# Patient Record
Sex: FEMALE | Race: WHITE | NOT HISPANIC OR LATINO
[De-identification: names, ages, dates, MRNs, and addresses within clinical notes are randomized per-mention and may not be internally consistent; named-entity substitution may affect disease eponyms.]

---

## 2020-01-01 ENCOUNTER — TRANSCRIPTION ENCOUNTER (OUTPATIENT)
Age: 65
End: 2020-01-01

## 2020-01-01 ENCOUNTER — APPOINTMENT (OUTPATIENT)
Dept: OTOLARYNGOLOGY | Facility: CLINIC | Age: 65
End: 2020-01-01
Payer: MEDICARE

## 2020-01-01 ENCOUNTER — INPATIENT (INPATIENT)
Facility: HOSPITAL | Age: 65
LOS: 5 days | Discharge: HOSPICE | DRG: 853 | End: 2020-12-03
Attending: INTERNAL MEDICINE | Admitting: INTERNAL MEDICINE
Payer: MEDICARE

## 2020-01-01 ENCOUNTER — APPOINTMENT (OUTPATIENT)
Dept: THORACIC SURGERY | Facility: CLINIC | Age: 65
End: 2020-01-01
Payer: MEDICARE

## 2020-01-01 ENCOUNTER — APPOINTMENT (OUTPATIENT)
Dept: OTOLARYNGOLOGY | Facility: CLINIC | Age: 65
End: 2020-01-01

## 2020-01-01 ENCOUNTER — APPOINTMENT (OUTPATIENT)
Dept: RHEUMATOLOGY | Facility: CLINIC | Age: 65
End: 2020-01-01

## 2020-01-01 ENCOUNTER — APPOINTMENT (OUTPATIENT)
Dept: RHEUMATOLOGY | Facility: CLINIC | Age: 65
End: 2020-01-01
Payer: MEDICARE

## 2020-01-01 ENCOUNTER — INPATIENT (INPATIENT)
Facility: HOSPITAL | Age: 65
LOS: 21 days | Discharge: HOSPICE | DRG: 871 | End: 2020-12-29
Attending: FAMILY MEDICINE | Admitting: HOSPITALIST
Payer: MEDICARE

## 2020-01-01 VITALS
DIASTOLIC BLOOD PRESSURE: 69 MMHG | SYSTOLIC BLOOD PRESSURE: 113 MMHG | HEART RATE: 120 BPM | OXYGEN SATURATION: 96 % | RESPIRATION RATE: 24 BRPM

## 2020-01-01 VITALS
RESPIRATION RATE: 16 BRPM | OXYGEN SATURATION: 99 % | WEIGHT: 96 LBS | SYSTOLIC BLOOD PRESSURE: 122 MMHG | BODY MASS INDEX: 18.85 KG/M2 | HEART RATE: 87 BPM | DIASTOLIC BLOOD PRESSURE: 74 MMHG | TEMPERATURE: 97.7 F | HEIGHT: 60 IN

## 2020-01-01 VITALS
SYSTOLIC BLOOD PRESSURE: 106 MMHG | HEART RATE: 95 BPM | DIASTOLIC BLOOD PRESSURE: 71 MMHG | WEIGHT: 96 LBS | BODY MASS INDEX: 18.85 KG/M2 | HEIGHT: 60 IN

## 2020-01-01 VITALS
HEIGHT: 60 IN | SYSTOLIC BLOOD PRESSURE: 111 MMHG | WEIGHT: 99 LBS | DIASTOLIC BLOOD PRESSURE: 72 MMHG | HEART RATE: 94 BPM | BODY MASS INDEX: 19.44 KG/M2

## 2020-01-01 VITALS
HEART RATE: 100 BPM | RESPIRATION RATE: 16 BRPM | DIASTOLIC BLOOD PRESSURE: 74 MMHG | HEIGHT: 62 IN | WEIGHT: 89.95 LBS | OXYGEN SATURATION: 100 % | SYSTOLIC BLOOD PRESSURE: 108 MMHG

## 2020-01-01 VITALS
TEMPERATURE: 102 F | RESPIRATION RATE: 20 BRPM | SYSTOLIC BLOOD PRESSURE: 105 MMHG | OXYGEN SATURATION: 94 % | HEART RATE: 72 BPM | DIASTOLIC BLOOD PRESSURE: 62 MMHG

## 2020-01-01 VITALS
SYSTOLIC BLOOD PRESSURE: 114 MMHG | DIASTOLIC BLOOD PRESSURE: 69 MMHG | TEMPERATURE: 98 F | HEART RATE: 108 BPM | OXYGEN SATURATION: 90 % | RESPIRATION RATE: 20 BRPM

## 2020-01-01 VITALS
RESPIRATION RATE: 16 BRPM | HEART RATE: 84 BPM | OXYGEN SATURATION: 96 % | SYSTOLIC BLOOD PRESSURE: 117 MMHG | BODY MASS INDEX: 19.44 KG/M2 | DIASTOLIC BLOOD PRESSURE: 76 MMHG | WEIGHT: 99 LBS | HEIGHT: 60 IN | TEMPERATURE: 97.7 F

## 2020-01-01 VITALS
HEIGHT: 60 IN | WEIGHT: 103 LBS | TEMPERATURE: 98.4 F | DIASTOLIC BLOOD PRESSURE: 82 MMHG | BODY MASS INDEX: 20.22 KG/M2 | HEART RATE: 98 BPM | SYSTOLIC BLOOD PRESSURE: 136 MMHG

## 2020-01-01 VITALS
WEIGHT: 100 LBS | BODY MASS INDEX: 19.63 KG/M2 | HEIGHT: 60 IN | DIASTOLIC BLOOD PRESSURE: 94 MMHG | SYSTOLIC BLOOD PRESSURE: 142 MMHG | OXYGEN SATURATION: 85 % | TEMPERATURE: 98.8 F | HEART RATE: 105 BPM

## 2020-01-01 DIAGNOSIS — A41.9 SEPSIS, UNSPECIFIED ORGANISM: ICD-10-CM

## 2020-01-01 DIAGNOSIS — R53.1 WEAKNESS: ICD-10-CM

## 2020-01-01 DIAGNOSIS — Z29.9 ENCOUNTER FOR PROPHYLACTIC MEASURES, UNSPECIFIED: ICD-10-CM

## 2020-01-01 DIAGNOSIS — K94.20 GASTROSTOMY COMPLICATION, UNSPECIFIED: ICD-10-CM

## 2020-01-01 DIAGNOSIS — L02.419 CUTANEOUS ABSCESS OF LIMB, UNSPECIFIED: ICD-10-CM

## 2020-01-01 DIAGNOSIS — Z00.00 ENCOUNTER FOR GENERAL ADULT MEDICAL EXAMINATION W/OUT ABNORMAL FINDINGS: ICD-10-CM

## 2020-01-01 DIAGNOSIS — Z86.59 PERSONAL HISTORY OF OTHER MENTAL AND BEHAVIORAL DISORDERS: ICD-10-CM

## 2020-01-01 DIAGNOSIS — J38.01 PARALYSIS OF VOCAL CORDS AND LARYNX, UNILATERAL: ICD-10-CM

## 2020-01-01 DIAGNOSIS — Z87.39 PERSONAL HISTORY OF OTHER DISEASES OF THE MUSCULOSKELETAL SYSTEM AND CONNECTIVE TISSUE: ICD-10-CM

## 2020-01-01 DIAGNOSIS — R41.82 ALTERED MENTAL STATUS, UNSPECIFIED: ICD-10-CM

## 2020-01-01 DIAGNOSIS — R13.10 DYSPHAGIA, UNSPECIFIED: ICD-10-CM

## 2020-01-01 DIAGNOSIS — J30.0 VASOMOTOR RHINITIS: ICD-10-CM

## 2020-01-01 DIAGNOSIS — J44.9 CHRONIC OBSTRUCTIVE PULMONARY DISEASE, UNSPECIFIED: ICD-10-CM

## 2020-01-01 DIAGNOSIS — M25.559 PAIN IN UNSPECIFIED HIP: ICD-10-CM

## 2020-01-01 DIAGNOSIS — R06.00 DYSPNEA, UNSPECIFIED: ICD-10-CM

## 2020-01-01 DIAGNOSIS — J69.0 PNEUMONITIS DUE TO INHALATION OF FOOD AND VOMIT: ICD-10-CM

## 2020-01-01 DIAGNOSIS — J38.00 PARALYSIS OF VOCAL CORDS AND LARYNX, UNSPECIFIED: ICD-10-CM

## 2020-01-01 DIAGNOSIS — C18.9 MALIGNANT NEOPLASM OF COLON, UNSPECIFIED: ICD-10-CM

## 2020-01-01 DIAGNOSIS — Z82.49 FAMILY HISTORY OF ISCHEMIC HEART DISEASE AND OTHER DISEASES OF THE CIRCULATORY SYSTEM: ICD-10-CM

## 2020-01-01 DIAGNOSIS — M54.9 DORSALGIA, UNSPECIFIED: ICD-10-CM

## 2020-01-01 DIAGNOSIS — M06.9 RHEUMATOID ARTHRITIS, UNSPECIFIED: ICD-10-CM

## 2020-01-01 DIAGNOSIS — F32.9 MAJOR DEPRESSIVE DISORDER, SINGLE EPISODE, UNSPECIFIED: ICD-10-CM

## 2020-01-01 DIAGNOSIS — Z79.899 OTHER LONG TERM (CURRENT) DRUG THERAPY: ICD-10-CM

## 2020-01-01 DIAGNOSIS — R10.9 UNSPECIFIED ABDOMINAL PAIN: ICD-10-CM

## 2020-01-01 DIAGNOSIS — B37.0 CANDIDAL STOMATITIS: ICD-10-CM

## 2020-01-01 DIAGNOSIS — Z51.5 ENCOUNTER FOR PALLIATIVE CARE: ICD-10-CM

## 2020-01-01 DIAGNOSIS — E43 UNSPECIFIED SEVERE PROTEIN-CALORIE MALNUTRITION: ICD-10-CM

## 2020-01-01 DIAGNOSIS — K22.5 DIVERTICULUM OF ESOPHAGUS, ACQUIRED: ICD-10-CM

## 2020-01-01 DIAGNOSIS — Z98.890 OTHER SPECIFIED POSTPROCEDURAL STATES: Chronic | ICD-10-CM

## 2020-01-01 DIAGNOSIS — Z87.891 PERSONAL HISTORY OF NICOTINE DEPENDENCE: ICD-10-CM

## 2020-01-01 DIAGNOSIS — Z63.5 DISRUPTION OF FAMILY BY SEPARATION AND DIVORCE: ICD-10-CM

## 2020-01-01 DIAGNOSIS — K11.7 DISTURBANCES OF SALIVARY SECRETION: ICD-10-CM

## 2020-01-01 DIAGNOSIS — K21.9 GASTRO-ESOPHAGEAL REFLUX DISEASE WITHOUT ESOPHAGITIS: ICD-10-CM

## 2020-01-01 DIAGNOSIS — D64.9 ANEMIA, UNSPECIFIED: ICD-10-CM

## 2020-01-01 DIAGNOSIS — I10 ESSENTIAL (PRIMARY) HYPERTENSION: ICD-10-CM

## 2020-01-01 DIAGNOSIS — Z90.49 ACQUIRED ABSENCE OF OTHER SPECIFIED PARTS OF DIGESTIVE TRACT: Chronic | ICD-10-CM

## 2020-01-01 DIAGNOSIS — Z78.9 OTHER SPECIFIED HEALTH STATUS: Chronic | ICD-10-CM

## 2020-01-01 DIAGNOSIS — F41.9 ANXIETY DISORDER, UNSPECIFIED: ICD-10-CM

## 2020-01-01 DIAGNOSIS — Z87.09 PERSONAL HISTORY OF OTHER DISEASES OF THE RESPIRATORY SYSTEM: ICD-10-CM

## 2020-01-01 DIAGNOSIS — R78.81 BACTEREMIA: ICD-10-CM

## 2020-01-01 LAB
-  AMPICILLIN: SIGNIFICANT CHANGE UP
-  AMPICILLIN: SIGNIFICANT CHANGE UP
-  CANDIDA ALBICANS: SIGNIFICANT CHANGE UP
-  CANDIDA GLABRATA: SIGNIFICANT CHANGE UP
-  CANDIDA KRUSEI: SIGNIFICANT CHANGE UP
-  CANDIDA PARAPSILOSIS: SIGNIFICANT CHANGE UP
-  CANDIDA TROPICALIS: SIGNIFICANT CHANGE UP
-  COAGULASE NEGATIVE STAPHYLOCOCCUS: SIGNIFICANT CHANGE UP
-  GENTAMICIN SYNERGY: SIGNIFICANT CHANGE UP
-  K. PNEUMONIAE GROUP: SIGNIFICANT CHANGE UP
-  KPC RESISTANCE GENE: SIGNIFICANT CHANGE UP
-  STREPTOCOCCUS SP. (NOT GRP A, B OR S PNEUMONIAE): SIGNIFICANT CHANGE UP
-  STREPTOMYCIN SYNERGY: SIGNIFICANT CHANGE UP
-  TETRACYCLINE: SIGNIFICANT CHANGE UP
-  VANCOMYCIN: SIGNIFICANT CHANGE UP
-  VANCOMYCIN: SIGNIFICANT CHANGE UP
25(OH)D3 SERPL-MCNC: 29.7 NG/ML
A BAUMANNII DNA SPEC QL NAA+PROBE: SIGNIFICANT CHANGE UP
A1C WITH ESTIMATED AVERAGE GLUCOSE RESULT: 5.4 % — SIGNIFICANT CHANGE UP (ref 4–5.6)
ALBUMIN MFR SERPL ELPH: 47 %
ALBUMIN SERPL ELPH-MCNC: 1.2 G/DL — LOW (ref 3.3–5)
ALBUMIN SERPL ELPH-MCNC: 1.2 G/DL — LOW (ref 3.3–5)
ALBUMIN SERPL ELPH-MCNC: 1.3 G/DL — LOW (ref 3.3–5)
ALBUMIN SERPL ELPH-MCNC: 1.6 G/DL — LOW (ref 3.3–5)
ALBUMIN SERPL ELPH-MCNC: 1.7 G/DL — LOW (ref 3.3–5)
ALBUMIN SERPL ELPH-MCNC: 2.2 G/DL — LOW (ref 3.3–5)
ALBUMIN SERPL ELPH-MCNC: 2.7 G/DL — LOW (ref 3.3–5)
ALBUMIN SERPL ELPH-MCNC: 2.7 G/DL — LOW (ref 3.3–5)
ALBUMIN SERPL ELPH-MCNC: 3 G/DL — LOW (ref 3.3–5)
ALBUMIN SERPL ELPH-MCNC: 3.2 G/DL — LOW (ref 3.3–5)
ALBUMIN SERPL ELPH-MCNC: 3.4 G/DL
ALBUMIN SERPL-MCNC: 3.1 G/DL
ALBUMIN/GLOB SERPL: 0.9 RATIO
ALBUPE: 18.1 %
ALP BLD-CCNC: 107 U/L
ALP SERPL-CCNC: 102 U/L — SIGNIFICANT CHANGE UP (ref 40–120)
ALP SERPL-CCNC: 102 U/L — SIGNIFICANT CHANGE UP (ref 40–120)
ALP SERPL-CCNC: 104 U/L — SIGNIFICANT CHANGE UP (ref 40–120)
ALP SERPL-CCNC: 111 U/L — SIGNIFICANT CHANGE UP (ref 40–120)
ALP SERPL-CCNC: 122 U/L — HIGH (ref 40–120)
ALP SERPL-CCNC: 78 U/L — SIGNIFICANT CHANGE UP (ref 40–120)
ALP SERPL-CCNC: 80 U/L — SIGNIFICANT CHANGE UP (ref 40–120)
ALP SERPL-CCNC: 89 U/L — SIGNIFICANT CHANGE UP (ref 40–120)
ALP SERPL-CCNC: 94 U/L — SIGNIFICANT CHANGE UP (ref 40–120)
ALP SERPL-CCNC: 95 U/L — SIGNIFICANT CHANGE UP (ref 40–120)
ALP SERPL-CCNC: 96 U/L — SIGNIFICANT CHANGE UP (ref 40–120)
ALP SERPL-CCNC: 99 U/L — SIGNIFICANT CHANGE UP (ref 40–120)
ALPHA1 GLOB MFR SERPL ELPH: 7.2 %
ALPHA1 GLOB SERPL ELPH-MCNC: 0.5 G/DL
ALPHA1UPE: 33.1 %
ALPHA2 GLOB MFR SERPL ELPH: 15.2 %
ALPHA2 GLOB SERPL ELPH-MCNC: 1 G/DL
ALPHA2UPE: 23.3 %
ALT FLD-CCNC: 11 U/L — LOW (ref 12–78)
ALT FLD-CCNC: 12 U/L — SIGNIFICANT CHANGE UP (ref 10–45)
ALT FLD-CCNC: 12 U/L — SIGNIFICANT CHANGE UP (ref 12–78)
ALT FLD-CCNC: 13 U/L — SIGNIFICANT CHANGE UP (ref 12–78)
ALT FLD-CCNC: 6 U/L — LOW (ref 10–45)
ALT FLD-CCNC: 6 U/L — LOW (ref 10–45)
ALT FLD-CCNC: 7 U/L — LOW (ref 12–78)
ALT FLD-CCNC: 7 U/L — LOW (ref 12–78)
ALT FLD-CCNC: 8 U/L — LOW (ref 12–78)
ALT FLD-CCNC: 9 U/L — LOW (ref 10–45)
ALT SERPL-CCNC: 18 U/L
ANA SER IF-ACNC: NEGATIVE
ANION GAP SERPL CALC-SCNC: 10 MMOL/L — SIGNIFICANT CHANGE UP (ref 5–17)
ANION GAP SERPL CALC-SCNC: 11 MMOL/L — SIGNIFICANT CHANGE UP (ref 5–17)
ANION GAP SERPL CALC-SCNC: 12 MMOL/L — SIGNIFICANT CHANGE UP (ref 5–17)
ANION GAP SERPL CALC-SCNC: 13 MMOL/L
ANION GAP SERPL CALC-SCNC: 15 MMOL/L — SIGNIFICANT CHANGE UP (ref 5–17)
ANION GAP SERPL CALC-SCNC: 5 MMOL/L — SIGNIFICANT CHANGE UP (ref 5–17)
ANION GAP SERPL CALC-SCNC: 6 MMOL/L — SIGNIFICANT CHANGE UP (ref 5–17)
ANION GAP SERPL CALC-SCNC: 8 MMOL/L — SIGNIFICANT CHANGE UP (ref 5–17)
ANION GAP SERPL CALC-SCNC: 8 MMOL/L — SIGNIFICANT CHANGE UP (ref 5–17)
ANION GAP SERPL CALC-SCNC: 9 MMOL/L — SIGNIFICANT CHANGE UP (ref 5–17)
APPEARANCE UR: CLEAR — SIGNIFICANT CHANGE UP
APPEARANCE UR: CLEAR — SIGNIFICANT CHANGE UP
APTT BLD: 31.5 SEC — SIGNIFICANT CHANGE UP (ref 27.5–35.5)
APTT BLD: 33 SEC — SIGNIFICANT CHANGE UP (ref 27.5–35.5)
APTT BLD: 35.8 SEC — HIGH (ref 27.5–35.5)
AST SERPL-CCNC: 10 U/L — SIGNIFICANT CHANGE UP (ref 10–40)
AST SERPL-CCNC: 11 U/L — SIGNIFICANT CHANGE UP (ref 10–40)
AST SERPL-CCNC: 12 U/L — LOW (ref 15–37)
AST SERPL-CCNC: 12 U/L — LOW (ref 15–37)
AST SERPL-CCNC: 13 U/L — LOW (ref 15–37)
AST SERPL-CCNC: 14 U/L — SIGNIFICANT CHANGE UP (ref 10–40)
AST SERPL-CCNC: 15 U/L — SIGNIFICANT CHANGE UP (ref 15–37)
AST SERPL-CCNC: 15 U/L — SIGNIFICANT CHANGE UP (ref 15–37)
AST SERPL-CCNC: 18 U/L — SIGNIFICANT CHANGE UP (ref 15–37)
AST SERPL-CCNC: 19 U/L — SIGNIFICANT CHANGE UP (ref 10–40)
AST SERPL-CCNC: 19 U/L — SIGNIFICANT CHANGE UP (ref 15–37)
AST SERPL-CCNC: 20 U/L
AST SERPL-CCNC: 9 U/L — LOW (ref 15–37)
B-GLOBULIN MFR SERPL ELPH: 13 %
B-GLOBULIN SERPL ELPH-MCNC: 0.8 G/DL
BACTERIA # UR AUTO: ABNORMAL
BASOPHILS # BLD AUTO: 0 K/UL — SIGNIFICANT CHANGE UP (ref 0–0.2)
BASOPHILS # BLD AUTO: 0.02 K/UL — SIGNIFICANT CHANGE UP (ref 0–0.2)
BASOPHILS # BLD AUTO: 0.03 K/UL — SIGNIFICANT CHANGE UP (ref 0–0.2)
BASOPHILS # BLD AUTO: 0.04 K/UL — SIGNIFICANT CHANGE UP (ref 0–0.2)
BASOPHILS # BLD AUTO: 0.05 K/UL — SIGNIFICANT CHANGE UP (ref 0–0.2)
BASOPHILS # BLD AUTO: 0.06 K/UL — SIGNIFICANT CHANGE UP (ref 0–0.2)
BASOPHILS # BLD AUTO: 0.06 K/UL — SIGNIFICANT CHANGE UP (ref 0–0.2)
BASOPHILS # BLD AUTO: 0.07 K/UL
BASOPHILS # BLD AUTO: 0.08 K/UL — SIGNIFICANT CHANGE UP (ref 0–0.2)
BASOPHILS NFR BLD AUTO: 0 % — SIGNIFICANT CHANGE UP (ref 0–2)
BASOPHILS NFR BLD AUTO: 0.2 % — SIGNIFICANT CHANGE UP (ref 0–2)
BASOPHILS NFR BLD AUTO: 0.3 %
BASOPHILS NFR BLD AUTO: 0.3 % — SIGNIFICANT CHANGE UP (ref 0–2)
BASOPHILS NFR BLD AUTO: 0.4 % — SIGNIFICANT CHANGE UP (ref 0–2)
BASOPHILS NFR BLD AUTO: 0.4 % — SIGNIFICANT CHANGE UP (ref 0–2)
BASOPHILS NFR BLD AUTO: 0.5 % — SIGNIFICANT CHANGE UP (ref 0–2)
BETAUPE: 15.6 %
BILIRUB DIRECT SERPL-MCNC: <.1 MG/DL — SIGNIFICANT CHANGE UP (ref 0.05–0.2)
BILIRUB DIRECT SERPL-MCNC: <.1 MG/DL — SIGNIFICANT CHANGE UP (ref 0.05–0.2)
BILIRUB INDIRECT FLD-MCNC: >0.1 MG/DL — LOW (ref 0.2–1)
BILIRUB INDIRECT FLD-MCNC: >0.2 MG/DL — SIGNIFICANT CHANGE UP (ref 0.2–1)
BILIRUB SERPL-MCNC: 0.2 MG/DL — SIGNIFICANT CHANGE UP (ref 0.2–1.2)
BILIRUB SERPL-MCNC: 0.3 MG/DL
BILIRUB SERPL-MCNC: 0.3 MG/DL — SIGNIFICANT CHANGE UP (ref 0.2–1.2)
BILIRUB SERPL-MCNC: 0.4 MG/DL — SIGNIFICANT CHANGE UP (ref 0.2–1.2)
BILIRUB SERPL-MCNC: 0.5 MG/DL — SIGNIFICANT CHANGE UP (ref 0.2–1.2)
BILIRUB UR-MCNC: NEGATIVE — SIGNIFICANT CHANGE UP
BILIRUB UR-MCNC: NEGATIVE — SIGNIFICANT CHANGE UP
BLD GP AB SCN SERPL QL: NEGATIVE — SIGNIFICANT CHANGE UP
BLD GP AB SCN SERPL QL: NEGATIVE — SIGNIFICANT CHANGE UP
BLD GP AB SCN SERPL QL: SIGNIFICANT CHANGE UP
BUN SERPL-MCNC: 11 MG/DL — SIGNIFICANT CHANGE UP (ref 7–23)
BUN SERPL-MCNC: 12 MG/DL — SIGNIFICANT CHANGE UP (ref 7–23)
BUN SERPL-MCNC: 13 MG/DL — SIGNIFICANT CHANGE UP (ref 7–23)
BUN SERPL-MCNC: 14 MG/DL — SIGNIFICANT CHANGE UP (ref 7–23)
BUN SERPL-MCNC: 14 MG/DL — SIGNIFICANT CHANGE UP (ref 7–23)
BUN SERPL-MCNC: 15 MG/DL — SIGNIFICANT CHANGE UP (ref 7–23)
BUN SERPL-MCNC: 16 MG/DL — SIGNIFICANT CHANGE UP (ref 7–23)
BUN SERPL-MCNC: 18 MG/DL — SIGNIFICANT CHANGE UP (ref 7–23)
BUN SERPL-MCNC: 19 MG/DL — SIGNIFICANT CHANGE UP (ref 7–23)
BUN SERPL-MCNC: 19 MG/DL — SIGNIFICANT CHANGE UP (ref 7–23)
BUN SERPL-MCNC: 20 MG/DL — SIGNIFICANT CHANGE UP (ref 7–23)
BUN SERPL-MCNC: 22 MG/DL — SIGNIFICANT CHANGE UP (ref 7–23)
BUN SERPL-MCNC: 22 MG/DL — SIGNIFICANT CHANGE UP (ref 7–23)
BUN SERPL-MCNC: 24 MG/DL — HIGH (ref 7–23)
BUN SERPL-MCNC: 3 MG/DL — LOW (ref 7–23)
BUN SERPL-MCNC: 40 MG/DL
BUN SERPL-MCNC: 5 MG/DL — LOW (ref 7–23)
BUN SERPL-MCNC: 5 MG/DL — LOW (ref 7–23)
BUN SERPL-MCNC: 8 MG/DL — SIGNIFICANT CHANGE UP (ref 7–23)
BUN SERPL-MCNC: 8 MG/DL — SIGNIFICANT CHANGE UP (ref 7–23)
C3 SERPL-MCNC: 88 MG/DL
C4 SERPL-MCNC: 17 MG/DL
CALCIUM SERPL-MCNC: 5.6 MG/DL — CRITICAL LOW (ref 8.4–10.5)
CALCIUM SERPL-MCNC: 7.1 MG/DL — LOW (ref 8.5–10.1)
CALCIUM SERPL-MCNC: 7.5 MG/DL — LOW (ref 8.5–10.1)
CALCIUM SERPL-MCNC: 7.7 MG/DL — LOW (ref 8.5–10.1)
CALCIUM SERPL-MCNC: 7.8 MG/DL — LOW (ref 8.5–10.1)
CALCIUM SERPL-MCNC: 7.8 MG/DL — LOW (ref 8.5–10.1)
CALCIUM SERPL-MCNC: 8.1 MG/DL — LOW (ref 8.4–10.5)
CALCIUM SERPL-MCNC: 8.1 MG/DL — LOW (ref 8.5–10.1)
CALCIUM SERPL-MCNC: 8.2 MG/DL — LOW (ref 8.4–10.5)
CALCIUM SERPL-MCNC: 8.2 MG/DL — LOW (ref 8.5–10.1)
CALCIUM SERPL-MCNC: 8.2 MG/DL — LOW (ref 8.5–10.1)
CALCIUM SERPL-MCNC: 8.3 MG/DL — LOW (ref 8.4–10.5)
CALCIUM SERPL-MCNC: 8.3 MG/DL — LOW (ref 8.4–10.5)
CALCIUM SERPL-MCNC: 8.4 MG/DL — SIGNIFICANT CHANGE UP (ref 8.4–10.5)
CALCIUM SERPL-MCNC: 8.5 MG/DL — SIGNIFICANT CHANGE UP (ref 8.4–10.5)
CALCIUM SERPL-MCNC: 8.5 MG/DL — SIGNIFICANT CHANGE UP (ref 8.4–10.5)
CALCIUM SERPL-MCNC: 8.6 MG/DL — SIGNIFICANT CHANGE UP (ref 8.4–10.5)
CALCIUM SERPL-MCNC: 8.7 MG/DL — SIGNIFICANT CHANGE UP (ref 8.4–10.5)
CALCIUM SERPL-MCNC: 8.8 MG/DL — SIGNIFICANT CHANGE UP (ref 8.4–10.5)
CALCIUM SERPL-MCNC: 9 MG/DL — SIGNIFICANT CHANGE UP (ref 8.4–10.5)
CALCIUM SERPL-MCNC: 9.1 MG/DL — SIGNIFICANT CHANGE UP (ref 8.4–10.5)
CALCIUM SERPL-MCNC: 9.5 MG/DL
CCP AB SER IA-ACNC: 128 UNITS
CEA SERPL-MCNC: 2.6 NG/ML — SIGNIFICANT CHANGE UP (ref 0–3.8)
CHLORIDE SERPL-SCNC: 100 MMOL/L — SIGNIFICANT CHANGE UP (ref 96–108)
CHLORIDE SERPL-SCNC: 101 MMOL/L — SIGNIFICANT CHANGE UP (ref 96–108)
CHLORIDE SERPL-SCNC: 102 MMOL/L — SIGNIFICANT CHANGE UP (ref 96–108)
CHLORIDE SERPL-SCNC: 103 MMOL/L — SIGNIFICANT CHANGE UP (ref 96–108)
CHLORIDE SERPL-SCNC: 104 MMOL/L — SIGNIFICANT CHANGE UP (ref 96–108)
CHLORIDE SERPL-SCNC: 90 MMOL/L — LOW (ref 96–108)
CHLORIDE SERPL-SCNC: 92 MMOL/L — LOW (ref 96–108)
CHLORIDE SERPL-SCNC: 92 MMOL/L — LOW (ref 96–108)
CHLORIDE SERPL-SCNC: 93 MMOL/L
CHLORIDE SERPL-SCNC: 94 MMOL/L — LOW (ref 96–108)
CHLORIDE SERPL-SCNC: 95 MMOL/L — LOW (ref 96–108)
CHLORIDE SERPL-SCNC: 96 MMOL/L — SIGNIFICANT CHANGE UP (ref 96–108)
CHLORIDE SERPL-SCNC: 96 MMOL/L — SIGNIFICANT CHANGE UP (ref 96–108)
CHLORIDE SERPL-SCNC: 97 MMOL/L — SIGNIFICANT CHANGE UP (ref 96–108)
CHLORIDE SERPL-SCNC: 99 MMOL/L — SIGNIFICANT CHANGE UP (ref 96–108)
CHLORIDE SERPL-SCNC: <70 MMOL/L — LOW (ref 96–108)
CK SERPL-CCNC: 10 U/L — LOW (ref 25–170)
CK SERPL-CCNC: 16 U/L — LOW (ref 26–192)
CK SERPL-CCNC: 23 U/L — LOW (ref 26–192)
CK SERPL-CCNC: 32 U/L — SIGNIFICANT CHANGE UP (ref 25–170)
CK SERPL-CCNC: 61 U/L — SIGNIFICANT CHANGE UP (ref 26–192)
CO2 SERPL-SCNC: 19 MMOL/L — LOW (ref 22–31)
CO2 SERPL-SCNC: 23 MMOL/L — SIGNIFICANT CHANGE UP (ref 22–31)
CO2 SERPL-SCNC: 25 MMOL/L — SIGNIFICANT CHANGE UP (ref 22–31)
CO2 SERPL-SCNC: 25 MMOL/L — SIGNIFICANT CHANGE UP (ref 22–31)
CO2 SERPL-SCNC: 26 MMOL/L — SIGNIFICANT CHANGE UP (ref 22–31)
CO2 SERPL-SCNC: 26 MMOL/L — SIGNIFICANT CHANGE UP (ref 22–31)
CO2 SERPL-SCNC: 27 MMOL/L — SIGNIFICANT CHANGE UP (ref 22–31)
CO2 SERPL-SCNC: 28 MMOL/L — SIGNIFICANT CHANGE UP (ref 22–31)
CO2 SERPL-SCNC: 29 MMOL/L — SIGNIFICANT CHANGE UP (ref 22–31)
CO2 SERPL-SCNC: 29 MMOL/L — SIGNIFICANT CHANGE UP (ref 22–31)
CO2 SERPL-SCNC: 30 MMOL/L — SIGNIFICANT CHANGE UP (ref 22–31)
CO2 SERPL-SCNC: 31 MMOL/L — SIGNIFICANT CHANGE UP (ref 22–31)
CO2 SERPL-SCNC: 34 MMOL/L
CO2 SERPL-SCNC: 34 MMOL/L — HIGH (ref 22–31)
COLOR SPEC: YELLOW — SIGNIFICANT CHANGE UP
COLOR SPEC: YELLOW — SIGNIFICANT CHANGE UP
CREAT 24H UR-MCNC: NORMAL G/24 H
CREAT SERPL-MCNC: 0.21 MG/DL — LOW (ref 0.5–1.3)
CREAT SERPL-MCNC: 0.25 MG/DL — LOW (ref 0.5–1.3)
CREAT SERPL-MCNC: 0.29 MG/DL — LOW (ref 0.5–1.3)
CREAT SERPL-MCNC: 0.31 MG/DL — LOW (ref 0.5–1.3)
CREAT SERPL-MCNC: 0.31 MG/DL — LOW (ref 0.5–1.3)
CREAT SERPL-MCNC: 0.32 MG/DL — LOW (ref 0.5–1.3)
CREAT SERPL-MCNC: 0.32 MG/DL — LOW (ref 0.5–1.3)
CREAT SERPL-MCNC: 0.33 MG/DL — LOW (ref 0.5–1.3)
CREAT SERPL-MCNC: 0.34 MG/DL — LOW (ref 0.5–1.3)
CREAT SERPL-MCNC: 0.35 MG/DL — LOW (ref 0.5–1.3)
CREAT SERPL-MCNC: 0.36 MG/DL — LOW (ref 0.5–1.3)
CREAT SERPL-MCNC: 0.4 MG/DL — LOW (ref 0.5–1.3)
CREAT SERPL-MCNC: 0.43 MG/DL — LOW (ref 0.5–1.3)
CREAT SERPL-MCNC: 0.6 MG/DL
CREAT SERPL-MCNC: <0.2 MG/DL — LOW (ref 0.5–1.3)
CREAT SERPL-MCNC: <0.2 MG/DL — LOW (ref 0.5–1.3)
CREAT SERPL-MCNC: <0.3 MG/DL — LOW (ref 0.5–1.3)
CREATININE UR (MAYO): 101 MG/DL
CRP SERPL-MCNC: 20.94 MG/DL — HIGH (ref 0–0.4)
CRP SERPL-MCNC: 5.83 MG/DL
CULTURE RESULTS: SIGNIFICANT CHANGE UP
DIFF PNL FLD: ABNORMAL
DIFF PNL FLD: ABNORMAL
DSDNA AB SER-ACNC: <12 IU/ML
E CLOAC COMP DNA BLD POS QL NAA+PROBE: SIGNIFICANT CHANGE UP
E COLI DNA BLD POS QL NAA+NON-PROBE: SIGNIFICANT CHANGE UP
ENA RNP AB SER IA-ACNC: <0.2 AL
ENA SM AB SER IA-ACNC: <0.2 AL
ENA SS-A AB SER IA-ACNC: <0.2 AL
ENA SS-B AB SER IA-ACNC: <0.2 AL
ENTEROCOC DNA BLD POS QL NAA+NON-PROBE: SIGNIFICANT CHANGE UP
EOSINOPHIL # BLD AUTO: 0 K/UL — SIGNIFICANT CHANGE UP (ref 0–0.5)
EOSINOPHIL # BLD AUTO: 0 K/UL — SIGNIFICANT CHANGE UP (ref 0–0.5)
EOSINOPHIL # BLD AUTO: 0.02 K/UL
EOSINOPHIL # BLD AUTO: 0.08 K/UL — SIGNIFICANT CHANGE UP (ref 0–0.5)
EOSINOPHIL # BLD AUTO: 0.11 K/UL — SIGNIFICANT CHANGE UP (ref 0–0.5)
EOSINOPHIL # BLD AUTO: 0.12 K/UL — SIGNIFICANT CHANGE UP (ref 0–0.5)
EOSINOPHIL # BLD AUTO: 0.12 K/UL — SIGNIFICANT CHANGE UP (ref 0–0.5)
EOSINOPHIL # BLD AUTO: 0.14 K/UL — SIGNIFICANT CHANGE UP (ref 0–0.5)
EOSINOPHIL # BLD AUTO: 0.16 K/UL — SIGNIFICANT CHANGE UP (ref 0–0.5)
EOSINOPHIL # BLD AUTO: 0.16 K/UL — SIGNIFICANT CHANGE UP (ref 0–0.5)
EOSINOPHIL # BLD AUTO: 0.17 K/UL — SIGNIFICANT CHANGE UP (ref 0–0.5)
EOSINOPHIL # BLD AUTO: 0.18 K/UL — SIGNIFICANT CHANGE UP (ref 0–0.5)
EOSINOPHIL # BLD AUTO: 0.2 K/UL — SIGNIFICANT CHANGE UP (ref 0–0.5)
EOSINOPHIL # BLD AUTO: 0.21 K/UL — SIGNIFICANT CHANGE UP (ref 0–0.5)
EOSINOPHIL # BLD AUTO: 0.22 K/UL — SIGNIFICANT CHANGE UP (ref 0–0.5)
EOSINOPHIL # BLD AUTO: 0.24 K/UL — SIGNIFICANT CHANGE UP (ref 0–0.5)
EOSINOPHIL # BLD AUTO: 0.24 K/UL — SIGNIFICANT CHANGE UP (ref 0–0.5)
EOSINOPHIL NFR BLD AUTO: 0 % — SIGNIFICANT CHANGE UP (ref 0–6)
EOSINOPHIL NFR BLD AUTO: 0 % — SIGNIFICANT CHANGE UP (ref 0–6)
EOSINOPHIL NFR BLD AUTO: 0.1 %
EOSINOPHIL NFR BLD AUTO: 0.5 % — SIGNIFICANT CHANGE UP (ref 0–6)
EOSINOPHIL NFR BLD AUTO: 0.6 % — SIGNIFICANT CHANGE UP (ref 0–6)
EOSINOPHIL NFR BLD AUTO: 0.6 % — SIGNIFICANT CHANGE UP (ref 0–6)
EOSINOPHIL NFR BLD AUTO: 0.7 % — SIGNIFICANT CHANGE UP (ref 0–6)
EOSINOPHIL NFR BLD AUTO: 0.8 % — SIGNIFICANT CHANGE UP (ref 0–6)
EOSINOPHIL NFR BLD AUTO: 0.9 % — SIGNIFICANT CHANGE UP (ref 0–6)
EOSINOPHIL NFR BLD AUTO: 0.9 % — SIGNIFICANT CHANGE UP (ref 0–6)
EOSINOPHIL NFR BLD AUTO: 1.2 % — SIGNIFICANT CHANGE UP (ref 0–6)
EOSINOPHIL NFR BLD AUTO: 1.4 % — SIGNIFICANT CHANGE UP (ref 0–6)
EOSINOPHIL NFR BLD AUTO: 1.5 % — SIGNIFICANT CHANGE UP (ref 0–6)
EOSINOPHIL NFR BLD AUTO: 1.9 % — SIGNIFICANT CHANGE UP (ref 0–6)
EOSINOPHIL NFR BLD AUTO: 2.2 % — SIGNIFICANT CHANGE UP (ref 0–6)
EOSINOPHIL NFR BLD AUTO: 2.7 % — SIGNIFICANT CHANGE UP (ref 0–6)
EPI CELLS # UR: SIGNIFICANT CHANGE UP
ERYTHROCYTE [SEDIMENTATION RATE] IN BLOOD BY WESTERGREN METHOD: 96 MM/HR
ERYTHROCYTE [SEDIMENTATION RATE] IN BLOOD: 98 MM/HR — HIGH (ref 0–20)
ESTIMATED AVERAGE GLUCOSE: 108 MG/DL — SIGNIFICANT CHANGE UP (ref 68–114)
G6PD SER-CCNC: 22.8 U/G HGB
GAMMA GLOB FLD ELPH-MCNC: 1.1 G/DL
GAMMA GLOB MFR SERPL ELPH: 17.6 %
GAMMAUPE: 9.9 %
GAS PNL BLDV: SIGNIFICANT CHANGE UP
GLUCOSE BLDC GLUCOMTR-MCNC: 100 MG/DL — HIGH (ref 70–99)
GLUCOSE BLDC GLUCOMTR-MCNC: 107 MG/DL — HIGH (ref 70–99)
GLUCOSE BLDC GLUCOMTR-MCNC: 107 MG/DL — HIGH (ref 70–99)
GLUCOSE BLDC GLUCOMTR-MCNC: 115 MG/DL — HIGH (ref 70–99)
GLUCOSE BLDC GLUCOMTR-MCNC: 115 MG/DL — HIGH (ref 70–99)
GLUCOSE BLDC GLUCOMTR-MCNC: 121 MG/DL — HIGH (ref 70–99)
GLUCOSE BLDC GLUCOMTR-MCNC: 122 MG/DL — HIGH (ref 70–99)
GLUCOSE BLDC GLUCOMTR-MCNC: 133 MG/DL — HIGH (ref 70–99)
GLUCOSE BLDC GLUCOMTR-MCNC: 139 MG/DL — HIGH (ref 70–99)
GLUCOSE BLDC GLUCOMTR-MCNC: 150 MG/DL — HIGH (ref 70–99)
GLUCOSE BLDC GLUCOMTR-MCNC: 156 MG/DL — HIGH (ref 70–99)
GLUCOSE BLDC GLUCOMTR-MCNC: 165 MG/DL — HIGH (ref 70–99)
GLUCOSE BLDC GLUCOMTR-MCNC: 179 MG/DL — HIGH (ref 70–99)
GLUCOSE BLDC GLUCOMTR-MCNC: 181 MG/DL — HIGH (ref 70–99)
GLUCOSE BLDC GLUCOMTR-MCNC: 194 MG/DL — HIGH (ref 70–99)
GLUCOSE BLDC GLUCOMTR-MCNC: 197 MG/DL — HIGH (ref 70–99)
GLUCOSE BLDC GLUCOMTR-MCNC: 64 MG/DL — LOW (ref 70–99)
GLUCOSE BLDC GLUCOMTR-MCNC: 79 MG/DL — SIGNIFICANT CHANGE UP (ref 70–99)
GLUCOSE BLDC GLUCOMTR-MCNC: 79 MG/DL — SIGNIFICANT CHANGE UP (ref 70–99)
GLUCOSE BLDC GLUCOMTR-MCNC: 80 MG/DL — SIGNIFICANT CHANGE UP (ref 70–99)
GLUCOSE BLDC GLUCOMTR-MCNC: 82 MG/DL — SIGNIFICANT CHANGE UP (ref 70–99)
GLUCOSE BLDC GLUCOMTR-MCNC: 85 MG/DL — SIGNIFICANT CHANGE UP (ref 70–99)
GLUCOSE BLDC GLUCOMTR-MCNC: 85 MG/DL — SIGNIFICANT CHANGE UP (ref 70–99)
GLUCOSE BLDC GLUCOMTR-MCNC: 90 MG/DL — SIGNIFICANT CHANGE UP (ref 70–99)
GLUCOSE BLDC GLUCOMTR-MCNC: 95 MG/DL — SIGNIFICANT CHANGE UP (ref 70–99)
GLUCOSE SERPL-MCNC: 102 MG/DL — HIGH (ref 70–99)
GLUCOSE SERPL-MCNC: 107 MG/DL — HIGH (ref 70–99)
GLUCOSE SERPL-MCNC: 108 MG/DL — HIGH (ref 70–99)
GLUCOSE SERPL-MCNC: 111 MG/DL — HIGH (ref 70–99)
GLUCOSE SERPL-MCNC: 115 MG/DL — HIGH (ref 70–99)
GLUCOSE SERPL-MCNC: 123 MG/DL — HIGH (ref 70–99)
GLUCOSE SERPL-MCNC: 123 MG/DL — HIGH (ref 70–99)
GLUCOSE SERPL-MCNC: 144 MG/DL — HIGH (ref 70–99)
GLUCOSE SERPL-MCNC: 202 MG/DL — HIGH (ref 70–99)
GLUCOSE SERPL-MCNC: 38 MG/DL — CRITICAL LOW (ref 70–99)
GLUCOSE SERPL-MCNC: 58 MG/DL — LOW (ref 70–99)
GLUCOSE SERPL-MCNC: 66 MG/DL — LOW (ref 70–99)
GLUCOSE SERPL-MCNC: 69 MG/DL — LOW (ref 70–99)
GLUCOSE SERPL-MCNC: 72 MG/DL — SIGNIFICANT CHANGE UP (ref 70–99)
GLUCOSE SERPL-MCNC: 77 MG/DL — SIGNIFICANT CHANGE UP (ref 70–99)
GLUCOSE SERPL-MCNC: 79 MG/DL — SIGNIFICANT CHANGE UP (ref 70–99)
GLUCOSE SERPL-MCNC: 80 MG/DL
GLUCOSE SERPL-MCNC: 87 MG/DL — SIGNIFICANT CHANGE UP (ref 70–99)
GLUCOSE SERPL-MCNC: 92 MG/DL — SIGNIFICANT CHANGE UP (ref 70–99)
GLUCOSE SERPL-MCNC: 93 MG/DL — SIGNIFICANT CHANGE UP (ref 70–99)
GLUCOSE SERPL-MCNC: 96 MG/DL — SIGNIFICANT CHANGE UP (ref 70–99)
GLUCOSE SERPL-MCNC: 97 MG/DL — SIGNIFICANT CHANGE UP (ref 70–99)
GLUCOSE SERPL-MCNC: 97 MG/DL — SIGNIFICANT CHANGE UP (ref 70–99)
GLUCOSE SERPL-MCNC: 98 MG/DL — SIGNIFICANT CHANGE UP (ref 70–99)
GLUCOSE UR QL: NEGATIVE — SIGNIFICANT CHANGE UP
GLUCOSE UR QL: NEGATIVE — SIGNIFICANT CHANGE UP
GP B STREP DNA BLD POS QL NAA+NON-PROBE: SIGNIFICANT CHANGE UP
GRAM STN FLD: SIGNIFICANT CHANGE UP
HAEM INFLU DNA BLD POS QL NAA+NON-PROBE: SIGNIFICANT CHANGE UP
HBV CORE IGG+IGM SER QL: NONREACTIVE
HBV SURFACE AB SER QL: NONREACTIVE
HBV SURFACE AG SER QL: NONREACTIVE
HCT VFR BLD CALC: 17.7 % — CRITICAL LOW (ref 34.5–45)
HCT VFR BLD CALC: 20.2 % — CRITICAL LOW (ref 34.5–45)
HCT VFR BLD CALC: 23.8 % — LOW (ref 34.5–45)
HCT VFR BLD CALC: 24.3 % — LOW (ref 34.5–45)
HCT VFR BLD CALC: 24.5 % — LOW (ref 34.5–45)
HCT VFR BLD CALC: 24.6 % — LOW (ref 34.5–45)
HCT VFR BLD CALC: 24.6 % — LOW (ref 34.5–45)
HCT VFR BLD CALC: 24.7 % — LOW (ref 34.5–45)
HCT VFR BLD CALC: 24.9 % — LOW (ref 34.5–45)
HCT VFR BLD CALC: 25.5 % — LOW (ref 34.5–45)
HCT VFR BLD CALC: 25.8 % — LOW (ref 34.5–45)
HCT VFR BLD CALC: 26.1 % — LOW (ref 34.5–45)
HCT VFR BLD CALC: 26.4 % — LOW (ref 34.5–45)
HCT VFR BLD CALC: 26.6 % — LOW (ref 34.5–45)
HCT VFR BLD CALC: 26.8 % — LOW (ref 34.5–45)
HCT VFR BLD CALC: 27.5 % — LOW (ref 34.5–45)
HCT VFR BLD CALC: 27.9 % — LOW (ref 34.5–45)
HCT VFR BLD CALC: 27.9 % — LOW (ref 34.5–45)
HCT VFR BLD CALC: 28.3 % — LOW (ref 34.5–45)
HCT VFR BLD CALC: 28.8 % — LOW (ref 34.5–45)
HCT VFR BLD CALC: 29.2 % — LOW (ref 34.5–45)
HCT VFR BLD CALC: 29.8 % — LOW (ref 34.5–45)
HCT VFR BLD CALC: 29.8 % — LOW (ref 34.5–45)
HCT VFR BLD CALC: 30 % — LOW (ref 34.5–45)
HCT VFR BLD CALC: 31.2 % — LOW (ref 34.5–45)
HCT VFR BLD CALC: 34.7 %
HCV AB S/CO SERPL IA: 0.12 S/CO — SIGNIFICANT CHANGE UP (ref 0–0.99)
HCV AB S/CO SERPL IA: 0.16 S/CO — SIGNIFICANT CHANGE UP (ref 0–0.99)
HCV AB SER QL: NONREACTIVE
HCV AB SERPL-IMP: SIGNIFICANT CHANGE UP
HCV AB SERPL-IMP: SIGNIFICANT CHANGE UP
HCV S/CO RATIO: 0.22 S/CO
HGB BLD-MCNC: 10.6 G/DL
HGB BLD-MCNC: 5.4 G/DL — CRITICAL LOW (ref 11.5–15.5)
HGB BLD-MCNC: 6.1 G/DL — CRITICAL LOW (ref 11.5–15.5)
HGB BLD-MCNC: 6.7 G/DL — CRITICAL LOW (ref 11.5–15.5)
HGB BLD-MCNC: 7 G/DL — CRITICAL LOW (ref 11.5–15.5)
HGB BLD-MCNC: 7.1 G/DL — LOW (ref 11.5–15.5)
HGB BLD-MCNC: 7.2 G/DL — LOW (ref 11.5–15.5)
HGB BLD-MCNC: 7.3 G/DL — LOW (ref 11.5–15.5)
HGB BLD-MCNC: 7.3 G/DL — LOW (ref 11.5–15.5)
HGB BLD-MCNC: 7.4 G/DL — LOW (ref 11.5–15.5)
HGB BLD-MCNC: 7.5 G/DL — LOW (ref 11.5–15.5)
HGB BLD-MCNC: 7.6 G/DL — LOW (ref 11.5–15.5)
HGB BLD-MCNC: 7.6 G/DL — LOW (ref 11.5–15.5)
HGB BLD-MCNC: 7.8 G/DL — LOW (ref 11.5–15.5)
HGB BLD-MCNC: 7.9 G/DL — LOW (ref 11.5–15.5)
HGB BLD-MCNC: 8 G/DL — LOW (ref 11.5–15.5)
HGB BLD-MCNC: 8.1 G/DL — LOW (ref 11.5–15.5)
HGB BLD-MCNC: 8.3 G/DL — LOW (ref 11.5–15.5)
HGB BLD-MCNC: 8.5 G/DL — LOW (ref 11.5–15.5)
HGB BLD-MCNC: 8.5 G/DL — LOW (ref 11.5–15.5)
HGB BLD-MCNC: 8.6 G/DL — LOW (ref 11.5–15.5)
HGB BLD-MCNC: 8.6 G/DL — LOW (ref 11.5–15.5)
HGB BLD-MCNC: 8.9 G/DL — LOW (ref 11.5–15.5)
HGB BLD-MCNC: 9 G/DL — LOW (ref 11.5–15.5)
HGB BLD-MCNC: 9 G/DL — LOW (ref 11.5–15.5)
HGB BLD-MCNC: 9.2 G/DL — LOW (ref 11.5–15.5)
HISTONE AB SER QL: 2.5 UNITS
IGA 24H UR QL IFE: NORMAL
IMM GRANULOCYTES NFR BLD AUTO: 0.8 %
IMM GRANULOCYTES NFR BLD AUTO: 0.8 % — SIGNIFICANT CHANGE UP (ref 0–1.5)
IMM GRANULOCYTES NFR BLD AUTO: 0.9 % — SIGNIFICANT CHANGE UP (ref 0–1.5)
IMM GRANULOCYTES NFR BLD AUTO: 1 % — SIGNIFICANT CHANGE UP (ref 0–1.5)
IMM GRANULOCYTES NFR BLD AUTO: 1.1 % — SIGNIFICANT CHANGE UP (ref 0–1.5)
IMM GRANULOCYTES NFR BLD AUTO: 1.1 % — SIGNIFICANT CHANGE UP (ref 0–1.5)
IMM GRANULOCYTES NFR BLD AUTO: 1.4 % — SIGNIFICANT CHANGE UP (ref 0–1.5)
INR BLD: 1.18 RATIO — HIGH (ref 0.88–1.16)
INR BLD: 1.25 RATIO — HIGH (ref 0.88–1.16)
INR BLD: 1.29 RATIO — HIGH (ref 0.88–1.16)
INR BLD: 1.6 RATIO — HIGH (ref 0.88–1.16)
INTERPRETATION SERPL IEP-IMP: NORMAL
K OXYTOCA DNA BLD POS QL NAA+NON-PROBE: SIGNIFICANT CHANGE UP
KAPPA LC 24H UR QL: NORMAL
KETONES UR-MCNC: ABNORMAL
KETONES UR-MCNC: NEGATIVE — SIGNIFICANT CHANGE UP
L MONOCYTOG DNA BLD POS QL NAA+NON-PROBE: SIGNIFICANT CHANGE UP
LACTATE SERPL-SCNC: 0.7 MMOL/L — SIGNIFICANT CHANGE UP (ref 0.7–2)
LEUKOCYTE ESTERASE UR-ACNC: ABNORMAL
LEUKOCYTE ESTERASE UR-ACNC: NEGATIVE — SIGNIFICANT CHANGE UP
LIDOCAIN IGE QN: 31 U/L — LOW (ref 73–393)
LYMPHOCYTES # BLD AUTO: 0.35 K/UL — LOW (ref 1–3.3)
LYMPHOCYTES # BLD AUTO: 0.52 K/UL — LOW (ref 1–3.3)
LYMPHOCYTES # BLD AUTO: 0.82 K/UL — LOW (ref 1–3.3)
LYMPHOCYTES # BLD AUTO: 0.87 K/UL — LOW (ref 1–3.3)
LYMPHOCYTES # BLD AUTO: 0.9 K/UL — LOW (ref 1–3.3)
LYMPHOCYTES # BLD AUTO: 0.99 K/UL — LOW (ref 1–3.3)
LYMPHOCYTES # BLD AUTO: 1.07 K/UL — SIGNIFICANT CHANGE UP (ref 1–3.3)
LYMPHOCYTES # BLD AUTO: 1.12 K/UL — SIGNIFICANT CHANGE UP (ref 1–3.3)
LYMPHOCYTES # BLD AUTO: 1.18 K/UL — SIGNIFICANT CHANGE UP (ref 1–3.3)
LYMPHOCYTES # BLD AUTO: 1.2 K/UL — SIGNIFICANT CHANGE UP (ref 1–3.3)
LYMPHOCYTES # BLD AUTO: 1.21 K/UL — SIGNIFICANT CHANGE UP (ref 1–3.3)
LYMPHOCYTES # BLD AUTO: 1.28 K/UL — SIGNIFICANT CHANGE UP (ref 1–3.3)
LYMPHOCYTES # BLD AUTO: 1.29 K/UL — SIGNIFICANT CHANGE UP (ref 1–3.3)
LYMPHOCYTES # BLD AUTO: 1.38 K/UL — SIGNIFICANT CHANGE UP (ref 1–3.3)
LYMPHOCYTES # BLD AUTO: 1.44 K/UL — SIGNIFICANT CHANGE UP (ref 1–3.3)
LYMPHOCYTES # BLD AUTO: 1.45 K/UL — SIGNIFICANT CHANGE UP (ref 1–3.3)
LYMPHOCYTES # BLD AUTO: 1.58 K/UL — SIGNIFICANT CHANGE UP (ref 1–3.3)
LYMPHOCYTES # BLD AUTO: 1.6 K/UL
LYMPHOCYTES # BLD AUTO: 1.7 % — LOW (ref 13–44)
LYMPHOCYTES # BLD AUTO: 1.87 K/UL — SIGNIFICANT CHANGE UP (ref 1–3.3)
LYMPHOCYTES # BLD AUTO: 10 % — LOW (ref 13–44)
LYMPHOCYTES # BLD AUTO: 14.5 % — SIGNIFICANT CHANGE UP (ref 13–44)
LYMPHOCYTES # BLD AUTO: 16.4 % — SIGNIFICANT CHANGE UP (ref 13–44)
LYMPHOCYTES # BLD AUTO: 2.6 % — LOW (ref 13–44)
LYMPHOCYTES # BLD AUTO: 4.9 % — LOW (ref 13–44)
LYMPHOCYTES # BLD AUTO: 6.3 % — LOW (ref 13–44)
LYMPHOCYTES # BLD AUTO: 6.8 % — LOW (ref 13–44)
LYMPHOCYTES # BLD AUTO: 6.9 % — LOW (ref 13–44)
LYMPHOCYTES # BLD AUTO: 6.9 % — LOW (ref 13–44)
LYMPHOCYTES # BLD AUTO: 7 % — LOW (ref 13–44)
LYMPHOCYTES # BLD AUTO: 7.7 % — LOW (ref 13–44)
LYMPHOCYTES # BLD AUTO: 8.8 % — LOW (ref 13–44)
LYMPHOCYTES # BLD AUTO: 8.9 % — LOW (ref 13–44)
LYMPHOCYTES # BLD AUTO: 9 % — LOW (ref 13–44)
LYMPHOCYTES # BLD AUTO: 9.1 % — LOW (ref 13–44)
LYMPHOCYTES # BLD AUTO: 9.3 % — LOW (ref 13–44)
LYMPHOCYTES # BLD AUTO: 9.6 % — LOW (ref 13–44)
LYMPHOCYTES NFR BLD AUTO: 7 %
M TB IFN-G BLD-IMP: NEGATIVE
MAGNESIUM SERPL-MCNC: 1.1 MG/DL — LOW (ref 1.6–2.6)
MAGNESIUM SERPL-MCNC: 1.6 MG/DL — SIGNIFICANT CHANGE UP (ref 1.6–2.6)
MAGNESIUM SERPL-MCNC: 1.7 MG/DL — SIGNIFICANT CHANGE UP (ref 1.6–2.6)
MAGNESIUM SERPL-MCNC: 1.8 MG/DL — SIGNIFICANT CHANGE UP (ref 1.6–2.6)
MAGNESIUM SERPL-MCNC: 1.9 MG/DL — SIGNIFICANT CHANGE UP (ref 1.6–2.6)
MAGNESIUM SERPL-MCNC: 2 MG/DL — SIGNIFICANT CHANGE UP (ref 1.6–2.6)
MAGNESIUM SERPL-MCNC: 2.1 MG/DL — SIGNIFICANT CHANGE UP (ref 1.6–2.6)
MAN DIFF?: NORMAL
MCHC RBC-ENTMCNC: 24.9 PG — LOW (ref 27–34)
MCHC RBC-ENTMCNC: 25 PG — LOW (ref 27–34)
MCHC RBC-ENTMCNC: 25.1 PG — LOW (ref 27–34)
MCHC RBC-ENTMCNC: 25.2 PG — LOW (ref 27–34)
MCHC RBC-ENTMCNC: 25.3 PG — LOW (ref 27–34)
MCHC RBC-ENTMCNC: 25.4 PG — LOW (ref 27–34)
MCHC RBC-ENTMCNC: 25.5 PG — LOW (ref 27–34)
MCHC RBC-ENTMCNC: 25.6 PG — LOW (ref 27–34)
MCHC RBC-ENTMCNC: 25.8 PG — LOW (ref 27–34)
MCHC RBC-ENTMCNC: 26 PG — LOW (ref 27–34)
MCHC RBC-ENTMCNC: 26.1 PG — LOW (ref 27–34)
MCHC RBC-ENTMCNC: 26.2 PG — LOW (ref 27–34)
MCHC RBC-ENTMCNC: 26.2 PG — LOW (ref 27–34)
MCHC RBC-ENTMCNC: 26.3 PG — LOW (ref 27–34)
MCHC RBC-ENTMCNC: 26.9 PG — LOW (ref 27–34)
MCHC RBC-ENTMCNC: 27.9 PG
MCHC RBC-ENTMCNC: 28.2 GM/DL — LOW (ref 32–36)
MCHC RBC-ENTMCNC: 28.5 GM/DL — LOW (ref 32–36)
MCHC RBC-ENTMCNC: 28.9 GM/DL — LOW (ref 32–36)
MCHC RBC-ENTMCNC: 29.1 GM/DL — LOW (ref 32–36)
MCHC RBC-ENTMCNC: 29.1 GM/DL — LOW (ref 32–36)
MCHC RBC-ENTMCNC: 29.3 GM/DL — LOW (ref 32–36)
MCHC RBC-ENTMCNC: 29.3 GM/DL — LOW (ref 32–36)
MCHC RBC-ENTMCNC: 29.4 GM/DL — LOW (ref 32–36)
MCHC RBC-ENTMCNC: 29.4 GM/DL — LOW (ref 32–36)
MCHC RBC-ENTMCNC: 29.5 GM/DL — LOW (ref 32–36)
MCHC RBC-ENTMCNC: 29.7 GM/DL — LOW (ref 32–36)
MCHC RBC-ENTMCNC: 29.7 GM/DL — LOW (ref 32–36)
MCHC RBC-ENTMCNC: 29.9 GM/DL — LOW (ref 32–36)
MCHC RBC-ENTMCNC: 30 GM/DL — LOW (ref 32–36)
MCHC RBC-ENTMCNC: 30 GM/DL — LOW (ref 32–36)
MCHC RBC-ENTMCNC: 30.2 GM/DL — LOW (ref 32–36)
MCHC RBC-ENTMCNC: 30.5 GM/DL
MCHC RBC-ENTMCNC: 30.5 GM/DL — LOW (ref 32–36)
MCV RBC AUTO: 83.8 FL — SIGNIFICANT CHANGE UP (ref 80–100)
MCV RBC AUTO: 84.9 FL — SIGNIFICANT CHANGE UP (ref 80–100)
MCV RBC AUTO: 85.2 FL — SIGNIFICANT CHANGE UP (ref 80–100)
MCV RBC AUTO: 85.4 FL — SIGNIFICANT CHANGE UP (ref 80–100)
MCV RBC AUTO: 85.4 FL — SIGNIFICANT CHANGE UP (ref 80–100)
MCV RBC AUTO: 85.6 FL — SIGNIFICANT CHANGE UP (ref 80–100)
MCV RBC AUTO: 85.7 FL — SIGNIFICANT CHANGE UP (ref 80–100)
MCV RBC AUTO: 85.9 FL — SIGNIFICANT CHANGE UP (ref 80–100)
MCV RBC AUTO: 86.2 FL — SIGNIFICANT CHANGE UP (ref 80–100)
MCV RBC AUTO: 86.3 FL — SIGNIFICANT CHANGE UP (ref 80–100)
MCV RBC AUTO: 86.3 FL — SIGNIFICANT CHANGE UP (ref 80–100)
MCV RBC AUTO: 86.4 FL — SIGNIFICANT CHANGE UP (ref 80–100)
MCV RBC AUTO: 86.5 FL — SIGNIFICANT CHANGE UP (ref 80–100)
MCV RBC AUTO: 86.7 FL — SIGNIFICANT CHANGE UP (ref 80–100)
MCV RBC AUTO: 86.8 FL — SIGNIFICANT CHANGE UP (ref 80–100)
MCV RBC AUTO: 86.9 FL — SIGNIFICANT CHANGE UP (ref 80–100)
MCV RBC AUTO: 86.9 FL — SIGNIFICANT CHANGE UP (ref 80–100)
MCV RBC AUTO: 87.3 FL — SIGNIFICANT CHANGE UP (ref 80–100)
MCV RBC AUTO: 87.4 FL — SIGNIFICANT CHANGE UP (ref 80–100)
MCV RBC AUTO: 88.3 FL — SIGNIFICANT CHANGE UP (ref 80–100)
MCV RBC AUTO: 88.4 FL — SIGNIFICANT CHANGE UP (ref 80–100)
MCV RBC AUTO: 88.5 FL — SIGNIFICANT CHANGE UP (ref 80–100)
MCV RBC AUTO: 88.7 FL — SIGNIFICANT CHANGE UP (ref 80–100)
MCV RBC AUTO: 88.8 FL — SIGNIFICANT CHANGE UP (ref 80–100)
MCV RBC AUTO: 88.8 FL — SIGNIFICANT CHANGE UP (ref 80–100)
MCV RBC AUTO: 91.3 FL
METHOD TYPE: SIGNIFICANT CHANGE UP
MONOCYTES # BLD AUTO: 0.16 K/UL — SIGNIFICANT CHANGE UP (ref 0–0.9)
MONOCYTES # BLD AUTO: 0.17 K/UL — SIGNIFICANT CHANGE UP (ref 0–0.9)
MONOCYTES # BLD AUTO: 0.5 K/UL — SIGNIFICANT CHANGE UP (ref 0–0.9)
MONOCYTES # BLD AUTO: 0.83 K/UL — SIGNIFICANT CHANGE UP (ref 0–0.9)
MONOCYTES # BLD AUTO: 0.84 K/UL — SIGNIFICANT CHANGE UP (ref 0–0.9)
MONOCYTES # BLD AUTO: 1.01 K/UL — HIGH (ref 0–0.9)
MONOCYTES # BLD AUTO: 1.04 K/UL — HIGH (ref 0–0.9)
MONOCYTES # BLD AUTO: 1.14 K/UL — HIGH (ref 0–0.9)
MONOCYTES # BLD AUTO: 1.15 K/UL — HIGH (ref 0–0.9)
MONOCYTES # BLD AUTO: 1.19 K/UL — HIGH (ref 0–0.9)
MONOCYTES # BLD AUTO: 1.23 K/UL
MONOCYTES # BLD AUTO: 1.26 K/UL — HIGH (ref 0–0.9)
MONOCYTES # BLD AUTO: 1.27 K/UL — HIGH (ref 0–0.9)
MONOCYTES # BLD AUTO: 1.34 K/UL — HIGH (ref 0–0.9)
MONOCYTES # BLD AUTO: 1.35 K/UL — HIGH (ref 0–0.9)
MONOCYTES # BLD AUTO: 1.36 K/UL — HIGH (ref 0–0.9)
MONOCYTES # BLD AUTO: 1.59 K/UL — HIGH (ref 0–0.9)
MONOCYTES NFR BLD AUTO: 0.8 % — LOW (ref 2–14)
MONOCYTES NFR BLD AUTO: 0.9 % — LOW (ref 2–14)
MONOCYTES NFR BLD AUTO: 10.4 % — SIGNIFICANT CHANGE UP (ref 2–14)
MONOCYTES NFR BLD AUTO: 10.4 % — SIGNIFICANT CHANGE UP (ref 2–14)
MONOCYTES NFR BLD AUTO: 10.8 % — SIGNIFICANT CHANGE UP (ref 2–14)
MONOCYTES NFR BLD AUTO: 5.3 % — SIGNIFICANT CHANGE UP (ref 2–14)
MONOCYTES NFR BLD AUTO: 5.4 %
MONOCYTES NFR BLD AUTO: 5.6 % — SIGNIFICANT CHANGE UP (ref 2–14)
MONOCYTES NFR BLD AUTO: 6.3 % — SIGNIFICANT CHANGE UP (ref 2–14)
MONOCYTES NFR BLD AUTO: 6.4 % — SIGNIFICANT CHANGE UP (ref 2–14)
MONOCYTES NFR BLD AUTO: 6.6 % — SIGNIFICANT CHANGE UP (ref 2–14)
MONOCYTES NFR BLD AUTO: 7 % — SIGNIFICANT CHANGE UP (ref 2–14)
MONOCYTES NFR BLD AUTO: 7.3 % — SIGNIFICANT CHANGE UP (ref 2–14)
MONOCYTES NFR BLD AUTO: 7.6 % — SIGNIFICANT CHANGE UP (ref 2–14)
MONOCYTES NFR BLD AUTO: 7.8 % — SIGNIFICANT CHANGE UP (ref 2–14)
MONOCYTES NFR BLD AUTO: 8.4 % — SIGNIFICANT CHANGE UP (ref 2–14)
MONOCYTES NFR BLD AUTO: 8.8 % — SIGNIFICANT CHANGE UP (ref 2–14)
MONOCYTES NFR BLD AUTO: 8.8 % — SIGNIFICANT CHANGE UP (ref 2–14)
MONOCYTES NFR BLD AUTO: 9.4 % — SIGNIFICANT CHANGE UP (ref 2–14)
MRSA SPEC QL CULT: SIGNIFICANT CHANGE UP
MSSA DNA SPEC QL NAA+PROBE: SIGNIFICANT CHANGE UP
N MEN ISLT CULT: SIGNIFICANT CHANGE UP
NEUTROPHILS # BLD AUTO: 10.3 K/UL — HIGH (ref 1.8–7.4)
NEUTROPHILS # BLD AUTO: 10.87 K/UL — HIGH (ref 1.8–7.4)
NEUTROPHILS # BLD AUTO: 13.51 K/UL — HIGH (ref 1.8–7.4)
NEUTROPHILS # BLD AUTO: 14.41 K/UL — HIGH (ref 1.8–7.4)
NEUTROPHILS # BLD AUTO: 14.42 K/UL — HIGH (ref 1.8–7.4)
NEUTROPHILS # BLD AUTO: 15.01 K/UL — HIGH (ref 1.8–7.4)
NEUTROPHILS # BLD AUTO: 16.94 K/UL — HIGH (ref 1.8–7.4)
NEUTROPHILS # BLD AUTO: 17.04 K/UL — HIGH (ref 1.8–7.4)
NEUTROPHILS # BLD AUTO: 17.5 K/UL — HIGH (ref 1.8–7.4)
NEUTROPHILS # BLD AUTO: 17.65 K/UL — HIGH (ref 1.8–7.4)
NEUTROPHILS # BLD AUTO: 18.42 K/UL — HIGH (ref 1.8–7.4)
NEUTROPHILS # BLD AUTO: 19.44 K/UL — HIGH (ref 1.8–7.4)
NEUTROPHILS # BLD AUTO: 19.82 K/UL
NEUTROPHILS # BLD AUTO: 5.38 K/UL — SIGNIFICANT CHANGE UP (ref 1.8–7.4)
NEUTROPHILS # BLD AUTO: 7.47 K/UL — HIGH (ref 1.8–7.4)
NEUTROPHILS # BLD AUTO: 7.91 K/UL — HIGH (ref 1.8–7.4)
NEUTROPHILS # BLD AUTO: 8.32 K/UL — HIGH (ref 1.8–7.4)
NEUTROPHILS # BLD AUTO: 8.43 K/UL — HIGH (ref 1.8–7.4)
NEUTROPHILS # BLD AUTO: 9.35 K/UL — HIGH (ref 1.8–7.4)
NEUTROPHILS NFR BLD AUTO: 68.9 % — SIGNIFICANT CHANGE UP (ref 43–77)
NEUTROPHILS NFR BLD AUTO: 72.5 % — SIGNIFICANT CHANGE UP (ref 43–77)
NEUTROPHILS NFR BLD AUTO: 76.5 % — SIGNIFICANT CHANGE UP (ref 43–77)
NEUTROPHILS NFR BLD AUTO: 77.6 % — HIGH (ref 43–77)
NEUTROPHILS NFR BLD AUTO: 79.3 % — HIGH (ref 43–77)
NEUTROPHILS NFR BLD AUTO: 79.4 % — HIGH (ref 43–77)
NEUTROPHILS NFR BLD AUTO: 80.1 % — HIGH (ref 43–77)
NEUTROPHILS NFR BLD AUTO: 81.4 % — HIGH (ref 43–77)
NEUTROPHILS NFR BLD AUTO: 83.1 % — HIGH (ref 43–77)
NEUTROPHILS NFR BLD AUTO: 83.8 % — HIGH (ref 43–77)
NEUTROPHILS NFR BLD AUTO: 84.2 % — HIGH (ref 43–77)
NEUTROPHILS NFR BLD AUTO: 84.8 % — HIGH (ref 43–77)
NEUTROPHILS NFR BLD AUTO: 85 % — HIGH (ref 43–77)
NEUTROPHILS NFR BLD AUTO: 86.2 % — HIGH (ref 43–77)
NEUTROPHILS NFR BLD AUTO: 86.4 %
NEUTROPHILS NFR BLD AUTO: 86.8 % — HIGH (ref 43–77)
NEUTROPHILS NFR BLD AUTO: 90.5 % — HIGH (ref 43–77)
NEUTROPHILS NFR BLD AUTO: 90.5 % — HIGH (ref 43–77)
NEUTROPHILS NFR BLD AUTO: 94 % — HIGH (ref 43–77)
NITRITE UR-MCNC: NEGATIVE — SIGNIFICANT CHANGE UP
NITRITE UR-MCNC: NEGATIVE — SIGNIFICANT CHANGE UP
NRBC # BLD: 0 /100 WBCS — SIGNIFICANT CHANGE UP (ref 0–0)
ORGANISM # SPEC MICROSCOPIC CNT: SIGNIFICANT CHANGE UP
P AERUGINOSA DNA BLD POS NAA+NON-PROBE: SIGNIFICANT CHANGE UP
PH UR: 6.5 — SIGNIFICANT CHANGE UP (ref 5–8)
PH UR: 6.5 — SIGNIFICANT CHANGE UP (ref 5–8)
PHOSPHATE SERPL-MCNC: 2.4 MG/DL — LOW (ref 2.5–4.5)
PHOSPHATE SERPL-MCNC: 2.4 MG/DL — LOW (ref 2.5–4.5)
PHOSPHATE SERPL-MCNC: 2.6 MG/DL — SIGNIFICANT CHANGE UP (ref 2.5–4.5)
PHOSPHATE SERPL-MCNC: 2.8 MG/DL — SIGNIFICANT CHANGE UP (ref 2.5–4.5)
PHOSPHATE SERPL-MCNC: 2.8 MG/DL — SIGNIFICANT CHANGE UP (ref 2.5–4.5)
PHOSPHATE SERPL-MCNC: 3.1 MG/DL — SIGNIFICANT CHANGE UP (ref 2.5–4.5)
PHOSPHATE SERPL-MCNC: 3.2 MG/DL — SIGNIFICANT CHANGE UP (ref 2.5–4.5)
PHOSPHATE SERPL-MCNC: 3.3 MG/DL — SIGNIFICANT CHANGE UP (ref 2.5–4.5)
PHOSPHATE SERPL-MCNC: 3.4 MG/DL — SIGNIFICANT CHANGE UP (ref 2.5–4.5)
PHOSPHATE SERPL-MCNC: 3.5 MG/DL — SIGNIFICANT CHANGE UP (ref 2.5–4.5)
PHOSPHATE SERPL-MCNC: 3.6 MG/DL — SIGNIFICANT CHANGE UP (ref 2.5–4.5)
PHOSPHATE SERPL-MCNC: 3.7 MG/DL — SIGNIFICANT CHANGE UP (ref 2.5–4.5)
PLATELET # BLD AUTO: 365 K/UL — SIGNIFICANT CHANGE UP (ref 150–400)
PLATELET # BLD AUTO: 365 K/UL — SIGNIFICANT CHANGE UP (ref 150–400)
PLATELET # BLD AUTO: 376 K/UL — SIGNIFICANT CHANGE UP (ref 150–400)
PLATELET # BLD AUTO: 386 K/UL — SIGNIFICANT CHANGE UP (ref 150–400)
PLATELET # BLD AUTO: 389 K/UL — SIGNIFICANT CHANGE UP (ref 150–400)
PLATELET # BLD AUTO: 390 K/UL — SIGNIFICANT CHANGE UP (ref 150–400)
PLATELET # BLD AUTO: 394 K/UL — SIGNIFICANT CHANGE UP (ref 150–400)
PLATELET # BLD AUTO: 434 K/UL — HIGH (ref 150–400)
PLATELET # BLD AUTO: 481 K/UL — HIGH (ref 150–400)
PLATELET # BLD AUTO: 487 K/UL — HIGH (ref 150–400)
PLATELET # BLD AUTO: 494 K/UL — HIGH (ref 150–400)
PLATELET # BLD AUTO: 502 K/UL
PLATELET # BLD AUTO: 505 K/UL — HIGH (ref 150–400)
PLATELET # BLD AUTO: 506 K/UL — HIGH (ref 150–400)
PLATELET # BLD AUTO: 516 K/UL — HIGH (ref 150–400)
PLATELET # BLD AUTO: 517 K/UL — HIGH (ref 150–400)
PLATELET # BLD AUTO: 538 K/UL — HIGH (ref 150–400)
PLATELET # BLD AUTO: 543 K/UL — HIGH (ref 150–400)
PLATELET # BLD AUTO: 544 K/UL — HIGH (ref 150–400)
PLATELET # BLD AUTO: 619 K/UL — HIGH (ref 150–400)
PLATELET # BLD AUTO: 645 K/UL — HIGH (ref 150–400)
PLATELET # BLD AUTO: 672 K/UL — HIGH (ref 150–400)
PLATELET # BLD AUTO: 687 K/UL — HIGH (ref 150–400)
PLATELET # BLD AUTO: 694 K/UL — HIGH (ref 150–400)
PLATELET # BLD AUTO: 756 K/UL — HIGH (ref 150–400)
PLATELET # BLD AUTO: 829 K/UL — HIGH (ref 150–400)
POTASSIUM SERPL-MCNC: 3.1 MMOL/L — LOW (ref 3.5–5.3)
POTASSIUM SERPL-MCNC: 3.4 MMOL/L — LOW (ref 3.5–5.3)
POTASSIUM SERPL-MCNC: 3.6 MMOL/L — SIGNIFICANT CHANGE UP (ref 3.5–5.3)
POTASSIUM SERPL-MCNC: 3.6 MMOL/L — SIGNIFICANT CHANGE UP (ref 3.5–5.3)
POTASSIUM SERPL-MCNC: 3.8 MMOL/L — SIGNIFICANT CHANGE UP (ref 3.5–5.3)
POTASSIUM SERPL-MCNC: 3.9 MMOL/L — SIGNIFICANT CHANGE UP (ref 3.5–5.3)
POTASSIUM SERPL-MCNC: 4 MMOL/L — SIGNIFICANT CHANGE UP (ref 3.5–5.3)
POTASSIUM SERPL-MCNC: 4.1 MMOL/L — SIGNIFICANT CHANGE UP (ref 3.5–5.3)
POTASSIUM SERPL-MCNC: 4.2 MMOL/L — SIGNIFICANT CHANGE UP (ref 3.5–5.3)
POTASSIUM SERPL-MCNC: 4.3 MMOL/L — SIGNIFICANT CHANGE UP (ref 3.5–5.3)
POTASSIUM SERPL-MCNC: 4.4 MMOL/L — SIGNIFICANT CHANGE UP (ref 3.5–5.3)
POTASSIUM SERPL-MCNC: 4.5 MMOL/L — SIGNIFICANT CHANGE UP (ref 3.5–5.3)
POTASSIUM SERPL-MCNC: 4.7 MMOL/L — SIGNIFICANT CHANGE UP (ref 3.5–5.3)
POTASSIUM SERPL-SCNC: 3.1 MMOL/L — LOW (ref 3.5–5.3)
POTASSIUM SERPL-SCNC: 3.4 MMOL/L — LOW (ref 3.5–5.3)
POTASSIUM SERPL-SCNC: 3.6 MMOL/L — SIGNIFICANT CHANGE UP (ref 3.5–5.3)
POTASSIUM SERPL-SCNC: 3.6 MMOL/L — SIGNIFICANT CHANGE UP (ref 3.5–5.3)
POTASSIUM SERPL-SCNC: 3.8 MMOL/L — SIGNIFICANT CHANGE UP (ref 3.5–5.3)
POTASSIUM SERPL-SCNC: 3.9 MMOL/L — SIGNIFICANT CHANGE UP (ref 3.5–5.3)
POTASSIUM SERPL-SCNC: 4 MMOL/L — SIGNIFICANT CHANGE UP (ref 3.5–5.3)
POTASSIUM SERPL-SCNC: 4.1 MMOL/L — SIGNIFICANT CHANGE UP (ref 3.5–5.3)
POTASSIUM SERPL-SCNC: 4.2 MMOL/L — SIGNIFICANT CHANGE UP (ref 3.5–5.3)
POTASSIUM SERPL-SCNC: 4.3 MMOL/L — SIGNIFICANT CHANGE UP (ref 3.5–5.3)
POTASSIUM SERPL-SCNC: 4.4 MMOL/L — SIGNIFICANT CHANGE UP (ref 3.5–5.3)
POTASSIUM SERPL-SCNC: 4.5 MMOL/L — SIGNIFICANT CHANGE UP (ref 3.5–5.3)
POTASSIUM SERPL-SCNC: 4.6 MMOL/L
POTASSIUM SERPL-SCNC: 4.7 MMOL/L — SIGNIFICANT CHANGE UP (ref 3.5–5.3)
PROCALCITONIN SERPL-MCNC: 0.19 NG/ML — HIGH (ref 0–0.04)
PROT PATTERN 24H UR ELPH-IMP: NORMAL
PROT SERPL-MCNC: 5.1 G/DL — LOW (ref 6–8.3)
PROT SERPL-MCNC: 5.1 G/DL — LOW (ref 6–8.3)
PROT SERPL-MCNC: 5.3 G/DL — LOW (ref 6–8.3)
PROT SERPL-MCNC: 5.6 G/DL — LOW (ref 6–8.3)
PROT SERPL-MCNC: 5.6 G/DL — LOW (ref 6–8.3)
PROT SERPL-MCNC: 5.7 G/DL — LOW (ref 6–8.3)
PROT SERPL-MCNC: 5.7 G/DL — LOW (ref 6–8.3)
PROT SERPL-MCNC: 6.4 G/DL — SIGNIFICANT CHANGE UP (ref 6–8.3)
PROT SERPL-MCNC: 6.5 G/DL
PROT SERPL-MCNC: 6.6 G/DL — SIGNIFICANT CHANGE UP (ref 6–8.3)
PROT SERPL-MCNC: 6.7 G/DL — SIGNIFICANT CHANGE UP (ref 6–8.3)
PROT UR-MCNC: 15
PROT UR-MCNC: 15 MG/DL
PROT UR-MCNC: 15 MG/DL
PROT UR-MCNC: ABNORMAL
PROTHROM AB SERPL-ACNC: 13.7 SEC — HIGH (ref 10.6–13.6)
PROTHROM AB SERPL-ACNC: 14.5 SEC — HIGH (ref 10.6–13.6)
PROTHROM AB SERPL-ACNC: 14.9 SEC — HIGH (ref 10.6–13.6)
PROTHROM AB SERPL-ACNC: 18.8 SEC — HIGH (ref 10.6–13.6)
QUANTIFERON TB PLUS MITOGEN MINUS NIL: 1.03 IU/ML
QUANTIFERON TB PLUS NIL: 0.01 IU/ML
QUANTIFERON TB PLUS TB1 MINUS NIL: 0 IU/ML
QUANTIFERON TB PLUS TB2 MINUS NIL: -0.01 IU/ML
RAPID RVP RESULT: SIGNIFICANT CHANGE UP
RBC # BLD: 2.05 M/UL — LOW (ref 3.8–5.2)
RBC # BLD: 2.38 M/UL — LOW (ref 3.8–5.2)
RBC # BLD: 2.68 M/UL — LOW (ref 3.8–5.2)
RBC # BLD: 2.77 M/UL — LOW (ref 3.8–5.2)
RBC # BLD: 2.78 M/UL — LOW (ref 3.8–5.2)
RBC # BLD: 2.83 M/UL — LOW (ref 3.8–5.2)
RBC # BLD: 2.84 M/UL — LOW (ref 3.8–5.2)
RBC # BLD: 2.85 M/UL — LOW (ref 3.8–5.2)
RBC # BLD: 2.9 M/UL — LOW (ref 3.8–5.2)
RBC # BLD: 2.94 M/UL — LOW (ref 3.8–5.2)
RBC # BLD: 3.01 M/UL — LOW (ref 3.8–5.2)
RBC # BLD: 3.01 M/UL — LOW (ref 3.8–5.2)
RBC # BLD: 3.05 M/UL — LOW (ref 3.8–5.2)
RBC # BLD: 3.09 M/UL — LOW (ref 3.8–5.2)
RBC # BLD: 3.1 M/UL — LOW (ref 3.8–5.2)
RBC # BLD: 3.16 M/UL — LOW (ref 3.8–5.2)
RBC # BLD: 3.17 M/UL — LOW (ref 3.8–5.2)
RBC # BLD: 3.19 M/UL — LOW (ref 3.8–5.2)
RBC # BLD: 3.3 M/UL — LOW (ref 3.8–5.2)
RBC # BLD: 3.33 M/UL — LOW (ref 3.8–5.2)
RBC # BLD: 3.42 M/UL — LOW (ref 3.8–5.2)
RBC # BLD: 3.43 M/UL — LOW (ref 3.8–5.2)
RBC # BLD: 3.45 M/UL — LOW (ref 3.8–5.2)
RBC # BLD: 3.48 M/UL — LOW (ref 3.8–5.2)
RBC # BLD: 3.59 M/UL — LOW (ref 3.8–5.2)
RBC # BLD: 3.8 M/UL
RBC # FLD: 16.3 % — HIGH (ref 10.3–14.5)
RBC # FLD: 16.4 % — HIGH (ref 10.3–14.5)
RBC # FLD: 16.6 % — HIGH (ref 10.3–14.5)
RBC # FLD: 16.7 % — HIGH (ref 10.3–14.5)
RBC # FLD: 16.8 % — HIGH (ref 10.3–14.5)
RBC # FLD: 16.8 % — HIGH (ref 10.3–14.5)
RBC # FLD: 16.9 % — HIGH (ref 10.3–14.5)
RBC # FLD: 17 % — HIGH (ref 10.3–14.5)
RBC # FLD: 17.1 % — HIGH (ref 10.3–14.5)
RBC # FLD: 17.1 % — HIGH (ref 10.3–14.5)
RBC # FLD: 17.2 % — HIGH (ref 10.3–14.5)
RBC # FLD: 17.2 % — HIGH (ref 10.3–14.5)
RBC # FLD: 17.3 % — HIGH (ref 10.3–14.5)
RBC # FLD: 17.4 % — HIGH (ref 10.3–14.5)
RBC # FLD: 17.5 % — HIGH (ref 10.3–14.5)
RBC # FLD: 17.8 % — HIGH (ref 10.3–14.5)
RBC # FLD: 18 % — HIGH (ref 10.3–14.5)
RBC # FLD: 18.2 % — HIGH (ref 10.3–14.5)
RBC # FLD: 18.2 % — HIGH (ref 10.3–14.5)
RBC # FLD: 18.3 %
RBC # FLD: 18.5 % — HIGH (ref 10.3–14.5)
RBC CASTS # UR COMP ASSIST: SIGNIFICANT CHANGE UP /HPF (ref 0–4)
RF+CCP IGG SER-IMP: ABNORMAL
RH IG SCN BLD-IMP: POSITIVE — SIGNIFICANT CHANGE UP
RH IG SCN BLD-IMP: POSITIVE — SIGNIFICANT CHANGE UP
RHEUMATOID FACT SER QL: 86 IU/ML
S MARCESCENS DNA BLD POS NAA+NON-PROBE: SIGNIFICANT CHANGE UP
S PNEUM DNA BLD POS QL NAA+NON-PROBE: SIGNIFICANT CHANGE UP
S PYO DNA BLD POS QL NAA+NON-PROBE: SIGNIFICANT CHANGE UP
SARS-COV-2 IGG SERPL IA-ACNC: 0.03 INDEX
SARS-COV-2 IGG SERPL QL IA: NEGATIVE
SARS-COV-2 IGG SERPL QL IA: NEGATIVE — SIGNIFICANT CHANGE UP
SARS-COV-2 IGG SERPL QL IA: NEGATIVE — SIGNIFICANT CHANGE UP
SARS-COV-2 IGM SERPL IA-ACNC: 0.08 INDEX — SIGNIFICANT CHANGE UP
SARS-COV-2 IGM SERPL IA-ACNC: <0.1 INDEX — SIGNIFICANT CHANGE UP
SARS-COV-2 RNA SPEC QL NAA+PROBE: SIGNIFICANT CHANGE UP
SODIUM SERPL-SCNC: 128 MMOL/L — LOW (ref 135–145)
SODIUM SERPL-SCNC: 129 MMOL/L — LOW (ref 135–145)
SODIUM SERPL-SCNC: 130 MMOL/L — LOW (ref 135–145)
SODIUM SERPL-SCNC: 131 MMOL/L — LOW (ref 135–145)
SODIUM SERPL-SCNC: 133 MMOL/L — LOW (ref 135–145)
SODIUM SERPL-SCNC: 134 MMOL/L — LOW (ref 135–145)
SODIUM SERPL-SCNC: 135 MMOL/L — SIGNIFICANT CHANGE UP (ref 135–145)
SODIUM SERPL-SCNC: 137 MMOL/L — SIGNIFICANT CHANGE UP (ref 135–145)
SODIUM SERPL-SCNC: 137 MMOL/L — SIGNIFICANT CHANGE UP (ref 135–145)
SODIUM SERPL-SCNC: 138 MMOL/L — SIGNIFICANT CHANGE UP (ref 135–145)
SODIUM SERPL-SCNC: 138 MMOL/L — SIGNIFICANT CHANGE UP (ref 135–145)
SODIUM SERPL-SCNC: 139 MMOL/L — SIGNIFICANT CHANGE UP (ref 135–145)
SODIUM SERPL-SCNC: 139 MMOL/L — SIGNIFICANT CHANGE UP (ref 135–145)
SODIUM SERPL-SCNC: 140 MMOL/L
SODIUM SERPL-SCNC: 141 MMOL/L — SIGNIFICANT CHANGE UP (ref 135–145)
SODIUM SERPL-SCNC: 142 MMOL/L — SIGNIFICANT CHANGE UP (ref 135–145)
SODIUM SERPL-SCNC: 93 MMOL/L — CRITICAL LOW (ref 135–145)
SP GR SPEC: 1.01 — SIGNIFICANT CHANGE UP (ref 1.01–1.02)
SP GR SPEC: >1.05 (ref 1.01–1.02)
SPECIMEN SOURCE: SIGNIFICANT CHANGE UP
SPECIMEN VOL 24H UR: NORMAL ML
TROPONIN I SERPL-MCNC: <.015 NG/ML — SIGNIFICANT CHANGE UP (ref 0.01–0.04)
UROBILINOGEN FLD QL: ABNORMAL
UROBILINOGEN FLD QL: NEGATIVE — SIGNIFICANT CHANGE UP
VANCOMYCIN TROUGH SERPL-MCNC: 5.2 UG/ML — LOW (ref 10–20)
WBC # BLD: 10.72 K/UL — HIGH (ref 3.8–10.5)
WBC # BLD: 10.81 K/UL — HIGH (ref 3.8–10.5)
WBC # BLD: 11.01 K/UL — HIGH (ref 3.8–10.5)
WBC # BLD: 12.83 K/UL — HIGH (ref 3.8–10.5)
WBC # BLD: 12.89 K/UL — HIGH (ref 3.8–10.5)
WBC # BLD: 12.98 K/UL — HIGH (ref 3.8–10.5)
WBC # BLD: 13.57 K/UL — HIGH (ref 3.8–10.5)
WBC # BLD: 14.77 K/UL — HIGH (ref 3.8–10.5)
WBC # BLD: 16.19 K/UL — HIGH (ref 3.8–10.5)
WBC # BLD: 16.26 K/UL — HIGH (ref 3.8–10.5)
WBC # BLD: 16.61 K/UL — HIGH (ref 3.8–10.5)
WBC # BLD: 17.71 K/UL — HIGH (ref 3.8–10.5)
WBC # BLD: 17.92 K/UL — HIGH (ref 3.8–10.5)
WBC # BLD: 18.72 K/UL — HIGH (ref 3.8–10.5)
WBC # BLD: 20 K/UL — HIGH (ref 3.8–10.5)
WBC # BLD: 20.12 K/UL — HIGH (ref 3.8–10.5)
WBC # BLD: 20.35 K/UL — HIGH (ref 3.8–10.5)
WBC # BLD: 20.46 K/UL — HIGH (ref 3.8–10.5)
WBC # BLD: 20.66 K/UL — HIGH (ref 3.8–10.5)
WBC # BLD: 24.43 K/UL — HIGH (ref 3.8–10.5)
WBC # BLD: 24.77 K/UL — HIGH (ref 3.8–10.5)
WBC # BLD: 7.8 K/UL — SIGNIFICANT CHANGE UP (ref 3.8–10.5)
WBC # BLD: 9.01 K/UL — SIGNIFICANT CHANGE UP (ref 3.8–10.5)
WBC # BLD: 9.39 K/UL — SIGNIFICANT CHANGE UP (ref 3.8–10.5)
WBC # BLD: 9.4 K/UL — SIGNIFICANT CHANGE UP (ref 3.8–10.5)
WBC # FLD AUTO: 10.72 K/UL — HIGH (ref 3.8–10.5)
WBC # FLD AUTO: 10.81 K/UL — HIGH (ref 3.8–10.5)
WBC # FLD AUTO: 11.01 K/UL — HIGH (ref 3.8–10.5)
WBC # FLD AUTO: 12.83 K/UL — HIGH (ref 3.8–10.5)
WBC # FLD AUTO: 12.89 K/UL — HIGH (ref 3.8–10.5)
WBC # FLD AUTO: 12.98 K/UL — HIGH (ref 3.8–10.5)
WBC # FLD AUTO: 13.57 K/UL — HIGH (ref 3.8–10.5)
WBC # FLD AUTO: 14.77 K/UL — HIGH (ref 3.8–10.5)
WBC # FLD AUTO: 16.19 K/UL — HIGH (ref 3.8–10.5)
WBC # FLD AUTO: 16.26 K/UL — HIGH (ref 3.8–10.5)
WBC # FLD AUTO: 16.61 K/UL — HIGH (ref 3.8–10.5)
WBC # FLD AUTO: 17.71 K/UL — HIGH (ref 3.8–10.5)
WBC # FLD AUTO: 17.92 K/UL — HIGH (ref 3.8–10.5)
WBC # FLD AUTO: 18.72 K/UL — HIGH (ref 3.8–10.5)
WBC # FLD AUTO: 20 K/UL — HIGH (ref 3.8–10.5)
WBC # FLD AUTO: 20.12 K/UL — HIGH (ref 3.8–10.5)
WBC # FLD AUTO: 20.35 K/UL — HIGH (ref 3.8–10.5)
WBC # FLD AUTO: 20.46 K/UL — HIGH (ref 3.8–10.5)
WBC # FLD AUTO: 20.66 K/UL — HIGH (ref 3.8–10.5)
WBC # FLD AUTO: 22.92 K/UL
WBC # FLD AUTO: 24.43 K/UL — HIGH (ref 3.8–10.5)
WBC # FLD AUTO: 24.77 K/UL — HIGH (ref 3.8–10.5)
WBC # FLD AUTO: 7.8 K/UL — SIGNIFICANT CHANGE UP (ref 3.8–10.5)
WBC # FLD AUTO: 9.01 K/UL — SIGNIFICANT CHANGE UP (ref 3.8–10.5)
WBC # FLD AUTO: 9.39 K/UL — SIGNIFICANT CHANGE UP (ref 3.8–10.5)
WBC # FLD AUTO: 9.4 K/UL — SIGNIFICANT CHANGE UP (ref 3.8–10.5)
WBC UR QL: SIGNIFICANT CHANGE UP

## 2020-01-01 PROCEDURE — 99233 SBSQ HOSP IP/OBS HIGH 50: CPT

## 2020-01-01 PROCEDURE — 85025 COMPLETE CBC W/AUTO DIFF WBC: CPT

## 2020-01-01 PROCEDURE — 86140 C-REACTIVE PROTEIN: CPT

## 2020-01-01 PROCEDURE — 93010 ELECTROCARDIOGRAM REPORT: CPT

## 2020-01-01 PROCEDURE — A9579: CPT

## 2020-01-01 PROCEDURE — 99233 SBSQ HOSP IP/OBS HIGH 50: CPT | Mod: GC

## 2020-01-01 PROCEDURE — 72170 X-RAY EXAM OF PELVIS: CPT

## 2020-01-01 PROCEDURE — 73502 X-RAY EXAM HIP UNI 2-3 VIEWS: CPT

## 2020-01-01 PROCEDURE — 84100 ASSAY OF PHOSPHORUS: CPT

## 2020-01-01 PROCEDURE — 80048 BASIC METABOLIC PNL TOTAL CA: CPT

## 2020-01-01 PROCEDURE — 73723 MRI JOINT LWR EXTR W/O&W/DYE: CPT | Mod: 26,LT

## 2020-01-01 PROCEDURE — 86803 HEPATITIS C AB TEST: CPT

## 2020-01-01 PROCEDURE — 86900 BLOOD TYPING SEROLOGIC ABO: CPT

## 2020-01-01 PROCEDURE — 87205 SMEAR GRAM STAIN: CPT

## 2020-01-01 PROCEDURE — 99232 SBSQ HOSP IP/OBS MODERATE 35: CPT

## 2020-01-01 PROCEDURE — 80202 ASSAY OF VANCOMYCIN: CPT

## 2020-01-01 PROCEDURE — 86769 SARS-COV-2 COVID-19 ANTIBODY: CPT

## 2020-01-01 PROCEDURE — 99204 OFFICE O/P NEW MOD 45 MIN: CPT

## 2020-01-01 PROCEDURE — 73552 X-RAY EXAM OF FEMUR 2/>: CPT | Mod: 26,LT

## 2020-01-01 PROCEDURE — 74230 X-RAY XM SWLNG FUNCJ C+: CPT | Mod: 26

## 2020-01-01 PROCEDURE — P9047: CPT

## 2020-01-01 PROCEDURE — 99223 1ST HOSP IP/OBS HIGH 75: CPT | Mod: 25

## 2020-01-01 PROCEDURE — 12345: CPT | Mod: NC

## 2020-01-01 PROCEDURE — 74177 CT ABD & PELVIS W/CONTRAST: CPT | Mod: 26

## 2020-01-01 PROCEDURE — 94640 AIRWAY INHALATION TREATMENT: CPT

## 2020-01-01 PROCEDURE — 71045 X-RAY EXAM CHEST 1 VIEW: CPT | Mod: 26

## 2020-01-01 PROCEDURE — 99291 CRITICAL CARE FIRST HOUR: CPT

## 2020-01-01 PROCEDURE — 82962 GLUCOSE BLOOD TEST: CPT

## 2020-01-01 PROCEDURE — 82248 BILIRUBIN DIRECT: CPT

## 2020-01-01 PROCEDURE — 85652 RBC SED RATE AUTOMATED: CPT

## 2020-01-01 PROCEDURE — 74176 CT ABD & PELVIS W/O CONTRAST: CPT

## 2020-01-01 PROCEDURE — 0225U NFCT DS DNA&RNA 21 SARSCOV2: CPT

## 2020-01-01 PROCEDURE — 73110 X-RAY EXAM OF WRIST: CPT | Mod: LT

## 2020-01-01 PROCEDURE — 83036 HEMOGLOBIN GLYCOSYLATED A1C: CPT

## 2020-01-01 PROCEDURE — 36415 COLL VENOUS BLD VENIPUNCTURE: CPT

## 2020-01-01 PROCEDURE — 87070 CULTURE OTHR SPECIMN AEROBIC: CPT

## 2020-01-01 PROCEDURE — 99285 EMERGENCY DEPT VISIT HI MDM: CPT

## 2020-01-01 PROCEDURE — 84484 ASSAY OF TROPONIN QUANT: CPT

## 2020-01-01 PROCEDURE — 99214 OFFICE O/P EST MOD 30 MIN: CPT

## 2020-01-01 PROCEDURE — 99223 1ST HOSP IP/OBS HIGH 75: CPT | Mod: AI,GC

## 2020-01-01 PROCEDURE — 99214 OFFICE O/P EST MOD 30 MIN: CPT | Mod: 25

## 2020-01-01 PROCEDURE — 99222 1ST HOSP IP/OBS MODERATE 55: CPT

## 2020-01-01 PROCEDURE — 72156 MRI NECK SPINE W/O & W/DYE: CPT

## 2020-01-01 PROCEDURE — C1729: CPT

## 2020-01-01 PROCEDURE — 72156 MRI NECK SPINE W/O & W/DYE: CPT | Mod: 26

## 2020-01-01 PROCEDURE — 85610 PROTHROMBIN TIME: CPT

## 2020-01-01 PROCEDURE — 73723 MRI JOINT LWR EXTR W/O&W/DYE: CPT

## 2020-01-01 PROCEDURE — 72158 MRI LUMBAR SPINE W/O & W/DYE: CPT

## 2020-01-01 PROCEDURE — 72157 MRI CHEST SPINE W/O & W/DYE: CPT | Mod: 26

## 2020-01-01 PROCEDURE — 87040 BLOOD CULTURE FOR BACTERIA: CPT

## 2020-01-01 PROCEDURE — 80076 HEPATIC FUNCTION PANEL: CPT

## 2020-01-01 PROCEDURE — 72157 MRI CHEST SPINE W/O & W/DYE: CPT

## 2020-01-01 PROCEDURE — 99221 1ST HOSP IP/OBS SF/LOW 40: CPT

## 2020-01-01 PROCEDURE — 87186 SC STD MICRODIL/AGAR DIL: CPT

## 2020-01-01 PROCEDURE — 83735 ASSAY OF MAGNESIUM: CPT

## 2020-01-01 PROCEDURE — 12345: CPT | Mod: NC,GC

## 2020-01-01 PROCEDURE — 73502 X-RAY EXAM HIP UNI 2-3 VIEWS: CPT | Mod: 26,LT

## 2020-01-01 PROCEDURE — 80053 COMPREHEN METABOLIC PANEL: CPT

## 2020-01-01 PROCEDURE — 31575 DIAGNOSTIC LARYNGOSCOPY: CPT

## 2020-01-01 PROCEDURE — 86901 BLOOD TYPING SEROLOGIC RH(D): CPT

## 2020-01-01 PROCEDURE — 83690 ASSAY OF LIPASE: CPT

## 2020-01-01 PROCEDURE — 85730 THROMBOPLASTIN TIME PARTIAL: CPT

## 2020-01-01 PROCEDURE — 93005 ELECTROCARDIOGRAM TRACING: CPT

## 2020-01-01 PROCEDURE — 74177 CT ABD & PELVIS W/CONTRAST: CPT

## 2020-01-01 PROCEDURE — 84145 PROCALCITONIN (PCT): CPT

## 2020-01-01 PROCEDURE — 74176 CT ABD & PELVIS W/O CONTRAST: CPT | Mod: 26

## 2020-01-01 PROCEDURE — 73630 X-RAY EXAM OF FOOT: CPT | Mod: LT

## 2020-01-01 PROCEDURE — 31579 LARYNGOSCOPY TELESCOPIC: CPT

## 2020-01-01 PROCEDURE — 99204 OFFICE O/P NEW MOD 45 MIN: CPT | Mod: 25

## 2020-01-01 PROCEDURE — 99223 1ST HOSP IP/OBS HIGH 75: CPT

## 2020-01-01 PROCEDURE — 93306 TTE W/DOPPLER COMPLETE: CPT | Mod: 26

## 2020-01-01 PROCEDURE — 96375 TX/PRO/DX INJ NEW DRUG ADDON: CPT

## 2020-01-01 PROCEDURE — 86850 RBC ANTIBODY SCREEN: CPT

## 2020-01-01 PROCEDURE — 83605 ASSAY OF LACTIC ACID: CPT

## 2020-01-01 PROCEDURE — 71045 X-RAY EXAM CHEST 1 VIEW: CPT

## 2020-01-01 PROCEDURE — 82550 ASSAY OF CK (CPK): CPT

## 2020-01-01 PROCEDURE — 73130 X-RAY EXAM OF HAND: CPT | Mod: RT

## 2020-01-01 PROCEDURE — 99205 OFFICE O/P NEW HI 60 MIN: CPT

## 2020-01-01 PROCEDURE — 87150 DNA/RNA AMPLIFIED PROBE: CPT

## 2020-01-01 PROCEDURE — 87086 URINE CULTURE/COLONY COUNT: CPT

## 2020-01-01 PROCEDURE — 96365 THER/PROPH/DIAG IV INF INIT: CPT | Mod: XU

## 2020-01-01 PROCEDURE — 90792 PSYCH DIAG EVAL W/MED SRVCS: CPT

## 2020-01-01 PROCEDURE — 85027 COMPLETE CBC AUTOMATED: CPT

## 2020-01-01 PROCEDURE — 72158 MRI LUMBAR SPINE W/O & W/DYE: CPT | Mod: 26

## 2020-01-01 PROCEDURE — 81001 URINALYSIS AUTO W/SCOPE: CPT

## 2020-01-01 PROCEDURE — C1769: CPT

## 2020-01-01 PROCEDURE — 73522 X-RAY EXAM HIPS BI 3-4 VIEWS: CPT | Mod: 26

## 2020-01-01 PROCEDURE — 87075 CULTR BACTERIA EXCEPT BLOOD: CPT

## 2020-01-01 PROCEDURE — 10160 PNXR ASPIR ABSC HMTMA BULLA: CPT

## 2020-01-01 PROCEDURE — 73610 X-RAY EXAM OF ANKLE: CPT | Mod: LT

## 2020-01-01 RX ORDER — SODIUM CHLORIDE 9 MG/ML
1000 INJECTION, SOLUTION INTRAVENOUS ONCE
Refills: 0 | Status: COMPLETED | OUTPATIENT
Start: 2020-01-01 | End: 2020-01-01

## 2020-01-01 RX ORDER — GABAPENTIN 400 MG/1
200 CAPSULE ORAL THREE TIMES A DAY
Refills: 0 | Status: DISCONTINUED | OUTPATIENT
Start: 2020-01-01 | End: 2020-01-01

## 2020-01-01 RX ORDER — ALBUMIN HUMAN 25 %
50 VIAL (ML) INTRAVENOUS EVERY 6 HOURS
Refills: 0 | Status: COMPLETED | OUTPATIENT
Start: 2020-01-01 | End: 2020-01-01

## 2020-01-01 RX ORDER — PIPERACILLIN AND TAZOBACTAM 4; .5 G/20ML; G/20ML
3.38 INJECTION, POWDER, LYOPHILIZED, FOR SOLUTION INTRAVENOUS
Qty: 0 | Refills: 0 | DISCHARGE
Start: 2020-01-01

## 2020-01-01 RX ORDER — METHADONE HYDROCHLORIDE 40 MG/1
30 TABLET ORAL EVERY 12 HOURS
Refills: 0 | Status: DISCONTINUED | OUTPATIENT
Start: 2020-01-01 | End: 2020-01-01

## 2020-01-01 RX ORDER — ACETAMINOPHEN 500 MG
650 TABLET ORAL ONCE
Refills: 0 | Status: COMPLETED | OUTPATIENT
Start: 2020-01-01 | End: 2020-01-01

## 2020-01-01 RX ORDER — METHADONE HYDROCHLORIDE 40 MG/1
60 TABLET ORAL ONCE
Refills: 0 | Status: DISCONTINUED | OUTPATIENT
Start: 2020-01-01 | End: 2020-01-01

## 2020-01-01 RX ORDER — HYDROMORPHONE HYDROCHLORIDE 2 MG/ML
2 INJECTION INTRAMUSCULAR; INTRAVENOUS; SUBCUTANEOUS
Qty: 100 | Refills: 0 | Status: DISCONTINUED | OUTPATIENT
Start: 2020-01-01 | End: 2020-01-01

## 2020-01-01 RX ORDER — DULOXETINE HYDROCHLORIDE 30 MG/1
30 CAPSULE, DELAYED RELEASE ORAL DAILY
Refills: 0 | Status: DISCONTINUED | OUTPATIENT
Start: 2020-01-01 | End: 2020-01-01

## 2020-01-01 RX ORDER — HYDROMORPHONE HYDROCHLORIDE 2 MG/ML
2 INJECTION INTRAMUSCULAR; INTRAVENOUS; SUBCUTANEOUS
Qty: 0 | Refills: 0 | DISCHARGE
Start: 2020-01-01

## 2020-01-01 RX ORDER — PIPERACILLIN AND TAZOBACTAM 4; .5 G/20ML; G/20ML
3.38 INJECTION, POWDER, LYOPHILIZED, FOR SOLUTION INTRAVENOUS EVERY 8 HOURS
Refills: 0 | Status: DISCONTINUED | OUTPATIENT
Start: 2020-01-01 | End: 2020-01-01

## 2020-01-01 RX ORDER — SODIUM CHLORIDE 9 MG/ML
1000 INJECTION INTRAMUSCULAR; INTRAVENOUS; SUBCUTANEOUS ONCE
Refills: 0 | Status: COMPLETED | OUTPATIENT
Start: 2020-01-01 | End: 2020-01-01

## 2020-01-01 RX ORDER — ADALIMUMAB 40MG/0.8ML
0 KIT SUBCUTANEOUS
Qty: 0 | Refills: 0 | DISCHARGE

## 2020-01-01 RX ORDER — HYDROMORPHONE HYDROCHLORIDE 2 MG/ML
2 INJECTION INTRAMUSCULAR; INTRAVENOUS; SUBCUTANEOUS EVERY 4 HOURS
Refills: 0 | Status: DISCONTINUED | OUTPATIENT
Start: 2020-01-01 | End: 2020-01-01

## 2020-01-01 RX ORDER — VANCOMYCIN HCL 1 G
750 VIAL (EA) INTRAVENOUS EVERY 12 HOURS
Refills: 0 | Status: DISCONTINUED | OUTPATIENT
Start: 2020-01-01 | End: 2020-01-01

## 2020-01-01 RX ORDER — ASCORBIC ACID 60 MG
1 TABLET,CHEWABLE ORAL
Qty: 0 | Refills: 0 | DISCHARGE

## 2020-01-01 RX ORDER — LACTULOSE 10 G/15ML
15 SOLUTION ORAL
Qty: 0 | Refills: 0 | DISCHARGE

## 2020-01-01 RX ORDER — HYDROMORPHONE HYDROCHLORIDE 2 MG/ML
0.5 INJECTION INTRAMUSCULAR; INTRAVENOUS; SUBCUTANEOUS
Refills: 0 | Status: DISCONTINUED | OUTPATIENT
Start: 2020-01-01 | End: 2020-01-01

## 2020-01-01 RX ORDER — DEXTROSE 50 % IN WATER 50 %
12.5 SYRINGE (ML) INTRAVENOUS ONCE
Refills: 0 | Status: COMPLETED | OUTPATIENT
Start: 2020-01-01 | End: 2020-01-01

## 2020-01-01 RX ORDER — TIZANIDINE 4 MG/1
4 TABLET ORAL
Refills: 0 | Status: ACTIVE | COMMUNITY

## 2020-01-01 RX ORDER — METHYLNALTREXONE BROMIDE 12 MG/.6ML
12 INJECTION, SOLUTION SUBCUTANEOUS ONCE
Refills: 0 | Status: COMPLETED | OUTPATIENT
Start: 2020-01-01 | End: 2020-01-01

## 2020-01-01 RX ORDER — DEXTROSE 50 % IN WATER 50 %
25 SYRINGE (ML) INTRAVENOUS ONCE
Refills: 0 | Status: DISCONTINUED | OUTPATIENT
Start: 2020-01-01 | End: 2020-01-01

## 2020-01-01 RX ORDER — ACETAMINOPHEN 500 MG
650 TABLET ORAL EVERY 6 HOURS
Refills: 0 | Status: DISCONTINUED | OUTPATIENT
Start: 2020-01-01 | End: 2020-01-01

## 2020-01-01 RX ORDER — MONTELUKAST 4 MG/1
1 TABLET, CHEWABLE ORAL
Qty: 0 | Refills: 0 | DISCHARGE

## 2020-01-01 RX ORDER — METHADONE HYDROCHLORIDE 40 MG/1
20 TABLET ORAL ONCE
Refills: 0 | Status: DISCONTINUED | OUTPATIENT
Start: 2020-01-01 | End: 2020-01-01

## 2020-01-01 RX ORDER — HEPARIN SODIUM 5000 [USP'U]/ML
5000 INJECTION INTRAVENOUS; SUBCUTANEOUS EVERY 12 HOURS
Refills: 0 | Status: DISCONTINUED | OUTPATIENT
Start: 2020-01-01 | End: 2020-01-01

## 2020-01-01 RX ORDER — PANTOPRAZOLE SODIUM 20 MG/1
40 TABLET, DELAYED RELEASE ORAL DAILY
Refills: 0 | Status: DISCONTINUED | OUTPATIENT
Start: 2020-01-01 | End: 2020-01-01

## 2020-01-01 RX ORDER — DULOXETINE HYDROCHLORIDE 30 MG/1
1 CAPSULE, DELAYED RELEASE ORAL
Qty: 0 | Refills: 0 | DISCHARGE

## 2020-01-01 RX ORDER — TRAZODONE HCL 50 MG
1 TABLET ORAL
Qty: 0 | Refills: 0 | DISCHARGE

## 2020-01-01 RX ORDER — MAGNESIUM SULFATE 500 MG/ML
2 VIAL (ML) INJECTION ONCE
Refills: 0 | Status: COMPLETED | OUTPATIENT
Start: 2020-01-01 | End: 2020-01-01

## 2020-01-01 RX ORDER — SODIUM CHLORIDE 9 MG/ML
1000 INJECTION, SOLUTION INTRAVENOUS
Refills: 0 | Status: DISCONTINUED | OUTPATIENT
Start: 2020-01-01 | End: 2020-01-01

## 2020-01-01 RX ORDER — GABAPENTIN 400 MG/1
600 CAPSULE ORAL THREE TIMES A DAY
Refills: 0 | Status: DISCONTINUED | OUTPATIENT
Start: 2020-01-01 | End: 2020-01-01

## 2020-01-01 RX ORDER — DAPTOMYCIN 500 MG/10ML
270 INJECTION, POWDER, LYOPHILIZED, FOR SOLUTION INTRAVENOUS EVERY 24 HOURS
Refills: 0 | Status: DISCONTINUED | OUTPATIENT
Start: 2020-01-01 | End: 2020-01-01

## 2020-01-01 RX ORDER — ALPRAZOLAM 0.25 MG
0.25 TABLET ORAL ONCE
Refills: 0 | Status: DISCONTINUED | OUTPATIENT
Start: 2020-01-01 | End: 2020-01-01

## 2020-01-01 RX ORDER — POLYETHYLENE GLYCOL 3350 17 G/17G
17 POWDER, FOR SOLUTION ORAL DAILY
Refills: 0 | Status: DISCONTINUED | OUTPATIENT
Start: 2020-01-01 | End: 2020-01-01

## 2020-01-01 RX ORDER — TRAMADOL HYDROCHLORIDE 50 MG/1
25 TABLET ORAL ONCE
Refills: 0 | Status: DISCONTINUED | OUTPATIENT
Start: 2020-01-01 | End: 2020-01-01

## 2020-01-01 RX ORDER — SIMETHICONE 80 MG/1
80 TABLET, CHEWABLE ORAL ONCE
Refills: 0 | Status: COMPLETED | OUTPATIENT
Start: 2020-01-01 | End: 2020-01-01

## 2020-01-01 RX ORDER — ROBINUL 0.2 MG/ML
0.4 INJECTION INTRAMUSCULAR; INTRAVENOUS
Qty: 0 | Refills: 0 | DISCHARGE
Start: 2020-01-01

## 2020-01-01 RX ORDER — FLUOXETINE HCL 10 MG
1 CAPSULE ORAL
Qty: 0 | Refills: 0 | DISCHARGE

## 2020-01-01 RX ORDER — HYDROMORPHONE HYDROCHLORIDE 2 MG/ML
3 INJECTION INTRAMUSCULAR; INTRAVENOUS; SUBCUTANEOUS
Refills: 0 | Status: DISCONTINUED | OUTPATIENT
Start: 2020-01-01 | End: 2020-01-01

## 2020-01-01 RX ORDER — ASCORBIC ACID 60 MG
500 TABLET,CHEWABLE ORAL DAILY
Refills: 0 | Status: DISCONTINUED | OUTPATIENT
Start: 2020-01-01 | End: 2020-01-01

## 2020-01-01 RX ORDER — ADALIMUMAB 20MG/0.4ML
KIT SUBCUTANEOUS
Refills: 0 | Status: ACTIVE | COMMUNITY

## 2020-01-01 RX ORDER — CHOLECALCIFEROL (VITAMIN D3) 125 MCG
0 CAPSULE ORAL
Qty: 0 | Refills: 0 | DISCHARGE

## 2020-01-01 RX ORDER — ACETAMINOPHEN 500 MG
2 TABLET ORAL
Qty: 0 | Refills: 0 | DISCHARGE

## 2020-01-01 RX ORDER — GABAPENTIN 400 MG/1
1 CAPSULE ORAL
Qty: 0 | Refills: 0 | DISCHARGE

## 2020-01-01 RX ORDER — PANTOPRAZOLE SODIUM 20 MG/1
1 TABLET, DELAYED RELEASE ORAL
Qty: 0 | Refills: 0 | DISCHARGE

## 2020-01-01 RX ORDER — FLUOXETINE HCL 10 MG
TABLET ORAL
Refills: 0 | Status: ACTIVE | COMMUNITY

## 2020-01-01 RX ORDER — SIMETHICONE 125 MG
CAPSULE ORAL
Refills: 0 | Status: ACTIVE | COMMUNITY

## 2020-01-01 RX ORDER — DEXTROSE 50 % IN WATER 50 %
15 SYRINGE (ML) INTRAVENOUS ONCE
Refills: 0 | Status: DISCONTINUED | OUTPATIENT
Start: 2020-01-01 | End: 2020-01-01

## 2020-01-01 RX ORDER — MORPHINE SULFATE 50 MG/1
2 CAPSULE, EXTENDED RELEASE ORAL EVERY 4 HOURS
Refills: 0 | Status: DISCONTINUED | OUTPATIENT
Start: 2020-01-01 | End: 2020-01-01

## 2020-01-01 RX ORDER — FAMOTIDINE 10 MG/ML
1 INJECTION INTRAVENOUS
Qty: 0 | Refills: 0 | DISCHARGE

## 2020-01-01 RX ORDER — HYDROMORPHONE HYDROCHLORIDE 2 MG/ML
4 INJECTION INTRAMUSCULAR; INTRAVENOUS; SUBCUTANEOUS
Refills: 0 | Status: DISCONTINUED | OUTPATIENT
Start: 2020-01-01 | End: 2020-01-01

## 2020-01-01 RX ORDER — PIPERACILLIN AND TAZOBACTAM 4; .5 G/20ML; G/20ML
3.38 INJECTION, POWDER, LYOPHILIZED, FOR SOLUTION INTRAVENOUS ONCE
Refills: 0 | Status: DISCONTINUED | OUTPATIENT
Start: 2020-01-01 | End: 2020-01-01

## 2020-01-01 RX ORDER — DILTIAZEM HCL 120 MG
1 CAPSULE, EXT RELEASE 24 HR ORAL
Qty: 0 | Refills: 0 | DISCHARGE

## 2020-01-01 RX ORDER — MIDODRINE HYDROCHLORIDE 2.5 MG/1
20 TABLET ORAL THREE TIMES A DAY
Refills: 0 | Status: DISCONTINUED | OUTPATIENT
Start: 2020-01-01 | End: 2020-01-01

## 2020-01-01 RX ORDER — GLUCAGON INJECTION, SOLUTION 0.5 MG/.1ML
1 INJECTION, SOLUTION SUBCUTANEOUS ONCE
Refills: 0 | Status: DISCONTINUED | OUTPATIENT
Start: 2020-01-01 | End: 2020-01-01

## 2020-01-01 RX ORDER — ASPIRIN 81 MG
81 TABLET, DELAYED RELEASE (ENTERIC COATED) ORAL
Refills: 0 | Status: ACTIVE | COMMUNITY

## 2020-01-01 RX ORDER — TIOTROPIUM BROMIDE 18 UG/1
1 CAPSULE ORAL; RESPIRATORY (INHALATION)
Qty: 0 | Refills: 0 | DISCHARGE

## 2020-01-01 RX ORDER — ENOXAPARIN SODIUM 100 MG/ML
40 INJECTION SUBCUTANEOUS DAILY
Refills: 0 | Status: DISCONTINUED | OUTPATIENT
Start: 2020-01-01 | End: 2020-01-01

## 2020-01-01 RX ORDER — MIDODRINE HYDROCHLORIDE 2.5 MG/1
15 TABLET ORAL THREE TIMES A DAY
Refills: 0 | Status: DISCONTINUED | OUTPATIENT
Start: 2020-01-01 | End: 2020-01-01

## 2020-01-01 RX ORDER — TRAMADOL HYDROCHLORIDE 50 MG/1
25 TABLET ORAL EVERY 6 HOURS
Refills: 0 | Status: DISCONTINUED | OUTPATIENT
Start: 2020-01-01 | End: 2020-01-01

## 2020-01-01 RX ORDER — VANCOMYCIN HCL 1 G
1000 VIAL (EA) INTRAVENOUS ONCE
Refills: 0 | Status: COMPLETED | OUTPATIENT
Start: 2020-01-01 | End: 2020-01-01

## 2020-01-01 RX ORDER — NYSTATIN 100000 [USP'U]/ML
100000 SUSPENSION ORAL 3 TIMES DAILY
Qty: 1 | Refills: 1 | Status: ACTIVE | COMMUNITY
Start: 2020-01-01 | End: 1900-01-01

## 2020-01-01 RX ORDER — METHADONE HYDROCHLORIDE 40 MG/1
60 TABLET ORAL
Qty: 0 | Refills: 0 | DISCHARGE

## 2020-01-01 RX ORDER — FLUOXETINE HCL 10 MG
20 CAPSULE ORAL DAILY
Refills: 0 | Status: DISCONTINUED | OUTPATIENT
Start: 2020-01-01 | End: 2020-01-01

## 2020-01-01 RX ORDER — IPRATROPIUM/ALBUTEROL SULFATE 18-103MCG
3 AEROSOL WITH ADAPTER (GRAM) INHALATION EVERY 6 HOURS
Refills: 0 | Status: DISCONTINUED | OUTPATIENT
Start: 2020-01-01 | End: 2020-01-01

## 2020-01-01 RX ORDER — METHADONE HYDROCHLORIDE 40 MG/1
10 TABLET ORAL DAILY
Refills: 0 | Status: DISCONTINUED | OUTPATIENT
Start: 2020-01-01 | End: 2020-01-01

## 2020-01-01 RX ORDER — QUETIAPINE FUMARATE 200 MG/1
12.5 TABLET, FILM COATED ORAL AT BEDTIME
Refills: 0 | Status: DISCONTINUED | OUTPATIENT
Start: 2020-01-01 | End: 2020-01-01

## 2020-01-01 RX ORDER — METHADONE HYDROCHLORIDE 5 MG/1
TABLET ORAL
Refills: 0 | Status: ACTIVE | COMMUNITY

## 2020-01-01 RX ORDER — SODIUM CHLORIDE 9 MG/ML
500 INJECTION, SOLUTION INTRAVENOUS
Refills: 0 | Status: DISCONTINUED | OUTPATIENT
Start: 2020-01-01 | End: 2020-01-01

## 2020-01-01 RX ORDER — MAGNESIUM OXIDE 241.3 MG/1000MG
400 TABLET ORAL
Refills: 0 | Status: ACTIVE | COMMUNITY

## 2020-01-01 RX ORDER — MORPHINE SULFATE 50 MG/1
2 CAPSULE, EXTENDED RELEASE ORAL
Qty: 0 | Refills: 0 | DISCHARGE

## 2020-01-01 RX ORDER — IPRATROPIUM BROMIDE 0.2 MG/ML
2.5 SOLUTION, NON-ORAL INHALATION
Qty: 0 | Refills: 0 | DISCHARGE

## 2020-01-01 RX ORDER — THIAMINE MONONITRATE (VIT B1) 100 MG
100 TABLET ORAL DAILY
Refills: 0 | Status: DISCONTINUED | OUTPATIENT
Start: 2020-01-01 | End: 2020-01-01

## 2020-01-01 RX ORDER — PANTOPRAZOLE SODIUM 20 MG/1
TABLET, DELAYED RELEASE ORAL
Refills: 0 | Status: ACTIVE | COMMUNITY

## 2020-01-01 RX ORDER — IPRATROPIUM/ALBUTEROL SULFATE 18-103MCG
3 AEROSOL WITH ADAPTER (GRAM) INHALATION ONCE
Refills: 0 | Status: COMPLETED | OUTPATIENT
Start: 2020-01-01 | End: 2020-01-01

## 2020-01-01 RX ORDER — CHOLECALCIFEROL (VITAMIN D3) 125 MCG
1 CAPSULE ORAL
Qty: 0 | Refills: 0 | DISCHARGE

## 2020-01-01 RX ORDER — ALBUTEROL 90 UG/1
2 AEROSOL, METERED ORAL EVERY 6 HOURS
Refills: 0 | Status: DISCONTINUED | OUTPATIENT
Start: 2020-01-01 | End: 2020-01-01

## 2020-01-01 RX ORDER — TIOTROPIUM BROMIDE 18 UG/1
1 CAPSULE ORAL; RESPIRATORY (INHALATION) DAILY
Refills: 0 | Status: DISCONTINUED | OUTPATIENT
Start: 2020-01-01 | End: 2020-01-01

## 2020-01-01 RX ORDER — LACTULOSE 10 G/15ML
10 SOLUTION ORAL THREE TIMES A DAY
Refills: 0 | Status: DISCONTINUED | OUTPATIENT
Start: 2020-01-01 | End: 2020-01-01

## 2020-01-01 RX ORDER — METHOCARBAMOL 500 MG/1
250 TABLET, FILM COATED ORAL EVERY 8 HOURS
Refills: 0 | Status: DISCONTINUED | OUTPATIENT
Start: 2020-01-01 | End: 2020-01-01

## 2020-01-01 RX ORDER — MONTELUKAST SODIUM 10 MG/1
TABLET, FILM COATED ORAL
Refills: 0 | Status: ACTIVE | COMMUNITY

## 2020-01-01 RX ORDER — HYDROMORPHONE HYDROCHLORIDE 2 MG/ML
2 INJECTION INTRAMUSCULAR; INTRAVENOUS; SUBCUTANEOUS
Refills: 0 | Status: DISCONTINUED | OUTPATIENT
Start: 2020-01-01 | End: 2020-01-01

## 2020-01-01 RX ORDER — DEXTROSE 50 % IN WATER 50 %
12.5 SYRINGE (ML) INTRAVENOUS ONCE
Refills: 0 | Status: DISCONTINUED | OUTPATIENT
Start: 2020-01-01 | End: 2020-01-01

## 2020-01-01 RX ORDER — METHADONE HYDROCHLORIDE 40 MG/1
60 TABLET ORAL DAILY
Refills: 0 | Status: DISCONTINUED | OUTPATIENT
Start: 2020-01-01 | End: 2020-01-01

## 2020-01-01 RX ORDER — GABAPENTIN 300 MG/1
300 CAPSULE ORAL
Refills: 0 | Status: ACTIVE | COMMUNITY

## 2020-01-01 RX ORDER — METHOCARBAMOL 500 MG/1
0.5 TABLET, FILM COATED ORAL
Qty: 0 | Refills: 0 | DISCHARGE
Start: 2020-01-01

## 2020-01-01 RX ORDER — IPRATROPIUM BROMIDE 21 UG/1
0.03 SPRAY NASAL TWICE DAILY
Qty: 2 | Refills: 2 | Status: ACTIVE | COMMUNITY
Start: 2020-01-01 | End: 1900-01-01

## 2020-01-01 RX ORDER — NOREPINEPHRINE BITARTRATE/D5W 8 MG/250ML
0.05 PLASTIC BAG, INJECTION (ML) INTRAVENOUS
Qty: 8 | Refills: 0 | Status: DISCONTINUED | OUTPATIENT
Start: 2020-01-01 | End: 2020-01-01

## 2020-01-01 RX ORDER — MONTELUKAST 4 MG/1
10 TABLET, CHEWABLE ORAL AT BEDTIME
Refills: 0 | Status: DISCONTINUED | OUTPATIENT
Start: 2020-01-01 | End: 2020-01-01

## 2020-01-01 RX ORDER — SODIUM CHLORIDE 9 MG/ML
500 INJECTION, SOLUTION INTRAVENOUS ONCE
Refills: 0 | Status: COMPLETED | OUTPATIENT
Start: 2020-01-01 | End: 2020-01-01

## 2020-01-01 RX ORDER — METHOCARBAMOL 500 MG/1
0.5 TABLET, FILM COATED ORAL
Qty: 0 | Refills: 0 | DISCHARGE

## 2020-01-01 RX ORDER — HYDROMORPHONE HYDROCHLORIDE 2 MG/ML
0.2 INJECTION INTRAMUSCULAR; INTRAVENOUS; SUBCUTANEOUS
Refills: 0 | Status: DISCONTINUED | OUTPATIENT
Start: 2020-01-01 | End: 2020-01-01

## 2020-01-01 RX ORDER — SENNA PLUS 8.6 MG/1
2 TABLET ORAL AT BEDTIME
Refills: 0 | Status: DISCONTINUED | OUTPATIENT
Start: 2020-01-01 | End: 2020-01-01

## 2020-01-01 RX ORDER — MAGNESIUM HYDROXIDE 400 MG/1
15 TABLET, CHEWABLE ORAL AT BEDTIME
Refills: 0 | Status: DISCONTINUED | OUTPATIENT
Start: 2020-01-01 | End: 2020-01-01

## 2020-01-01 RX ORDER — BIOTIN 10 MG
TABLET ORAL
Refills: 0 | Status: ACTIVE | COMMUNITY

## 2020-01-01 RX ORDER — DILTIAZEM HCL 120 MG
30 CAPSULE, EXT RELEASE 24 HR ORAL EVERY 8 HOURS
Refills: 0 | Status: DISCONTINUED | OUTPATIENT
Start: 2020-01-01 | End: 2020-01-01

## 2020-01-01 RX ORDER — SENNOSIDES 8.6 MG/1
8.6 CAPSULE, GELATIN COATED ORAL
Refills: 0 | Status: ACTIVE | COMMUNITY

## 2020-01-01 RX ORDER — DAPTOMYCIN 500 MG/10ML
250 INJECTION, POWDER, LYOPHILIZED, FOR SOLUTION INTRAVENOUS EVERY 24 HOURS
Refills: 0 | Status: DISCONTINUED | OUTPATIENT
Start: 2020-01-01 | End: 2020-01-01

## 2020-01-01 RX ORDER — DAPTOMYCIN 500 MG/10ML
270 INJECTION, POWDER, LYOPHILIZED, FOR SOLUTION INTRAVENOUS ONCE
Refills: 0 | Status: COMPLETED | OUTPATIENT
Start: 2020-01-01 | End: 2020-01-01

## 2020-01-01 RX ORDER — IOHEXOL 300 MG/ML
500 INJECTION, SOLUTION INTRAVENOUS
Refills: 0 | Status: COMPLETED | OUTPATIENT
Start: 2020-01-01 | End: 2020-01-01

## 2020-01-01 RX ORDER — FUROSEMIDE 40 MG
40 TABLET ORAL ONCE
Refills: 0 | Status: DISCONTINUED | OUTPATIENT
Start: 2020-01-01 | End: 2020-01-01

## 2020-01-01 RX ORDER — DOCUSATE SODIUM 100 MG/1
100 CAPSULE ORAL
Refills: 0 | Status: ACTIVE | COMMUNITY

## 2020-01-01 RX ORDER — HEPARIN SODIUM 5000 [USP'U]/ML
1 INJECTION INTRAVENOUS; SUBCUTANEOUS
Qty: 0 | Refills: 0 | DISCHARGE

## 2020-01-01 RX ORDER — HYDROMORPHONE HYDROCHLORIDE 2 MG/ML
1 INJECTION INTRAMUSCULAR; INTRAVENOUS; SUBCUTANEOUS EVERY 6 HOURS
Refills: 0 | Status: DISCONTINUED | OUTPATIENT
Start: 2020-01-01 | End: 2020-01-01

## 2020-01-01 RX ORDER — HYDROMORPHONE HYDROCHLORIDE 2 MG/ML
0.5 INJECTION INTRAMUSCULAR; INTRAVENOUS; SUBCUTANEOUS EVERY 6 HOURS
Refills: 0 | Status: DISCONTINUED | OUTPATIENT
Start: 2020-01-01 | End: 2020-01-01

## 2020-01-01 RX ORDER — METHADONE HYDROCHLORIDE 40 MG/1
3 TABLET ORAL
Qty: 0 | Refills: 0 | DISCHARGE

## 2020-01-01 RX ORDER — ALBUTEROL 90 UG/1
1 AEROSOL, METERED ORAL EVERY 4 HOURS
Refills: 0 | Status: DISCONTINUED | OUTPATIENT
Start: 2020-01-01 | End: 2020-01-01

## 2020-01-01 RX ORDER — MORPHINE SULFATE 50 MG/1
1 CAPSULE, EXTENDED RELEASE ORAL ONCE
Refills: 0 | Status: DISCONTINUED | OUTPATIENT
Start: 2020-01-01 | End: 2020-01-01

## 2020-01-01 RX ORDER — ASPIRIN/CALCIUM CARB/MAGNESIUM 324 MG
81 TABLET ORAL DAILY
Refills: 0 | Status: DISCONTINUED | OUTPATIENT
Start: 2020-01-01 | End: 2020-01-01

## 2020-01-01 RX ORDER — MULTIVIT-MIN/FERROUS GLUCONATE 9 MG/15 ML
15 LIQUID (ML) ORAL DAILY
Refills: 0 | Status: DISCONTINUED | OUTPATIENT
Start: 2020-01-01 | End: 2020-01-01

## 2020-01-01 RX ORDER — SOD SULF/SODIUM/NAHCO3/KCL/PEG
1000 SOLUTION, RECONSTITUTED, ORAL ORAL EVERY 4 HOURS
Refills: 0 | Status: COMPLETED | OUTPATIENT
Start: 2020-01-01 | End: 2020-01-01

## 2020-01-01 RX ORDER — FAMOTIDINE 10 MG/ML
20 INJECTION INTRAVENOUS
Refills: 0 | Status: DISCONTINUED | OUTPATIENT
Start: 2020-01-01 | End: 2020-01-01

## 2020-01-01 RX ORDER — MORPHINE SULFATE 50 MG/1
2 CAPSULE, EXTENDED RELEASE ORAL ONCE
Refills: 0 | Status: DISCONTINUED | OUTPATIENT
Start: 2020-01-01 | End: 2020-01-01

## 2020-01-01 RX ORDER — HYDROMORPHONE HYDROCHLORIDE 2 MG/ML
2 INJECTION INTRAMUSCULAR; INTRAVENOUS; SUBCUTANEOUS EVERY 6 HOURS
Refills: 0 | Status: DISCONTINUED | OUTPATIENT
Start: 2020-01-01 | End: 2020-01-01

## 2020-01-01 RX ORDER — TRAZODONE HYDROCHLORIDE 100 MG/1
100 TABLET ORAL
Refills: 0 | Status: ACTIVE | COMMUNITY

## 2020-01-01 RX ORDER — SODIUM CHLORIDE 9 MG/ML
1000 INJECTION INTRAMUSCULAR; INTRAVENOUS; SUBCUTANEOUS
Refills: 0 | Status: COMPLETED | OUTPATIENT
Start: 2020-01-01 | End: 2020-01-01

## 2020-01-01 RX ORDER — PIPERACILLIN AND TAZOBACTAM 4; .5 G/20ML; G/20ML
3.38 INJECTION, POWDER, LYOPHILIZED, FOR SOLUTION INTRAVENOUS ONCE
Refills: 0 | Status: COMPLETED | OUTPATIENT
Start: 2020-01-01 | End: 2020-01-01

## 2020-01-01 RX ORDER — ACETAMINOPHEN 500 MG
600 TABLET ORAL ONCE
Refills: 0 | Status: COMPLETED | OUTPATIENT
Start: 2020-01-01 | End: 2020-01-01

## 2020-01-01 RX ORDER — ALBUTEROL 90 UG/1
2.5 AEROSOL, METERED ORAL EVERY 6 HOURS
Refills: 0 | Status: DISCONTINUED | OUTPATIENT
Start: 2020-01-01 | End: 2020-01-01

## 2020-01-01 RX ORDER — SOD SULF/SODIUM/NAHCO3/KCL/PEG
1000 SOLUTION, RECONSTITUTED, ORAL ORAL EVERY 4 HOURS
Refills: 0 | Status: DISCONTINUED | OUTPATIENT
Start: 2020-01-01 | End: 2020-01-01

## 2020-01-01 RX ORDER — ACETAMINOPHEN 500 MG
1000 TABLET ORAL ONCE
Refills: 0 | Status: DISCONTINUED | OUTPATIENT
Start: 2020-01-01 | End: 2020-01-01

## 2020-01-01 RX ORDER — PIPERACILLIN AND TAZOBACTAM 4; .5 G/20ML; G/20ML
3.38 INJECTION, POWDER, LYOPHILIZED, FOR SOLUTION INTRAVENOUS
Qty: 0 | Refills: 0 | DISCHARGE

## 2020-01-01 RX ORDER — VIT A/VIT C/VIT E/ZINC/COPPER 4296-226
CAPSULE ORAL
Refills: 0 | Status: ACTIVE | COMMUNITY

## 2020-01-01 RX ORDER — SENNA PLUS 8.6 MG/1
2 TABLET ORAL
Qty: 0 | Refills: 0 | DISCHARGE

## 2020-01-01 RX ORDER — HEPARIN SODIUM 5000 [USP'U]/ML
0 INJECTION INTRAVENOUS; SUBCUTANEOUS
Qty: 0 | Refills: 0 | DISCHARGE

## 2020-01-01 RX ORDER — DEXTROSE 50 % IN WATER 50 %
25 SYRINGE (ML) INTRAVENOUS ONCE
Refills: 0 | Status: COMPLETED | OUTPATIENT
Start: 2020-01-01 | End: 2020-01-01

## 2020-01-01 RX ORDER — TRAZODONE HCL 50 MG
50 TABLET ORAL AT BEDTIME
Refills: 0 | Status: DISCONTINUED | OUTPATIENT
Start: 2020-01-01 | End: 2020-01-01

## 2020-01-01 RX ORDER — GUAIFENESIN 600 MG/1
600 TABLET, EXTENDED RELEASE ORAL
Refills: 0 | Status: ACTIVE | COMMUNITY

## 2020-01-01 RX ORDER — POTASSIUM CHLORIDE 20 MEQ
10 PACKET (EA) ORAL
Refills: 0 | Status: COMPLETED | OUTPATIENT
Start: 2020-01-01 | End: 2020-01-01

## 2020-01-01 RX ORDER — METHADONE HYDROCHLORIDE 40 MG/1
3 TABLET ORAL
Qty: 0 | Refills: 0 | DISCHARGE
Start: 2020-01-01

## 2020-01-01 RX ORDER — HALOPERIDOL DECANOATE 100 MG/ML
5 INJECTION INTRAMUSCULAR ONCE
Refills: 0 | Status: COMPLETED | OUTPATIENT
Start: 2020-01-01 | End: 2020-01-01

## 2020-01-01 RX ORDER — DAPTOMYCIN 500 MG/10ML
INJECTION, POWDER, LYOPHILIZED, FOR SOLUTION INTRAVENOUS
Refills: 0 | Status: DISCONTINUED | OUTPATIENT
Start: 2020-01-01 | End: 2020-01-01

## 2020-01-01 RX ORDER — ASPIRIN/CALCIUM CARB/MAGNESIUM 324 MG
0 TABLET ORAL
Qty: 0 | Refills: 0 | DISCHARGE

## 2020-01-01 RX ORDER — ROBINUL 0.2 MG/ML
0.4 INJECTION INTRAMUSCULAR; INTRAVENOUS EVERY 6 HOURS
Refills: 0 | Status: DISCONTINUED | OUTPATIENT
Start: 2020-01-01 | End: 2020-01-01

## 2020-01-01 RX ORDER — METHADONE HYDROCHLORIDE 40 MG/1
60 TABLET ORAL
Refills: 0 | Status: DISCONTINUED | OUTPATIENT
Start: 2020-01-01 | End: 2020-01-01

## 2020-01-01 RX ORDER — ENOXAPARIN SODIUM 100 MG/ML
30 INJECTION SUBCUTANEOUS DAILY
Refills: 0 | Status: DISCONTINUED | OUTPATIENT
Start: 2020-01-01 | End: 2020-01-01

## 2020-01-01 RX ORDER — IPRATROPIUM/ALBUTEROL SULFATE 18-103MCG
1 AEROSOL WITH ADAPTER (GRAM) INHALATION
Qty: 0 | Refills: 0 | DISCHARGE

## 2020-01-01 RX ORDER — ASPIRIN/CALCIUM CARB/MAGNESIUM 324 MG
1 TABLET ORAL
Qty: 0 | Refills: 0 | DISCHARGE

## 2020-01-01 RX ORDER — MAGNESIUM HYDROXIDE 400 MG/1
30 TABLET, CHEWABLE ORAL
Qty: 0 | Refills: 0 | DISCHARGE

## 2020-01-01 RX ORDER — PIPERACILLIN AND TAZOBACTAM 4; .5 G/20ML; G/20ML
3.38 INJECTION, POWDER, LYOPHILIZED, FOR SOLUTION INTRAVENOUS EVERY 8 HOURS
Refills: 0 | Status: COMPLETED | OUTPATIENT
Start: 2020-01-01 | End: 2020-01-01

## 2020-01-01 RX ORDER — CHOLECALCIFEROL (VITAMIN D3) 125 MCG
1000 CAPSULE ORAL DAILY
Refills: 0 | Status: DISCONTINUED | OUTPATIENT
Start: 2020-01-01 | End: 2020-01-01

## 2020-01-01 RX ORDER — MAGNESIUM HYDROXIDE 400 MG/1
15 TABLET, CHEWABLE ORAL
Qty: 0 | Refills: 0 | DISCHARGE

## 2020-01-01 RX ORDER — TRAZODONE HCL 50 MG
100 TABLET ORAL AT BEDTIME
Refills: 0 | Status: DISCONTINUED | OUTPATIENT
Start: 2020-01-01 | End: 2020-01-01

## 2020-01-01 RX ORDER — DAPTOMYCIN 500 MG/10ML
350 INJECTION, POWDER, LYOPHILIZED, FOR SOLUTION INTRAVENOUS EVERY 24 HOURS
Refills: 0 | Status: COMPLETED | OUTPATIENT
Start: 2020-01-01 | End: 2020-01-01

## 2020-01-01 RX ORDER — ALBUTEROL 90 UG/1
3 AEROSOL, METERED ORAL
Qty: 0 | Refills: 0 | DISCHARGE

## 2020-01-01 RX ORDER — GABAPENTIN 400 MG/1
2 CAPSULE ORAL
Qty: 0 | Refills: 0 | DISCHARGE

## 2020-01-01 RX ORDER — LACTULOSE 10 G/15ML
30 SOLUTION ORAL
Qty: 0 | Refills: 0 | DISCHARGE

## 2020-01-01 RX ORDER — ACETAMINOPHEN 500 MG
1 TABLET ORAL
Qty: 0 | Refills: 0 | DISCHARGE
Start: 2020-01-01

## 2020-01-01 RX ADMIN — Medication 1 TABLET(S): at 12:34

## 2020-01-01 RX ADMIN — PIPERACILLIN AND TAZOBACTAM 25 GRAM(S): 4; .5 INJECTION, POWDER, LYOPHILIZED, FOR SOLUTION INTRAVENOUS at 22:35

## 2020-01-01 RX ADMIN — HYDROMORPHONE HYDROCHLORIDE 2 MILLIGRAM(S): 2 INJECTION INTRAMUSCULAR; INTRAVENOUS; SUBCUTANEOUS at 12:49

## 2020-01-01 RX ADMIN — TIOTROPIUM BROMIDE 1 CAPSULE(S): 18 CAPSULE ORAL; RESPIRATORY (INHALATION) at 05:56

## 2020-01-01 RX ADMIN — HYDROMORPHONE HYDROCHLORIDE 3 MILLIGRAM(S): 2 INJECTION INTRAMUSCULAR; INTRAVENOUS; SUBCUTANEOUS at 08:52

## 2020-01-01 RX ADMIN — GABAPENTIN 200 MILLIGRAM(S): 400 CAPSULE ORAL at 05:52

## 2020-01-01 RX ADMIN — SODIUM CHLORIDE 2000 MILLILITER(S): 9 INJECTION INTRAMUSCULAR; INTRAVENOUS; SUBCUTANEOUS at 17:31

## 2020-01-01 RX ADMIN — MORPHINE SULFATE 2 MILLIGRAM(S): 50 CAPSULE, EXTENDED RELEASE ORAL at 05:49

## 2020-01-01 RX ADMIN — HYDROMORPHONE HYDROCHLORIDE 2 MILLIGRAM(S): 2 INJECTION INTRAMUSCULAR; INTRAVENOUS; SUBCUTANEOUS at 16:23

## 2020-01-01 RX ADMIN — ENOXAPARIN SODIUM 40 MILLIGRAM(S): 100 INJECTION SUBCUTANEOUS at 11:07

## 2020-01-01 RX ADMIN — GABAPENTIN 200 MILLIGRAM(S): 400 CAPSULE ORAL at 14:30

## 2020-01-01 RX ADMIN — ROBINUL 0.4 MILLIGRAM(S): 0.2 INJECTION INTRAMUSCULAR; INTRAVENOUS at 23:47

## 2020-01-01 RX ADMIN — Medication 0.5 MILLIGRAM(S): at 18:33

## 2020-01-01 RX ADMIN — HALOPERIDOL DECANOATE 5 MILLIGRAM(S): 100 INJECTION INTRAMUSCULAR at 21:40

## 2020-01-01 RX ADMIN — Medication 100 MILLIEQUIVALENT(S): at 13:27

## 2020-01-01 RX ADMIN — Medication 81 MILLIGRAM(S): at 11:37

## 2020-01-01 RX ADMIN — HYDROMORPHONE HYDROCHLORIDE 2 MILLIGRAM(S): 2 INJECTION INTRAMUSCULAR; INTRAVENOUS; SUBCUTANEOUS at 17:18

## 2020-01-01 RX ADMIN — GABAPENTIN 200 MILLIGRAM(S): 400 CAPSULE ORAL at 06:06

## 2020-01-01 RX ADMIN — PIPERACILLIN AND TAZOBACTAM 25 GRAM(S): 4; .5 INJECTION, POWDER, LYOPHILIZED, FOR SOLUTION INTRAVENOUS at 14:33

## 2020-01-01 RX ADMIN — Medication 4.16 MICROGRAM(S)/KG/MIN: at 11:10

## 2020-01-01 RX ADMIN — GABAPENTIN 200 MILLIGRAM(S): 400 CAPSULE ORAL at 14:10

## 2020-01-01 RX ADMIN — TIOTROPIUM BROMIDE 1 CAPSULE(S): 18 CAPSULE ORAL; RESPIRATORY (INHALATION) at 08:55

## 2020-01-01 RX ADMIN — Medication 15 MILLILITER(S): at 12:40

## 2020-01-01 RX ADMIN — Medication 650 MILLIGRAM(S): at 05:21

## 2020-01-01 RX ADMIN — GABAPENTIN 200 MILLIGRAM(S): 400 CAPSULE ORAL at 05:14

## 2020-01-01 RX ADMIN — PANTOPRAZOLE SODIUM 40 MILLIGRAM(S): 20 TABLET, DELAYED RELEASE ORAL at 18:39

## 2020-01-01 RX ADMIN — HYDROMORPHONE HYDROCHLORIDE 2 MILLIGRAM(S): 2 INJECTION INTRAMUSCULAR; INTRAVENOUS; SUBCUTANEOUS at 13:10

## 2020-01-01 RX ADMIN — GABAPENTIN 600 MILLIGRAM(S): 400 CAPSULE ORAL at 05:37

## 2020-01-01 RX ADMIN — GABAPENTIN 200 MILLIGRAM(S): 400 CAPSULE ORAL at 05:21

## 2020-01-01 RX ADMIN — MORPHINE SULFATE 2 MILLIGRAM(S): 50 CAPSULE, EXTENDED RELEASE ORAL at 08:04

## 2020-01-01 RX ADMIN — Medication 0.5 MILLIGRAM(S): at 05:42

## 2020-01-01 RX ADMIN — PIPERACILLIN AND TAZOBACTAM 25 GRAM(S): 4; .5 INJECTION, POWDER, LYOPHILIZED, FOR SOLUTION INTRAVENOUS at 14:11

## 2020-01-01 RX ADMIN — METHOCARBAMOL 250 MILLIGRAM(S): 500 TABLET, FILM COATED ORAL at 05:40

## 2020-01-01 RX ADMIN — GABAPENTIN 600 MILLIGRAM(S): 400 CAPSULE ORAL at 14:17

## 2020-01-01 RX ADMIN — GABAPENTIN 200 MILLIGRAM(S): 400 CAPSULE ORAL at 13:08

## 2020-01-01 RX ADMIN — ROBINUL 0.4 MILLIGRAM(S): 0.2 INJECTION INTRAMUSCULAR; INTRAVENOUS at 11:22

## 2020-01-01 RX ADMIN — Medication 50 MILLILITER(S): at 23:31

## 2020-01-01 RX ADMIN — PIPERACILLIN AND TAZOBACTAM 25 GRAM(S): 4; .5 INJECTION, POWDER, LYOPHILIZED, FOR SOLUTION INTRAVENOUS at 05:52

## 2020-01-01 RX ADMIN — FAMOTIDINE 20 MILLIGRAM(S): 10 INJECTION INTRAVENOUS at 17:12

## 2020-01-01 RX ADMIN — Medication 1 TABLET(S): at 12:53

## 2020-01-01 RX ADMIN — METHOCARBAMOL 250 MILLIGRAM(S): 500 TABLET, FILM COATED ORAL at 22:24

## 2020-01-01 RX ADMIN — Medication 1 MILLIGRAM(S): at 11:34

## 2020-01-01 RX ADMIN — HYDROMORPHONE HYDROCHLORIDE 3 MILLIGRAM(S): 2 INJECTION INTRAMUSCULAR; INTRAVENOUS; SUBCUTANEOUS at 22:00

## 2020-01-01 RX ADMIN — Medication 20 MILLIGRAM(S): at 13:59

## 2020-01-01 RX ADMIN — ROBINUL 0.4 MILLIGRAM(S): 0.2 INJECTION INTRAMUSCULAR; INTRAVENOUS at 05:20

## 2020-01-01 RX ADMIN — HYDROMORPHONE HYDROCHLORIDE 2 MILLIGRAM(S): 2 INJECTION INTRAMUSCULAR; INTRAVENOUS; SUBCUTANEOUS at 21:17

## 2020-01-01 RX ADMIN — SENNA PLUS 2 TABLET(S): 8.6 TABLET ORAL at 22:15

## 2020-01-01 RX ADMIN — ROBINUL 0.4 MILLIGRAM(S): 0.2 INJECTION INTRAMUSCULAR; INTRAVENOUS at 11:44

## 2020-01-01 RX ADMIN — Medication 100 MILLIGRAM(S): at 22:06

## 2020-01-01 RX ADMIN — PANTOPRAZOLE SODIUM 40 MILLIGRAM(S): 20 TABLET, DELAYED RELEASE ORAL at 11:38

## 2020-01-01 RX ADMIN — Medication 1 MILLIGRAM(S): at 23:21

## 2020-01-01 RX ADMIN — Medication 1000 UNIT(S): at 11:39

## 2020-01-01 RX ADMIN — Medication 20 MILLIGRAM(S): at 12:14

## 2020-01-01 RX ADMIN — GABAPENTIN 200 MILLIGRAM(S): 400 CAPSULE ORAL at 15:02

## 2020-01-01 RX ADMIN — METHADONE HYDROCHLORIDE 30 MILLIGRAM(S): 40 TABLET ORAL at 05:21

## 2020-01-01 RX ADMIN — ROBINUL 0.4 MILLIGRAM(S): 0.2 INJECTION INTRAMUSCULAR; INTRAVENOUS at 12:01

## 2020-01-01 RX ADMIN — METHADONE HYDROCHLORIDE 30 MILLIGRAM(S): 40 TABLET ORAL at 17:07

## 2020-01-01 RX ADMIN — PIPERACILLIN AND TAZOBACTAM 25 GRAM(S): 4; .5 INJECTION, POWDER, LYOPHILIZED, FOR SOLUTION INTRAVENOUS at 05:39

## 2020-01-01 RX ADMIN — GABAPENTIN 200 MILLIGRAM(S): 400 CAPSULE ORAL at 14:36

## 2020-01-01 RX ADMIN — DAPTOMYCIN 114 MILLIGRAM(S): 500 INJECTION, POWDER, LYOPHILIZED, FOR SOLUTION INTRAVENOUS at 22:07

## 2020-01-01 RX ADMIN — PIPERACILLIN AND TAZOBACTAM 25 GRAM(S): 4; .5 INJECTION, POWDER, LYOPHILIZED, FOR SOLUTION INTRAVENOUS at 14:38

## 2020-01-01 RX ADMIN — Medication 20 MILLIGRAM(S): at 12:34

## 2020-01-01 RX ADMIN — MIDODRINE HYDROCHLORIDE 20 MILLIGRAM(S): 2.5 TABLET ORAL at 05:23

## 2020-01-01 RX ADMIN — GABAPENTIN 600 MILLIGRAM(S): 400 CAPSULE ORAL at 05:38

## 2020-01-01 RX ADMIN — PIPERACILLIN AND TAZOBACTAM 25 GRAM(S): 4; .5 INJECTION, POWDER, LYOPHILIZED, FOR SOLUTION INTRAVENOUS at 16:16

## 2020-01-01 RX ADMIN — SENNA PLUS 2 TABLET(S): 8.6 TABLET ORAL at 22:05

## 2020-01-01 RX ADMIN — METHADONE HYDROCHLORIDE 30 MILLIGRAM(S): 40 TABLET ORAL at 17:10

## 2020-01-01 RX ADMIN — METHADONE HYDROCHLORIDE 60 MILLIGRAM(S): 40 TABLET ORAL at 09:35

## 2020-01-01 RX ADMIN — Medication 1 MILLIGRAM(S): at 12:08

## 2020-01-01 RX ADMIN — Medication 100 MILLIGRAM(S): at 11:35

## 2020-01-01 RX ADMIN — GABAPENTIN 200 MILLIGRAM(S): 400 CAPSULE ORAL at 05:22

## 2020-01-01 RX ADMIN — GABAPENTIN 600 MILLIGRAM(S): 400 CAPSULE ORAL at 22:04

## 2020-01-01 RX ADMIN — HEPARIN SODIUM 5000 UNIT(S): 5000 INJECTION INTRAVENOUS; SUBCUTANEOUS at 05:37

## 2020-01-01 RX ADMIN — METHADONE HYDROCHLORIDE 30 MILLIGRAM(S): 40 TABLET ORAL at 11:26

## 2020-01-01 RX ADMIN — ROBINUL 0.4 MILLIGRAM(S): 0.2 INJECTION INTRAMUSCULAR; INTRAVENOUS at 18:29

## 2020-01-01 RX ADMIN — Medication 81 MILLIGRAM(S): at 11:38

## 2020-01-01 RX ADMIN — HYDROMORPHONE HYDROCHLORIDE 0.5 MILLIGRAM(S): 2 INJECTION INTRAMUSCULAR; INTRAVENOUS; SUBCUTANEOUS at 12:13

## 2020-01-01 RX ADMIN — FAMOTIDINE 20 MILLIGRAM(S): 10 INJECTION INTRAVENOUS at 05:52

## 2020-01-01 RX ADMIN — GABAPENTIN 200 MILLIGRAM(S): 400 CAPSULE ORAL at 05:26

## 2020-01-01 RX ADMIN — Medication 650 MILLIGRAM(S): at 18:27

## 2020-01-01 RX ADMIN — METHOCARBAMOL 250 MILLIGRAM(S): 500 TABLET, FILM COATED ORAL at 05:37

## 2020-01-01 RX ADMIN — PIPERACILLIN AND TAZOBACTAM 25 GRAM(S): 4; .5 INJECTION, POWDER, LYOPHILIZED, FOR SOLUTION INTRAVENOUS at 14:50

## 2020-01-01 RX ADMIN — Medication 100 MILLIGRAM(S): at 15:24

## 2020-01-01 RX ADMIN — ROBINUL 0.4 MILLIGRAM(S): 0.2 INJECTION INTRAMUSCULAR; INTRAVENOUS at 05:16

## 2020-01-01 RX ADMIN — PIPERACILLIN AND TAZOBACTAM 25 GRAM(S): 4; .5 INJECTION, POWDER, LYOPHILIZED, FOR SOLUTION INTRAVENOUS at 06:06

## 2020-01-01 RX ADMIN — HYDROMORPHONE HYDROCHLORIDE 2 MILLIGRAM(S): 2 INJECTION INTRAMUSCULAR; INTRAVENOUS; SUBCUTANEOUS at 05:27

## 2020-01-01 RX ADMIN — PIPERACILLIN AND TAZOBACTAM 25 GRAM(S): 4; .5 INJECTION, POWDER, LYOPHILIZED, FOR SOLUTION INTRAVENOUS at 22:59

## 2020-01-01 RX ADMIN — HYDROMORPHONE HYDROCHLORIDE 2 MILLIGRAM(S): 2 INJECTION INTRAMUSCULAR; INTRAVENOUS; SUBCUTANEOUS at 15:08

## 2020-01-01 RX ADMIN — Medication 4.16 MICROGRAM(S)/KG/MIN: at 03:17

## 2020-01-01 RX ADMIN — METHADONE HYDROCHLORIDE 30 MILLIGRAM(S): 40 TABLET ORAL at 06:06

## 2020-01-01 RX ADMIN — Medication 500 MILLIGRAM(S): at 01:38

## 2020-01-01 RX ADMIN — DAPTOMYCIN 114 MILLIGRAM(S): 500 INJECTION, POWDER, LYOPHILIZED, FOR SOLUTION INTRAVENOUS at 22:14

## 2020-01-01 RX ADMIN — PIPERACILLIN AND TAZOBACTAM 25 GRAM(S): 4; .5 INJECTION, POWDER, LYOPHILIZED, FOR SOLUTION INTRAVENOUS at 05:16

## 2020-01-01 RX ADMIN — GABAPENTIN 200 MILLIGRAM(S): 400 CAPSULE ORAL at 22:24

## 2020-01-01 RX ADMIN — ROBINUL 0.4 MILLIGRAM(S): 0.2 INJECTION INTRAMUSCULAR; INTRAVENOUS at 00:51

## 2020-01-01 RX ADMIN — PIPERACILLIN AND TAZOBACTAM 25 GRAM(S): 4; .5 INJECTION, POWDER, LYOPHILIZED, FOR SOLUTION INTRAVENOUS at 13:26

## 2020-01-01 RX ADMIN — DULOXETINE HYDROCHLORIDE 30 MILLIGRAM(S): 30 CAPSULE, DELAYED RELEASE ORAL at 11:37

## 2020-01-01 RX ADMIN — PIPERACILLIN AND TAZOBACTAM 25 GRAM(S): 4; .5 INJECTION, POWDER, LYOPHILIZED, FOR SOLUTION INTRAVENOUS at 21:16

## 2020-01-01 RX ADMIN — HYDROMORPHONE HYDROCHLORIDE 0.2 MILLIGRAM(S): 2 INJECTION INTRAMUSCULAR; INTRAVENOUS; SUBCUTANEOUS at 19:50

## 2020-01-01 RX ADMIN — HYDROMORPHONE HYDROCHLORIDE 2 MILLIGRAM(S): 2 INJECTION INTRAMUSCULAR; INTRAVENOUS; SUBCUTANEOUS at 20:07

## 2020-01-01 RX ADMIN — MORPHINE SULFATE 2 MILLIGRAM(S): 50 CAPSULE, EXTENDED RELEASE ORAL at 08:34

## 2020-01-01 RX ADMIN — METHADONE HYDROCHLORIDE 30 MILLIGRAM(S): 40 TABLET ORAL at 18:39

## 2020-01-01 RX ADMIN — Medication 20 MILLIGRAM(S): at 12:44

## 2020-01-01 RX ADMIN — Medication 250 MILLIGRAM(S): at 19:02

## 2020-01-01 RX ADMIN — PIPERACILLIN AND TAZOBACTAM 25 GRAM(S): 4; .5 INJECTION, POWDER, LYOPHILIZED, FOR SOLUTION INTRAVENOUS at 05:56

## 2020-01-01 RX ADMIN — FAMOTIDINE 20 MILLIGRAM(S): 10 INJECTION INTRAVENOUS at 17:07

## 2020-01-01 RX ADMIN — METHADONE HYDROCHLORIDE 30 MILLIGRAM(S): 40 TABLET ORAL at 06:33

## 2020-01-01 RX ADMIN — Medication 50 MILLILITER(S): at 11:37

## 2020-01-01 RX ADMIN — Medication 100 MILLIGRAM(S): at 11:32

## 2020-01-01 RX ADMIN — Medication 20 MILLIGRAM(S): at 11:37

## 2020-01-01 RX ADMIN — PIPERACILLIN AND TAZOBACTAM 25 GRAM(S): 4; .5 INJECTION, POWDER, LYOPHILIZED, FOR SOLUTION INTRAVENOUS at 05:36

## 2020-01-01 RX ADMIN — Medication 50 GRAM(S): at 11:28

## 2020-01-01 RX ADMIN — SODIUM CHLORIDE 1000 MILLILITER(S): 9 INJECTION INTRAMUSCULAR; INTRAVENOUS; SUBCUTANEOUS at 13:20

## 2020-01-01 RX ADMIN — Medication 250 MILLIGRAM(S): at 19:29

## 2020-01-01 RX ADMIN — HYDROMORPHONE HYDROCHLORIDE 2 MILLIGRAM(S): 2 INJECTION INTRAMUSCULAR; INTRAVENOUS; SUBCUTANEOUS at 17:39

## 2020-01-01 RX ADMIN — ENOXAPARIN SODIUM 40 MILLIGRAM(S): 100 INJECTION SUBCUTANEOUS at 12:34

## 2020-01-01 RX ADMIN — HYDROMORPHONE HYDROCHLORIDE 2 MILLIGRAM(S): 2 INJECTION INTRAMUSCULAR; INTRAVENOUS; SUBCUTANEOUS at 12:00

## 2020-01-01 RX ADMIN — Medication 1 MILLIGRAM(S): at 23:39

## 2020-01-01 RX ADMIN — PIPERACILLIN AND TAZOBACTAM 25 GRAM(S): 4; .5 INJECTION, POWDER, LYOPHILIZED, FOR SOLUTION INTRAVENOUS at 22:24

## 2020-01-01 RX ADMIN — Medication 0.25 MILLIGRAM(S): at 14:29

## 2020-01-01 RX ADMIN — HYDROMORPHONE HYDROCHLORIDE 2 MILLIGRAM(S): 2 INJECTION INTRAMUSCULAR; INTRAVENOUS; SUBCUTANEOUS at 22:24

## 2020-01-01 RX ADMIN — GABAPENTIN 200 MILLIGRAM(S): 400 CAPSULE ORAL at 22:35

## 2020-01-01 RX ADMIN — HEPARIN SODIUM 5000 UNIT(S): 5000 INJECTION INTRAVENOUS; SUBCUTANEOUS at 17:52

## 2020-01-01 RX ADMIN — ENOXAPARIN SODIUM 40 MILLIGRAM(S): 100 INJECTION SUBCUTANEOUS at 12:52

## 2020-01-01 RX ADMIN — Medication 500 MILLIGRAM(S): at 12:40

## 2020-01-01 RX ADMIN — METHADONE HYDROCHLORIDE 30 MILLIGRAM(S): 40 TABLET ORAL at 07:28

## 2020-01-01 RX ADMIN — TRAMADOL HYDROCHLORIDE 25 MILLIGRAM(S): 50 TABLET ORAL at 16:15

## 2020-01-01 RX ADMIN — DAPTOMYCIN 114 MILLIGRAM(S): 500 INJECTION, POWDER, LYOPHILIZED, FOR SOLUTION INTRAVENOUS at 22:02

## 2020-01-01 RX ADMIN — Medication 100 MILLIGRAM(S): at 12:03

## 2020-01-01 RX ADMIN — MONTELUKAST 10 MILLIGRAM(S): 4 TABLET, CHEWABLE ORAL at 21:20

## 2020-01-01 RX ADMIN — METHADONE HYDROCHLORIDE 30 MILLIGRAM(S): 40 TABLET ORAL at 19:31

## 2020-01-01 RX ADMIN — SODIUM CHLORIDE 80 MILLILITER(S): 9 INJECTION, SOLUTION INTRAVENOUS at 10:12

## 2020-01-01 RX ADMIN — HYDROMORPHONE HYDROCHLORIDE 2 MILLIGRAM(S): 2 INJECTION INTRAMUSCULAR; INTRAVENOUS; SUBCUTANEOUS at 12:37

## 2020-01-01 RX ADMIN — TRAMADOL HYDROCHLORIDE 25 MILLIGRAM(S): 50 TABLET ORAL at 16:14

## 2020-01-01 RX ADMIN — HYDROMORPHONE HYDROCHLORIDE 4 MILLIGRAM(S): 2 INJECTION INTRAMUSCULAR; INTRAVENOUS; SUBCUTANEOUS at 13:24

## 2020-01-01 RX ADMIN — LACTULOSE 10 GRAM(S): 10 SOLUTION ORAL at 21:20

## 2020-01-01 RX ADMIN — PIPERACILLIN AND TAZOBACTAM 25 GRAM(S): 4; .5 INJECTION, POWDER, LYOPHILIZED, FOR SOLUTION INTRAVENOUS at 05:01

## 2020-01-01 RX ADMIN — Medication 1000 MILLILITER(S): at 20:03

## 2020-01-01 RX ADMIN — PIPERACILLIN AND TAZOBACTAM 25 GRAM(S): 4; .5 INJECTION, POWDER, LYOPHILIZED, FOR SOLUTION INTRAVENOUS at 21:43

## 2020-01-01 RX ADMIN — DAPTOMYCIN 114 MILLIGRAM(S): 500 INJECTION, POWDER, LYOPHILIZED, FOR SOLUTION INTRAVENOUS at 22:48

## 2020-01-01 RX ADMIN — PIPERACILLIN AND TAZOBACTAM 25 GRAM(S): 4; .5 INJECTION, POWDER, LYOPHILIZED, FOR SOLUTION INTRAVENOUS at 06:21

## 2020-01-01 RX ADMIN — Medication 100 MILLIGRAM(S): at 14:04

## 2020-01-01 RX ADMIN — FAMOTIDINE 20 MILLIGRAM(S): 10 INJECTION INTRAVENOUS at 05:27

## 2020-01-01 RX ADMIN — Medication 100 MILLIGRAM(S): at 06:09

## 2020-01-01 RX ADMIN — Medication 100 MILLIGRAM(S): at 13:21

## 2020-01-01 RX ADMIN — MORPHINE SULFATE 1 MILLIGRAM(S): 50 CAPSULE, EXTENDED RELEASE ORAL at 23:26

## 2020-01-01 RX ADMIN — PIPERACILLIN AND TAZOBACTAM 25 GRAM(S): 4; .5 INJECTION, POWDER, LYOPHILIZED, FOR SOLUTION INTRAVENOUS at 05:26

## 2020-01-01 RX ADMIN — DULOXETINE HYDROCHLORIDE 30 MILLIGRAM(S): 30 CAPSULE, DELAYED RELEASE ORAL at 11:38

## 2020-01-01 RX ADMIN — FAMOTIDINE 20 MILLIGRAM(S): 10 INJECTION INTRAVENOUS at 17:10

## 2020-01-01 RX ADMIN — PIPERACILLIN AND TAZOBACTAM 25 GRAM(S): 4; .5 INJECTION, POWDER, LYOPHILIZED, FOR SOLUTION INTRAVENOUS at 22:43

## 2020-01-01 RX ADMIN — METHOCARBAMOL 250 MILLIGRAM(S): 500 TABLET, FILM COATED ORAL at 05:55

## 2020-01-01 RX ADMIN — SENNA PLUS 2 TABLET(S): 8.6 TABLET ORAL at 21:19

## 2020-01-01 RX ADMIN — TRAMADOL HYDROCHLORIDE 25 MILLIGRAM(S): 50 TABLET ORAL at 03:13

## 2020-01-01 RX ADMIN — Medication 250 MILLIGRAM(S): at 17:13

## 2020-01-01 RX ADMIN — GABAPENTIN 200 MILLIGRAM(S): 400 CAPSULE ORAL at 06:24

## 2020-01-01 RX ADMIN — HYDROMORPHONE HYDROCHLORIDE 3 MILLIGRAM(S): 2 INJECTION INTRAMUSCULAR; INTRAVENOUS; SUBCUTANEOUS at 10:53

## 2020-01-01 RX ADMIN — PIPERACILLIN AND TAZOBACTAM 25 GRAM(S): 4; .5 INJECTION, POWDER, LYOPHILIZED, FOR SOLUTION INTRAVENOUS at 21:17

## 2020-01-01 RX ADMIN — Medication 100 MILLIGRAM(S): at 12:34

## 2020-01-01 RX ADMIN — Medication 100 MILLIGRAM(S): at 12:29

## 2020-01-01 RX ADMIN — TRAMADOL HYDROCHLORIDE 25 MILLIGRAM(S): 50 TABLET ORAL at 15:44

## 2020-01-01 RX ADMIN — ROBINUL 0.4 MILLIGRAM(S): 0.2 INJECTION INTRAMUSCULAR; INTRAVENOUS at 05:08

## 2020-01-01 RX ADMIN — ROBINUL 0.4 MILLIGRAM(S): 0.2 INJECTION INTRAMUSCULAR; INTRAVENOUS at 01:29

## 2020-01-01 RX ADMIN — ROBINUL 0.4 MILLIGRAM(S): 0.2 INJECTION INTRAMUSCULAR; INTRAVENOUS at 17:32

## 2020-01-01 RX ADMIN — Medication 0.5 MILLIGRAM(S): at 16:34

## 2020-01-01 RX ADMIN — MIDODRINE HYDROCHLORIDE 15 MILLIGRAM(S): 2.5 TABLET ORAL at 05:55

## 2020-01-01 RX ADMIN — SODIUM CHLORIDE 1000 MILLILITER(S): 9 INJECTION INTRAMUSCULAR; INTRAVENOUS; SUBCUTANEOUS at 21:35

## 2020-01-01 RX ADMIN — ENOXAPARIN SODIUM 40 MILLIGRAM(S): 100 INJECTION SUBCUTANEOUS at 11:44

## 2020-01-01 RX ADMIN — HYDROMORPHONE HYDROCHLORIDE 3 MILLIGRAM(S): 2 INJECTION INTRAMUSCULAR; INTRAVENOUS; SUBCUTANEOUS at 01:51

## 2020-01-01 RX ADMIN — PIPERACILLIN AND TAZOBACTAM 25 GRAM(S): 4; .5 INJECTION, POWDER, LYOPHILIZED, FOR SOLUTION INTRAVENOUS at 22:07

## 2020-01-01 RX ADMIN — FAMOTIDINE 20 MILLIGRAM(S): 10 INJECTION INTRAVENOUS at 05:21

## 2020-01-01 RX ADMIN — ROBINUL 0.4 MILLIGRAM(S): 0.2 INJECTION INTRAMUSCULAR; INTRAVENOUS at 12:13

## 2020-01-01 RX ADMIN — ROBINUL 0.4 MILLIGRAM(S): 0.2 INJECTION INTRAMUSCULAR; INTRAVENOUS at 23:41

## 2020-01-01 RX ADMIN — ROBINUL 0.4 MILLIGRAM(S): 0.2 INJECTION INTRAMUSCULAR; INTRAVENOUS at 17:29

## 2020-01-01 RX ADMIN — FAMOTIDINE 20 MILLIGRAM(S): 10 INJECTION INTRAVENOUS at 18:39

## 2020-01-01 RX ADMIN — SENNA PLUS 2 TABLET(S): 8.6 TABLET ORAL at 22:24

## 2020-01-01 RX ADMIN — MIDODRINE HYDROCHLORIDE 15 MILLIGRAM(S): 2.5 TABLET ORAL at 12:16

## 2020-01-01 RX ADMIN — TIOTROPIUM BROMIDE 1 CAPSULE(S): 18 CAPSULE ORAL; RESPIRATORY (INHALATION) at 17:54

## 2020-01-01 RX ADMIN — Medication 0.5 MILLIGRAM(S): at 19:35

## 2020-01-01 RX ADMIN — Medication 20 MILLIGRAM(S): at 15:39

## 2020-01-01 RX ADMIN — Medication 1 TABLET(S): at 11:25

## 2020-01-01 RX ADMIN — HYDROMORPHONE HYDROCHLORIDE 0.2 MILLIGRAM(S): 2 INJECTION INTRAMUSCULAR; INTRAVENOUS; SUBCUTANEOUS at 09:05

## 2020-01-01 RX ADMIN — SODIUM CHLORIDE 500 MILLILITER(S): 9 INJECTION, SOLUTION INTRAVENOUS at 00:30

## 2020-01-01 RX ADMIN — DAPTOMYCIN 114 MILLIGRAM(S): 500 INJECTION, POWDER, LYOPHILIZED, FOR SOLUTION INTRAVENOUS at 22:17

## 2020-01-01 RX ADMIN — PIPERACILLIN AND TAZOBACTAM 25 GRAM(S): 4; .5 INJECTION, POWDER, LYOPHILIZED, FOR SOLUTION INTRAVENOUS at 22:44

## 2020-01-01 RX ADMIN — Medication 1 MILLIGRAM(S): at 05:27

## 2020-01-01 RX ADMIN — HYDROMORPHONE HYDROCHLORIDE 0.5 MILLIGRAM(S): 2 INJECTION INTRAMUSCULAR; INTRAVENOUS; SUBCUTANEOUS at 23:23

## 2020-01-01 RX ADMIN — DULOXETINE HYDROCHLORIDE 30 MILLIGRAM(S): 30 CAPSULE, DELAYED RELEASE ORAL at 14:14

## 2020-01-01 RX ADMIN — Medication 0.5 MILLIGRAM(S): at 08:04

## 2020-01-01 RX ADMIN — PIPERACILLIN AND TAZOBACTAM 25 GRAM(S): 4; .5 INJECTION, POWDER, LYOPHILIZED, FOR SOLUTION INTRAVENOUS at 05:15

## 2020-01-01 RX ADMIN — METHADONE HYDROCHLORIDE 30 MILLIGRAM(S): 40 TABLET ORAL at 05:58

## 2020-01-01 RX ADMIN — Medication 20 MILLIGRAM(S): at 11:59

## 2020-01-01 RX ADMIN — PIPERACILLIN AND TAZOBACTAM 25 GRAM(S): 4; .5 INJECTION, POWDER, LYOPHILIZED, FOR SOLUTION INTRAVENOUS at 13:20

## 2020-01-01 RX ADMIN — HYDROMORPHONE HYDROCHLORIDE 3 MILLIGRAM(S): 2 INJECTION INTRAMUSCULAR; INTRAVENOUS; SUBCUTANEOUS at 10:38

## 2020-01-01 RX ADMIN — HYDROMORPHONE HYDROCHLORIDE 3 MILLIGRAM(S): 2 INJECTION INTRAMUSCULAR; INTRAVENOUS; SUBCUTANEOUS at 07:41

## 2020-01-01 RX ADMIN — HEPARIN SODIUM 5000 UNIT(S): 5000 INJECTION INTRAVENOUS; SUBCUTANEOUS at 18:55

## 2020-01-01 RX ADMIN — TRAMADOL HYDROCHLORIDE 25 MILLIGRAM(S): 50 TABLET ORAL at 00:02

## 2020-01-01 RX ADMIN — ENOXAPARIN SODIUM 40 MILLIGRAM(S): 100 INJECTION SUBCUTANEOUS at 11:27

## 2020-01-01 RX ADMIN — HYDROMORPHONE HYDROCHLORIDE 2 MILLIGRAM(S): 2 INJECTION INTRAMUSCULAR; INTRAVENOUS; SUBCUTANEOUS at 14:53

## 2020-01-01 RX ADMIN — Medication 20 MILLIGRAM(S): at 11:35

## 2020-01-01 RX ADMIN — IOHEXOL 500 MILLILITER(S): 300 INJECTION, SOLUTION INTRAVENOUS at 13:37

## 2020-01-01 RX ADMIN — GABAPENTIN 200 MILLIGRAM(S): 400 CAPSULE ORAL at 14:33

## 2020-01-01 RX ADMIN — Medication 1 TABLET(S): at 11:44

## 2020-01-01 RX ADMIN — FAMOTIDINE 20 MILLIGRAM(S): 10 INJECTION INTRAVENOUS at 05:02

## 2020-01-01 RX ADMIN — Medication 500 MILLIGRAM(S): at 11:38

## 2020-01-01 RX ADMIN — MIDODRINE HYDROCHLORIDE 15 MILLIGRAM(S): 2.5 TABLET ORAL at 18:25

## 2020-01-01 RX ADMIN — HEPARIN SODIUM 5000 UNIT(S): 5000 INJECTION INTRAVENOUS; SUBCUTANEOUS at 18:26

## 2020-01-01 RX ADMIN — METHADONE HYDROCHLORIDE 30 MILLIGRAM(S): 40 TABLET ORAL at 05:27

## 2020-01-01 RX ADMIN — DAPTOMYCIN 270 MILLIGRAM(S): 500 INJECTION, POWDER, LYOPHILIZED, FOR SOLUTION INTRAVENOUS at 21:16

## 2020-01-01 RX ADMIN — ROBINUL 0.4 MILLIGRAM(S): 0.2 INJECTION INTRAMUSCULAR; INTRAVENOUS at 11:18

## 2020-01-01 RX ADMIN — ROBINUL 0.4 MILLIGRAM(S): 0.2 INJECTION INTRAMUSCULAR; INTRAVENOUS at 10:40

## 2020-01-01 RX ADMIN — Medication 20 MILLIGRAM(S): at 12:03

## 2020-01-01 RX ADMIN — FAMOTIDINE 20 MILLIGRAM(S): 10 INJECTION INTRAVENOUS at 18:07

## 2020-01-01 RX ADMIN — METHOCARBAMOL 250 MILLIGRAM(S): 500 TABLET, FILM COATED ORAL at 21:19

## 2020-01-01 RX ADMIN — FAMOTIDINE 20 MILLIGRAM(S): 10 INJECTION INTRAVENOUS at 05:26

## 2020-01-01 RX ADMIN — PANTOPRAZOLE SODIUM 40 MILLIGRAM(S): 20 TABLET, DELAYED RELEASE ORAL at 01:39

## 2020-01-01 RX ADMIN — PIPERACILLIN AND TAZOBACTAM 25 GRAM(S): 4; .5 INJECTION, POWDER, LYOPHILIZED, FOR SOLUTION INTRAVENOUS at 14:00

## 2020-01-01 RX ADMIN — Medication 1000 UNIT(S): at 11:37

## 2020-01-01 RX ADMIN — SENNA PLUS 2 TABLET(S): 8.6 TABLET ORAL at 22:35

## 2020-01-01 RX ADMIN — PIPERACILLIN AND TAZOBACTAM 25 GRAM(S): 4; .5 INJECTION, POWDER, LYOPHILIZED, FOR SOLUTION INTRAVENOUS at 13:35

## 2020-01-01 RX ADMIN — PIPERACILLIN AND TAZOBACTAM 25 GRAM(S): 4; .5 INJECTION, POWDER, LYOPHILIZED, FOR SOLUTION INTRAVENOUS at 22:14

## 2020-01-01 RX ADMIN — METHOCARBAMOL 250 MILLIGRAM(S): 500 TABLET, FILM COATED ORAL at 13:27

## 2020-01-01 RX ADMIN — HYDROMORPHONE HYDROCHLORIDE 0.2 MILLIGRAM(S): 2 INJECTION INTRAMUSCULAR; INTRAVENOUS; SUBCUTANEOUS at 19:35

## 2020-01-01 RX ADMIN — Medication 15 MILLILITER(S): at 15:51

## 2020-01-01 RX ADMIN — Medication 20 MILLIGRAM(S): at 11:32

## 2020-01-01 RX ADMIN — METHADONE HYDROCHLORIDE 30 MILLIGRAM(S): 40 TABLET ORAL at 17:28

## 2020-01-01 RX ADMIN — SODIUM CHLORIDE 1000 MILLILITER(S): 9 INJECTION INTRAMUSCULAR; INTRAVENOUS; SUBCUTANEOUS at 17:28

## 2020-01-01 RX ADMIN — HYDROMORPHONE HYDROCHLORIDE 2 MILLIGRAM(S): 2 INJECTION INTRAMUSCULAR; INTRAVENOUS; SUBCUTANEOUS at 20:18

## 2020-01-01 RX ADMIN — METHADONE HYDROCHLORIDE 30 MILLIGRAM(S): 40 TABLET ORAL at 05:17

## 2020-01-01 RX ADMIN — HYDROMORPHONE HYDROCHLORIDE 2 MILLIGRAM(S): 2 INJECTION INTRAMUSCULAR; INTRAVENOUS; SUBCUTANEOUS at 23:39

## 2020-01-01 RX ADMIN — METHADONE HYDROCHLORIDE 30 MILLIGRAM(S): 40 TABLET ORAL at 18:37

## 2020-01-01 RX ADMIN — PIPERACILLIN AND TAZOBACTAM 25 GRAM(S): 4; .5 INJECTION, POWDER, LYOPHILIZED, FOR SOLUTION INTRAVENOUS at 14:36

## 2020-01-01 RX ADMIN — PIPERACILLIN AND TAZOBACTAM 25 GRAM(S): 4; .5 INJECTION, POWDER, LYOPHILIZED, FOR SOLUTION INTRAVENOUS at 06:04

## 2020-01-01 RX ADMIN — TIOTROPIUM BROMIDE 1 CAPSULE(S): 18 CAPSULE ORAL; RESPIRATORY (INHALATION) at 06:41

## 2020-01-01 RX ADMIN — FAMOTIDINE 20 MILLIGRAM(S): 10 INJECTION INTRAVENOUS at 06:34

## 2020-01-01 RX ADMIN — GABAPENTIN 200 MILLIGRAM(S): 400 CAPSULE ORAL at 05:58

## 2020-01-01 RX ADMIN — Medication 250 MILLIGRAM(S): at 06:33

## 2020-01-01 RX ADMIN — PIPERACILLIN AND TAZOBACTAM 25 GRAM(S): 4; .5 INJECTION, POWDER, LYOPHILIZED, FOR SOLUTION INTRAVENOUS at 14:54

## 2020-01-01 RX ADMIN — Medication 20 MILLIGRAM(S): at 12:28

## 2020-01-01 RX ADMIN — Medication 100 MILLIGRAM(S): at 12:53

## 2020-01-01 RX ADMIN — PIPERACILLIN AND TAZOBACTAM 25 GRAM(S): 4; .5 INJECTION, POWDER, LYOPHILIZED, FOR SOLUTION INTRAVENOUS at 06:14

## 2020-01-01 RX ADMIN — MORPHINE SULFATE 2 MILLIGRAM(S): 50 CAPSULE, EXTENDED RELEASE ORAL at 19:30

## 2020-01-01 RX ADMIN — Medication 1 TABLET(S): at 12:42

## 2020-01-01 RX ADMIN — FAMOTIDINE 20 MILLIGRAM(S): 10 INJECTION INTRAVENOUS at 17:13

## 2020-01-01 RX ADMIN — HYDROMORPHONE HYDROCHLORIDE 2 MILLIGRAM(S): 2 INJECTION INTRAMUSCULAR; INTRAVENOUS; SUBCUTANEOUS at 01:46

## 2020-01-01 RX ADMIN — ROBINUL 0.4 MILLIGRAM(S): 0.2 INJECTION INTRAMUSCULAR; INTRAVENOUS at 06:04

## 2020-01-01 RX ADMIN — PIPERACILLIN AND TAZOBACTAM 25 GRAM(S): 4; .5 INJECTION, POWDER, LYOPHILIZED, FOR SOLUTION INTRAVENOUS at 22:23

## 2020-01-01 RX ADMIN — PIPERACILLIN AND TAZOBACTAM 25 GRAM(S): 4; .5 INJECTION, POWDER, LYOPHILIZED, FOR SOLUTION INTRAVENOUS at 05:21

## 2020-01-01 RX ADMIN — TRAMADOL HYDROCHLORIDE 25 MILLIGRAM(S): 50 TABLET ORAL at 23:23

## 2020-01-01 RX ADMIN — METHADONE HYDROCHLORIDE 30 MILLIGRAM(S): 40 TABLET ORAL at 05:26

## 2020-01-01 RX ADMIN — ROBINUL 0.4 MILLIGRAM(S): 0.2 INJECTION INTRAMUSCULAR; INTRAVENOUS at 23:22

## 2020-01-01 RX ADMIN — HEPARIN SODIUM 5000 UNIT(S): 5000 INJECTION INTRAVENOUS; SUBCUTANEOUS at 05:50

## 2020-01-01 RX ADMIN — PIPERACILLIN AND TAZOBACTAM 25 GRAM(S): 4; .5 INJECTION, POWDER, LYOPHILIZED, FOR SOLUTION INTRAVENOUS at 14:29

## 2020-01-01 RX ADMIN — HEPARIN SODIUM 5000 UNIT(S): 5000 INJECTION INTRAVENOUS; SUBCUTANEOUS at 05:38

## 2020-01-01 RX ADMIN — DAPTOMYCIN 114 MILLIGRAM(S): 500 INJECTION, POWDER, LYOPHILIZED, FOR SOLUTION INTRAVENOUS at 22:10

## 2020-01-01 RX ADMIN — METHOCARBAMOL 250 MILLIGRAM(S): 500 TABLET, FILM COATED ORAL at 14:10

## 2020-01-01 RX ADMIN — HYDROMORPHONE HYDROCHLORIDE 2 MILLIGRAM(S): 2 INJECTION INTRAMUSCULAR; INTRAVENOUS; SUBCUTANEOUS at 12:30

## 2020-01-01 RX ADMIN — ENOXAPARIN SODIUM 40 MILLIGRAM(S): 100 INJECTION SUBCUTANEOUS at 11:20

## 2020-01-01 RX ADMIN — METHADONE HYDROCHLORIDE 30 MILLIGRAM(S): 40 TABLET ORAL at 07:59

## 2020-01-01 RX ADMIN — MIDODRINE HYDROCHLORIDE 15 MILLIGRAM(S): 2.5 TABLET ORAL at 05:38

## 2020-01-01 RX ADMIN — Medication 3 MILLILITER(S): at 09:12

## 2020-01-01 RX ADMIN — DAPTOMYCIN 110 MILLIGRAM(S): 500 INJECTION, POWDER, LYOPHILIZED, FOR SOLUTION INTRAVENOUS at 21:02

## 2020-01-01 RX ADMIN — GABAPENTIN 200 MILLIGRAM(S): 400 CAPSULE ORAL at 05:55

## 2020-01-01 RX ADMIN — GABAPENTIN 200 MILLIGRAM(S): 400 CAPSULE ORAL at 07:28

## 2020-01-01 RX ADMIN — Medication 250 MILLIGRAM(S): at 17:51

## 2020-01-01 RX ADMIN — Medication 1000 UNIT(S): at 01:38

## 2020-01-01 RX ADMIN — METHADONE HYDROCHLORIDE 30 MILLIGRAM(S): 40 TABLET ORAL at 06:04

## 2020-01-01 RX ADMIN — PIPERACILLIN AND TAZOBACTAM 25 GRAM(S): 4; .5 INJECTION, POWDER, LYOPHILIZED, FOR SOLUTION INTRAVENOUS at 07:28

## 2020-01-01 RX ADMIN — Medication 650 MILLIGRAM(S): at 19:32

## 2020-01-01 RX ADMIN — DULOXETINE HYDROCHLORIDE 30 MILLIGRAM(S): 30 CAPSULE, DELAYED RELEASE ORAL at 22:05

## 2020-01-01 RX ADMIN — HYDROMORPHONE HYDROCHLORIDE 3 MILLIGRAM(S): 2 INJECTION INTRAMUSCULAR; INTRAVENOUS; SUBCUTANEOUS at 22:15

## 2020-01-01 RX ADMIN — HYDROMORPHONE HYDROCHLORIDE 2 MILLIGRAM(S): 2 INJECTION INTRAMUSCULAR; INTRAVENOUS; SUBCUTANEOUS at 06:40

## 2020-01-01 RX ADMIN — GABAPENTIN 200 MILLIGRAM(S): 400 CAPSULE ORAL at 22:48

## 2020-01-01 RX ADMIN — Medication 500 MILLIGRAM(S): at 14:01

## 2020-01-01 RX ADMIN — Medication 260 MILLIGRAM(S): at 14:33

## 2020-01-01 RX ADMIN — TRAMADOL HYDROCHLORIDE 25 MILLIGRAM(S): 50 TABLET ORAL at 11:32

## 2020-01-01 RX ADMIN — HYDROMORPHONE HYDROCHLORIDE 2 MILLIGRAM(S): 2 INJECTION INTRAMUSCULAR; INTRAVENOUS; SUBCUTANEOUS at 21:36

## 2020-01-01 RX ADMIN — MIDODRINE HYDROCHLORIDE 15 MILLIGRAM(S): 2.5 TABLET ORAL at 18:38

## 2020-01-01 RX ADMIN — Medication 20 MILLIGRAM(S): at 12:40

## 2020-01-01 RX ADMIN — HYDROMORPHONE HYDROCHLORIDE 1 MILLIGRAM(S): 2 INJECTION INTRAMUSCULAR; INTRAVENOUS; SUBCUTANEOUS at 17:32

## 2020-01-01 RX ADMIN — TRAMADOL HYDROCHLORIDE 25 MILLIGRAM(S): 50 TABLET ORAL at 02:23

## 2020-01-01 RX ADMIN — Medication 15 MILLILITER(S): at 01:39

## 2020-01-01 RX ADMIN — PIPERACILLIN AND TAZOBACTAM 200 GRAM(S): 4; .5 INJECTION, POWDER, LYOPHILIZED, FOR SOLUTION INTRAVENOUS at 17:28

## 2020-01-01 RX ADMIN — Medication 100 MILLIGRAM(S): at 11:44

## 2020-01-01 RX ADMIN — LACTULOSE 10 GRAM(S): 10 SOLUTION ORAL at 05:37

## 2020-01-01 RX ADMIN — FAMOTIDINE 20 MILLIGRAM(S): 10 INJECTION INTRAVENOUS at 18:56

## 2020-01-01 RX ADMIN — PIPERACILLIN AND TAZOBACTAM 25 GRAM(S): 4; .5 INJECTION, POWDER, LYOPHILIZED, FOR SOLUTION INTRAVENOUS at 21:30

## 2020-01-01 RX ADMIN — Medication 100 MILLIGRAM(S): at 22:24

## 2020-01-01 RX ADMIN — SODIUM CHLORIDE 3000 MILLILITER(S): 9 INJECTION, SOLUTION INTRAVENOUS at 02:06

## 2020-01-01 RX ADMIN — ENOXAPARIN SODIUM 40 MILLIGRAM(S): 100 INJECTION SUBCUTANEOUS at 11:32

## 2020-01-01 RX ADMIN — Medication 0.5 MILLIGRAM(S): at 02:20

## 2020-01-01 RX ADMIN — HYDROMORPHONE HYDROCHLORIDE 2 MILLIGRAM(S): 2 INJECTION INTRAMUSCULAR; INTRAVENOUS; SUBCUTANEOUS at 23:10

## 2020-01-01 RX ADMIN — SODIUM CHLORIDE 666.67 MILLILITER(S): 9 INJECTION, SOLUTION INTRAVENOUS at 06:00

## 2020-01-01 RX ADMIN — METHADONE HYDROCHLORIDE 60 MILLIGRAM(S): 40 TABLET ORAL at 06:51

## 2020-01-01 RX ADMIN — HYDROMORPHONE HYDROCHLORIDE 2 MILLIGRAM(S): 2 INJECTION INTRAMUSCULAR; INTRAVENOUS; SUBCUTANEOUS at 12:45

## 2020-01-01 RX ADMIN — Medication 1 MILLIGRAM(S): at 12:28

## 2020-01-01 RX ADMIN — Medication 1 MILLIGRAM(S): at 15:26

## 2020-01-01 RX ADMIN — ROBINUL 0.4 MILLIGRAM(S): 0.2 INJECTION INTRAMUSCULAR; INTRAVENOUS at 18:02

## 2020-01-01 RX ADMIN — GABAPENTIN 600 MILLIGRAM(S): 400 CAPSULE ORAL at 22:23

## 2020-01-01 RX ADMIN — HEPARIN SODIUM 5000 UNIT(S): 5000 INJECTION INTRAVENOUS; SUBCUTANEOUS at 06:09

## 2020-01-01 RX ADMIN — METHOCARBAMOL 250 MILLIGRAM(S): 500 TABLET, FILM COATED ORAL at 15:25

## 2020-01-01 RX ADMIN — FAMOTIDINE 20 MILLIGRAM(S): 10 INJECTION INTRAVENOUS at 05:17

## 2020-01-01 RX ADMIN — Medication 20 MILLIGRAM(S): at 14:54

## 2020-01-01 RX ADMIN — Medication 20 MILLIGRAM(S): at 11:44

## 2020-01-01 RX ADMIN — METHOCARBAMOL 250 MILLIGRAM(S): 500 TABLET, FILM COATED ORAL at 14:35

## 2020-01-01 RX ADMIN — Medication 1 TABLET(S): at 11:35

## 2020-01-01 RX ADMIN — DAPTOMYCIN 114 MILLIGRAM(S): 500 INJECTION, POWDER, LYOPHILIZED, FOR SOLUTION INTRAVENOUS at 21:45

## 2020-01-01 RX ADMIN — HYDROMORPHONE HYDROCHLORIDE 2 MILLIGRAM(S): 2 INJECTION INTRAMUSCULAR; INTRAVENOUS; SUBCUTANEOUS at 22:06

## 2020-01-01 RX ADMIN — PIPERACILLIN AND TAZOBACTAM 25 GRAM(S): 4; .5 INJECTION, POWDER, LYOPHILIZED, FOR SOLUTION INTRAVENOUS at 21:50

## 2020-01-01 RX ADMIN — HEPARIN SODIUM 5000 UNIT(S): 5000 INJECTION INTRAVENOUS; SUBCUTANEOUS at 01:38

## 2020-01-01 RX ADMIN — MORPHINE SULFATE 1 MILLIGRAM(S): 50 CAPSULE, EXTENDED RELEASE ORAL at 22:56

## 2020-01-01 RX ADMIN — Medication 1000 UNIT(S): at 12:39

## 2020-01-01 RX ADMIN — GABAPENTIN 200 MILLIGRAM(S): 400 CAPSULE ORAL at 15:35

## 2020-01-01 RX ADMIN — PIPERACILLIN AND TAZOBACTAM 25 GRAM(S): 4; .5 INJECTION, POWDER, LYOPHILIZED, FOR SOLUTION INTRAVENOUS at 05:50

## 2020-01-01 RX ADMIN — QUETIAPINE FUMARATE 12.5 MILLIGRAM(S): 200 TABLET, FILM COATED ORAL at 22:02

## 2020-01-01 RX ADMIN — Medication 1000 MILLILITER(S): at 16:19

## 2020-01-01 RX ADMIN — LACTULOSE 10 GRAM(S): 10 SOLUTION ORAL at 22:04

## 2020-01-01 RX ADMIN — Medication 1000 UNIT(S): at 11:38

## 2020-01-01 RX ADMIN — GABAPENTIN 200 MILLIGRAM(S): 400 CAPSULE ORAL at 14:37

## 2020-01-01 RX ADMIN — HYDROMORPHONE HYDROCHLORIDE 2 MILLIGRAM(S): 2 INJECTION INTRAMUSCULAR; INTRAVENOUS; SUBCUTANEOUS at 00:51

## 2020-01-01 RX ADMIN — HYDROMORPHONE HYDROCHLORIDE 3 MILLIGRAM(S): 2 INJECTION INTRAMUSCULAR; INTRAVENOUS; SUBCUTANEOUS at 04:08

## 2020-01-01 RX ADMIN — PIPERACILLIN AND TAZOBACTAM 25 GRAM(S): 4; .5 INJECTION, POWDER, LYOPHILIZED, FOR SOLUTION INTRAVENOUS at 13:07

## 2020-01-01 RX ADMIN — TRAMADOL HYDROCHLORIDE 25 MILLIGRAM(S): 50 TABLET ORAL at 17:01

## 2020-01-01 RX ADMIN — TRAMADOL HYDROCHLORIDE 25 MILLIGRAM(S): 50 TABLET ORAL at 15:52

## 2020-01-01 RX ADMIN — PIPERACILLIN AND TAZOBACTAM 25 GRAM(S): 4; .5 INJECTION, POWDER, LYOPHILIZED, FOR SOLUTION INTRAVENOUS at 13:39

## 2020-01-01 RX ADMIN — Medication 250 MILLIGRAM(S): at 06:11

## 2020-01-01 RX ADMIN — Medication 0.5 MILLIGRAM(S): at 00:30

## 2020-01-01 RX ADMIN — HYDROMORPHONE HYDROCHLORIDE 2 MG/HR: 2 INJECTION INTRAMUSCULAR; INTRAVENOUS; SUBCUTANEOUS at 19:31

## 2020-01-01 RX ADMIN — GABAPENTIN 200 MILLIGRAM(S): 400 CAPSULE ORAL at 21:24

## 2020-01-01 RX ADMIN — GABAPENTIN 200 MILLIGRAM(S): 400 CAPSULE ORAL at 22:03

## 2020-01-01 RX ADMIN — SODIUM CHLORIDE 80 MILLILITER(S): 9 INJECTION, SOLUTION INTRAVENOUS at 11:38

## 2020-01-01 RX ADMIN — PIPERACILLIN AND TAZOBACTAM 25 GRAM(S): 4; .5 INJECTION, POWDER, LYOPHILIZED, FOR SOLUTION INTRAVENOUS at 06:33

## 2020-01-01 RX ADMIN — Medication 20 MILLIGRAM(S): at 11:25

## 2020-01-01 RX ADMIN — PIPERACILLIN AND TAZOBACTAM 25 GRAM(S): 4; .5 INJECTION, POWDER, LYOPHILIZED, FOR SOLUTION INTRAVENOUS at 22:41

## 2020-01-01 RX ADMIN — HYDROMORPHONE HYDROCHLORIDE 0.2 MILLIGRAM(S): 2 INJECTION INTRAMUSCULAR; INTRAVENOUS; SUBCUTANEOUS at 18:03

## 2020-01-01 RX ADMIN — HYDROMORPHONE HYDROCHLORIDE 2 MILLIGRAM(S): 2 INJECTION INTRAMUSCULAR; INTRAVENOUS; SUBCUTANEOUS at 17:00

## 2020-01-01 RX ADMIN — ROBINUL 0.4 MILLIGRAM(S): 0.2 INJECTION INTRAMUSCULAR; INTRAVENOUS at 04:45

## 2020-01-01 RX ADMIN — GABAPENTIN 200 MILLIGRAM(S): 400 CAPSULE ORAL at 17:08

## 2020-01-01 RX ADMIN — Medication 1 TABLET(S): at 11:32

## 2020-01-01 RX ADMIN — MORPHINE SULFATE 2 MILLIGRAM(S): 50 CAPSULE, EXTENDED RELEASE ORAL at 22:03

## 2020-01-01 RX ADMIN — HYDROMORPHONE HYDROCHLORIDE 1 MG/HR: 2 INJECTION INTRAMUSCULAR; INTRAVENOUS; SUBCUTANEOUS at 10:30

## 2020-01-01 RX ADMIN — METHOCARBAMOL 250 MILLIGRAM(S): 500 TABLET, FILM COATED ORAL at 14:02

## 2020-01-01 RX ADMIN — ENOXAPARIN SODIUM 40 MILLIGRAM(S): 100 INJECTION SUBCUTANEOUS at 13:59

## 2020-01-01 RX ADMIN — LACTULOSE 10 GRAM(S): 10 SOLUTION ORAL at 14:29

## 2020-01-01 RX ADMIN — TRAMADOL HYDROCHLORIDE 25 MILLIGRAM(S): 50 TABLET ORAL at 05:19

## 2020-01-01 RX ADMIN — LACTULOSE 10 GRAM(S): 10 SOLUTION ORAL at 22:22

## 2020-01-01 RX ADMIN — GABAPENTIN 200 MILLIGRAM(S): 400 CAPSULE ORAL at 22:53

## 2020-01-01 RX ADMIN — GABAPENTIN 200 MILLIGRAM(S): 400 CAPSULE ORAL at 05:19

## 2020-01-01 RX ADMIN — GABAPENTIN 200 MILLIGRAM(S): 400 CAPSULE ORAL at 13:39

## 2020-01-01 RX ADMIN — MIDODRINE HYDROCHLORIDE 15 MILLIGRAM(S): 2.5 TABLET ORAL at 11:39

## 2020-01-01 RX ADMIN — Medication 81 MILLIGRAM(S): at 01:38

## 2020-01-01 RX ADMIN — METHADONE HYDROCHLORIDE 60 MILLIGRAM(S): 40 TABLET ORAL at 11:39

## 2020-01-01 RX ADMIN — Medication 100 MILLIEQUIVALENT(S): at 15:48

## 2020-01-01 RX ADMIN — PIPERACILLIN AND TAZOBACTAM 25 GRAM(S): 4; .5 INJECTION, POWDER, LYOPHILIZED, FOR SOLUTION INTRAVENOUS at 05:22

## 2020-01-01 RX ADMIN — ENOXAPARIN SODIUM 40 MILLIGRAM(S): 100 INJECTION SUBCUTANEOUS at 11:35

## 2020-01-01 RX ADMIN — GABAPENTIN 200 MILLIGRAM(S): 400 CAPSULE ORAL at 06:33

## 2020-01-01 RX ADMIN — ENOXAPARIN SODIUM 40 MILLIGRAM(S): 100 INJECTION SUBCUTANEOUS at 11:25

## 2020-01-01 RX ADMIN — PIPERACILLIN AND TAZOBACTAM 25 GRAM(S): 4; .5 INJECTION, POWDER, LYOPHILIZED, FOR SOLUTION INTRAVENOUS at 22:51

## 2020-01-01 RX ADMIN — GABAPENTIN 200 MILLIGRAM(S): 400 CAPSULE ORAL at 13:27

## 2020-01-01 RX ADMIN — MORPHINE SULFATE 2 MILLIGRAM(S): 50 CAPSULE, EXTENDED RELEASE ORAL at 00:03

## 2020-01-01 RX ADMIN — FAMOTIDINE 20 MILLIGRAM(S): 10 INJECTION INTRAVENOUS at 05:58

## 2020-01-01 RX ADMIN — Medication 12.5 GRAM(S): at 12:53

## 2020-01-01 RX ADMIN — Medication 50 MILLILITER(S): at 23:48

## 2020-01-01 RX ADMIN — FAMOTIDINE 20 MILLIGRAM(S): 10 INJECTION INTRAVENOUS at 18:11

## 2020-01-01 RX ADMIN — Medication 81 MILLIGRAM(S): at 12:39

## 2020-01-01 RX ADMIN — GABAPENTIN 200 MILLIGRAM(S): 400 CAPSULE ORAL at 23:20

## 2020-01-01 RX ADMIN — HYDROMORPHONE HYDROCHLORIDE 1 MG/HR: 2 INJECTION INTRAMUSCULAR; INTRAVENOUS; SUBCUTANEOUS at 19:49

## 2020-01-01 RX ADMIN — DAPTOMYCIN 114 MILLIGRAM(S): 500 INJECTION, POWDER, LYOPHILIZED, FOR SOLUTION INTRAVENOUS at 21:40

## 2020-01-01 RX ADMIN — PIPERACILLIN AND TAZOBACTAM 25 GRAM(S): 4; .5 INJECTION, POWDER, LYOPHILIZED, FOR SOLUTION INTRAVENOUS at 04:16

## 2020-01-01 RX ADMIN — Medication 20 MILLIGRAM(S): at 01:48

## 2020-01-01 RX ADMIN — Medication 50 MILLILITER(S): at 05:21

## 2020-01-01 RX ADMIN — PANTOPRAZOLE SODIUM 40 MILLIGRAM(S): 20 TABLET, DELAYED RELEASE ORAL at 12:40

## 2020-01-01 RX ADMIN — GABAPENTIN 200 MILLIGRAM(S): 400 CAPSULE ORAL at 22:55

## 2020-01-01 RX ADMIN — TRAMADOL HYDROCHLORIDE 25 MILLIGRAM(S): 50 TABLET ORAL at 14:53

## 2020-01-01 RX ADMIN — ROBINUL 0.4 MILLIGRAM(S): 0.2 INJECTION INTRAMUSCULAR; INTRAVENOUS at 05:27

## 2020-01-01 RX ADMIN — GABAPENTIN 200 MILLIGRAM(S): 400 CAPSULE ORAL at 16:53

## 2020-01-01 RX ADMIN — DULOXETINE HYDROCHLORIDE 30 MILLIGRAM(S): 30 CAPSULE, DELAYED RELEASE ORAL at 11:39

## 2020-01-01 RX ADMIN — Medication 650 MILLIGRAM(S): at 22:15

## 2020-01-01 RX ADMIN — Medication 1 TABLET(S): at 13:21

## 2020-01-01 RX ADMIN — GABAPENTIN 200 MILLIGRAM(S): 400 CAPSULE ORAL at 22:01

## 2020-01-01 RX ADMIN — FAMOTIDINE 20 MILLIGRAM(S): 10 INJECTION INTRAVENOUS at 17:28

## 2020-01-01 RX ADMIN — Medication 100 MILLIGRAM(S): at 05:35

## 2020-01-01 RX ADMIN — GABAPENTIN 600 MILLIGRAM(S): 400 CAPSULE ORAL at 15:25

## 2020-01-01 RX ADMIN — FAMOTIDINE 20 MILLIGRAM(S): 10 INJECTION INTRAVENOUS at 17:40

## 2020-01-01 RX ADMIN — ENOXAPARIN SODIUM 40 MILLIGRAM(S): 100 INJECTION SUBCUTANEOUS at 12:42

## 2020-01-01 RX ADMIN — HYDROMORPHONE HYDROCHLORIDE 2 MILLIGRAM(S): 2 INJECTION INTRAMUSCULAR; INTRAVENOUS; SUBCUTANEOUS at 06:03

## 2020-01-01 RX ADMIN — Medication 1 TABLET(S): at 12:28

## 2020-01-01 RX ADMIN — HYDROMORPHONE HYDROCHLORIDE 3 MILLIGRAM(S): 2 INJECTION INTRAMUSCULAR; INTRAVENOUS; SUBCUTANEOUS at 07:26

## 2020-01-01 RX ADMIN — DULOXETINE HYDROCHLORIDE 30 MILLIGRAM(S): 30 CAPSULE, DELAYED RELEASE ORAL at 12:14

## 2020-01-01 RX ADMIN — TRAMADOL HYDROCHLORIDE 25 MILLIGRAM(S): 50 TABLET ORAL at 15:32

## 2020-01-01 RX ADMIN — PIPERACILLIN AND TAZOBACTAM 25 GRAM(S): 4; .5 INJECTION, POWDER, LYOPHILIZED, FOR SOLUTION INTRAVENOUS at 22:30

## 2020-01-01 RX ADMIN — ENOXAPARIN SODIUM 40 MILLIGRAM(S): 100 INJECTION SUBCUTANEOUS at 11:58

## 2020-01-01 RX ADMIN — Medication 50 MILLILITER(S): at 20:25

## 2020-01-01 RX ADMIN — TIOTROPIUM BROMIDE 1 CAPSULE(S): 18 CAPSULE ORAL; RESPIRATORY (INHALATION) at 05:39

## 2020-01-01 RX ADMIN — Medication 240 MILLIGRAM(S): at 06:32

## 2020-01-01 RX ADMIN — GABAPENTIN 200 MILLIGRAM(S): 400 CAPSULE ORAL at 14:15

## 2020-01-01 RX ADMIN — HYDROMORPHONE HYDROCHLORIDE 3 MILLIGRAM(S): 2 INJECTION INTRAMUSCULAR; INTRAVENOUS; SUBCUTANEOUS at 23:36

## 2020-01-01 RX ADMIN — Medication 81 MILLIGRAM(S): at 12:14

## 2020-01-01 RX ADMIN — HYDROMORPHONE HYDROCHLORIDE 2 MILLIGRAM(S): 2 INJECTION INTRAMUSCULAR; INTRAVENOUS; SUBCUTANEOUS at 13:19

## 2020-01-01 RX ADMIN — IOHEXOL 500 MILLILITER(S): 300 INJECTION, SOLUTION INTRAVENOUS at 14:16

## 2020-01-01 RX ADMIN — LACTULOSE 10 GRAM(S): 10 SOLUTION ORAL at 06:10

## 2020-01-01 RX ADMIN — HEPARIN SODIUM 5000 UNIT(S): 5000 INJECTION INTRAVENOUS; SUBCUTANEOUS at 17:41

## 2020-01-01 RX ADMIN — DAPTOMYCIN 114 MILLIGRAM(S): 500 INJECTION, POWDER, LYOPHILIZED, FOR SOLUTION INTRAVENOUS at 23:20

## 2020-01-01 RX ADMIN — Medication 20 MILLIGRAM(S): at 12:53

## 2020-01-01 RX ADMIN — SODIUM CHLORIDE 1000 MILLILITER(S): 9 INJECTION, SOLUTION INTRAVENOUS at 23:49

## 2020-01-01 RX ADMIN — SODIUM CHLORIDE 1000 MILLILITER(S): 9 INJECTION, SOLUTION INTRAVENOUS at 16:47

## 2020-01-01 RX ADMIN — FAMOTIDINE 20 MILLIGRAM(S): 10 INJECTION INTRAVENOUS at 06:04

## 2020-01-01 RX ADMIN — MORPHINE SULFATE 2 MILLIGRAM(S): 50 CAPSULE, EXTENDED RELEASE ORAL at 06:19

## 2020-01-01 RX ADMIN — METHOCARBAMOL 250 MILLIGRAM(S): 500 TABLET, FILM COATED ORAL at 05:22

## 2020-01-01 RX ADMIN — FAMOTIDINE 20 MILLIGRAM(S): 10 INJECTION INTRAVENOUS at 17:00

## 2020-01-01 RX ADMIN — SODIUM CHLORIDE 1000 MILLILITER(S): 9 INJECTION INTRAMUSCULAR; INTRAVENOUS; SUBCUTANEOUS at 16:50

## 2020-01-01 RX ADMIN — DAPTOMYCIN 110.8 MILLIGRAM(S): 500 INJECTION, POWDER, LYOPHILIZED, FOR SOLUTION INTRAVENOUS at 18:16

## 2020-01-01 RX ADMIN — HYDROMORPHONE HYDROCHLORIDE 2 MILLIGRAM(S): 2 INJECTION INTRAMUSCULAR; INTRAVENOUS; SUBCUTANEOUS at 02:16

## 2020-01-01 RX ADMIN — PIPERACILLIN AND TAZOBACTAM 25 GRAM(S): 4; .5 INJECTION, POWDER, LYOPHILIZED, FOR SOLUTION INTRAVENOUS at 21:11

## 2020-01-01 RX ADMIN — GABAPENTIN 600 MILLIGRAM(S): 400 CAPSULE ORAL at 21:20

## 2020-01-01 RX ADMIN — Medication 1 MILLIGRAM(S): at 17:29

## 2020-01-01 RX ADMIN — PIPERACILLIN AND TAZOBACTAM 200 GRAM(S): 4; .5 INJECTION, POWDER, LYOPHILIZED, FOR SOLUTION INTRAVENOUS at 13:25

## 2020-01-01 RX ADMIN — PIPERACILLIN AND TAZOBACTAM 25 GRAM(S): 4; .5 INJECTION, POWDER, LYOPHILIZED, FOR SOLUTION INTRAVENOUS at 21:02

## 2020-01-01 RX ADMIN — Medication 1 TABLET(S): at 14:15

## 2020-01-01 RX ADMIN — METHADONE HYDROCHLORIDE 30 MILLIGRAM(S): 40 TABLET ORAL at 17:00

## 2020-01-01 RX ADMIN — Medication 15 MILLILITER(S): at 18:39

## 2020-01-01 RX ADMIN — METHADONE HYDROCHLORIDE 30 MILLIGRAM(S): 40 TABLET ORAL at 20:59

## 2020-01-01 RX ADMIN — SODIUM CHLORIDE 1000 MILLILITER(S): 9 INJECTION INTRAMUSCULAR; INTRAVENOUS; SUBCUTANEOUS at 09:30

## 2020-01-01 RX ADMIN — METHADONE HYDROCHLORIDE 30 MILLIGRAM(S): 40 TABLET ORAL at 18:11

## 2020-01-01 RX ADMIN — Medication 0.25 MILLIGRAM(S): at 10:20

## 2020-01-01 RX ADMIN — ROBINUL 0.4 MILLIGRAM(S): 0.2 INJECTION INTRAMUSCULAR; INTRAVENOUS at 06:19

## 2020-01-01 RX ADMIN — Medication 1000 UNIT(S): at 14:01

## 2020-01-01 RX ADMIN — ENOXAPARIN SODIUM 40 MILLIGRAM(S): 100 INJECTION SUBCUTANEOUS at 13:21

## 2020-01-01 RX ADMIN — GABAPENTIN 200 MILLIGRAM(S): 400 CAPSULE ORAL at 22:06

## 2020-01-01 RX ADMIN — METHADONE HYDROCHLORIDE 30 MILLIGRAM(S): 40 TABLET ORAL at 06:24

## 2020-01-01 RX ADMIN — PIPERACILLIN AND TAZOBACTAM 25 GRAM(S): 4; .5 INJECTION, POWDER, LYOPHILIZED, FOR SOLUTION INTRAVENOUS at 15:35

## 2020-01-01 RX ADMIN — Medication 100 MILLIGRAM(S): at 14:15

## 2020-01-01 RX ADMIN — Medication 100 MILLIGRAM(S): at 12:44

## 2020-01-01 RX ADMIN — MIDODRINE HYDROCHLORIDE 15 MILLIGRAM(S): 2.5 TABLET ORAL at 14:02

## 2020-01-01 RX ADMIN — METHADONE HYDROCHLORIDE 30 MILLIGRAM(S): 40 TABLET ORAL at 18:07

## 2020-01-01 RX ADMIN — GABAPENTIN 600 MILLIGRAM(S): 400 CAPSULE ORAL at 06:09

## 2020-01-01 RX ADMIN — Medication 0.5 MILLIGRAM(S): at 01:01

## 2020-01-01 RX ADMIN — HYDROMORPHONE HYDROCHLORIDE 0.2 MILLIGRAM(S): 2 INJECTION INTRAMUSCULAR; INTRAVENOUS; SUBCUTANEOUS at 08:50

## 2020-01-01 RX ADMIN — HYDROMORPHONE HYDROCHLORIDE 2 MILLIGRAM(S): 2 INJECTION INTRAMUSCULAR; INTRAVENOUS; SUBCUTANEOUS at 10:45

## 2020-01-01 RX ADMIN — PIPERACILLIN AND TAZOBACTAM 25 GRAM(S): 4; .5 INJECTION, POWDER, LYOPHILIZED, FOR SOLUTION INTRAVENOUS at 16:48

## 2020-01-01 RX ADMIN — ENOXAPARIN SODIUM 40 MILLIGRAM(S): 100 INJECTION SUBCUTANEOUS at 12:03

## 2020-01-01 RX ADMIN — GABAPENTIN 200 MILLIGRAM(S): 400 CAPSULE ORAL at 14:34

## 2020-01-01 RX ADMIN — GABAPENTIN 200 MILLIGRAM(S): 400 CAPSULE ORAL at 22:30

## 2020-01-01 RX ADMIN — LACTULOSE 10 GRAM(S): 10 SOLUTION ORAL at 05:36

## 2020-01-01 RX ADMIN — DAPTOMYCIN 114 MILLIGRAM(S): 500 INJECTION, POWDER, LYOPHILIZED, FOR SOLUTION INTRAVENOUS at 22:13

## 2020-01-01 RX ADMIN — ROBINUL 0.4 MILLIGRAM(S): 0.2 INJECTION INTRAMUSCULAR; INTRAVENOUS at 17:07

## 2020-01-01 RX ADMIN — Medication 100 MILLIGRAM(S): at 14:50

## 2020-01-01 RX ADMIN — HYDROMORPHONE HYDROCHLORIDE 3 MILLIGRAM(S): 2 INJECTION INTRAMUSCULAR; INTRAVENOUS; SUBCUTANEOUS at 23:54

## 2020-01-01 RX ADMIN — LACTULOSE 10 GRAM(S): 10 SOLUTION ORAL at 14:17

## 2020-01-01 RX ADMIN — Medication 81 MILLIGRAM(S): at 11:39

## 2020-01-01 RX ADMIN — PIPERACILLIN AND TAZOBACTAM 25 GRAM(S): 4; .5 INJECTION, POWDER, LYOPHILIZED, FOR SOLUTION INTRAVENOUS at 22:49

## 2020-01-01 RX ADMIN — HYDROMORPHONE HYDROCHLORIDE 3 MILLIGRAM(S): 2 INJECTION INTRAMUSCULAR; INTRAVENOUS; SUBCUTANEOUS at 02:06

## 2020-01-01 RX ADMIN — SODIUM CHLORIDE 50 MILLILITER(S): 9 INJECTION, SOLUTION INTRAVENOUS at 13:04

## 2020-01-01 RX ADMIN — GABAPENTIN 200 MILLIGRAM(S): 400 CAPSULE ORAL at 06:05

## 2020-01-01 RX ADMIN — Medication 50 MILLILITER(S): at 05:55

## 2020-01-01 RX ADMIN — GABAPENTIN 200 MILLIGRAM(S): 400 CAPSULE ORAL at 13:32

## 2020-01-01 RX ADMIN — DULOXETINE HYDROCHLORIDE 30 MILLIGRAM(S): 30 CAPSULE, DELAYED RELEASE ORAL at 12:40

## 2020-01-01 RX ADMIN — PIPERACILLIN AND TAZOBACTAM 25 GRAM(S): 4; .5 INJECTION, POWDER, LYOPHILIZED, FOR SOLUTION INTRAVENOUS at 13:54

## 2020-01-01 RX ADMIN — FAMOTIDINE 20 MILLIGRAM(S): 10 INJECTION INTRAVENOUS at 17:24

## 2020-01-01 RX ADMIN — PIPERACILLIN AND TAZOBACTAM 25 GRAM(S): 4; .5 INJECTION, POWDER, LYOPHILIZED, FOR SOLUTION INTRAVENOUS at 14:34

## 2020-01-01 RX ADMIN — FAMOTIDINE 20 MILLIGRAM(S): 10 INJECTION INTRAVENOUS at 05:14

## 2020-01-01 RX ADMIN — METHADONE HYDROCHLORIDE 30 MILLIGRAM(S): 40 TABLET ORAL at 17:37

## 2020-01-01 RX ADMIN — ROBINUL 0.4 MILLIGRAM(S): 0.2 INJECTION INTRAMUSCULAR; INTRAVENOUS at 17:39

## 2020-01-01 RX ADMIN — Medication 0.5 MILLIGRAM(S): at 12:31

## 2020-01-01 RX ADMIN — HYDROMORPHONE HYDROCHLORIDE 2 MILLIGRAM(S): 2 INJECTION INTRAMUSCULAR; INTRAVENOUS; SUBCUTANEOUS at 12:01

## 2020-01-01 RX ADMIN — GABAPENTIN 200 MILLIGRAM(S): 400 CAPSULE ORAL at 15:12

## 2020-01-01 RX ADMIN — ENOXAPARIN SODIUM 40 MILLIGRAM(S): 100 INJECTION SUBCUTANEOUS at 12:28

## 2020-01-01 RX ADMIN — SODIUM CHLORIDE 80 MILLILITER(S): 9 INJECTION, SOLUTION INTRAVENOUS at 05:49

## 2020-01-01 RX ADMIN — GABAPENTIN 200 MILLIGRAM(S): 400 CAPSULE ORAL at 13:54

## 2020-01-01 RX ADMIN — PIPERACILLIN AND TAZOBACTAM 25 GRAM(S): 4; .5 INJECTION, POWDER, LYOPHILIZED, FOR SOLUTION INTRAVENOUS at 13:14

## 2020-01-01 RX ADMIN — QUETIAPINE FUMARATE 12.5 MILLIGRAM(S): 200 TABLET, FILM COATED ORAL at 23:20

## 2020-01-01 RX ADMIN — ENOXAPARIN SODIUM 40 MILLIGRAM(S): 100 INJECTION SUBCUTANEOUS at 12:44

## 2020-01-01 RX ADMIN — SODIUM CHLORIDE 80 MILLILITER(S): 9 INJECTION, SOLUTION INTRAVENOUS at 06:11

## 2020-01-01 RX ADMIN — SODIUM CHLORIDE 80 MILLILITER(S): 9 INJECTION, SOLUTION INTRAVENOUS at 02:23

## 2020-01-01 RX ADMIN — FAMOTIDINE 20 MILLIGRAM(S): 10 INJECTION INTRAVENOUS at 06:14

## 2020-01-01 RX ADMIN — METHADONE HYDROCHLORIDE 30 MILLIGRAM(S): 40 TABLET ORAL at 17:24

## 2020-01-01 RX ADMIN — SODIUM CHLORIDE 50 MILLILITER(S): 9 INJECTION, SOLUTION INTRAVENOUS at 20:36

## 2020-01-01 RX ADMIN — METHADONE HYDROCHLORIDE 20 MILLIGRAM(S): 40 TABLET ORAL at 11:17

## 2020-01-01 RX ADMIN — HYDROMORPHONE HYDROCHLORIDE 1 MG/HR: 2 INJECTION INTRAMUSCULAR; INTRAVENOUS; SUBCUTANEOUS at 08:52

## 2020-01-01 RX ADMIN — Medication 1 TABLET(S): at 12:52

## 2020-01-01 RX ADMIN — PIPERACILLIN AND TAZOBACTAM 25 GRAM(S): 4; .5 INJECTION, POWDER, LYOPHILIZED, FOR SOLUTION INTRAVENOUS at 13:27

## 2020-01-01 RX ADMIN — ENOXAPARIN SODIUM 40 MILLIGRAM(S): 100 INJECTION SUBCUTANEOUS at 12:53

## 2020-01-01 RX ADMIN — Medication 100 MILLIGRAM(S): at 12:42

## 2020-01-01 RX ADMIN — GABAPENTIN 200 MILLIGRAM(S): 400 CAPSULE ORAL at 06:04

## 2020-01-01 RX ADMIN — TRAMADOL HYDROCHLORIDE 25 MILLIGRAM(S): 50 TABLET ORAL at 00:30

## 2020-01-01 RX ADMIN — Medication 1 MILLIGRAM(S): at 13:24

## 2020-01-01 RX ADMIN — PIPERACILLIN AND TAZOBACTAM 25 GRAM(S): 4; .5 INJECTION, POWDER, LYOPHILIZED, FOR SOLUTION INTRAVENOUS at 23:20

## 2020-01-01 RX ADMIN — Medication 500 MILLIGRAM(S): at 12:14

## 2020-01-01 RX ADMIN — QUETIAPINE FUMARATE 12.5 MILLIGRAM(S): 200 TABLET, FILM COATED ORAL at 22:34

## 2020-01-01 RX ADMIN — ENOXAPARIN SODIUM 40 MILLIGRAM(S): 100 INJECTION SUBCUTANEOUS at 11:09

## 2020-01-01 RX ADMIN — Medication 1000 UNIT(S): at 12:14

## 2020-01-01 RX ADMIN — Medication 20 MILLIGRAM(S): at 13:21

## 2020-01-01 RX ADMIN — GABAPENTIN 200 MILLIGRAM(S): 400 CAPSULE ORAL at 06:14

## 2020-01-01 RX ADMIN — Medication 1 TABLET(S): at 12:44

## 2020-01-01 RX ADMIN — HYDROMORPHONE HYDROCHLORIDE 2 MILLIGRAM(S): 2 INJECTION INTRAMUSCULAR; INTRAVENOUS; SUBCUTANEOUS at 12:50

## 2020-01-01 RX ADMIN — PIPERACILLIN AND TAZOBACTAM 25 GRAM(S): 4; .5 INJECTION, POWDER, LYOPHILIZED, FOR SOLUTION INTRAVENOUS at 06:24

## 2020-01-01 RX ADMIN — Medication 1 TABLET(S): at 12:03

## 2020-01-01 RX ADMIN — HYDROMORPHONE HYDROCHLORIDE 2 MILLIGRAM(S): 2 INJECTION INTRAMUSCULAR; INTRAVENOUS; SUBCUTANEOUS at 11:26

## 2020-01-01 RX ADMIN — PIPERACILLIN AND TAZOBACTAM 25 GRAM(S): 4; .5 INJECTION, POWDER, LYOPHILIZED, FOR SOLUTION INTRAVENOUS at 14:14

## 2020-01-01 RX ADMIN — PIPERACILLIN AND TAZOBACTAM 25 GRAM(S): 4; .5 INJECTION, POWDER, LYOPHILIZED, FOR SOLUTION INTRAVENOUS at 22:04

## 2020-01-01 RX ADMIN — ROBINUL 0.4 MILLIGRAM(S): 0.2 INJECTION INTRAMUSCULAR; INTRAVENOUS at 23:21

## 2020-01-01 RX ADMIN — MONTELUKAST 10 MILLIGRAM(S): 4 TABLET, CHEWABLE ORAL at 22:23

## 2020-01-01 RX ADMIN — TRAMADOL HYDROCHLORIDE 25 MILLIGRAM(S): 50 TABLET ORAL at 12:00

## 2020-01-01 RX ADMIN — SODIUM CHLORIDE 80 MILLILITER(S): 9 INJECTION, SOLUTION INTRAVENOUS at 20:21

## 2020-01-01 RX ADMIN — HYDROMORPHONE HYDROCHLORIDE 2 MILLIGRAM(S): 2 INJECTION INTRAMUSCULAR; INTRAVENOUS; SUBCUTANEOUS at 20:10

## 2020-01-01 RX ADMIN — MIDODRINE HYDROCHLORIDE 15 MILLIGRAM(S): 2.5 TABLET ORAL at 12:40

## 2020-01-01 RX ADMIN — METHADONE HYDROCHLORIDE 30 MILLIGRAM(S): 40 TABLET ORAL at 17:08

## 2020-01-01 RX ADMIN — HYDROMORPHONE HYDROCHLORIDE 2 MILLIGRAM(S): 2 INJECTION INTRAMUSCULAR; INTRAVENOUS; SUBCUTANEOUS at 11:00

## 2020-01-01 RX ADMIN — Medication 100 MILLIGRAM(S): at 21:19

## 2020-01-01 RX ADMIN — Medication 1 MILLIGRAM(S): at 05:20

## 2020-01-01 RX ADMIN — Medication 81 MILLIGRAM(S): at 14:02

## 2020-01-01 RX ADMIN — HYDROMORPHONE HYDROCHLORIDE 2 MILLIGRAM(S): 2 INJECTION INTRAMUSCULAR; INTRAVENOUS; SUBCUTANEOUS at 01:04

## 2020-01-01 RX ADMIN — ENOXAPARIN SODIUM 40 MILLIGRAM(S): 100 INJECTION SUBCUTANEOUS at 12:11

## 2020-01-01 RX ADMIN — FAMOTIDINE 20 MILLIGRAM(S): 10 INJECTION INTRAVENOUS at 07:28

## 2020-01-01 RX ADMIN — METHADONE HYDROCHLORIDE 30 MILLIGRAM(S): 40 TABLET ORAL at 05:15

## 2020-01-01 RX ADMIN — METHADONE HYDROCHLORIDE 10 MILLIGRAM(S): 40 TABLET ORAL at 08:08

## 2020-01-01 RX ADMIN — TRAMADOL HYDROCHLORIDE 25 MILLIGRAM(S): 50 TABLET ORAL at 23:05

## 2020-01-01 RX ADMIN — HYDROMORPHONE HYDROCHLORIDE 2 MILLIGRAM(S): 2 INJECTION INTRAMUSCULAR; INTRAVENOUS; SUBCUTANEOUS at 11:44

## 2020-01-01 RX ADMIN — SIMETHICONE 80 MILLIGRAM(S): 80 TABLET, CHEWABLE ORAL at 05:27

## 2020-01-01 RX ADMIN — DAPTOMYCIN 114 MILLIGRAM(S): 500 INJECTION, POWDER, LYOPHILIZED, FOR SOLUTION INTRAVENOUS at 22:36

## 2020-01-01 RX ADMIN — Medication 100 MILLIGRAM(S): at 11:25

## 2020-01-01 RX ADMIN — FAMOTIDINE 20 MILLIGRAM(S): 10 INJECTION INTRAVENOUS at 06:27

## 2020-01-01 RX ADMIN — Medication 1 MILLIGRAM(S): at 08:51

## 2020-01-01 RX ADMIN — Medication 650 MILLIGRAM(S): at 13:20

## 2020-01-01 RX ADMIN — FAMOTIDINE 20 MILLIGRAM(S): 10 INJECTION INTRAVENOUS at 17:09

## 2020-01-01 RX ADMIN — METHADONE HYDROCHLORIDE 60 MILLIGRAM(S): 40 TABLET ORAL at 05:38

## 2020-01-01 RX ADMIN — PANTOPRAZOLE SODIUM 40 MILLIGRAM(S): 20 TABLET, DELAYED RELEASE ORAL at 14:01

## 2020-01-01 RX ADMIN — MIDODRINE HYDROCHLORIDE 15 MILLIGRAM(S): 2.5 TABLET ORAL at 17:51

## 2020-01-01 RX ADMIN — METHADONE HYDROCHLORIDE 30 MILLIGRAM(S): 40 TABLET ORAL at 17:12

## 2020-01-01 RX ADMIN — PIPERACILLIN AND TAZOBACTAM 25 GRAM(S): 4; .5 INJECTION, POWDER, LYOPHILIZED, FOR SOLUTION INTRAVENOUS at 06:11

## 2020-01-01 RX ADMIN — DAPTOMYCIN 114 MILLIGRAM(S): 500 INJECTION, POWDER, LYOPHILIZED, FOR SOLUTION INTRAVENOUS at 21:18

## 2020-01-01 RX ADMIN — MIDODRINE HYDROCHLORIDE 15 MILLIGRAM(S): 2.5 TABLET ORAL at 05:37

## 2020-01-01 RX ADMIN — HYDROMORPHONE HYDROCHLORIDE 1 MILLIGRAM(S): 2 INJECTION INTRAMUSCULAR; INTRAVENOUS; SUBCUTANEOUS at 13:06

## 2020-01-01 RX ADMIN — METHADONE HYDROCHLORIDE 30 MILLIGRAM(S): 40 TABLET ORAL at 06:23

## 2020-01-01 RX ADMIN — Medication 1 MILLIGRAM(S): at 01:51

## 2020-01-01 RX ADMIN — GABAPENTIN 200 MILLIGRAM(S): 400 CAPSULE ORAL at 13:14

## 2020-01-01 RX ADMIN — METHADONE HYDROCHLORIDE 30 MILLIGRAM(S): 40 TABLET ORAL at 05:52

## 2020-01-01 RX ADMIN — FAMOTIDINE 20 MILLIGRAM(S): 10 INJECTION INTRAVENOUS at 06:06

## 2020-01-01 RX ADMIN — METHOCARBAMOL 250 MILLIGRAM(S): 500 TABLET, FILM COATED ORAL at 06:10

## 2020-01-01 RX ADMIN — HYDROMORPHONE HYDROCHLORIDE 2 MILLIGRAM(S): 2 INJECTION INTRAMUSCULAR; INTRAVENOUS; SUBCUTANEOUS at 11:47

## 2020-01-01 RX ADMIN — HYDROMORPHONE HYDROCHLORIDE 2 MG/HR: 2 INJECTION INTRAMUSCULAR; INTRAVENOUS; SUBCUTANEOUS at 10:46

## 2020-01-01 RX ADMIN — Medication 100 MILLIEQUIVALENT(S): at 14:15

## 2020-01-01 RX ADMIN — Medication 500 MILLIGRAM(S): at 11:37

## 2020-01-01 RX ADMIN — GABAPENTIN 200 MILLIGRAM(S): 400 CAPSULE ORAL at 21:02

## 2020-01-01 RX ADMIN — Medication 25 MILLILITER(S): at 10:05

## 2020-01-01 RX ADMIN — HYDROMORPHONE HYDROCHLORIDE 0.2 MILLIGRAM(S): 2 INJECTION INTRAMUSCULAR; INTRAVENOUS; SUBCUTANEOUS at 02:16

## 2020-01-01 RX ADMIN — METHADONE HYDROCHLORIDE 30 MILLIGRAM(S): 40 TABLET ORAL at 05:20

## 2020-01-01 RX ADMIN — Medication 50 MILLILITER(S): at 17:51

## 2020-01-01 RX ADMIN — METHADONE HYDROCHLORIDE 30 MILLIGRAM(S): 40 TABLET ORAL at 17:15

## 2020-01-01 RX ADMIN — ROBINUL 0.4 MILLIGRAM(S): 0.2 INJECTION INTRAMUSCULAR; INTRAVENOUS at 11:45

## 2020-01-01 RX ADMIN — PIPERACILLIN AND TAZOBACTAM 25 GRAM(S): 4; .5 INJECTION, POWDER, LYOPHILIZED, FOR SOLUTION INTRAVENOUS at 21:20

## 2020-01-01 RX ADMIN — MONTELUKAST 10 MILLIGRAM(S): 4 TABLET, CHEWABLE ORAL at 22:05

## 2020-01-01 RX ADMIN — HYDROMORPHONE HYDROCHLORIDE 3 MILLIGRAM(S): 2 INJECTION INTRAMUSCULAR; INTRAVENOUS; SUBCUTANEOUS at 09:07

## 2020-01-01 RX ADMIN — HYDROMORPHONE HYDROCHLORIDE 2 MG/HR: 2 INJECTION INTRAMUSCULAR; INTRAVENOUS; SUBCUTANEOUS at 12:27

## 2020-01-01 RX ADMIN — DAPTOMYCIN 114 MILLIGRAM(S): 500 INJECTION, POWDER, LYOPHILIZED, FOR SOLUTION INTRAVENOUS at 22:09

## 2020-01-01 RX ADMIN — GABAPENTIN 200 MILLIGRAM(S): 400 CAPSULE ORAL at 21:17

## 2020-01-01 RX ADMIN — PANTOPRAZOLE SODIUM 40 MILLIGRAM(S): 20 TABLET, DELAYED RELEASE ORAL at 12:14

## 2020-01-01 RX ADMIN — HYDROMORPHONE HYDROCHLORIDE 0.2 MILLIGRAM(S): 2 INJECTION INTRAMUSCULAR; INTRAVENOUS; SUBCUTANEOUS at 20:31

## 2020-01-01 RX ADMIN — TRAMADOL HYDROCHLORIDE 25 MILLIGRAM(S): 50 TABLET ORAL at 23:35

## 2020-01-01 RX ADMIN — HYDROMORPHONE HYDROCHLORIDE 0.2 MILLIGRAM(S): 2 INJECTION INTRAMUSCULAR; INTRAVENOUS; SUBCUTANEOUS at 01:41

## 2020-01-01 RX ADMIN — Medication 1000 MILLIGRAM(S): at 20:12

## 2020-01-01 RX ADMIN — Medication 20 MILLIGRAM(S): at 14:14

## 2020-01-01 RX ADMIN — PIPERACILLIN AND TAZOBACTAM 25 GRAM(S): 4; .5 INJECTION, POWDER, LYOPHILIZED, FOR SOLUTION INTRAVENOUS at 14:58

## 2020-01-01 RX ADMIN — Medication 15 MILLILITER(S): at 12:14

## 2020-01-01 RX ADMIN — FAMOTIDINE 20 MILLIGRAM(S): 10 INJECTION INTRAVENOUS at 17:18

## 2020-01-01 RX ADMIN — METHOCARBAMOL 250 MILLIGRAM(S): 500 TABLET, FILM COATED ORAL at 22:07

## 2020-01-01 RX ADMIN — GABAPENTIN 200 MILLIGRAM(S): 400 CAPSULE ORAL at 21:16

## 2020-01-01 RX ADMIN — PIPERACILLIN AND TAZOBACTAM 25 GRAM(S): 4; .5 INJECTION, POWDER, LYOPHILIZED, FOR SOLUTION INTRAVENOUS at 22:11

## 2020-01-01 RX ADMIN — Medication 0.25 MILLIGRAM(S): at 11:38

## 2020-01-01 RX ADMIN — Medication 600 MILLIGRAM(S): at 07:42

## 2020-01-01 RX ADMIN — Medication 20 MILLIGRAM(S): at 11:38

## 2020-01-01 RX ADMIN — PIPERACILLIN AND TAZOBACTAM 25 GRAM(S): 4; .5 INJECTION, POWDER, LYOPHILIZED, FOR SOLUTION INTRAVENOUS at 21:24

## 2020-01-01 RX ADMIN — PIPERACILLIN AND TAZOBACTAM 25 GRAM(S): 4; .5 INJECTION, POWDER, LYOPHILIZED, FOR SOLUTION INTRAVENOUS at 05:27

## 2020-01-01 RX ADMIN — PIPERACILLIN AND TAZOBACTAM 25 GRAM(S): 4; .5 INJECTION, POWDER, LYOPHILIZED, FOR SOLUTION INTRAVENOUS at 13:32

## 2020-01-01 RX ADMIN — TRAMADOL HYDROCHLORIDE 25 MILLIGRAM(S): 50 TABLET ORAL at 00:03

## 2020-01-01 RX ADMIN — MIDODRINE HYDROCHLORIDE 20 MILLIGRAM(S): 2.5 TABLET ORAL at 17:52

## 2020-01-01 RX ADMIN — ENOXAPARIN SODIUM 40 MILLIGRAM(S): 100 INJECTION SUBCUTANEOUS at 12:33

## 2020-01-01 RX ADMIN — Medication 20 MILLIGRAM(S): at 11:34

## 2020-01-01 RX ADMIN — Medication 100 MILLIGRAM(S): at 22:04

## 2020-01-01 RX ADMIN — MIDODRINE HYDROCHLORIDE 20 MILLIGRAM(S): 2.5 TABLET ORAL at 11:37

## 2020-01-01 RX ADMIN — METHYLNALTREXONE BROMIDE 12 MILLIGRAM(S): 12 INJECTION, SOLUTION SUBCUTANEOUS at 16:19

## 2020-01-01 RX ADMIN — METHOCARBAMOL 250 MILLIGRAM(S): 500 TABLET, FILM COATED ORAL at 22:29

## 2020-01-01 RX ADMIN — HEPARIN SODIUM 5000 UNIT(S): 5000 INJECTION INTRAVENOUS; SUBCUTANEOUS at 05:55

## 2020-01-01 RX ADMIN — PIPERACILLIN AND TAZOBACTAM 25 GRAM(S): 4; .5 INJECTION, POWDER, LYOPHILIZED, FOR SOLUTION INTRAVENOUS at 14:17

## 2020-01-01 RX ADMIN — HYDROMORPHONE HYDROCHLORIDE 2 MILLIGRAM(S): 2 INJECTION INTRAMUSCULAR; INTRAVENOUS; SUBCUTANEOUS at 17:45

## 2020-01-01 RX ADMIN — GABAPENTIN 200 MILLIGRAM(S): 400 CAPSULE ORAL at 22:11

## 2020-01-01 RX ADMIN — HYDROMORPHONE HYDROCHLORIDE 0.5 MILLIGRAM(S): 2 INJECTION INTRAMUSCULAR; INTRAVENOUS; SUBCUTANEOUS at 18:29

## 2020-01-01 RX ADMIN — SODIUM CHLORIDE 1000 MILLILITER(S): 9 INJECTION, SOLUTION INTRAVENOUS at 18:15

## 2020-01-01 RX ADMIN — PIPERACILLIN AND TAZOBACTAM 25 GRAM(S): 4; .5 INJECTION, POWDER, LYOPHILIZED, FOR SOLUTION INTRAVENOUS at 05:35

## 2020-01-01 RX ADMIN — HYDROMORPHONE HYDROCHLORIDE 3 MILLIGRAM(S): 2 INJECTION INTRAMUSCULAR; INTRAVENOUS; SUBCUTANEOUS at 03:53

## 2020-01-01 RX ADMIN — Medication 100 MILLIGRAM(S): at 05:50

## 2020-01-01 RX ADMIN — HYDROMORPHONE HYDROCHLORIDE 0.5 MILLIGRAM(S): 2 INJECTION INTRAMUSCULAR; INTRAVENOUS; SUBCUTANEOUS at 05:16

## 2020-01-01 RX ADMIN — HYDROMORPHONE HYDROCHLORIDE 2 MILLIGRAM(S): 2 INJECTION INTRAMUSCULAR; INTRAVENOUS; SUBCUTANEOUS at 12:15

## 2020-01-01 RX ADMIN — MIDODRINE HYDROCHLORIDE 15 MILLIGRAM(S): 2.5 TABLET ORAL at 11:38

## 2020-01-01 RX ADMIN — HYDROMORPHONE HYDROCHLORIDE 2 MILLIGRAM(S): 2 INJECTION INTRAMUSCULAR; INTRAVENOUS; SUBCUTANEOUS at 21:19

## 2020-01-01 SDOH — SOCIAL STABILITY - SOCIAL INSECURITY: DISRUPTION OF FAMILY BY SEPARATION AND DIVORCE: Z63.5

## 2020-05-13 PROBLEM — J38.01 VOCAL FOLD PARESIS, LEFT: Status: ACTIVE | Noted: 2020-01-01

## 2020-05-13 PROBLEM — J30.0 VASOMOTOR RHINITIS: Status: ACTIVE | Noted: 2020-01-01

## 2020-05-13 NOTE — ASSESSMENT
[FreeTextEntry1] : Ms. 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 Chase), then s/p Nissen fundoplication in Jan by thoracic, now with return of all symptoms.  History highly suggestive of return of zenkers given the dysphagia to liquids which pour out if she bends over several minutes after swallowing.  Small zenker's seen on Barium- unclear significance as this is typicall seen after zenkers repair.  Left TVC still paretic but with good voice.   Needs SLP f/up - but planning to do through [facility]. \par - f/up GI EGD with Dr. Dailey on thursday\par - will refer to Dr. Livingston for possible Zenker's repair\par - f/up SLP once able to schedule due to global pandemic \par - atrovent nasal for vasomotor rhinitis

## 2020-05-13 NOTE — PROCEDURE
[de-identified] : reason for laryngoscopy: unable to cooperate with mirror \par \par Fiberoptic laryngoscopy was performed on the patient.  R/b/a was explained to the patient and they agreed to proceed with procedure.  Nasal cavities were treated with lidocaine and afrin prior to laryngoscopy for comfort.  Nasal cavities: clear b/l, Nasopharynx: mild erythema and swelling, Oropharynx/Hypopharynx: + lingual tonsillar hypertrophy no masses or lesions, posterior pharyngeal wall with cobblestoning.  No BOT hypertrophy, vallecula is clear of any masses or cysts, Supraglottis: epiglottis sharp with normal bilateral arytenoids, aryepiglottic folds, ventricles but with + interarytenoid edema consistent with reflux, Glottis: LEFT TVC PARETIC BUT WITH SOME MINIMAL MOVEMENT.  Subglottis clear  No pooling of secretions in the pyriform sinus.  Airway patent\par \par Scope #:28\par \par

## 2020-05-13 NOTE — HISTORY OF PRESENT ILLNESS
[de-identified] : Ms. BOONE is a 64 year female referred from Dr. Dailey of GI.  Outside medical records scanned in all scripts. \par Had zenkers repair by thoracic surgery in [place], technique not mentioned in records.  Had RLN injury postop on the LEFT.  \par seen by ENT in [place] (Dr. Toni Chase).  [place] to be palsy, had silent aspiration. Given injection laryngoplasty with significant improvement in voice and swallowing.  Was working with SLP down there and was swallowing well. Then developed severe acid reflux symptoms.  Same thoracic surgeon (Dr. Banda) performed nissen fundoplication in Jan 2020.  Pt initially with symptoms but since moving back to [place] region with return of nearly all symptoms.  \par + severe acid reflux, + globus sensation, + dysphagia to liquids.  Feels they do not go down and when she leans forward, liquids will come back out.  Cannot sleep laying flat due to severe irritation/throat clearing.  Thickens her liquids.  \par Seen by GI here, started on PPI, planned for new EGD thursday.  Had barium swallow which showed small zenkers.  Planned to see SLP at [facility], but they are waiting to schedule due to global pandemic. \par Doing well with regards to voice, no dyspnea.  \par also with near constant runny nose, no nasal obstruction, worsened when going outside. Present for years but worsened lately with reflux.

## 2020-05-13 NOTE — CONSULT LETTER
[Dear  ___] : Dear  [unfilled], [Consult Letter:] : I had the pleasure of evaluating your patient, [unfilled]. [Please see my note below.] : Please see my note below. [Consult Closing:] : Thank you very much for allowing me to participate in the care of this patient.  If you have any questions, please do not hesitate to contact me. [FreeTextEntry3] : Sincerely, \par \par Jocelin Mohr MD\par Otolaryngology- Facial Plastics \par [address]\[place] [address]\par (P) - [phone]\par (F) - [phone]

## 2020-05-28 PROBLEM — Z82.49 FAMILY HISTORY OF HEART FAILURE: Status: ACTIVE | Noted: 2020-01-01

## 2020-05-28 PROBLEM — Z87.39 HISTORY OF RHEUMATOID ARTHRITIS: Status: RESOLVED | Noted: 2020-01-01 | Resolved: 2020-01-01

## 2020-05-28 PROBLEM — Z82.49 FAMILY HISTORY OF HYPERTENSION: Status: ACTIVE | Noted: 2020-01-01

## 2020-05-28 PROBLEM — Z87.39 HISTORY OF CHRONIC BACK PAIN: Status: RESOLVED | Noted: 2020-01-01 | Resolved: 2020-01-01

## 2020-05-28 PROBLEM — Z00.00 ENCOUNTER FOR PREVENTIVE HEALTH EXAMINATION: Status: ACTIVE | Noted: 2020-01-01

## 2020-05-28 PROBLEM — Z87.09 HISTORY OF CHRONIC OBSTRUCTIVE LUNG DISEASE: Status: RESOLVED | Noted: 2020-01-01 | Resolved: 2020-01-01

## 2020-05-28 PROBLEM — Z86.59 HISTORY OF ANXIETY: Status: RESOLVED | Noted: 2020-01-01 | Resolved: 2020-01-01

## 2020-05-28 PROBLEM — Z86.59 HISTORY OF DEPRESSION: Status: RESOLVED | Noted: 2020-01-01 | Resolved: 2020-01-01

## 2020-06-12 NOTE — ASSESSMENT
[FreeTextEntry1] : =Longstanding, RA (likely seropositive, erosive as per patient-no records available)\par On Humira since 2018, appears to be in clinical remission since\par =Chronic iron deficiency anemia, on IV iron with hematologist\par =COPD, follows with pulmonary\par =Complicated Zenker's diverticulum repair with recurrent aspiration pneumonias and recent hospitalization\par =Colon Ca s/p partial colectomy 2016. T2N0 no adjuvant therapy. last colonoscopy 2018, repeat planned for 2021\par \par .No evidence of active synovitis on exam today\par .Blood work today for monitoring since on biologic as well as screening tests\par .Xrays of the hands/feet to assess for erosive disease\par .Patient to fax over records from previous Rheumatologist (labs, Xrays, DEXA)\par as well as the records from hematology and Pulmonary\par .Hypoxia on exam today, patient asymptomatic\par urged to call her pulmonologist for evaluation and to reassess at home \par to seek medical care immediately if SOB \par \par RTO in one month\par \par Patient aware of my assessment and plan, all questions answered.\par

## 2020-06-12 NOTE — HISTORY OF PRESENT ILLNESS
[FreeTextEntry1] : 65 yo W, presenting to establish care for previously diagnosed RA. \par \par =-Diagnosed with RA for over 10 years\par was initially seeing Dr Chen for a short period of time, no therapies received\par She moved to [place] 2018 and was there for 4 years was seeing a Rheumatologist Dr Wolf for the past 4 years.\par Was transiently on Cimzia then lost efficacy then Xeljanz without benefit (2017).\par Has been on Humira since 2018, last taken yesterday. Reports being in remission since. 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 Chase), then s/p Nissen fundoplication in Jan by thoracic, now with return of all symptoms.\par Seen ENT Dr Mohr on 5/13 referred to GI for EGD and Dr Livingston for possible Zenker's repair\par #HCM \par colonoscopy last 2 years ago\par PAP due next year\par \par PMD Dr Espinoza

## 2020-06-12 NOTE — PHYSICAL EXAM
[General Appearance - Alert] : alert [General Appearance - In No Acute Distress] : in no acute distress [Sclera] : the sclera and conjunctiva were normal [Heart Sounds] : normal S1 and S2 [Abdomen Tenderness] : non-tender [Edema] : there was no peripheral edema [] : no rash [Oriented To Time, Place, And Person] : oriented to person, place, and time [FreeTextEntry1] : no synovitis on exam, deformities of toes bilaterally

## 2020-06-12 NOTE — CONSULT LETTER
[Dear  ___] : Dear  [unfilled], [Consult Letter:] : I had the pleasure of evaluating your patient, [unfilled]. [Please see my note below.] : Please see my note below. [Sincerely,] : Sincerely, [FreeTextEntry3] : Elif Doe MD\par

## 2020-06-23 NOTE — HISTORY OF PRESENT ILLNESS
[de-identified] : 65 yo F with hx of Zenker's diverticulum. In July 2019, pt had an operation for the diverticulum in [place]. However, patient states Dr. Dailey (GI) diagnosed her with another Zenker's in April with an esophagram. Pt states that her food comes up.  She is currently on a PEG and pureed foods. Pt has been "spitting up" especially since Friday, and has hx of aspiration pneumonia.\par \par Pt has had a paraesophageal hernia repair in the past as well.

## 2020-06-23 NOTE — REASON FOR VISIT
[Initial Evaluation] : an initial evaluation for [FreeTextEntry2] : Referred by Dr. Mohr for Zenker's diverticulum

## 2020-06-23 NOTE — CONSULT LETTER
[Dear  ___] : Dear  [unfilled], [Please see my note below.] : Please see my note below. [Consult Closing:] : Thank you very much for allowing me to participate in the care of this patient.  If you have any questions, please do not hesitate to contact me. [Consult Letter:] : I had the pleasure of evaluating your patient, [unfilled]. [Sincerely,] : Sincerely, [DrLuz Maria  ___] : Dr. BAÑUELOS [FreeTextEntry2] : Jocelin Mohr MD ([facility] physician)\par  [FreeTextEntry3] : Karri Livingston MD, FACS\par Chief of Otolaryngology [facility]\par  - Dept. of Otolaryngology\par [facility] [employer]\par \par

## 2020-06-23 NOTE — PHYSICAL EXAM
[Edentulous] : edentulous [Midline] : trachea located in midline position [Normal] : orientation to person, place, and time: normal [de-identified] : Kyphotic

## 2020-06-23 NOTE — DATA REVIEWED
[de-identified] : Esophagram (Handy Hendrickson) - Images unavailable for review, but show a residual diverticulum.\par \par

## 2020-07-01 PROBLEM — Z79.899 HIGH RISK MEDICATION USE: Status: ACTIVE | Noted: 2020-01-01

## 2020-07-01 PROBLEM — M06.9 RHEUMATOID ARTHRITIS: Status: ACTIVE | Noted: 2020-01-01

## 2020-08-25 PROBLEM — K22.5 ZENKER DIVERTICULA: Status: ACTIVE | Noted: 2020-01-01

## 2020-08-25 PROBLEM — J69.0 ASPIRATION PNEUMONIA: Status: ACTIVE | Noted: 2020-01-01

## 2020-08-25 PROBLEM — D64.9 ANEMIA: Status: ACTIVE | Noted: 2020-01-01

## 2020-08-25 PROBLEM — Z63.5 DIVORCED: Status: ACTIVE | Noted: 2020-01-01

## 2020-08-25 PROBLEM — R13.10 ESOPHAGEAL DYSPHAGIA: Status: ACTIVE | Noted: 2020-01-01

## 2020-08-25 PROBLEM — Z87.891 FORMER SMOKER: Status: ACTIVE | Noted: 2020-01-01

## 2020-08-25 NOTE — PHYSICAL EXAM
[General Appearance - Alert] : alert [General Appearance - In No Acute Distress] : in no acute distress [General Appearance - Well Developed] : well developed [Sclera] : the sclera and conjunctiva were normal [Outer Ear] : the ears and nose were normal in appearance [Neck Appearance] : the appearance of the neck was normal [] : the neck was supple [Neck Cervical Mass (___cm)] : no neck mass was observed [Respiration, Rhythm And Depth] : normal respiratory rhythm and effort [Auscultation Breath Sounds / Voice Sounds] : lungs were clear to auscultation bilaterally [Heart Rate And Rhythm] : heart rate was normal and rhythm regular [Heart Sounds] : normal S1 and S2 [Examination Of The Chest] : the chest was normal in appearance [Abdomen Soft] : soft [Abdomen Tenderness] : non-tender [FreeTextEntry1] : .def [Cervical Lymph Nodes Enlarged Posterior Bilaterally] : posterior cervical [Cervical Lymph Nodes Enlarged Anterior Bilaterally] : anterior cervical [No CVA Tenderness] : no ~M costovertebral angle tenderness [Supraclavicular Lymph Nodes Enlarged Bilaterally] : supraclavicular [Abnormal Walk] : normal gait [Skin Color & Pigmentation] : normal skin color and pigmentation [No Focal Deficits] : no focal deficits [Oriented To Time, Place, And Person] : oriented to person, place, and time [Impaired Insight] : insight and judgment were intact [Affect] : the affect was normal

## 2020-08-25 NOTE — CONSULT LETTER
[Dear  ___] : Dear  [unfilled], [Consult Letter:] : I had the pleasure of evaluating your patient, [unfilled]. [( Thank you for referring [unfilled] for consultation for _____ )] : Thank you for referring [unfilled] for consultation for [unfilled] [Please see my note below.] : Please see my note below. [Consult Closing:] : Thank you very much for allowing me to participate in the care of this patient.  If you have any questions, please do not hesitate to contact me. [Sincerely,] : Sincerely, [FreeTextEntry2] : Dr. William Vicente (Pulm/Ref)\par Dr. Cinthya Espinoza (PCP)\par Dr. Mix (Card)\par  [FreeTextEntry3] : Prashanth Madrigal MD, FACS \par Chief, Division of Thoracic Surgery \par Director, Minimally Invasive Thoracic Surgery \par Department of Cardiovascular and Thoracic Surgery \par [facility] \par , Cardiovascular and Thoracic Surgery\par \par

## 2020-08-25 NOTE — HISTORY OF PRESENT ILLNESS
[FreeTextEntry1] : Ms. GINA BOONE, 64 year old female presents today for initial consultation.  She has history of Zenker's diverticulum s/p repair 08/2019 (in [place]) which was complicated by left vocal cord paralysis and dysphagia requiring PEG insertion.  Since that time she has developed recurrent aspiration pneumonia.  \par \par She is a former smoker (quit 2013, hx 2 PPD x 40 years), w/ hx of HTN, colon CA s/p colectomy, COPD with bronchospastic component on home oxygen, anxiety/depression, RA, chronic pain syndrome, left vocal fold paresis, anemia (requiring Iron infusions) and hiatal hernia repair s/p Nissen fundoplication 1/3/20 (in [place]).  PEG tube was changed/replaced on 6/3/20.  \par \par Of note, patient has had multiple admissions to [facility] for aspiration pneumonia and dislodged PEG tube. PEG tube was replaced.  She continues to use the PEG tube for supplemental feedings (Jevity HN 1/2 can 3-4 times daily).  She tolerated thickened fluids and chopped diet. \par \par Barium esophagram on 05/01/2020:\par - a small ovoid collection of barium is demonstrated posterior to the lower cervical spine to the right of the midline. This barium collection communicates with the esophagus. This likely represents a Zenker's diverticulum. A postoperative collection cannot be completely excluded\par - there is retention of barium in the distal esophagus with delayed esophageal emptying. There is free flow of barium into the stomach w/o evidence of obstruction\par \par Patient is here today for CT surgery consultation, referred by Dr. William Vicente (Pulm). \par \par She continues to complain of hoarseness, fatigue, weight loss (>15 lbs), dysphagia with solid foods and thin liquids, shortness of breath with exertion.  She has history of acid reflux but reports that it is improved after hiatal hernia repair and Pantoprazole.  She has a frequent cough and reports that she frequently expectorates thick sputum.  She denies any fever, chills, cough, chest pain, hemoptysis, or recent illness.

## 2020-08-25 NOTE — REVIEW OF SYSTEMS
[Feeling Poorly] : feeling poorly [Feeling Tired] : feeling tired [Recent Weight Loss (___ Lbs)] : recent [unfilled] ~Ulb weight loss [Hoarseness] : hoarseness [Shortness Of Breath] : shortness of breath [Cough] : cough [Abdominal Pain] : abdominal pain [Heartburn] : heartburn [Constipation] : constipation [Joint Pain] : joint pain [Limb Weakness] : limb weakness [Difficulty Walking] : difficulty walking [Sleep Disturbances] : sleep disturbances [Negative] : Endocrine [Fever] : no fever [Chills] : no chills [Earache] : no earache [Loss Of Hearing] : no hearing loss [Nosebleeds] : no nosebleeds [Nasal Discharge] : no nasal discharge [Wheezing] : no wheezing [Confused] : no confusion [Convulsions] : no convulsions [Dizziness] : no dizziness [Fainting] : no fainting [Easy Bleeding] : no tendency for easy bleeding [Easy Bruising] : no tendency for easy bruising [Swollen Glands] : no swollen glands [Swollen Glands In The Neck] : no swollen glands in the neck [FreeTextEntry7] : reports heartburn improved with Pantoprazole [FreeTextEntry4] : vocal cord paralysis  [FreeTextEntry9] : hx of RA, chronic neck/back pain (followed by [facility] Mg[facility]) [de-identified] : hx of anemia

## 2020-08-25 NOTE — ASSESSMENT
[FreeTextEntry1] : 64 year old female, former smoker presents today for initial consultation, referred by her pulmonologist Dr. Vicente.  She has history of Zenker's diverticulum s/p repair 08/2019 (in [place]) which was complicated by left vocal cord paralysis and dysphagia requiring PEG insertion.  Since that time she has developed recurrent aspiration pneumonia.    \par \par Of note, patient has had multiple admissions to [facility] for aspiration pneumonia and dislodged PEG tube. PEG tube was replaced.  She continues to use the PEG tube for supplemental feedings (Jevity HN 1/2 can 3-4 times daily).  She tolerated thickened fluids and chopped diet. \par \par Barium esophagram on 05/01/2020:\par - a small ovoid collection of barium is demonstrated posterior to the lower cervical spine to the right of the midline. This barium collection communicates with the esophagus. This likely represents a Zenker's diverticulum. A postoperative collection cannot be completely excluded\par - there is retention of barium in the distal esophagus with delayed esophageal emptying. There is free flow of barium into the stomach w/o evidence of obstruction\par \par I have reviewed the patient's medical records and diagnostic images during the time of this office visit, and I have made the following recommendation:\par 1.  I have recommended that she see Dr. Kelley for a Motility study and Bravo Study.\par 2.  I have recommended that she have a noncontrast Chest CT Scan and follow up in my office to discuss the results.\par 3.  She has an appointment with Dr. Kim tomorrow re: vocal cord paralysis, which I have encouraged her to keep. \par \par I personally performed the services described in the documentation, reviewed the documentation recorded by the scribe in my presence and it accurately and completely records my words and actions.\par \par I, Neelima Brown NP, am scribing for and the presence of Dr. Madrigal, the following sections HISTORY OF PRESENT ILLNESS, PAST MEDICAL/FAMILY/SOCIAL HISTORY; REVIEW OF SYSTEMS; VITAL SIGNS; PHYSICAL EXAM; DISPOSITION.\par

## 2020-08-26 PROBLEM — B37.0 ORAL THRUSH: Status: ACTIVE | Noted: 2020-01-01

## 2020-08-26 NOTE — HISTORY OF PRESENT ILLNESS
[de-identified] : 64 year old female referred by Dr. Livingston vocal cord paresis.  History of Zenker diverticula, esophageal dysphagia and aspiration pneumonia, s/p Zenker's surgery July 2020, subsequent vocal cord damage. G-tube in place for feedings.  Currently on thickened liquid diet and chopped food.  Reports constant expectorant of clear sputum with nausea and vomiting.  Reports continues to have dysphagia, but contents continue to come back up, taking Protonix for acid reflux.  Denies throat pain, but soreness due to constant coughing. Denies hemoptysis and recent fevers/infections. Temperature WNL upon screening when entering facility. Oxygen requirements for COPD. Previous injection helped but still having trouble with thick coughing and phlegm and aspiration pneumonia. Few weeks of otalgia AU. H/o fungal pharyngitis.\par

## 2020-08-26 NOTE — REASON FOR VISIT
[Subsequent Evaluation] : a subsequent evaluation for [Family Member] : family member [FreeTextEntry2] : referred by Dr. Livingston vocal cord paresis.

## 2020-08-26 NOTE — CONSULT LETTER
[Dear  ___] : Dear  [unfilled], [Courtesy Letter:] : I had the pleasure of seeing your patient, [unfilled], in my office today. [Consult Closing:] : Thank you very much for allowing me to participate in the care of this patient.  If you have any questions, please do not hesitate to contact me. [Please see my note below.] : Please see my note below. [Sincerely,] : Sincerely, [DrLuz Maria  ___] : Dr. BAÑUELOS [FreeTextEntry2] : Cinthya Espinoza, DO [FreeTextEntry3] : Artur Kim MD, PhD\par Chief, Division of Laryngology\par Department of Otolaryngology\par [facility]\par Pediatric Otolaryngology, [facility]\par  of Otolaryngology\par [facility] [employer] [facility]

## 2020-11-27 NOTE — CONSULT NOTE ADULT - ASSESSMENT
s/p peg - malfunction  ct abdomen -appendicitis cannot be excluded  iv antibiotics and further management  for appendicitis as per surgery  cardiac status stable for surgery if needed  hypertension  copd

## 2020-11-27 NOTE — H&P ADULT - NSHPSOCIALHISTORY_GEN_ALL_CORE
Social History:    Marital Status:   Occupation:   Lives with:     Substance Use :  Tobacco Usage:  (   ) never smoked   (   ) former smoker   (   ) current smoker  (     ) pack year  (        ) last tobacco use date  Alcohol Usage:      Health Management     For female:   Last Mammo:   Last Pap:     For male:  Last prostate exam:          [  ] date:            (  ) findings      Immunization Hx:   (  ) flu shot                               (     ) date   (  ) pneumonia shot               (     ) date  (  ) tetanus                               (     ) date     (     ) Advanced Directives: (     ) declined   [  ] health care proxy Social History:    Marital Status:   Occupation:   Lives with:     Substance Use :  none  Tobacco Usage:  (   ) never smoked   (   ) former smoker   (  no  ) current smoker  (     ) pack year  (        ) last tobacco use date  Alcohol Usage:  none      Health Management     For female:   Last Mammo:   Last Pap:     For male:  Last prostate exam:          [  ] date:            (  ) findings      Immunization Hx:   (  ) flu shot                               (     ) date   (  ) pneumonia shot               (     ) date  (  ) tetanus                               (     ) date     (     ) Advanced Directives: (     ) declined   [  ] health care proxy

## 2020-11-27 NOTE — H&P ADULT - NSHPLABSRESULTS_GEN_ALL_CORE
8.5    20.00 )-----------( 543      ( 27 Nov 2020 13:21 )             27.9       CBC Full  -  ( 27 Nov 2020 13:21 )  WBC Count : 20.00 K/uL  RBC Count : 3.33 M/uL  Hemoglobin : 8.5 g/dL  Hematocrit : 27.9 %  Platelet Count - Automated : 543 K/uL  Mean Cell Volume : 83.8 fl  Mean Cell Hemoglobin : 25.5 pg  Mean Cell Hemoglobin Concentration : 30.5 gm/dL  Auto Neutrophil # : 17.04 K/uL  Auto Lymphocyte # : 1.38 K/uL  Auto Monocyte # : 1.27 K/uL  Auto Eosinophil # : 0.11 K/uL  Auto Basophil # : 0.03 K/uL  Auto Neutrophil % : 85.0 %  Auto Lymphocyte % : 6.9 %  Auto Monocyte % : 6.4 %  Auto Eosinophil % : 0.6 %  Auto Basophil % : 0.2 %      11-27    133<L>  |  96  |  22  ----------------------------<  93  4.3   |  31  |  0.36<L>    Ca    8.1<L>      27 Nov 2020 13:21    TPro  6.4  /  Alb  1.6<L>  /  TBili  0.2  /  DBili  x   /  AST  13<L>  /  ALT  11<L>  /  AlkPhos  102  11-27      CAPILLARY BLOOD GLUCOSE          Vital Signs Last 24 Hrs  T(C): 36.8 (27 Nov 2020 17:31), Max: 38.7 (27 Nov 2020 12:46)  T(F): 98.2 (27 Nov 2020 17:31), Max: 101.6 (27 Nov 2020 12:46)  HR: 80 (27 Nov 2020 20:43) (67 - 81)  BP: 100/59 (27 Nov 2020 20:43) (88/51 - 105/62)  BP(mean): 72 (27 Nov 2020 20:43) (72 - 72)  RR: 17 (27 Nov 2020 20:43) (16 - 20)  SpO2: 96% (27 Nov 2020 20:43) (94% - 100%)        PT/INR - ( 27 Nov 2020 13:21 )   PT: 14.9 sec;   INR: 1.29 ratio         PTT - ( 27 Nov 2020 13:21 )  PTT:31.5 sec

## 2020-11-27 NOTE — ED ADULT TRIAGE NOTE - TEMPERATURE IN CELSIUS (DEGREES C)
[Drinks from cup] : drinks from cup [Imitates speech/sounds] : imitates speech/sounds [Stranger anxiety] : stranger anxiety [Indicates wants] : indicates wants [Play pat-a-cake] : play pat-a-cake [Plays peek-a-garces] : plays peek-a-garces [[place] 2 objects held in hands] : passes objects [Points at object] : points at object [Takes objects] : takes objects [Lee/Mama specific] : lee/mama specific [Combine syllables] : combine syllables [Tony] : tony [Get to sitting] : get to sitting [Pull to stand] : pull to stand [Sits well] : sits well  [Stands holding on] : stands holding on 38.7 [Waves bye-bye] : does not wave bye-bye

## 2020-11-27 NOTE — CONSULT NOTE ADULT - ASSESSMENT
64 y/o F w/ pmh of recovered alcoholic, former smoker, copd, RA, chronic low back pain, hx of colon ca s/p sigmoid resection (2015), hx of open R. inguinal hernia repair w/ mesh (2010), hx of hiatal hernia repair (2019), zenker diverticulum surgery c/b vocal cord paralysis's requiring peg for feeding, s/p peg (replaced peg 3 weeks ago [facility]) transfer from [facility] to [facility] earlier today for leaking PEJ tube and on/off lower abd pain over the last 2-3 days. In the ED pt underwent an CT of abd/pelvis was found to have a Abscess around the L. hip ill-defined multi septated fluid collection containing foci of air measuring appx 3.8 x 3.2 x3.9cm concerning for abscess, WBC of 20, normal LA. MICU consulted for hypotension    66 y/o F w/ pmh of recovered alcoholic, former smoker, copd, RA, chronic low back pain, hx of colon ca s/p sigmoid resection (2015), hx of open R. inguinal hernia repair w/ mesh (2010), hx of hiatal hernia repair (2019), zenker diverticulum surgery c/b vocal cord paralysis's requiring peg for feeding, s/p peg (replaced peg 3 weeks ago [facility]) transfer from [facility] to [facility] earlier today for leaking PEJ tube and on/off lower abd pain over the last 2-3 days. In the ED pt underwent an CT of abd/pelvis was found to have a Abscess around the L. hip ill-defined multi septated fluid collection containing foci of air measuring appx 3.8 x 3.2 x3.9cm concerning for abscess, WBC of 20, normal LA. MICU consulted for hypotension after 3L of IVF in the setting of sepsis from L. hip abscess.    -Neuro: No acute issues MS stable, currently awake and protecting her airway  -Cardiac: Hypotension possible from sepsis given 3L and now on LR at 80cc/hr w/ improvement in SBP to 100 cont IVF if becomes hypotensive again will consider pressors, maintain MAP >65  -Resp: No acute issues, maintain o2 >92%  -GI: cont diet as tolerated, GI ppx w/ protonix, constipation bowel regimen w/ lactulose, mag and senna    66 y/o F w/ pmh of recovered alcoholic, former smoker, copd, RA, chronic low back pain, hx of colon ca s/p sigmoid resection (2015), hx of open R. inguinal hernia repair w/ mesh (2010), hx of hiatal hernia repair (2019), zenker diverticulum surgery c/b vocal cord paralysis's requiring peg for feeding, s/p peg (replaced peg 3 weeks ago [facility]) transfer from [facility] to [facility] earlier today for leaking PEJ tube and on/off lower abd pain over the last 2-3 days. In the ED pt underwent an CT of abd/pelvis was found to have a Abscess around the L. hip ill-defined multi septated fluid collection containing foci of air measuring appx 3.8 x 3.2 x3.9cm concerning for abscess, WBC of 20, normal LA. MICU consulted for hypotension after 3L of IVF in the setting of sepsis from L. hip abscess.    -Neuro: No acute issues MS stable, currently awake and protecting her airway  -Cardiac: Hypotension possible from sepsis given 3L and now on LR at 80cc/hr w/ improvement in SBP to 100 cont IVF if becomes hypotensive again will consider pressors, maintain MAP >65  -Resp: No acute issues, maintain o2 >92%  -GI: NPO for now, GI ppx w/ protonix, constipation bowel regimen w/ lactulose, mag and senna   -Renal: Renal function stable, lytes stable, cont to monitor and trend and replete prn  -ID: L. hip abscess seen by ID recommend IV vanco/zosyn, will add IV clinda, covid19 neg, f/u on UA, UCX, blood CX, check procal, possible MRI in the AM to r/o joint involvement per ortho?, will check repeat labs  -Endo: no acute issues, monitor FS q6h given NPO status  -Heme: DVT ppx w/ heparin  -Dispo: Transfer to MICU, C discussed pt is full code, patient requested her daughter Angelina Cardoso be her HCP if she unable to make any decision her phone number is [phone], case d/w eICU attending    64 y/o F w/ pmh of recovered alcoholic, former smoker, copd, RA, chronic low back pain, hx of colon ca s/p sigmoid resection (2015), hx of open R. inguinal hernia repair w/ mesh (2010), hx of hiatal hernia repair (2019), zenker diverticulum surgery c/b vocal cord paralysis's requiring peg for feeding, s/p peg (replaced peg 3 weeks ago [facility] transfer from [facility] to [facility] earlier today for leaking PEJ tube and on/off lower abd pain over the last 2-3 days. In the ED pt underwent an CT of abd/pelvis was found to have a Abscess around the L. hip ill-defined multi septated fluid collection containing foci of air measuring appx 3.8 x 3.2 x3.9cm concerning for abscess, WBC of 20, normal LA. MICU consulted for hypotension after 3L of IVF in the setting of sepsis from L. hip abscess.    -Neuro: No acute issues MS stable, currently awake and protecting her airway  -Cardiac: Hypotension possible from sepsis given 3L of IVF and now on LR at 80cc/hr w/ improvement in SBP to 100 cont IVF if becomes hypotensive again will consider pressors +/- midodrine to maintain MAP >65  -Resp: No acute issues, maintain o2 >92%  -GI: NPO for now, GI ppx w/ protonix, constipation bowel regimen w/ lactulose, mag and senna, Intra abdominal inflammatory changes? per surgery to monitor will consider adding US to eval if pain continues  -Renal: Renal function stable, lytes stable, cont to monitor and trend and replete prn  -ID: L. hip abscess seen by ID recommend IV vanco/zosyn, will add IV clinda, covid19 neg, f/u on UA, UCX, blood CX, check procal, possible MRI in the AM to r/o joint involvement per ortho as MRI is not available at this time?, will check repeat labs  -Endo: no acute issues, monitor FS q6h given NPO status  -Heme: DVT ppx w/ heparin  -Dispo: Transfer to MICU, GOC discussed pt is full code, patient requested her daughter Angelina Cardoso be her HCP if she unable to make any decision her phone number is [phone], case d/w eICU attending and both General surgery and orthopedic team   66 y/o F w/ pmh of recovered alcoholic, former smoker, copd, RA, chronic low back pain, hx of colon ca s/p sigmoid resection (2015), hx of open R. inguinal hernia repair w/ mesh (2010), hx of hiatal hernia repair (2019), zenker diverticulum surgery c/b vocal cord paralysis's requiring peg for feeding, s/p peg (replaced peg 3 weeks ago [facility]) transfer from [facility] to [facility] earlier today for leaking PEJ tube and on/off lower abd pain over the last 2-3 days. In the ED pt underwent an CT of abd/pelvis was found to have a Abscess around the L. hip ill-defined multi septated fluid collection containing foci of air measuring appx 3.8 x 3.2 x3.9cm concerning for abscess, WBC of 20, normal LA. MICU consulted for hypotension after 3L of IVF in the setting of sepsis from L. hip abscess.    -Neuro: No acute issues MS stable, currently awake and protecting her airway  -Cardiac: Hypotension possible from sepsis given 3L of IVF and now on LR at 80cc/hr w/ improvement in SBP to 100 cont IVF if becomes hypotensive again will consider pressors +/- midodrine to maintain MAP >65  -Resp: No acute issues, maintain o2 >92%  -GI: NPO for now, GI ppx w/ protonix, constipation bowel regimen w/ lactulose, mag and senna, Intra abdominal inflammatory changes? per surgery to monitor will consider adding US to eval if pain continues  -Renal: Renal function stable, lytes stable, cont to monitor and trend and replete prn  -ID: L. hip abscess seen by ID recommend IV vanco/zosyn, will add IV clinda, covid19 neg, f/u on UA, UCX, blood CX, check procal, possible MRI in the AM to r/o joint involvement per ortho as MRI is not available at this time?, will check repeat labs  -Endo: no acute issues, monitor FS q6h given NPO status  -Heme: DVT ppx w/ heparin  -Msk: Chronic low back pain on methadone 60mg daily checked HCS data-base and confirmed pt reports being on methadone for appx 15 years) along with gabapentin and Robaxin, will order methadone 60mg daily for 6am as patient has been taking  -[facility]: [facility] discussed pt is full code, patient requested her daughter Angelina Cardoso be her HCP if she unable to make any decision her phone number is [phone], case d/w eICU attending and both General surgery and orthopedic team

## 2020-11-27 NOTE — ED ADULT NURSE NOTE - PMH
Candidal stomatitis    COPD (chronic obstructive pulmonary disease)    HTN (hypertension)    Major depressive disorder    Malignant neoplasm of colon

## 2020-11-27 NOTE — ED ADULT NURSE REASSESSMENT NOTE - NS ED NURSE REASSESS COMMENT FT1
Patient hypotensive at this time, MD Joseph and MD Ahumada made aware. IVF in progress. Will cont. to monitor.
Pt is Aox3 in NAD. c/o of left hip pain. pt medicated as ordered. .  IV clean dry and intact, running without difficulty.. Safety maintained, Bed locked in lowest position. Call bell in reach. Pt awaiting bed placement. ICU PA at bedside  pt noted to have Peg tube.

## 2020-11-27 NOTE — ED ADULT TRIAGE NOTE - CHIEF COMPLAINT QUOTE
Pt BIBA from [facility] for fever and leaking Gtube- as per EMD staff at [facility] state the leaking from Gtube smells like fecal matter

## 2020-11-27 NOTE — H&P ADULT - ADDITIONAL PE
+Pain in L gluteal region  soft, mild distention, mild LLQ and around the PEJ site tenderness   L. hip with mild swelling

## 2020-11-27 NOTE — PROVIDER CONTACT NOTE (EICU) - SITUATION
new admission- brief note- called by ICU ACP for new admission. Please see ICU ACP note for further details.

## 2020-11-27 NOTE — ED ADULT NURSE NOTE - NSIMPLEMENTINTERV_GEN_ALL_ED
Implemented All Fall Risk Interventions:  [place] to call system. Call bell, personal items and telephone within reach. Instruct patient to call for assistance. Room bathroom lighting operational. Non-slip footwear when patient is off stretcher. Physically safe environment: no spills, clutter or unnecessary equipment. Stretcher in lowest position, wheels locked, appropriate side rails in place. Provide visual cue, wrist band, yellow gown, etc. Monitor gait and stability. Monitor for mental status changes and reorient to person, place, and time. Review medications for side effects contributing to fall risk. Reinforce activity limits and safety measures with patient and family.

## 2020-11-27 NOTE — H&P ADULT - ATTENDING COMMENTS
HPI: 66 y/o F w/ pmh of recovered alcoholic, former smoker, copd, RA, chronic low back pain (on methadone 60mg daily), hx of colon ca s/p sigmoid resection (2015), hx of open R. inguinal hernia repair w/ mesh (2010), hx of hiatal hernia repair (2019), zenker diverticulum surgery c/b vocal cord paralysis's requiring peg for feeding, s/p peg (replaced peg 3 weeks ago [facility]) transfer from [facility] to [facility] earlier today for leaking PEJ tube and on/off lower abd pain over the last 2-3 days. In the ED pt underwent an CT of abd/pelvis was found to have a Abscess around the L. hip ill-defined multi septated fluid collection containing foci of air measuring appx 3.8 x 3.2 x3.9cm concerning for abscess, WBC of 20, normal LA. Pt was seen by both surgical and ortho team who recommenced no surgical intervention at this time. MICU consulted for hypotension,

## 2020-11-27 NOTE — ED PROVIDER NOTE - CARE PLAN
Principal Discharge DX:	Abdominal pain, unspecified abdominal location  Goal:	ro Free air   Principal Discharge DX:	Abdominal pain, unspecified abdominal location  Goal:	ro sepsis  Secondary Diagnosis:	Abscess of hip  Secondary Diagnosis:	Leukocytosis, unspecified type

## 2020-11-27 NOTE — H&P ADULT - NSICDXPASTMEDICALHX_GEN_ALL_CORE_FT
PAST MEDICAL HISTORY:  Candidal stomatitis     COPD (chronic obstructive pulmonary disease)     HTN (hypertension)     Major depressive disorder     Malignant neoplasm of colon

## 2020-11-27 NOTE — CONSULT NOTE ADULT - ASSESSMENT
MARIAM FUENTES  DOA 11/27/2020 DR ZHANNA GREEN            Initial evaluation/Pulmonary Critical Care consultation requested on  11/27/2020  by Dr Green   from Dr Nair   Patient examined chart reviewed    HOSPITAL ADMISSION   PATIENT CAME  FROM (if information available)      MARIAM FUENTES  DOA 11/27/2020 DR ZHANNA GREEN              REVIEW OF SYMPTOMS      Able to give ROS  Yes     RELIABLE No   CONSTITUTIONAL Weakness Yes  Chills No Vision changes No  ENDOCRINE No unexplained hair loss No heat or cold intolerance    ALLERGY No hives  Sore throat No   RESP Coughing blood no  Shortness of breath YES   NEURO No Headache  Confusion Pain neck No   CARDIAC No Chest pain No Palpitations   GI No Pain abdomen NO   Vomiting NO     PHYSICAL EXAM    HEENT Unremarkable PERRLA atraumatic   RESP Fair air entry EXP prolonged    Harsh breath sound Resp distres mild   CARDIAC S1 S2 No S3     NO JVD    ABDOMEN SOFT BS PRESENT NOT DISTENDED No hepatosplenomegaly PEDAL EDEMA present No calf tenderness  NO rash     OXYGENATION.   11/27/2020 2l 97%      VITALS.   11/27/2020 afeb 67 91/51   11/27/2020 On arrival 101 100/60       MICROBIO.   RVP 11/27/2020 rvp negv  SCV2 11/27/2020 scv2 negv            LABS.   W 11/27/2020 w 20  Tr 11/27/2020 Tr negv   Hb 11/27/2020 Hb 8.5   Plt 11/27/2020 plt 543   inr 11/27/2020 inr 1.29   Na 11/27/2020 Na 133   CO2 11/27/2020 CO2 31   Cr 11/27/2020 Cr .3                  IMAGING.   CXR 11/27/2020 cxr napd   ctap ic 11/27/2020   emphysem changes base cyst l lower base   sp janet  appy cannot be excluded      heterogenous thickening l hip with ill defined multiseptate fluid collection containing foci of air 3.8 x 3.2 x3.9 cm concerning for abscess and surrounding cellulitis       PATIENT SUMMARY.   65 f recovered alcoholic former smoker PMH candida stomatitis COPD hytn major depression cancer colon sigmoid resectn in [place] 2015 open r hernia repair with mesh  from [facility] sent for leaking pej tube Recently her dislodged peg had been replaced with pej at [facility] Currently pt  had fever and abdd pain x 1 d   Pt also co pain l hip x 2 d no local trauma hx Free air was seen in l hip on imaging     home meds mom hpsc vit c asa 81 cardizem 30.3 duloxetine 30 fluoxetine 20 montelukast 10 protonix 40 senna  trazodone 100 albuterol gabapentin 600.3 methocarbamol 250.3 methadone 60       ASSESSMENT PLAN   LEAKING PEJ Sugery on case GI called   HIP ABSCESS Surgery and Ortho on case MRI being considered   INFECTION Hip abscess Vanco zosyn started in er Continue ID eval   HEMODYNAMCIS Stable monitor   COPD Continue bd ltra   DEPRESSION Cont psych meds     OVERALL PLAN  Get pej fixed  Rx abscess hip       TIME SPENT   Over 55 minutes aggregate care time spent on encounter; activities included   direct patient care, counseling and/or coordinating care reviewing notes, lab data/ imaging , discussion with multidisciplinary team/ patient  /family and explaining in detail risks, benefits, alternatives  of the recommendations     MARIAM FUENTES  DOA 11/27/2020 DR ZHANNA GREEN

## 2020-11-27 NOTE — ED ADULT NURSE NOTE - CHIEF COMPLAINT QUOTE
Pt BIBA from [facility] for fever and leaking Gtube- as per EMD staff at [facility] state the leaking from Gtube smells like fecal matter

## 2020-11-27 NOTE — CONSULT NOTE ADULT - SUBJECTIVE AND OBJECTIVE BOX
HPI: 66 y/o F w/ pmh of recovered alcoholic, former smoker, copd, RA, chronic low back pain, hx of colon ca s/p sigmoid resection (2015), hx of open R. inguinal hernia repair w/ mesh (2010), hx of hiatal hernia repair (2019), zenker diverticulum surgery c/b vocal cord paralysis's requiring peg for feeding, s/p peg (replaced peg 3 weeks ago [facility]) transfer from [facility] to [facility] earlier today for leaking PEJ tube and on/off lower abd pain over the last 2-3 days. In the ED pt underwent an CT of abd/pelvis was found to have a Abscess around the L. hip ill-defined multi septated fluid collection containing foci of air measuring appx 3.8 x 3.2 x3.9cm concerning for abscess, WBC of 20, normal LA. Pt was seen by both surgical and ortho team who recommenced no surgical intervention at this time. MICU consulted for hypotension, pt seen and examined at bedside noted to have a BP of 90/52 w/ MAP of 66 with otherwise stable VS. Pt reports having pain to the L. hip that is worsens w/ touch and movement radiating into the lower abdomen worst to the LLQ. Pt otherwise denies any other medical complains at this time, denies any fall or trauma.         Review of Systems:  Constitutional: No fever, chills, fatigue  Neuro: No headache, numbness, weakness  Resp: No cough, wheezing, shortness of breath  CVS: No chest pain, palpitations, leg swelling  GI: +abdominal pain, no nausea, no vomiting, no diarrhea   : No dysuria, frequency, incontinence  Skin: No itching, burning, rashes, or lesions   Msk: +L. hip pain and swelling  Psych: No depression, anxiety, mood swings    T(F): 98.2 (11-27-20 @ 17:31), Max: 101.6 (11-27-20 @ 12:46)  HR: 77 (11-27-20 @ 19:26) (67 - 81)  BP: 93/58 (11-27-20 @ 19:26) (88/51 - 105/62)  RR: 18 (11-27-20 @ 19:26) (16 - 20)  SpO2: 98% (11-27-20 @ 19:26) (94% - 100%)  Wt(kg): --        CAPILLARY BLOOD GLUCOSE          I&O's Summary      Physical Exam:   Gen: Comfortable in bed in NAD  Neuro: AAOx3  HEENT: PERRL  Resp: CTA b/l  CVS: +RRR  Abd: BSx4, soft, mild distention, mild LLQ and around the PEJ site tenderness   Ext: L. hip with swelling and ttp along the hip, no erythema/warmness noted, no crepitus, NVI, +dp pulses  Skin: warm/dry    Meds:  piperacillin/tazobactam IVPB.. IV Intermittent        montelukast Oral    acetaminophen    Suspension .. Enteral Tube PRN  DULoxetine Oral  FLUoxetine Oral  gabapentin Oral  methocarbamol Oral  traZODone Oral      aspirin  chewable Enteral Tube  heparin   Injectable SubCutaneous    lactulose Syrup Oral  magnesium hydroxide Suspension Oral PRN  pantoprazole  Injectable IV Push  senna Oral      ascorbic acid Oral  cholecalciferol Oral  lactated ringers. IV Continuous  multivitamin/minerals/iron Oral Solution (CENTRUM) Oral                                  8.5    20.00 )-----------( 543      ( 27 Nov 2020 13:21 )             27.9       11-27    133<L>  |  96  |  22  ----------------------------<  93  4.3   |  31  |  0.36<L>    Ca    8.1<L>      27 Nov 2020 13:21    TPro  6.4  /  Alb  1.6<L>  /  TBili  0.2  /  DBili  x   /  AST  13<L>  /  ALT  11<L>  /  AlkPhos  102  11-27    Lactate 0.7           11-27 @ 13:21      CARDIAC MARKERS ( 27 Nov 2020 13:21 )  <.015 ng/mL / x     / 16 U/L / x     / x          PT/INR - ( 27 Nov 2020 13:21 )   PT: 14.9 sec;   INR: 1.29 ratio         PTT - ( 27 Nov 2020 13:21 )  PTT:31.5 sec        Rapid RVP Result: NotDetec (11-27 @ 13:21)          Radiology:     < from: CT Abdomen and Pelvis w/ IV Cont (11.27.20 @ 15:52) >    EXAM:  CT ABDOMEN AND PELVIS IC                            *** ADDENDUM 11/27/2020  ***    Addendum: Heterogeneous thickening of the left hip, with an ill-defined multi septated fluid collection containing foci of air measuring approximately 3.8 x 3.2 x 3.9 cm, concerning for abscess with surrounding cellulitis.    *** END OF ADDENDUM 11/27/2020  ***      PROCEDURE DATE:  11/27/2020          INTERPRETATION:  CLINICAL INFORMATION: 65 years old. Female. Abdominal pain, acute, nonlocalized.    COMPARISON: None.    PROCEDURE:  CT of the Abdomen and Pelvis was performed with intravenous contrast.  Intravenous contrast: 90 ml Omnipaque 350. 10 ml discarded.  Oral contrast: None.  Sagittal and coronal reformats were performed.    FINDINGS:    LOWER CHEST: Emphysematous changes in the visualized lung bases with an air cyst in the left lower lobe base.    LIVER: Within normal limits.  BILE DUCTS: Normal caliber.  GALLBLADDER: Cholecystectomy.  SPLEEN: Within normal limits.  PANCREAS: Within normal limits.  ADRENALS: Within normal limits.  KIDNEYS/URETERS: Enhance symmetrically. No hydronephrosis.    BLADDER: Within normal limits.  REPRODUCTIVE ORGANS: Uterus appears unremarkable.    BOWEL: No bowel obstruction. Percutaneous gastrojejunostomyis noted. Rectosigmoid anastomosis is also noted. Appendix is not definitively visualized; however, there appear to be mild inflammatory changes in the right lower quadrant. Moderate stool throughout the colon and rectum.  PERITONEUM: No ascites.  VESSELS: Normal caliber abdominal aorta. Atherosclerosis. Appendix is not definitively visualized.  RETROPERITONEUM/LYMPH NODES: No lymphadenopathy.  ABDOMINAL WALL: Within normal limits.  BONES: Degenerative changes in the spine. Scoliosis.    IMPRESSION:    Appendix is not definitively visualized; however, there appear to be mild inflammatory changes in the right lower quadrant. Appendicitis cannot be excluded. Consider repeat examination with oral contrast. Moderate stool throughout the colon andrectum.    Findings discussed with Dr. LEES on 11/27/2020 4:20 PM with readback.        ***Please see the addendum at the top of this report. It may contain additional important information or changes.****        FERNANDO GREEN MD; Attending Radiologist  This document has been electronically signed. Nov 27 2020  4:26PM  Addend:FERNANDO GREEN MD; Attending Radiologist  This addendum was electronically signed on: Nov 27 2020  5:56PM.    < end of copied text >    < from: Xray Chest 1 View-PORTABLE IMMEDIATE (11.27.20 @ 13:59) >  EXAM:  XR CHEST PORTABLE IMMED 1V                            PROCEDURE DATE:  11/27/2020          INTERPRETATION:  DATE OF STUDY: 11/27/2020    PRIOR:None    CLINICAL INDICATION: Fever; abdominal pain.    TECHNIQUE: portable chest.    FINDINGS:  The study is limited by patient positioning.  The cardiomediastinal silhouette is within normal limits.  No bilateral focal infiltrates. No pleural effusion or pneumothorax.  There are degenerative changes of the thoracic spine.    IMPRESSION:  Negativefor acute pulmonary process.      KEYSHA GALEANO MD; Attending Radiologist  This document has been electronically signed. Nov 27 2020  2:28PM    < end of copied text >      CENTRAL LINE: N  GREWAL: N  A-LINE: N    GLOBAL ISSUE/BEST PRACTICE:  Analgesia: Y  Sedation: N  CAM-ICU: n/a  HOB elevation: Y  Stress ulcer prophylaxis: Y  VTE prophylaxis: Y  Glycemic control: Y  Nutrition: Y    CODE STATUS: FULL CODE    CRITICAL CARE TIME SPENT:   (Assessing presenting problems of acute illness, which pose high probability of life threatening deterioration or end organ damage/dysfunction, as well as medical decision making including initiating plan of care, reviewing data, reviewing radiologic exams, discussing with multidisciplinary team,  discussing goals of care with patient/family, and writing this note.  Non-inclusive of procedures performed)     HPI: 64 y/o F w/ pmh of recovered alcoholic, former smoker, copd, RA, chronic low back pain, hx of colon ca s/p sigmoid resection (2015), hx of open R. inguinal hernia repair w/ mesh (2010), hx of hiatal hernia repair (2019), zenker diverticulum surgery c/b vocal cord paralysis's requiring peg for feeding, s/p peg (replaced peg 3 weeks ago [facility]) transfer from [facility] [place] to [facility] earlier today for leaking PEJ tube and on/off lower abd pain over the last 2-3 days. In the ED pt underwent an CT of abd/pelvis was found to have a Abscess around the L. hip ill-defined multi septated fluid collection containing foci of air measuring appx 3.8 x 3.2 x3.9cm concerning for abscess, WBC of 20, normal LA. Pt was seen by both surgical and ortho team who recommenced no surgical intervention at this time. MICU consulted for hypotension, pt seen and examined at bedside noted to have a BP of 90/52 w/ MAP of 66 with otherwise stable VS. Pt reports having pain to the L. hip that is worsens w/ touch and movement radiating into the lower abdomen worst to the LLQ. Pt otherwise denies any other medical complains at this time, denies any fall or trauma.         Review of Systems:  Constitutional: No fever, chills, fatigue  Neuro: No headache, numbness, weakness  Resp: No cough, wheezing, shortness of breath  CVS: No chest pain, palpitations, leg swelling  GI: +abdominal pain, no nausea, no vomiting, no diarrhea   : No dysuria, frequency, incontinence  Skin: No itching, burning, rashes, or lesions   Msk: +L. hip pain and swelling  Psych: No depression, anxiety, mood swings    T(F): 98.2 (11-27-20 @ 17:31), Max: 101.6 (11-27-20 @ 12:46)  HR: 77 (11-27-20 @ 19:26) (67 - 81)  BP: 93/58 (11-27-20 @ 19:26) (88/51 - 105/62)  RR: 18 (11-27-20 @ 19:26) (16 - 20)  SpO2: 98% (11-27-20 @ 19:26) (94% - 100%)  Wt(kg): --        CAPILLARY BLOOD GLUCOSE          I&O's Summary      Physical Exam:   Gen: Comfortable in bed in NAD  Neuro: AAOx3  HEENT: PERRL  Resp: CTA b/l  CVS: +RRR  Abd: BSx4, soft, mild distention, mild LLQ and around the PEJ site tenderness   Ext: L. hip with swelling and ttp along the hip, no erythema/warmness noted, no crepitus, NVI, +dp pulses  Skin: warm/dry    Meds:  piperacillin/tazobactam IVPB.. IV Intermittent        montelukast Oral    acetaminophen    Suspension .. Enteral Tube PRN  DULoxetine Oral  FLUoxetine Oral  gabapentin Oral  methocarbamol Oral  traZODone Oral      aspirin  chewable Enteral Tube  heparin   Injectable SubCutaneous    lactulose Syrup Oral  magnesium hydroxide Suspension Oral PRN  pantoprazole  Injectable IV Push  senna Oral      ascorbic acid Oral  cholecalciferol Oral  lactated ringers. IV Continuous  multivitamin/minerals/iron Oral Solution (CENTRUM) Oral                                  8.5    20.00 )-----------( 543      ( 27 Nov 2020 13:21 )             27.9       11-27    133<L>  |  96  |  22  ----------------------------<  93  4.3   |  31  |  0.36<L>    Ca    8.1<L>      27 Nov 2020 13:21    TPro  6.4  /  Alb  1.6<L>  /  TBili  0.2  /  DBili  x   /  AST  13<L>  /  ALT  11<L>  /  AlkPhos  102  11-27    Lactate 0.7           11-27 @ 13:21      CARDIAC MARKERS ( 27 Nov 2020 13:21 )  <.015 ng/mL / x     / 16 U/L / x     / x          PT/INR - ( 27 Nov 2020 13:21 )   PT: 14.9 sec;   INR: 1.29 ratio         PTT - ( 27 Nov 2020 13:21 )  PTT:31.5 sec        Rapid RVP Result: NotDetec (11-27 @ 13:21)          Radiology:     < from: CT Abdomen and Pelvis w/ IV Cont (11.27.20 @ 15:52) >    EXAM:  CT ABDOMEN AND PELVIS IC                            *** ADDENDUM 11/27/2020  ***    Addendum: Heterogeneous thickening of the left hip, with an ill-defined multi septated fluid collection containing foci of air measuring approximately 3.8 x 3.2 x 3.9 cm, concerning for abscess with surrounding cellulitis.    *** END OF ADDENDUM 11/27/2020  ***      PROCEDURE DATE:  11/27/2020          INTERPRETATION:  CLINICAL INFORMATION: 65 years old. Female. Abdominal pain, acute, nonlocalized.    COMPARISON: None.    PROCEDURE:  CT of the Abdomen and Pelvis was performed with intravenous contrast.  Intravenous contrast: 90 ml Omnipaque 350. 10 ml discarded.  Oral contrast: None.  Sagittal and coronal reformats were performed.    FINDINGS:    LOWER CHEST: Emphysematous changes in the visualized lung bases with an air cyst in the left lower lobe base.    LIVER: Within normal limits.  BILE DUCTS: Normal caliber.  GALLBLADDER: Cholecystectomy.  SPLEEN: Within normal limits.  PANCREAS: Within normal limits.  ADRENALS: Within normal limits.  KIDNEYS/URETERS: Enhance symmetrically. No hydronephrosis.    BLADDER: Within normal limits.  REPRODUCTIVE ORGANS: Uterus appears unremarkable.    BOWEL: No bowel obstruction. Percutaneous gastrojejunostomyis noted. Rectosigmoid anastomosis is also noted. Appendix is not definitively visualized; however, there appear to be mild inflammatory changes in the right lower quadrant. Moderate stool throughout the colon and rectum.  PERITONEUM: No ascites.  VESSELS: Normal caliber abdominal aorta. Atherosclerosis. Appendix is not definitively visualized.  RETROPERITONEUM/LYMPH NODES: No lymphadenopathy.  ABDOMINAL WALL: Within normal limits.  BONES: Degenerative changes in the spine. Scoliosis.    IMPRESSION:    Appendix is not definitively visualized; however, there appear to be mild inflammatory changes in the right lower quadrant. Appendicitis cannot be excluded. Consider repeat examination with oral contrast. Moderate stool throughout the colon andrectum.    Findings discussed with Dr. LEES on 11/27/2020 4:20 PM with readback.        ***Please see the addendum at the top of this report. It may contain additional important information or changes.****        FERNANDO GREEN MD; Attending Radiologist  This document has been electronically signed. Nov 27 2020  4:26PM  Addend:FERNANDO GREEN MD; Attending Radiologist  This addendum was electronically signed on: Nov 27 2020  5:56PM.    < end of copied text >    < from: Xray Chest 1 View-PORTABLE IMMEDIATE (11.27.20 @ 13:59) >  EXAM:  XR CHEST PORTABLE IMMED 1V                            PROCEDURE DATE:  11/27/2020          INTERPRETATION:  DATE OF STUDY: 11/27/2020    PRIOR:None    CLINICAL INDICATION: Fever; abdominal pain.    TECHNIQUE: portable chest.    FINDINGS:  The study is limited by patient positioning.  The cardiomediastinal silhouette is within normal limits.  No bilateral focal infiltrates. No pleural effusion or pneumothorax.  There are degenerative changes of the thoracic spine.    IMPRESSION:  Negativefor acute pulmonary process.      KEYSHA GALEANO MD; Attending Radiologist  This document has been electronically signed. Nov 27 2020  2:28PM    < end of copied text >      CENTRAL LINE: N  GREWAL: N  A-LINE: N    GLOBAL ISSUE/BEST PRACTICE:  Analgesia: Y  Sedation: N  CAM-ICU: n/a  HOB elevation: Y  Stress ulcer prophylaxis: Y  VTE prophylaxis: Y  Glycemic control: Y  Nutrition: Y    CODE STATUS: FULL CODE       HPI: 64 y/o F w/ pmh of recovered alcoholic, former smoker, copd, RA, chronic low back pain (on methadone 60mg daily), hx of colon ca s/p sigmoid resection (2015), hx of open R. 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) transfer from [facility] to [facility] earlier today for leaking PEJ tube and on/off lower abd pain over the last 2-3 days. In the ED pt underwent an CT of abd/pelvis was found to have a Abscess around the L. hip ill-defined multi septated fluid collection containing foci of air measuring appx 3.8 x 3.2 x3.9cm concerning for abscess, WBC of 20, normal LA. Pt was seen by both surgical and ortho team who recommenced no surgical intervention at this time. MICU consulted for hypotension, pt seen and examined at bedside noted to have a BP of 90/52 w/ MAP of 66 with otherwise stable VS. Pt reports having pain to the L. hip that is worsens w/ touch and movement radiating into the lower abdomen worst to the LLQ. Pt otherwise denies any other medical complains at this time, denies any fall or trauma.         Review of Systems:  Constitutional: No fever, chills, fatigue  Neuro: No headache, numbness, weakness  Resp: No cough, wheezing, shortness of breath  CVS: No chest pain, palpitations, leg swelling  GI: +abdominal pain, no nausea, no vomiting, no diarrhea   : No dysuria, frequency, incontinence  Skin: No itching, burning, rashes, or lesions   Msk: +L. hip pain and swelling  Psych: No depression, anxiety, mood swings    T(F): 98.2 (11-27-20 @ 17:31), Max: 101.6 (11-27-20 @ 12:46)  HR: 77 (11-27-20 @ 19:26) (67 - 81)  BP: 93/58 (11-27-20 @ 19:26) (88/51 - 105/62)  RR: 18 (11-27-20 @ 19:26) (16 - 20)  SpO2: 98% (11-27-20 @ 19:26) (94% - 100%)  Wt(kg): --        CAPILLARY BLOOD GLUCOSE          I&O's Summary      Physical Exam:   Gen: Comfortable in bed in NAD  Neuro: AAOx3  HEENT: PERRL  Resp: CTA b/l  CVS: +RRR  Abd: BSx4, soft, mild distention, mild LLQ and around the PEJ site tenderness   Ext: L. hip with swelling and ttp along the hip, no erythema/warmness noted, no crepitus, NVI, +dp pulses  Skin: warm/dry    Meds:  piperacillin/tazobactam IVPB.. IV Intermittent        montelukast Oral    acetaminophen    Suspension .. Enteral Tube PRN  DULoxetine Oral  FLUoxetine Oral  gabapentin Oral  methocarbamol Oral  traZODone Oral      aspirin  chewable Enteral Tube  heparin   Injectable SubCutaneous    lactulose Syrup Oral  magnesium hydroxide Suspension Oral PRN  pantoprazole  Injectable IV Push  senna Oral      ascorbic acid Oral  cholecalciferol Oral  lactated ringers. IV Continuous  multivitamin/minerals/iron Oral Solution (CENTRUM) Oral                                  8.5    20.00 )-----------( 543      ( 27 Nov 2020 13:21 )             27.9       11-27    133<L>  |  96  |  22  ----------------------------<  93  4.3   |  31  |  0.36<L>    Ca    8.1<L>      27 Nov 2020 13:21    TPro  6.4  /  Alb  1.6<L>  /  TBili  0.2  /  DBili  x   /  AST  13<L>  /  ALT  11<L>  /  AlkPhos  102  11-27    Lactate 0.7           11-27 @ 13:21      CARDIAC MARKERS ( 27 Nov 2020 13:21 )  <.015 ng/mL / x     / 16 U/L / x     / x          PT/INR - ( 27 Nov 2020 13:21 )   PT: 14.9 sec;   INR: 1.29 ratio         PTT - ( 27 Nov 2020 13:21 )  PTT:31.5 sec        Rapid RVP Result: NotDetec (11-27 @ 13:21)          Radiology:     < from: CT Abdomen and Pelvis w/ IV Cont (11.27.20 @ 15:52) >    EXAM:  CT ABDOMEN AND PELVIS IC                            *** ADDENDUM 11/27/2020  ***    Addendum: Heterogeneous thickening of the left hip, with an ill-defined multi septated fluid collection containing foci of air measuring approximately 3.8 x 3.2 x 3.9 cm, concerning for abscess with surrounding cellulitis.    *** END OF ADDENDUM 11/27/2020  ***      PROCEDURE DATE:  11/27/2020          INTERPRETATION:  CLINICAL INFORMATION: 65 years old. Female. Abdominal pain, acute, nonlocalized.    COMPARISON: None.    PROCEDURE:  CT of the Abdomen and Pelvis was performed with intravenous contrast.  Intravenous contrast: 90 ml Omnipaque 350. 10 ml discarded.  Oral contrast: None.  Sagittal and coronal reformats were performed.    FINDINGS:    LOWER CHEST: Emphysematous changes in the visualized lung bases with an air cyst in the left lower lobe base.    LIVER: Within normal limits.  BILE DUCTS: Normal caliber.  GALLBLADDER: Cholecystectomy.  SPLEEN: Within normal limits.  PANCREAS: Within normal limits.  ADRENALS: Within normal limits.  KIDNEYS/URETERS: Enhance symmetrically. No hydronephrosis.    BLADDER: Within normal limits.  REPRODUCTIVE ORGANS: Uterus appears unremarkable.    BOWEL: No bowel obstruction. Percutaneous gastrojejunostomyis noted. Rectosigmoid anastomosis is also noted. Appendix is not definitively visualized; however, there appear to be mild inflammatory changes in the right lower quadrant. Moderate stool throughout the colon and rectum.  PERITONEUM: No ascites.  VESSELS: Normal caliber abdominal aorta. Atherosclerosis. Appendix is not definitively visualized.  RETROPERITONEUM/LYMPH NODES: No lymphadenopathy.  ABDOMINAL WALL: Within normal limits.  BONES: Degenerative changes in the spine. Scoliosis.    IMPRESSION:    Appendix is not definitively visualized; however, there appear to be mild inflammatory changes in the right lower quadrant. Appendicitis cannot be excluded. Consider repeat examination with oral contrast. Moderate stool throughout the colon andrectum.    Findings discussed with Dr. LEES on 11/27/2020 4:20 PM with readback.        ***Please see the addendum at the top of this report. It may contain additional important information or changes.****        FERNANDO GREEN MD; Attending Radiologist  This document has been electronically signed. Nov 27 2020  4:26PM  Addend:FERNANDO GREEN MD; Attending Radiologist  This addendum was electronically signed on: Nov 27 2020  5:56PM.    < end of copied text >    < from: Xray Chest 1 View-PORTABLE IMMEDIATE (11.27.20 @ 13:59) >  EXAM:  XR CHEST PORTABLE IMMED 1V                            PROCEDURE DATE:  11/27/2020          INTERPRETATION:  DATE OF STUDY: 11/27/2020    PRIOR:None    CLINICAL INDICATION: Fever; abdominal pain.    TECHNIQUE: portable chest.    FINDINGS:  The study is limited by patient positioning.  The cardiomediastinal silhouette is within normal limits.  No bilateral focal infiltrates. No pleural effusion or pneumothorax.  There are degenerative changes of the thoracic spine.    IMPRESSION:  Negativefor acute pulmonary process.      KEYSHA GALEANO MD; Attending Radiologist  This document has been electronically signed. Nov 27 2020  2:28PM    < end of copied text >      CENTRAL LINE: N  GREWAL: N  A-LINE: N    GLOBAL ISSUE/BEST PRACTICE:  Analgesia: Y  Sedation: N  CAM-ICU: n/a  HOB elevation: Y  Stress ulcer prophylaxis: Y  VTE prophylaxis: Y  Glycemic control: Y  Nutrition: Y    CODE STATUS: FULL CODE

## 2020-11-27 NOTE — H&P ADULT - PROBLEM SELECTOR PLAN 3
BP monitoring,continue current antihypertensive meds, low salt diet,followup with PMD in 1-2 weeks BP monitoring,continue current antihypertensive meds, low salt diet,followup with PMD in 1-2 weeks  Holding chem DVT ppx for possible operative intervention.

## 2020-11-27 NOTE — CONSULT NOTE ADULT - SUBJECTIVE AND OBJECTIVE BOX
GINA BECERRA    [facility] ED    Patient is a 65y old  Female who presents with a chief complaint of      Allergies    Levaquin (Unknown)    Intolerances        HPI:      PAST MEDICAL & SURGICAL HISTORY:  Candidal stomatitis    Malignant neoplasm of colon    Major depressive disorder    HTN (hypertension)    COPD (chronic obstructive pulmonary disease)        FAMILY HISTORY:        MEDICATIONS       Vital Signs Last 24 Hrs  T(C): 36.8 (27 Nov 2020 17:31), Max: 38.7 (27 Nov 2020 12:46)  T(F): 98.2 (27 Nov 2020 17:31), Max: 101.6 (27 Nov 2020 12:46)  HR: 71 (27 Nov 2020 18:44) (67 - 81)  BP: 93/58 (27 Nov 2020 18:44) (88/51 - 105/62)  BP(mean): --  RR: 18 (27 Nov 2020 18:44) (16 - 20)  SpO2: 97% (27 Nov 2020 18:44) (94% - 100%)            LABS:                        8.5    20.00 )-----------( 543      ( 27 Nov 2020 13:21 )             27.9     11-27    133<L>  |  96  |  22  ----------------------------<  93  4.3   |  31  |  0.36<L>    Ca    8.1<L>      27 Nov 2020 13:21    TPro  6.4  /  Alb  1.6<L>  /  TBili  0.2  /  DBili  x   /  AST  13<L>  /  ALT  11<L>  /  AlkPhos  102  11-27    PT/INR - ( 27 Nov 2020 13:21 )   PT: 14.9 sec;   INR: 1.29 ratio         PTT - ( 27 Nov 2020 13:21 )  PTT:31.5 sec          WBC:  WBC Count: 20.00 K/uL (11-27 @ 13:21)      MICROBIOLOGY:  RECENT CULTURES:        CARDIAC MARKERS ( 27 Nov 2020 13:21 )  <.015 ng/mL / x     / 16 U/L / x     / x            PT/INR - ( 27 Nov 2020 13:21 )   PT: 14.9 sec;   INR: 1.29 ratio         PTT - ( 27 Nov 2020 13:21 )  PTT:31.5 sec    Sodium:  Sodium, Serum: 133 mmol/L (11-27 @ 13:21)      0.36 mg/dL 11-27 @ 13:21      Hemoglobin:  Hemoglobin: 8.5 g/dL (11-27 @ 13:21)      Platelets: Platelet Count - Automated: 543 K/uL (11-27 @ 13:21)      LIVER FUNCTIONS - ( 27 Nov 2020 13:21 )  Alb: 1.6 g/dL / Pro: 6.4 g/dL / ALK PHOS: 102 U/L / ALT: 11 U/L / AST: 13 U/L / GGT: x                 RADIOLOGY & ADDITIONAL STUDIES:

## 2020-11-27 NOTE — H&P ADULT - HISTORY OF PRESENT ILLNESS
66 yo F p/w fever from SNF, abd pain x past ~ 1 day. Staff states ? backing up into g-tube. Pt co some abd pain and some gen malaise. Pos nausea. No diarrhea. no neck / back pain. no dysuria. no cp/sob/palp. no cough/. No known covid exposure. no agg/allev factors. No recent travel. no other acute co.  INTERPRETATION:  CLINICAL INFORMATION: 65 years old. Female. Abdominal pain, acute, nonlocalized.  	  	COMPARISON: None.  	  	PROCEDURE:  	CT of the Abdomen and Pelvis was performed with intravenous contrast.  	Intravenous contrast: 90 ml Omnipaque 350. 10 ml discarded.  	Oral contrast: None.  	Sagittal and coronal reformats were performed.  	  	FINDINGS:  	  	LOWER CHEST: Emphysematous changes in the visualizedlung bases with an air cyst in the left lower lobe base.  	  	LIVER: Within normal limits.  	BILE DUCTS: Normal caliber.  	GALLBLADDER: Cholecystectomy.  	SPLEEN: Within normal limits.  	PANCREAS: Within normal limits.  	ADRENALS: Within normal limits.  	KIDNEYS/URETERS: Enhance symmetrically. No hydronephrosis.  	  	BLADDER: Within normal limits.  	REPRODUCTIVE ORGANS: Uterus appears unremarkable.  	  	BOWEL: No bowel obstruction. Percutaneous gastrojejunostomy is noted. Rectosigmoid anastomosis is also noted. Appendix is not definitively visualized; however, there appear to be mild inflammatory changes in the right lower quadrant. Moderate stool throughout the colon and rectum.  	PERITONEUM: No ascites.  	VESSELS: Nor HPI: 66 y/o F w/ pmh of recovered alcoholic, former smoker, copd, RA, chronic low back pain (on methadone 60mg daily), hx of colon ca s/p sigmoid resection (2015), hx of open R. inguinal hernia repair w/ mesh (2010), hx of hiatal hernia repair (2019), zenker diverticulum surgery c/b vocal cord paralysis's requiring peg for feeding, s/p peg (replaced peg 3 weeks ago [facility]) transfer from [facility] to [facility] earlier today for leaking PEJ tube and on/off lower abd pain over the last 2-3 days. In the ED pt underwent an CT of abd/pelvis was found to have a Abscess around the L. hip ill-defined multi septated fluid collection containing foci of air measuring appx 3.8 x 3.2 x3.9cm concerning for abscess, WBC of 20, normal LA. Pt was seen by both surgical and ortho team who recommenced no surgical intervention at this time. MICU consulted for hypotension,

## 2020-11-27 NOTE — H&P ADULT - ASSESSMENT
< from: CT Abdomen and Pelvis w/ IV Cont (11.27.20 @ 15:52) >    EXAM:  CT ABDOMEN AND PELVIS IC                            *** ADDENDUM 11/27/2020  ***    Addendum: Heterogeneous thickening of the left hip, with an ill-defined multi septated fluid collection containing foci of air measuring approximately 3.8 x 3.2 x 3.9 cm, concerning for abscess with surrounding cellulitis.    *** END OF ADDENDUM 11/27/2020  ***      PROCEDURE DATE:  11/27/2020          INTERPRETATION:  CLINICAL INFORMATION: 65 years old. Female. Abdominal pain, acute, nonlocalized.    COMPARISON: None.    PROCEDURE:  CT of the Abdomen and Pelvis was performed with intravenous contrast.  Intravenous contrast: 90 ml Omnipaque 350. 10 ml discarded.  Oral contrast: None.  Sagittal and coronal reformats were performed.    FINDINGS:    LOWER CHEST: Emphysematous changes in the visualized lung bases with an air cyst in the left lower lobe base.    LIVER: Within normal limits.    < from: CT Abdomen and Pelvis w/ IV Cont (11.27.20 @ 15:52) >  LIVER: Within normal limits.  BILE DUCTS: Normal caliber.  GALLBLADDER: Cholecystectomy.  SPLEEN: Within normal limits.  PANCREAS: Within normal limits.  ADRENALS: Within normal limits.  KIDNEYS/URETERS: Enhance symmetrically. No hydronephrosis.    BLADDER: Within normal limits.  REPRODUCTIVE ORGANS: Uterus appears unremarkable.    BOWEL: No bowel obstruction. Percutaneous gastrojejunostomyis noted. Rectosigmoid anastomosis is also noted. Appendix is not definitively visualized; however, there appear to be mild inflammatory changes in the right lower quadrant. Moderate stool throughout the colon and rectum.  PERITONEUM: No ascites.  VESSELS: Normal caliber abdominal aorta. Atherosclerosis. Appendix is not definitively visualized.  RETROPERITONEUM/LYMPH NODES: No lymphadenopathy.  ABDOMINAL WALL: Within normal limits.  BONES: Degenerative changes in the spine. Scoliosis.    IMPRESSION:    Appendix is not definitively visualized; however, there appear to be mild inflammatory changes in the right lower quadrant. Appendicitis cannot be excluded. Consider repeat examination with oral contrast. Moderate stool throughout the colon andrectum.    Findings discussed with Dr. LEES on 11/27/2020 4:20 PM with readback.          < end of copied text >  < end of copied text >               HPI: 66 y/o F w/ pmh of recovered alcoholic, former smoker, copd, RA, chronic low back pain (on methadone 60mg daily), hx of colon ca s/p sigmoid resection (2015), hx of open R. inguinal hernia repair w/ mesh (2010), hx of hiatal hernia repair (2019), zenker diverticulum surgery c/b vocal cord paralysis's requiring peg for feeding, s/p peg (replaced peg 3 weeks ago [facility]) transfer from [facility] to [facility] earlier today for leaking PEJ tube and on/off lower abd pain over the last 2-3 days. In the ED pt underwent an CT of abd/pelvis was found to have a Abscess around the L. hip ill-defined multi septated fluid collection containing foci of air measuring appx 3.8 x 3.2 x3.9cm concerning for abscess, WBC of 20, normal LA. Pt was seen by both surgical and ortho team who recommenced no surgical intervention at this time. MICU consulted for hypotension,      < from: CT Abdomen and Pelvis w/ IV Cont (11.27.20 @ 15:52) >    EXAM:  CT ABDOMEN AND PELVIS IC                            *** ADDENDUM 11/27/2020  ***    Addendum: Heterogeneous thickening of the left hip, with an ill-defined multi septated fluid collection containing foci of air measuring approximately 3.8 x 3.2 x 3.9 cm, concerning for abscess with surrounding cellulitis.    *** END OF ADDENDUM 11/27/2020  ***      PROCEDURE DATE:  11/27/2020          INTERPRETATION:  CLINICAL INFORMATION: 65 years old. Female. Abdominal pain, acute, nonlocalized.    COMPARISON: None.    PROCEDURE:  CT of the Abdomen and Pelvis was performed with intravenous contrast.  Intravenous contrast: 90 ml Omnipaque 350. 10 ml discarded.  Oral contrast: None.  Sagittal and coronal reformats were performed.    FINDINGS:    LOWER CHEST: Emphysematous changes in the visualized lung bases with an air cyst in the left lower lobe base.    LIVER: Within normal limits.    < from: CT Abdomen and Pelvis w/ IV Cont (11.27.20 @ 15:52) >  LIVER: Within normal limits.  BILE DUCTS: Normal caliber.  GALLBLADDER: Cholecystectomy.  SPLEEN: Within normal limits.  PANCREAS: Within normal limits.  ADRENALS: Within normal limits.  KIDNEYS/URETERS: Enhance symmetrically. No hydronephrosis.    BLADDER: Within normal limits.  REPRODUCTIVE ORGANS: Uterus appears unremarkable.    BOWEL: No bowel obstruction. Percutaneous gastrojejunostomyis noted. Rectosigmoid anastomosis is also noted. Appendix is not definitively visualized; however, there appear to be mild inflammatory changes in the right lower quadrant. Moderate stool throughout the colon and rectum.  PERITONEUM: No ascites.  VESSELS: Normal caliber abdominal aorta. Atherosclerosis. Appendix is not definitively visualized.  RETROPERITONEUM/LYMPH NODES: No lymphadenopathy.  ABDOMINAL WALL: Within normal limits.  BONES: Degenerative changes in the spine. Scoliosis.    IMPRESSION:    Appendix is not definitively visualized; however, there appear to be mild inflammatory changes in the right lower quadrant. Appendicitis cannot be excluded. Consider repeat examination with oral contrast. Moderate stool throughout the colon andrectum.    Findings discussed with Dr. LEES on 11/27/2020 4:20 PM with readback.          < end of copied text >  < end of copied text >

## 2020-11-27 NOTE — H&P ADULT - PROBLEM SELECTOR PLAN 1
f/u with ct scan f/u with ct scan  Renal function stable, lytes stable, cont to monitor and trend and replete prn  L. hip abscess  IV vanco/zosyn, , covid19 neg, f/u on UA, UCX, blood CX, check procal, possible MRI in the AM to r/o joint involvement per ortho as MRI is not available at this time?, will check repeat labs  Discussed with [facility] Radiologist: b/l pyriformis enhancement on MR. L enhancement track laterally to and causes enhancement of the L hip capsule and synovium with no invasion of the joint. No effusion or signs of joint involvement. There is sacral edema w/ edema of sacral nerve roots. Rec MR L Spine w/wo contrast to rule out epidural abscess.

## 2020-11-27 NOTE — ED PROVIDER NOTE - OBJECTIVE STATEMENT
66 yo F p/w fever from SNF, abd pain x past ~ 1 day. Staff states ? backing up into g-tube. Pt co some abd pain and some gen malaise. Pos nausea. No diarrhea. no neck / back pain. no dysuria. no cp/sob/palp. no cough/. No known covid exposure. no agg/allev factors. No recent travel. no other acute co.

## 2020-11-27 NOTE — PATIENT PROFILE ADULT - PUBLIC BENEFITS
Principal Discharge DX:	ESRD (end stage renal disease) on dialysis  Goal:	Continuation of dialysis  Assessment and plan of treatment:	You were found to have electrolyte abnormalities causing you to have mental status and EKG changes on presentation. You were emergently dialyzed. Please continue with your dialysis regimen and follow up with Dr. Garcia as an outpatient.  Secondary Diagnosis:	Diabetes  Goal:	HbA1c < 7  Assessment and plan of treatment:	You have a history of diabetes for which you are on insulin at home. Please continue to take your insulin as prescribed. Please follow up with your primary care doctor for management of your diabetes. no

## 2020-11-27 NOTE — ED PROVIDER NOTE - GASTROINTESTINAL, MLM
Abdomen soft, pos diffuse mid abd tenderness, no guarding. g-tube in place, no obv stool in tube. No acute infxn around tube insertion site.

## 2020-11-27 NOTE — CONSULT NOTE ADULT - SUBJECTIVE AND OBJECTIVE BOX
66 y/o Female presents to  ED c/o left hip pain for the past few days. Patient also reports 2-3 days of LUQ pain around the PEJ site. Patient denies any other acute complaints. Pain in the hip is localized to the gluteal region and is 10/10. Reports it was was made worse with palpation. Reports its made better with rest. Patient has a fever of 101.6 F in the Ed. No other complaints.     PAST MEDICAL & SURGICAL HISTORY:  Candidal stomatitis    Malignant neoplasm of colon    Major depressive disorder    HTN (hypertension)    COPD (chronic obstructive pulmonary disease)      MEDICATIONS  (STANDING):  vancomycin  IVPB 1000 milliGRAM(s) IV Intermittent once    MEDICATIONS  (PRN):    Allergies    Levaquin (Unknown)    Intolerances        T(C): 36.8 (11-27-20 @ 17:31), Max: 38.7 (11-27-20 @ 12:46)  HR: 71 (11-27-20 @ 18:44) (67 - 81)  BP: 93/58 (11-27-20 @ 18:44) (88/51 - 105/62)  RR: 18 (11-27-20 @ 18:44) (16 - 20)  SpO2: 97% (11-27-20 @ 18:44) (94% - 100%)  Wt(kg): --    PE   L LE:  Skin intact, fullness over the left gluteal region, No ecchymosis/soft tissue swelling  Compartments soft; + TTP about gluteal regoin of the hip. No TTP to knee/leg/ankle/foot   ROM limited 2/2 pain   Unable to SLR; + Log Roll/Heel Strike  Motor intact GS/TA/FHL/EHL  SILT L2-S1  DP/PT pulses 2+    SECONDARY EXAM: Benign, Skin intact, SILT throughout, motor grossly intact throughout, no other orthopedic injuries at this time, compartments soft and compressible    General: PEg tube in place    SPINE: Skin intact, no bony tenderness or step-offs appreciated throughout cervical/thoracic/lumbar/sacral spine    B/L UE: Skin intact, no erythema, ecchymosis, edema, gross deformity, NTTP over the bony prominences of the shoulder/elbow/wrist/hand, painless passive/active ROM of the shoulder/elbow/wrist/hand, C5-T1 SILT, motor grossly intact throughout axillary/musculocutaenous/radial/median/ulnar nerves, + radial pulse    R LE: Skin intact, no erythema, ecchymosis, edema, gross deformity, NTTP over the bony prominences of the hip/knee/ankle/foot, painless passive/active ROM of the hip/knee/ankle/foot, L2-S1 SILT, motor grossly intact throughout hip flexors/quads/hams/TA/EHL/FHL/GSC, + DP/PT pulses, no pain with log roll, no pain on axial loading, compartments soft and compressible, calves nontender      Imaging:  CT A/P: left gluteal heterogeneous collection suspicious for abscess. No effusion noted, no tracking to the hip noted. D/w radiologist Dr. Meadows who confirmed that the collection is over the gluteal musculature wihtout clear involvement of the hip. Unable to fully exclude without further imaging.     65y Female with left gluteal absess  - At present likely infectious collection of fluid does not appear to involvement the joint. D/w Dr. Meadows from radiology that the colleciton is centered over the gluteal musculature but doesn't appear to involve the joint. If there is evidence of hip joint involvement, would I&D the joint, but at present there is no evidence as such  - Recommend MRI pelvis/L hip w/&w/o to r/o joint involvement  - Can consider joint aspiration to r/o hip involvement  - Medicine/ICU management as needed  - At present no orthopaedic surgical intervention warranted  - FU ESR/CRP  - FU Hip Xrays  -Ortho to follow    Case d/w attending on call Dr. Andujar, Dr. Joseph (ED), ICU PA and Dr. Ahumada (Gen Sx). 64 y/o Female presents to  ED c/o left hip pain for the past few days. Patient also reports 2-3 days of LUQ pain around the PEJ site. Patient denies any other acute complaints. Pain in the hip is localized to the gluteal region and is 10/10. Reports it was was made worse with palpation. Reports its made better with rest. Patient has a fever of 101.6 F in the Ed. No other complaints.     PAST MEDICAL & SURGICAL HISTORY:  Candidal stomatitis    Malignant neoplasm of colon    Major depressive disorder    HTN (hypertension)    COPD (chronic obstructive pulmonary disease)      MEDICATIONS  (STANDING):  vancomycin  IVPB 1000 milliGRAM(s) IV Intermittent once    MEDICATIONS  (PRN):    Allergies    Levaquin (Unknown)    Intolerances        T(C): 36.8 (11-27-20 @ 17:31), Max: 38.7 (11-27-20 @ 12:46)  HR: 71 (11-27-20 @ 18:44) (67 - 81)  BP: 93/58 (11-27-20 @ 18:44) (88/51 - 105/62)  RR: 18 (11-27-20 @ 18:44) (16 - 20)  SpO2: 97% (11-27-20 @ 18:44) (94% - 100%)  Wt(kg): --    PE   L LE:  Skin intact, fullness over the left gluteal region, No ecchymosis/soft tissue swelling  Compartments soft; + TTP about gluteal regoin of the hip. No TTP to knee/leg/ankle/foot   ROM limited 2/2 pain   Unable to SLR; + Log Roll/Heel Strike  Motor intact GS/TA/FHL/EHL  SILT L2-S1  DP/PT pulses 2+    SECONDARY EXAM: Benign, Skin intact, SILT throughout, motor grossly intact throughout, no other orthopedic injuries at this time, compartments soft and compressible    General: PEg tube in place    SPINE: Skin intact, no bony tenderness or step-offs appreciated throughout cervical/thoracic/lumbar/sacral spine    B/L UE: Skin intact, no erythema, ecchymosis, edema, gross deformity, NTTP over the bony prominences of the shoulder/elbow/wrist/hand, painless passive/active ROM of the shoulder/elbow/wrist/hand, C5-T1 SILT, motor grossly intact throughout axillary/musculocutaenous/radial/median/ulnar nerves, + radial pulse    R LE: Skin intact, no erythema, ecchymosis, edema, gross deformity, NTTP over the bony prominences of the hip/knee/ankle/foot, painless passive/active ROM of the hip/knee/ankle/foot, L2-S1 SILT, motor grossly intact throughout hip flexors/quads/hams/TA/EHL/FHL/GSC, + DP/PT pulses, no pain with log roll, no pain on axial loading, compartments soft and compressible, calves nontender      Imaging:  CT A/P: left gluteal heterogeneous collection suspicious for abscess. No effusion noted, no tracking to the hip noted. D/w radiologist Dr. Meadows who confirmed that the collection is over the gluteal musculature wihtout clear involvement of the hip. Unable to fully exclude without further imaging.     65y Female with left gluteal abscess  - At present likely infectious collection of fluid does not appear to involvement the joint. D/w Dr. Meadows from radiology that the colleciton is centered over the gluteal musculature but doesn't appear to involve the joint. If there is evidence of hip joint involvement, would I&D the joint, but at present there is no evidence as such  - Recommend MRI pelvis/L hip w/&w/o to r/o joint involvement  - Can consider joint aspiration to r/o hip involvement  - Medicine/ICU management as needed  - At present no orthopaedic surgical intervention warranted  - FU ESR/CRP  - FU Hip Xrays  - LRINEC score 5, pending CRP. Recommend close observation at this time  -Ortho to follow    Case d/w attending on call Dr. Andujar, Dr. Joseph (ED), ICU PA and Dr. Ahumada (Gen Sx).

## 2020-11-27 NOTE — ED ADULT NURSE NOTE - OBJECTIVE STATEMENT
a/ox4 patient BIBEMS from nursing home for fecal odor coming from feeding tube, nausea/vomiting, and abd discomfort. Patient has rectal temp of 101.6 at this time - meds admin per md orders. No cp/sob. Patient at nursing home for malnutrition and weight loss. Pending further lab and radiology reports at this time.

## 2020-11-27 NOTE — CONSULT NOTE ADULT - SUBJECTIVE AND OBJECTIVE BOX
Patient is a 65y old  Female who presents with a chief complaint of peg leaking   r/o appendicitis (27 Nov 2020 20:18)          Asked to see patient for ID Consult    PAST MEDICAL & SURGICAL HISTORY:  Candidal stomatitis    Malignant neoplasm of colon    Major depressive disorder    HTN (hypertension)    COPD (chronic obstructive pulmonary disease)        Allergies    Levaquin (Unknown)    Intolerances        REVIEW OF SYSTEMS:  All systems below were reviewed and are negative [  ]  HEENT:  ID:  Pulmonary:  Cardiac:  GI:  Renal:  Musculoskeletal:  All other systems above were reviewed and are negative   [  ]    HOME MEDICATIONS:    MEDICATIONS  (STANDING):  ascorbic acid 500 milliGRAM(s) Oral daily  aspirin  chewable 81 milliGRAM(s) Enteral Tube daily  cholecalciferol 1000 Unit(s) Oral daily  DULoxetine 30 milliGRAM(s) Oral daily  FLUoxetine 20 milliGRAM(s) Oral daily  gabapentin 600 milliGRAM(s) Oral three times a day  heparin   Injectable 5000 Unit(s) SubCutaneous every 12 hours  lactated ringers. 1000 milliLiter(s) (80 mL/Hr) IV Continuous <Continuous>  lactulose Syrup 10 Gram(s) Oral three times a day  methocarbamol 250 milliGRAM(s) Oral every 8 hours  montelukast 10 milliGRAM(s) Oral at bedtime  multivitamin/minerals/iron Oral Solution (CENTRUM) 15 milliLiter(s) Oral daily  pantoprazole  Injectable 40 milliGRAM(s) IV Push daily  piperacillin/tazobactam IVPB.. 3.375 Gram(s) IV Intermittent every 8 hours  senna 2 Tablet(s) Oral at bedtime  traZODone 100 milliGRAM(s) Oral at bedtime    MEDICATIONS  (PRN):  acetaminophen    Suspension .. 650 milliGRAM(s) Enteral Tube every 6 hours PRN Temp greater or equal to 38C (100.4F), Mild Pain (1 - 3)  magnesium hydroxide Suspension 15 milliLiter(s) Oral at bedtime PRN Constipation      Vital Signs Last 24 Hrs  T(C): 36.8 (27 Nov 2020 17:31), Max: 38.7 (27 Nov 2020 12:46)  T(F): 98.2 (27 Nov 2020 17:31), Max: 101.6 (27 Nov 2020 12:46)  HR: 77 (27 Nov 2020 19:26) (67 - 81)  BP: 93/58 (27 Nov 2020 19:26) (88/51 - 105/62)  BP(mean): --  RR: 18 (27 Nov 2020 19:26) (16 - 20)  SpO2: 98% (27 Nov 2020 19:26) (94% - 100%)    PHYSICAL EXAM:  HEENT:  Neck:  Lungs:  Heart:  Abdomen:  Genital/ Rectal:  Extremities:  Neurologic:  Vascular:    I&O's Summary      LABORATORY:                          8.5    20.00 )-----------( 543      ( 27 Nov 2020 13:21 )             27.9           11-27    133<L>  |  96  |  22  ----------------------------<  93  4.3   |  31  |  0.36<L>    Ca    8.1<L>      27 Nov 2020 13:21    TPro  6.4  /  Alb  1.6<L>  /  TBili  0.2  /  DBili  x   /  AST  13<L>  /  ALT  11<L>  /  AlkPhos  102  11-27      Rapid Respiratory Viral Panel Result        11-27 @ 13:21  Rapid RVP [facility]  Coronovirus --  Adenovirus --  Bordetella Pertussis --  Chlamydia Pneumonia --  Entero/Rhinovirus--  HKU1 Coronovirus --  HMPV Coronovirus --  Influenza A --  Influenza AH1 --  Influenza AH1 2009 --  Influenza AH3 --  Influenza B --  Mycoplasma Pneumoniae --  NL63 Coronovirus --  OC43 Coronovirus --  Parainfluenza 1 --  Parainfluenza 2 --  Parainfluenza 3 --  Parainfluenza 4 --  Resp Syncytial Virus --      LABORATORY:    CBC Full  -  ( 27 Nov 2020 13:21 )  WBC Count : 20.00 K/uL  RBC Count : 3.33 M/uL  Hemoglobin : 8.5 g/dL  Hematocrit : 27.9 %  Platelet Count - Automated : 543 K/uL  Mean Cell Volume : 83.8 fl  Mean Cell Hemoglobin : 25.5 pg  Mean Cell Hemoglobin Concentration : 30.5 gm/dL  Auto Neutrophil # : 17.04 K/uL  Auto Lymphocyte # : 1.38 K/uL  Auto Monocyte # : 1.27 K/uL  Auto Eosinophil # : 0.11 K/uL  Auto Basophil # : 0.03 K/uL  Auto Neutrophil % : 85.0 %  Auto Lymphocyte % : 6.9 %  Auto Monocyte % : 6.4 %  Auto Eosinophil % : 0.6 %  Auto Basophil % : 0.2 %          11-27    133<L>  |  96  |  22  ----------------------------<  93  4.3   |  31  |  0.36<L>    Ca    8.1<L>      27 Nov 2020 13:21    TPro  6.4  /  Alb  1.6<L>  /  TBili  0.2  /  DBili  x   /  AST  13<L>  /  ALT  11<L>  /  AlkPhos  102  11-27                                            Rapid Respiratory Viral Panel Result        11-27 @ 13:21  Rapid RVP [facility]  Coronovirus --  Adenovirus --  Bordetella Pertussis --  Chlamydia Pneumonia --  Entero/Rhinovirus--  HKU1 Coronovirus --  HMPV Coronovirus --  Influenza A --  Influenza AH1 --  Influenza AH1 2009 --  Influenza AH3 --  Influenza B --  Mycoplasma Pneumoniae --  NL63 Coronovirus --  OC43 Coronovirus --  Parainfluenza 1 --  Parainfluenza 2 --  Parainfluenza 3 --  Parainfluenza 4 --  Resp Syncytial Virus --           Patient is a 65y old  Female who presents with a chief complaint of peg leaking   r/o appendicitis (27 Nov 2020 20:18)     The patient is a 65 year old female with a history of former smoker, COPD, RA, chronic back pain, colon cancer with sigmoid resection (2015), open Right inguinal hernia repair with mesh (2010), hiatal hernia repair (2019) and PEG placement who was brought to the ED today from the nursing home for foul leakage from the PEG tube and site. The PEG reportedly was just replaced at [facility] 3 weeks ago. In the ED she had a fever of 101.6F with SBP in the 90s and high WBC of 20K. CT of abdomen and pelvis showed inflammatory change in the RLQ and a fluid collection with gas in the left hip about 3 x 3.8 cm. She was given IV Zosyn and Vancomycin in the ED. She has been seen by surgery.       PAST MEDICAL & SURGICAL HISTORY:  Candidal stomatitis    Malignant neoplasm of colon    Major depressive disorder    HTN (hypertension)    COPD (chronic obstructive pulmonary disease)        Allergies    Levaquin (Unknown)    Intolerances        REVIEW OF SYSTEMS:  No vomiting  No GI bleed or hematuria.     HOME MEDICATIONS:    MEDICATIONS  (STANDING):  ascorbic acid 500 milliGRAM(s) Oral daily  aspirin  chewable 81 milliGRAM(s) Enteral Tube daily  cholecalciferol 1000 Unit(s) Oral daily  DULoxetine 30 milliGRAM(s) Oral daily  FLUoxetine 20 milliGRAM(s) Oral daily  gabapentin 600 milliGRAM(s) Oral three times a day  heparin   Injectable 5000 Unit(s) SubCutaneous every 12 hours  lactated ringers. 1000 milliLiter(s) (80 mL/Hr) IV Continuous <Continuous>  lactulose Syrup 10 Gram(s) Oral three times a day  methocarbamol 250 milliGRAM(s) Oral every 8 hours  montelukast 10 milliGRAM(s) Oral at bedtime  multivitamin/minerals/iron Oral Solution (CENTRUM) 15 milliLiter(s) Oral daily  pantoprazole  Injectable 40 milliGRAM(s) IV Push daily  piperacillin/tazobactam IVPB.. 3.375 Gram(s) IV Intermittent every 8 hours  senna 2 Tablet(s) Oral at bedtime  traZODone 100 milliGRAM(s) Oral at bedtime    MEDICATIONS  (PRN):  acetaminophen    Suspension .. 650 milliGRAM(s) Enteral Tube every 6 hours PRN Temp greater or equal to 38C (100.4F), Mild Pain (1 - 3)  magnesium hydroxide Suspension 15 milliLiter(s) Oral at bedtime PRN Constipation      Vital Signs Last 24 Hrs  T(C): 36.8 (27 Nov 2020 17:31), Max: 38.7 (27 Nov 2020 12:46)  T(F): 98.2 (27 Nov 2020 17:31), Max: 101.6 (27 Nov 2020 12:46)  HR: 77 (27 Nov 2020 19:26) (67 - 81)  BP: 93/58 (27 Nov 2020 19:26) (88/51 - 105/62)  BP(mean): --  RR: 18 (27 Nov 2020 19:26) (16 - 20)  SpO2: 98% (27 Nov 2020 19:26) (94% - 100%)    PHYSICAL EXAM:  HEENT: NC/AT  Neck: Soft. no tenderness.   Lungs: Coarse BS bilaterally; no wheezing.   Heart: RRR; no murmurs.   Abdomen: Soft; PEG site with moderate erythema and swelling. Tender in all quadrants more pronounced i  RLQ and LLQ.   Genital/ Rectal: No goldman .   Extremities: Moderate swelling of left hip. Very tender to palpations.   Neurologic: Awake.     I&O's Summary      LABORATORY:                          8.5    20.00 )-----------( 543      ( 27 Nov 2020 13:21 )             27.9           11-27    133<L>  |  96  |  22  ----------------------------<  93  4.3   |  31  |  0.36<L>    Ca    8.1<L>      27 Nov 2020 13:21    TPro  6.4  /  Alb  1.6<L>  /  TBili  0.2  /  DBili  x   /  AST  13<L>  /  ALT  11<L>  /  AlkPhos  102  11-27      Rapid Respiratory Viral Panel Result        11-27 @ 13:21  Rapid RVP [facility]  Coronovirus --  Adenovirus --  Bordetella Pertussis --  Chlamydia Pneumonia --  Entero/Rhinovirus--  HKU1 Coronovirus --  HMPV Coronovirus --  Influenza A --  Influenza AH1 --  Influenza AH1 2009 --  Influenza AH3 --  Influenza B --  Mycoplasma Pneumoniae --  NL63 Coronovirus --  OC43 Coronovirus --  Parainfluenza 1 --  Parainfluenza 2 --  Parainfluenza 3 --  Parainfluenza 4 --  Resp Syncytial Virus --      LABORATORY:    CBC Full  -  ( 27 Nov 2020 13:21 )  WBC Count : 20.00 K/uL  RBC Count : 3.33 M/uL  Hemoglobin : 8.5 g/dL  Hematocrit : 27.9 %  Platelet Count - Automated : 543 K/uL  Mean Cell Volume : 83.8 fl  Mean Cell Hemoglobin : 25.5 pg  Mean Cell Hemoglobin Concentration : 30.5 gm/dL  Auto Neutrophil # : 17.04 K/uL  Auto Lymphocyte # : 1.38 K/uL  Auto Monocyte # : 1.27 K/uL  Auto Eosinophil # : 0.11 K/uL  Auto Basophil # : 0.03 K/uL  Auto Neutrophil % : 85.0 %  Auto Lymphocyte % : 6.9 %  Auto Monocyte % : 6.4 %  Auto Eosinophil % : 0.6 %  Auto Basophil % : 0.2 %          11-27    133<L>  |  96  |  22  ----------------------------<  93  4.3   |  31  |  0.36<L>    Ca    8.1<L>      27 Nov 2020 13:21    TPro  6.4  /  Alb  1.6<L>  /  TBili  0.2  /  DBili  x   /  AST  13<L>  /  ALT  11<L>  /  AlkPhos  102  11-27    Rapid Respiratory Viral Panel Result        11-27 @ 13:21  Rapid RVP [facility]  Coronovirus --  Adenovirus --  Bordetella Pertussis --  Chlamydia Pneumonia --  Entero/Rhinovirus--  HKU1 Coronovirus --  HMPV Coronovirus --  Influenza A --  Influenza AH1 --  Influenza AH1 2009 --  Influenza AH3 --  Influenza B --  Mycoplasma Pneumoniae --  NL63 Coronovirus --  OC43 Coronovirus --  Parainfluenza 1 --  Parainfluenza 2 --  Parainfluenza 3 --  Parainfluenza 4 --  Resp Syncytial Virus --    Assessment and Plan:    1. Cellulitis of PEG site.  2. Probable RLQ abscess.  3. Peritonitis.  4. Suspected left hip/left gluteal abscess.  5. Sepsis with hypotension and leukocytosis.  6. PEG malfunction.  7, Colon cancer with sigmoid resection.    . Agree with IV Zosyn.   . Add IV Vancomycin. Vanco troigh before 4th dose.  . Add IV Difkucan to provide empirical antifungal coverage.   . Blood cultures, UA and urine culture.  . GI evaluation and orthopedics evaluations.  . Surgery follow up.    Thank you      Patient is a 65y old  Female who presents with a chief complaint of peg leaking   r/o appendicitis (27 Nov 2020 20:18)     The patient is a 65 year old female with a history of former smoker, COPD, RA, chronic back pain, colon cancer with sigmoid resection (2015), open Right inguinal hernia repair with mesh (2010), hiatal hernia repair (2019) and PEG placement who was brought to the ED today from the nursing home for foul leakage from the PEG tube and site. The PEG reportedly was just replaced at [facility] 3 weeks ago. In the ED she had a fever of 101.6F with SBP in the 90s and high WBC of 20K. CT of abdomen and pelvis showed inflammatory change in the RLQ and a fluid collection with gas in the left hip about 3 x 3.8 cm. She was given IV Zosyn and Vancomycin in the ED. She has been seen by surgery.       PAST MEDICAL & SURGICAL HISTORY:  Candidal stomatitis    Malignant neoplasm of colon    Major depressive disorder    HTN (hypertension)    COPD (chronic obstructive pulmonary disease)        Allergies    Levaquin (Unknown)    Intolerances        REVIEW OF SYSTEMS:  No vomiting  No GI bleed or hematuria.     HOME MEDICATIONS:    MEDICATIONS  (STANDING):  ascorbic acid 500 milliGRAM(s) Oral daily  aspirin  chewable 81 milliGRAM(s) Enteral Tube daily  cholecalciferol 1000 Unit(s) Oral daily  DULoxetine 30 milliGRAM(s) Oral daily  FLUoxetine 20 milliGRAM(s) Oral daily  gabapentin 600 milliGRAM(s) Oral three times a day  heparin   Injectable 5000 Unit(s) SubCutaneous every 12 hours  lactated ringers. 1000 milliLiter(s) (80 mL/Hr) IV Continuous <Continuous>  lactulose Syrup 10 Gram(s) Oral three times a day  methocarbamol 250 milliGRAM(s) Oral every 8 hours  montelukast 10 milliGRAM(s) Oral at bedtime  multivitamin/minerals/iron Oral Solution (CENTRUM) 15 milliLiter(s) Oral daily  pantoprazole  Injectable 40 milliGRAM(s) IV Push daily  piperacillin/tazobactam IVPB.. 3.375 Gram(s) IV Intermittent every 8 hours  senna 2 Tablet(s) Oral at bedtime  traZODone 100 milliGRAM(s) Oral at bedtime    MEDICATIONS  (PRN):  acetaminophen    Suspension .. 650 milliGRAM(s) Enteral Tube every 6 hours PRN Temp greater or equal to 38C (100.4F), Mild Pain (1 - 3)  magnesium hydroxide Suspension 15 milliLiter(s) Oral at bedtime PRN Constipation      Vital Signs Last 24 Hrs  T(C): 36.8 (27 Nov 2020 17:31), Max: 38.7 (27 Nov 2020 12:46)  T(F): 98.2 (27 Nov 2020 17:31), Max: 101.6 (27 Nov 2020 12:46)  HR: 77 (27 Nov 2020 19:26) (67 - 81)  BP: 93/58 (27 Nov 2020 19:26) (88/51 - 105/62)  BP(mean): --  RR: 18 (27 Nov 2020 19:26) (16 - 20)  SpO2: 98% (27 Nov 2020 19:26) (94% - 100%)    PHYSICAL EXAM:  HEENT: NC/AT  Neck: Soft. no tenderness.   Lungs: Coarse BS bilaterally; no wheezing.   Heart: RRR; no murmurs.   Abdomen: Soft; PEG site with moderate erythema and swelling. Tender in all quadrants more pronounced i  RLQ and LLQ.   Genital/ Rectal: No goldman .   Extremities: Moderate swelling of left hip. Very tender to palpations.   Neurologic: Awake.     I&O's Summary      LABORATORY:                          8.5    20.00 )-----------( 543      ( 27 Nov 2020 13:21 )             27.9           11-27    133<L>  |  96  |  22  ----------------------------<  93  4.3   |  31  |  0.36<L>    Ca    8.1<L>      27 Nov 2020 13:21    TPro  6.4  /  Alb  1.6<L>  /  TBili  0.2  /  DBili  x   /  AST  13<L>  /  ALT  11<L>  /  AlkPhos  102  11-27      Rapid Respiratory Viral Panel Result        11-27 @ 13:21  Rapid RVP [facility]  Coronovirus --  Adenovirus --  Bordetella Pertussis --  Chlamydia Pneumonia --  Entero/Rhinovirus--  HKU1 Coronovirus --  HMPV Coronovirus --  Influenza A --  Influenza AH1 --  Influenza AH1 2009 --  Influenza AH3 --  Influenza B --  Mycoplasma Pneumoniae --  NL63 Coronovirus --  OC43 Coronovirus --  Parainfluenza 1 --  Parainfluenza 2 --  Parainfluenza 3 --  Parainfluenza 4 --  Resp Syncytial Virus --      LABORATORY:    CBC Full  -  ( 27 Nov 2020 13:21 )  WBC Count : 20.00 K/uL  RBC Count : 3.33 M/uL  Hemoglobin : 8.5 g/dL  Hematocrit : 27.9 %  Platelet Count - Automated : 543 K/uL  Mean Cell Volume : 83.8 fl  Mean Cell Hemoglobin : 25.5 pg  Mean Cell Hemoglobin Concentration : 30.5 gm/dL  Auto Neutrophil # : 17.04 K/uL  Auto Lymphocyte # : 1.38 K/uL  Auto Monocyte # : 1.27 K/uL  Auto Eosinophil # : 0.11 K/uL  Auto Basophil # : 0.03 K/uL  Auto Neutrophil % : 85.0 %  Auto Lymphocyte % : 6.9 %  Auto Monocyte % : 6.4 %  Auto Eosinophil % : 0.6 %  Auto Basophil % : 0.2 %          11-27    133<L>  |  96  |  22  ----------------------------<  93  4.3   |  31  |  0.36<L>    Ca    8.1<L>      27 Nov 2020 13:21    TPro  6.4  /  Alb  1.6<L>  /  TBili  0.2  /  DBili  x   /  AST  13<L>  /  ALT  11<L>  /  AlkPhos  102  11-27    Rapid Respiratory Viral Panel Result        11-27 @ 13:21  Rapid RVP [facility]  Coronovirus --  Adenovirus --  Bordetella Pertussis --  Chlamydia Pneumonia --  Entero/Rhinovirus--  HKU1 Coronovirus --  HMPV Coronovirus --  Influenza A --  Influenza AH1 --  Influenza AH1 2009 --  Influenza AH3 --  Influenza B --  Mycoplasma Pneumoniae --  NL63 Coronovirus --  OC43 Coronovirus --  Parainfluenza 1 --  Parainfluenza 2 --  Parainfluenza 3 --  Parainfluenza 4 --  Resp Syncytial Virus --    Assessment and Plan:    1. Cellulitis of PEG site.  2. Probable RLQ abscess.  3. Peritonitis.  4. Suspected left hip/left gluteal abscess.  5. Sepsis with hypotension and leukocytosis.  6. PEG malfunction.  7, Colon cancer with sigmoid resection.    . Agree with IV Zosyn 3.375 gm q8h  . Add IV Vancomycin 750 mg q12h. Vanco trough before 4th dose.  . Blood cultures, UA and urine culture.  . GI evaluation for PEG malfunction., Would get orthopedics evaluation for suspected left hip/gluteal abscess.   . If still has fevers in 24 hrs will add antifungal coverage..     Thank you for the courtesy of this consultation.

## 2020-11-27 NOTE — CONSULT NOTE ADULT - ASSESSMENT
65 year old female, recovered alcoholic, former smoker, COPD, RA, chronic back pain, hx colon cancer s/p sigmoid resection (2015, done in [place], unsure of surgeon), hx open Right inguinal hernia repair with mesh (2010), hx hiatal hernia repair (2019, in [place]), s/p PEJ (replaced PEG, 3 weeks ago, [facility]), brought in from [facility] for leaking PEJ tube. 65 year old female, recovered alcoholic, former smoker, COPD, RA, chronic back pain, hx colon cancer s/p sigmoid resection (2015, done in [place], unsure of surgeon), hx open Right inguinal hernia repair with mesh (2010), hx hiatal hernia repair (2019, in [place]), s/p PEJ (replaced PEG, 3 weeks ago, [facility]), brought in from [facility] for leaking PEJ tube, found to have RLQ inflammatory changes, as well as abnormal finding of free air in Left hip on CT scan.    - No immediate surgical intervention warranted at this time  - Will monitor intra-abdominal inflammatory findings, however patient without complaint in that regard at this time.  - Recommend admit to medicine  - GI consult for possible replacement of PEJ tube  - ID consult for antibiotic recommendations  - Ortho consult for evaluation of free air in Left hip on CT  - Should undergo fever workup including UA/urine culture  - Discussed with Dr. Ahumada

## 2020-11-27 NOTE — CONSULT NOTE ADULT - SUBJECTIVE AND OBJECTIVE BOX
HPI:  65 year old female, recovered alcoholic, former smoker, COPD, RA, chronic back pain, hx colon cancer s/p sigmoid resection (2015, done in [place], unsure of surgeon), hx open Right inguinal hernia repair with mesh (2010), hx hiatal hernia repair (2019, in [place]), s/p PEJ (replaced PEG, 3 weeks ago, [facility]), brought in from [facility] for leaking PEJ tube.  Patient reports 2-3 days of LUQ pain around PEJ site.  Patient reports similar episode 3 weeks ago, was brought to [facility], was transferred to [facility] to replace PEG with PEJ tube, sent back and discharged from [facility].  Last tube feed was yesterday.  Last BM was this AM, small, looser than usual, did not see color, wears diaper.  In ED, found to have fever of 101.6F, WBC 20, CT showing RLQ inflammatory changes, appendicitis not excluded, moderate stool.  Patient denies any fever, chest pain, nausea, vomiting, or RLQ pain.    PAST MEDICAL & SURGICAL HISTORY:  Candidal stomatitis  Malignant neoplasm of colon s/p sigmoid colon resection (2015)  Major depressive disorder  HTN (hypertension)  COPD (chronic obstructive pulmonary disease)  RA  s/p open Right inguinal hernia repair with mesh (2010)  s/p Hiatal hernia repair (2019)    REVIEW OF SYSTEMS:  CONSTITUTIONAL: +Fever.  EYES/ENT: No visual changes;  No vertigo or throat pain   NECK: No pain or stiffness  RESPIRATORY: +SOB (hx COPD). No cough, wheezing, hemoptysis.  CARDIOVASCULAR: No chest pain or palpitations  GASTROINTESTINAL: +LUQ pain.  No nausea, vomiting, or hematemesis; No diarrhea or constipation. No melena or hematochezia.  GENITOURINARY: No dysuria, frequency or hematuria  NEUROLOGICAL: No numbness or weakness  SKIN: No itching, burning, rashes, or lesions   All other review of systems is negative unless indicated above.    MEDICATIONS  (STANDING):  sodium chloride 0.9% Bolus 1000 milliLiter(s) IV Bolus once    Allergies  Levaquin (Rash)    SOCIAL HISTORY: Recovered alcoholic (history of 1 pint/vodka x 20 years, quit 20 years ago).  Former smoker (2 ppd x 40 years, quit 7 years ago)    Vital Signs Last 24 Hrs  T(C): 38.7 (27 Nov 2020 13:25), Max: 38.7 (27 Nov 2020 12:46)  T(F): 101.6 (27 Nov 2020 13:25), Max: 101.6 (27 Nov 2020 12:46)  HR: 70 (27 Nov 2020 16:47) (70 - 81)  BP: 95/54 (27 Nov 2020 16:47) (95/54 - 105/62)  RR: 18 (27 Nov 2020 16:47) (18 - 20)  SpO2: 100% (27 Nov 2020 16:47) (94% - 100%)    PHYSICAL EXAM  GENERAL:  Ill-appearing female lying in bed in NAD, on O2 via NC  CARDIO:  Regular rate and rhythm.  No murmur, gallop or rub appreciated.  RESPIRATORY:  Clear to auscultation bilaterally.  No wheezing, rales or rhonchi appreciated.  ABDOMEN:  Soft, nondistended, +mild TTP and erythema around PEJ site, +leaking gastric contents.  Faint BS appreciated on auscultation.  No rebound tenderness or guarding.  NEURO:  A&O x 3    LABS:                        8.5    20.00 )-----------( 543      ( 27 Nov 2020 13:21 )             27.9     11-27    133<L>  |  96  |  22  ----------------------------<  93  4.3   |  31  |  0.36<L>    Ca    8.1<L>      27 Nov 2020 13:21    TPro  6.4  /  Alb  1.6<L>  /  TBili  0.2  /  DBili  x   /  AST  13<L>  /  ALT  11<L>  /  AlkPhos  102  11-27    PT/INR - ( 27 Nov 2020 13:21 )   PT: 14.9 sec;   INR: 1.29 ratio  PTT - ( 27 Nov 2020 13:21 )  PTT:31.5 sec    RADIOLOGY & ADDITIONAL STUDIES:  < from: CT Abdomen and Pelvis w/ IV Cont (11.27.20 @ 15:52) >  IMPRESSION:  Appendix is not definitively visualized; however, there appear to be mild inflammatory changes in the right lower quadrant. Appendicitis cannot be excluded. Consider repeat examination with oral contrast. Moderate stool throughout the colon and rectum. HPI:  65 year old female, recovered alcoholic, former smoker, COPD, RA, chronic back pain, hx colon cancer s/p sigmoid resection (2015, done in [place], unsure of surgeon), hx open Right inguinal hernia repair with mesh (2010), hx hiatal hernia repair (2019, in [place]), s/p PEJ (replaced PEG, 3 weeks ago, [facility]), brought in from [facility] for leaking PEJ tube.  Patient reports 2-3 days of LUQ pain around PEJ site.  Patient reports similar episode 3 weeks ago, was brought to [facility], was transferred to [facility] to replace PEG with PEJ tube, sent back and discharged from [facility].  Last tube feed was yesterday.  Last BM was this AM, small, looser than usual, did not see color, wears diaper.  Patient also complaining of intermittent Left hip pain x 2-3 days.  Denies any trauma or injections to area.  In ED, found to have fever of 101.6F, WBC 20, CT showing RLQ inflammatory changes, and small area of free air within Left hip as per phone conversation between Dr. Ahumada and radiologist.  Patient denies any fever, chest pain, nausea, vomiting, or RLQ pain.    PAST MEDICAL & SURGICAL HISTORY:  Candidal stomatitis  Malignant neoplasm of colon s/p sigmoid colon resection (2015)  Major depressive disorder  HTN (hypertension)  COPD (chronic obstructive pulmonary disease)  RA  s/p open Right inguinal hernia repair with mesh (2010)  s/p Hiatal hernia repair (2019)    REVIEW OF SYSTEMS:  CONSTITUTIONAL: +Fever.  EYES/ENT: No visual changes;  No vertigo or throat pain   NECK: No pain or stiffness  RESPIRATORY: +SOB (hx COPD). No cough, wheezing, hemoptysis.  CARDIOVASCULAR: No chest pain or palpitations  GASTROINTESTINAL: +LUQ pain.  No nausea, vomiting, or hematemesis; No diarrhea or constipation. No melena or hematochezia.  GENITOURINARY: No dysuria, frequency or hematuria  NEUROLOGICAL: No numbness or weakness  SKIN: No itching, burning, rashes, or lesions   All other review of systems is negative unless indicated above.    MEDICATIONS  (STANDING):  sodium chloride 0.9% Bolus 1000 milliLiter(s) IV Bolus once    Allergies  Levaquin (Rash)    SOCIAL HISTORY: Recovered alcoholic (history of 1 pint/vodka x 20 years, quit 20 years ago).  Former smoker (2 ppd x 40 years, quit 7 years ago)    Vital Signs Last 24 Hrs  T(C): 38.7 (27 Nov 2020 13:25), Max: 38.7 (27 Nov 2020 12:46)  T(F): 101.6 (27 Nov 2020 13:25), Max: 101.6 (27 Nov 2020 12:46)  HR: 70 (27 Nov 2020 16:47) (70 - 81)  BP: 95/54 (27 Nov 2020 16:47) (95/54 - 105/62)  RR: 18 (27 Nov 2020 16:47) (18 - 20)  SpO2: 100% (27 Nov 2020 16:47) (94% - 100%)    PHYSICAL EXAM  GENERAL:  Ill-appearing female lying in bed in NAD, on O2 via NC  CARDIO:  Regular rate and rhythm.  No murmur, gallop or rub appreciated.  RESPIRATORY:  Clear to auscultation bilaterally.  No wheezing, rales or rhonchi appreciated.  ABDOMEN:  Soft, nondistended, +mild TTP and erythema around PEJ site, +leaking gastric contents.  Faint BS appreciated on auscultation.  No rebound tenderness or guarding.  NEURO:  A&O x 3    LABS:                        8.5    20.00 )-----------( 543      ( 27 Nov 2020 13:21 )             27.9     11-27    133<L>  |  96  |  22  ----------------------------<  93  4.3   |  31  |  0.36<L>    Ca    8.1<L>      27 Nov 2020 13:21    TPro  6.4  /  Alb  1.6<L>  /  TBili  0.2  /  DBili  x   /  AST  13<L>  /  ALT  11<L>  /  AlkPhos  102  11-27    PT/INR - ( 27 Nov 2020 13:21 )   PT: 14.9 sec;   INR: 1.29 ratio  PTT - ( 27 Nov 2020 13:21 )  PTT:31.5 sec    RADIOLOGY & ADDITIONAL STUDIES:  < from: CT Abdomen and Pelvis w/ IV Cont (11.27.20 @ 15:52) >  IMPRESSION:  Appendix is not definitively visualized; however, there appear to be mild inflammatory changes in the right lower quadrant. Appendicitis cannot be excluded. Consider repeat examination with oral contrast. Moderate stool throughout the colon and rectum. HPI:  65 year old female, recovered alcoholic, former smoker, COPD, RA, chronic back pain, hx colon cancer s/p sigmoid resection (2015, done in [place], unsure of surgeon), hx open Right inguinal hernia repair with mesh (2010), hx hiatal hernia repair (2019, in [place]), s/p PEJ (replaced PEG, 3 weeks ago, [facility]), brought in from [facility] for leaking PEJ tube.  Patient reports 2-3 days of LUQ pain around PEJ site.  Patient reports similar episode 3 weeks ago, was brought to [facility], was transferred to [facility] to replace PEG with PEJ tube, sent back and discharged from [facility].  Last tube feed was yesterday.  Last BM was this AM, small, looser than usual, did not see color, wears diaper.  Patient also complaining of intermittent Left hip pain x 2-3 days.  Denies any trauma or injections to area.  In ED, found to have fever of 101.6F, WBC 20, CT showing RLQ inflammatory changes, and small area of free air within Left hip as per phone conversation between Dr. Ahumada and radiologist.  Patient denies any fever, chest pain, nausea, vomiting, or RLQ pain.    PAST MEDICAL & SURGICAL HISTORY:  Candidal stomatitis  Malignant neoplasm of colon s/p sigmoid colon resection (2015)  Major depressive disorder  HTN (hypertension)  COPD (chronic obstructive pulmonary disease)  RA  s/p open Right inguinal hernia repair with mesh (2010)  s/p Hiatal hernia repair (2019)    REVIEW OF SYSTEMS:  CONSTITUTIONAL: +Fever.  EYES/ENT: No visual changes;  No vertigo or throat pain   NECK: No pain or stiffness  RESPIRATORY: +SOB (hx COPD). No cough, wheezing, hemoptysis.  CARDIOVASCULAR: No chest pain or palpitations  GASTROINTESTINAL: +LUQ pain.  No nausea, vomiting, or hematemesis; No diarrhea or constipation. No melena or hematochezia.  GENITOURINARY: No dysuria, frequency or hematuria  NEUROLOGICAL: No numbness or weakness  SKIN: No itching, burning, rashes, or lesions   All other review of systems is negative unless indicated above.    MEDICATIONS  (STANDING):  sodium chloride 0.9% Bolus 1000 milliLiter(s) IV Bolus once    Allergies  Levaquin (Rash)    SOCIAL HISTORY: Recovered alcoholic (history of 1 pint/vodka x 20 years, quit 20 years ago).  Former smoker (2 ppd x 40 years, quit 7 years ago)    Vital Signs Last 24 Hrs  T(C): 38.7 (27 Nov 2020 13:25), Max: 38.7 (27 Nov 2020 12:46)  T(F): 101.6 (27 Nov 2020 13:25), Max: 101.6 (27 Nov 2020 12:46)  HR: 70 (27 Nov 2020 16:47) (70 - 81)  BP: 95/54 (27 Nov 2020 16:47) (95/54 - 105/62)  RR: 18 (27 Nov 2020 16:47) (18 - 20)  SpO2: 100% (27 Nov 2020 16:47) (94% - 100%)    PHYSICAL EXAM  GENERAL:  Ill-appearing female lying in bed in NAD, on O2 via NC  CARDIO:  Regular rate and rhythm.  No murmur, gallop or rub appreciated.  RESPIRATORY:  Clear to auscultation bilaterally.  No wheezing, rales or rhonchi appreciated.  ABDOMEN:  Soft, nondistended, +mild TTP and erythema around PEJ site, +leaking gastric contents.  Faint BS appreciated on auscultation.  No rebound tenderness or guarding.  EXTREMITIES: Moderately TTP along Left hip  NEURO:  A&O x 3    LABS:                        8.5    20.00 )-----------( 543      ( 27 Nov 2020 13:21 )             27.9     11-27    133<L>  |  96  |  22  ----------------------------<  93  4.3   |  31  |  0.36<L>    Ca    8.1<L>      27 Nov 2020 13:21    TPro  6.4  /  Alb  1.6<L>  /  TBili  0.2  /  DBili  x   /  AST  13<L>  /  ALT  11<L>  /  AlkPhos  102  11-27    PT/INR - ( 27 Nov 2020 13:21 )   PT: 14.9 sec;   INR: 1.29 ratio  PTT - ( 27 Nov 2020 13:21 )  PTT:31.5 sec    RADIOLOGY & ADDITIONAL STUDIES:  < from: CT Abdomen and Pelvis w/ IV Cont (11.27.20 @ 15:52) >  IMPRESSION:  Appendix is not definitively visualized; however, there appear to be mild inflammatory changes in the right lower quadrant. Appendicitis cannot be excluded. Consider repeat examination with oral contrast. Moderate stool throughout the colon and rectum.

## 2020-11-27 NOTE — H&P ADULT - REASON FOR ADMISSION
the L. hip ill-defined multi septated fluid collection containing foci of air measuring appx 3.8 x 3.2 x3.9cm concerning for abscess, WBC of 20, normal LA. Pt was seen by both surgical and ortho team who recommenced no surgical intervention at this time. MICU consulted for hypotension,

## 2020-11-27 NOTE — CONSULT NOTE ADULT - SUBJECTIVE AND OBJECTIVE BOX
CARDIOLOGY CONSULT NOTE admitted with peg leaking  s/p peg   s/p colon sigmoid   resection for ca colon         HPI:      REVIEW OF SYSTEMS:    CONSTITUTIONAL: No fever, weight loss, or fatigue  EYES: No eye pain, visual disturbances, or discharge  ENMT:  No difficulty hearing, tinnitus, vertigo; No sinus or throat pain  NECK: No pain or stiffness  BREASTS: No pain, masses, or nipple discharge  RESPIRATORY: No cough, wheezing, chills or hemoptysis; No shortness of breath  CARDIOVASCULAR: No chest pain, palpitations, dizziness, or leg swelling  GASTROINTESTINAL: No abdominal or epigastric pain. No nausea, vomiting, or hematemesis; No diarrhea or constipation. No melena or hematochezia.  GENITOURINARY: No dysuria, frequency, hematuria, or incontinence  NEUROLOGICAL: No headaches, memory loss, loss of strength, numbness, or tremors  SKIN: No itching, burning, rashes, or lesions   LYMPH NODES: No enlarged glands  ENDOCRINE: No heat or cold intolerance; No hair loss  MUSCULOSKELETAL: No joint pain or swelling; No muscle, back, or extremity pain  PSYCHIATRIC: No depression, anxiety, mood swings, or difficulty sleeping  HEME/LYMPH: No easy bruising, or bleeding gums  ALLERGY AND IMMUNOLOGIC: No hives or eczema    MEDICATIONS  (STANDING):  ascorbic acid 500 milliGRAM(s) Oral daily  aspirin  chewable 81 milliGRAM(s) Enteral Tube daily  cholecalciferol 1000 Unit(s) Oral daily  diltiazem    Tablet 30 milliGRAM(s) Oral every 8 hours  DULoxetine 30 milliGRAM(s) Oral daily  FLUoxetine 20 milliGRAM(s) Oral daily  gabapentin 600 milliGRAM(s) Oral three times a day  heparin   Injectable 5000 Unit(s) SubCutaneous every 12 hours  lactated ringers. 1000 milliLiter(s) (80 mL/Hr) IV Continuous <Continuous>  lactulose Syrup 10 Gram(s) Oral three times a day  methocarbamol 250 milliGRAM(s) Oral every 8 hours  montelukast 10 milliGRAM(s) Oral at bedtime  multivitamin/minerals/iron Oral Solution (CENTRUM) 15 milliLiter(s) Oral daily  pantoprazole  Injectable 40 milliGRAM(s) IV Push daily  piperacillin/tazobactam IVPB.. 3.375 Gram(s) IV Intermittent every 8 hours  senna 2 Tablet(s) Oral at bedtime  traZODone 100 milliGRAM(s) Oral at bedtime    MEDICATIONS  (PRN):  acetaminophen    Suspension .. 650 milliGRAM(s) Enteral Tube every 6 hours PRN Temp greater or equal to 38C (100.4F), Mild Pain (1 - 3)  magnesium hydroxide Suspension 15 milliLiter(s) Oral at bedtime PRN Constipation      ALLERGIES: Levaquin (Unknown)      Social History:     FAMILY HISTORY:      PAST MEDICAL & SURGICAL HISTORY:  Candidal stomatitis    Malignant neoplasm of colon    Major depressive disorder    HTN (hypertension)    COPD (chronic obstructive pulmonary disease)          PHYSICAL EXAMINATION:  -----------------------------  T(C): 36.8 (11-27-20 @ 17:31), Max: 38.7 (11-27-20 @ 12:46)  HR: 77 (11-27-20 @ 19:26) (67 - 81)  BP: 93/58 (11-27-20 @ 19:26) (88/51 - 105/62)  RR: 18 (11-27-20 @ 19:26) (16 - 20)  SpO2: 98% (11-27-20 @ 19:26) (94% - 100%)  Wt(kg): --        Constitutional: well developed, normal appearance, well groomed, well nourished, no deformities and no acute distress.   Eyes: the conjunctiva exhibited no abnormalities and the eyelids demonstrated no xanthelasmas.   HEENT: normal oral mucosa, no oral pallor and no oral cyanosis.   Neck: normal jugular venous A waves present, normal jugular venous V waves present and no jugular venous morales A waves.   Pulmonary: no respiratory distress, normal respiratory rhythm and effort, no accessory muscle use and lungs were clear to auscultation bilaterally.   Cardiovascular: heart rate and rhythm were normal, normal S1 and S2 and no murmur, gallop, rub, heave or thrill are present.   Abdomen: marked tenderness rt lower abdomen, no hepato-splenomegaly and no abdominal mass palpated.   Musculoskeletal: the gait could not be assessed..   Extremities: no clubbing of the fingernails, no localized cyanosis, no petechial hemorrhages and no ischemic changes.   Skin: normal skin color and pigmentation, no rash, no venous stasis, no skin lesions, no skin ulcer and no xanthoma was observed.   Psychiatric: oriented to person, place, and time, the affect was normal, the mood was normal and not feeling anxious.     LABS:   --------  11-27    133<L>  |  96  |  22  ----------------------------<  93  4.3   |  31  |  0.36<L>    Ca    8.1<L>      27 Nov 2020 13:21    TPro  6.4  /  Alb  1.6<L>  /  TBili  0.2  /  DBili  x   /  AST  13<L>  /  ALT  11<L>  /  AlkPhos  102  11-27                         8.5    20.00 )-----------( 543      ( 27 Nov 2020 13:21 )             27.9     PT/INR - ( 27 Nov 2020 13:21 )   PT: 14.9 sec;   INR: 1.29 ratio         PTT - ( 27 Nov 2020 13:21 )  PTT:31.5 sec    11-27 @ 13:21 CPK total:--, CKMB --, Troponin I - <.015 ng/mL          RADIOLOGY:  -----------------        ECG:     ECHO

## 2020-11-27 NOTE — PROVIDER CONTACT NOTE (EICU) - ASSESSMENT
- agree with vancomycin, pipercillin/tazobactam, clindamycin (toxin reduction) for now  - fluids--> pressors if shock continues  - hyponatremia is concerning  - ortho and surgery aware of case; d/w ICU ACP if any clinical decline needs emergent source control... like in OR.  - unstable for MRI at this point in my opinion.

## 2020-11-27 NOTE — ED PROVIDER NOTE - PROGRESS NOTE DETAILS
Pt seen by Dr Ahumada - who [facility] (new radiologist), who states free air lat to L hip - reeval pt - no surgical scar, no known surg. pos tend at L lat him. [facility] - will see pt. CHRISTOPHER Mustafa states no gen surgical issues, will follow> BP borderline - ICU called - will call back Pt seen by Ortho - no involvement of joint - should dw Gen Surg. Dw Dr Ahumada - states medical rx at this time, NO OR indicated for now - IV abx, medical admission. Matthew Nair ([facility]), accepts admit to [facility]. MATTHEW ICU PA

## 2020-11-28 NOTE — PROGRESS NOTE ADULT - ASSESSMENT
s/p peg   s/p colon resection for ca   abdominal pain- abnormal ct abdomen r/o appendicitis  r/o hip absces  hypertension  further management as per surgery

## 2020-11-28 NOTE — CONSULT NOTE ADULT - SUBJECTIVE AND OBJECTIVE BOX
[facility]  Santosh Stapleton PA-C  [address] [place] [address]  [phone]      Chief Complaint:  Patient is a 65y old  Female who presents with a chief complaint of leaking peg (2020 10:19)      HPI:64 y/o F w/ pmh of recovered alcoholic, former smoker, copd, RA, chronic low back pain (on methadone 60mg daily), hx of colon ca s/p sigmoid resection (), hx of open R. 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 In the ED pt underwent an CT of abd/pelvis was found to have a Abscess around the L. hip ill-defined multi septated fluid collection containing foci of air measuring appx 3.8 x 3.2 x3.9cm concerning for abscess, WBC of 20, normal LA. Pt was seen by both surgical and ortho team who recommenced no surgical intervention at this time. MICU consulted for hypotension, pt seen and examined at bedside noted to have a BP of 90/52 w/ MAP of 66 with otherwise stable VS. Pt reports having pain to the L. hip that is worsens w/ touch and movement radiating into the lower abdomen worst to the LLQ. Pt otherwise denies any other medical complains at this time, denies any fall or trauma.       Allergies:  Levaquin (Unknown)      Medications:  acetaminophen    Suspension .. 650 milliGRAM(s) Enteral Tube every 6 hours PRN  ALBUTerol    90 MICROgram(s) HFA Inhaler 1 Puff(s) Inhalation every 4 hours  ALBUTerol    90 MICROgram(s) HFA Inhaler 2 Puff(s) Inhalation every 6 hours PRN  ascorbic acid 500 milliGRAM(s) Oral daily  aspirin  chewable 81 milliGRAM(s) Enteral Tube daily  cholecalciferol 1000 Unit(s) Oral daily  clindamycin IVPB      clindamycin IVPB 600 milliGRAM(s) IV Intermittent every 8 hours  DULoxetine 30 milliGRAM(s) Oral daily  FLUoxetine 20 milliGRAM(s) Oral daily  gabapentin 600 milliGRAM(s) Oral three times a day  lactated ringers. 1000 milliLiter(s) IV Continuous <Continuous>  lactulose Syrup 10 Gram(s) Oral three times a day  magnesium hydroxide Suspension 15 milliLiter(s) Oral at bedtime PRN  methocarbamol 250 milliGRAM(s) Oral every 8 hours  midodrine. 15 milliGRAM(s) Oral three times a day  montelukast 10 milliGRAM(s) Oral at bedtime  morphine  - Injectable 2 milliGRAM(s) IV Push every 4 hours PRN  multivitamin/minerals/iron Oral Solution (CENTRUM) 15 milliLiter(s) Oral daily  pantoprazole   Suspension 40 milliGRAM(s) Oral daily  piperacillin/tazobactam IVPB.. 3.375 Gram(s) IV Intermittent every 8 hours  senna 2 Tablet(s) Oral at bedtime  tiotropium 18 MICROgram(s) Capsule 1 Capsule(s) Inhalation daily  traZODone 100 milliGRAM(s) Oral at bedtime  vancomycin  IVPB 750 milliGRAM(s) IV Intermittent every 12 hours      PMHX/PSHX:  Candidal stomatitis    Malignant neoplasm of colon    Major depressive disorder    HTN (hypertension)    COPD (chronic obstructive pulmonary disease)        Family history:      Social History:     ROS:     unable to obtain      PHYSICAL EXAM:   Vital Signs:  Vital Signs Last 24 Hrs  T(C): 36.9 (2020 07:54), Max: 38.7 (2020 12:46)  T(F): 98.4 (2020 07:54), Max: 101.6 (2020 12:46)  HR: 81 (2020 10:00) (67 - 85)  BP: 99/58 (2020 10:00) (86/52 - 105/62)  BP(mean): 73 (2020 10:00) (64 - 75)  RR: 16 (2020 10:00) (14 - 26)  SpO2: 97% (2020 10:00) (94% - 100%)  Daily Height in cm: 152.4 (2020 20:43)    Daily     nad  confused  frail  non toxic  soft, nt  no edema      LABS:                        7.8    12.83 )-----------( 481      ( 2020 06:21 )             26.4         138  |  103  |  12  ----------------------------<  66<L>  4.2   |  29  |  0.21<L>    Ca    7.8<L>      2020 06:21  Phos  3.3       Mg     1.9         TPro  5.6<L>  /  Alb  1.3<L>  /  TBili  0.3  /  DBili  <.10  /  AST  12<L>  /  ALT  8<L>  /  AlkPhos  80      LIVER FUNCTIONS - ( 2020 06:21 )  Alb: 1.3 g/dL / Pro: 5.6 g/dL / ALK PHOS: 80 U/L / ALT: 8 U/L / AST: 12 U/L / GGT: x           PT/INR - ( 2020 06:21 )   PT: 13.7 sec;   INR: 1.18 ratio         PTT - ( 2020 13:21 )  PTT:31.5 sec  Urinalysis Basic - ( 2020 22:39 )    Color: Yellow / Appearance: Clear / S.010 / pH: x  Gluc: x / Ketone: Negative  / Bili: Negative / Urobili: Negative   Blood: x / Protein: 15 / Nitrite: Negative   Leuk Esterase: Trace / RBC: 0-2 /HPF / WBC 3-5   Sq Epi: x / Non Sq Epi: Few / Bacteria: Occasional      Amylase Serum--      Lipase serum31       Ammonia--      Imaging:

## 2020-11-28 NOTE — PROGRESS NOTE ADULT - SUBJECTIVE AND OBJECTIVE BOX
GINA MARIAM    V [id number] [id number]    Patient is a 65y old  Female who presents with a chief complaint of leaking peg (2020 10:39)       Allergies    Levaquin (Unknown)    Intolerances        HPI:  66 yo F p/w fever from SNF, abd pain x past ~ 1 day. Staff states ? backing up into g-tube. Pt co some abd pain and some gen malaise. Pos nausea. No diarrhea. no neck / back pain. no dysuria. no cp/sob/palp. no cough/. No known covid exposure. no agg/allev factors. No recent travel. no other acute co.  INTERPRETATION:  CLINICAL INFORMATION: 65 years old. Female. Abdominal pain, acute, nonlocalized.  	  	COMPARISON: None.  	  	PROCEDURE:  	CT of the Abdomen and Pelvis was performed with intravenous contrast.  	Intravenous contrast: 90 ml Omnipaque 350. 10 ml discarded.  	Oral contrast: None.  	Sagittal and coronal reformats were performed.  	  	FINDINGS:  	  	LOWER CHEST: Emphysematous changes in the visualizedlung bases with an air cyst in the left lower lobe base.  	  	LIVER: Within normal limits.  	BILE DUCTS: Normal caliber.  	GALLBLADDER: Cholecystectomy.  	SPLEEN: Within normal limits.  	PANCREAS: Within normal limits.  	ADRENALS: Within normal limits.  	KIDNEYS/URETERS: Enhance symmetrically. No hydronephrosis.  	  	BLADDER: Within normal limits.  	REPRODUCTIVE ORGANS: Uterus appears unremarkable.  	  	BOWEL: No bowel obstruction. Percutaneous gastrojejunostomy is noted. Rectosigmoid anastomosis is also noted. Appendix is not definitively visualized; however, there appear to be mild inflammatory changes in the right lower quadrant. Moderate stool throughout the colon and rectum.  	PERITONEUM: No ascites.  	VESSELS: Nor (2020 19:45)      PAST MEDICAL & SURGICAL HISTORY:  Candidal stomatitis    Malignant neoplasm of colon    Major depressive disorder    HTN (hypertension)    COPD (chronic obstructive pulmonary disease)        FAMILY HISTORY:        MEDICATIONS   acetaminophen    Suspension .. 650 milliGRAM(s) Enteral Tube every 6 hours PRN  ALBUTerol    90 MICROgram(s) HFA Inhaler 1 Puff(s) Inhalation every 4 hours  ALBUTerol    90 MICROgram(s) HFA Inhaler 2 Puff(s) Inhalation every 6 hours PRN  ascorbic acid 500 milliGRAM(s) Oral daily  aspirin  chewable 81 milliGRAM(s) Enteral Tube daily  cholecalciferol 1000 Unit(s) Oral daily  clindamycin IVPB 600 milliGRAM(s) IV Intermittent every 8 hours  clindamycin IVPB      DULoxetine 30 milliGRAM(s) Oral daily  FLUoxetine 20 milliGRAM(s) Oral daily  gabapentin 600 milliGRAM(s) Oral three times a day  lactated ringers. 1000 milliLiter(s) IV Continuous <Continuous>  lactulose Syrup 10 Gram(s) Oral three times a day  magnesium hydroxide Suspension 15 milliLiter(s) Oral at bedtime PRN  methocarbamol 250 milliGRAM(s) Oral every 8 hours  midodrine. 15 milliGRAM(s) Oral three times a day  montelukast 10 milliGRAM(s) Oral at bedtime  morphine  - Injectable 2 milliGRAM(s) IV Push every 4 hours PRN  multivitamin/minerals/iron Oral Solution (CENTRUM) 15 milliLiter(s) Oral daily  norepinephrine Infusion 0.05 MICROgram(s)/kG/Min IV Continuous <Continuous>  pantoprazole   Suspension 40 milliGRAM(s) Oral daily  piperacillin/tazobactam IVPB.. 3.375 Gram(s) IV Intermittent every 8 hours  senna 2 Tablet(s) Oral at bedtime  tiotropium 18 MICROgram(s) Capsule 1 Capsule(s) Inhalation daily  traZODone 100 milliGRAM(s) Oral at bedtime  vancomycin  IVPB 750 milliGRAM(s) IV Intermittent every 12 hours      Vital Signs Last 24 Hrs  T(C): 36.9 (2020 07:54), Max: 38.7 (2020 12:46)  T(F): 98.4 (2020 07:54), Max: 101.6 (2020 12:46)  HR: 81 (2020 10:00) (67 - 85)  BP: 99/58 (2020 10:00) (86/52 - 105/62)  BP(mean): 73 (2020 10:00) (64 - 75)  RR: 16 (2020 10:00) (14 - 26)  SpO2: 97% (2020 10:00) (94% - 100%)      20 @ 07:01  -  20 @ 07:00  --------------------------------------------------------  IN: 160 mL / OUT: 500 mL / NET: -340 mL            LABS:                        7.8    12.83 )-----------( 481      ( 2020 06:21 )             26.4         138  |  103  |  12  ----------------------------<  66<L>  4.2   |  29  |  0.21<L>    Ca    7.8<L>      2020 06:21  Phos  3.3       Mg     1.9         TPro  5.6<L>  /  Alb  1.3<L>  /  TBili  0.3  /  DBili  <.10  /  AST  12<L>  /  ALT  8<L>  /  AlkPhos  80      PT/INR - ( 2020 06:21 )   PT: 13.7 sec;   INR: 1.18 ratio         PTT - ( 2020 13:21 )  PTT:31.5 sec  Urinalysis Basic - ( 2020 22:39 )    Color: Yellow / Appearance: Clear / S.010 / pH: x  Gluc: x / Ketone: Negative  / Bili: Negative / Urobili: Negative   Blood: x / Protein: 15 / Nitrite: Negative   Leuk Esterase: Trace / RBC: 0-2 /HPF / WBC 3-5   Sq Epi: x / Non Sq Epi: Few / Bacteria: Occasional            WBC:  WBC Count: 12.83 K/uL ( @ 06:21)  WBC Count: 14.77 K/uL ( @ 02:27)  WBC Count: 16.26 K/uL ( @ 23:42)  WBC Count: 20.00 K/uL ( @ 13:21)      MICROBIOLOGY:  RECENT CULTURES:   .Blood Blood-Peripheral XXXX   Growth in anaerobic bottle: Gram Positive Cocci in Pairs and Chains   Growth in anaerobic bottle: Gram Positive Cocci in Pairs and Chains  "Due to technical problems, Proteus sp. will Not be reported as part of  the BCID panel until further notice" ***Blood Panel PCR results on this  specimen are available  approximately 3 hours after the Gram stain result.***  Gram stain, PCR, and/or culture results may not always  correspond due to difference in methodologies.  ************************************************************  This PCR assay was performed using [employer].  The following targets are tested for: Enterococcus,  vancomycin resistant enterococci, Listeria monocytogenes,  coagulase negative staphylococci, S. aureus,  methicillin resistant S. aureus, Streptococcus agalactiae  (Group B), S. pneumoniae, S. pyogenes (Group A),  Acinetobacter baumannii, Enterobacter cloacae, E. coli,  Klebsiella oxytoca, K. pneumoniae, Proteus sp.,  Serratia marcescens, Haemophilus influenzae,  Neisseria meningitidis, Pseudomonas aeruginosa, Candida  albicans, C. glabrata, C krusei, C parapsilosis,  C. tropicalis and the KPC resistance gene.          CARDIAC MARKERS ( 2020 23:42 )  x     / x     / 23 U/L / x     / x      CARDIAC MARKERS ( 2020 13: )  <.015 ng/mL / x     / 16 U/L / x     / x            PT/INR - ( 2020 06:21 )   PT: 13.7 sec;   INR: 1.18 ratio         PTT - ( 2020 13: )  PTT:31.5 sec    Sodium:  Sodium, Serum: 138 mmol/L ( @ 06:21)  Sodium, Serum: 138 mmol/L ( @ 23:42)  Sodium, Serum: 133 mmol/L (:21)      0.21 mg/dL :21  <0.20 mg/dL  @ 23:42  0.36 mg/dL :21      Hemoglobin:  Hemoglobin: 7.8 g/dL ( 06:21)  Hemoglobin: 7.4 g/dL ( @ :27)  Hemoglobin: 6.1 g/dL ( @ 23:42)  Hemoglobin: 8.5 g/dL ( 13:21)      Platelets: Platelet Count - Automated: 481 K/uL ( @ 06:21)  Platelet Count - Automated: 434 K/uL ( @ :27)  Platelet Count - Automated: 517 K/uL ( @ 23:42)  Platelet Count - Automated: 543 K/uL ( @ 13:21)      LIVER FUNCTIONS - ( 2020 06:21 )  Alb: 1.3 g/dL / Pro: 5.6 g/dL / ALK PHOS: 80 U/L / ALT: 8 U/L / AST: 12 U/L / GGT: x             Urinalysis Basic - ( 2020 22:39 )    Color: Yellow / Appearance: Clear / S.010 / pH: x  Gluc: x / Ketone: Negative  / Bili: Negative / Urobili: Negative   Blood: x / Protein: 15 / Nitrite: Negative   Leuk Esterase: Trace / RBC: 0-2 /HPF / WBC 3-5   Sq Epi: x / Non Sq Epi: Few / Bacteria: Occasional        RADIOLOGY & ADDITIONAL STUDIES:

## 2020-11-28 NOTE — CHART NOTE - NSCHARTNOTEFT_GEN_A_CORE
MRI results reviewed with Dr. Andujar. Patient has a large gluteal muscle abscess. No current signs of septic arthritis. No further orthopaedic intervention at this time for the hip.    Plan  Pain control  PT/OT  Ok to resume DVT ppx  Can feed patient  Spine consult for possible OM  Gluteal abscess management per ID, Gen Sx, ICU  No acute orthopaedic surgical intervention at this time.  Outpatient follow up with attending on call, Dr. Andujar in 1-2 weeks following discharge. Please call office for an appointment.  Ortho stable for discharge. Please reconsult as necessary MRI results reviewed with Dr. Andujar. Patient has a large gluteal muscle abscess. No current signs of septic arthritis. No further orthopaedic intervention at this time for the hip.    Plan  Pain control  PT/OT  Ok to resume DVT ppx  Can feed patient  MRI L/T/C spine if concerned regarding sacral OM.   Gluteal abscess management per ID, Gen Sx, ICU  No acute orthopaedic surgical intervention at this time.  Outpatient follow up with attending on call, Dr. Andujar in 1-2 weeks following discharge. Please call office for an appointment.  Ortho stable for discharge. Please reconsult as necessary

## 2020-11-28 NOTE — DIETITIAN INITIAL EVALUATION ADULT. - PHYSCIAL ASSESSMENT
underweight Limited visual physical exam reveals moderate-severe muscle wasting to temples, shoulders, clavicles, +fat loss to buccal pads.

## 2020-11-28 NOTE — CONSULT NOTE ADULT - ASSESSMENT
leaking gj tube    CT reviewed, no collection noted  G tube bumper tightened  ok to use g port for meds/flush  feeds ONLY in j port  will follow

## 2020-11-28 NOTE — PROGRESS NOTE ADULT - ATTENDING COMMENTS
64 yo woman with Hx past EtOH abuse, COPD, RA, chronic pain on methadone, Hx colon cancer s/p sigmoid resection 2015 presented with a leaking PEJ tube and found to have severe sepsis from a left buttock abscess and myositis, causing hypotension.  Now found to have enterococcus bacteremia.      --alert and mentating well  continue home regimen of cymbalta, fluoxetine, trazadone  chronic pain, continue methadone, robaxin  prn morphine for acute pain from abscess  --hypotension, no need for vasopressors at this point  add midodrine   --stable respiratory status  COPD, continue bronchodilators  --normal renal function  --NPO for now  currently PEJ working for medications without difficulty  if leaking when TF restarted then will have GI evaluate  --left piriformis abscess and myositis resulting in enterococcus bacteremia  MRI hip joint shows no evidence of septic arthritis, per ortho no intervention at this time  there is edema of S1-S3 neural foramina so will check lumbar MRI to eval for evidence of epidural abscess  per surgery, no intervention at this point  continue broad spectrum coverage with vanc, zosyn, clinda  recheck BCx  --DVT ppx

## 2020-11-28 NOTE — DIETITIAN INITIAL EVALUATION ADULT. - ENTERAL
Vital 1.5 @ 20cc/hr x 20 hrs and advance by 10cc q8h until goal rate of 50cc/hr x 20 hrs achieved and tolerated.  (1500 kenan, 67 gm protein)

## 2020-11-28 NOTE — PROGRESS NOTE ADULT - ASSESSMENT
66 yo with L buttock abscess as per CT.  No superficial components to abscess     cont abx     will d/w IR on 11/30 for possible drainage    poss repeat imaging      needs local wound care to abdomen for leaking pej. Possible GI consult

## 2020-11-28 NOTE — PROGRESS NOTE ADULT - SUBJECTIVE AND OBJECTIVE BOX
pt seen  c/o soreness of abdomen and L hip  ICU Vital Signs Last 24 Hrs  T(C): 36.9 (28 Nov 2020 07:54), Max: 38.7 (27 Nov 2020 12:46)  T(F): 98.4 (28 Nov 2020 07:54), Max: 101.6 (27 Nov 2020 12:46)  HR: 85 (28 Nov 2020 07:00) (67 - 85)  BP: 91/56 (28 Nov 2020 07:00) (86/52 - 105/62)  BP(mean): 68 (28 Nov 2020 07:00) (64 - 75)  ABP: --  ABP(mean): --  RR: 20 (28 Nov 2020 07:00) (14 - 26)  SpO2: 95% (28 Nov 2020 07:00) (94% - 100%)  gen-NAD  resp-clear  abd-soft ND, erythema per-PEJ                        7.8    12.83 )-----------( 481      ( 28 Nov 2020 06:21 )             26.4   11-28    138  |  103  |  12  ----------------------------<  66<L>  4.2   |  29  |  0.21<L>    Ca    7.8<L>      28 Nov 2020 06:21  Phos  3.3     11-28  Mg     1.9     11-28    TPro  5.6<L>  /  Alb  1.3<L>  /  TBili  0.3  /  DBili  <.10  /  AST  12<L>  /  ALT  8<L>  /  AlkPhos  80  11-28

## 2020-11-28 NOTE — PROGRESS NOTE ADULT - SUBJECTIVE AND OBJECTIVE BOX
Patient is a 65y Female with a known history of : admitted with leaking peg  vague abdominal pain   left hip pain  HTN (hypertension) [I10]    Abscess of hip [L02.419]    Abdominal pain, unspecified abdominal location [R10.9]      HPI:  66 yo F p/w fever from SNF, abd pain x past ~ 1 day. Staff states ? backing up into g-tube. Pt co some abd pain and some gen malaise. Pos nausea. No diarrhea. no neck / back pain. no dysuria. no cp/sob/palp. no cough/. No known covid exposure. no agg/allev factors. No recent travel. no other acute co.  INTERPRETATION:  CLINICAL INFORMATION: 65 years old. Female. Abdominal pain, acute, nonlocalized.  	  	COMPARISON: None.  	  	PROCEDURE:  	CT of the Abdomen and Pelvis was performed with intravenous contrast.  	Intravenous contrast: 90 ml Omnipaque 350. 10 ml discarded.  	Oral contrast: None.  	Sagittal and coronal reformats were performed.  	  	FINDINGS:  	  	LOWER CHEST: Emphysematous changes in the visualizedlung bases with an air cyst in the left lower lobe base.  	  	LIVER: Within normal limits.  	BILE DUCTS: Normal caliber.  	GALLBLADDER: Cholecystectomy.  	SPLEEN: Within normal limits.  	PANCREAS: Within normal limits.  	ADRENALS: Within normal limits.  	KIDNEYS/URETERS: Enhance symmetrically. No hydronephrosis.  	  	BLADDER: Within normal limits.  	REPRODUCTIVE ORGANS: Uterus appears unremarkable.  	  	BOWEL: No bowel obstruction. Percutaneous gastrojejunostomy is noted. Rectosigmoid anastomosis is also noted. Appendix is not definitively visualized; however, there appear to be mild inflammatory changes in the right lower quadrant. Moderate stool throughout the colon and rectum.  	PERITONEUM: No ascites.  	VESSELS: Nor (27 Nov 2020 19:45)      REVIEW OF SYSTEMS:    CONSTITUTIONAL: No fever, weight loss, or fatigue  EYES: No eye pain, visual disturbances, or discharge  ENMT:  No difficulty hearing, tinnitus, vertigo; No sinus or throat pain  NECK: No pain or stiffness  BREASTS: No pain, masses, or nipple discharge  RESPIRATORY: No cough, wheezing, chills or hemoptysis; No shortness of breath  CARDIOVASCULAR: No chest pain, palpitations, dizziness, or leg swelling  GASTROINTESTINAL: No abdominal or epigastric pain. No nausea, vomiting, or hematemesis; No diarrhea or constipation. No melena or hematochezia.  GENITOURINARY: No dysuria, frequency, hematuria, or incontinence  NEUROLOGICAL: No headaches, memory loss, loss of strength, numbness, or tremors  SKIN: No itching, burning, rashes, or lesions   LYMPH NODES: No enlarged glands  ENDOCRINE: No heat or cold intolerance; No hair loss  MUSCULOSKELETAL: No joint pain or swelling; No muscle, back, or extremity pain  PSYCHIATRIC: No depression, anxiety, mood swings, or difficulty sleeping  HEME/LYMPH: No easy bruising, or bleeding gums  ALLERGY AND IMMUNOLOGIC: No hives or eczema    MEDICATIONS  (STANDING):  ALBUTerol    90 MICROgram(s) HFA Inhaler 1 Puff(s) Inhalation every 4 hours  ALPRAZolam 0.25 milliGRAM(s) Oral once  ascorbic acid 500 milliGRAM(s) Oral daily  aspirin  chewable 81 milliGRAM(s) Enteral Tube daily  cholecalciferol 1000 Unit(s) Oral daily  clindamycin IVPB      clindamycin IVPB 600 milliGRAM(s) IV Intermittent every 8 hours  DULoxetine 30 milliGRAM(s) Oral daily  FLUoxetine 20 milliGRAM(s) Oral daily  gabapentin 600 milliGRAM(s) Oral three times a day  lactated ringers. 1000 milliLiter(s) (80 mL/Hr) IV Continuous <Continuous>  lactulose Syrup 10 Gram(s) Oral three times a day  methocarbamol 250 milliGRAM(s) Oral every 8 hours  midodrine. 15 milliGRAM(s) Oral three times a day  montelukast 10 milliGRAM(s) Oral at bedtime  multivitamin/minerals/iron Oral Solution (CENTRUM) 15 milliLiter(s) Oral daily  pantoprazole   Suspension 40 milliGRAM(s) Oral daily  piperacillin/tazobactam IVPB.. 3.375 Gram(s) IV Intermittent every 8 hours  senna 2 Tablet(s) Oral at bedtime  tiotropium 18 MICROgram(s) Capsule 1 Capsule(s) Inhalation daily  traZODone 100 milliGRAM(s) Oral at bedtime  vancomycin  IVPB 750 milliGRAM(s) IV Intermittent every 12 hours    MEDICATIONS  (PRN):  acetaminophen    Suspension .. 650 milliGRAM(s) Enteral Tube every 6 hours PRN Temp greater or equal to 38C (100.4F), Mild Pain (1 - 3)  ALBUTerol    90 MICROgram(s) HFA Inhaler 2 Puff(s) Inhalation every 6 hours PRN Shortness of Breath and/or Wheezing  magnesium hydroxide Suspension 15 milliLiter(s) Oral at bedtime PRN Constipation  morphine  - Injectable 2 milliGRAM(s) IV Push every 4 hours PRN Moderate Pain (4 - 6)      ALLERGIES: Levaquin (Unknown)      FAMILY HISTORY:      PHYSICAL EXAMINATION:  -----------------------------  T(C): 36.9 (11-28-20 @ 07:54), Max: 38.7 (11-27-20 @ 12:46)  HR: 81 (11-28-20 @ 10:00) (67 - 85)  BP: 99/58 (11-28-20 @ 10:00) (86/52 - 105/62)  RR: 16 (11-28-20 @ 10:00) (14 - 26)  SpO2: 97% (11-28-20 @ 10:00) (94% - 100%)  Wt(kg): --    11-27 @ 07:01  -  11-28 @ 07:00  --------------------------------------------------------  IN:    Lactated Ringers: 160 mL  Total IN: 160 mL    OUT:    Voided (mL): 500 mL  Total OUT: 500 mL    Total NET: -340 mL        Height (cm): 152.4 (11-27 @ 20:43)  Weight (kg): 44.4 (11-27 @ 20:43)  BMI (kg/m2): 19.1 (11-27 @ 20:43)  BSA (m2): 1.38 (11-27 @ 20:43)    VITALS  T(C): 36.9 (11-28-20 @ 07:54), Max: 38.7 (11-27-20 @ 12:46)  HR: 81 (11-28-20 @ 10:00) (67 - 85)  BP: 99/58 (11-28-20 @ 10:00) (86/52 - 105/62)  RR: 16 (11-28-20 @ 10:00) (14 - 26)  SpO2: 97% (11-28-20 @ 10:00) (94% - 100%)    Constitutional: well developed, normal appearance, well groomed, well nourished, no deformities and no acute distress.   Eyes: the conjunctiva exhibited no abnormalities and the eyelids demonstrated no xanthelasmas.   HEENT: normal oral mucosa, no oral pallor and no oral cyanosis.   Neck: normal jugular venous A waves present, normal jugular venous V waves present and no jugular venous morales A waves.   Pulmonary: no respiratory distress, normal respiratory rhythm and effort, no accessory muscle use and lungs were clear to auscultation bilaterally.   Cardiovascular: heart rate and rhythm were normal, normal S1 and S2 and no murmur, gallop, rub, heave or thrill are present.   Abdomen: soft, non-tender, no hepato-splenomegaly and no abdominal mass palpated.   Musculoskeletal: the gait could not be assessed..   Extremities: no clubbing of the fingernails, no localized cyanosis, no petechial hemorrhages and no ischemic changes.   Skin: normal skin color and pigmentation, no rash, no venous stasis, no skin lesions, no skin ulcer and no xanthoma was observed.   Psychiatric: oriented to person, place, and time, the affect was normal, the mood was normal and not feeling anxious.     LABS:   --------  11-28    138  |  103  |  12  ----------------------------<  66<L>  4.2   |  29  |  0.21<L>    Ca    7.8<L>      28 Nov 2020 06:21  Phos  3.3     11-28  Mg     1.9     11-28    TPro  5.6<L>  /  Alb  1.3<L>  /  TBili  0.3  /  DBili  <.10  /  AST  12<L>  /  ALT  8<L>  /  AlkPhos  80  11-28                         7.8    12.83 )-----------( 481      ( 28 Nov 2020 06:21 )             26.4     PT/INR - ( 28 Nov 2020 06:21 )   PT: 13.7 sec;   INR: 1.18 ratio         PTT - ( 27 Nov 2020 13:21 )  PTT:31.5 sec    11-27 @ 13:21 CPK total:--, CKMB --, Troponin I - <.015 ng/mL          RADIOLOGY:  -----------------    ECG:     ECHO:

## 2020-11-28 NOTE — PROGRESS NOTE ADULT - ASSESSMENT
MARIAM FUENTES  DOA 11/27/2020 DR ZHANNA BECKWITH              REVIEW OF SYMPTOMS      Able to give ROS  Yes     RELIABLE No   CONSTITUTIONAL Weakness Yes  Chills No Vision changes No  ENDOCRINE No unexplained hair loss No heat or cold intolerance    ALLERGY No hives  Sore throat No   RESP Coughing blood no  Shortness of breath YES   NEURO No Headache  Confusion Pain neck No   CARDIAC No Chest pain No Palpitations   GI No Pain abdomen NO   Vomiting NO     PHYSICAL EXAM    HEENT Unremarkable PERRLA atraumatic   RESP Fair air entry EXP prolonged    Harsh breath sound Resp distres mild   CARDIAC S1 S2 No S3     NO JVD    ABDOMEN SOFT BS PRESENT NOT DISTENDED No hepatosplenomegaly PEDAL EDEMA present No calf tenderness  NO rash     PT DATA/BEST PRACTICE  ALLERGY          levaquin    WT                    11/28/2020 44 k           BMI                     11/28/2020 19                   ADVANCED DIRECTIVE     HEAD OF BED ELEVATION Yes      DVT PROPHYLAXIS.  PHARMAC PPLX HELD BECAUSE OF DROP IN HB NOPTED 11/27       DIET. NPO (11/27)                                                                          VYAS PROPHYLAXIS. PROTONIX 40 (11/27)   INFECTION PPLX                                                    OXYGENATION.   11/27/2020 2l 97%      VITALS.    11/28/2020 afeb 82 96/56   11/28/2020 u 500       IMAGING.   CXR 11/27/2020 cxr napd   ctap ic 11/27/2020   emphysem changes base cyst l lower base   sp janet  appy cannot be excluded      heterogenous thickening l hip with ill defined multiseptate fluid collection containing foci of air 3.8 x 3.2 x3.9 cm concerning for abscess and surrounding cellulitis           PATIENT SUMMARY.   65 f recovered alcoholic former smoker PMH candida stomatitis COPD hytn major depression cancer colon sigmoid resectn in [place] 2015 open r hernia repair with mesh  from [facility] sent for leaking pej tube  Currently pt  had fever and abdd pain x 1 d   Pt also co pain l hip x 2 d no local trauma hx Free air was seen in l hip on imaging     Recently her dislodged peg had been replaced with pej at [facility] meds mom hpsc vit c asa 81 cardizem 30.3 duloxetine 30 fluoxetine 20 montelukast 10 protonix 40 senna  trazodone 100 albuterol gabapentin 600.3 methocarbamol 250.3 methadone 60       ASSESSMENT PLAN   LEAKING PEJ Sug[place] on case GI called   HIP ABSCESS Surgery and Ortho on case MRI being considered   ESR/CRP 98/20   IR drainage being considered for 11/30   Ortho noted that radiologist feels the abscess spares the hip joint MRI pelvis w&w/o contr   INFECTION Hip abscess Vanco zosyn started in er Continue ID eval 11/28/2020 Suggest dc clinda overlapping coverage (zosyn)   HEMODYNAMCIS Was admitted ICU bec of low bp 11/27 BP improved on midodrine Target MAP 65 (+)   COPD Continue bd ltra   ANEMIA POA Monitor target Hb 7 (+)   DEPRESSION Cont psych meds     OVERALL PLAN  Get pej fixed  Rx abscess hip     TIME SPENT   Over 25 minutes aggregate care time spent on encounter; activities included   direct patient care, counseling and/or coordinating care reviewing notes, lab data/ imaging , discussion with multidisciplinary team/ patient  /family and explaining in detail risks, benefits, alternatives  of the recommendations     MARIAM FUENTES  DOA 11/27/2020 DR ZHANNA BECKWITH

## 2020-11-28 NOTE — PROGRESS NOTE ADULT - ASSESSMENT
66 y/o F w/ pmh of recovered alcoholic, former smoker, copd, RA, chronic low back pain, hx of colon ca s/p sigmoid resection (2015), hx of open R. inguinal hernia repair w/ mesh (2010), hx of hiatal hernia repair (2019), zenker diverticulum surgery c/b vocal cord paralysis's requiring peg for feeding, s/p peg (replaced peg 3 weeks ago [facility]) transfer from [facility] to [facility] earlier today for leaking PEJ tube and on/off lower abd pain over the last 2-3 days. In the ED pt underwent an CT of abd/pelvis was found to have a Abscess around the L. hip ill-defined multi septated fluid collection containing foci of air measuring appx 3.8 x 3.2 x3.9cm concerning for abscess, WBC of 20, normal LA. MICU consulted for hypotension after 3L of IVF in the setting of sepsis from L. hip abscess.    Neuro:   No acute issues MS stable, currently awake and protecting her airway    Cardiac:   Hypotension possible from sepsis given 3L of IVF and now on LR at 80cc/hr w/ improvement in SBP to 100 cont IVF if becomes hypotensive again will consider pressors +/- midodrine to maintain MAP >65    Resp:   No acute issues, maintain o2 >92%    GI:  NPO for now, GI ppx w/ protonix, constipation bowel regimen w/ lactulose, mag and senna, Intra abdominal inflammatory changes? per surgery to monitor will consider adding US to eval if pain continues    Renal:   Renal function stable, lytes stable, cont to monitor and trend and replete prn    ID:   L. hip abscess seen by ID recommend IV vanco/zosyn, will add IV clinda, covid19 neg, f/u on UA, UCX, blood CX, check procal, possible MRI in the AM to r/o joint involvement per ortho as MRI is not available at this time?, will check repeat labs    Endo:   no acute issues, monitor FS q6h given NPO status    Heme:   DVT ppx w/ heparin    Msk:   Chronic low back pain on methadone 60mg daily checked HCS data-base and confirmed pt reports being on methadone for appx 15 years) along with gabapentin and Robaxin, will order methadone 60mg daily for 6am as patient has been taking    Dispo: Transfer to MICU, GOC discussed pt is full code, patient requested her daughter Angelina Cardoso be her HCP if she unable to make any decision her phone number is [phone], case d/w eICU attending and both General surgery and orthopedic team 64 y/o F w/ pmh of recovered alcoholic, former smoker, copd, RA, chronic low back pain, hx of colon ca s/p sigmoid resection (2015), hx of open R. 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) transfer from [facility] to [facility] earlier today for leaking PEJ tube and on/off lower abd pain over the last 2-3 days. In the ED pt underwent an CT of abd/pelvis was found to have a Abscess around the L. hip ill-defined multi septated fluid collection containing foci of air measuring appx 3.8 x 3.2 x3.9cm concerning for abscess, WBC of 20, normal LA. MICU consulted for hypotension after 3L of IVF in the setting of sepsis from L. hip abscess.    Neuro:   No acute issues MS stable, currently awake and protecting her airway.   Home meds: Duloxetine Po, Fluoxetine PO, Methadone/methocarbamol (back pain), Trazadone. Gabapentin.     Cardiac:   Hypotension possible from sepsis Midodrine and Levophed for BP support.     Resp:   No acute issues, maintain o2 >92%. nasal cannula. No home O2. Montelukast, albuterol/tiotropium inhaler.     GI:  NPO for now, GI ppx w/ protonix, constipation bowel regimen w/ lactulose, mag and senna, Intra abdominal inflammatory changes. per surgery to monitor will consider adding US to eval if pain continues    Renal:   Renal function stable, lytes stable, cont to monitor and trend and replete prn    ID:   L. hip abscess seen by ID recommend IV vanco/zosyn, will add IV clinda, covid19 neg, f/u on UA, UCX, blood CX, check procal, possible MRI in the AM to r/o joint involvement per ortho as MRI is not available at this time?, will check repeat labs  Discussed with [facility] Radiologist: b/l pyriformis enhancement on MR. L enhancement track laterally to and causes enhancement of the L hip capsule and synovium with no invasion of the joint. No effusion or signs of joint involvement. There is sacral edema w/ edema of sacral nerve roots. Rec MR L Spine w/wo contrast to rule out epidural abscess.     Endo:   no acute issues, monitor FS q6h given NPO status    Heme:   Holding chem DVT ppx for possible operative intervention.     Msk:   Chronic low back pain on methadone 60mg daily checked HCS data-base and confirmed pt reports being on methadone for appx 15 years) along with gabapentin and Robaxin, will order methadone 60mg daily for 6am as patient has been taking    Dispo: ICU management for sepsis. 64 y/o F w/ pmh of recovered alcoholic, former smoker, copd, RA, chronic low back pain, hx of colon ca s/p sigmoid resection (2015), hx of open R. inguinal hernia repair w/ mesh (2010), hx of hiatal hernia repair (2019), zenker diverticulum surgery c/b vocal cord paralysis's requiring peg for feeding, s/p peg (replaced peg 3 weeks ago [facility]) transfer from [facility] to [facility] earlier today for leaking PEJ tube and on/off lower abd pain over the last 2-3 days. In the ED pt underwent an CT of abd/pelvis was found to have a Abscess around the L. hip ill-defined multi septated fluid collection containing foci of air measuring appx 3.8 x 3.2 x3.9cm concerning for abscess, WBC of 20, normal LA. MICU consulted for hypotension after 3L of IVF in the setting of sepsis from L. hip abscess.    Neuro:   No acute issues MS stable, currently awake and protecting her airway.   Home meds: Duloxetine Po, Fluoxetine PO, Methadone/methocarbamol (back pain), Trazadone. Gabapentin.     Cardiac:   Hypotension possible from sepsis Midodrine and Levophed for BP support.     Resp:   No acute issues, maintain o2 >92%. nasal cannula. No home O2. Montelukast, albuterol/tiotropium inhaler.     GI:  NPO for now, GI ppx w/ protonix, constipation bowel regimen w/ lactulose, mag and senna, Intra abdominal inflammatory changes. per surgery to monitor will consider adding US to eval if pain continues    Renal:   Renal function stable, lytes stable, cont to monitor and trend and replete prn    ID:   L. hip abscess seen by ID recommend IV vanco/zosyn, will add IV clinda, covid19 neg, f/u on UA, UCX, blood CX, check procal, possible MRI in the AM to r/o joint involvement per ortho as MRI is not available at this time?, will check repeat labs  Discussed with [facility] Radiologist: b/l pyriformis enhancement on MR. L enhancement track laterally to and causes enhancement of the L hip capsule and synovium with no invasion of the joint. No effusion or signs of joint involvement. There is sacral edema w/ edema of sacral nerve roots. Rec MR L Spine w/wo contrast to rule out epidural abscess.     Endo:   no acute issues, monitor FS q6h given NPO status    Heme:   Holding chem DVT ppx for possible operative intervention.     Msk:   Chronic low back pain on methadone 60mg daily checked HCS data-base and confirmed pt reports being on methadone for appx 15 years) along with gabapentin and Robaxin, will order methadone 60mg daily for 6am as patient has been taking    Dispo: ICU management for sepsis.     Discussed plan of care and status with Daughter Missy @ [id number] 11/28

## 2020-11-28 NOTE — PROGRESS NOTE ADULT - ASSESSMENT
65y Female with left gluteal abscess    - At present likely infectious collection of fluid does not appear to involvement the joint. D/w Dr. Meadows from radiology that the collection is centered over the gluteal musculature but doesn't appear to involve the joint. If there is evidence of hip joint involvement, would I&D the joint, but at present there is no evidence as such  - Recommend MRI pelvis/L hip w/&w/o to r/o joint involvement  - Medicine/ICU management as needed  - At present no orthopaedic surgical intervention warranted  - ESR/CRP: 98/20. WBC count currently improving on antibiotics.   -Recommend close observation at this time  -Ortho to follow, contact with any acute changes,   -Discussed with Dr Andujar   65y Female with left gluteal abscess    - At present likely infectious collection of fluid does not appear to involvement the joint. D/w Dr. Meadows from radiology that the collection is centered over the gluteal musculature but doesn't appear to involve the joint. If there is evidence of hip joint involvement, would I&D the joint, but at present there is no evidence as such  - Recommend MRI pelvis/L hip w/&w/o to r/o joint involvement  - Medicine/ICU management as needed  - At present no acute orthopaedic surgical intervention warranted  - ESR/CRP: 98/20. WBC count currently improving on antibiotics.   - Recommend close observation at this time  - Ortho to follow, contact with any acute changes  - Discussed with Dr Andujar   65y Female with left gluteal abscess    - At present likely infectious collection of fluid does not appear to involvement the joint. D/w Dr. Meadows from radiology that the collection is centered over the gluteal musculature but doesn't appear to involve the joint. If there is evidence of hip joint involvement, would I&D the joint, but at present there is no evidence as such  - Recommend MRI pelvis/L hip w/&w/o to r/o joint involvement  - Medicine/ICU management as needed  - At present no acute orthopaedic surgical intervention warranted  - ESR/CRP: 98/20. WBC count currently improving on antibiotics.   - Wound c/w NPO and would hold all AC in case MR imaging findings suspicious for joint involvement  - Recommend close observation at this time  - Ortho to follow, contact with any acute changes  - Discussed with Dr Andujar

## 2020-11-28 NOTE — CHART NOTE - NSCHARTNOTEFT_GEN_A_CORE
Upon Nutritional Assessment by the Registered Dietitian your patient was determined to meet criteria / has evidence of the following diagnosis/diagnoses:          [ ]  Mild Protein Calorie Malnutrition        [ ]  Moderate Protein Calorie Malnutrition        [x] Severe Protein Calorie Malnutrition        [ ] Unspecified Protein Calorie Malnutrition        [ ] Underweight / BMI <19        [ ] Morbid Obesity / BMI > 40      Findings as based on:  •  Comprehensive nutrition assessment and consultation  •  Pt presents with moderate-severe muscle wasting to temples, clavicles, shoulders, +moderate buccal fat pad loss.       Treatment:    The following diet has been recommended:  Pt currently NPO    PROVIDER Section:     By signing this assessment you are acknowledging and agree with the diagnosis/diagnoses assigned by the Registered Dietitian    Comments:

## 2020-11-28 NOTE — PROGRESS NOTE ADULT - SUBJECTIVE AND OBJECTIVE BOX
Interval History:    CENTRAL LINE:   [  ] YES       [  ] NO  GREWAL:                 [  ] YES       [  ] NO         REVIEW OF SYSTEMS:  All Systems below were reviewed and are negative [  ]  HEENT:  ID:  Pulmonary:  Cardiac:  GI:  Renal:  Musculoskeletal:  All other systems above were reviewed and are negative   [  ]      MEDICATIONS  (STANDING):  ALBUTerol    90 MICROgram(s) HFA Inhaler 1 Puff(s) Inhalation every 4 hours  ascorbic acid 500 milliGRAM(s) Oral daily  aspirin  chewable 81 milliGRAM(s) Enteral Tube daily  cholecalciferol 1000 Unit(s) Oral daily  clindamycin IVPB      clindamycin IVPB 600 milliGRAM(s) IV Intermittent every 8 hours  DULoxetine 30 milliGRAM(s) Oral daily  FLUoxetine 20 milliGRAM(s) Oral daily  gabapentin 600 milliGRAM(s) Oral three times a day  heparin   Injectable 5000 Unit(s) SubCutaneous every 12 hours  lactated ringers. 1000 milliLiter(s) (80 mL/Hr) IV Continuous <Continuous>  lactulose Syrup 10 Gram(s) Oral three times a day  methocarbamol 250 milliGRAM(s) Oral every 8 hours  midodrine. 15 milliGRAM(s) Oral three times a day  montelukast 10 milliGRAM(s) Oral at bedtime  multivitamin/minerals/iron Oral Solution (CENTRUM) 15 milliLiter(s) Oral daily  norepinephrine Infusion 0.05 MICROgram(s)/kG/Min (4.16 mL/Hr) IV Continuous <Continuous>  pantoprazole   Suspension 40 milliGRAM(s) Oral daily  piperacillin/tazobactam IVPB.. 3.375 Gram(s) IV Intermittent every 8 hours  senna 2 Tablet(s) Oral at bedtime  tiotropium 18 MICROgram(s) Capsule 1 Capsule(s) Inhalation daily  traZODone 100 milliGRAM(s) Oral at bedtime  vancomycin  IVPB 750 milliGRAM(s) IV Intermittent every 12 hours    MEDICATIONS  (PRN):  acetaminophen    Suspension .. 650 milliGRAM(s) Enteral Tube every 6 hours PRN Temp greater or equal to 38C (100.4F), Mild Pain (1 - 3)  ALBUTerol    90 MICROgram(s) HFA Inhaler 2 Puff(s) Inhalation every 6 hours PRN Shortness of Breath and/or Wheezing  magnesium hydroxide Suspension 15 milliLiter(s) Oral at bedtime PRN Constipation  morphine  - Injectable 2 milliGRAM(s) IV Push every 4 hours PRN Moderate Pain (4 - 6)      Vital Signs Last 24 Hrs  T(C): 37.1 (28 Nov 2020 16:01), Max: 37.1 (28 Nov 2020 16:01)  T(F): 98.8 (28 Nov 2020 16:01), Max: 98.8 (28 Nov 2020 16:01)  HR: 78 (28 Nov 2020 17:00) (70 - 85)  BP: 96/54 (28 Nov 2020 17:00) (84/52 - 132/67)  BP(mean): 70 (28 Nov 2020 17:00) (64 - 91)  RR: 26 (28 Nov 2020 17:00) (12 - 29)  SpO2: 93% (28 Nov 2020 17:00) (91% - 99%)    I&O's Summary    27 Nov 2020 07:01  -  28 Nov 2020 07:00  --------------------------------------------------------  IN: 160 mL / OUT: 500 mL / NET: -340 mL    28 Nov 2020 07:01  -  28 Nov 2020 18:28  --------------------------------------------------------  IN: 1420 mL / OUT: 0 mL / NET: 1420 mL        PHYSICAL EXAM:  HEENT: NC/AT; PERRLA  Neck: Soft; no tenderness  Lungs: CTA bilaterally; no wheezing.   Heart:  Abdomen:  Genital/ Rectal:  Extremities:  Neurologic:  Vascular:      LABORATORY:    CBC Full  -  ( 28 Nov 2020 06:21 )  WBC Count : 12.83 K/uL  RBC Count : 3.09 M/uL  Hemoglobin : 7.8 g/dL  Hematocrit : 26.4 %  Platelet Count - Automated : 481 K/uL  Mean Cell Volume : 85.4 fl  Mean Cell Hemoglobin : 25.2 pg  Mean Cell Hemoglobin Concentration : 29.5 gm/dL  Auto Neutrophil # : x  Auto Lymphocyte # : x  Auto Monocyte # : x  Auto Eosinophil # : x  Auto Basophil # : x  Auto Neutrophil % : x  Auto Lymphocyte % : x  Auto Monocyte % : x  Auto Eosinophil % : x  Auto Basophil % : x      ESR:                   11-28 @ 01:24  --    C-Reactive Protein:     11-28 @ 01:24  20.94    Procalcitonin:           11-28 @ 01:24   --  ESR:                   11-27 @ 23:42  --    C-Reactive Protein:     11-27 @ 23:42  --    Procalcitonin:           11-27 @ 23:42   0.19  ESR:                   11-27 @ 21:20  98    C-Reactive Protein:     11-27 @ 21:20  --    Procalcitonin:           11-27 @ 21:20   --      11-28    138  |  103  |  12  ----------------------------<  66<L>  4.2   |  29  |  0.21<L>    Ca    7.8<L>      28 Nov 2020 06:21  Phos  3.3     11-28  Mg     1.9     11-28    TPro  5.6<L>  /  Alb  1.3<L>  /  TBili  0.3  /  DBili  <.10  /  AST  12<L>  /  ALT  8<L>  /  AlkPhos  80  11-28      Rapid Respiratory Viral Panel Result        11-27 @ 13:21  Rapid [facility]  Coronovirus --  Adenovirus --  Bordetella Pertussis --  Chlamydia Pneumonia --  Entero/Rhinovirus--  HKU1 Coronovirus --  HMPV Coronovirus --  Influenza A --  Influenza AH1 --  Influenza AH1 2009 --  Influenza AH3 --  Influenza B --  Mycoplasma Pneumoniae --  NL63 Coronovirus --  OC43 Coronovirus --  Parainfluenza 1 --  Parainfluenza 2 --  Parainfluenza 3 --  Parainfluenza 4 --  Resp Syncytial Virus --      Assessment and Plan:          Lorenzo Zelaya MD   [phone].   She is awake. Basim comfortable.  No fevers today.      MEDICATIONS  (STANDING):  ALBUTerol    90 MICROgram(s) HFA Inhaler 1 Puff(s) Inhalation every 4 hours  ascorbic acid 500 milliGRAM(s) Oral daily  aspirin  chewable 81 milliGRAM(s) Enteral Tube daily  cholecalciferol 1000 Unit(s) Oral daily  clindamycin IVPB      clindamycin IVPB 600 milliGRAM(s) IV Intermittent every 8 hours  DULoxetine 30 milliGRAM(s) Oral daily  FLUoxetine 20 milliGRAM(s) Oral daily  gabapentin 600 milliGRAM(s) Oral three times a day  heparin   Injectable 5000 Unit(s) SubCutaneous every 12 hours  lactated ringers. 1000 milliLiter(s) (80 mL/Hr) IV Continuous <Continuous>  lactulose Syrup 10 Gram(s) Oral three times a day  methocarbamol 250 milliGRAM(s) Oral every 8 hours  midodrine. 15 milliGRAM(s) Oral three times a day  montelukast 10 milliGRAM(s) Oral at bedtime  multivitamin/minerals/iron Oral Solution (CENTRUM) 15 milliLiter(s) Oral daily  norepinephrine Infusion 0.05 MICROgram(s)/kG/Min (4.16 mL/Hr) IV Continuous <Continuous>  pantoprazole   Suspension 40 milliGRAM(s) Oral daily  piperacillin/tazobactam IVPB.. 3.375 Gram(s) IV Intermittent every 8 hours  senna 2 Tablet(s) Oral at bedtime  tiotropium 18 MICROgram(s) Capsule 1 Capsule(s) Inhalation daily  traZODone 100 milliGRAM(s) Oral at bedtime  vancomycin  IVPB 750 milliGRAM(s) IV Intermittent every 12 hours    MEDICATIONS  (PRN):  acetaminophen    Suspension .. 650 milliGRAM(s) Enteral Tube every 6 hours PRN Temp greater or equal to 38C (100.4F), Mild Pain (1 - 3)  ALBUTerol    90 MICROgram(s) HFA Inhaler 2 Puff(s) Inhalation every 6 hours PRN Shortness of Breath and/or Wheezing  magnesium hydroxide Suspension 15 milliLiter(s) Oral at bedtime PRN Constipation  morphine  - Injectable 2 milliGRAM(s) IV Push every 4 hours PRN Moderate Pain (4 - 6)      Vital Signs Last 24 Hrs  T(C): 37.1 (28 Nov 2020 16:01), Max: 37.1 (28 Nov 2020 16:01)  T(F): 98.8 (28 Nov 2020 16:01), Max: 98.8 (28 Nov 2020 16:01)  HR: 78 (28 Nov 2020 17:00) (70 - 85)  BP: 96/54 (28 Nov 2020 17:00) (84/52 - 132/67)  BP(mean): 70 (28 Nov 2020 17:00) (64 - 91)  RR: 26 (28 Nov 2020 17:00) (12 - 29)  SpO2: 93% (28 Nov 2020 17:00) (91% - 99%)    I&O's Summary    27 Nov 2020 07:01  -  28 Nov 2020 07:00  --------------------------------------------------------  IN: 160 mL / OUT: 500 mL / NET: -340 mL    28 Nov 2020 07:01  -  28 Nov 2020 18:28  --------------------------------------------------------  IN: 1420 mL / OUT: 0 mL / NET: 1420 mL        PHYSICAL EXAM:  HEENT: NC/AT; PERRLA  Neck: Soft; no tenderness  Lungs: Coarse BS bilaterally; no wheezing.   Heart: RRR; no murmurs.   Abdomen: Soft, PEG site with moderate erythema. Decreased tenderness of RLQ. Very tender of left hip and left gluteus.   Genital/ Rectal: No goldman  Extremities: No ulcers.  Neurologic: Confused.       LABORATORY:    CBC Full  -  ( 28 Nov 2020 06:21 )  WBC Count : 12.83 K/uL  RBC Count : 3.09 M/uL  Hemoglobin : 7.8 g/dL  Hematocrit : 26.4 %  Platelet Count - Automated : 481 K/uL  Mean Cell Volume : 85.4 fl  Mean Cell Hemoglobin : 25.2 pg  Mean Cell Hemoglobin Concentration : 29.5 gm/dL  Auto Neutrophil # : x  Auto Lymphocyte # : x  Auto Monocyte # : x  Auto Eosinophil # : x  Auto Basophil # : x  Auto Neutrophil % : x  Auto Lymphocyte % : x  Auto Monocyte % : x  Auto Eosinophil % : x  Auto Basophil % : x      ESR:                   11-28 @ 01:24  --    C-Reactive Protein:     11-28 @ 01:24  20.94    Procalcitonin:           11-28 @ 01:24   --  ESR:                   11-27 @ 23:42  --    C-Reactive Protein:     11-27 @ 23:42  --    Procalcitonin:           11-27 @ 23:42   0.19  ESR:                   11-27 @ 21:20  98    C-Reactive Protein:     11-27 @ 21:20  --    Procalcitonin:           11-27 @ 21:20   --      11-28    138  |  103  |  12  ----------------------------<  66<L>  4.2   |  29  |  0.21<L>    Ca    7.8<L>      28 Nov 2020 06:21  Phos  3.3     11-28  Mg     1.9     11-28    TPro  5.6<L>  /  Alb  1.3<L>  /  TBili  0.3  /  DBili  <.10  /  AST  12<L>  /  ALT  8<L>  /  AlkPhos  80  11-28    Assessment and Plan:    1. Cellulitis of PEG site.  2. Left gluteal abscess with extensive myositis.   3. Left greater trochanter abscess.  4. Suspected right gluteal abscess.  5. Suspected osteomyelitis of sacrum and L-spine.  6. Bacteremia with Enterococcus.   6. Sepsis.   7. History of colon cancer with sigmoid resection and anastomosis.     . Blood cultures in the ED are growing Enterococcus. Still waiting for susceptibilities but would cover for VRE. Would change IV Vanco to Daptomycin 4 mg/kg iv daily. Monitor CPK. Repeat blood cultures in AM.  . Continue IV Zosyn 3.375 gm q8h and IV Clindamycin for now.   . With the bilateral gluteal abscesses and extensive myositis of left gluteus, would suggest early surgical drainage and debridements. Would also evaluate for leaks of the rectosigmoid anastomosis as the source.  . Agree to repeat MRI to evaluate for osteomyelitis of sacrum and Lspine.  . Would get ortho to re-evaluate of left greater trochanter abscess,  . Discussed with Intensivist Dr. Abbott.     Lorenzo Zelaya MD   [phone].   She is awake. Basim comfortable.  No fevers today.      MEDICATIONS  (STANDING):  ALBUTerol    90 MICROgram(s) HFA Inhaler 1 Puff(s) Inhalation every 4 hours  ascorbic acid 500 milliGRAM(s) Oral daily  aspirin  chewable 81 milliGRAM(s) Enteral Tube daily  cholecalciferol 1000 Unit(s) Oral daily  clindamycin IVPB      clindamycin IVPB 600 milliGRAM(s) IV Intermittent every 8 hours  DULoxetine 30 milliGRAM(s) Oral daily  FLUoxetine 20 milliGRAM(s) Oral daily  gabapentin 600 milliGRAM(s) Oral three times a day  heparin   Injectable 5000 Unit(s) SubCutaneous every 12 hours  lactated ringers. 1000 milliLiter(s) (80 mL/Hr) IV Continuous <Continuous>  lactulose Syrup 10 Gram(s) Oral three times a day  methocarbamol 250 milliGRAM(s) Oral every 8 hours  midodrine. 15 milliGRAM(s) Oral three times a day  montelukast 10 milliGRAM(s) Oral at bedtime  multivitamin/minerals/iron Oral Solution (CENTRUM) 15 milliLiter(s) Oral daily  norepinephrine Infusion 0.05 MICROgram(s)/kG/Min (4.16 mL/Hr) IV Continuous <Continuous>  pantoprazole   Suspension 40 milliGRAM(s) Oral daily  piperacillin/tazobactam IVPB.. 3.375 Gram(s) IV Intermittent every 8 hours  senna 2 Tablet(s) Oral at bedtime  tiotropium 18 MICROgram(s) Capsule 1 Capsule(s) Inhalation daily  traZODone 100 milliGRAM(s) Oral at bedtime  vancomycin  IVPB 750 milliGRAM(s) IV Intermittent every 12 hours    MEDICATIONS  (PRN):  acetaminophen    Suspension .. 650 milliGRAM(s) Enteral Tube every 6 hours PRN Temp greater or equal to 38C (100.4F), Mild Pain (1 - 3)  ALBUTerol    90 MICROgram(s) HFA Inhaler 2 Puff(s) Inhalation every 6 hours PRN Shortness of Breath and/or Wheezing  magnesium hydroxide Suspension 15 milliLiter(s) Oral at bedtime PRN Constipation  morphine  - Injectable 2 milliGRAM(s) IV Push every 4 hours PRN Moderate Pain (4 - 6)      Vital Signs Last 24 Hrs  T(C): 37.1 (28 Nov 2020 16:01), Max: 37.1 (28 Nov 2020 16:01)  T(F): 98.8 (28 Nov 2020 16:01), Max: 98.8 (28 Nov 2020 16:01)  HR: 78 (28 Nov 2020 17:00) (70 - 85)  BP: 96/54 (28 Nov 2020 17:00) (84/52 - 132/67)  BP(mean): 70 (28 Nov 2020 17:00) (64 - 91)  RR: 26 (28 Nov 2020 17:00) (12 - 29)  SpO2: 93% (28 Nov 2020 17:00) (91% - 99%)    I&O's Summary    27 Nov 2020 07:01  -  28 Nov 2020 07:00  --------------------------------------------------------  IN: 160 mL / OUT: 500 mL / NET: -340 mL    28 Nov 2020 07:01  -  28 Nov 2020 18:28  --------------------------------------------------------  IN: 1420 mL / OUT: 0 mL / NET: 1420 mL        PHYSICAL EXAM:  HEENT: NC/AT; PERRLA  Neck: Soft; no tenderness  Lungs: Coarse BS bilaterally; no wheezing.   Heart: RRR; no murmurs.   Abdomen: Soft, PEG site with moderate erythema. Decreased tenderness of RLQ. Very tender of left hip and left gluteus.   Genital/ Rectal: No goldman  Extremities: No ulcers.  Neurologic: Confused.       LABORATORY:    CBC Full  -  ( 28 Nov 2020 06:21 )  WBC Count : 12.83 K/uL  RBC Count : 3.09 M/uL  Hemoglobin : 7.8 g/dL  Hematocrit : 26.4 %  Platelet Count - Automated : 481 K/uL  Mean Cell Volume : 85.4 fl  Mean Cell Hemoglobin : 25.2 pg  Mean Cell Hemoglobin Concentration : 29.5 gm/dL  Auto Neutrophil # : x  Auto Lymphocyte # : x  Auto Monocyte # : x  Auto Eosinophil # : x  Auto Basophil # : x  Auto Neutrophil % : x  Auto Lymphocyte % : x  Auto Monocyte % : x  Auto Eosinophil % : x  Auto Basophil % : x      ESR:                   11-28 @ 01:24  --    C-Reactive Protein:     11-28 @ 01:24  20.94    Procalcitonin:           11-28 @ 01:24   --  ESR:                   11-27 @ 23:42  --    C-Reactive Protein:     11-27 @ 23:42  --    Procalcitonin:           11-27 @ 23:42   0.19  ESR:                   11-27 @ 21:20  98    C-Reactive Protein:     11-27 @ 21:20  --    Procalcitonin:           11-27 @ 21:20   --      11-28    138  |  103  |  12  ----------------------------<  66<L>  4.2   |  29  |  0.21<L>    Ca    7.8<L>      28 Nov 2020 06:21  Phos  3.3     11-28  Mg     1.9     11-28    TPro  5.6<L>  /  Alb  1.3<L>  /  TBili  0.3  /  DBili  <.10  /  AST  12<L>  /  ALT  8<L>  /  AlkPhos  80  11-28    Assessment and Plan:    1. Cellulitis of PEG site.  2. Left gluteal abscess with extensive myositis.   3. Left greater trochanter abscess.  4. Suspected right gluteal abscess.  5. Suspected osteomyelitis of sacrum and L-spine.  6. Bacteremia with Enterococcus.   6. Sepsis.   7. History of colon cancer with sigmoid resection and anastomosis.     . Blood cultures in the ED are growing Enterococcus. Still waiting for susceptibilities but would cover for VRE. Would change IV Vanco to Daptomycin 6 mg/kg iv daily for suspected VRE bacteremia. Monitor CPK. Repeat blood cultures in AM.  . Continue IV Zosyn 3.375 gm q8h and IV Clindamycin for now.   . With the bilateral gluteal abscesses and extensive myositis of left gluteus, would suggest early surgical drainage and debridements. Would also evaluate for leaks of the rectosigmoid anastomosis as the source.  . Agree to repeat MRI to evaluate for osteomyelitis of sacrum and Lspine.  . Would get ortho to re-evaluate of left greater trochanter abscess,  . Discussed with Intensivist Dr. Abbott.     Lorenzo Zelaya MD   [phone].

## 2020-11-28 NOTE — PROGRESS NOTE ADULT - SUBJECTIVE AND OBJECTIVE BOX
Patient is a 65y old  Female who presents with a chief complaint of L. hip abscess, sepsis, hypotension (2020 20:23)    24 hour events: ***    REVIEW OF SYSTEMS  Constitutional: No fever, chills, fatigue  Neuro: No headache, numbness, weakness  Resp: No cough, wheezing, shortness of breath  CVS: No chest pain, palpitations, leg swelling  GI: No abdominal pain, nausea, vomiting, diarrhea   : No dysuria, frequency, incontinence  Skin: No itching, burning, rashes, or lesions   Msk: No joint pain or swelling  Psych: No depression, anxiety, mood swings  Heme: No bleeding    T(F): 98.4 (20 @ 07:54), Max: 101.6 (20 @ 12:46)  HR: 85 (20 @ 07:00) (67 - 85)  BP: 91/56 (20 @ 07:00) (86/52 - 105/62)  RR: 20 (20 @ 07:00) (14 - 26)  SpO2: 95% (20 @ 07:00) (94% - 100%)  Wt(kg): --            I&O's Summary     @ 07:01  -   @ 07:00  --------------------------------------------------------  IN: 160 mL / OUT: 500 mL / NET: -340 mL      PHYSICAL EXAM  Gen: Comfortable in bed in NAD  Neuro: AAOx3, answering questions appropriately.   HEENT: PERRL, normocephalic, atraumatic  Resp: CTA b/l  CVS: +RRR  Abd: BSx4, soft, mild distention, mild LLQ and around the PEJ site tenderness   Ext: L. hip with swelling and ttp along the hip, no erythema/warmness noted, no crepitus, NVI, +dp pulses, Pain with ROM of L hip.   Skin: warm/dry    MEDICATIONS  clindamycin IVPB   clindamycin IVPB IV Intermittent  piperacillin/tazobactam IVPB.. IV Intermittent  vancomycin  IVPB IV Intermittent        montelukast Oral    acetaminophen    Suspension .. Enteral Tube PRN  DULoxetine Oral  FLUoxetine Oral  gabapentin Oral  methadone    Tablet Oral  methocarbamol Oral  morphine  - Injectable IV Push PRN  traZODone Oral      aspirin  chewable Enteral Tube  heparin   Injectable SubCutaneous    lactulose Syrup Oral  magnesium hydroxide Suspension Oral PRN  pantoprazole   Suspension Oral  senna Oral      ascorbic acid Oral  cholecalciferol Oral  lactated ringers. IV Continuous  multivitamin/minerals/iron Oral Solution (CENTRUM) Oral                                7.8    12.83 )-----------( 481      ( 2020 06:21 )             26.4           138  |  103  |  12  ----------------------------<  66<L>  4.2   |  29  |  0.21<L>    Ca    7.8<L>      2020 06:21  Phos  3.3       Mg     1.9         TPro  5.6<L>  /  Alb  1.3<L>  /  TBili  0.3  /  DBili  <.10  /  AST  12<L>  /  ALT  8<L>  /  AlkPhos  80      Lactate 0.7            @ 13:21      CARDIAC MARKERS ( 2020 23:42 )  x     / x     / 23 U/L / x     / x      CARDIAC MARKERS ( 2020 13:21 )  <.015 ng/mL / x     / 16 U/L / x     / x          PT/INR - ( 2020 06:21 )   PT: 13.7 sec;   INR: 1.18 ratio         PTT - ( 2020 13:21 )  PTT:31.5 sec  Urinalysis Basic - ( 2020 22:39 )    Color: Yellow / Appearance: Clear / S.010 / pH: x  Gluc: x / Ketone: Negative  / Bili: Negative / Urobili: Negative   Blood: x / Protein: 15 / Nitrite: Negative   Leuk Esterase: Trace / RBC: 0-2 /HPF / WBC 3-5   Sq Epi: x / Non Sq Epi: Few / Bacteria: Occasional          Rapid RVP Result: NotDetec ( @ 13:21)    Radiology: ***< from: CT Abdomen and Pelvis w/ IV Cont (20 @ 15:52) >  Appendix is not definitively visualized; however, there appear to be mild inflammatory changes in the right lower quadrant. Appendicitis cannot be excluded. Consider repeat examination with oral contrast. Moderate stool throughout the colon andrectum.  Heterogeneous thickening of the left hip, with an ill-defined multi septated fluid collection containing foci of air measuring approximately 3.8 x 3.2 x 3.9 cm, concerning for abscess with surrounding cellulitis.    < from: Xray Chest 1 View-PORTABLE IMMEDIATE (20 @ 13:59) >  IMPRESSION:  Negativefor acute pulmonary process.      Bedside lung ultrasound: ***  Bedside ECHO: ***    PEG: Y  GREWAL: N                              GLOBAL ISSUE/BEST PRACTICE  Analgesia:   Sedation:   CAM-ICU:   HOB elevation: yes  Stress ulcer prophylaxis:   VTE prophylaxis:   Glycemic control:   Nutrition:     CODE STATUS: ***  [facility] discussion: Y       Patient is a 65y old  Female who presents with a chief complaint of L. hip abscess, sepsis, hypotension (2020 20:23)    24 hour events: Pt was admitted to the ICU with septic shock 2/2 to L piriformis abscess that tracks laterally and associated with enhancement of the L hip capsule and synovium. Levophed was started for BP support. Pt was seen this morning, no overnight events. She complains of abdominal pain and pain in the L gluteal region.     REVIEW OF SYSTEMS  Constitutional: No fever, chills. + fatigue  Neuro: No headache, numbness, weakness  Resp: No cough, wheezing, shortness of breath  CVS: No chest pain, palpitations, leg swelling  GI: No abdominal pain, nausea, vomiting, diarrhea   : No dysuria, frequency, incontinence  Skin: No itching, burning, rashes, or lesions   Msk: +Pain in L gluteal region  Psych: No depression, anxiety, mood swings  Heme: No bleeding    T(F): 98.4 (20 @ 07:54), Max: 101.6 (20 @ 12:46)  HR: 85 (20 @ 07:00) (67 - 85)  BP: 91/56 (20 @ 07:00) (86/52 - 105/62)  RR: 20 (20 @ 07:00) (14 - 26)  SpO2: 95% (20 @ 07:00) (94% - 100%)  Wt(kg): --            I&O's Summary     @ 07:01  -   @ 07:00  --------------------------------------------------------  IN: 160 mL / OUT: 500 mL / NET: -340 mL      PHYSICAL EXAM  Gen: Comfortable in bed in NAD  Neuro: AAOx3, answering questions appropriately.   HEENT: PERRL, normocephalic, atraumatic  Resp: CTA b/l  CVS: +RRR  Abd: BSx4, soft, mild distention, mild LLQ and around the PEJ site tenderness   Ext: L. hip with mild swelling and ttp along the gluteal region, no erythema/warmness noted, no crepitus, NVI, +dp pulses, Pain with ROM of L hip.   Skin: warm/dry    MEDICATIONS  clindamycin IVPB   clindamycin IVPB IV Intermittent  piperacillin/tazobactam IVPB.. IV Intermittent  vancomycin  IVPB IV Intermittent        montelukast Oral    acetaminophen    Suspension .. Enteral Tube PRN  DULoxetine Oral  FLUoxetine Oral  gabapentin Oral  methadone    Tablet Oral  methocarbamol Oral  morphine  - Injectable IV Push PRN  traZODone Oral      aspirin  chewable Enteral Tube  heparin   Injectable SubCutaneous    lactulose Syrup Oral  magnesium hydroxide Suspension Oral PRN  pantoprazole   Suspension Oral  senna Oral      ascorbic acid Oral  cholecalciferol Oral  lactated ringers. IV Continuous  multivitamin/minerals/iron Oral Solution (CENTRUM) Oral                                7.8    12.83 )-----------( 481      ( 2020 06:21 )             26.4       -    138  |  103  |  12  ----------------------------<  66<L>  4.2   |  29  |  0.21<L>    Ca    7.8<L>      2020 06:21  Phos  3.3     -  Mg     1.9     -    TPro  5.6<L>  /  Alb  1.3<L>  /  TBili  0.3  /  DBili  <.10  /  AST  12<L>  /  ALT  8<L>  /  AlkPhos  80      Lactate 0.7           - @ 13:21      CARDIAC MARKERS ( 2020 23:42 )  x     / x     / 23 U/L / x     / x      CARDIAC MARKERS ( 2020 13:21 )  <.015 ng/mL / x     / 16 U/L / x     / x          PT/INR - ( 2020 06:21 )   PT: 13.7 sec;   INR: 1.18 ratio         PTT - ( 2020 13:21 )  PTT:31.5 sec  Urinalysis Basic - ( 2020 22:39 )    Color: Yellow / Appearance: Clear / S.010 / pH: x  Gluc: x / Ketone: Negative  / Bili: Negative / Urobili: Negative   Blood: x / Protein: 15 / Nitrite: Negative   Leuk Esterase: Trace / RBC: 0-2 /HPF / WBC 3-5   Sq Epi: x / Non Sq Epi: Few / Bacteria: Occasional          Rapid RVP Result: NotDetec ( @ 13:21)    Radiology: ***< from: CT Abdomen and Pelvis w/ IV Cont (20 @ 15:52) >  Appendix is not definitively visualized; however, there appear to be mild inflammatory changes in the right lower quadrant. Appendicitis cannot be excluded. Consider repeat examination with oral contrast. Moderate stool throughout the colon andrectum.  Heterogeneous thickening of the left hip, with an ill-defined multi septated fluid collection containing foci of air measuring approximately 3.8 x 3.2 x 3.9 cm, concerning for abscess with surrounding cellulitis.    < from: Xray Chest 1 View-PORTABLE IMMEDIATE (20 @ 13:59) >  IMPRESSION:  Negativefor acute pulmonary process.      Bedside lung ultrasound: ***  Bedside ECHO: ***    PEG: Y  GREWAL: N                              GLOBAL ISSUE/BEST PRACTICE  Analgesia:   Sedation:   CAM-ICU:   HOB elevation: yes  Stress ulcer prophylaxis:   VTE prophylaxis:   Glycemic control:   Nutrition:     CODE STATUS: ***  [facility] discussion: Y       Patient is a 65y old  Female who presents with a chief complaint of L. hip abscess, sepsis, hypotension (2020 20:23)    24 hour events: Pt was admitted to the ICU with severe sepsis and hypotension 2/2 to L piriformis abscess that tracks laterally and associated with enhancement of the L hip capsule and synovium. Has not required vasopressors.  Pt was seen this morning, no overnight events. She complains of abdominal pain and pain in the L gluteal region.     REVIEW OF SYSTEMS  Constitutional: No fever, chills. + fatigue  Msk: +Pain in L gluteal region    T(F): 98.4 (20 @ 07:54), Max: 101.6 (20 @ 12:46)  HR: 85 (20 @ 07:00) (67 - 85)  BP: 91/56 (20 @ 07:00) (86/52 - 105/62)  RR: 20 (20 @ 07:00) (14 - 26)  SpO2: 95% (20 @ 07:00) (94% - 100%)  Wt(kg): --            I&O's Summary     @ 07:01  -   @ 07:00  --------------------------------------------------------  IN: 160 mL / OUT: 500 mL / NET: -340 mL      PHYSICAL EXAM  Gen: Comfortable in bed in NAD  Neuro: AAOx3, answering questions appropriately.   HEENT: PERRL, normocephalic, atraumatic  Resp: CTA b/l  CVS: +RRR  Abd: BSx4, soft, mild distention, mild LLQ and around the PEJ site tenderness   Ext: L. hip with mild swelling and ttp along the gluteal region, no erythema/warmness noted, no crepitus, NVI, +dp pulses, Pain with ROM of L hip.   Skin: warm/dry    MEDICATIONS  clindamycin IVPB   clindamycin IVPB IV Intermittent  piperacillin/tazobactam IVPB.. IV Intermittent  vancomycin  IVPB IV Intermittent        montelukast Oral    acetaminophen    Suspension .. Enteral Tube PRN  DULoxetine Oral  FLUoxetine Oral  gabapentin Oral  methadone    Tablet Oral  methocarbamol Oral  morphine  - Injectable IV Push PRN  traZODone Oral      aspirin  chewable Enteral Tube  heparin   Injectable SubCutaneous    lactulose Syrup Oral  magnesium hydroxide Suspension Oral PRN  pantoprazole   Suspension Oral  senna Oral      ascorbic acid Oral  cholecalciferol Oral  lactated ringers. IV Continuous  multivitamin/minerals/iron Oral Solution (CENTRUM) Oral                                7.8    12.83 )-----------( 481      ( 2020 06:21 )             26.4           138  |  103  |  12  ----------------------------<  66<L>  4.2   |  29  |  0.21<L>    Ca    7.8<L>      2020 06:21  Phos  3.3       Mg     1.9         TPro  5.6<L>  /  Alb  1.3<L>  /  TBili  0.3  /  DBili  <.10  /  AST  12<L>  /  ALT  8<L>  /  AlkPhos  80      Lactate 0.7            @ 13:21      CARDIAC MARKERS ( 2020 23:42 )  x     / x     / 23 U/L / x     / x      CARDIAC MARKERS ( 2020 13:21 )  <.015 ng/mL / x     / 16 U/L / x     / x          PT/INR - ( 2020 06:21 )   PT: 13.7 sec;   INR: 1.18 ratio         PTT - ( 2020 13:21 )  PTT:31.5 sec  Urinalysis Basic - ( 2020 22:39 )    Color: Yellow / Appearance: Clear / S.010 / pH: x  Gluc: x / Ketone: Negative  / Bili: Negative / Urobili: Negative   Blood: x / Protein: 15 / Nitrite: Negative   Leuk Esterase: Trace / RBC: 0-2 /HPF / WBC 3-5   Sq Epi: x / Non Sq Epi: Few / Bacteria: Occasional          Rapid RVP Result: NotDetec ( @ 13:21)    Radiology: ***< from: CT Abdomen and Pelvis w/ IV Cont (20 @ 15:52) >  Appendix is not definitively visualized; however, there appear to be mild inflammatory changes in the right lower quadrant. Appendicitis cannot be excluded. Consider repeat examination with oral contrast. Moderate stool throughout the colon andrectum.  Heterogeneous thickening of the left hip, with an ill-defined multi septated fluid collection containing foci of air measuring approximately 3.8 x 3.2 x 3.9 cm, concerning for abscess with surrounding cellulitis.    < from: Xray Chest 1 View-PORTABLE IMMEDIATE (. @ 13:59) >  IMPRESSION:  Negativefor acute pulmonary process.      < from: MR Hip w/wo IV Cont, Left (. @ 12:34) >  Impression:    Extensive myositis about the left hip musculature as outlined above with a collection decompressing from the left piriformis muscle laterally along the leftgluteal myofascial planes to the level of the left greater trochanter. This collection demonstrates intrinsic increased T1 signal suggestive of some degree of underlying hemorrhagic/proteinaceous material. Prominent myositis is also seen within the right piriformis muscle with evidence of a developing fluid collection. On the prior CT there are small foci of extraluminal air seen posterior to the rectosigmoid anastomosis. This may be related to a small anastomotic leak and may be the etiology of this process. There is patchy edema within the sacrum, most notably about the right S1/S2 neural foramen which may be related to developing osteomyelitis or reactive osteitis. Edema extends along the bilateral S1/S2 and S2/S3 neural foramina. Further evaluation with a dedicated MRI of the lumbar spine with and without intravenous contrast is recommended to exclude any epidural abscess.    Additional fluid collection is seen within the posterior aspect of the left greater trochanteric bursa.    Pericapsular edema and enhancement about the left hip joint. Minimal synovial enhancement about the left hip without evidence of a left hip joint effusion. Findings may be related to reactive changes secondary to pericapsular edema, however the possibility of an early septic arthrosis is not entirely excluded.    Findings discussed with Dr. Burroughs.    < end of copied text >        PEG: Y  GREWAL: N                              GLOBAL ISSUE/BEST PRACTICE  Analgesia:  Y  Sedation:  N  HOB elevation: yes  Stress ulcer prophylaxis:  Y  VTE prophylaxis:  Y  Glycemic control: Y  Nutrition:  NPO    CODE STATUS: FULL

## 2020-11-28 NOTE — DIETITIAN INITIAL EVALUATION ADULT. - OTHER INFO
66 y/o F w/ pmh of recovered alcoholic, former smoker, copd, RA, chronic low back pain, hx of colon ca s/p sigmoid resection (2015), hx of open R. 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) transfer from [facility] to [facility] earlier today for leaking PEJ tube and on/off lower abd pain over the last 2-3 days. CT of abd/pelvis revealed an Abscess around the L. hip.   MICU consulted for hypotension after 3L of IVF in the setting of sepsis from L. hip abscess.  Pt asleep, unable to interview.  As per visual inspection pt appears malnourished. +muscle wasting and fat loss apparent.  Transfer papers reviewed, pt was receiving Vital 1.2 75cc/hr x 20 hrs (1500mL) via PEJ.  Ht/Wt noted as 59", 85.7#.  Requested reweight from RN as admit wt 95# here.  RN stated wt was taken this morning with nothing on bed.

## 2020-11-28 NOTE — PROGRESS NOTE ADULT - SUBJECTIVE AND OBJECTIVE BOX
Pt seen and examined at the bedside this morning. She reports pain in the abdomen and some discomfort for the in the L hip. She states that she is tired.       Vital Signs Last 24 Hrs  T(C): 36.9 (28 Nov 2020 07:54), Max: 38.7 (27 Nov 2020 12:46)  T(F): 98.4 (28 Nov 2020 07:54), Max: 101.6 (27 Nov 2020 12:46)  HR: 85 (28 Nov 2020 07:00) (67 - 85)  BP: 91/56 (28 Nov 2020 07:00) (86/52 - 105/62)  BP(mean): 68 (28 Nov 2020 07:00) (64 - 75)  RR: 20 (28 Nov 2020 07:00) (14 - 26)  SpO2: 95% (28 Nov 2020 07:00) (94% - 100%)      PE   PEG tube in place.   L LE:  Skin intact, fullness over the left gluteal region, No ecchymosis/soft tissue swelling  Compartments soft; + TTP about gluteal regoin of the hip. No TTP to knee/leg/ankle/foot   ROM limited 2/2 pain   Unable to SLR; + Log Roll/Heel Strike  Motor intact GS/TA/FHL/EHL  SILT L2-S1  DP/PT pulses 2+      Imaging:  CT A/P: left gluteal heterogeneous collection suspicious for abscess. No effusion noted, no tracking to the hip noted. D/w radiologist Dr. Meadows who confirmed that the collection is over the gluteal musculature wihtout clear involvement of the hip. Unable to fully exclude without further imaging.      Pt seen and examined at the bedside this morning. She reports pain in the abdomen and some discomfort for the in the L hip. She states that she is tired.       Vital Signs Last 24 Hrs  T(C): 36.9 (28 Nov 2020 07:54), Max: 38.7 (27 Nov 2020 12:46)  T(F): 98.4 (28 Nov 2020 07:54), Max: 101.6 (27 Nov 2020 12:46)  HR: 85 (28 Nov 2020 07:00) (67 - 85)  BP: 91/56 (28 Nov 2020 07:00) (86/52 - 105/62)  BP(mean): 68 (28 Nov 2020 07:00) (64 - 75)  RR: 20 (28 Nov 2020 07:00) (14 - 26)  SpO2: 95% (28 Nov 2020 07:00) (94% - 100%)      PE   PEG tube in place.   L LE:  Skin intact, fullness over the left gluteal region, No ecchymosis/soft tissue swelling  Compartments soft; + TTP about gluteal regoin of the hip. No TTP to knee/leg/ankle/foot   ROM limited 2/2 pain   Unable to SLR; + Log Roll/Heel Strike  Motor intact GS/TA/FHL/EHL  SILT L2-S1  DP/PT pulses 2+      Imaging:  CT A/P: left gluteal heterogeneous collection suspicious for abscess. No effusion noted, no tracking to the hip noted. D/w radiologist Dr. Meadows who confirmed that the collection is over the gluteal musculature wihtout clear involvement of the hip. Unable to fully exclude without further imaging.     XR L Hip/Pel: Neg for Fx

## 2020-11-29 NOTE — CHART NOTE - NSCHARTNOTEFT_GEN_A_CORE
"[facility]  consult dictated    Impression:    Enterococcal Bacteremia  Hx of recurrent aspiration pneumonia  COPD  S/P G tube  Chronic hypoxic respiratory failure    Plan:    Continue antibiotics   IV fluids  Bronchodilators  Supplemental oxygen   

## 2020-11-29 NOTE — PROGRESS NOTE ADULT - SUBJECTIVE AND OBJECTIVE BOX
Patient is a 65y old  Female who presents with a chief complaint of the L. hip ill-defined multi septated fluid collection containing foci of air measuring appx 3.8 x 3.2 x3.9cm concerning for abscess, WBC of 20, normal LA. Pt was seen by both surgical and ortho team who recommenced no surgical intervention at this time. MICU consulted for hypotension, (2020 18:27)    24 hour events: Required small dose of levophed overnight. No other acute events.    REVIEW OF SYSTEMS  Constitutional: No fever, chills. admits to fatigue  Neuro: No headache, numbness, weakness  Resp: No cough, wheezing, shortness of breath  CVS: No chest pain, palpitations, leg swelling  GI: No abdominal pain, nausea, vomiting, diarrhea   : No dysuria, frequency, incontinence  Skin: No itching, burning, rashes, or lesions   Msk: admits to L gluteal pain  Psych: No depression, anxiety, mood swings  Heme: No bleeding    T(F): 98.4 (20 @ 05:00), Max: 98.8 (20 @ 16:01)  HR: 76 (20 @ 06:00) (62 - 82)  BP: 105/65 (20 @ 06:00) (76/52 - 132/67)  RR: 16 (20 @ 06:00) (12 - 38)  SpO2: 95% (20 @ 06:00) (84% - 99%)    I&O's Summary     @ 07:01  -   @ 07:00  --------------------------------------------------------  IN: 2851.3 mL / OUT: 0 mL / NET: 2851.3 mL      PHYSICAL EXAM  Gen: Comfortable in bed in NAD  Neuro: AAOx3, answering questions appropriately.   HEENT: PERRL, normocephalic, atraumatic  Resp: CTA b/l  CVS: +RRR  Abd: BSx4, soft, mild distention, mild LLQ and around the PEJ site tenderness   Ext: L. hip with mild swelling and ttp along the gluteal region, no erythema/warmness noted, no crepitus, NVI, +dp pulses, Pain with ROM of L hip.   Skin: warm/dry    MEDICATIONS  clindamycin IVPB   clindamycin IVPB IV Intermittent  DAPTOmycin IVPB   DAPTOmycin IVPB IV Intermittent  piperacillin/tazobactam IVPB.. IV Intermittent    midodrine. Oral  norepinephrine Infusion IV Continuous      ALBUTerol    90 MICROgram(s) HFA Inhaler Inhalation  ALBUTerol    90 MICROgram(s) HFA Inhaler Inhalation PRN  montelukast Oral  tiotropium 18 MICROgram(s) Capsule Inhalation    acetaminophen    Suspension .. Enteral Tube PRN  DULoxetine Oral  FLUoxetine Oral  gabapentin Oral  methadone    Tablet Oral  methocarbamol Oral  morphine  - Injectable IV Push PRN  traZODone Oral      aspirin  chewable Enteral Tube  heparin   Injectable SubCutaneous    lactulose Syrup Oral  magnesium hydroxide Suspension Oral PRN  pantoprazole   Suspension Oral  senna Oral      ascorbic acid Oral  cholecalciferol Oral  lactated ringers. IV Continuous  multivitamin/minerals/iron Oral Solution (CENTRUM) Oral                                8.6    11.01 )-----------( 538      ( 2020 06:11 )             29.2       11-    137  |  99  |  12  ----------------------------<  92  4.0   |  28  |  0.35<L>    Ca    8.2<L>      2020 06:11  Phos  3.7     -  Mg     1.9         TPro  5.7<L>  /  Alb  1.3<L>  /  TBili  0.2  /  DBili  x   /  AST  18  /  ALT  13  /  AlkPhos  95  11-29      CARDIAC MARKERS ( 2020 06:11 )  x     / x     / 61 U/L / x     / x      CARDIAC MARKERS ( 2020 23:42 )  x     / x     / 23 U/L / x     / x      CARDIAC MARKERS ( 2020 13:21 )  <.015 ng/mL / x     / 16 U/L / x     / x          PT/INR - ( 2020 06:21 )   PT: 13.7 sec;   INR: 1.18 ratio         PTT - ( 2020 13:21 )  PTT:31.5 sec  Urinalysis Basic - ( 2020 22:39 )    Color: Yellow / Appearance: Clear / S.010 / pH: x  Gluc: x / Ketone: Negative  / Bili: Negative / Urobili: Negative   Blood: x / Protein: 15 / Nitrite: Negative   Leuk Esterase: Trace / RBC: 0-2 /HPF / WBC 3-5   Sq Epi: x / Non Sq Epi: Few / Bacteria: Occasional      .Urine Clean Catch (Midstream)   <10,000 CFU/mL Normal Urogenital Ami --  @ 04:55  .Blood Blood-Peripheral   No growth to date.   Growth in anaerobic bottle: Gram Positive Cocci in Pairs and Chains  @ 18:29      Rapid RVP Result: NotDetec ( @ 13:21)    PEG: Y  GREWAL: N                              GLOBAL ISSUE/BEST PRACTICE  Analgesia:  Y  Sedation:  N  HOB elevation: yes  Stress ulcer prophylaxis:  Y  VTE prophylaxis:  Y  Glycemic control: Y  Nutrition:  NPO    CODE STATUS: FULL       Patient is a 65y old  Female who presents with a chief complaint of the L. hip ill-defined multi septated fluid collection containing foci of air measuring appx 3.8 x 3.2 x3.9cm concerning for abscess, WBC of 20, normal LA. Pt was seen by both surgical and ortho team who recommenced no surgical intervention at this time. MICU consulted for hypotension, (2020 18:27)    24 hour events: Required small dose of levophed overnight for 2 hours. No other acute events.    REVIEW OF SYSTEMS:  Limited as patient very lethargic  Constitutional: admits to fatigue  Resp: No shortness of breath  CVS: No chest pain  GI: No abdominal pain     T(F): 98.4 (20 @ 05:00), Max: 98.8 (20 @ 16:01)  HR: 76 (20 @ 06:00) (62 - 82)  BP: 105/65 (20 @ 06:00) (76/52 - 132/67)  RR: 16 (20 @ 06:00) (12 - 38)  SpO2: 95% (20 @ 06:00) (84% - 99%)    I&O's Summary     @ 07:01  -   @ 07:00  --------------------------------------------------------  IN: 2851.3 mL / OUT: 0 mL / NET: 2851.3 mL      PHYSICAL EXAM  Gen: Comfortable in bed in NAD, lethargic  HEENT: PERRL, normocephalic, atraumatic  Resp: CTA b/l  CVS: +RRR  Abd: BSx4, soft, mild distention, NT, PEJ tube in place  Ext: L. hip with mild swelling and TTP along the gluteal region, no erythema  Skin: warm/dry    MEDICATIONS  clindamycin IVPB   clindamycin IVPB IV Intermittent  DAPTOmycin IVPB   DAPTOmycin IVPB IV Intermittent  piperacillin/tazobactam IVPB.. IV Intermittent    midodrine. Oral  norepinephrine Infusion IV Continuous      ALBUTerol    90 MICROgram(s) HFA Inhaler Inhalation  ALBUTerol    90 MICROgram(s) HFA Inhaler Inhalation PRN  montelukast Oral  tiotropium 18 MICROgram(s) Capsule Inhalation    acetaminophen    Suspension .. Enteral Tube PRN  DULoxetine Oral  FLUoxetine Oral  gabapentin Oral  methadone    Tablet Oral  methocarbamol Oral  morphine  - Injectable IV Push PRN  traZODone Oral      aspirin  chewable Enteral Tube  heparin   Injectable SubCutaneous    lactulose Syrup Oral  magnesium hydroxide Suspension Oral PRN  pantoprazole   Suspension Oral  senna Oral      ascorbic acid Oral  cholecalciferol Oral  lactated ringers. IV Continuous  multivitamin/minerals/iron Oral Solution (CENTRUM) Oral                                8.6    11.01 )-----------( 538      ( 2020 06:11 )             29.2           137  |  99  |  12  ----------------------------<  92  4.0   |  28  |  0.35<L>    Ca    8.2<L>      2020 06:11  Phos  3.7       Mg     1.9         TPro  5.7<L>  /  Alb  1.3<L>  /  TBili  0.2  /  DBili  x   /  AST  18  /  ALT  13  /  AlkPhos  95        CARDIAC MARKERS ( 2020 06:11 )  x     / x     / 61 U/L / x     / x      CARDIAC MARKERS ( 2020 23:42 )  x     / x     / 23 U/L / x     / x      CARDIAC MARKERS ( 2020 13:21 )  <.015 ng/mL / x     / 16 U/L / x     / x          PT/INR - ( 2020 06:21 )   PT: 13.7 sec;   INR: 1.18 ratio         PTT - ( 2020 13:21 )  PTT:31.5 sec  Urinalysis Basic - ( 2020 22:39 )    Color: Yellow / Appearance: Clear / S.010 / pH: x  Gluc: x / Ketone: Negative  / Bili: Negative / Urobili: Negative   Blood: x / Protein: 15 / Nitrite: Negative   Leuk Esterase: Trace / RBC: 0-2 /HPF / WBC 3-5   Sq Epi: x / Non Sq Epi: Few / Bacteria: Occasional      .Urine Clean Catch (Midstream)   <10,000 CFU/mL Normal Urogenital Ami --  @ 04:55  .Blood Blood-Peripheral   No growth to date.   Growth in anaerobic bottle: Gram Positive Cocci in Pairs and Chains  @ 18:29      Rapid RVP Result: NotDetec ( @ 13:21)    PEG: Y  GREWAL: N                              GLOBAL ISSUE/BEST PRACTICE  Analgesia:  Y  Sedation:  N  HOB elevation: yes  Stress ulcer prophylaxis:  Y  VTE prophylaxis:  Y  Glycemic control: Y  Nutrition:  NPO    CODE STATUS: FULL       Patient is a 65y old  Female who presents with a chief complaint of the L. hip ill-defined multi septated fluid collection containing foci of air measuring appx 3.8 x 3.2 x3.9cm concerning for abscess, WBC of 20, normal LA. Pt was seen by both surgical and ortho team who recommenced no surgical intervention at this time. MICU consulted for hypotension, (2020 18:27)    24 hour events: Required small dose of levophed overnight for 2 hours. No other acute events. Patient seen, just received methadone dose and therefore patient very lethargic but responding appropriately. Denies pain at the moment.    REVIEW OF SYSTEMS:  Limited as patient very lethargic  Constitutional: admits to fatigue  Resp: No shortness of breath  CVS: No chest pain  GI: No abdominal pain     T(F): 98.4 (20 @ 05:00), Max: 98.8 (20 @ 16:01)  HR: 76 (20 @ 06:00) (62 - 82)  BP: 105/65 (20 @ 06:00) (76/52 - 132/67)  RR: 16 (20 @ 06:00) (12 - 38)  SpO2: 95% (20 @ 06:00) (84% - 99%)    I&O's Summary     @ 07:01  -   @ 07:00  --------------------------------------------------------  IN: 2851.3 mL / OUT: 0 mL / NET: 2851.3 mL      PHYSICAL EXAM  Gen: Comfortable in bed in NAD, lethargic  HEENT: PERRL, normocephalic, atraumatic  Resp: CTA b/l  CVS: +RRR  Abd: BSx4, soft, mild distention, NT, PEJ tube in place  Ext: L. hip with mild swelling and TTP along the gluteal region, no erythema  Skin: warm/dry    MEDICATIONS  clindamycin IVPB   clindamycin IVPB IV Intermittent  DAPTOmycin IVPB   DAPTOmycin IVPB IV Intermittent  piperacillin/tazobactam IVPB.. IV Intermittent    midodrine. Oral  norepinephrine Infusion IV Continuous      ALBUTerol    90 MICROgram(s) HFA Inhaler Inhalation  ALBUTerol    90 MICROgram(s) HFA Inhaler Inhalation PRN  montelukast Oral  tiotropium 18 MICROgram(s) Capsule Inhalation    acetaminophen    Suspension .. Enteral Tube PRN  DULoxetine Oral  FLUoxetine Oral  gabapentin Oral  methadone    Tablet Oral  methocarbamol Oral  morphine  - Injectable IV Push PRN  traZODone Oral      aspirin  chewable Enteral Tube  heparin   Injectable SubCutaneous    lactulose Syrup Oral  magnesium hydroxide Suspension Oral PRN  pantoprazole   Suspension Oral  senna Oral      ascorbic acid Oral  cholecalciferol Oral  lactated ringers. IV Continuous  multivitamin/minerals/iron Oral Solution (CENTRUM) Oral                                8.6    11.01 )-----------( 538      ( 2020 06:11 )             29.2           137  |  99  |  12  ----------------------------<  92  4.0   |  28  |  0.35<L>    Ca    8.2<L>      2020 06:11  Phos  3.7       Mg     1.9         TPro  5.7<L>  /  Alb  1.3<L>  /  TBili  0.2  /  DBili  x   /  AST  18  /  ALT  13  /  AlkPhos  95        CARDIAC MARKERS ( 2020 06:11 )  x     / x     / 61 U/L / x     / x      CARDIAC MARKERS ( 2020 23:42 )  x     / x     / 23 U/L / x     / x      CARDIAC MARKERS ( 2020 13:21 )  <.015 ng/mL / x     / 16 U/L / x     / x          PT/INR - ( 2020 06:21 )   PT: 13.7 sec;   INR: 1.18 ratio         PTT - ( 2020 13:21 )  PTT:31.5 sec  Urinalysis Basic - ( 2020 22:39 )    Color: Yellow / Appearance: Clear / S.010 / pH: x  Gluc: x / Ketone: Negative  / Bili: Negative / Urobili: Negative   Blood: x / Protein: 15 / Nitrite: Negative   Leuk Esterase: Trace / RBC: 0-2 /HPF / WBC 3-5   Sq Epi: x / Non Sq Epi: Few / Bacteria: Occasional      .Urine Clean Catch (Midstream)   <10,000 CFU/mL Normal Urogenital Ami --  @ 04:55  .Blood Blood-Peripheral   No growth to date.   Growth in anaerobic bottle: Gram Positive Cocci in Pairs and Chains  @ 18:29      Rapid RVP Result: NotDetec ( @ 13:21)    PEG: Y  GREWAL: N                              GLOBAL ISSUE/BEST PRACTICE  Analgesia:  Y  Sedation:  N  HOB elevation: yes  Stress ulcer prophylaxis:  Y  VTE prophylaxis:  Y  Glycemic control: Y  Nutrition:  NPO    CODE STATUS: FULL

## 2020-11-29 NOTE — PHARMACOTHERAPY INTERVENTION NOTE - COMMENTS
Patient ordered methadone 60mg once daily at 6am for chronic pain including back pain, colon malignancy, generalized osteoarthritis, and intervertebral disc displacement. Verified dose and indication via [facility].
Patient currently ordered for IV vancomycin, IV zosyn, and IV clindamycin empirically. Spoke with Dr. Abbott to discuss increased risk of JARED in patients who receive vancomycin and zosyn concomitantly. MD is aware and would like to continue with current regimen. Will monitor renal function and discuss with ID Dr. Zelaya.

## 2020-11-29 NOTE — PROGRESS NOTE ADULT - SUBJECTIVE AND OBJECTIVE BOX
GINA BECERRA    [facility] ICU1 [id number]    Patient is a 65y old  Female who presents with a chief complaint of the L. hip ill-defined multi septated fluid collection containing foci of air measuring appx 3.8 x 3.2 x3.9cm concerning for abscess, WBC of 20, normal LA. Pt was seen by both surgical and ortho team who recommenced no surgical intervention at this time. MICU consulted for hypotension, (2020 07:31)       Allergies    Levaquin (Unknown)    Intolerances        HPI:  HPI: 64 y/o F w/ pmh of recovered alcoholic, former smoker, copd, RA, chronic low back pain (on methadone 60mg daily), hx of colon ca s/p sigmoid resection (), hx of open R. 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 In the ED pt underwent an CT of abd/pelvis was found to have a Abscess around the L. hip ill-defined multi septated fluid collection containing foci of air measuring appx 3.8 x 3.2 x3.9cm concerning for abscess, WBC of 20, normal LA. Pt was seen by both surgical and ortho team who recommenced no surgical intervention at this time. MICU consulted for hypotension, (2020 19:45)      PAST MEDICAL & SURGICAL HISTORY:  Candidal stomatitis    Malignant neoplasm of colon    Major depressive disorder    HTN (hypertension)    COPD (chronic obstructive pulmonary disease)        FAMILY HISTORY:        MEDICATIONS   acetaminophen    Suspension .. 650 milliGRAM(s) Enteral Tube every 6 hours PRN  ALBUTerol    90 MICROgram(s) HFA Inhaler 2 Puff(s) Inhalation every 6 hours PRN  ascorbic acid 500 milliGRAM(s) Oral daily  aspirin  chewable 81 milliGRAM(s) Enteral Tube daily  cholecalciferol 1000 Unit(s) Oral daily  clindamycin IVPB      clindamycin IVPB 600 milliGRAM(s) IV Intermittent every 8 hours  DAPTOmycin IVPB      DAPTOmycin IVPB 270 milliGRAM(s) IV Intermittent every 24 hours  dextrose 40% Gel 15 Gram(s) Oral once  dextrose 5%. 1000 milliLiter(s) IV Continuous <Continuous>  dextrose 5%. 1000 milliLiter(s) IV Continuous <Continuous>  dextrose 50% Injectable 25 Gram(s) IV Push once  dextrose 50% Injectable 12.5 Gram(s) IV Push once  dextrose 50% Injectable 25 Gram(s) IV Push once  DULoxetine 30 milliGRAM(s) Oral daily  FLUoxetine 20 milliGRAM(s) Oral daily  gabapentin 600 milliGRAM(s) Oral three times a day  glucagon  Injectable 1 milliGRAM(s) IntraMuscular once  heparin   Injectable 5000 Unit(s) SubCutaneous every 12 hours  lactated ringers. 1000 milliLiter(s) IV Continuous <Continuous>  lactulose Syrup 10 Gram(s) Oral three times a day  magnesium hydroxide Suspension 15 milliLiter(s) Oral at bedtime PRN  methadone    Tablet 60 milliGRAM(s) Oral <User Schedule>  methocarbamol 250 milliGRAM(s) Oral every 8 hours  midodrine. 15 milliGRAM(s) Oral three times a day  montelukast 10 milliGRAM(s) Oral at bedtime  morphine  - Injectable 2 milliGRAM(s) IV Push every 4 hours PRN  multivitamin/minerals/iron Oral Solution (CENTRUM) 15 milliLiter(s) Oral daily  pantoprazole   Suspension 40 milliGRAM(s) Oral daily  piperacillin/tazobactam IVPB.. 3.375 Gram(s) IV Intermittent every 8 hours  senna 2 Tablet(s) Oral at bedtime  tiotropium 18 MICROgram(s) Capsule 1 Capsule(s) Inhalation daily  traZODone 100 milliGRAM(s) Oral at bedtime      Vital Signs Last 24 Hrs  T(C): 36.6 (2020 07:44), Max: 37.1 (2020 16:01)  T(F): 97.8 (2020 07:44), Max: 98.8 (2020 16:01)  HR: 69 (2020 09:00) (62 - 81)  BP: 82/53 (2020 09:00) (76/52 - 132/67)  BP(mean): 63 (2020 09:00) (60 - 94)  RR: 12 (2020 09:00) (11 - 38)  SpO2: 94% (2020 09:00) (84% - 99%)      20 @ 07:01  -  20 @ 07:00  --------------------------------------------------------  IN: 2969.6 mL / OUT: 0 mL / NET: 2969.6 mL            LABS:                        8.6    11. )-----------( 538      ( 2020 06:11 )             29.2         137  |  99  |  12  ----------------------------<  92  4.0   |  28  |  0.35<L>    Ca    8.2<L>      2020 06:11  Phos  3.7       Mg     1.9         TPro  5.7<L>  /  Alb  1.3<L>  /  TBili  0.2  /  DBili  x   /  AST  18  /  ALT  13  /  AlkPhos  95      PT/INR - ( 2020 06:21 )   PT: 13.7 sec;   INR: 1.18 ratio         PTT - ( 2020 13:21 )  PTT:31.5 sec  Urinalysis Basic - ( 2020 22:39 )    Color: Yellow / Appearance: Clear / S.010 / pH: x  Gluc: x / Ketone: Negative  / Bili: Negative / Urobili: Negative   Blood: x / Protein: 15 / Nitrite: Negative   Leuk Esterase: Trace / RBC: 0-2 /HPF / WBC 3-5   Sq Epi: x / Non Sq Epi: Few / Bacteria: Occasional            WBC:  WBC Count: 11.01 K/uL ( @ 06:11)  WBC Count: 12.83 K/uL ( @ 06:21)  WBC Count: 14.77 K/uL ( @ 02:27)  WBC Count: 16.26 K/uL ( @ 23:42)  WBC Count: 20.00 K/uL ( @ 13:21)      MICROBIOLOGY:  RECENT CULTURES:   .Urine Clean Catch (Midstream) XXXX XXXX   <10,000 CFU/mL Normal Urogenital Ami     .Blood Blood-Peripheral Blood Culture PCR   Growth in anaerobic bottle: Gram Positive Cocci in Pairs and Chains   No growth to date.          CARDIAC MARKERS ( 2020 06:11 )  x     / x     / 61 U/L / x     / x      CARDIAC MARKERS ( 2020 23:42 )  x     / x     / 23 U/L / x     / x      CARDIAC MARKERS ( 2020 13:21 )  <.015 ng/mL / x     / 16 U/L / x     / x            PT/INR - ( 2020 06:21 )   PT: 13.7 sec;   INR: 1.18 ratio         PTT - ( 2020 13:21 )  PTT:31.5 sec    Sodium:  Sodium, Serum: 137 mmol/L ( 06:11)  Sodium, Serum: 138 mmol/L ( 06:21)  Sodium, Serum: 138 mmol/L ( 23:42)  Sodium, Serum: 133 mmol/L ( 13:21)      0.35 mg/dL  06:11  0.21 mg/dL :21  <0.20 mg/dL  23:42  0.36 mg/dL  13:21      Hemoglobin:  Hemoglobin: 8.6 g/dL ( @ 06:11)  Hemoglobin: 7.8 g/dL ( @ 06:21)  Hemoglobin: 7.4 g/dL ( @ 02:27)  Hemoglobin: 6.1 g/dL ( @ 23:42)  Hemoglobin: 8.5 g/dL ( @ 13:21)      Platelets: Platelet Count - Automated: 538 K/uL ( @ 06:11)  Platelet Count - Automated: 481 K/uL ( @ 06:21)  Platelet Count - Automated: 434 K/uL ( @ 02:27)  Platelet Count - Automated: 517 K/uL ( @ 23:42)  Platelet Count - Automated: 543 K/uL ( @ 13:21)      LIVER FUNCTIONS - ( 2020 06:11 )  Alb: 1.3 g/dL / Pro: 5.7 g/dL / ALK PHOS: 95 U/L / ALT: 13 U/L / AST: 18 U/L / GGT: x             Urinalysis Basic - ( 2020 22:39 )    Color: Yellow / Appearance: Clear / S.010 / pH: x  Gluc: x / Ketone: Negative  / Bili: Negative / Urobili: Negative   Blood: x / Protein: 15 / Nitrite: Negative   Leuk Esterase: Trace / RBC: 0-2 /HPF / WBC 3-5   Sq Epi: x / Non Sq Epi: Few / Bacteria: Occasional        RADIOLOGY & ADDITIONAL STUDIES:

## 2020-11-29 NOTE — PROGRESS NOTE ADULT - ASSESSMENT
s/p peg   s/p colon resection for ca   abdominal pain- abnormal ct abdomen r/o appendicitis  r/o hip absces  hypertension  further management as per surgery  to have repeat ct abdomen aqnd pelvis 11/30

## 2020-11-29 NOTE — PROGRESS NOTE ADULT - SUBJECTIVE AND OBJECTIVE BOX
Patient is a 65y Female with a known history of :  HTN (hypertension) [I10]    Abscess of hip [L02.419]    Abdominal pain, unspecified abdominal location [R10.9]      HPI:  HPI: 66 y/o F w/ pmh of recovered alcoholic, former smoker, copd, RA, chronic low back pain (on methadone 60mg daily), hx of colon ca s/p sigmoid resection (2015), hx of open R. inguinal hernia repair w/ mesh (2010), hx of hiatal hernia repair (2019), zenker diverticulum surgery c/b vocal cord paralysis's requiring peg for feeding, s/p peg (replaced peg 3 weeks ago [facility]) transfer from [facility] to [facility] earlier today for leaking PEJ tube and on/off lower abd pain over the last 2-3 days. In the ED pt underwent an CT of abd/pelvis was found to have a Abscess around the L. hip ill-defined multi septated fluid collection containing foci of air measuring appx 3.8 x 3.2 x3.9cm concerning for abscess, WBC of 20, normal LA. Pt was seen by both surgical and ortho team who recommenced no surgical intervention at this time. MICU consulted for hypotension, (27 Nov 2020 19:45)      REVIEW OF SYSTEMS:    CONSTITUTIONAL: No fever, weight loss, or fatigue  EYES: No eye pain, visual disturbances, or discharge  ENMT:  No difficulty hearing, tinnitus, vertigo; No sinus or throat pain  NECK: No pain or stiffness  BREASTS: No pain, masses, or nipple discharge  RESPIRATORY: No cough, wheezing, chills or hemoptysis; No shortness of breath  CARDIOVASCULAR: No chest pain, palpitations, dizziness, or leg swelling  GASTROINTESTINAL: No abdominal or epigastric pain. No nausea, vomiting, or hematemesis; No diarrhea or constipation. No melena or hematochezia.  GENITOURINARY: No dysuria, frequency, hematuria, or incontinence  NEUROLOGICAL: No headaches, memory loss, loss of strength, numbness, or tremors  SKIN: No itching, burning, rashes, or lesions   LYMPH NODES: No enlarged glands  ENDOCRINE: No heat or cold intolerance; No hair loss  MUSCULOSKELETAL: No joint pain or swelling; No muscle, back, or extremity pain  PSYCHIATRIC: No depression, anxiety, mood swings, or difficulty sleeping  HEME/LYMPH: No easy bruising, or bleeding gums  ALLERGY AND IMMUNOLOGIC: No hives or eczema    MEDICATIONS  (STANDING):  ascorbic acid 500 milliGRAM(s) Oral daily  aspirin  chewable 81 milliGRAM(s) Enteral Tube daily  cholecalciferol 1000 Unit(s) Oral daily  clindamycin IVPB      clindamycin IVPB 600 milliGRAM(s) IV Intermittent every 8 hours  DAPTOmycin IVPB      DAPTOmycin IVPB 270 milliGRAM(s) IV Intermittent every 24 hours  dextrose 40% Gel 15 Gram(s) Oral once  dextrose 5%. 1000 milliLiter(s) (50 mL/Hr) IV Continuous <Continuous>  dextrose 5%. 1000 milliLiter(s) (100 mL/Hr) IV Continuous <Continuous>  dextrose 50% Injectable 25 Gram(s) IV Push once  dextrose 50% Injectable 12.5 Gram(s) IV Push once  dextrose 50% Injectable 25 Gram(s) IV Push once  DULoxetine 30 milliGRAM(s) Oral daily  FLUoxetine 20 milliGRAM(s) Oral daily  gabapentin 600 milliGRAM(s) Oral three times a day  glucagon  Injectable 1 milliGRAM(s) IntraMuscular once  heparin   Injectable 5000 Unit(s) SubCutaneous every 12 hours  lactated ringers. 1000 milliLiter(s) (80 mL/Hr) IV Continuous <Continuous>  lactulose Syrup 10 Gram(s) Oral three times a day  methadone    Tablet 60 milliGRAM(s) Oral <User Schedule>  methocarbamol 250 milliGRAM(s) Oral every 8 hours  midodrine. 15 milliGRAM(s) Oral three times a day  montelukast 10 milliGRAM(s) Oral at bedtime  multivitamin/minerals/iron Oral Solution (CENTRUM) 15 milliLiter(s) Oral daily  pantoprazole   Suspension 40 milliGRAM(s) Oral daily  piperacillin/tazobactam IVPB.. 3.375 Gram(s) IV Intermittent every 8 hours  senna 2 Tablet(s) Oral at bedtime  tiotropium 18 MICROgram(s) Capsule 1 Capsule(s) Inhalation daily  traZODone 100 milliGRAM(s) Oral at bedtime    MEDICATIONS  (PRN):  acetaminophen    Suspension .. 650 milliGRAM(s) Enteral Tube every 6 hours PRN Temp greater or equal to 38C (100.4F), Mild Pain (1 - 3)  ALBUTerol    90 MICROgram(s) HFA Inhaler 2 Puff(s) Inhalation every 6 hours PRN Shortness of Breath and/or Wheezing  magnesium hydroxide Suspension 15 milliLiter(s) Oral at bedtime PRN Constipation  morphine  - Injectable 2 milliGRAM(s) IV Push every 4 hours PRN Moderate Pain (4 - 6)      ALLERGIES: Levaquin (Unknown)      FAMILY HISTORY:      PHYSICAL EXAMINATION:  -----------------------------  T(C): 36.6 (11-29-20 @ 07:44), Max: 37.1 (11-28-20 @ 16:01)  HR: 69 (11-29-20 @ 09:00) (62 - 80)  BP: 82/53 (11-29-20 @ 09:00) (76/52 - 132/67)  RR: 12 (11-29-20 @ 09:00) (11 - 38)  SpO2: 94% (11-29-20 @ 09:00) (84% - 99%)  Wt(kg): --    11-28 @ 07:01  -  11-29 @ 07:00  --------------------------------------------------------  IN:    Enteral Tube Flush: 280 mL    IV PiggyBack: 50 mL    IV PiggyBack: 250 mL    IV PiggyBack: 150 mL    IV PiggyBack: 300 mL    Lactated Ringers: 1920 mL    Norepinephrine: 19.6 mL  Total IN: 2969.6 mL    OUT:  Total OUT: 0 mL    Total NET: 2969.6 mL            VITALS  T(C): 36.6 (11-29-20 @ 07:44), Max: 37.1 (11-28-20 @ 16:01)  HR: 69 (11-29-20 @ 09:00) (62 - 80)  BP: 82/53 (11-29-20 @ 09:00) (76/52 - 132/67)  RR: 12 (11-29-20 @ 09:00) (11 - 38)  SpO2: 94% (11-29-20 @ 09:00) (84% - 99%)    Constitutional: well developed, normal appearance, well groomed, well nourished, no deformities and no acute distress.   Eyes: the conjunctiva exhibited no abnormalities and the eyelids demonstrated no xanthelasmas.   HEENT: normal oral mucosa, no oral pallor and no oral cyanosis.   Neck: normal jugular venous A waves present, normal jugular venous V waves present and no jugular venous morales A waves.   Pulmonary: no respiratory distress, normal respiratory rhythm and effort, no accessory muscle use and lungs were clear to auscultation bilaterally.   Cardiovascular: heart rate and rhythm were normal, normal S1 and S2 and no murmur, gallop, rub, heave or thrill are present.   Abdomen: soft, non-tender, no hepato-splenomegaly and no abdominal mass palpated.   Musculoskeletal: the gait could not be assessed..   Extremities: no clubbing of the fingernails, no localized cyanosis, no petechial hemorrhages and no ischemic changes.   Skin: normal skin color and pigmentation, no rash, no venous stasis, no skin lesions, no skin ulcer and no xanthoma was observed.   Psychiatric: oriented to person, place, and time, the affect was normal, the mood was normal and not feeling anxious.     LABS:   --------  11-29    137  |  99  |  12  ----------------------------<  92  4.0   |  28  |  0.35<L>    Ca    8.2<L>      29 Nov 2020 06:11  Phos  3.7     11-29  Mg     1.9     11-29    TPro  5.7<L>  /  Alb  1.3<L>  /  TBili  0.2  /  DBili  x   /  AST  18  /  ALT  13  /  AlkPhos  95  11-29                         8.6    11.01 )-----------( 538      ( 29 Nov 2020 06:11 )             29.2     PT/INR - ( 28 Nov 2020 06:21 )   PT: 13.7 sec;   INR: 1.18 ratio         PTT - ( 27 Nov 2020 13:21 )  PTT:31.5 sec    11-27 @ 13:21 CPK total:--, CKMB --, Troponin I - <.015 ng/mL      Culture Results:   <10,000 CFU/mL Normal Urogenital Ami (11-28 @ 04:55)  Culture Results:   No growth to date. (11-27 @ 18:29)  Culture Results:   Growth in anaerobic bottle: Gram Positive Cocci in Pairs and Chains  "Due to technical problems, Proteus sp. will Not be reported as part of  the BCID panel until further notice" ***Blood Panel PCR results on this  specimen are available  approximately 3 hours after the Gram stain result.***  Gram stain, PCR, and/or culture results may not always  correspond due to difference in methodologies.  ************************************************************  This PCR assay was performed using [employer].  The following targets are tested for: Enterococcus,  vancomycin resistant enterococci, Listeria monocytogenes,  coagulase negative staphylococci, S. aureus,  methicillin resistant S. aureus, Streptococcus agalactiae  (Group B), S. pneumoniae, S. pyogenes (Group A),  Acinetobacter baumannii, Enterobacter cloacae, E. coli,  Klebsiella oxytoca, K. pneumoniae, Proteus sp.,  Serratia marcescens, Haemophilus influenzae,  Neisseria meningitidis, Pseudomonas aeruginosa, Candida  albicans, C. glabrata, C krusei, C parapsilosis,  C. tropicalis and the KPC resistance gene. (11-27 @ 18:29)      RADIOLOGY:  -----------------    ECG:     ECHO:

## 2020-11-29 NOTE — PROGRESS NOTE ADULT - SUBJECTIVE AND OBJECTIVE BOX
Interval History:    CENTRAL LINE:   [  ] YES       [  ] NO  GREWAL:                 [  ] YES       [  ] NO         REVIEW OF SYSTEMS:  All Systems below were reviewed and are negative [  ]  HEENT:  ID:  Pulmonary:  Cardiac:  GI:  Renal:  Musculoskeletal:  All other systems above were reviewed and are negative   [  ]      MEDICATIONS  (STANDING):  ascorbic acid 500 milliGRAM(s) Oral daily  aspirin  chewable 81 milliGRAM(s) Enteral Tube daily  cholecalciferol 1000 Unit(s) Oral daily  clindamycin IVPB      clindamycin IVPB 600 milliGRAM(s) IV Intermittent every 8 hours  DAPTOmycin IVPB      DAPTOmycin IVPB 270 milliGRAM(s) IV Intermittent every 24 hours  dextrose 40% Gel 15 Gram(s) Oral once  dextrose 5% + lactated ringers. 1000 milliLiter(s) (80 mL/Hr) IV Continuous <Continuous>  dextrose 5%. 1000 milliLiter(s) (50 mL/Hr) IV Continuous <Continuous>  dextrose 5%. 1000 milliLiter(s) (100 mL/Hr) IV Continuous <Continuous>  dextrose 50% Injectable 25 Gram(s) IV Push once  dextrose 50% Injectable 12.5 Gram(s) IV Push once  dextrose 50% Injectable 25 Gram(s) IV Push once  DULoxetine 30 milliGRAM(s) Oral daily  FLUoxetine 20 milliGRAM(s) Oral daily  gabapentin 600 milliGRAM(s) Oral three times a day  glucagon  Injectable 1 milliGRAM(s) IntraMuscular once  heparin   Injectable 5000 Unit(s) SubCutaneous every 12 hours  lactulose Syrup 10 Gram(s) Oral three times a day  methadone    Tablet 60 milliGRAM(s) Oral <User Schedule>  methocarbamol 250 milliGRAM(s) Oral every 8 hours  midodrine. 15 milliGRAM(s) Oral three times a day  montelukast 10 milliGRAM(s) Oral at bedtime  multivitamin/minerals/iron Oral Solution (CENTRUM) 15 milliLiter(s) Oral daily  pantoprazole   Suspension 40 milliGRAM(s) Oral daily  piperacillin/tazobactam IVPB.. 3.375 Gram(s) IV Intermittent every 8 hours  senna 2 Tablet(s) Oral at bedtime  tiotropium 18 MICROgram(s) Capsule 1 Capsule(s) Inhalation daily  traZODone 100 milliGRAM(s) Oral at bedtime    MEDICATIONS  (PRN):  acetaminophen    Suspension .. 650 milliGRAM(s) Enteral Tube every 6 hours PRN Temp greater or equal to 38C (100.4F), Mild Pain (1 - 3)  ALBUTerol    90 MICROgram(s) HFA Inhaler 2 Puff(s) Inhalation every 6 hours PRN Shortness of Breath and/or Wheezing  magnesium hydroxide Suspension 15 milliLiter(s) Oral at bedtime PRN Constipation  morphine  - Injectable 2 milliGRAM(s) IV Push every 4 hours PRN Moderate Pain (4 - 6)      Vital Signs Last 24 Hrs  T(C): 36.6 (29 Nov 2020 15:51), Max: 36.9 (29 Nov 2020 05:00)  T(F): 97.9 (29 Nov 2020 15:51), Max: 98.4 (29 Nov 2020 05:00)  HR: 78 (29 Nov 2020 15:00) (62 - 80)  BP: 99/58 (29 Nov 2020 15:00) (76/52 - 128/71)  BP(mean): 76 (29 Nov 2020 15:00) (60 - 94)  RR: 18 (29 Nov 2020 15:00) (10 - 38)  SpO2: 89% (29 Nov 2020 15:00) (84% - 98%)    I&O's Summary    28 Nov 2020 07:01  -  29 Nov 2020 07:00  --------------------------------------------------------  IN: 2969.6 mL / OUT: 0 mL / NET: 2969.6 mL    29 Nov 2020 07:01  -  29 Nov 2020 16:14  --------------------------------------------------------  IN: 699 mL / OUT: 0 mL / NET: 699 mL        PHYSICAL EXAM:  HEENT: NC/AT; PERRLA  Neck: Soft; no tenderness  Lungs: CTA bilaterally; no wheezing.   Heart:  Abdomen:  Genital/ Rectal:  Extremities:  Neurologic:  Vascular:      LABORATORY:    CBC Full  -  ( 29 Nov 2020 06:11 )  WBC Count : 11.01 K/uL  RBC Count : 3.42 M/uL  Hemoglobin : 8.6 g/dL  Hematocrit : 29.2 %  Platelet Count - Automated : 538 K/uL  Mean Cell Volume : 85.4 fl  Mean Cell Hemoglobin : 25.1 pg  Mean Cell Hemoglobin Concentration : 29.5 gm/dL  Auto Neutrophil # : 8.43 K/uL  Auto Lymphocyte # : 0.99 K/uL  Auto Monocyte # : 1.15 K/uL  Auto Eosinophil # : 0.24 K/uL  Auto Basophil # : 0.05 K/uL  Auto Neutrophil % : 76.5 %  Auto Lymphocyte % : 9.0 %  Auto Monocyte % : 10.4 %  Auto Eosinophil % : 2.2 %  Auto Basophil % : 0.5 %      ESR:                   11-28 @ 01:24  --    C-Reactive Protein:     11-28 @ 01:24  20.94    Procalcitonin:           11-28 @ 01:24   --  ESR:                   11-27 @ 23:42  --    C-Reactive Protein:     11-27 @ 23:42  --    Procalcitonin:           11-27 @ 23:42   0.19  ESR:                   11-27 @ 21:20  98    C-Reactive Protein:     11-27 @ 21:20  --    Procalcitonin:           11-27 @ 21:20   --      11-29    137  |  99  |  12  ----------------------------<  92  4.0   |  28  |  0.35<L>    Ca    8.2<L>      29 Nov 2020 06:11  Phos  3.7     11-29  Mg     1.9     11-29    TPro  5.7<L>  /  Alb  1.3<L>  /  TBili  0.2  /  DBili  x   /  AST  18  /  ALT  13  /  AlkPhos  95  11-29      Rapid Respiratory Viral Panel Result        11-27 @ 13:21  Rapid RVP [facility]  Coronovirus --  Adenovirus --  Bordetella Pertussis --  Chlamydia Pneumonia --  Entero/Rhinovirus--  HKU1 Coronovirus --  HMPV Coronovirus --  Influenza A --  Influenza AH1 --  Influenza AH1 2009 --  Influenza AH3 --  Influenza B --  Mycoplasma Pneumoniae --  NL63 Coronovirus --  OC43 Coronovirus --  Parainfluenza 1 --  Parainfluenza 2 --  Parainfluenza 3 --  Parainfluenza 4 --  Resp Syncytial Virus --      Assessment and Plan:          Lorenzo Zelaya MD   [phone].   She is afebrile  Sleeping in bed.      MEDICATIONS  (STANDING):  ascorbic acid 500 milliGRAM(s) Oral daily  aspirin  chewable 81 milliGRAM(s) Enteral Tube daily  cholecalciferol 1000 Unit(s) Oral daily  clindamycin IVPB      clindamycin IVPB 600 milliGRAM(s) IV Intermittent every 8 hours  DAPTOmycin IVPB      DAPTOmycin IVPB 270 milliGRAM(s) IV Intermittent every 24 hours  dextrose 40% Gel 15 Gram(s) Oral once  dextrose 5% + lactated ringers. 1000 milliLiter(s) (80 mL/Hr) IV Continuous <Continuous>  dextrose 5%. 1000 milliLiter(s) (50 mL/Hr) IV Continuous <Continuous>  dextrose 5%. 1000 milliLiter(s) (100 mL/Hr) IV Continuous <Continuous>  dextrose 50% Injectable 25 Gram(s) IV Push once  dextrose 50% Injectable 12.5 Gram(s) IV Push once  dextrose 50% Injectable 25 Gram(s) IV Push once  DULoxetine 30 milliGRAM(s) Oral daily  FLUoxetine 20 milliGRAM(s) Oral daily  gabapentin 600 milliGRAM(s) Oral three times a day  glucagon  Injectable 1 milliGRAM(s) IntraMuscular once  heparin   Injectable 5000 Unit(s) SubCutaneous every 12 hours  lactulose Syrup 10 Gram(s) Oral three times a day  methadone    Tablet 60 milliGRAM(s) Oral <User Schedule>  methocarbamol 250 milliGRAM(s) Oral every 8 hours  midodrine. 15 milliGRAM(s) Oral three times a day  montelukast 10 milliGRAM(s) Oral at bedtime  multivitamin/minerals/iron Oral Solution (CENTRUM) 15 milliLiter(s) Oral daily  pantoprazole   Suspension 40 milliGRAM(s) Oral daily  piperacillin/tazobactam IVPB.. 3.375 Gram(s) IV Intermittent every 8 hours  senna 2 Tablet(s) Oral at bedtime  tiotropium 18 MICROgram(s) Capsule 1 Capsule(s) Inhalation daily  traZODone 100 milliGRAM(s) Oral at bedtime    MEDICATIONS  (PRN):  acetaminophen    Suspension .. 650 milliGRAM(s) Enteral Tube every 6 hours PRN Temp greater or equal to 38C (100.4F), Mild Pain (1 - 3)  ALBUTerol    90 MICROgram(s) HFA Inhaler 2 Puff(s) Inhalation every 6 hours PRN Shortness of Breath and/or Wheezing  magnesium hydroxide Suspension 15 milliLiter(s) Oral at bedtime PRN Constipation  morphine  - Injectable 2 milliGRAM(s) IV Push every 4 hours PRN Moderate Pain (4 - 6)      Vital Signs Last 24 Hrs  T(C): 36.6 (29 Nov 2020 15:51), Max: 36.9 (29 Nov 2020 05:00)  T(F): 97.9 (29 Nov 2020 15:51), Max: 98.4 (29 Nov 2020 05:00)  HR: 78 (29 Nov 2020 15:00) (62 - 80)  BP: 99/58 (29 Nov 2020 15:00) (76/52 - 128/71)  BP(mean): 76 (29 Nov 2020 15:00) (60 - 94)  RR: 18 (29 Nov 2020 15:00) (10 - 38)  SpO2: 89% (29 Nov 2020 15:00) (84% - 98%)    I&O's Summary    28 Nov 2020 07:01  -  29 Nov 2020 07:00  --------------------------------------------------------  IN: 2969.6 mL / OUT: 0 mL / NET: 2969.6 mL    29 Nov 2020 07:01  -  29 Nov 2020 16:14  --------------------------------------------------------  IN: 699 mL / OUT: 0 mL / NET: 699 mL        PHYSICAL EXAM:  HEENT: NC/AT; PERRLA  Neck: Soft; no tenderness  Lungs: CTA bilaterally; no wheezing.   Heart: RRR; no murmurs.   Abdomen: Soft; no tenderness.   Genital/ Rectal: No goldman   Extremities: No ulcers.   Neurologic: Lethargic.      LABORATORY:    CBC Full  -  ( 29 Nov 2020 06:11 )  WBC Count : 11.01 K/uL  RBC Count : 3.42 M/uL  Hemoglobin : 8.6 g/dL  Hematocrit : 29.2 %  Platelet Count - Automated : 538 K/uL  Mean Cell Volume : 85.4 fl  Mean Cell Hemoglobin : 25.1 pg  Mean Cell Hemoglobin Concentration : 29.5 gm/dL  Auto Neutrophil # : 8.43 K/uL  Auto Lymphocyte # : 0.99 K/uL  Auto Monocyte # : 1.15 K/uL  Auto Eosinophil # : 0.24 K/uL  Auto Basophil # : 0.05 K/uL  Auto Neutrophil % : 76.5 %  Auto Lymphocyte % : 9.0 %  Auto Monocyte % : 10.4 %  Auto Eosinophil % : 2.2 %  Auto Basophil % : 0.5 %      ESR:                   11-28 @ 01:24  --    C-Reactive Protein:     11-28 @ 01:24  20.94    Procalcitonin:           11-28 @ 01:24   --  ESR:                   11-27 @ 23:42  --    C-Reactive Protein:     11-27 @ 23:42  --    Procalcitonin:           11-27 @ 23:42   0.19  ESR:                   11-27 @ 21:20  98    C-Reactive Protein:     11-27 @ 21:20  --    Procalcitonin:           11-27 @ 21:20   --      11-29    137  |  99  |  12  ----------------------------<  92  4.0   |  28  |  0.35<L>    Ca    8.2<L>      29 Nov 2020 06:11  Phos  3.7     11-29  Mg     1.9     11-29    TPro  5.7<L>  /  Alb  1.3<L>  /  TBili  0.2  /  DBili  x   /  AST  18  /  ALT  13  /  AlkPhos  95  11-29      Rapid Respiratory Viral Panel Result        11-27 @ 13:21  Rapid RVP [facility]  Coronovirus --  Adenovirus --  Bordetella Pertussis --  Chlamydia Pneumonia --  Entero/Rhinovirus--  HKU1 Coronovirus --  HMPV Coronovirus --  Influenza A --  Influenza AH1 --  Influenza AH1 2009 --  Influenza AH3 --  Influenza B --  Mycoplasma Pneumoniae --  NL63 Coronovirus --  OC43 Coronovirus --  Parainfluenza 1 --  Parainfluenza 2 --  Parainfluenza 3 --  Parainfluenza 4 --  Resp Syncytial Virus --      Assessment and Plan:    1. Cellulitis of PEG site.  2. Left gluteal abscess with extensive myositis.   3. Left greater trochanter abscess.  4. Suspected right gluteal abscess.  5. Suspected osteomyelitis of sacrum and L-spine.  6. Bacteremia with Enterococcus.   6. Sepsis.   7. History of colon cancer with sigmoid resection and anastomosis.     . Blood cultures in the ED are growing Enterococcus. Still waiting for susceptibilities but would cover for VRE. Continue Daptomycin 6 mg/kg iv daily for suspected VRE bacteremia. Monitor CPK. Repeat blood cultures today.   . Continue IV Zosyn 3.375 gm q8h and IV Clindamycin for now.   . With the bilateral gluteal abscesses and extensive myositis of left gluteus, would suggest early surgical drainage and debridements rather than just IR drainage. Would also request surgery to evaluate for leaks of the rectosigmoid anastomosis as the source of abscesses.   . Agree to repeat MRI to evaluate for osteomyelitis of sacrum and Lspine.  . Would get ortho to re-evaluate of left greater trochanter abscess.       Lorenzo Zelaya MD   [phone].

## 2020-11-29 NOTE — PROGRESS NOTE ADULT - SUBJECTIVE AND OBJECTIVE BOX
[facility]  Santosh Stapleton PA-C  [address] [place] [address]  [phone]      INTERVAL HPI/OVERNIGHT EVENTS:    patient s/e  no leakage at g/j tube  MRI noted     MEDICATIONS  (STANDING):  ascorbic acid 500 milliGRAM(s) Oral daily  aspirin  chewable 81 milliGRAM(s) Enteral Tube daily  cholecalciferol 1000 Unit(s) Oral daily  clindamycin IVPB      clindamycin IVPB 600 milliGRAM(s) IV Intermittent every 8 hours  DAPTOmycin IVPB      DAPTOmycin IVPB 270 milliGRAM(s) IV Intermittent every 24 hours  dextrose 40% Gel 15 Gram(s) Oral once  dextrose 5%. 1000 milliLiter(s) (50 mL/Hr) IV Continuous <Continuous>  dextrose 5%. 1000 milliLiter(s) (100 mL/Hr) IV Continuous <Continuous>  dextrose 50% Injectable 25 Gram(s) IV Push once  dextrose 50% Injectable 12.5 Gram(s) IV Push once  dextrose 50% Injectable 25 Gram(s) IV Push once  DULoxetine 30 milliGRAM(s) Oral daily  FLUoxetine 20 milliGRAM(s) Oral daily  gabapentin 600 milliGRAM(s) Oral three times a day  glucagon  Injectable 1 milliGRAM(s) IntraMuscular once  heparin   Injectable 5000 Unit(s) SubCutaneous every 12 hours  lactated ringers. 1000 milliLiter(s) (80 mL/Hr) IV Continuous <Continuous>  lactulose Syrup 10 Gram(s) Oral three times a day  methadone    Tablet 60 milliGRAM(s) Oral <User Schedule>  methocarbamol 250 milliGRAM(s) Oral every 8 hours  midodrine. 15 milliGRAM(s) Oral three times a day  montelukast 10 milliGRAM(s) Oral at bedtime  multivitamin/minerals/iron Oral Solution (CENTRUM) 15 milliLiter(s) Oral daily  pantoprazole   Suspension 40 milliGRAM(s) Oral daily  piperacillin/tazobactam IVPB.. 3.375 Gram(s) IV Intermittent every 8 hours  senna 2 Tablet(s) Oral at bedtime  tiotropium 18 MICROgram(s) Capsule 1 Capsule(s) Inhalation daily  traZODone 100 milliGRAM(s) Oral at bedtime    MEDICATIONS  (PRN):  acetaminophen    Suspension .. 650 milliGRAM(s) Enteral Tube every 6 hours PRN Temp greater or equal to 38C (100.4F), Mild Pain (1 - 3)  ALBUTerol    90 MICROgram(s) HFA Inhaler 2 Puff(s) Inhalation every 6 hours PRN Shortness of Breath and/or Wheezing  magnesium hydroxide Suspension 15 milliLiter(s) Oral at bedtime PRN Constipation  morphine  - Injectable 2 milliGRAM(s) IV Push every 4 hours PRN Moderate Pain (4 - 6)      Allergies    Levaquin (Unknown)    Intolerances        ROS:   unable to obtain      PHYSICAL EXAM:   Vital Signs:  Vital Signs Last 24 Hrs  T(C): 36.6 (2020 07:44), Max: 37.1 (2020 16:01)  T(F): 97.8 (2020 07:44), Max: 98.8 (2020 16:01)  HR: 69 (2020 09:00) (62 - 80)  BP: 82/53 (2020 09:00) (76/52 - 132/67)  BP(mean): 63 (2020 09:00) (60 - 94)  RR: 12 (2020 09:00) (11 - 38)  SpO2: 94% (2020 09:00) (84% - 99%)  Daily     Daily Weight in k (2020 07:44)    nad  confused  frail  non toxic  soft, nt +g tube   no edema        LABS:                        8.6    11.01 )-----------( 538      ( 2020 06:11 )             29.2         137  |  99  |  12  ----------------------------<  92  4.0   |  28  |  0.35<L>    Ca    8.2<L>      2020 06:11  Phos  3.7       Mg     1.9         TPro  5.7<L>  /  Alb  1.3<L>  /  TBili  0.2  /  DBili  x   /  AST  18  /  ALT  13  /  AlkPhos  95  11-29    PT/INR - ( 2020 06:21 )   PT: 13.7 sec;   INR: 1.18 ratio         PTT - ( 2020 13:21 )  PTT:31.5 sec  Urinalysis Basic - ( 2020 22:39 )    Color: Yellow / Appearance: Clear / S.010 / pH: x  Gluc: x / Ketone: Negative  / Bili: Negative / Urobili: Negative   Blood: x / Protein: 15 / Nitrite: Negative   Leuk Esterase: Trace / RBC: 0-2 /HPF / WBC 3-5   Sq Epi: x / Non Sq Epi: Few / Bacteria: Occasional        RADIOLOGY & ADDITIONAL TESTS:

## 2020-11-29 NOTE — PROGRESS NOTE ADULT - SUBJECTIVE AND OBJECTIVE BOX
BRIEF HOSPITAL COURSE:   65F with PMHx of depression, colon CA sp sigmoid resection, IHR with mesh, HH repair, PEG for vocal cord paralysis, HTN, COPD who presented with leaking PEJ tube and abd discomfort. Workup revealed a L hip collection with concern of abscess. Subsequent development of shock requiring pressors. Course complicated by septic shock, enterococcal bacteremia.     Events last 24 hours: afebrile, overnight, called by nursing for hypotension, pressors restarted.     PAST MEDICAL & SURGICAL HISTORY:  Candidal stomatitis    Malignant neoplasm of colon    Major depressive disorder    HTN (hypertension)    COPD (chronic obstructive pulmonary disease)        Review of Systems:  12 pt ROS negative unless otherwise stated above      Medications:  clindamycin IVPB      clindamycin IVPB 600 milliGRAM(s) IV Intermittent every 8 hours  DAPTOmycin IVPB      DAPTOmycin IVPB 270 milliGRAM(s) IV Intermittent every 24 hours  piperacillin/tazobactam IVPB.. 3.375 Gram(s) IV Intermittent every 8 hours    midodrine. 15 milliGRAM(s) Oral three times a day  norepinephrine Infusion 0.05 MICROgram(s)/kG/Min IV Continuous <Continuous>    ALBUTerol    90 MICROgram(s) HFA Inhaler 1 Puff(s) Inhalation every 4 hours  ALBUTerol    90 MICROgram(s) HFA Inhaler 2 Puff(s) Inhalation every 6 hours PRN  montelukast 10 milliGRAM(s) Oral at bedtime  tiotropium 18 MICROgram(s) Capsule 1 Capsule(s) Inhalation daily    acetaminophen    Suspension .. 650 milliGRAM(s) Enteral Tube every 6 hours PRN  DULoxetine 30 milliGRAM(s) Oral daily  FLUoxetine 20 milliGRAM(s) Oral daily  gabapentin 600 milliGRAM(s) Oral three times a day  methadone    Tablet 60 milliGRAM(s) Oral daily  methocarbamol 250 milliGRAM(s) Oral every 8 hours  morphine  - Injectable 2 milliGRAM(s) IV Push every 4 hours PRN  traZODone 100 milliGRAM(s) Oral at bedtime      aspirin  chewable 81 milliGRAM(s) Enteral Tube daily  heparin   Injectable 5000 Unit(s) SubCutaneous every 12 hours    lactulose Syrup 10 Gram(s) Oral three times a day  magnesium hydroxide Suspension 15 milliLiter(s) Oral at bedtime PRN  pantoprazole   Suspension 40 milliGRAM(s) Oral daily  senna 2 Tablet(s) Oral at bedtime        ascorbic acid 500 milliGRAM(s) Oral daily  cholecalciferol 1000 Unit(s) Oral daily  lactated ringers. 1000 milliLiter(s) IV Continuous <Continuous>  multivitamin/minerals/iron Oral Solution (CENTRUM) 15 milliLiter(s) Oral daily                ICU Vital Signs Last 24 Hrs  T(C): 36.7 (2020 23:00), Max: 37.1 (2020 16:01)  T(F): 98 (2020 23:00), Max: 98.8 (2020 16:01)  HR: 62 (2020 04:) (62 - 85)  BP: 121/65 (:) (76/52 - 132/67)  BP(mean): 88 (:) (60 - 91)  ABP: --  ABP(mean): --  RR: 14 (:) (12 - 38)  SpO2: 98% (:) (84% - 99%)      I&O's Summary    2020 07:01  -  2020 07:00  --------------------------------------------------------  IN: 160 mL / OUT: 500 mL / NET: -340 mL    2020 07:01  -  2020 04:39  --------------------------------------------------------  IN: 1591.3 mL / OUT: 0 mL / NET: 1591.3 mL          LABS:                        7.8    12.83 )-----------( 481      ( 2020 06:21 )             26.4     11    138  |  103  |  12  ----------------------------<  66<L>  4.2   |  29  |  0.21<L>    Ca    7.8<L>      2020 06:21  Phos  3.3       Mg     1.9         TPro  5.6<L>  /  Alb  1.3<L>  /  TBili  0.3  /  DBili  <.10  /  AST  12<L>  /  ALT  8<L>  /  AlkPhos  80        CARDIAC MARKERS ( 2020 23:42 )  x     / x     / 23 U/L / x     / x      CARDIAC MARKERS ( 2020 13:21 )  <.015 ng/mL / x     / 16 U/L / x     / x          CAPILLARY BLOOD GLUCOSE        PT/INR - ( 2020 06:21 )   PT: 13.7 sec;   INR: 1.18 ratio         PTT - ( 2020 13:21 )  PTT:31.5 sec  Urinalysis Basic - ( 2020 22:39 )    Color: Yellow / Appearance: Clear / S.010 / pH: x  Gluc: x / Ketone: Negative  / Bili: Negative / Urobili: Negative   Blood: x / Protein: 15 / Nitrite: Negative   Leuk Esterase: Trace / RBC: 0-2 /HPF / WBC 3-5   Sq Epi: x / Non Sq Epi: Few / Bacteria: Occasional      CULTURES:  Culture Results:   <10,000 CFU/mL Normal Urogenital Ami ( @ 04:55)  Culture Results:   No growth to date. ( @ 18:29)  Culture Results:   Growth in anaerobic bottle: Gram Positive Cocci in Pairs and Chains  "Due to technical problems, Proteus sp. will Not be reported as part of  the BCID panel until further notice" ***Blood Panel PCR results on this  specimen are available  approximately 3 hours after the Gram stain result.***  Gram stain, PCR, and/or culture results may not always  correspond due to difference in methodologies.  ************************************************************  This PCR assay was performed using [employer].  The following targets are tested for: Enterococcus,  vancomycin resistant enterococci, Listeria monocytogenes,  coagulase negative staphylococci, S. aureus,  methicillin resistant S. aureus, Streptococcus agalactiae  (Group B), S. pneumoniae, S. pyogenes (Group A),  Acinetobacter baumannii, Enterobacter cloacae, E. coli,  Klebsiella oxytoca, K. pneumoniae, Proteus sp.,  Serratia marcescens, Haemophilus influenzae,  Neisseria meningitidis, Pseudomonas aeruginosa, Candida  albicans, C. glabrata, C krusei, C parapsilosis,  C. tropicalis and the KPC resistance gene. ( @ 18:29)  Rapid RVP Result: NotDetec ( @ 13:21)      Physical Examination:    General: No acute distress. cachetic, Alert interactive, nonfocal    HEENT: Pupils equal, reactive to light.  Symmetric, sclera anicteric    PULM: Clear to auscultation bilaterally, no significant sputum production    CVS: Regular rate and rhythm    ABD: Soft, nondistended, nontender, normoactive bowel sounds, no rebound or guardingPEJ site with reactive erythema surronding    EXT: L hip swelling/buttock, tender to palpation, no LE edema, neurovascular intact    SKIN: Warm and well perfused, no rashes noted.    RADIOLOGY:   EXAM:  MR HIP WAW IC LT                            PROCEDURE DATE:  2020          INTERPRETATION:  Clinical indications: Left hip abscess.    Multiplanar multisequence MRI of the left hip was performed with and without intravenous contrast.    4.5 cc of Gadavist was administered intravenously. 3 cc was discarded.    Correlation is made with prior CT of the abdomen and pelvis from 2020.    Findings:    There is extensive edema and enhancement involving the piriformis muscles bilaterally, greater on the left. On the left there is a fluid collection tracking from the left piriformis muscle laterally to the level of the gluteal muscles and the superior margin of the greater trochanter. This collection measures approximately 3.6 x 8.9 x 2.5 cm. This collection is closely associated with the distal myotendinous insertional fibers of the gluteus minimus and medius and piriformis muscles on the left greater trochanter. There is increased intrinsic T1 signal within the collection which may be related to some component of proteinaceous/hemorrhagic debris. Additional areas of loculated fluid are seen within the posterior aspect of the left greater trochanteric bursa measuring approximately 1.4 1.5 x 3.4 cm. There is extensive surrounding intramuscular edema and enhancement throughout the left gluteal, left short internal adductor muscles extending to the sacrum, within the left adductor musculature, and along the left anterior compartment thigh musculature. A developing fluid collection is also seen within the right piriformis muscle measuring approximately 3.7 x 1.2 x 1.7 cm. Edema and enhancement within the piriformis muscle also extends proximally to the bilateral S1/S2 and S2/S3 neural foramina with edema and enhancement seen in the sacral canal. Further evaluation with a dedicated MRI of the lumbar spine with and without contrast is recommended to evaluate for any epidural abscess. There is patchy edema and enhancement within the sacrum, more prominent about the right S1/S2 neural foramen with corresponding decreased T1 marrow signal. Findings raise concern for the possibility of developing osteomyelitis versus reactive osteitis. When correlating with the prior CT of the abdomen and pelvis there is evidence of small foci of extraluminal air within the presacral soft tissues adjacent to the rectosigmoid anastomosis. Findings raise concern for possibility of an anastomotic leak as the possible etiology of these findings.    There is pericapsular edema throughout the left hip. There is mild enhancement of the synovium of the left hip joint without a demonstrable joint effusion. Synovial enhancement is consistent with a nonspecific synovitis. This may be reactive in nature secondary to the pericapsular edema. Possibility of a developing septic arthrosis is not entirely excluded, however there is no joint effusion. Aside from patchy marrow signal abnormality within the sacrum the marrow signal within the remainder of the pelvis appears otherwise preserved.    The right hip joint space is preserved. There is severe lower lumbar spondylosis. There is mild bilateral sacroiliac joint arthrosis.    There is edema surrounding the fat planes of the left sciatic nerve. The left sciatic nerve is otherwise normal in signal characteristics.    There is no acute tendinous injury.    Impression:    Extensive myositis about the left hip musculature as outlined above with a collection decompressing from the left piriformis muscle laterally along the left gluteal myofascial planes to the level of the left greater trochanter. This collection demonstrates intrinsic increased T1 signal suggestive of some degree of underlying hemorrhagic/proteinaceous material. Prominent myositis is also seen within the right piriformis muscle with evidence of a developing fluid collection. On the prior CT there are small foci of extraluminal air seen posterior to the rectosigmoid anastomosis. This may be related to a small anastomotic leak and may be the etiology of this process. There is patchy edema within the sacrum, most notably about the right S1/S2 neural foramen which may be related to developing osteomyelitis or reactive osteitis. Edema extends along the bilateral S1/S2 and S2/S3 neural foramina. Further evaluation with a dedicated MRI of the lumbar spine with and without intravenous contrast is recommended to exclude any epidural abscess.    Additional fluid collection is seen within the posterior aspect of the left greater trochanteric bursa.    Pericapsular edema and enhancement about the left hip joint. Minimal synovial enhancement about the left hip without evidence of a left hip joint effusion. Findings may be related to reactive changes secondary to pericapsular edema, however the possibility of an early septic arthrosis is not entirely excluded.    Findings discussed with Dr. Burroughs.            SHITAL CORTES MD; Attending Radiologist  This document has been electronically signed. 2020  1:25PM    EXAM:  CT ABDOMEN AND PELVIS IC                            *** ADDENDUM 2020  ***    Addendum: Heterogeneous thickening of the left hip, with an ill-defined multi septated fluid collection containing foci of air measuring approximately 3.8 x 3.2 x 3.9 cm, concerning for abscess with surrounding cellulitis.    *** END OF ADDENDUM 2020  ***      PROCEDURE DATE:  2020          INTERPRETATION:  CLINICAL INFORMATION: 65 years old. Female. Abdominal pain, acute, nonlocalized.    COMPARISON: None.    PROCEDURE:  CT of the Abdomen and Pelvis was performed with intravenous contrast.  Intravenous contrast: 90 ml Omnipaque 350. 10 ml discarded.  Oral contrast: None.  Sagittal and coronal reformats were performed.    FINDINGS:    LOWER CHEST: Emphysematous changes in the visualized lung bases with an air cyst in the left lower lobe base.    LIVER: Within normal limits.  BILE DUCTS: Normal caliber.  GALLBLADDER: Cholecystectomy.  SPLEEN: Within normal limits.  PANCREAS: Within normal limits.  ADRENALS: Within normal limits.  KIDNEYS/URETERS: Enhance symmetrically. No hydronephrosis.    BLADDER: Within normal limits.  REPRODUCTIVE ORGANS: Uterus appears unremarkable.    BOWEL: No bowel obstruction. Percutaneous gastrojejunostomy is noted. Rectosigmoid anastomosis is also noted. Appendix is not definitively visualized; however, there appear to be mild inflammatory changes in the right lower quadrant. Moderate stool throughout the colon and rectum.  PERITONEUM: No ascites.  VESSELS: Normal caliber abdominal aorta. Atherosclerosis. Appendix is not definitively visualized.  RETROPERITONEUM/LYMPH NODES: No lymphadenopathy.  ABDOMINAL WALL: Within normal limits.  BONES: Degenerative changes in the spine. Scoliosis.    IMPRESSION:    Appendix is not definitively visualized; however, there appear to be mild inflammatory changes in the right lower quadrant. Appendicitis cannot be excluded. Consider repeat examination with oral contrast. Moderate stool throughout the colon and rectum.    Findings discussed with Dr. LEES on 2020 4:20 PM with readback.        ***Please see the addendum at the top of this report. It may contain additional important information or changes.****        FERNANDO GREEN MD; Attending Radiologist  This document has been electronically signed. 2020  4:26PM  Addend:FERNANDO GREEN MD; Attending Radiologist  This addendum was electronically signed on: 2020  5:56PM.      CRITICAL CARE TIME SPENT: 35 mins assessing presenting problems of acute illness that poses high probability of life threatening deterioration or end organ damage/dysfunction.  Medical decision making including Initiating plan of care, reviewing data, reviewing radiology, direct patient bedside evaluation and interpretation of vital signs, discusion with multidisciplinary team, all non inclusive of procedures

## 2020-11-29 NOTE — PROGRESS NOTE ADULT - ASSESSMENT
Impression:  1. enterococcal bacteremia  2. septic shock  3. anemia  4. severe protein malnutrition  5. L buttock abscess    Plan:  Neuro - avoiding neurosuppressants    CV -  actively titrating pressor support as needed to maintain MAP> 60          cont midodrine to facilitate pressor weaning          if escalating pressors would consider addition of stress steroids for CIRCI    Pulm -  stable currently on NC    GI -  PPI, start enteric feeds this am pending needs for intervention    Renal - Cr stable, strict I/Os, avoid nephrotoxins.     Heme -  Pharmacologic DVT PPx  in addition to SCD's, transfuse for Hbg<7 or signs of active bleeding    ID - cont IV abx at direction of ID, BCx + enterobacter, Abx adjustments based on discussion with ID in conjunction with clinical features, culture data, and judicious procalcitonin monitoring.  Ortho seen without evidence of hip involvement.         Will re-engage surgery for source control abscess needs this morning, IR versus surgical debridement.    Endo -  Aggressive glycemic control to limit FS glucose to < 180mg/dl, BS stable.

## 2020-11-29 NOTE — PROGRESS NOTE ADULT - ASSESSMENT
64 y/o F w/ pmh of recovered alcoholic, former smoker, copd, RA, chronic low back pain, hx of colon ca s/p sigmoid resection (2015), hx of open R. inguinal hernia repair w/ mesh (2010), hx of hiatal hernia repair (2019), zenker diverticulum surgery c/b vocal cord paralysis's requiring peg for feeding, s/p peg (replaced peg 3 weeks ago [facility]) transfer from [facility] to [facility] earlier today for leaking PEJ tube and on/off lower abd pain over the last 2-3 days. In the ED pt underwent an CT of abd/pelvis was found to have a Abscess around the L. hip ill-defined multi septated fluid collection containing foci of air measuring appx 3.8 x 3.2 x3.9cm concerning for abscess, WBC of 20, normal LA. MICU consulted for hypotension after 3L of IVF in the setting of sepsis from L. hip abscess.    Neuro:   No acute issues MS stable, currently awake and protecting her airway. PRN morphine for acute pain from abscess.  Home meds: Duloxetine Po, Fluoxetine PO, Trazadone. Chronic pain will continue with methadone/methocarbamol (back pain). Gabapentin.     Cardiac:   Hypotension possible from sepsis Midodrine and Levophed for BP support, required small dose levophed overnight will continue with midodrine.   CAD- continue with asa    Resp:   No acute issues, maintain o2 >92%. nasal cannula. No home O2. Continue with montelukast, albuterol and tiotropium inhaler.     GI:  NPO for now, GI ppx w/ protonix, constipation bowel regimen w/ lactulose, mag and senna, Intra abdominal inflammatory changes. per surgery to monitor will consider adding US to eval if pain continues. Continue with IVF    Renal:   Renal function stable, lytes stable, cont to monitor and trend and replete prn    ID:   L. piriformis abscess- continue with clindamycin and zosyn, vanco changed to daptomycin. 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 Follow up repeat BCx. MRI hip shows no evidence of septic arthritis, no intervention at this time as per ortho. Edema present at S1-S3 neural foramina, f/u MRI lumbar spine to r/o epidural abscess.     Endo:   no acute issues, monitor FS q6h given NPO status    Heme:   Started on heparin 5000U BID as per ortho    Msk:   Chronic low back pain on methadone 60mg daily checked HCS data-base and confirmed pt reports being on methadone for appx 15 years) along with gabapentin and Robaxin    Dispo: ICU management for sepsis. 66 y/o F w/ pmh of recovered alcoholic, former smoker, copd, RA, chronic low back pain, hx of colon ca s/p sigmoid resection (2015), hx of open R. inguinal hernia repair w/ mesh (2010), hx of hiatal hernia repair (2019), zenker diverticulum surgery c/b vocal cord paralysis's requiring peg for feeding, s/p peg (replaced peg 3 weeks ago [facility]) transfer [facility] to [facility] earlier today for leaking PEJ tube and on/off lower abd pain over the last 2-3 days. In the ED pt underwent an CT of abd/pelvis was found to have a Abscess around the L. hip ill-defined multi septated fluid collection containing foci of air measuring appx 3.8 x 3.2 x3.9cm concerning for abscess, WBC of 20, normal LA. MICU consulted for hypotension after 3L of IVF in the setting of sepsis from L. hip abscess.    Neuro:   No acute issues MS stable, currently awake and protecting her airway. PRN morphine for acute pain from abscess.  Home meds: Duloxetine Po, Fluoxetine PO, Trazadone. Chronic pain will continue with methadone/methocarbamol (back pain). Gabapentin.     Cardiac:   Hypotension possible from sepsis Midodrine and Levophed for BP support, required small dose levophed overnight will continue with midodrine.   CAD- continue with asa    Resp:   No acute issues, maintain o2 >92%. nasal cannula. No home O2. Continue with montelukast, albuterol and tiotropium inhaler.     GI:  NPO for now, GI ppx w/ protonix, constipation bowel regimen w/ lactulose, mag and senna, Intra abdominal inflammatory changes. Noted to have possible rectosigmoid anastomotic leak on MRI, will get follow up imaging to r/o extravasation.     Renal:   Renal function stable, lytes stable, cont to monitor and trend and replete prn    ID:   L. piriformis abscess- continue with clindamycin and zosyn, vanco changed to daptomycin. 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 Follow up repeat BCx. MRI hip shows no evidence of septic arthritis, no intervention at this time as per ortho. Edema present at S1-S3 neural foramina, f/u MRI lumbar spine to r/o epidural abscess. Surg spoke to IR, will discuss possible drainage for 11/30    Endo:   no acute issues, monitor FS q6h given NPO status    Heme:   Started on heparin 5000U BID as per ortho    Msk:   Chronic low back pain on methadone 60mg daily checked HCS data-base and confirmed pt reports being on methadone for appx 15 years) along with gabapentin and Robaxin    Dispo: ICU management for sepsis. 66 y/o F w/ pmh of recovered alcoholic, former smoker, copd, RA, chronic low back pain, hx of colon ca s/p sigmoid resection (2015), hx of open R. inguinal hernia repair w/ mesh (2010), hx of hiatal hernia repair (2019), zenker diverticulum surgery c/b vocal cord paralysis's requiring peg for feeding, s/p peg (replaced peg 3 weeks ago [facility]) transfer from [facility] to [facility] earlier today for leaking PEJ tube and on/off lower abd pain over the last 2-3 days. In the ED pt underwent an CT of abd/pelvis was found to have a Abscess around the L. hip ill-defined multi septated fluid collection containing foci of air measuring appx 3.8 x 3.2 x3.9cm concerning for abscess, WBC of 20, normal LA. MICU consulted for hypotension after 3L of IVF in the setting of sepsis from L. hip abscess.    Neuro:   No acute issues MS stable, currently awake and protecting her airway. PRN morphine for acute pain from abscess.  Home meds: Duloxetine Po, Fluoxetine PO, Trazadone. Chronic pain will continue with methadone/methocarbamol (back pain). Gabapentin.     Cardiac:   Hypotension possible from sepsis Midodrine and Levophed for BP support, required small dose levophed overnight will continue with midodrine.   CAD- continue with asa    Resp:   No acute issues, maintain o2 >92%. nasal cannula. No home O2. Continue with montelukast, albuterol and tiotropium inhaler.     GI:  NPO for now, GI ppx w/ protonix, constipation bowel regimen w/ lactulose, mag and senna, Intra abdominal inflammatory changes. Noted to have possible rectosigmoid anastomotic leak on MRI, will get follow up imaging to r/o extravasation.     Renal:   Renal function stable, lytes stable, cont to monitor and trend and replete prn    ID:   L. piriformis abscess- continue with clindamycin and zosyn, vanco changed to daptomycin. 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 Follow up repeat BCx. MRI hip shows no evidence of septic arthritis, no intervention at this time as per ortho. Edema present at S1-S3 neural foramina, f/u MRI lumbar spine to r/o epidural abscess. Surg spoke to IR, will discuss possible drainage for 11/30    Endo:   no acute issues, monitor FS q6h given NPO status    Heme:   Started on heparin 5000U BID as per ortho    Msk:   Chronic low back pain on methadone 60mg daily checked HCS data-base and confirmed pt reports being on methadone for appx 15 years) along with gabapentin and Robaxin    Dispo: ICU management for sepsis.     Spoke to daughter, Angelina Thomas, over the phone and updated on patient's clinical status.

## 2020-11-29 NOTE — PROGRESS NOTE ADULT - ATTENDING COMMENTS
64 yo woman with Hx past EtOH abuse, COPD, RA, chronic pain on methadone, Hx colon cancer s/p sigmoid resection 2015 presented with a leaking PEJ tube and found to have severe sepsis from a left buttock abscess and myositis, causing hypotension/septic shock.  Found to have enterococcus bacteremia.      --continue home regimen of cymbalta, fluoxetine, trazadone  chronic pain, continue methadone, robaxin  prn morphine for acute pain from abscess  --briefly required vasopressors overnight, now off  continue midodrine for hypotension  --stable respiratory status  COPD, continue bronchodilators  --normal renal function  --PEJ now leaking, GI eval  based on MRI imaging, will check CT a/p with PO or rectal contrast to eval for anastomotic leak as source of abscess,   --left piriformis abscess and myositis resulting in enterococcus bacteremia  continue dapto, zosyn, clinda pending further culture data  based on MRI imaging, check lumbar MRI to eval for evidence of epidural abscess  per ortho, no indication for orthopedic intervention at this point  discussed with surgery today, possible IR drainage

## 2020-11-30 NOTE — PROGRESS NOTE ADULT - SUBJECTIVE AND OBJECTIVE BOX
Patient is a 65y Female with a known history of :  Gastrostomy complication [K94.20]    HTN (hypertension) [I10]    Abscess of hip [L02.419]    Abdominal pain, unspecified abdominal location [R10.9]      HPI:  HPI: 64 y/o F w/ pmh of recovered alcoholic, former smoker, copd, RA, chronic low back pain (on methadone 60mg daily), hx of colon ca s/p sigmoid resection (2015), hx of open R. inguinal hernia repair w/ mesh (2010), hx of hiatal hernia repair (2019), zenker diverticulum surgery c/b vocal cord paralysis's requiring peg for feeding, s/p peg (replaced peg 3 weeks ago [facility]) transfer from [facility] to [facility] earlier today for leaking PEJ tube and on/off lower abd pain over the last 2-3 days. In the ED pt underwent an CT of abd/pelvis was found to have a Abscess around the L. hip ill-defined multi septated fluid collection containing foci of air measuring appx 3.8 x 3.2 x3.9cm concerning for abscess, WBC of 20, normal LA. Pt was seen by both surgical and ortho team who recommenced no surgical intervention at this time. MICU consulted for hypotension, (27 Nov 2020 19:45)      REVIEW OF SYSTEMS:    CONSTITUTIONAL: No fever, weight loss, or fatigue  EYES: No eye pain, visual disturbances, or discharge  ENMT:  No difficulty hearing, tinnitus, vertigo; No sinus or throat pain  NECK: No pain or stiffness  BREASTS: No pain, masses, or nipple discharge  RESPIRATORY: No cough, wheezing, chills or hemoptysis; No shortness of breath  CARDIOVASCULAR: No chest pain, palpitations, dizziness, or leg swelling  GASTROINTESTINAL: No abdominal or epigastric pain. No nausea, vomiting, or hematemesis; No diarrhea or constipation. No melena or hematochezia.  GENITOURINARY: No dysuria, frequency, hematuria, or incontinence  NEUROLOGICAL: No headaches, memory loss, loss of strength, numbness, or tremors  SKIN: No itching, burning, rashes, or lesions   LYMPH NODES: No enlarged glands  ENDOCRINE: No heat or cold intolerance; No hair loss  MUSCULOSKELETAL: No joint pain or swelling; No muscle, back, or extremity pain  PSYCHIATRIC: No depression, anxiety, mood swings, or difficulty sleeping  HEME/LYMPH: No easy bruising, or bleeding gums  ALLERGY AND IMMUNOLOGIC: No hives or eczema    MEDICATIONS  (STANDING):  ascorbic acid 500 milliGRAM(s) Oral daily  aspirin  chewable 81 milliGRAM(s) Enteral Tube daily  cholecalciferol 1000 Unit(s) Oral daily  clindamycin IVPB      clindamycin IVPB 600 milliGRAM(s) IV Intermittent every 8 hours  DAPTOmycin IVPB      DAPTOmycin IVPB 270 milliGRAM(s) IV Intermittent every 24 hours  dextrose 40% Gel 15 Gram(s) Oral once  dextrose 5% + lactated ringers. 1000 milliLiter(s) (80 mL/Hr) IV Continuous <Continuous>  dextrose 5%. 1000 milliLiter(s) (100 mL/Hr) IV Continuous <Continuous>  dextrose 5%. 1000 milliLiter(s) (50 mL/Hr) IV Continuous <Continuous>  dextrose 50% Injectable 25 Gram(s) IV Push once  dextrose 50% Injectable 12.5 Gram(s) IV Push once  dextrose 50% Injectable 25 Gram(s) IV Push once  DULoxetine 30 milliGRAM(s) Oral daily  FLUoxetine 20 milliGRAM(s) Oral daily  gabapentin 600 milliGRAM(s) Oral three times a day  glucagon  Injectable 1 milliGRAM(s) IntraMuscular once  heparin   Injectable 5000 Unit(s) SubCutaneous every 12 hours  lactulose Syrup 10 Gram(s) Oral three times a day  methadone    Tablet 60 milliGRAM(s) Oral <User Schedule>  methocarbamol 250 milliGRAM(s) Oral every 8 hours  midodrine. 15 milliGRAM(s) Oral three times a day  montelukast 10 milliGRAM(s) Oral at bedtime  multivitamin/minerals/iron Oral Solution (CENTRUM) 15 milliLiter(s) Oral daily  pantoprazole   Suspension 40 milliGRAM(s) Oral daily  piperacillin/tazobactam IVPB.. 3.375 Gram(s) IV Intermittent every 8 hours  senna 2 Tablet(s) Oral at bedtime  tiotropium 18 MICROgram(s) Capsule 1 Capsule(s) Inhalation daily  traZODone 100 milliGRAM(s) Oral at bedtime    MEDICATIONS  (PRN):  acetaminophen    Suspension .. 650 milliGRAM(s) Enteral Tube every 6 hours PRN Temp greater or equal to 38C (100.4F), Mild Pain (1 - 3)  ALBUTerol    90 MICROgram(s) HFA Inhaler 2 Puff(s) Inhalation every 6 hours PRN Shortness of Breath and/or Wheezing  magnesium hydroxide Suspension 15 milliLiter(s) Oral at bedtime PRN Constipation  morphine  - Injectable 2 milliGRAM(s) IV Push every 4 hours PRN Moderate Pain (4 - 6)      ALLERGIES: Levaquin (Unknown)      FAMILY HISTORY:      PHYSICAL EXAMINATION:  -----------------------------  T(C): 36.6 (11-30-20 @ 04:00), Max: 37.2 (11-29-20 @ 20:30)  HR: 78 (11-30-20 @ 09:00) (62 - 81)  BP: 80/50 (11-30-20 @ 09:00) (80/50 - 127/65)  RR: 18 (11-30-20 @ 09:00) (10 - 31)  SpO2: 96% (11-30-20 @ 09:00) (88% - 97%)  Wt(kg): --    11-29 @ 07:01  -  11-30 @ 07:00  --------------------------------------------------------  IN:    dextrose 5% + lactated ringers: 1600 mL    IV PiggyBack: 50 mL    IV PiggyBack: 500 mL    IV PiggyBack: 50 mL    IV PiggyBack: 200 mL    IV PiggyBack: 100 mL    Lactated Ringers: 329 mL  Total IN: 2829 mL    OUT:    Voided (mL): 750 mL  Total OUT: 750 mL    Total NET: 2079 mL            VITALS  T(C): 36.6 (11-30-20 @ 04:00), Max: 37.2 (11-29-20 @ 20:30)  HR: 78 (11-30-20 @ 09:00) (62 - 81)  BP: 80/50 (11-30-20 @ 09:00) (80/50 - 127/65)  RR: 18 (11-30-20 @ 09:00) (10 - 31)  SpO2: 96% (11-30-20 @ 09:00) (88% - 97%)    Constitutional: well developed, normal appearance, well groomed, well nourished, no deformities and no acute distress.   Eyes: the conjunctiva exhibited no abnormalities and the eyelids demonstrated no xanthelasmas.   HEENT: normal oral mucosa, no oral pallor and no oral cyanosis.   Neck: normal jugular venous A waves present, normal jugular venous V waves present and no jugular venous morales A waves.   Pulmonary: no respiratory distress, normal respiratory rhythm and effort, no accessory muscle use and lungs were clear to auscultation bilaterally.   Cardiovascular: heart rate and rhythm were normal, normal S1 and S2 and no murmur, gallop, rub, heave or thrill are present.   Abdomen: soft, non-tender, no hepato-splenomegaly and no abdominal mass palpated.   Musculoskeletal: the gait could not be assessed..   Extremities: no clubbing of the fingernails, no localized cyanosis, no petechial hemorrhages and no ischemic changes.   Skin: normal skin color and pigmentation, no rash, no venous stasis, no skin lesions, no skin ulcer and no xanthoma was observed.   Psychiatric: oriented to person, place, and time, the affect was normal, the mood was normal and not feeling anxious.     LABS:   --------  11-30    134<L>  |  97  |  11  ----------------------------<  144<H>  3.8   |  31  |  0.31<L>    Ca    7.7<L>      30 Nov 2020 07:39  Phos  2.8     11-30  Mg     1.8     11-30    TPro  5.1<L>  /  Alb  1.2<L>  /  TBili  0.2  /  DBili  x   /  AST  15  /  ALT  12  /  AlkPhos  89  11-30                         7.6    9.40  )-----------( 506      ( 30 Nov 2020 07:39 )             25.8                 RADIOLOGY:  -----------------    ECG:     ECHO:

## 2020-11-30 NOTE — PROGRESS NOTE ADULT - SUBJECTIVE AND OBJECTIVE BOX
PROGRESS NOTE  Patient is a 65y old  Female who presents with a chief complaint of the L. hip ill-defined multi septated fluid collection containing foci of air measuring appx 3.8 x 3.2 x3.9cm concerning for abscess, WBC of 20, normal LA. Pt was seen by both surgical and ortho team who recommenced no surgical intervention at this time. MICU consulted for hypotension, (30 Nov 2020 13:50)  Chart and available morning labs /imaging are reviewed electronically , urgent issues addressed . More information  is being added upon completion of rounds , when more information is collected and management discussed with consultants , medical staff and social service/case management on the floor   OVERNIGHT -REMAINS IN ICU   Was hypotensive over night and resumed on levaphed Followed by surgery and ortho team ID input is appreciated   HPI:  HPI: 66 y/o F w/ pmh of recovered alcoholic, former smoker, copd, RA, chronic low back pain (on methadone 60mg daily), hx of colon ca s/p sigmoid resection (2015), hx of open R. inguinal hernia repair w/ mesh (2010), hx of hiatal hernia repair (2019), zenker diverticulum surgery c/b vocal cord paralysis's requiring peg for feeding, s/p peg (replaced peg 3 weeks ago [facility]) transfer from [facility] to [facility] earlier today for leaking PEJ tube and on/off lower abd pain over the last 2-3 days. In the ED pt underwent an CT of abd/pelvis was found to have a Abscess around the L. hip ill-defined multi septated fluid collection containing foci of air measuring appx 3.8 x 3.2 x3.9cm concerning for abscess, WBC of 20, normal LA. Pt was seen by both surgical and ortho team who recommenced no surgical intervention at this time. MICU consulted for hypotension, (27 Nov 2020 19:45)  PAST MEDICAL & SURGICAL HISTORY:  Candidal stomatitis  Malignant neoplasm of colon  Major depressive disorder  HTN (hypertension)  COPD (chronic obstructive pulmonary disease)  MEDICATIONS  (STANDING):  ascorbic acid 500 milliGRAM(s) Oral daily  aspirin  chewable 81 milliGRAM(s) Enteral Tube daily  cholecalciferol 1000 Unit(s) Oral daily  dextrose 40% Gel 15 Gram(s) Oral once  dextrose 5% + lactated ringers. 1000 milliLiter(s) (80 mL/Hr) IV Continuous <Continuous>  dextrose 5%. 1000 milliLiter(s) (100 mL/Hr) IV Continuous <Continuous>  dextrose 5%. 1000 milliLiter(s) (50 mL/Hr) IV Continuous <Continuous>  dextrose 50% Injectable 25 Gram(s) IV Push once  dextrose 50% Injectable 12.5 Gram(s) IV Push once  dextrose 50% Injectable 25 Gram(s) IV Push once  DULoxetine 30 milliGRAM(s) Oral daily  FLUoxetine 20 milliGRAM(s) Oral daily  gabapentin 600 milliGRAM(s) Oral three times a day  glucagon  Injectable 1 milliGRAM(s) IntraMuscular once  heparin   Injectable 5000 Unit(s) SubCutaneous every 12 hours  lactulose Syrup 10 Gram(s) Oral three times a day  methocarbamol 250 milliGRAM(s) Oral every 8 hours  midodrine. 15 milliGRAM(s) Oral three times a day  montelukast 10 milliGRAM(s) Oral at bedtime  multivitamin/minerals/iron Oral Solution (CENTRUM) 15 milliLiter(s) Oral daily  pantoprazole   Suspension 40 milliGRAM(s) Oral daily  piperacillin/tazobactam IVPB.. 3.375 Gram(s) IV Intermittent every 8 hours  senna 2 Tablet(s) Oral at bedtime  tiotropium 18 MICROgram(s) Capsule 1 Capsule(s) Inhalation daily  traZODone 100 milliGRAM(s) Oral at bedtime  MEDICATIONS  (PRN):  acetaminophen    Suspension .. 650 milliGRAM(s) Enteral Tube every 6 hours PRN Temp greater or equal to 38C (100.4F), Mild Pain (1 - 3)  ALBUTerol    90 MICROgram(s) HFA Inhaler 2 Puff(s) Inhalation every 6 hours PRN Shortness of Breath and/or Wheezing  magnesium hydroxide Suspension 15 milliLiter(s) Oral at bedtime PRN Constipation  OBJECTIVE  T(C): 36.7 (11-30-20 @ 15:23), Max: 37.2 (11-29-20 @ 20:30)  HR: 60 (11-30-20 @ 16:00) (59 - 81)  BP: 107/60 (11-30-20 @ 16:00) (80/50 - 127/65)  RR: 10 (11-30-20 @ 16:00) (10 - 31)  SpO2: 96% (11-30-20 @ 16:00) (88% - 97%)  Wt(kg): --  I&O's Summary    29 Nov 2020 07:01  -  30 Nov 2020 07:00  --------------------------------------------------------  IN: 2829 mL / OUT: 750 mL / NET: 2079 mL  REVIEW OF SYSTEMS:  LIMITED   PHYSICAL EXAM:  Appearance: NAD. VS past 24 hrs -as above   HEENT:   Moist oral mucosa. Conjunctiva clear b/l.   Neck : supple  Respiratory: Lungs CTAB.  Gastrointestinal:  Soft, nontender. No rebound. No rigidity. BS present	PEG  Cardiovascular: RRR ,S1S2 present  Neurologic: Non-focal. Moving all extremities.  Extremities: L. hip with mild swelling and TTP along the gluteal region, no erythema  Skin: No rashes, No ecchymoses, No cyanosis.	  wounds ,skin lesions-See skin assesment flow sheet   LABS:                        7.6    9.40  )-----------( 506      ( 30 Nov 2020 07:39 )             25.8     11-30    134<L>  |  97  |  11  ----------------------------<  144<H>  3.8   |  31  |  0.31<L>    Ca    7.7<L>      30 Nov 2020 07:39  Phos  2.8     11-30  Mg     1.8     11-30    TPro  5.1<L>  /  Alb  1.2<L>  /  TBili  0.2  /  DBili  x   /  AST  15  /  ALT  12  /  AlkPhos  89  11-30    CAPILLARY BLOOD GLUCOSE      POCT Blood Glucose.: 132 mg/dL (30 Nov 2020 12:16)  POCT Blood Glucose.: 142 mg/dL (30 Nov 2020 06:12)  POCT Blood Glucose.: 141 mg/dL (29 Nov 2020 23:01)          Culture - Blood (collected 29 Nov 2020 11:41)  Source: .Blood Blood-Peripheral  Preliminary Report (30 Nov 2020 12:01):    No growth to date.    Culture - Blood (collected 29 Nov 2020 11:41)  Source: .Blood Blood-Peripheral  Preliminary Report (30 Nov 2020 12:01):    No growth to date.    Culture - Urine (collected 28 Nov 2020 04:55)  Source: .Urine Clean Catch (Midstream)  Final Report (29 Nov 2020 00:44):    <10,000 CFU/mL Normal Urogenital Ami    Culture - Blood (collected 27 Nov 2020 18:29)  Source: .Blood Blood-Peripheral  Gram Stain (28 Nov 2020 11:34):    Growth in anaerobic bottle: Gram Positive Cocci in Pairs and Chains  Final Report (30 Nov 2020 11:00):    Growth in anaerobic bottle: Enterococcus faecalis    "Due to technical problems, Proteus sp. will Not be reported as part of    the BCID panel until further notice" ***Blood Panel PCR results on this    specimen are available    approximately 3 hours afterthe Gram stain result.***    Gram stain, PCR, and/or culture results may not always    correspond due to difference in methodologies.    ************************************************************    This PCR assay was performed using [employer].    The following targets are tested for: Enterococcus,    vancomycin resistant enterococci, Listeria monocytogenes,    coagulase negative staphylococci, S. aureus,    methicillin resistant S. aureus, Streptococcus agalactiae    (Group B), S. pneumoniae, S. pyogenes (Group A),    Acinetobacter baumannii, Enterobacter cloacae, E. coli,    Klebsiella oxytoca, K. pneumoniae, Proteus sp.,    Serratia marcescens, Haemophilus influenzae,    Neisseria meningitidis, Pseudomonas aeruginosa, Candida    albicans, C. glabrata, C krusei, C parapsilosis,    C. tropicalis and the KPC resistance gene.  Organism: Blood Culture PCR  Enterococcus faecalis (30 Nov 2020 11:00)  Organism: Enterococcus faecalis (30 Nov 2020 11:00)  Organism: Blood Culture PCR (30 Nov 2020 11:00)    Culture - Blood (collected 27 Nov 2020 18:29)  Source: .Blood Blood-Peripheral  Preliminary Report (28 Nov 2020 19:01):    No growth to date.      RADIOLOGY & ADDITIONAL TESTS:< from: IR Procedure (11.30.20 @ 13:57) >  INTERPRETATION:  Clinical data: Fluid collection in left gluteal region for aspiration and possible drainage    Procedure: The patient was placed prone on the fluoroscopic table. Continuous hemodynamic monitoring was performed. The known left gluteal fluid collection was localized using limited ultrasound of the left gluteal region. The left gluteal region was prepped and draped in sterile fashion. Skinanesthesia was achieved using 2% lidocaine solution. The fluid collection was punctured under ultrasound guidance using a 19-gauge sheathed needle. Serosanguineous material was aspirated with a specimen sent for culture. Via the sheath approximately 5 cc of fluid were manually aspirated. The sheath was removed and a sterile dressing was applied. The patient tolerated the procedure without complication.    Impression: Aspiration of left gluteal fluid collection using ultrasound guidance as above.    < end of copied text >  < from: MR Hip w/wo IV Cont, Left (11.28.20 @ 12:34) >  EXAM:  MR HIP WAW IC LT                            PROCEDURE DATE:  11/28/2020          INTERPRETATION:  Clinical indications: Left hip abscess.    Multiplanar multisequence MRI of the left hip was performed with and without intravenous contrast.    4.5 cc of Gadavist was administered intravenously. 3 cc was discarded.    Correlation is made with prior CT of the abdomen and pelvis from November 27, 2020.    Findings:    There is extensive edema and enhancement involving the piriformis muscles bilaterally, greater on the left. On the left there is a fluid collection tracking from the left piriformis muscle laterally to the level of the gluteal muscles and the superior margin of the greater trochanter. This collection measures approximately 3.6 x 8.9 x 2.5 cm. This collection is closely associated with the distal myotendinous insertional fibers of the gluteus minimus and medius and piriformis muscles on the left greater trochanter. There is increased intrinsic T1 signal within the collection which may be related to some component of proteinaceous/hemorrhagic debris. Additional areas of loculated fluid are seen within the posterior aspect of the left greater trochanteric bursa measuring approximately 1.4 1.5 x 3.4 cm. There is extensive surrounding intramuscular edema and enhancement throughout the left gluteal, left short internal adductor muscles extending to the sacrum, within the left adductor musculature, and along the left anterior compartment thigh musculature. A developing fluid collection is also seen within the right piriformis muscle measuring approximately 3.7 x 1.2 x 1.7 cm. Edema and enhancement within the piriformis muscle also extends proximally to the bilateral S1/S2 and S2/S3 neural foramina with edema and enhancement seen in the sacral canal. Further evaluation with a dedicated MRI of the lumbar spine with and without contrast is recommended to evaluate for any epidural abscess. There is patchy edema and enhancement within the sacrum, more prominent aboutthe right S1/S2 neural foramen with corresponding decreased T1 marrow signal. Findings raise concern for the possibility of developing osteomyelitis versus reactive osteitis. When correlating with the prior CT of the abdomen and pelvis there is evidence of small foci of extraluminal air within the presacral soft tissues adjacent to the rectosigmoid anastomosis. Findings raise concern for possibility of an anastomotic leak as the possible etiology of these findings.    There is pericapsular edema throughout the left hip. There is mild enhancement of the synovium of the left hip joint without a demonstrable joint effusion. Synovial enhancement is consistent with a nonspecific synovitis. This may be reactive in nature secondary to the pericapsular edema. Possibility of a developing septic arthrosis is not entirely excluded, however there is no joint effusion. Aside from patchy marrow signal abnormality within the sacrum the marrow signal within the remainder of the pelvis appears otherwise preserved.    The right hip joint space is preserved. There is severe lower lumbar spondylosis. There is mild bilateral sacroiliac joint arthrosis.    There is edema surrounding the fat planes of the left sciatic nerve. The left sciatic nerve is otherwise normal in signal characteristics.    There is no acute tendinous injury.    Impression:    Extensive myositis about the left hip musculature as outlined above with a collection decompressing from the left piriformis muscle laterally along the leftgluteal myofascial planes to the level of the left greater trochanter. This collection demonstrates intrinsic increased T1 signal suggestive of some degree of underlying hemorrhagic/proteinaceous material. Prominent myositis is also seen within the right piriformis muscle with evidence of a developing fluid collection. On the prior CT there are small foci of extraluminal air seen posterior to the rectosigmoid anastomosis. This may be related to a small anastomotic leak and may be the etiology of this process. There is patchy edema within the sacrum, most notably about the right S1/S2 neural foramen which may be related to developing osteomyelitis or reactive osteitis. Edema extends along the bilateral S1/S2 and S2/S3 neural foramina. Further evaluation with a dedicated MRI of the lumbar spine with and without intravenous contrast is recommended to exclude any epidural abscess.    Additional fluid collection is seen within the posterior aspect of the left greater trochanteric bursa.    Pericapsular edema and enhancement about the left hip joint. Minimal synovial enhancement about the left hip without evidence of a left hip joint effusion. Findings may be related to reactive changes secondary to pericapsular edema, however the possibility of an early septic arthrosis is not entirely excluded.    < end of copied text >     reviewed elctronically  ASSESSMENT/PLAN:

## 2020-11-30 NOTE — PROGRESS NOTE ADULT - ASSESSMENT
s/p peg   s/p colon resection for ca   abdominal pain- abnormal ct abdomen r/o appendicitis  r/o hip absces  hypertension  further management as per surgery  mri of left hip  - r/o abscess -on antibiotics

## 2020-11-30 NOTE — PROGRESS NOTE ADULT - SUBJECTIVE AND OBJECTIVE BOX
Interventional Radiology Procedure Note    Procedure:  US FNA of left gluteal collection    Indication:  LEft gluteal collection, sepsis    Operators: [place]    Anesthesia (type): lidocaine 1 %    Contrast:  none    EBL: minimal    Findings/Follow up Plan of Care:  FNA of left gluteal collection yielded approx 5 cc of maroon-colored material which appears to represent old blood.  Specimen sent for culture.  Full report to follow.    Specimens Removed: culture    Implants: none    Complications: none    Condition/Disposition:  Recovery in ICU.  Discussed with Dr. Monroe.     Please call Interventional Radiology x [phone] with any questions, concerns, or issues.

## 2020-11-30 NOTE — PROGRESS NOTE ADULT - SUBJECTIVE AND OBJECTIVE BOX
Patient is a 65y old  Female who presents with a chief complaint of the L. hip ill-defined multi septated fluid collection containing foci of air measuring appx 3.8 x 3.2 x3.9cm concerning for abscess, WBC of 20, normal LA. Pt was seen by both surgical and ortho team who recommenced no surgical intervention at this time. MICU consulted for hypotension, (30 Nov 2020 18:48)      INTERVAL HPI/OVERNIGHT EVENTS:    admits to cough. Hypotension has resolved    MEDICATIONS  (STANDING):  ascorbic acid 500 milliGRAM(s) Oral daily  aspirin  chewable 81 milliGRAM(s) Enteral Tube daily  cholecalciferol 1000 Unit(s) Oral daily  dextrose 40% Gel 15 Gram(s) Oral once  dextrose 5% + lactated ringers. 1000 milliLiter(s) (80 mL/Hr) IV Continuous <Continuous>  dextrose 5%. 1000 milliLiter(s) (100 mL/Hr) IV Continuous <Continuous>  dextrose 5%. 1000 milliLiter(s) (50 mL/Hr) IV Continuous <Continuous>  dextrose 50% Injectable 25 Gram(s) IV Push once  dextrose 50% Injectable 12.5 Gram(s) IV Push once  dextrose 50% Injectable 25 Gram(s) IV Push once  DULoxetine 30 milliGRAM(s) Oral daily  FLUoxetine 20 milliGRAM(s) Oral daily  gabapentin 600 milliGRAM(s) Oral three times a day  glucagon  Injectable 1 milliGRAM(s) IntraMuscular once  heparin   Injectable 5000 Unit(s) SubCutaneous every 12 hours  lactulose Syrup 10 Gram(s) Oral three times a day  methocarbamol 250 milliGRAM(s) Oral every 8 hours  midodrine. 15 milliGRAM(s) Oral three times a day  montelukast 10 milliGRAM(s) Oral at bedtime  multivitamin/minerals/iron Oral Solution (CENTRUM) 15 milliLiter(s) Oral daily  pantoprazole   Suspension 40 milliGRAM(s) Oral daily  piperacillin/tazobactam IVPB.. 3.375 Gram(s) IV Intermittent every 8 hours  senna 2 Tablet(s) Oral at bedtime  tiotropium 18 MICROgram(s) Capsule 1 Capsule(s) Inhalation daily  traZODone 100 milliGRAM(s) Oral at bedtime      MEDICATIONS  (PRN):  acetaminophen    Suspension .. 650 milliGRAM(s) Enteral Tube every 6 hours PRN Temp greater or equal to 38C (100.4F), Mild Pain (1 - 3)  ALBUTerol    90 MICROgram(s) HFA Inhaler 2 Puff(s) Inhalation every 6 hours PRN Shortness of Breath and/or Wheezing  magnesium hydroxide Suspension 15 milliLiter(s) Oral at bedtime PRN Constipation      Allergies    Levaquin (Unknown)    Intolerances        PAST MEDICAL & SURGICAL HISTORY:  Candidal stomatitis    Malignant neoplasm of colon    Major depressive disorder    HTN (hypertension)    COPD (chronic obstructive pulmonary disease)        Vital Signs Last 24 Hrs  T(C): 36.7 (30 Nov 2020 15:23), Max: 37.2 (29 Nov 2020 20:30)  T(F): 98.1 (30 Nov 2020 15:23), Max: 99 (29 Nov 2020 20:30)  HR: 64 (30 Nov 2020 18:00) (58 - 81)  BP: 110/56 (30 Nov 2020 18:00) (80/50 - 127/65)  BP(mean): 75 (30 Nov 2020 18:00) (60 - 88)  RR: 16 (30 Nov 2020 18:00) (10 - 31)  SpO2: 97% (30 Nov 2020 18:00) (88% - 97%)    PHYSICAL EXAMINATION:    GENERAL: The patient is cachectic, awake and alert in no apparent distress.     HEENT: Head is normocephalic and atraumatic. Extraocular muscles are intact. Mucous membranes are moist.    NECK: Supple.    LUNGS: diminished air entry bilateral    HEART: Regular rate and rhythm without murmur.    ABDOMEN: Soft, nontender, and nondistended.      EXTREMITIES: Without any cyanosis, clubbing, rash, lesions or edema.    NEUROLOGIC: Grossly intact.    SKIN: No ulceration or induration present.      LABS:                        7.6    9.40  )-----------( 506      ( 30 Nov 2020 07:39 )             25.8     11-30    134<L>  |  97  |  11  ----------------------------<  144<H>  3.8   |  31  |  0.31<L>    Ca    7.7<L>      30 Nov 2020 07:39  Phos  2.8     11-30  Mg     1.8     11-30    TPro  5.1<L>  /  Alb  1.2<L>  /  TBili  0.2  /  DBili  x   /  AST  15  /  ALT  12  /  AlkPhos  89  11-30          CARDIAC MARKERS ( 29 Nov 2020 06:11 )  x     / x     / 61 U/L / x     / x                Procalcitonin, Serum: 0.19 ng/mL (11-27-20 @ 23:42)      MICROBIOLOGY:  Culture Results:   No growth to date. (11-29 @ 11:41)  Culture Results:   No growth to date. (11-29 @ 11:41)  Culture Results:   <10,000 CFU/mL Normal Urogenital Ami (11-28 @ 04:55)  Culture Results:   No growth to date. (11-27 @ 18:29)  Culture Results:   Growth in anaerobic bottle: Enterococcus faecalis  "Due to technical problems, Proteus sp. will Not be reported as part of  the BCID panel until further notice" ***Blood Panel PCR results on this  specimen are available  approximately 3 hours afterthe Gram stain result.***  Gram stain, PCR, and/or culture results may not always  correspond due to difference in methodologies.  ************************************************************  This PCR assay was performed using [employer].  The following targets are tested for: Enterococcus,  vancomycin resistant enterococci, Listeria monocytogenes,  coagulase negative staphylococci, S. aureus,  methicillin resistant S. aureus, Streptococcus agalactiae  (Group B), S. pneumoniae, S. pyogenes (Group A),  Acinetobacter baumannii, Enterobacter cloacae, E. coli,  Klebsiella oxytoca, K. pneumoniae, Proteus sp.,  Serratia marcescens, Haemophilus influenzae,  Neisseria meningitidis, Pseudomonas aeruginosa, Candida  albicans, C. glabrata, C krusei, C parapsilosis,  C. tropicalis and the KPC resistance gene. (11-27 @ 18:29)      Assessment:    Gram Negative Bacteremia  Left hip abscess  Hx COPD  Hx Recurrent Aspiration Pneumonia  S/P G Tube  Hypotension due to sepsis - resolved  Hx Hypertension    Plan:    Supple oxygen  IV antibiotics  Pain control  Cardiac monitoring  Spiriva daily  Albuterol PRN

## 2020-11-30 NOTE — PROGRESS NOTE ADULT - SUBJECTIVE AND OBJECTIVE BOX
pt seen  c/o back pain/ L hip pain  ICU Vital Signs Last 24 Hrs  T(C): 36.6 (30 Nov 2020 04:00), Max: 37.2 (29 Nov 2020 20:30)  T(F): 97.8 (30 Nov 2020 04:00), Max: 99 (29 Nov 2020 20:30)  HR: 78 (30 Nov 2020 09:00) (62 - 81)  BP: 80/50 (30 Nov 2020 09:00) (80/50 - 127/65)  BP(mean): 60 (30 Nov 2020 09:00) (60 - 88)  ABP: --  ABP(mean): --  RR: 18 (30 Nov 2020 09:00) (10 - 31)  SpO2: 96% (30 Nov 2020 09:00) (88% - 97%)  gen-NAD  resp-clear  abd-soft NT/ND  L hip tenderness                          7.6    9.40  )-----------( 506      ( 30 Nov 2020 07:39 )             25.8   11-30    134<L>  |  97  |  11  ----------------------------<  144<H>  3.8   |  31  |  0.31<L>    Ca    7.7<L>      30 Nov 2020 07:39  Phos  2.8     11-30  Mg     1.8     11-30    TPro  5.1<L>  /  Alb  1.2<L>  /  TBili  0.2  /  DBili  x   /  AST  15  /  ALT  12  /  AlkPhos  89  11-30

## 2020-11-30 NOTE — PROGRESS NOTE ADULT - ATTENDING COMMENTS
Patient is a 65y old  Female PMHx of depression, colon CA sp sigmoid resection, IHR with mesh, HH repair, PEG for vocal cord paralysis, HTN, COPD who presented with leaking PEJ tube and abd discomfort.who presents with a chief complaint of the L. hip ill-defined multi septated fluid collection containing foci of air measuring appx 3.8 x 3.2 x3.9cm concerning for abscess, WBC of 20, normal LA. Pt was seen by both surgical and ortho team who recommenced no surgical intervention at this time. MICU consulted for hypotension, (30 Nov 2020 00:44) Admitted for septic workup and evaluation ,send blood and urine cx ,serial lactate levels, monitor vitals closely hydration, monitor urine output and renal profile ,iv abx initiated L. piriformis abscess- continue with clindamycin and Daptomycin. Zosynathalia vanco changed to daptomycin. Noted to have enterococcus bacteremia ,Left gluteal abscess with extensive myositis. ,Left greater trochanter abscess ,Suspected right gluteal abscess ,Suspected osteomyelitis of sacrum and L-spine ,  Bacteremia with Enterococcus ,ruled in for Sepsis History of colon cancer with sigmoid resection and anastomosis.   . Blood cultures in the ED are growing Enterococcus. Still waiting for susceptibilities but would cover for VRE. Continue Daptomycin 6 mg/kg iv daily for suspected VRE bacteremia. With the bilateral gluteal abscesses and extensive myositis of left gluteus ID consult suggested early surgical drainage and debridements rather than just IR drainage. Also requested surgery to evaluate for leaks of the rectosigmoid anastomosis as the source of abscesses and to  to repeat MRI to evaluate for osteomyelitis of sacrum and L-spine.

## 2020-11-30 NOTE — PROGRESS NOTE ADULT - SUBJECTIVE AND OBJECTIVE BOX
Patient is a 65y old  Female who presents with a chief complaint of the L. hip ill-defined multi septated fluid collection containing foci of air measuring appx 3.8 x 3.2 x3.9cm concerning for abscess, WBC of 20, normal LA. Pt was seen by both surgical and ortho team who recommenced no surgical intervention at this time. MICU consulted for hypotension, (30 Nov 2020 00:44)    24 hour events: Overnight PA was told by nurse patient was hypotensive. Ordered for Morphine IVP for left hip pain. Daily Methadone, Restarted Levophed gtt.  Actively titrating to maintain MAP >65.    REVIEW OF SYSTEMS  Constitutional: admits to fatigue  Resp: No shortness of breath  CVS: No chest pain  GI: No abdominal pain   MSK: complains of back pain and pain in the L hip, asking for methadone to help with pain.     T(F): 97.8 (11-30-20 @ 04:00), Max: 99 (11-29-20 @ 20:30)  HR: 80 (11-30-20 @ 07:00) (62 - 80)  BP: 87/55 (11-30-20 @ 07:00) (80/50 - 127/65)  RR: 13 (11-30-20 @ 07:00) (10 - 22)  SpO2: 94% (11-30-20 @ 07:00) (89% - 97%)  Wt(kg): --            I&O's Summary    11-29 @ 07:01  -  11-30 @ 07:00  --------------------------------------------------------  IN: 2829 mL / OUT: 750 mL / NET: 2079 mL      PHYSICAL EXAM  Gen: Comfortable in bed in NAD, lethargic  HEENT: PERRL, normocephalic, atraumatic  Resp: CTA b/l  CVS: +RRR  Abd: BSx4, soft, mild distention, NT, PEJ tube in place  Ext: L. hip with mild swelling and TTP along the gluteal region, no erythema  Skin: warm/dry    MEDICATIONS  clindamycin IVPB   clindamycin IVPB IV Intermittent  DAPTOmycin IVPB   DAPTOmycin IVPB IV Intermittent  piperacillin/tazobactam IVPB.. IV Intermittent    midodrine. Oral    dextrose 40% Gel Oral  dextrose 50% Injectable IV Push  dextrose 50% Injectable IV Push  dextrose 50% Injectable IV Push  glucagon  Injectable IntraMuscular    ALBUTerol    90 MICROgram(s) HFA Inhaler Inhalation PRN  montelukast Oral  tiotropium 18 MICROgram(s) Capsule Inhalation    acetaminophen    Suspension .. Enteral Tube PRN  DULoxetine Oral  FLUoxetine Oral  gabapentin Oral  methadone    Tablet Oral  methocarbamol Oral  morphine  - Injectable IV Push PRN  traZODone Oral      aspirin  chewable Enteral Tube  heparin   Injectable SubCutaneous    lactulose Syrup Oral  magnesium hydroxide Suspension Oral PRN  pantoprazole   Suspension Oral  senna Oral      ascorbic acid Oral  cholecalciferol Oral  dextrose 5% + lactated ringers. IV Continuous  dextrose 5%. IV Continuous  dextrose 5%. IV Continuous  multivitamin/minerals/iron Oral Solution (CENTRUM) Oral                                7.6    9.40  )-----------( 506      ( 30 Nov 2020 07:39 )             25.8       11-30    134<L>  |  97  |  11  ----------------------------<  144<H>  3.8   |  31  |  0.31<L>    Ca    7.7<L>      30 Nov 2020 07:39  Phos  2.8     11-30  Mg     1.8     11-30    TPro  5.1<L>  /  Alb  1.2<L>  /  TBili  0.2  /  DBili  x   /  AST  15  /  ALT  12  /  AlkPhos  89  11-30      CARDIAC MARKERS ( 29 Nov 2020 06:11 )  x     / x     / 61 U/L / x     / x              .Urine Clean Catch (Midstream)   <10,000 CFU/mL Normal Urogenital Ami -- 11-28 @ 04:55  .Blood Blood-Peripheral   No growth to date.   Growth in anaerobic bottle: Gram Positive Cocci in Pairs and Chains 11-27 @ 18:29      Rapid RVP Result: NotDetec (11-27 @ 13:21)    Radiology: ***  Bedside lung ultrasound: ***  Bedside ECHO: ***    PEG: Y  GREWAL: N                              GLOBAL ISSUE/BEST PRACTICE  Analgesia:  Y  Sedation:  N  HOB elevation: yes  Stress ulcer prophylaxis:  Y  VTE prophylaxis:  Y  Glycemic control: Y  Nutrition:  NPO    CODE STATUS: FULL

## 2020-11-30 NOTE — PROGRESS NOTE ADULT - SUBJECTIVE AND OBJECTIVE BOX
[facility]  Santosh Stapleton PA-C  [address] [place] [address]  [phone]      INTERVAL HPI/OVERNIGHT EVENTS:    patient s/e  leakage noted at j tube overnight  today is npo for IR procedure  bumper was tightened by me today     MEDICATIONS  (STANDING):  ascorbic acid 500 milliGRAM(s) Oral daily  aspirin  chewable 81 milliGRAM(s) Enteral Tube daily  cholecalciferol 1000 Unit(s) Oral daily  clindamycin IVPB      clindamycin IVPB 600 milliGRAM(s) IV Intermittent every 8 hours  DAPTOmycin IVPB      DAPTOmycin IVPB 270 milliGRAM(s) IV Intermittent every 24 hours  dextrose 40% Gel 15 Gram(s) Oral once  dextrose 5%. 1000 milliLiter(s) (50 mL/Hr) IV Continuous <Continuous>  dextrose 5%. 1000 milliLiter(s) (100 mL/Hr) IV Continuous <Continuous>  dextrose 50% Injectable 25 Gram(s) IV Push once  dextrose 50% Injectable 12.5 Gram(s) IV Push once  dextrose 50% Injectable 25 Gram(s) IV Push once  DULoxetine 30 milliGRAM(s) Oral daily  FLUoxetine 20 milliGRAM(s) Oral daily  gabapentin 600 milliGRAM(s) Oral three times a day  glucagon  Injectable 1 milliGRAM(s) IntraMuscular once  heparin   Injectable 5000 Unit(s) SubCutaneous every 12 hours  lactated ringers. 1000 milliLiter(s) (80 mL/Hr) IV Continuous <Continuous>  lactulose Syrup 10 Gram(s) Oral three times a day  methadone    Tablet 60 milliGRAM(s) Oral <User Schedule>  methocarbamol 250 milliGRAM(s) Oral every 8 hours  midodrine. 15 milliGRAM(s) Oral three times a day  montelukast 10 milliGRAM(s) Oral at bedtime  multivitamin/minerals/iron Oral Solution (CENTRUM) 15 milliLiter(s) Oral daily  pantoprazole   Suspension 40 milliGRAM(s) Oral daily  piperacillin/tazobactam IVPB.. 3.375 Gram(s) IV Intermittent every 8 hours  senna 2 Tablet(s) Oral at bedtime  tiotropium 18 MICROgram(s) Capsule 1 Capsule(s) Inhalation daily  traZODone 100 milliGRAM(s) Oral at bedtime    MEDICATIONS  (PRN):  acetaminophen    Suspension .. 650 milliGRAM(s) Enteral Tube every 6 hours PRN Temp greater or equal to 38C (100.4F), Mild Pain (1 - 3)  ALBUTerol    90 MICROgram(s) HFA Inhaler 2 Puff(s) Inhalation every 6 hours PRN Shortness of Breath and/or Wheezing  magnesium hydroxide Suspension 15 milliLiter(s) Oral at bedtime PRN Constipation  morphine  - Injectable 2 milliGRAM(s) IV Push every 4 hours PRN Moderate Pain (4 - 6)      Allergies    Levaquin (Unknown)    Intolerances        ROS:   unable to obtain      PHYSICAL EXAM:   Vital Signs:  Vital Signs Last 24 Hrs  T(C): 36.6 (2020 07:44), Max: 37.1 (2020 16:01)  T(F): 97.8 (2020 07:44), Max: 98.8 (2020 16:01)  HR: 69 (2020 09:00) (62 - 80)  BP: 82/53 (2020 09:00) (76/52 - 132/67)  BP(mean): 63 (2020 09:00) (60 - 94)  RR: 12 (2020 09:00) (11 - 38)  SpO2: 94% (2020 09:00) (84% - 99%)  Daily     Daily Weight in k (2020 07:44)    nad  confused  frail  non toxic  soft, nt +g tube   no edema        LABS:                        8.6    11.01 )-----------( 538      ( 2020 06:11 )             29.2     11-    137  |  99  |  12  ----------------------------<  92  4.0   |  28  |  0.35<L>    Ca    8.2<L>      2020 06:11  Phos  3.7     11-  Mg     1.9         TPro  5.7<L>  /  Alb  1.3<L>  /  TBili  0.2  /  DBili  x   /  AST  18  /  ALT  13  /  AlkPhos  95  11-29    PT/INR - ( 2020 06:21 )   PT: 13.7 sec;   INR: 1.18 ratio         PTT - ( 2020 13:21 )  PTT:31.5 sec  Urinalysis Basic - ( 2020 22:39 )    Color: Yellow / Appearance: Clear / S.010 / pH: x  Gluc: x / Ketone: Negative  / Bili: Negative / Urobili: Negative   Blood: x / Protein: 15 / Nitrite: Negative   Leuk Esterase: Trace / RBC: 0-2 /HPF / WBC 3-5   Sq Epi: x / Non Sq Epi: Few / Bacteria: Occasional        RADIOLOGY & ADDITIONAL TESTS:

## 2020-11-30 NOTE — PROGRESS NOTE ADULT - SUBJECTIVE AND OBJECTIVE BOX
Interval History:    CENTRAL LINE:   [  ] YES       [  ] NO  GREWAL:                 [  ] YES       [  ] NO         REVIEW OF SYSTEMS:  All Systems below were reviewed and are negative [  ]  HEENT:  ID:  Pulmonary:  Cardiac:  GI:  Renal:  Musculoskeletal:  All other systems above were reviewed and are negative   [  ]      MEDICATIONS  (STANDING):  ascorbic acid 500 milliGRAM(s) Oral daily  aspirin  chewable 81 milliGRAM(s) Enteral Tube daily  cholecalciferol 1000 Unit(s) Oral daily  dextrose 40% Gel 15 Gram(s) Oral once  dextrose 5% + lactated ringers. 1000 milliLiter(s) (80 mL/Hr) IV Continuous <Continuous>  dextrose 5%. 1000 milliLiter(s) (100 mL/Hr) IV Continuous <Continuous>  dextrose 5%. 1000 milliLiter(s) (50 mL/Hr) IV Continuous <Continuous>  dextrose 50% Injectable 25 Gram(s) IV Push once  dextrose 50% Injectable 12.5 Gram(s) IV Push once  dextrose 50% Injectable 25 Gram(s) IV Push once  DULoxetine 30 milliGRAM(s) Oral daily  FLUoxetine 20 milliGRAM(s) Oral daily  gabapentin 600 milliGRAM(s) Oral three times a day  glucagon  Injectable 1 milliGRAM(s) IntraMuscular once  heparin   Injectable 5000 Unit(s) SubCutaneous every 12 hours  lactulose Syrup 10 Gram(s) Oral three times a day  methocarbamol 250 milliGRAM(s) Oral every 8 hours  midodrine. 15 milliGRAM(s) Oral three times a day  montelukast 10 milliGRAM(s) Oral at bedtime  multivitamin/minerals/iron Oral Solution (CENTRUM) 15 milliLiter(s) Oral daily  pantoprazole   Suspension 40 milliGRAM(s) Oral daily  piperacillin/tazobactam IVPB.. 3.375 Gram(s) IV Intermittent every 8 hours  senna 2 Tablet(s) Oral at bedtime  tiotropium 18 MICROgram(s) Capsule 1 Capsule(s) Inhalation daily  traZODone 100 milliGRAM(s) Oral at bedtime    MEDICATIONS  (PRN):  acetaminophen    Suspension .. 650 milliGRAM(s) Enteral Tube every 6 hours PRN Temp greater or equal to 38C (100.4F), Mild Pain (1 - 3)  ALBUTerol    90 MICROgram(s) HFA Inhaler 2 Puff(s) Inhalation every 6 hours PRN Shortness of Breath and/or Wheezing  magnesium hydroxide Suspension 15 milliLiter(s) Oral at bedtime PRN Constipation      Vital Signs Last 24 Hrs  T(C): 36.7 (30 Nov 2020 15:23), Max: 37.2 (29 Nov 2020 20:30)  T(F): 98.1 (30 Nov 2020 15:23), Max: 99 (29 Nov 2020 20:30)  HR: 64 (30 Nov 2020 18:00) (58 - 81)  BP: 110/56 (30 Nov 2020 18:00) (80/50 - 127/65)  BP(mean): 75 (30 Nov 2020 18:00) (60 - 88)  RR: 16 (30 Nov 2020 18:00) (10 - 31)  SpO2: 97% (30 Nov 2020 18:00) (88% - 97%)    I&O's Summary    29 Nov 2020 07:01  -  30 Nov 2020 07:00  --------------------------------------------------------  IN: 2829 mL / OUT: 750 mL / NET: 2079 mL    30 Nov 2020 07:01  -  30 Nov 2020 19:58  --------------------------------------------------------  IN: 960 mL / OUT: 0 mL / NET: 960 mL        PHYSICAL EXAM:  HEENT: NC/AT; PERRLA  Neck: Soft; no tenderness  Lungs: CTA bilaterally; no wheezing.   Heart:  Abdomen:  Genital/ Rectal:  Extremities:  Neurologic:  Vascular:      LABORATORY:    CBC Full  -  ( 30 Nov 2020 07:39 )  WBC Count : 9.40 K/uL  RBC Count : 3.01 M/uL  Hemoglobin : 7.6 g/dL  Hematocrit : 25.8 %  Platelet Count - Automated : 506 K/uL  Mean Cell Volume : 85.7 fl  Mean Cell Hemoglobin : 25.2 pg  Mean Cell Hemoglobin Concentration : 29.5 gm/dL  Auto Neutrophil # : 7.47 K/uL  Auto Lymphocyte # : 0.87 K/uL  Auto Monocyte # : 0.83 K/uL  Auto Eosinophil # : 0.11 K/uL  Auto Basophil # : 0.03 K/uL  Auto Neutrophil % : 79.4 %  Auto Lymphocyte % : 9.3 %  Auto Monocyte % : 8.8 %  Auto Eosinophil % : 1.2 %  Auto Basophil % : 0.3 %      ESR:                   11-28 @ 01:24  --    C-Reactive Protein:     11-28 @ 01:24  20.94    Procalcitonin:           11-28 @ 01:24   --  ESR:                   11-27 @ 23:42  --    C-Reactive Protein:     11-27 @ 23:42  --    Procalcitonin:           11-27 @ 23:42   0.19  ESR:                   11-27 @ 21:20  98    C-Reactive Protein:     11-27 @ 21:20  --    Procalcitonin:           11-27 @ 21:20   --      11-30    134<L>  |  97  |  11  ----------------------------<  144<H>  3.8   |  31  |  0.31<L>    Ca    7.7<L>      30 Nov 2020 07:39  Phos  2.8     11-30  Mg     1.8     11-30    TPro  5.1<L>  /  Alb  1.2<L>  /  TBili  0.2  /  DBili  x   /  AST  15  /  ALT  12  /  AlkPhos  89  11-30      Rapid Respiratory Viral Panel Result        11-27 @ 13:21  Rapid RVP NotDetec  Coronovirus --  Adenovirus --  Bordetella Pertussis --  Chlamydia Pneumonia --  Entero/Rhinovirus--  HKU1 Coronovirus --  HMPV Coronovirus --  Influenza A --  Influenza AH1 --  Influenza AH1 2009 --  Influenza AH3 --  Influenza B --  Mycoplasma Pneumoniae --  NL63 Coronovirus --  OC43 Coronovirus --  Parainfluenza 1 --  Parainfluenza 2 --  Parainfluenza 3 --  Parainfluenza 4 --  Resp Syncytial Virus --      Assessment and Plan:          Lorenzo Zelaya MD   [phone]. She is more alert  No fevers. Not on vasopressors.     MEDICATIONS  (STANDING):  ascorbic acid 500 milliGRAM(s) Oral daily  aspirin  chewable 81 milliGRAM(s) Enteral Tube daily  cholecalciferol 1000 Unit(s) Oral daily  dextrose 40% Gel 15 Gram(s) Oral once  dextrose 5% + lactated ringers. 1000 milliLiter(s) (80 mL/Hr) IV Continuous <Continuous>  dextrose 5%. 1000 milliLiter(s) (100 mL/Hr) IV Continuous <Continuous>  dextrose 5%. 1000 milliLiter(s) (50 mL/Hr) IV Continuous <Continuous>  dextrose 50% Injectable 25 Gram(s) IV Push once  dextrose 50% Injectable 12.5 Gram(s) IV Push once  dextrose 50% Injectable 25 Gram(s) IV Push once  DULoxetine 30 milliGRAM(s) Oral daily  FLUoxetine 20 milliGRAM(s) Oral daily  gabapentin 600 milliGRAM(s) Oral three times a day  glucagon  Injectable 1 milliGRAM(s) IntraMuscular once  heparin   Injectable 5000 Unit(s) SubCutaneous every 12 hours  lactulose Syrup 10 Gram(s) Oral three times a day  methocarbamol 250 milliGRAM(s) Oral every 8 hours  midodrine. 15 milliGRAM(s) Oral three times a day  montelukast 10 milliGRAM(s) Oral at bedtime  multivitamin/minerals/iron Oral Solution (CENTRUM) 15 milliLiter(s) Oral daily  pantoprazole   Suspension 40 milliGRAM(s) Oral daily  piperacillin/tazobactam IVPB.. 3.375 Gram(s) IV Intermittent every 8 hours  senna 2 Tablet(s) Oral at bedtime  tiotropium 18 MICROgram(s) Capsule 1 Capsule(s) Inhalation daily  traZODone 100 milliGRAM(s) Oral at bedtime    MEDICATIONS  (PRN):  acetaminophen    Suspension .. 650 milliGRAM(s) Enteral Tube every 6 hours PRN Temp greater or equal to 38C (100.4F), Mild Pain (1 - 3)  ALBUTerol    90 MICROgram(s) HFA Inhaler 2 Puff(s) Inhalation every 6 hours PRN Shortness of Breath and/or Wheezing  magnesium hydroxide Suspension 15 milliLiter(s) Oral at bedtime PRN Constipation      Vital Signs Last 24 Hrs  T(C): 36.7 (30 Nov 2020 15:23), Max: 37.2 (29 Nov 2020 20:30)  T(F): 98.1 (30 Nov 2020 15:23), Max: 99 (29 Nov 2020 20:30)  HR: 64 (30 Nov 2020 18:00) (58 - 81)  BP: 110/56 (30 Nov 2020 18:00) (80/50 - 127/65)  BP(mean): 75 (30 Nov 2020 18:00) (60 - 88)  RR: 16 (30 Nov 2020 18:00) (10 - 31)  SpO2: 97% (30 Nov 2020 18:00) (88% - 97%)    I&O's Summary    29 Nov 2020 07:01  -  30 Nov 2020 07:00  --------------------------------------------------------  IN: 2829 mL / OUT: 750 mL / NET: 2079 mL    30 Nov 2020 07:01  -  30 Nov 2020 19:58  --------------------------------------------------------  IN: 960 mL / OUT: 0 mL / NET: 960 mL        PHYSICAL EXAM:  HEENT: NC/AT; PERRLA  Neck: Soft; no tenderness  Lungs: Coarse BS bilaterally; no wheezing.   Heart: RRR; no murmurs.   Abdomen: Soft. decreased tenderness of lower quadrants. Decreased erythema of PEG site. No drainage.  Genital/ Rectal: No goldman   Extremities: No ulcers or edema.   Neurologic: Awake.       LABORATORY:    CBC Full  -  ( 30 Nov 2020 07:39 )  WBC Count : 9.40 K/uL  RBC Count : 3.01 M/uL  Hemoglobin : 7.6 g/dL  Hematocrit : 25.8 %  Platelet Count - Automated : 506 K/uL  Mean Cell Volume : 85.7 fl  Mean Cell Hemoglobin : 25.2 pg  Mean Cell Hemoglobin Concentration : 29.5 gm/dL  Auto Neutrophil # : 7.47 K/uL  Auto Lymphocyte # : 0.87 K/uL  Auto Monocyte # : 0.83 K/uL  Auto Eosinophil # : 0.11 K/uL  Auto Basophil # : 0.03 K/uL  Auto Neutrophil % : 79.4 %  Auto Lymphocyte % : 9.3 %  Auto Monocyte % : 8.8 %  Auto Eosinophil % : 1.2 %  Auto Basophil % : 0.3 %      ESR:                   11-28 @ 01:24  --    C-Reactive Protein:     11-28 @ 01:24  20.94    Procalcitonin:           11-28 @ 01:24   --  ESR:                   11-27 @ 23:42  --    C-Reactive Protein:     11-27 @ 23:42  --    Procalcitonin:           11-27 @ 23:42   0.19  ESR:                   11-27 @ 21:20  98    C-Reactive Protein:     11-27 @ 21:20  --    Procalcitonin:           11-27 @ 21:20   --      11-30    134<L>  |  97  |  11  ----------------------------<  144<H>  3.8   |  31  |  0.31<L>    Ca    7.7<L>      30 Nov 2020 07:39  Phos  2.8     11-30  Mg     1.8     11-30    TPro  5.1<L>  /  Alb  1.2<L>  /  TBili  0.2  /  DBili  x   /  AST  15  /  ALT  12  /  AlkPhos  89  11-30      Assessment and Plan:    1. Cellulitis of PEG site.  2. Left gluteal abscess with extensive myositis.   3. Left greater trochanter abscess.  4. Right gluteal abscess.  5. Suspected osteomyelitis of sacrum and L-spine.  6. Bacteremia with Enterococcus.   6. Sepsis.   7. History of colon cancer with sigmoid resection and anastomosis.     . Blood cultures in the ED grew Enterococcus faecalis (not VRE).   . Continue IV Zosyn 3.375 gm q8h for now. Discontinue IV Daptomycin and Clindamycin.   . IR attempted to drain left gluteal abscess but only about 5 cc of fluid was obtained. With the bilateral gluteal abscesses and extensive myositis of left gluteus, would suggest surgical drainage and debridements. Would also request surgery to evaluate for leaks of the rectosigmoid anastomosis as the source of abscesses.   . Waiting to repeat MRI to evaluate for osteomyelitis of sacrum and Lspine.  . Follow culture of left gluteal abscess.       Lorenzo Zelaya MD   [phone].

## 2020-11-30 NOTE — PROGRESS NOTE ADULT - PROBLEM SELECTOR PLAN 1
WITH ENTEROCOCCAL BACTEREMIA ,POA -CONTINUE ABX AS PER ID , ON MIDODRINE AND LEVAPHED FOR HYPOTENSION

## 2020-11-30 NOTE — PROGRESS NOTE ADULT - SUBJECTIVE AND OBJECTIVE BOX
HPI:  64 yo F pmhx depression, Colon CA s/p sigmoid resection w/ PEJ, HTN, COPD presented for abdominal pain. Patient admitted with leaking PEJ and left hip collection concern for abscess. Hospital course further complicated by septic shock, enterococcal bacteremia     Contacted by RN that patient hypotensive. Recieved IVF bolus without improvement. Levophed gtt restarted for hypotension. At bedside patient complaining of left hip pain    Allergies: Levaquin    PAST MEDICAL & SURGICAL HISTORY:  Candidal stomatitis    Malignant neoplasm of colon    Major depressive disorder    HTN (hypertension)    COPD (chronic obstructive pulmonary disease)      FAMILY HISTORY:    SOCIAL HISTORY:    Home Medications:    Review of Systems:  Constitutional: no fever, chills, fatigue  Neuro: no headache, numbness, weakness  Resp: no cough, wheezing, shortness of breath  CVS: no chest pain, palpitations, leg swelling  GI: no abdominal pain, nausea, vomiting, diarrhea   : no dysuria, frequency, incontinence  Msk: +left hip pain, no joint pain or swelling      T(F): 97.8 (11-29-20 @ 23:13), Max: 99 (11-29-20 @ 20:30)  HR: 71 (11-29-20 @ 23:30) (62 - 78)  BP: 97/52 (11-29-20 @ 23:00) (76/52 - 128/71)  RR: 22 (11-29-20 @ 23:30) (10 - 35)  SpO2: 94% (11-29-20 @ 23:30)  Wt(kg): --    CAPILLARY BLOOD GLUCOSE      POCT Blood Glucose.: 141 mg/dL (29 Nov 2020 23:01)    I&O's Summary    28 Nov 2020 07:01  -  29 Nov 2020 07:00  --------------------------------------------------------  IN: 2969.6 mL / OUT: 0 mL / NET: 2969.6 mL    29 Nov 2020 07:01  -  30 Nov 2020 00:44  --------------------------------------------------------  IN: 1569 mL / OUT: 650 mL / NET: 919 mL        Physical Exam:     Gen: ill appearing, cachectic  Neuro: awake and alert  CVS: +S1S2  Resp: CTA   Abd: soft, nt, nd, +J tube  Ext: warm, dry, no edema    Meds:  clindamycin IVPB      clindamycin IVPB 600 milliGRAM(s) IV Intermittent every 8 hours  DAPTOmycin IVPB      DAPTOmycin IVPB 270 milliGRAM(s) IV Intermittent every 24 hours  piperacillin/tazobactam IVPB.. 3.375 Gram(s) IV Intermittent every 8 hours    midodrine. 15 milliGRAM(s) Oral three times a day     dextrose 40% Gel 15 Gram(s) Oral once  dextrose 50% Injectable 25 Gram(s) IV Push once  dextrose 50% Injectable 12.5 Gram(s) IV Push once  dextrose 50% Injectable 25 Gram(s) IV Push once  glucagon  Injectable 1 milliGRAM(s) IntraMuscular once     ALBUTerol    90 MICROgram(s) HFA Inhaler 2 Puff(s) Inhalation every 6 hours PRN  montelukast 10 milliGRAM(s) Oral at bedtime  tiotropium 18 MICROgram(s) Capsule 1 Capsule(s) Inhalation daily     acetaminophen    Suspension .. 650 milliGRAM(s) Enteral Tube every 6 hours PRN  DULoxetine 30 milliGRAM(s) Oral daily  FLUoxetine 20 milliGRAM(s) Oral daily  gabapentin 600 milliGRAM(s) Oral three times a day  methadone    Tablet 60 milliGRAM(s) Oral <User Schedule>  methocarbamol 250 milliGRAM(s) Oral every 8 hours  morphine  - Injectable 2 milliGRAM(s) IV Push every 4 hours PRN  traZODone 100 milliGRAM(s) Oral at bedtime        aspirin  chewable 81 milliGRAM(s) Enteral Tube daily  heparin   Injectable 5000 Unit(s) SubCutaneous every 12 hours     lactulose Syrup 10 Gram(s) Oral three times a day  magnesium hydroxide Suspension 15 milliLiter(s) Oral at bedtime PRN  pantoprazole   Suspension 40 milliGRAM(s) Oral daily  senna 2 Tablet(s) Oral at bedtime        ascorbic acid 500 milliGRAM(s) Oral daily  cholecalciferol 1000 Unit(s) Oral daily  dextrose 5% + lactated ringers. 1000 milliLiter(s) IV Continuous <Continuous>  dextrose 5%. 1000 milliLiter(s) IV Continuous <Continuous>  dextrose 5%. 1000 milliLiter(s) IV Continuous <Continuous>  multivitamin/minerals/iron Oral Solution (CENTRUM) 15 milliLiter(s) Oral daily                                    8.6    11.01 )-----------( 538      ( 29 Nov 2020 06:11 )             29.2       11-29    137  |  99  |  12  ----------------------------<  92  4.0   |  28  |  0.35<L>    Ca    8.2<L>      29 Nov 2020 06:11  Phos  3.7     11-29  Mg     1.9     11-29    TPro  5.7<L>  /  Alb  1.3<L>  /  TBili  0.2  /  DBili  x   /  AST  18  /  ALT  13  /  AlkPhos  95  11-29      CARDIAC MARKERS ( 29 Nov 2020 06:11 )  x     / x     / 61 U/L / x     / x          PT/INR - ( 28 Nov 2020 06:21 )   PT: 13.7 sec;   INR: 1.18 ratio             .Urine Clean Catch (Midstream)   <10,000 CFU/mL Normal Urogenital Ami -- 11-28 @ 04:55  .Blood Blood-Peripheral   No growth to date.   Growth in anaerobic bottle: Gram Positive Cocci in Pairs and Chains 11-27 @ 18:29      Rapid RVP Result: NotDetec (11-27 @ 13:21)      Radiology:   EXAM:  MR HIP WAW IC LT                            PROCEDURE DATE:  11/28/2020          INTERPRETATION:  Clinical indications: Left hip abscess.    Multiplanar multisequence MRI of the left hip was performed with and without intravenous contrast.    4.5 cc of Gadavist was administered intravenously. 3 cc was discarded.    Correlation is made with prior CT of the abdomen and pelvis from November 27, 2020.    Findings:    There is extensive edema and enhancement involving the piriformis muscles bilaterally, greater on the left. On the left there is a fluid collection tracking from the left piriformis muscle laterally to the level of the gluteal muscles and the superior margin of the greater trochanter. This collection measures approximately 3.6 x 8.9 x 2.5 cm. This collection is closely associated with the distal myotendinous insertional fibers of the gluteus minimus and medius and piriformis muscles on the left greater trochanter. There is increased intrinsic T1 signal within the collection which may be related to some component of proteinaceous/hemorrhagic debris. Additional areas of loculated fluid are seen within the posterior aspect of the left greater trochanteric bursa measuring approximately 1.4 1.5 x 3.4 cm. There is extensive surrounding intramuscular edema and enhancement throughout the left gluteal, left short internal adductor muscles extending to the sacrum, within the left adductor musculature, and along the left anterior compartment thigh musculature. A developing fluid collection is also seen within the right piriformis muscle measuring approximately 3.7 x 1.2 x 1.7 cm. Edema and enhancement within the piriformis muscle also extends proximally to the bilateral S1/S2 and S2/S3 neural foramina with edema and enhancement seen in the sacral canal. Further evaluation with a dedicated MRI of the lumbar spine with and without contrast is recommended to evaluate for any epidural abscess. There is patchy edema and enhancement within the sacrum, more prominent aboutthe right S1/S2 neural foramen with corresponding decreased T1 marrow signal. Findings raise concern for the possibility of developing osteomyelitis versus reactive osteitis. When correlating with the prior CT of the abdomen and pelvis there is evidence of small foci of extraluminal air within the presacral soft tissues adjacent to the rectosigmoid anastomosis. Findings raise concern for possibility of an anastomotic leak as the possible etiology of these findings.    There is pericapsular edema throughout the left hip. There is mild enhancement of the synovium of the left hip joint without a demonstrable joint effusion. Synovial enhancement is consistent with a nonspecific synovitis. This may be reactive in nature secondary to the pericapsular edema. Possibility of a developing septic arthrosis is not entirely excluded, however there is no joint effusion. Aside from patchy marrow signal abnormality within the sacrum the marrow signal within the remainder of the pelvis appears otherwise preserved.    The right hip joint space is preserved. There is severe lower lumbar spondylosis. There is mild bilateral sacroiliac joint arthrosis.    There is edema surrounding the fat planes of the left sciatic nerve. The left sciatic nerve is otherwise normal in signal characteristics.    There is no acute tendinous injury.    Impression:    Extensive myositis about the left hip musculature as outlined above with a collection decompressing from the left piriformis muscle laterally along the leftgluteal myofascial planes to the level of the left greater trochanter. This collection demonstrates intrinsic increased T1 signal suggestive of some degree of underlying hemorrhagic/proteinaceous material. Prominent myositis is also seen within the right piriformis muscle with evidence of a developing fluid collection. On the prior CT there are small foci of extraluminal air seen posterior to the rectosigmoid anastomosis. This may be related to a small anastomotic leak and may be the etiology of this process. There is patchy edema within the sacrum, most notably about the right S1/S2 neural foramen which may be related to developing osteomyelitis or reactive osteitis. Edema extends along the bilateral S1/S2 and S2/S3 neural foramina. Further evaluation with a dedicated MRI of the lumbar spine with and without intravenous contrast is recommended to exclude any epidural abscess.    Additional fluid collection is seen within the posterior aspect of the left greater trochanteric bursa.    Pericapsular edema and enhancement about the left hip joint. Minimal synovial enhancement about the left hip without evidence of a left hip joint effusion. Findings may be related to reactive changes secondary to pericapsular edema, however the possibility of an early septic arthrosis is not entirely excluded.    Findings discussed with Dr. Burroughs.            SHITAL CORTES MD; Attending Radiologist  This document has been electronically signed. Nov 28 2020  1:25PM      Assessment/Plan:  64 yo F pmhx depression, Colon CA s/p sigmoid resection w/ PEJ, HTN, COPD presented for abdominal pain. Patient admitted with leaking PEJ and left hip collection concern for abscess. Hospital course further complicated by septic shock, enterococcal bacteremia     -Ordered for Morphine IVP for left hip pain. Daily Methadone  -Septic shock; Levophed gtt restarted. Actively titrating to maintain MAP >65. Midodrine unable to be given secondary to PEJ leak.  -COPD; standing bronchodilators w/ Spiriva. PRN Proventil  -PEJ tube leak. GI involved, will check for extravasation in AM. Keep NPO  -Left hip abscess. Evaluated by Ortho and general surgery. No intervention at this time. Plan for MR spine in AM to rule out epidural abscess. Possible IR drainage  -Enterococcus bacteremia likely from left hip collection; Abx coverage w/ Dapto, Zosyn, and Clindamycin. Add bacid  -DVT PPX: Heparin SC    Critical Care time: 38 minutes including time spent reviewing chart, ordering tests/labs, discussing with interdisciplinary team. Not including time spent performing procedures.  Critical care time spent (mins): ***

## 2020-11-30 NOTE — PROGRESS NOTE ADULT - ASSESSMENT
64 y/o F w/ pmh of recovered alcoholic, former smoker, copd, RA, chronic low back pain, hx of colon ca s/p sigmoid resection (2015), hx of open R. inguinal hernia repair w/ mesh (2010), hx of hiatal hernia repair (2019), zenker diverticulum surgery c/b vocal cord paralysis's requiring peg for feeding, s/p peg (replaced peg 3 weeks ago [facility]) transfer from [facility] to [facility] earlier today for leaking PEJ tube and on/off lower abd pain over the last 2-3 days. In the ED pt underwent an CT of abd/pelvis was found to have a Abscess around the L. hip ill-defined multi septated fluid collection containing foci of air measuring appx 3.8 x 3.2 x3.9cm concerning for abscess, WBC of 20, normal LA. MICU consulted for hypotension after 3L of IVF in the setting of sepsis from L. hip abscess.    Neuro:   No acute issues MS stable, currently awake and protecting her airway. PRN morphine for acute pain from abscess.  Home meds: Duloxetine Po, Fluoxetine PO, Trazadone. Chronic pain will continue with methadone/methocarbamol (back pain). Gabapentin.     Cardiac:   Hypotension possible from sepsis, continue Midodrine for BP Support. Levophed currently held, required small amount overnight.   CAD- continue with asa    Resp:   No acute issues, maintain o2 >92%. nasal cannula. No home O2. Continue with montelukast, albuterol and tiotropium inhaler.     GI:  NPO for now, GI ppx w/ protonix, constipation bowel regimen w/ lactulose, mag and senna, Intra abdominal inflammatory changes. Noted to have possible rectosigmoid anastomotic leak on MRI, will get follow up imaging to r/o extravasation.  GI/ Gen sx following.      Renal:   Renal function stable, lytes stable, cont to monitor and trend and replete prn    ID:   L. piriformis abscess- continue with clindamycin and Daptomycin. Zosyn, vanco changed to daptomycin. 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 Follow up repeat BCx. MRI hip shows no evidence of septic arthritis, no intervention at this time as per ortho. Edema present at S1-S3 neural foramina, f/u MRI C/T/L spine to r/o epidural abscess. Surg spoke to IR, will discuss possible drainage for 11/30    Endo:   no acute issues, monitor FS q6h given NPO status    Heme:   Started on heparin 5000U BID as per ortho, A81    Msk:   Chronic low back pain on methadone 60mg daily checked HCS data-base and confirmed pt reports being on methadone for appx 15 years) along with gabapentin and Robaxin    Dispo: ICU management for sepsis. 66 y/o F w/ pmh of recovered alcoholic, former smoker, copd, RA, chronic low back pain, hx of colon ca s/p sigmoid resection (2015), hx of open R. inguinal hernia repair w/ mesh (2010), hx of hiatal hernia repair (2019), zenker diverticulum surgery c/b vocal cord paralysis's requiring peg for feeding, s/p peg (replaced peg 3 weeks ago [facility]) transfer from [facility] to [facility] earlier today for leaking PEJ tube and on/off lower abd pain over the last 2-3 days. In the ED pt underwent an CT of abd/pelvis was found to have a Abscess around the L. hip ill-defined multi septated fluid collection containing foci of air measuring appx 3.8 x 3.2 x3.9cm concerning for abscess, WBC of 20, normal LA. MICU consulted for hypotension after 3L of IVF in the setting of sepsis from L. hip abscess.    Neuro:   No acute issues MS stable, currently awake and protecting her airway. PRN morphine for acute pain from abscess.  Home meds: Duloxetine Po, Fluoxetine PO, Trazadone. Chronic pain will continue with methadone/methocarbamol (back pain). Gabapentin.     Cardiac:   Hypotension possible from sepsis, continue Midodrine for BP Support. Levophed currently held, required small amount overnight.   CAD- continue with asa    Resp:   No acute issues, maintain o2 >92%. nasal cannula. No home O2. Continue with montelukast, albuterol and tiotropium inhaler.     GI:  NPO for now, GI ppx w/ protonix, constipation bowel regimen w/ lactulose, mag and senna, Intra abdominal inflammatory changes. Noted to have possible rectosigmoid anastomotic leak on MRI, will get follow up imaging to r/o extravasation. CT Abd w/ PO/rectal contrast ord.   Plan for bedside speech and swallow when all tests are complete.     Renal:   Renal function stable, lytes stable, cont to monitor and trend and replete prn    ID:   L. piriformis abscess- continue with clindamycin and Daptomycin. Zosyn, vanco changed to daptomycin. 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 Follow up repeat BCx. MRI hip shows no evidence of septic arthritis, no intervention at this time as per ortho. Edema present at S1-S3 neural foramina, f/u MRI C/T/L spine to r/o epidural abscess.   Undergoing IT drainage of L gluteal abscess today.     Endo:   no acute issues, monitor FS q6h given NPO status    Heme:   Started on heparin 5000U BID as per ortho, A81    Msk:   Chronic low back pain on methadone 60mg daily checked HCS data-base and confirmed pt reports being on methadone for appx 15 years) along with gabapentin and Robaxin    Dispo: ICU management for sepsis. 64 y/o F w/ pmh of recovered alcoholic, former smoker, copd, RA, chronic low back pain, hx of colon ca s/p sigmoid resection (2015), hx of open R. inguinal hernia repair w/ mesh (2010), hx of hiatal hernia repair (2019), zenker diverticulum surgery c/b vocal cord paralysis's requiring peg for feeding, s/p peg (replaced peg 3 weeks ago [facility]) transfer from [facility] to [facility] earlier today for leaking PEJ tube and on/off lower abd pain over the last 2-3 days. In the ED pt underwent an CT of abd/pelvis was found to have a Abscess around the L. hip ill-defined multi septated fluid collection containing foci of air measuring appx 3.8 x 3.2 x3.9cm concerning for abscess, WBC of 20, normal LA. MICU consulted for hypotension after 3L of IVF in the setting of sepsis from L. hip abscess.    Neuro:   No acute issues MS stable, currently awake and protecting her airway. PRN morphine for acute pain from abscess.  Home meds: Duloxetine Po, Fluoxetine PO, Trazadone. Chronic pain will continue with methadone/methocarbamol (back pain). Gabapentin.     Cardiac:   Hypotension possible from sepsis, continue Midodrine for BP Support. Levophed currently held, required small amount overnight.   CAD- continue with asa    Resp:   No acute issues, maintain o2 >92%. nasal cannula. No home O2. Continue with montelukast, albuterol and tiotropium inhaler.     GI:  NPO for now, GI ppx w/ protonix, constipation bowel regimen w/ lactulose, mag and senna, Intra abdominal inflammatory changes. Noted to have possible rectosigmoid anastomotic leak on MRI, will get follow up imaging to r/o extravasation. CT Abd w/ PO/rectal contrast ord.   Plan for bedside speech and swallow when all tests are complete.     Renal:   Renal function stable, lytes stable, cont to monitor and trend and replete prn    ID:   L. piriformis abscess- continue with clindamycin and Daptomycin. Zosyn, vanco changed to daptomycin. 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 Follow up repeat BCx. MRI hip shows no evidence of septic arthritis, no intervention at this time as per ortho. Edema present at S1-S3 neural foramina, f/u MRI C/T/L spine to r/o epidural abscess.   Undergoing IT drainage of L gluteal abscess today.     Endo:   no acute issues, monitor FS q6h given NPO status    Heme:   Started on heparin 5000U BID as per ortho, A81    Msk:   Chronic low back pain on methadone 60mg daily checked HCS data-base and confirmed pt reports being on methadone for appx 15 years) along with gabapentin and Robaxin    Dispo: ICU management for sepsis.     Discussed with Daughter Missy to update on plan of care @ 8105 11/30

## 2020-11-30 NOTE — PROGRESS NOTE ADULT - ATTENDING COMMENTS
Patient seen and examined    64 y/o female with hx past EtOH abuse, COPD, RA, chronic pain on methadone, colon cancer s/p sigmoid resection 2015 presented with a leaking PEJ tube and found to have severe sepsis from a left buttock abscess and myositis, causing hypotension/septic shock. Found to have enterococcus bacteremia.      Awake, interactive   C/o severe back pain, requesting her methadone     Bedside ECHO with normal LV sys fn, MR, TR, LVOT VTI 19 cm, IVC 2.5 cm     Recs:   -Resume methadone 60 mg (dose confirmed)   -HD status is ok on midodrine  -Resp status normal   -J tube fixed by GI, will hold off on starting TF until sigmoid anastomotic leak ruled out with contrast CT   -She is requesting PO diet too - will get swallow eval when ready for diet   -Anemia mostly stable, no evidence of active bleeding   -L gluteal abscess - underwent diagnostic aspiration by IR today, f/u c/s  -CT abd/pelvis with rectal contrast to r/o anastomotic leak pending   -Patient will likely need surgical exploration and drainage as IR aspiration was very minimal and no drains were placed   -MRI spine to r/o epidural abscess pending, pre-treat with Xanax as claustrophobic   -Enterococcus is ampicillin sensitive - will d/w ID re: de-escalation, WBC is improving   -Renal fn stable   -Prognosis poor Patient seen and examined    66 y/o female with hx past EtOH abuse, COPD, RA, chronic pain on methadone, colon cancer s/p sigmoid resection 2015 presented with a leaking PEJ tube and found to have severe sepsis from a left buttock abscess and myositis, causing hypotension/septic shock. Found to have enterococcus bacteremia.      Awake, interactive   C/o severe back pain, requesting her methadone     Bedside ECHO with normal LV sys fn, MR, TR, LVOT VTI 19 cm, IVC 2.5 cm     Recs:   -Resume methadone 60 mg (dose confirmed)   -HD status is ok on midodrine  -Resp status normal   -J tube fixed by GI, will hold off on starting TF until sigmoid anastomotic leak ruled out with contrast CT   -She is requesting PO diet too - will get swallow eval when ready for diet   -Anemia mostly stable, no evidence of active bleeding   -L gluteal abscess - underwent diagnostic aspiration by IR today, f/u c/s  -CT abd/pelvis with rectal contrast to r/o anastomotic leak pending   -Patient will likely need surgical exploration and drainage as IR aspiration was very minimal and no drains were placed   -MRI spine to r/o epidural abscess pending, pre-treat with Xanax as claustrophobic   -Enterococcus is ampicillin sensitive - will d/w ID re: de-escalation, WBC is improving, repeat BCx neg so far  -Renal fn stable   -Prognosis poor

## 2020-11-30 NOTE — PROGRESS NOTE ADULT - SUBJECTIVE AND OBJECTIVE BOX
Interventional Radiology Pre-Procedure Note    This is a 65y Female with left gluteal collection for aspiration    Procedure: Left gluteal aspiration and possible drainage    Diagnosis/Indication: Patient is a 65y old  Female who presents with a chief complaint of the L. hip ill-defined multi septated fluid collection containing foci of air measuring appx 3.8 x 3.2 x3.9cm concerning for abscess, WBC of 20, normal LA. Pt was seen by both surgical and ortho team who recommenced no surgical intervention at this time. MICU consulted for hypotension, (30 Nov 2020 10:58)      PAST MEDICAL & SURGICAL HISTORY:  Candidal stomatitis    Malignant neoplasm of colon    Major depressive disorder    HTN (hypertension)    COPD (chronic obstructive pulmonary disease)         CBC Full  -  ( 30 Nov 2020 07:39 )  WBC Count : 9.40 K/uL  RBC Count : 3.01 M/uL  Hemoglobin : 7.6 g/dL  Hematocrit : 25.8 %  Platelet Count - Automated : 506 K/uL  Mean Cell Volume : 85.7 fl  Mean Cell Hemoglobin : 25.2 pg  Mean Cell Hemoglobin Concentration : 29.5 gm/dL  Auto Neutrophil # : 7.47 K/uL  Auto Lymphocyte # : 0.87 K/uL  Auto Monocyte # : 0.83 K/uL  Auto Eosinophil # : 0.11 K/uL  Auto Basophil # : 0.03 K/uL  Auto Neutrophil % : 79.4 %  Auto Lymphocyte % : 9.3 %  Auto Monocyte % : 8.8 %  Auto Eosinophil % : 1.2 %  Auto Basophil % : 0.3 %    11-30    134<L>  |  97  |  11  ----------------------------<  144<H>  3.8   |  31  |  0.31<L>    Ca    7.7<L>      30 Nov 2020 07:39  Phos  2.8     11-30  Mg     1.8     11-30    TPro  5.1<L>  /  Alb  1.2<L>  /  TBili  0.2  /  DBili  x   /  AST  15  /  ALT  12  /  AlkPhos  89  11-30        Procedure/ risks/ benefits were explained, informed consent obtained from patient daughter (pt confused following methadone), verbalizes understanding. I discussed case with Dr. Monroe of ICU and surgical attending who both feel this is an urgent procedure. Will proceed with drainage.

## 2020-12-01 NOTE — CHART NOTE - NSCHARTNOTEFT_GEN_A_CORE
CT abd/pel reviewed by Dr. Ahumada.  Extravasation noted to be minimal at this time.  To be discussed with IR tomorrow for possible drainage.  If surgery determined to be required, will need Alexandra's procedure.  Patient high risk, however stable at this time.  Will follow up tomorrow and make final determination.

## 2020-12-01 NOTE — CHART NOTE - NSCHARTNOTEFT_GEN_A_CORE
Assessment: Pt seen in ICU for nutrition follow-up. As per chart pt is a EtOH abuse, COPD, RA, chronic pain on methadone, colon cancer s/p sigmoid resection 2015, hx of open R. inguinal hernia repair w/ mesh (2010), hx of hiatal hernia repair (2019), zenker diverticulum surgery c/b vocal cord paralysis's requiring peJ for feeding, presented to [facility] on 11/27/2020 for leaking PEJ tube pt was found to have sepsis from L. buttocks abscess, myositis eneterococcus bacteremia causing hypotension and septic shock require pressors.     S/P (11/30) IR I&D of left hip abscess. Pt currently hypotensive continues on pressors. Pt is currently NPO was previously receiving TF of Vital 1.5 via PEJ. When medically feasible restart TF to meet pt's nutritional needs. Pt noted with constipation, on lactulose syrup and milk of magnesia, resolved +BM 11/30.       Factors impacting intake: [ ] none [ ] nausea  [ ] vomiting [ ] diarrhea [ ] constipation  [ ]chewing problems [x ] swallowing issues  [ x] other: was receiving TF via PEJ     Diet Presciption: Diet, NPO:   Except Medications (11-30-20 @ 10:59)    Intake: n/a     Current Weight: (11/29) 99.2lbs  Admission Weight: 97.14lbs  % Weight Change- continue to trend and obtain pt's daily body weight to obtain more accurate current weight     Pertinent Medications: MEDICATIONS  (STANDING):  ascorbic acid 500 milliGRAM(s) Oral daily  aspirin  chewable 81 milliGRAM(s) Enteral Tube daily  cholecalciferol 1000 Unit(s) Oral daily  dextrose 40% Gel 15 Gram(s) Oral once  dextrose 5% + lactated ringers. 1000 milliLiter(s) (80 mL/Hr) IV Continuous <Continuous>  dextrose 5%. 1000 milliLiter(s) (100 mL/Hr) IV Continuous <Continuous>  dextrose 5%. 1000 milliLiter(s) (50 mL/Hr) IV Continuous <Continuous>  dextrose 50% Injectable 25 Gram(s) IV Push once  dextrose 50% Injectable 12.5 Gram(s) IV Push once  dextrose 50% Injectable 25 Gram(s) IV Push once  DULoxetine 30 milliGRAM(s) Oral daily  FLUoxetine 20 milliGRAM(s) Oral daily  gabapentin 600 milliGRAM(s) Oral three times a day  glucagon  Injectable 1 milliGRAM(s) IntraMuscular once  heparin   Injectable 5000 Unit(s) SubCutaneous every 12 hours  lactulose Syrup 10 Gram(s) Oral three times a day  methadone    Tablet 60 milliGRAM(s) Oral <User Schedule>  methocarbamol 250 milliGRAM(s) Oral every 8 hours  midodrine. 15 milliGRAM(s) Oral three times a day  montelukast 10 milliGRAM(s) Oral at bedtime  multivitamin/minerals/iron Oral Solution (CENTRUM) 15 milliLiter(s) Oral daily  norepinephrine Infusion 0.05 MICROgram(s)/kG/Min (4.16 mL/Hr) IV Continuous <Continuous>  pantoprazole   Suspension 40 milliGRAM(s) Oral daily  piperacillin/tazobactam IVPB.. 3.375 Gram(s) IV Intermittent every 8 hours  senna 2 Tablet(s) Oral at bedtime  tiotropium 18 MICROgram(s) Capsule 1 Capsule(s) Inhalation daily  traZODone 100 milliGRAM(s) Oral at bedtime    MEDICATIONS  (PRN):  acetaminophen    Suspension .. 650 milliGRAM(s) Enteral Tube every 6 hours PRN Temp greater or equal to 38C (100.4F), Mild Pain (1 - 3)  ALBUTerol    90 MICROgram(s) HFA Inhaler 2 Puff(s) Inhalation every 6 hours PRN Shortness of Breath and/or Wheezing  magnesium hydroxide Suspension 15 milliLiter(s) Oral at bedtime PRN Constipation    Pertinent Labs: 12-01 Na139 mmol/L Glu 111 mg/dL<H> K+ 3.6 mmol/L Cr  <0.20 mg/dL<L> BUN 5 mg/dL<L> 12-01 Phos 3.2 mg/dL 12-01 Alb 1.2 g/dL<L>, Hgb 7.6, Hct 26.1, Calcium 7.8     CAPILLARY BLOOD GLUCOSE    POCT Blood Glucose.: 119 mg/dL (01 Dec 2020 05:36)  POCT Blood Glucose.: 109 mg/dL (01 Dec 2020 00:42)  POCT Blood Glucose.: 110 mg/dL (30 Nov 2020 17:53)  POCT Blood Glucose.: 132 mg/dL (30 Nov 2020 12:16)    Skin: Stage 1 sacrum pressure injury   No edema noted at this time     Estimated Needs:   [ x] no change since previous assessment  [ ] recalculated:     Previous Nutrition Diagnosis:    [ x] Malnutrition (Chronic, severe)     Nutrition Diagnosis is [ x] ongoing    New Nutrition Diagnosis: [x] not applicable       Interventions:   Recommend  [ ] Change Diet To:  [ ] Nutrition Supplement  [ x] Nutrition Support: When medically feasible recommend to restart TF of Vital 1.5 at 50ml x 20hrs for a total volume of 1,000ml/day, provides 1,500 kcal/day, 67.5 gram protein/day.   [x ] Other:   1) Monitor pt's tolerance to TF  2) Monitor pt's weight, skin, edema, GI distress     Monitoring and Evaluation:   [ ] PO intake [ x ] Tolerance to diet prescription [ x ] weights [ x ] labs[ x ] follow up per protocol  [x ] other: RD to remain available Assessment: Pt seen in ICU for nutrition follow-up. As per chart pt is a EtOH abuse, COPD, RA, chronic pain on methadone, colon cancer s/p sigmoid resection 2015, hx of open R. inguinal hernia repair w/ mesh (2010), hx of hiatal hernia repair (2019), zenker diverticulum surgery c/b vocal cord paralysis's requiring peJ for feeding, presented to [facility] on 11/27/2020 for leaking PEJ tube pt was found to have sepsis from L. buttocks abscess, myositis eneterococcus bacteremia causing hypotension and septic shock require pressors.     S/P (11/30) IR I&D of left hip abscess. Pt currently hypotensive continues on pressors. Pt is currently NPO was previously receiving TF of Vital 1.5 via PEJ. When medically feasible restart TF to meet pt's nutritional needs. Pt noted with constipation, on lactulose syrup and milk of magnesia, resolved +BM 11/30.       Factors impacting intake: [ ] none [ ] nausea  [ ] vomiting [ ] diarrhea [ ] constipation  [ ]chewing problems [x ] swallowing issues  [ x] other: was receiving TF via PEJ     Diet Presciption: Diet, NPO:   Except Medications (11-30-20 @ 10:59)    Intake: n/a     Current Weight: (11/29) 99.2lbs  Admission Weight: 97.14lbs  % Weight Change- continue to trend and obtain pt's daily body weight to obtain more accurate current weight     Pertinent Medications: MEDICATIONS  (STANDING):  ascorbic acid 500 milliGRAM(s) Oral daily  aspirin  chewable 81 milliGRAM(s) Enteral Tube daily  cholecalciferol 1000 Unit(s) Oral daily  dextrose 40% Gel 15 Gram(s) Oral once  dextrose 5% + lactated ringers. 1000 milliLiter(s) (80 mL/Hr) IV Continuous <Continuous>  dextrose 5%. 1000 milliLiter(s) (100 mL/Hr) IV Continuous <Continuous>  dextrose 5%. 1000 milliLiter(s) (50 mL/Hr) IV Continuous <Continuous>  dextrose 50% Injectable 25 Gram(s) IV Push once  dextrose 50% Injectable 12.5 Gram(s) IV Push once  dextrose 50% Injectable 25 Gram(s) IV Push once  DULoxetine 30 milliGRAM(s) Oral daily  FLUoxetine 20 milliGRAM(s) Oral daily  gabapentin 600 milliGRAM(s) Oral three times a day  glucagon  Injectable 1 milliGRAM(s) IntraMuscular once  heparin   Injectable 5000 Unit(s) SubCutaneous every 12 hours  lactulose Syrup 10 Gram(s) Oral three times a day  methadone    Tablet 60 milliGRAM(s) Oral <User Schedule>  methocarbamol 250 milliGRAM(s) Oral every 8 hours  midodrine. 15 milliGRAM(s) Oral three times a day  montelukast 10 milliGRAM(s) Oral at bedtime  multivitamin/minerals/iron Oral Solution (CENTRUM) 15 milliLiter(s) Oral daily  norepinephrine Infusion 0.05 MICROgram(s)/kG/Min (4.16 mL/Hr) IV Continuous <Continuous>  pantoprazole   Suspension 40 milliGRAM(s) Oral daily  piperacillin/tazobactam IVPB.. 3.375 Gram(s) IV Intermittent every 8 hours  senna 2 Tablet(s) Oral at bedtime  tiotropium 18 MICROgram(s) Capsule 1 Capsule(s) Inhalation daily  traZODone 100 milliGRAM(s) Oral at bedtime    MEDICATIONS  (PRN):  acetaminophen    Suspension .. 650 milliGRAM(s) Enteral Tube every 6 hours PRN Temp greater or equal to 38C (100.4F), Mild Pain (1 - 3)  ALBUTerol    90 MICROgram(s) HFA Inhaler 2 Puff(s) Inhalation every 6 hours PRN Shortness of Breath and/or Wheezing  magnesium hydroxide Suspension 15 milliLiter(s) Oral at bedtime PRN Constipation    Pertinent Labs: 12-01 Na139 mmol/L Glu 111 mg/dL<H> K+ 3.6 mmol/L Cr  <0.20 mg/dL<L> BUN 5 mg/dL<L> 12-01 Phos 3.2 mg/dL 12-01 Alb 1.2 g/dL<L>, Hgb 7.6, Hct 26.1, Calcium 7.8     CAPILLARY BLOOD GLUCOSE    POCT Blood Glucose.: 119 mg/dL (01 Dec 2020 05:36)  POCT Blood Glucose.: 109 mg/dL (01 Dec 2020 00:42)  POCT Blood Glucose.: 110 mg/dL (30 Nov 2020 17:53)  POCT Blood Glucose.: 132 mg/dL (30 Nov 2020 12:16)    Skin: Stage 1 sacrum pressure injury   No edema noted at this time     Estimated Needs:   [ x] no change since previous assessment  [ ] recalculated:     Previous Nutrition Diagnosis:    [ x] Malnutrition (Chronic, severe)     Nutrition Diagnosis is [ x] ongoing    New Nutrition Diagnosis: [x] not applicable       Interventions:   Recommend  [ ] Change Diet To:  [ ] Nutrition Supplement  [ x] Nutrition Support: When medically feasible recommend to restart TF of Vital 1.5 as pt becomes more stable recommend to start trickle feeds and increase as tolerated to goal rate of 50ml x 20hrs for a total volume of 1,000ml/day, provides 1,500 kcal/day, 67.5 gram protein/day.   [x ] Other:   1) Monitor pt's tolerance to TF  2) Monitor pt's weight, skin, edema, GI distress     Monitoring and Evaluation:   [ ] PO intake [ x ] Tolerance to diet prescription [ x ] weights [ x ] labs[ x ] follow up per protocol  [x ] other: RD to remain available

## 2020-12-01 NOTE — PROGRESS NOTE ADULT - SUBJECTIVE AND OBJECTIVE BOX
INTERVAL HPI/OVERNIGHT EVENTS:  pt seen and examined earlier this am  lethargic in bed but arousable, sp meds per rn  per overnight rn- on pressors, ft still leaking bilious drainage w meds/water flushes  bm yesterday and today    MEDICATIONS  (STANDING):  ascorbic acid 500 milliGRAM(s) Oral daily  aspirin  chewable 81 milliGRAM(s) Enteral Tube daily  cholecalciferol 1000 Unit(s) Oral daily  dextrose 40% Gel 15 Gram(s) Oral once  dextrose 5% + lactated ringers. 1000 milliLiter(s) (80 mL/Hr) IV Continuous <Continuous>  dextrose 5%. 1000 milliLiter(s) (100 mL/Hr) IV Continuous <Continuous>  dextrose 5%. 1000 milliLiter(s) (50 mL/Hr) IV Continuous <Continuous>  dextrose 50% Injectable 25 Gram(s) IV Push once  dextrose 50% Injectable 12.5 Gram(s) IV Push once  dextrose 50% Injectable 25 Gram(s) IV Push once  DULoxetine 30 milliGRAM(s) Oral daily  FLUoxetine 20 milliGRAM(s) Oral daily  gabapentin 200 milliGRAM(s) Oral three times a day  glucagon  Injectable 1 milliGRAM(s) IntraMuscular once  heparin   Injectable 5000 Unit(s) SubCutaneous every 12 hours  lactulose Syrup 10 Gram(s) Oral three times a day  methocarbamol 250 milliGRAM(s) Oral every 8 hours  midodrine. 15 milliGRAM(s) Oral three times a day  montelukast 10 milliGRAM(s) Oral at bedtime  multivitamin/minerals/iron Oral Solution (CENTRUM) 15 milliLiter(s) Oral daily  norepinephrine Infusion 0.05 MICROgram(s)/kG/Min (4.16 mL/Hr) IV Continuous <Continuous>  pantoprazole   Suspension 40 milliGRAM(s) Oral daily  piperacillin/tazobactam IVPB.. 3.375 Gram(s) IV Intermittent every 8 hours  senna 2 Tablet(s) Oral at bedtime  tiotropium 18 MICROgram(s) Capsule 1 Capsule(s) Inhalation daily  traZODone 50 milliGRAM(s) Oral at bedtime    MEDICATIONS  (PRN):  acetaminophen    Suspension .. 650 milliGRAM(s) Enteral Tube every 6 hours PRN Temp greater or equal to 38C (100.4F), Mild Pain (1 - 3)  ALBUTerol    90 MICROgram(s) HFA Inhaler 2 Puff(s) Inhalation every 6 hours PRN Shortness of Breath and/or Wheezing  magnesium hydroxide Suspension 15 milliLiter(s) Oral at bedtime PRN Constipation      Allergies    Levaquin (Unknown)    Intolerances        Review of Systems:    unable to obtain       Vital Signs Last 24 Hrs  T(C): 36.4 (01 Dec 2020 12:31), Max: 36.7 (30 Nov 2020 15:23)  T(F): 97.5 (01 Dec 2020 12:31), Max: 98.1 (30 Nov 2020 15:23)  HR: 61 (01 Dec 2020 13:00) (57 - 83)  BP: 108/68 (01 Dec 2020 13:00) (70/45 - 150/66)  BP(mean): 84 (01 Dec 2020 13:00) (53 - 96)  RR: 8 (01 Dec 2020 13:00) (8 - 29)  SpO2: 97% (01 Dec 2020 13:00) (91% - 100%)    PHYSICAL EXAM:    Constitutional: lying in bed  HEENT: ncat  Gastrointestinal: soft appears comfortable upon palpation +dt +gj tube w surrounding erythema no active leaking appreciated dressing dry and intact   Extremities: No edema  Vascular: + peripheral pulses  Neurological: lethargic but arousable      LABS:                        7.6    10.72 )-----------( 516      ( 01 Dec 2020 06:10 )             26.1     12-01    139  |  102  |  5<L>  ----------------------------<  111<H>  3.6   |  29  |  <0.20<L>    Ca    7.8<L>      01 Dec 2020 06:10  Phos  3.2     12-01  Mg     1.7     12-01    TPro  5.1<L>  /  Alb  1.2<L>  /  TBili  0.2  /  DBili  <.10  /  AST  15  /  ALT  11<L>  /  AlkPhos  102  12-01    PT/INR - ( 01 Dec 2020 08:47 )   PT: 14.5 sec;   INR: 1.25 ratio         PTT - ( 01 Dec 2020 08:47 )  PTT:33.0 sec      RADIOLOGY & ADDITIONAL TESTS:

## 2020-12-01 NOTE — PROGRESS NOTE ADULT - SUBJECTIVE AND OBJECTIVE BOX
pt seen  lethargic  ICU Vital Signs Last 24 Hrs  T(C): 36.7 (01 Dec 2020 07:55), Max: 36.7 (30 Nov 2020 13:29)  T(F): 98 (01 Dec 2020 07:55), Max: 98.1 (30 Nov 2020 15:23)  HR: 60 (01 Dec 2020 10:00) (57 - 83)  BP: 105/63 (01 Dec 2020 10:00) (70/45 - 150/66)  BP(mean): 79 (01 Dec 2020 10:00) (53 - 96)  ABP: --  ABP(mean): --  RR: 10 (01 Dec 2020 10:00) (9 - 29)  SpO2: 99% (01 Dec 2020 10:00) (91% - 100%)  gen-NAD  resp-clear  abd-soft ND/NT                          7.6    10.72 )-----------( 516      ( 01 Dec 2020 06:10 )             26.1   12-01    139  |  102  |  5<L>  ----------------------------<  111<H>  3.6   |  29  |  <0.20<L>    Ca    7.8<L>      01 Dec 2020 06:10  Phos  3.2     12-01  Mg     1.7     12-01    TPro  5.1<L>  /  Alb  1.2<L>  /  TBili  0.2  /  DBili  <.10  /  AST  15  /  ALT  11<L>  /  AlkPhos  102  12-01

## 2020-12-01 NOTE — PROGRESS NOTE ADULT - ATTENDING COMMENTS
Patient seen and examined    66 y/o female with hx past EtOH abuse, COPD, RA, chronic pain on methadone, colon cancer s/p sigmoid resection 2015 presented with a leaking PEJ tube and found to have severe sepsis from a left buttock abscess and myositis, causing hypotension/septic shock. Found to have enterococcus bacteremia.      Lethargic and bradypneic in am today   Required Levo for hypotension     Recs:   -Decrease methadone to 10 mg daily (on 40 mg in am and 20 mg in pm at home per daughter)   -Decrease Neurontin to 200 mg TID and trazodone to 50 mg qhs, continue other CNS meds   -Septic shock on Levo and midodrine, wean Levo for MAP>65   -Monitor resp status with bradypnea, seems arousable however   -CT A/P with small anastomotic leak, d/w surgery - he will speak with IR for drainage tomorrow   -She has been NPO for a few days, will talk to surgery and GI re: TPN   -MRI spine pending   -L gluteal gram stain neg (s/p IR on 11/30)   -Continue Zosyn  -Renal fn stable   -Prognosis poor    Patient critically ill, 36 mins spent

## 2020-12-01 NOTE — PROGRESS NOTE ADULT - SUBJECTIVE AND OBJECTIVE BOX
Patient is a 65y old  Female who presents with a chief complaint of the L. hip ill-defined multi septated fluid collection containing foci of air measuring appx 3.8 x 3.2 x3.9cm concerning for abscess, WBC of 20, normal LA. Pt was seen by both surgical and ortho team who recommenced no surgical intervention at this time. MICU consulted for hypotension, (01 Dec 2020 18:22)      INTERVAL HPI/OVERNIGHT EVENTS:    lethargic but responsive    MEDICATIONS  (STANDING):  albumin human 25% IVPB 50 milliLiter(s) IV Intermittent every 6 hours  ascorbic acid 500 milliGRAM(s) Oral daily  aspirin  chewable 81 milliGRAM(s) Enteral Tube daily  cholecalciferol 1000 Unit(s) Oral daily  dextrose 40% Gel 15 Gram(s) Oral once  dextrose 5% + lactated ringers. 1000 milliLiter(s) (80 mL/Hr) IV Continuous <Continuous>  dextrose 5%. 1000 milliLiter(s) (100 mL/Hr) IV Continuous <Continuous>  dextrose 5%. 1000 milliLiter(s) (50 mL/Hr) IV Continuous <Continuous>  dextrose 50% Injectable 25 Gram(s) IV Push once  dextrose 50% Injectable 12.5 Gram(s) IV Push once  dextrose 50% Injectable 25 Gram(s) IV Push once  DULoxetine 30 milliGRAM(s) Oral daily  FLUoxetine 20 milliGRAM(s) Oral daily  gabapentin 200 milliGRAM(s) Oral three times a day  glucagon  Injectable 1 milliGRAM(s) IntraMuscular once  heparin   Injectable 5000 Unit(s) SubCutaneous every 12 hours  methocarbamol 250 milliGRAM(s) Oral every 8 hours  midodrine. 15 milliGRAM(s) Oral three times a day  norepinephrine Infusion 0.05 MICROgram(s)/kG/Min (4.16 mL/Hr) IV Continuous <Continuous>  piperacillin/tazobactam IVPB.. 3.375 Gram(s) IV Intermittent every 8 hours  tiotropium 18 MICROgram(s) Capsule 1 Capsule(s) Inhalation daily      MEDICATIONS  (PRN):  ALBUTerol    90 MICROgram(s) HFA Inhaler 2 Puff(s) Inhalation every 6 hours PRN Shortness of Breath and/or Wheezing      Allergies    Levaquin (Unknown)    Intolerances        PAST MEDICAL & SURGICAL HISTORY:  Candidal stomatitis    Malignant neoplasm of colon    Major depressive disorder    HTN (hypertension)    COPD (chronic obstructive pulmonary disease)        Vital Signs Last 24 Hrs  T(C): 36.4 (01 Dec 2020 12:31), Max: 36.7 (01 Dec 2020 07:55)  T(F): 97.5 (01 Dec 2020 12:31), Max: 98 (01 Dec 2020 07:55)  HR: 63 (01 Dec 2020 20:00) (59 - 83)  BP: 83/50 (01 Dec 2020 20:00) (70/45 - 121/69)  BP(mean): 62 (01 Dec 2020 20:00) (53 - 90)  RR: 9 (01 Dec 2020 20:00) (8 - 29)  SpO2: 100% (01 Dec 2020 20:00) (91% - 100%)    PHYSICAL EXAMINATION:    GENERAL: The patient is cachectic and  lethargic but responsive and in no apparent distress.     HEENT: Head is normocephalic and atraumatic. Extraocular muscles are intact. Mucous membranes are moist.    NECK: Supple.    LUNGS: diminished air entry bilateral    HEART: Regular rate and rhythm without murmur.    ABDOMEN: Soft, nontender, and nondistended.      EXTREMITIES: Without any cyanosis, clubbing, rash, lesions or edema.    NEUROLOGIC: Grossly intact.    SKIN: No ulceration or induration present.      LABS:                        7.6    10.72 )-----------( 516      ( 01 Dec 2020 06:10 )             26.1     12-01    139  |  102  |  5<L>  ----------------------------<  111<H>  3.6   |  29  |  <0.20<L>    Ca    7.8<L>      01 Dec 2020 06:10  Phos  3.2     12-01  Mg     1.7     12-01    TPro  5.1<L>  /  Alb  1.2<L>  /  TBili  0.2  /  DBili  <.10  /  AST  15  /  ALT  11<L>  /  AlkPhos  102  12-01    PT/INR - ( 01 Dec 2020 08:47 )   PT: 14.5 sec;   INR: 1.25 ratio         PTT - ( 01 Dec 2020 08:47 )  PTT:33.0 sec                    MICROBIOLOGY:  Culture Results:   No growth (11-30 @ 19:13)  Culture Results:   No growth to date. (11-29 @ 11:41)  Culture Results:   No growth to date. (11-29 @ 11:41)  Culture Results:   <10,000 CFU/mL Normal Urogenital Ami (11-28 @ 04:55)        Assessment:    Enterococcus Bacteremia - probably due to recto-sigmoid leak  Hx COPD  Acute on Chronic Hypoxic respiratory failure  Recurrent aspiration pneumonia  S/P G tube    Plan:    Continue IV antibiotics  Supplemental oxygen  Continue Spiriva + Albuterol PRN  Surgical re-evaluation  Cardiac monitoring

## 2020-12-01 NOTE — PROGRESS NOTE ADULT - ASSESSMENT
66 y/o F w/ pmh of recovered alcoholic, former smoker, copd, RA, chronic low back pain, hx of colon ca s/p sigmoid resection (2015), hx of open R. inguinal hernia repair w/ mesh (2010), hx of hiatal hernia repair (2019), zenker diverticulum surgery c/b vocal cord paralysis's requiring peg for feeding, s/p peg (replaced peg 3 weeks ago [facility]) transfer from [facility] to [facility] earlier today for leaking PEJ tube and on/off lower abd pain over the last 2-3 days. In the ED pt underwent an CT of abd/pelvis was found to have a Abscess around the L. hip ill-defined multi septated fluid collection containing foci of air measuring appx 3.8 x 3.2 x3.9cm concerning for abscess, WBC of 20, normal LA. MICU consulted for hypotension after 3L of IVF in the setting of sepsis from L. hip abscess.    Neuro:   No acute issues MS stable, currently awake and protecting her airway. PRN morphine for acute pain from abscess.  Home meds: Duloxetine Po, Fluoxetine PO, Trazadone. Chronic pain will continue with methadone/methocarbamol (back pain). Gabapentin.     Cardiac:   Hypotension possible from sepsis, continue Midodrine for BP Support. Levophed currently held, required small amount overnight.   CAD- continue with asa    Resp:   No acute issues, maintain o2 >92%. nasal cannula. No home O2. Continue with montelukast, albuterol and tiotropium inhaler.     GI:  NPO for now, GI ppx w/ protonix, constipation bowel regimen w/ lactulose, mag and senna, Intra abdominal inflammatory changes. Noted to have possible rectosigmoid anastomotic leak on MRI, will get follow up imaging to r/o extravasation. CT Abd w/ PO/rectal contrast ord.   Plan for bedside speech and swallow when all tests are complete.     Renal:   Renal function stable, lytes stable, cont to monitor and trend and replete prn    ID:   L. piriformis abscess- continue with clindamycin and Daptomycin. Zosyn, vanco changed to daptomycin. 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 Follow up repeat BCx. MRI hip shows no evidence of septic arthritis, no intervention at this time as per ortho. Edema present at S1-S3 neural foramina, f/u MRI C/T/L spine to r/o epidural abscess.   Undergoing IT drainage of L gluteal abscess today.     Endo:   no acute issues, monitor FS q6h given NPO status    Heme:   Started on heparin 5000U BID as per ortho, A81    Msk:   Chronic low back pain on methadone 60mg daily checked HCS data-base and confirmed pt reports being on methadone for appx 15 years) along with gabapentin and Robaxin    Dispo: ICU management for sepsis. 64 y/o F w/ pmh of recovered alcoholic, former smoker, copd, RA, chronic low back pain, hx of colon ca s/p sigmoid resection (2015), hx of open R. inguinal hernia repair w/ mesh (2010), hx of hiatal hernia repair (2019), zenker diverticulum surgery c/b vocal cord paralysis's requiring peg for feeding, s/p peg (replaced peg 3 weeks ago [facility]) transfer from [facility] to [facility] earlier today for leaking PEJ tube and on/off lower abd pain over the last 2-3 days. In the ED pt underwent an CT of abd/pelvis was found to have a Abscess around the L. hip ill-defined multi septated fluid collection containing foci of air measuring appx 3.8 x 3.2 x3.9cm concerning for abscess, WBC of 20, normal LA. MICU consulted for hypotension after 3L of IVF in the setting of sepsis from L. hip abscess.    Neuro:   Pt somnolent on exam this morning. Hold Methadone at this time.   Home meds: Duloxetine Po, Fluoxetine PO, Trazadone. Chronic pain will continue with methadone/methocarbamol (back pain). Gabapentin.     Cardiac:   Hypotension possible from sepsis, continue Midodrine for BP Support. Levophed restarted for hypotension.   CAD- continue with asa    Resp:   No acute issues, maintain o2 >92%. nasal cannula. No home O2. Continue with montelukast, albuterol and tiotropium inhaler.     GI:  NPO for now, GI ppx w/ protonix, constipation bowel regimen w/ lactulose, mag and senna, Intra abdominal inflammatory changes. Noted to have possible rectosigmoid anastomotic leak on MRI, will get follow up imaging to r/o extravasation.   CT Abd w/ PO/rectal contrast ord.   Plan for bedside speech and swallow when all tests are complete.     Renal:   Renal function stable, lytes stable, cont to monitor and trend and replete prn    ID:   L. piriformis abscess- continue with clindamycin and Daptomycin. Zosyn, vanco changed to daptomycin. 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 Follow up repeat BCx. MRI hip shows no evidence of septic arthritis, no intervention at this time as per ortho. Edema present at S1-S3 neural foramina, f/u MRI C/T/L spine to r/o epidural abscess.   Undergoing IT drainage of L gluteal abscess today.     Endo:   no acute issues, monitor FS q6h given NPO status    Heme:   Started on heparin 5000U BID as per ortho, A81    Msk:   Chronic low back pain on methadone 60mg daily checked HCS data-base and confirmed pt reports being on methadone for appx 15 years) along with gabapentin and Robaxin    Dispo: ICU management for sepsis. 64 y/o F w/ pmh of recovered alcoholic, former smoker, copd, RA, chronic low back pain, hx of colon ca s/p sigmoid resection (2015), hx of open R. inguinal hernia repair w/ mesh (2010), hx of hiatal hernia repair (2019), zenker diverticulum surgery c/b vocal cord paralysis's requiring peg for feeding, s/p peg (replaced peg 3 weeks ago [facility]) transfer from [facility] to [facility] earlier today for leaking PEJ tube and on/off lower abd pain over the last 2-3 days. In the ED pt underwent an CT of abd/pelvis was found to have a Abscess around the L. hip ill-defined multi septated fluid collection containing foci of air measuring appx 3.8 x 3.2 x3.9cm concerning for abscess, WBC of 20, normal LA. MICU consulted for hypotension after 3L of IVF in the setting of sepsis from L. hip abscess.    Neuro:   Pt somnolent on exam this morning. Methadone dose reduced. Trazadone reduced. Gabapentin reduced.   Home meds: Duloxetine Po, Fluoxetine PO, Trazadone. Chronic pain will continue with methadone/methocarbamol (back pain). Gabapentin.     Cardiac:   Hypotension possible from sepsis, continue Midodrine for BP Support. Levophed restarted for hypotension.   CAD- continue with asa    Resp:   No acute issues, maintain o2 >92%. nasal cannula. No home O2. Continue with montelukast, albuterol and tiotropium inhaler.     GI:  NPO for now, GI ppx w/ protonix, constipation bowel regimen w/ lactulose, mag and senna, Intra abdominal inflammatory changes. Noted to have possible rectosigmoid anastomotic leak on MRI, will get follow up imaging to r/o extravasation.   CT Abd w/ PO/rectal contrast ord.   Plan for bedside speech and swallow when all tests are complete.     Renal:   Renal function stable, lytes stable, cont to monitor and trend and replete prn    ID:   L. piriformis abscess- continue with clindamycin and Daptomycin. Zosyn, vanco changed to daptomycin. 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 Follow up repeat BCx. MRI hip shows no evidence of septic arthritis, no intervention at this time as per ortho. Edema present at S1-S3 neural foramina, f/u MRI C/T/L spine to r/o epidural abscess.   Undergoing IT drainage of L gluteal abscess today.     Endo:   no acute issues, monitor FS q6h given NPO status    Heme:   Started on heparin 5000U BID as per ortho, A81    Msk:   Chronic low back pain on methadone 60mg daily checked HCS data-base and confirmed pt reports being on methadone for appx 15 years) along with gabapentin and Robaxin    Dispo: ICU management for sepsis. Awaiting MRI for r/o epidural abscess and CT abd for r/o anastomosis intra-abdominal. 64 y/o F w/ pmh of recovered alcoholic, former smoker, copd, RA, chronic low back pain, hx of colon ca s/p sigmoid resection (2015), hx of open R. inguinal hernia repair w/ mesh (2010), hx of hiatal hernia repair (2019), zenker diverticulum surgery c/b vocal cord paralysis's requiring peg for feeding, s/p peg (replaced peg 3 weeks ago [facility]) transfer from [facility] to [facility] earlier today for leaking PEJ tube and on/off lower abd pain over the last 2-3 days. In the ED pt underwent an CT of abd/pelvis was found to have a Abscess around the L. hip ill-defined multi septated fluid collection containing foci of air measuring appx 3.8 x 3.2 x3.9cm concerning for abscess, WBC of 20, normal LA. MICU consulted for hypotension after 3L of IVF in the setting of sepsis from L. hip abscess.    Neuro:   Pt somnolent on exam this morning. Methadone dose reduced. Trazadone reduced. Gabapentin reduced.   Home meds: Duloxetine Po, Fluoxetine PO, Trazadone. Chronic pain will continue with methadone/methocarbamol (back pain). Gabapentin.     Cardiac:   Hypotension possible from sepsis, continue Midodrine for BP Support. Levophed restarted for hypotension.   CAD- continue with asa    Resp:   No acute issues, maintain o2 >92%. nasal cannula. No home O2. Continue with montelukast, albuterol and tiotropium inhaler.     GI:  NPO for now, GI ppx w/ protonix, constipation bowel regimen w/ lactulose, mag and senna, Intra abdominal inflammatory changes. Noted to have possible rectosigmoid anastomotic leak on MRI, will get follow up imaging to r/o extravasation.   CT Abd w/ PO/rectal contrast ord.   Plan for bedside speech and swallow when all tests are complete.     Renal:   Renal function stable, lytes stable, cont to monitor and trend and replete prn    ID:   L. piriformis abscess- continue with clindamycin and Daptomycin. Zosyn, vanco changed to daptomycin. 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 Follow up repeat BCx. MRI hip shows no evidence of septic arthritis, no intervention at this time as per ortho. Edema present at S1-S3 neural foramina, f/u MRI C/T/L spine to r/o epidural abscess.   Undergoing IT drainage of L gluteal abscess today.     Endo:   no acute issues, monitor FS q6h given NPO status    Heme:   Started on heparin 5000U BID as per ortho, A81    Msk:   Chronic low back pain on methadone 60mg daily checked HCS data-base and confirmed pt reports being on methadone for appx 15 years) along with gabapentin and Robaxin    Dispo: ICU management for sepsis. Awaiting MRI for r/o epidural abscess and CT abd for r/o anastomosis intra-abdominal.     Discussed plan of care with daughter this afternoon 12/1

## 2020-12-01 NOTE — PROGRESS NOTE ADULT - PROBLEM SELECTOR PLAN 5
MRI showed - There is minimal localized extraluminal extravasation of contrast at the posterior aspect of the rectosigmoid anastomosis. There are adjacent small bubbles of extraluminal air .Surgery to evaluate for leaks of the rectosigmoid anastomosis and potential source of infection

## 2020-12-01 NOTE — PROGRESS NOTE ADULT - SUBJECTIVE AND OBJECTIVE BOX
Patient is a 65y Female with a known history of :  Prophylactic measure [Z29.9]    Major depressive disorder [F32.9]    Chronic back pain [M54.9]    GERD (gastroesophageal reflux disease) [K21.9]    Severe malnutrition [E43]    Malignant neoplasm of colon [C18.9]    COPD (chronic obstructive pulmonary disease) [J44.9]    Sepsis [A41.9]    Gastrostomy complication [K94.20]    HTN (hypertension) [I10]    Abscess of hip [L02.419]    Abdominal pain, unspecified abdominal location [R10.9]      HPI:  HPI: 66 y/o F w/ pmh of recovered alcoholic, former smoker, copd, RA, chronic low back pain (on methadone 60mg daily), hx of colon ca s/p sigmoid resection (2015), hx of open R. inguinal hernia repair w/ mesh (2010), hx of hiatal hernia repair (2019), zenker diverticulum surgery c/b vocal cord paralysis's requiring peg for feeding, s/p peg (replaced peg 3 weeks ago [facility]) transfer from [facility] to [facility] earlier today for leaking PEJ tube and on/off lower abd pain over the last 2-3 days. In the ED pt underwent an CT of abd/pelvis was found to have a Abscess around the L. hip ill-defined multi septated fluid collection containing foci of air measuring appx 3.8 x 3.2 x3.9cm concerning for abscess, WBC of 20, normal LA. Pt was seen by both surgical and ortho team who recommenced no surgical intervention at this time. MICU consulted for hypotension, (27 Nov 2020 19:45)      REVIEW OF SYSTEMS:    CONSTITUTIONAL: No fever, weight loss, or fatigue  EYES: No eye pain, visual disturbances, or discharge  ENMT:  No difficulty hearing, tinnitus, vertigo; No sinus or throat pain  NECK: No pain or stiffness  BREASTS: No pain, masses, or nipple discharge  RESPIRATORY: No cough, wheezing, chills or hemoptysis; No shortness of breath  CARDIOVASCULAR: No chest pain, palpitations, dizziness, or leg swelling  GASTROINTESTINAL: No abdominal or epigastric pain. No nausea, vomiting, or hematemesis; No diarrhea or constipation. No melena or hematochezia.  GENITOURINARY: No dysuria, frequency, hematuria, or incontinence  NEUROLOGICAL: No headaches, memory loss, loss of strength, numbness, or tremors  SKIN: No itching, burning, rashes, or lesions   LYMPH NODES: No enlarged glands  ENDOCRINE: No heat or cold intolerance; No hair loss  MUSCULOSKELETAL: No joint pain or swelling; No muscle, back, or extremity pain  PSYCHIATRIC: No depression, anxiety, mood swings, or difficulty sleeping  HEME/LYMPH: No easy bruising, or bleeding gums  ALLERGY AND IMMUNOLOGIC: No hives or eczema    MEDICATIONS  (STANDING):  ascorbic acid 500 milliGRAM(s) Oral daily  aspirin  chewable 81 milliGRAM(s) Enteral Tube daily  cholecalciferol 1000 Unit(s) Oral daily  dextrose 40% Gel 15 Gram(s) Oral once  dextrose 5% + lactated ringers. 1000 milliLiter(s) (80 mL/Hr) IV Continuous <Continuous>  dextrose 5%. 1000 milliLiter(s) (100 mL/Hr) IV Continuous <Continuous>  dextrose 5%. 1000 milliLiter(s) (50 mL/Hr) IV Continuous <Continuous>  dextrose 50% Injectable 25 Gram(s) IV Push once  dextrose 50% Injectable 12.5 Gram(s) IV Push once  dextrose 50% Injectable 25 Gram(s) IV Push once  DULoxetine 30 milliGRAM(s) Oral daily  FLUoxetine 20 milliGRAM(s) Oral daily  gabapentin 600 milliGRAM(s) Oral three times a day  glucagon  Injectable 1 milliGRAM(s) IntraMuscular once  heparin   Injectable 5000 Unit(s) SubCutaneous every 12 hours  lactulose Syrup 10 Gram(s) Oral three times a day  methadone    Tablet 60 milliGRAM(s) Oral <User Schedule>  methocarbamol 250 milliGRAM(s) Oral every 8 hours  midodrine. 15 milliGRAM(s) Oral three times a day  montelukast 10 milliGRAM(s) Oral at bedtime  multivitamin/minerals/iron Oral Solution (CENTRUM) 15 milliLiter(s) Oral daily  norepinephrine Infusion 0.05 MICROgram(s)/kG/Min (4.16 mL/Hr) IV Continuous <Continuous>  pantoprazole   Suspension 40 milliGRAM(s) Oral daily  piperacillin/tazobactam IVPB.. 3.375 Gram(s) IV Intermittent every 8 hours  senna 2 Tablet(s) Oral at bedtime  tiotropium 18 MICROgram(s) Capsule 1 Capsule(s) Inhalation daily  traZODone 100 milliGRAM(s) Oral at bedtime    MEDICATIONS  (PRN):  acetaminophen    Suspension .. 650 milliGRAM(s) Enteral Tube every 6 hours PRN Temp greater or equal to 38C (100.4F), Mild Pain (1 - 3)  ALBUTerol    90 MICROgram(s) HFA Inhaler 2 Puff(s) Inhalation every 6 hours PRN Shortness of Breath and/or Wheezing  magnesium hydroxide Suspension 15 milliLiter(s) Oral at bedtime PRN Constipation      ALLERGIES: Levaquin (Unknown)      FAMILY HISTORY:      PHYSICAL EXAMINATION:  -----------------------------  T(C): 36.7 (12-01-20 @ 07:55), Max: 36.7 (11-30-20 @ 13:29)  HR: 59 (12-01-20 @ 08:00) (57 - 83)  BP: 95/58 (12-01-20 @ 08:00) (70/45 - 150/66)  RR: 9 (12-01-20 @ 08:00) (9 - 29)  SpO2: 97% (12-01-20 @ 08:00) (91% - 100%)  Wt(kg): --    11-30 @ 07:01  -  12-01 @ 07:00  --------------------------------------------------------  IN:    dextrose 5% + lactated ringers: 1920 mL    Enteral Tube Flush: 150 mL    IV PiggyBack: 200 mL    IV PiggyBack: 1000 mL    Norepinephrine: 11.8 mL  Total IN: 3281.8 mL    OUT:    Voided (mL): 1000 mL  Total OUT: 1000 mL    Total NET: 2281.8 mL      12-01 @ 07:01  -  12-01 @ 08:43  --------------------------------------------------------  IN:    dextrose 5% + lactated ringers: 80 mL    Norepinephrine: 1.7 mL  Total IN: 81.7 mL    OUT:  Total OUT: 0 mL    Total NET: 81.7 mL            VITALS  T(C): 36.7 (12-01-20 @ 07:55), Max: 36.7 (11-30-20 @ 13:29)  HR: 59 (12-01-20 @ 08:00) (57 - 83)  BP: 95/58 (12-01-20 @ 08:00) (70/45 - 150/66)  RR: 9 (12-01-20 @ 08:00) (9 - 29)  SpO2: 97% (12-01-20 @ 08:00) (91% - 100%)    Constitutional: well developed, normal appearance, well groomed, well nourished, no deformities and no acute distress.   Eyes: the conjunctiva exhibited no abnormalities and the eyelids demonstrated no xanthelasmas.   HEENT: normal oral mucosa, no oral pallor and no oral cyanosis.   Neck: normal jugular venous A waves present, normal jugular venous V waves present and no jugular venous morales A waves.   Pulmonary: no respiratory distress, normal respiratory rhythm and effort, no accessory muscle use and lungs were clear to auscultation bilaterally.   Cardiovascular: heart rate and rhythm were normal, normal S1 and S2 and no murmur, gallop, rub, heave or thrill are present.   Abdomen: soft, non-tender, no hepato-splenomegaly and no abdominal mass palpated.   Musculoskeletal: the gait could not be assessed..   Extremities: no clubbing of the fingernails, no localized cyanosis, no petechial hemorrhages and no ischemic changes.   Skin: normal skin color and pigmentation, no rash, no venous stasis, no skin lesions, no skin ulcer and no xanthoma was observed.   Psychiatric: oriented to person, place, and time, the affect was normal, the mood was normal and not feeling anxious.     LABS:   --------  12-01    139  |  102  |  5<L>  ----------------------------<  111<H>  3.6   |  29  |  <0.20<L>    Ca    7.8<L>      01 Dec 2020 06:10  Phos  3.2     12-01  Mg     1.7     12-01    TPro  5.1<L>  /  Alb  1.2<L>  /  TBili  0.2  /  DBili  <.10  /  AST  15  /  ALT  11<L>  /  AlkPhos  102  12-01                         7.6    10.72 )-----------( 516      ( 01 Dec 2020 06:10 )             26.1             Culture Results:   No growth to date. (11-29 @ 11:41)  Culture Results:   No growth to date. (11-29 @ 11:41)      RADIOLOGY:  -----------------    ECG:     ECHO:

## 2020-12-01 NOTE — PROGRESS NOTE ADULT - ASSESSMENT
64 y/o F w/ pmh of EtOH abuse, COPD, RA, chronic pain on methadone, colon cancer s/p sigmoid resection 2015, hx of open R. inguinal hernia repair w/ mesh (2010), hx of hiatal hernia repair (2019), zenker diverticulum surgery c/b vocal cord paralysis's requiring peJ for feeding, presented to [facility] on 11/27/2020 for leaking PEJ tube pt was found to have sepsis from L. buttocks abscess, myositis eneterococcus bacteremia causing hypotension and septic shock require pressors now off on midodrine.      -Neuro: No acute issues MS stable  -Cardiac: Hypotension       Improved w/ LR bolus continue midodrine 15mg q8h to maintain MAP >65 otherwise no acute issues  -Resp: No acute issues, maintain o2 >92%  -GI: NPO for now, GI ppx w/ protonix, constipation bowel regimen w/ lactulose, mag and senna   -Renal: Renal function stable, lytes stable, cont to monitor and trend and replete prn  -ID: L. hip abscess + Enterococcus bacteremia cont IV zosyn, possible surgical intervention given very minimal drainage from IR drainage  -Endo: no acute issues  -Heme: DVT ppx w/ heparin      -Msk: Chronic low back pain on methadone 60mg daily checked HCS data-base and confirmed pt reports being on methadone for appx 15 years) along with gabapentin and Robaxin, will order methadone 60mg daily for 6am as patient has been taking  -Dispo: Transfer to MICU, [facility] discussed pt is full code, patient requested her daughter Angelina Cardoso be her HCP if she unable to make any decision her phone number is [phone], case d/w eICU attending and both General surgery and orthopedic team 66 y/o F w/ pmh of EtOH abuse, COPD, RA, chronic pain on methadone, colon cancer s/p sigmoid resection 2015, hx of open R. inguinal hernia repair w/ mesh (2010), hx of hiatal hernia repair (2019), zenker diverticulum surgery c/b vocal cord paralysis's requiring peJ for feeding, presented to [facility] on 11/27/2020 for leaking PEJ tube pt was found to have sepsis from L. buttocks abscess, myositis eneterococcus bacteremia causing hypotension and septic shock require pressors now off on midodrine.      -Neuro: No acute issues MS stable  -Cardiac: Hypotension w/ no improvement w/ LR bolus continue midodrine 15mg q8h, levophed restarted to maintain MAP >65 otherwise no acute issues  -Resp: No acute issues, maintain o2 >92%  -GI: NPO for now, GI ppx w/ protonix, constipation bowel regimen w/ lactulose, mag and senna   -Renal: Renal function stable, lytes stable, cont to monitor and trend and replete prn  -ID: L. hip abscess + Enterococcus bacteremia cont IV zosyn, possible surgical intervention given very minimal drainage from IR drainage  -Endo: no acute issues  -Heme: DVT ppx w/ heparin  -Msk: Chronic low back pain on methadone 60mg daily, cont gabapentin and Robaxin  -Dispo: Pt remains in ICU given on/off hypotension requiring levophed to be restarted again tonight, GOC ongoing, dispo pending hospital course

## 2020-12-01 NOTE — PROGRESS NOTE ADULT - SUBJECTIVE AND OBJECTIVE BOX
HPI: 64 y/o F w/ pmh of EtOH abuse, COPD, RA, chronic pain on methadone, colon cancer s/p sigmoid resection 2015, hx of open R. inguinal hernia repair w/ mesh (2010), hx of hiatal hernia repair (2019), zenker diverticulum surgery c/b vocal cord paralysis's requiring peJ for feeding, presented to [facility] on 11/27/2020 for leaking PEJ tube pt was found to have sepsis from L. buttocks abscess, myositis eneterococcus bacteremia causing hypotension and septic shock require pressors now off on midodrine.      24 hour events: tonight pt developed hypotension with SBP into the 70s was given 500cc of LR bolus w/ improvement. pt otherwise remains stable and w/out any complains at this time.    Review of Systems:  Constitutional: No fever, chills, fatigue  Neuro: No headache, numbness, weakness  Resp: No cough, wheezing, shortness of breath  CVS: No chest pain, palpitations, leg swelling  GI: No abdominal pain, nausea, vomiting, diarrhea   : No dysuria, frequency, incontinence  Skin: No itching, burning, rashes, or lesions   Msk: No joint pain or swelling  Psych: No depression, anxiety, mood swings    T(F): 97.6 (12-01-20 @ 00:00), Max: 98.1 (11-30-20 @ 15:23)  HR: 60 (12-01-20 @ 01:00) (57 - 81)  BP: 80/52 (12-01-20 @ 01:00) (70/45 - 150/66)  RR: 13 (12-01-20 @ 01:00) (10 - 31)  SpO2: 97% (12-01-20 @ 01:00) (88% - 100%)  Wt(kg): --      11-29-20 @ 07:01  -  11-30-20 @ 07:00  --------------------------------------------------------  IN: 2829 mL / OUT: 750 mL / NET: 2079 mL    11-30-20 @ 07:01  -  12-01-20 @ 01:47  --------------------------------------------------------  IN: 2170 mL / OUT: 300 mL / NET: 1870 mL        CAPILLARY BLOOD GLUCOSE      POCT Blood Glucose.: 109 mg/dL (01 Dec 2020 00:42)      I&O's Summary    29 Nov 2020 07:01  -  30 Nov 2020 07:00  --------------------------------------------------------  IN: 2829 mL / OUT: 750 mL / NET: 2079 mL    30 Nov 2020 07:01  -  01 Dec 2020 01:47  --------------------------------------------------------  IN: 2170 mL / OUT: 300 mL / NET: 1870 mL        Physical Exam:   Gen: Comfortable in bed in NAD  Neuro: AAOx3  HEENT: PERRL  Resp: CTA b/l  CVS: +RRR  Abd: BSx4, soft, nt/nd  Ext: no edema  Skin: warm/dry    Meds:  piperacillin/tazobactam IVPB.. IV Intermittent    midodrine. Oral    dextrose 40% Gel Oral  dextrose 50% Injectable IV Push  dextrose 50% Injectable IV Push  dextrose 50% Injectable IV Push  glucagon  Injectable IntraMuscular    ALBUTerol    90 MICROgram(s) HFA Inhaler Inhalation PRN  montelukast Oral  tiotropium 18 MICROgram(s) Capsule Inhalation    acetaminophen    Suspension .. Enteral Tube PRN  DULoxetine Oral  FLUoxetine Oral  gabapentin Oral  methadone    Tablet Oral  methocarbamol Oral  traZODone Oral      aspirin  chewable Enteral Tube  heparin   Injectable SubCutaneous    lactulose Syrup Oral  magnesium hydroxide Suspension Oral PRN  pantoprazole   Suspension Oral  senna Oral      ascorbic acid Oral  cholecalciferol Oral  dextrose 5% + lactated ringers. IV Continuous  dextrose 5%. IV Continuous  dextrose 5%. IV Continuous  multivitamin/minerals/iron Oral Solution (CENTRUM) Oral                                  7.6    9.40  )-----------( 506      ( 30 Nov 2020 07:39 )             25.8       11-30    134<L>  |  97  |  11  ----------------------------<  144<H>  3.8   |  31  |  0.31<L>    Ca    7.7<L>      30 Nov 2020 07:39  Phos  2.8     11-30  Mg     1.8     11-30    TPro  5.1<L>  /  Alb  1.2<L>  /  TBili  0.2  /  DBili  x   /  AST  15  /  ALT  12  /  AlkPhos  89  11-30      CARDIAC MARKERS ( 29 Nov 2020 06:11 )  x     / x     / 61 U/L / x     / x              .Blood Blood-Peripheral   No growth to date. -- 11-29 @ 11:41  .Urine Clean Catch (Midstream)   <10,000 CFU/mL Normal Urogenital Ami -- 11-28 @ 04:55  .Blood Blood-Peripheral   No growth to date.   Growth in anaerobic bottle: Gram Positive Cocci in Pairs and Chains 11-27 @ 18:29      Rapid RVP Result: NotDetec (11-27 @ 13:21)          Radiology:     < from: MR Hip w/wo IV Cont, Left (11.28.20 @ 12:34) >  EXAM:  MR HIP WAW IC LT                            PROCEDURE DATE:  11/28/2020          INTERPRETATION:  Clinical indications: Left hip abscess.    Multiplanar multisequence MRI of the left hip was performed with and without intravenous contrast.    4.5 cc of Gadavist was administered intravenously. 3 cc was discarded.    Correlation is made with prior CT of the abdomen and pelvis from November 27, 2020.    Findings:    There is extensive edema and enhancement involving the piriformis muscles bilaterally, greater on the left. On the left there is a fluid collection tracking from the left piriformis muscle laterally to the level of the gluteal muscles and the superior margin of the greater trochanter. This collection measures approximately 3.6 x 8.9 x 2.5 cm. This collection is closely associated with the distal myotendinous insertional fibers of the gluteus minimus and medius and piriformis muscles on the left greater trochanter. There is increased intrinsic T1 signal within the collection which may be related to some component of proteinaceous/hemorrhagic debris. Additional areas of loculated fluid are seen within the posterior aspect of the left greater trochanteric bursa measuring approximately 1.4 1.5 x 3.4 cm. There is extensive surrounding intramuscular edema and enhancement throughout the left gluteal, left short internal adductor muscles extending to the sacrum, within the left adductor musculature, and along the left anterior compartment thigh musculature. A developing fluid collection is also seen within the right piriformis muscle measuring approximately 3.7 x 1.2 x 1.7 cm. Edema and enhancement within the piriformis muscle also extends proximally to the bilateral S1/S2 and S2/S3 neural foramina with edema and enhancement seen in the sacral canal. Further evaluation with a dedicated MRI of the lumbar spine with and without contrast is recommended to evaluate for any epidural abscess. There is patchy edema and enhancement within the sacrum, more prominent aboutthe right S1/S2 neural foramen with corresponding decreased T1 marrow signal. Findings raise concern for the possibility of developing osteomyelitis versus reactive osteitis. When correlating with the prior CT of the abdomen and pelvis there is evidence of small foci of extraluminal air within the presacral soft tissues adjacent to the rectosigmoid anastomosis. Findings raise concern for possibility of an anastomotic leak as the possible etiology of these findings.    There is pericapsular edema throughout the left hip. There is mild enhancement of the synovium of the left hip joint without a demonstrable joint effusion. Synovial enhancement is consistent with a nonspecific synovitis. This may be reactive in nature secondary to the pericapsular edema. Possibility of a developing septic arthrosis is not entirely excluded, however there is no joint effusion. Aside from patchy marrow signal abnormality within the sacrum the marrow signal within the remainder of the pelvis appears otherwise preserved.    The right hip joint space is preserved. There is severe lower lumbar spondylosis. There is mild bilateral sacroiliac joint arthrosis.    There is edema surrounding the fat planes of the left sciatic nerve. The left sciatic nerve is otherwise normal in signal characteristics.    There is no acute tendinous injury.    Impression:    Extensive myositis about the left hip musculature as outlined above with a collection decompressing from the left piriformis muscle laterally along the leftgluteal myofascial planes to the level of the left greater trochanter. This collection demonstrates intrinsic increased T1 signal suggestive of some degree of underlying hemorrhagic/proteinaceous material. Prominent myositis is also seen within the right piriformis muscle with evidence of a developing fluid collection. On the prior CT there are small foci of extraluminal air seen posterior to the rectosigmoid anastomosis. This may be related to a small anastomotic leak and may be the etiology of this process. There is patchy edema within the sacrum, most notably about the right S1/S2 neural foramen which may be related to developing osteomyelitis or reactive osteitis. Edema extends along the bilateral S1/S2 and S2/S3 neural foramina. Further evaluation with a dedicated MRI of the lumbar spine with and without intravenous contrast is recommended to exclude any epidural abscess.    Additional fluid collection is seen within the posterior aspect of the left greater trochanteric bursa.    Pericapsular edema and enhancement about the left hip joint. Minimal synovial enhancement about the left hip without evidence of a left hip joint effusion. Findings may be related to reactive changes secondary to pericapsular edema, however the possibility of an early septic arthrosis is not entirely excluded.    Findings discussed with Dr. Burroughs.            SHITAL CORTES MD; Attending Radiologist  This document has been electronically signed. Nov 28 2020  1:25PM    < end of copied text >      CENTRAL LINE: N  GREWAL: N  A-LINE: N    GLOBAL ISSUE/BEST PRACTICE:  Analgesia: Y  Sedation: N  CAM-ICU: n/a  HOB elevation: Y  Stress ulcer prophylaxis: Y  VTE prophylaxis: Y  Glycemic control: Y  Nutrition: Y    CODE STATUS: FULL CODE         HPI: 66 y/o F w/ pmh of EtOH abuse, COPD, RA, chronic pain on methadone, colon cancer s/p sigmoid resection 2015, hx of open R. inguinal hernia repair w/ mesh (2010), hx of hiatal hernia repair (2019), zenker diverticulum surgery c/b vocal cord paralysis's requiring peJ for feeding, presented to [facility] on 11/27/2020 for leaking PEJ tube pt was found to have sepsis from L. buttocks abscess, myositis eneterococcus bacteremia causing hypotension and septic shock require pressors now off on midodrine.      24 hour events: tonight pt developed hypotension with SBP into the 70s was given 1L of LR bolus w/ no improvement. pt otherwise remains stable and w/out any complains at this time and was restarted on levophed    Review of Systems:  Constitutional: No fever, chills, fatigue  Neuro: No headache, numbness, weakness  Resp: No cough, wheezing, shortness of breath  CVS: No chest pain, palpitations, leg swelling  GI: No abdominal pain, nausea, vomiting, diarrhea   : No dysuria, frequency, incontinence  Skin: No itching, burning, rashes, or lesions   Msk: No joint pain or swelling  Psych: No depression, anxiety, mood swings    T(F): 97.6 (12-01-20 @ 00:00), Max: 98.1 (11-30-20 @ 15:23)  HR: 60 (12-01-20 @ 01:00) (57 - 81)  BP: 80/52 (12-01-20 @ 01:00) (70/45 - 150/66)  RR: 13 (12-01-20 @ 01:00) (10 - 31)  SpO2: 97% (12-01-20 @ 01:00) (88% - 100%)  Wt(kg): --      11-29-20 @ 07:01  -  11-30-20 @ 07:00  --------------------------------------------------------  IN: 2829 mL / OUT: 750 mL / NET: 2079 mL    11-30-20 @ 07:01  -  12-01-20 @ 01:47  --------------------------------------------------------  IN: 2170 mL / OUT: 300 mL / NET: 1870 mL        CAPILLARY BLOOD GLUCOSE      POCT Blood Glucose.: 109 mg/dL (01 Dec 2020 00:42)      I&O's Summary    29 Nov 2020 07:01  -  30 Nov 2020 07:00  --------------------------------------------------------  IN: 2829 mL / OUT: 750 mL / NET: 2079 mL    30 Nov 2020 07:01  -  01 Dec 2020 01:47  --------------------------------------------------------  IN: 2170 mL / OUT: 300 mL / NET: 1870 mL        Physical Exam:   Gen: Comfortable in bed in NAD  Neuro: AAOx3  HEENT: PERRL  Resp: CTA b/l  CVS: +RRR  Abd: BSx4, soft, nt/nd  Ext: no edema  Skin: warm/dry    Meds:  piperacillin/tazobactam IVPB.. IV Intermittent    midodrine. Oral    dextrose 40% Gel Oral  dextrose 50% Injectable IV Push  dextrose 50% Injectable IV Push  dextrose 50% Injectable IV Push  glucagon  Injectable IntraMuscular    ALBUTerol    90 MICROgram(s) HFA Inhaler Inhalation PRN  montelukast Oral  tiotropium 18 MICROgram(s) Capsule Inhalation    acetaminophen    Suspension .. Enteral Tube PRN  DULoxetine Oral  FLUoxetine Oral  gabapentin Oral  methadone    Tablet Oral  methocarbamol Oral  traZODone Oral      aspirin  chewable Enteral Tube  heparin   Injectable SubCutaneous    lactulose Syrup Oral  magnesium hydroxide Suspension Oral PRN  pantoprazole   Suspension Oral  senna Oral      ascorbic acid Oral  cholecalciferol Oral  dextrose 5% + lactated ringers. IV Continuous  dextrose 5%. IV Continuous  dextrose 5%. IV Continuous  multivitamin/minerals/iron Oral Solution (CENTRUM) Oral                                  7.6    9.40  )-----------( 506      ( 30 Nov 2020 07:39 )             25.8       11-30    134<L>  |  97  |  11  ----------------------------<  144<H>  3.8   |  31  |  0.31<L>    Ca    7.7<L>      30 Nov 2020 07:39  Phos  2.8     11-30  Mg     1.8     11-30    TPro  5.1<L>  /  Alb  1.2<L>  /  TBili  0.2  /  DBili  x   /  AST  15  /  ALT  12  /  AlkPhos  89  11-30      CARDIAC MARKERS ( 29 Nov 2020 06:11 )  x     / x     / 61 U/L / x     / x              .Blood Blood-Peripheral   No growth to date. -- 11-29 @ 11:41  .Urine Clean Catch (Midstream)   <10,000 CFU/mL Normal Urogenital Ami -- 11-28 @ 04:55  .Blood Blood-Peripheral   No growth to date.   Growth in anaerobic bottle: Gram Positive Cocci in Pairs and Chains 11-27 @ 18:29      Rapid RVP Result: NotDetec (11-27 @ 13:21)          Radiology:     < from: MR Hip w/wo IV Cont, Left (11.28.20 @ 12:34) >  EXAM:  MR HIP WAW IC LT                            PROCEDURE DATE:  11/28/2020          INTERPRETATION:  Clinical indications: Left hip abscess.    Multiplanar multisequence MRI of the left hip was performed with and without intravenous contrast.    4.5 cc of Gadavist was administered intravenously. 3 cc was discarded.    Correlation is made with prior CT of the abdomen and pelvis from November 27, 2020.    Findings:    There is extensive edema and enhancement involving the piriformis muscles bilaterally, greater on the left. On the left there is a fluid collection tracking from the left piriformis muscle laterally to the level of the gluteal muscles and the superior margin of the greater trochanter. This collection measures approximately 3.6 x 8.9 x 2.5 cm. This collection is closely associated with the distal myotendinous insertional fibers of the gluteus minimus and medius and piriformis muscles on the left greater trochanter. There is increased intrinsic T1 signal within the collection which may be related to some component of proteinaceous/hemorrhagic debris. Additional areas of loculated fluid are seen within the posterior aspect of the left greater trochanteric bursa measuring approximately 1.4 1.5 x 3.4 cm. There is extensive surrounding intramuscular edema and enhancement throughout the left gluteal, left short internal adductor muscles extending to the sacrum, within the left adductor musculature, and along the left anterior compartment thigh musculature. A developing fluid collection is also seen within the right piriformis muscle measuring approximately 3.7 x 1.2 x 1.7 cm. Edema and enhancement within the piriformis muscle also extends proximally to the bilateral S1/S2 and S2/S3 neural foramina with edema and enhancement seen in the sacral canal. Further evaluation with a dedicated MRI of the lumbar spine with and without contrast is recommended to evaluate for any epidural abscess. There is patchy edema and enhancement within the sacrum, more prominent aboutthe right S1/S2 neural foramen with corresponding decreased T1 marrow signal. Findings raise concern for the possibility of developing osteomyelitis versus reactive osteitis. When correlating with the prior CT of the abdomen and pelvis there is evidence of small foci of extraluminal air within the presacral soft tissues adjacent to the rectosigmoid anastomosis. Findings raise concern for possibility of an anastomotic leak as the possible etiology of these findings.    There is pericapsular edema throughout the left hip. There is mild enhancement of the synovium of the left hip joint without a demonstrable joint effusion. Synovial enhancement is consistent with a nonspecific synovitis. This may be reactive in nature secondary to the pericapsular edema. Possibility of a developing septic arthrosis is not entirely excluded, however there is no joint effusion. Aside from patchy marrow signal abnormality within the sacrum the marrow signal within the remainder of the pelvis appears otherwise preserved.    The right hip joint space is preserved. There is severe lower lumbar spondylosis. There is mild bilateral sacroiliac joint arthrosis.    There is edema surrounding the fat planes of the left sciatic nerve. The left sciatic nerve is otherwise normal in signal characteristics.    There is no acute tendinous injury.    Impression:    Extensive myositis about the left hip musculature as outlined above with a collection decompressing from the left piriformis muscle laterally along the leftgluteal myofascial planes to the level of the left greater trochanter. This collection demonstrates intrinsic increased T1 signal suggestive of some degree of underlying hemorrhagic/proteinaceous material. Prominent myositis is also seen within the right piriformis muscle with evidence of a developing fluid collection. On the prior CT there are small foci of extraluminal air seen posterior to the rectosigmoid anastomosis. This may be related to a small anastomotic leak and may be the etiology of this process. There is patchy edema within the sacrum, most notably about the right S1/S2 neural foramen which may be related to developing osteomyelitis or reactive osteitis. Edema extends along the bilateral S1/S2 and S2/S3 neural foramina. Further evaluation with a dedicated MRI of the lumbar spine with and without intravenous contrast is recommended to exclude any epidural abscess.    Additional fluid collection is seen within the posterior aspect of the left greater trochanteric bursa.    Pericapsular edema and enhancement about the left hip joint. Minimal synovial enhancement about the left hip without evidence of a left hip joint effusion. Findings may be related to reactive changes secondary to pericapsular edema, however the possibility of an early septic arthrosis is not entirely excluded.    Findings discussed with Dr. Burroughs.            SHITAL CORTES MD; Attending Radiologist  This document has been electronically signed. Nov 28 2020  1:25PM    < end of copied text >      CENTRAL LINE: N  GREWAL: N  A-LINE: N    GLOBAL ISSUE/BEST PRACTICE:  Analgesia: Y  Sedation: N  CAM-ICU: n/a  HOB elevation: Y  Stress ulcer prophylaxis: Y  VTE prophylaxis: Y  Glycemic control: Y  Nutrition: Y    CODE STATUS: FULL CODE      Critical Care time: 35 minutes including time spent reviewing chart, ordering tests/labs, discussing with interdisciplinary team. Not including time spent performing procedures.

## 2020-12-01 NOTE — PROGRESS NOTE ADULT - SUBJECTIVE AND OBJECTIVE BOX
Patient is a 65y old  Female who presents with a chief complaint of hypotension (01 Dec 2020 01:47)    24 hour events: ***    REVIEW OF SYSTEMS  Constitutional: admits to fatigue  Resp: No shortness of breath  CVS: No chest pain  GI: No abdominal pain   MSK: complains of back pain and pain in the L hip, asking for methadone to help with pain    T(F): 97.6 (12-01-20 @ 00:00), Max: 98.1 (11-30-20 @ 15:23)  HR: 63 (12-01-20 @ 07:00) (57 - 83)  BP: 100/56 (12-01-20 @ 07:00) (70/45 - 150/66)  RR: 10 (12-01-20 @ 07:00) (10 - 31)  SpO2: 96% (12-01-20 @ 07:00) (88% - 100%)  Wt(kg): --            I&O's Summary    11-30 @ 07:01  -  12-01 @ 07:00  --------------------------------------------------------  IN: 3281.8 mL / OUT: 1000 mL / NET: 2281.8 mL      PHYSICAL EXAM  Gen: Comfortable in bed in NAD, lethargic  HEENT: PERRL, normocephalic, atraumatic  Resp: CTA b/l  CVS: +RRR  Abd: BSx4, soft, mild distention, NT, PEJ tube in place  Ext: L. hip with mild swelling and TTP along the gluteal region, no erythema  Skin: warm/dry    MEDICATIONS  piperacillin/tazobactam IVPB.. IV Intermittent    midodrine. Oral  norepinephrine Infusion IV Continuous    dextrose 40% Gel Oral  dextrose 50% Injectable IV Push  dextrose 50% Injectable IV Push  dextrose 50% Injectable IV Push  glucagon  Injectable IntraMuscular    ALBUTerol    90 MICROgram(s) HFA Inhaler Inhalation PRN  montelukast Oral  tiotropium 18 MICROgram(s) Capsule Inhalation    acetaminophen    Suspension .. Enteral Tube PRN  DULoxetine Oral  FLUoxetine Oral  gabapentin Oral  methadone    Tablet Oral  methocarbamol Oral  traZODone Oral      aspirin  chewable Enteral Tube  heparin   Injectable SubCutaneous    lactulose Syrup Oral  magnesium hydroxide Suspension Oral PRN  pantoprazole   Suspension Oral  senna Oral      ascorbic acid Oral  cholecalciferol Oral  dextrose 5% + lactated ringers. IV Continuous  dextrose 5%. IV Continuous  dextrose 5%. IV Continuous  multivitamin/minerals/iron Oral Solution (CENTRUM) Oral                                7.6    10.72 )-----------( 516      ( 01 Dec 2020 06:10 )             26.1       12-01    139  |  102  |  5<L>  ----------------------------<  111<H>  3.6   |  29  |  <0.20<L>    Ca    7.8<L>      01 Dec 2020 06:10  Phos  3.2     12-01  Mg     1.7     12-01    TPro  5.1<L>  /  Alb  1.2<L>  /  TBili  0.2  /  DBili  <.10  /  AST  15  /  ALT  11<L>  /  AlkPhos  102  12-01              .Blood Blood-Peripheral   No growth to date. -- 11-29 @ 11:41  .Urine Clean Catch (Midstream)   <10,000 CFU/mL Normal Urogenital Ami -- 11-28 @ 04:55  .Blood Blood-Peripheral   No growth to date.   Growth in anaerobic bottle: Gram Positive Cocci in Pairs and Chains 11-27 @ 18:29      Rapid RVP Result: NotDetec (11-27 @ 13:21)    Radiology: ***  Bedside lung ultrasound: ***  Bedside ECHO: ***    PEG: Y  GREWAL: N                              GLOBAL ISSUE/BEST PRACTICE  Analgesia:  Y  Sedation:  N  HOB elevation: yes  Stress ulcer prophylaxis:  Y  VTE prophylaxis:  Y  Glycemic control: Y  Nutrition:  NPO    CODE STATUS: FULL         Patient is a 65y old  Female who presents with a chief complaint of hypotension (01 Dec 2020 01:47)    24 hour events: Underwent IR I+D of L hip abscess yesterday. Less than 10cc evacuated unable to place a drain per IR team. She became hypotensive overnight and started on levophed. Will FU MRI and CT for plan of taking patient today.     REVIEW OF SYSTEMS  Pt somnelent this morning and unable to participate in exam.     T(F): 97.6 (12-01-20 @ 00:00), Max: 98.1 (11-30-20 @ 15:23)  HR: 63 (12-01-20 @ 07:00) (57 - 83)  BP: 100/56 (12-01-20 @ 07:00) (70/45 - 150/66)  RR: 10 (12-01-20 @ 07:00) (10 - 31)  SpO2: 96% (12-01-20 @ 07:00) (88% - 100%)  Wt(kg): --            I&O's Summary    11-30 @ 07:01  -  12-01 @ 07:00  --------------------------------------------------------  IN: 3281.8 mL / OUT: 1000 mL / NET: 2281.8 mL      PHYSICAL EXAM  Gen: NAD, Somnelent, mildly reactive to verbal stimuli.   HEENT: PERRL, normocephalic, atraumatic  Resp: CTA b/l  CVS: +RRR  Abd: BSx4, soft, mild distention, NT, PEJ tube in place  Ext: L. hip with mild swelling and TTP along the gluteal region, no erythema  Skin: warm/dry    MEDICATIONS  piperacillin/tazobactam IVPB.. IV Intermittent    midodrine. Oral  norepinephrine Infusion IV Continuous    dextrose 40% Gel Oral  dextrose 50% Injectable IV Push  dextrose 50% Injectable IV Push  dextrose 50% Injectable IV Push  glucagon  Injectable IntraMuscular    ALBUTerol    90 MICROgram(s) HFA Inhaler Inhalation PRN  montelukast Oral  tiotropium 18 MICROgram(s) Capsule Inhalation    acetaminophen    Suspension .. Enteral Tube PRN  DULoxetine Oral  FLUoxetine Oral  gabapentin Oral  methadone    Tablet Oral  methocarbamol Oral  traZODone Oral      aspirin  chewable Enteral Tube  heparin   Injectable SubCutaneous    lactulose Syrup Oral  magnesium hydroxide Suspension Oral PRN  pantoprazole   Suspension Oral  senna Oral      ascorbic acid Oral  cholecalciferol Oral  dextrose 5% + lactated ringers. IV Continuous  dextrose 5%. IV Continuous  dextrose 5%. IV Continuous  multivitamin/minerals/iron Oral Solution (CENTRUM) Oral                                7.6    10.72 )-----------( 516      ( 01 Dec 2020 06:10 )             26.1       12-01    139  |  102  |  5<L>  ----------------------------<  111<H>  3.6   |  29  |  <0.20<L>    Ca    7.8<L>      01 Dec 2020 06:10  Phos  3.2     12-01  Mg     1.7     12-01    TPro  5.1<L>  /  Alb  1.2<L>  /  TBili  0.2  /  DBili  <.10  /  AST  15  /  ALT  11<L>  /  AlkPhos  102  12-01              .Blood Blood-Peripheral   No growth to date. -- 11-29 @ 11:41  .Urine Clean Catch (Midstream)   <10,000 CFU/mL Normal Urogenital Ami -- 11-28 @ 04:55  .Blood Blood-Peripheral   No growth to date.   Growth in anaerobic bottle: Gram Positive Cocci in Pairs and Chains 11-27 @ 18:29      Rapid RVP Result: NotDetec (11-27 @ 13:21)    Radiology: ***  Bedside lung ultrasound: ***  Bedside ECHO: ***    PEG: Y  GREWAL: N                              GLOBAL ISSUE/BEST PRACTICE  Analgesia:  Y  Sedation:  N  HOB elevation: yes  Stress ulcer prophylaxis:  Y  VTE prophylaxis:  Y  Glycemic control: Y  Nutrition:  NPO    CODE STATUS: FULL

## 2020-12-01 NOTE — PROGRESS NOTE ADULT - SUBJECTIVE AND OBJECTIVE BOX
PROGRESS NOTE  Patient is a 65y old  Female who presents with a chief complaint of the L. hip ill-defined multi septated fluid collection containing foci of air measuring appx 3.8 x 3.2 x3.9cm concerning for abscess, WBC of 20, normal LA. Pt was seen by both surgical and ortho team who recommenced no surgical intervention at this time. MICU consulted for hypotension, (01 Dec 2020 14:42)  Chart and available morning labs /imaging are reviewed electronically , urgent issues addressed . More information  is being added upon completion of rounds , when more information is collected and management discussed with consultants , medical staff and social service/case management on the floor   OVERNIGHT  Underwent IR I+D of L hip abscess yesterday. Less than 10cc evacuated unable to place a drain per IR team. She became hypotensive overnight and started on levophed.   HPI:  HPI: 66 y/o F w/ pmh of recovered alcoholic, former smoker, copd, RA, chronic low back pain (on methadone 60mg daily), hx of colon ca s/p sigmoid resection (2015), hx of open R. 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 In the ED pt underwent an CT of abd/pelvis was found to have a Abscess around the L. hip ill-defined multi septated fluid collection containing foci of air measuring appx 3.8 x 3.2 x3.9cm concerning for abscess, WBC of 20, normal LA. Pt was seen by both surgical and ortho team who recommenced no surgical intervention at this time. MICU consulted for hypotension, (27 Nov 2020 19:45)  PAST MEDICAL & SURGICAL HISTORY:  Candidal stomatitis  Malignant neoplasm of colon  Major depressive disorder  HTN (hypertension)  COPD (chronic obstructive pulmonary disease)  MEDICATIONS  (STANDING):  ascorbic acid 500 milliGRAM(s) Oral daily  aspirin  chewable 81 milliGRAM(s) Enteral Tube daily  cholecalciferol 1000 Unit(s) Oral daily  dextrose 40% Gel 15 Gram(s) Oral once  dextrose 5% + lactated ringers. 1000 milliLiter(s) (80 mL/Hr) IV Continuous <Continuous>  dextrose 5%. 1000 milliLiter(s) (100 mL/Hr) IV Continuous <Continuous>  dextrose 5%. 1000 milliLiter(s) (50 mL/Hr) IV Continuous <Continuous>  dextrose 50% Injectable 25 Gram(s) IV Push once  dextrose 50% Injectable 12.5 Gram(s) IV Push once  dextrose 50% Injectable 25 Gram(s) IV Push once  DULoxetine 30 milliGRAM(s) Oral daily  FLUoxetine 20 milliGRAM(s) Oral daily  gabapentin 200 milliGRAM(s) Oral three times a day  glucagon  Injectable 1 milliGRAM(s) IntraMuscular once  heparin   Injectable 5000 Unit(s) SubCutaneous every 12 hours  lactulose Syrup 10 Gram(s) Oral three times a day  methocarbamol 250 milliGRAM(s) Oral every 8 hours  midodrine. 15 milliGRAM(s) Oral three times a day  montelukast 10 milliGRAM(s) Oral at bedtime  multivitamin/minerals/iron Oral Solution (CENTRUM) 15 milliLiter(s) Oral daily  norepinephrine Infusion 0.05 MICROgram(s)/kG/Min (4.16 mL/Hr) IV Continuous <Continuous>  pantoprazole   Suspension 40 milliGRAM(s) Oral daily  piperacillin/tazobactam IVPB.. 3.375 Gram(s) IV Intermittent every 8 hours  senna 2 Tablet(s) Oral at bedtime  tiotropium 18 MICROgram(s) Capsule 1 Capsule(s) Inhalation daily  traZODone 50 milliGRAM(s) Oral at bedtime  MEDICATIONS  (PRN):  acetaminophen    Suspension .. 650 milliGRAM(s) Enteral Tube every 6 hours PRN Temp greater or equal to 38C (100.4F), Mild Pain (1 - 3)  ALBUTerol    90 MICROgram(s) HFA Inhaler 2 Puff(s) Inhalation every 6 hours PRN Shortness of Breath and/or Wheezing  magnesium hydroxide Suspension 15 milliLiter(s) Oral at bedtime PRN Constipation  NOTE In accordance with  [facility] policy ICU final medical management decisions/MD orders are  determined/done only by ICU Intensivists   OBJECTIVE  T(C): 36.4 (12-01-20 @ 12:31), Max: 36.7 (12-01-20 @ 07:55)  HR: 61 (12-01-20 @ 13:00) (57 - 83)  BP: 108/68 (12-01-20 @ 13:00) (70/45 - 150/66)  RR: 8 (12-01-20 @ 13:00) (8 - 29)  SpO2: 97% (12-01-20 @ 13:00) (91% - 100%)  Wt(kg): --  I&O's Summary    30 Nov 2020 07:01  -  01 Dec 2020 07:00  --------------------------------------------------------  IN: 3281.8 mL / OUT: 1000 mL / NET: 2281.8 mL    01 Dec 2020 07:01  -  01 Dec 2020 16:12  --------------------------------------------------------  IN: 640.2 mL / OUT: 0 mL / NET: 640.2 mL  REVIEW OF SYSTEMS:  limited due to ms /somnolence   PHYSICAL EXAM:  Appearance: NAD. VS past 24 hrs -as above   HEENT:   Moist oral mucosa. Conjunctiva clear b/l.   Neck : supple  Respiratory: Lungs CTAB.  Gastrointestinal:  Soft, nontender. No rebound. No rigidity. BS present	  Cardiovascular: RRR ,S1S2 present  Neurologic: Non-focal. Moving all extremities.  Extremities: L hip  edema. No erythema. No calf tenderness.  Skin: No rashes, No ecchymoses, No cyanosis.	  wounds ,skin lesions-See skin assesment flow sheet   LABS:                        7.6    10.72 )-----------( 516      ( 01 Dec 2020 06:10 )             26.1     12-01    139  |  102  |  5<L>  ----------------------------<  111<H>  3.6   |  29  |  <0.20<L>    Ca    7.8<L>      01 Dec 2020 06:10  Phos  3.2     12-01  Mg     1.7     12-01    TPro  5.1<L>  /  Alb  1.2<L>  /  TBili  0.2  /  DBili  <.10  /  AST  15  /  ALT  11<L>  /  AlkPhos  102  12-01    CAPILLARY BLOOD GLUCOSE      POCT Blood Glucose.: 119 mg/dL (01 Dec 2020 05:36)  POCT Blood Glucose.: 109 mg/dL (01 Dec 2020 00:42)  POCT Blood Glucose.: 110 mg/dL (30 Nov 2020 17:53)    PT/INR - ( 01 Dec 2020 08:47 )   PT: 14.5 sec;   INR: 1.25 ratio         PTT - ( 01 Dec 2020 08:47 )  PTT:33.0 sec      Culture - Blood (collected 29 Nov 2020 11:41)  Source: .Blood Blood-Peripheral  Preliminary Report (30 Nov 2020 12:01):    No growth to date.    Culture - Blood (collected 29 Nov 2020 11:41)  Source: .Blood Blood-Peripheral  Preliminary Report (30 Nov 2020 12:01):    No growth to date.    Culture - Urine (collected 28 Nov 2020 04:55)  Source: .Urine Clean Catch (Midstream)  Final Report (29 Nov 2020 00:44):    <10,000 CFU/mL Normal Urogenital Ami    Culture - Blood (collected 27 Nov 2020 18:29)  Source: .Blood Blood-Peripheral  Gram Stain (28 Nov 2020 11:34):    Growth in anaerobic bottle: Gram Positive Cocci in Pairs and Chains  Final Report (30 Nov 2020 11:00):    Growth in anaerobic bottle: Enterococcus faecalis    "Due to technical problems, Proteus sp. will Not be reported as part of    the BCID panel until further notice" ***Blood Panel PCR results on this    specimen are available    approximately 3 hours afterthe Gram stain result.***    Gram stain, PCR, and/or culture results may not always    correspond due to difference in methodologies.    ************************************************************    This PCR assay was performed using [employer].    The following targets are tested for: Enterococcus,    vancomycin resistant enterococci, Listeria monocytogenes,    coagulase negative staphylococci, S. aureus,    methicillin resistant S. aureus, Streptococcus agalactiae    (Group B), S. pneumoniae, S. pyogenes (Group A),    Acinetobacter baumannii, Enterobacter cloacae, E. coli,    Klebsiella oxytoca, K. pneumoniae, Proteus sp.,    Serratia marcescens, Haemophilus influenzae,    Neisseria meningitidis, Pseudomonas aeruginosa, Candida    albicans, C. glabrata, C krusei, C parapsilosis,    C. tropicalis and the KPC resistance gene.  Organism: Blood Culture PCR  Enterococcus faecalis (30 Nov 2020 11:00)  Organism: Enterococcus faecalis (30 Nov 2020 11:00)  Organism: Blood Culture PCR (30 Nov 2020 11:00)    Culture - Blood (collected 27 Nov 2020 18:29)  Source: .Blood Blood-Peripheral  Preliminary Report (28 Nov 2020 19:01):    No growth to date.      RADIOLOGY & ADDITIONAL TESTS:< from: CT Abdomen and Pelvis w/ Oral Cont (12.01.20 @ 14:01) >  EXAM:  CT ABDOMEN AND PELVIS OC                            *** ADDENDUM 12/01/2020  ***    Finding communicated to ICU physician assistant Donte at the time of interpretation on 12/1/2020.    *** END OF ADDENDUM 12/01/2020  ***      PROCEDURE DATE:  12/01/2020          INTERPRETATION:  CLINICAL INFORMATION: Abdominal pain. Rectosigmoid anastomosis, evaluate for leak.    COMPARISON: CT scan abdomen pelvis 11/27/2020.    PROCEDURE:  CT of the Abdomen and Pelvis was performed without intravenous contrast.  Intravenous contrast: None.  Oral contrast: None.  Rectal contrast was administered.  Sagittal and coronal reformats were performed.    FINDINGS:    LOWER CHEST: Not entirely included on this exam. Small left pleural effusion atelectasis.    Streak artifact related to patient's arms degrades image quality.    The evaluation of the solid organ parenchyma is limited without intravenous contrast.    LIVER: Not entirely included on this exam.  BILE DUCTS: Mild prominence of the common bile duct measuring up to 9 mm.  GALLBLADDER: Prior cholecystectomy.  SPLEEN: Within normal limits.  PANCREAS: Within normal limits.  ADRENALS: Within normal limits.  KIDNEYS/URETERS: Within normal limits.    BLADDER: Within normal limits.  REPRODUCTIVE ORGANS: No pelvic mass noted.    BOWEL:  PERITONEUM: Rectosigmoid surgical anastomosis.  There is minimal localized extraluminal extravasation of contrast at the posterior aspect of the rectosigmoid anastomosis. There are adjacent small bubbles of extraluminal air.  There is soft tissue density tracking along the bilateral presacral soft tissues.  No localized intra-abdominal fluid collection or pneumoperitoneum within the greater peritoneal cavity is noted.    There is fecal retention with generalizeddistention of the colon.    There is a percutaneous gastrojejunostomy tube.    VESSELS: Atherosclerotic calcification.  RETROPERITONEUM/LYMPH NODES: No lymphadenopathy.  ABDOMINAL WALL: Soft tissue fullness is present in the periarticular soft tissues of the left hip, with small bubbles of air at the level of the left greater sciatic notch.    BONES: Degenerative changes spine with scoliosis    IMPRESSION:    Localized anastomotic leak at the rectosigmoid surgical anastomosis.  There is diffuse soft tissues prominence in the presacral soft tissues, without discrete fluid collection noted.    See separate report from MRI of the hip and pelvis from 11/28/2020.      ***Please see the addendum at the top of this report. It may contain additional important information or changes.****    < end of copied text >     reviewed elctronically  < from: IR Procedure (11.30.20 @ 13:57) >    INTERPRETATION:  Clinical data: Fluid collection in left gluteal region for aspiration and possible drainage    Procedure: The patient was placed prone on the fluoroscopic table. Continuous hemodynamic monitoring was performed. The known left gluteal fluid collection was localized using limited ultrasound of the left gluteal region. The left gluteal region was prepped and draped in sterile fashion. Skinanesthesia was achieved using 2% lidocaine solution. The fluid collection was punctured under ultrasound guidance using a 19-gauge sheathed needle. Serosanguineous material was aspirated with a specimen sent for culture. Via the sheath approximately 5 cc of fluid were manually aspirated. The sheath was removed and a sterile dressing was applied. The patient tolerated the procedure without complication.    Impression: Aspiration of left gluteal fluid collection using ultrasound guidance as above.    < end of copied text >

## 2020-12-01 NOTE — PROGRESS NOTE ADULT - SUBJECTIVE AND OBJECTIVE BOX
Interval History:    CENTRAL LINE:   [  ] YES       [  ] NO  GREWAL:                 [  ] YES       [  ] NO         REVIEW OF SYSTEMS:  All Systems below were reviewed and are negative [  ]  HEENT:  ID:  Pulmonary:  Cardiac:  GI:  Renal:  Musculoskeletal:  All other systems above were reviewed and are negative   [  ]      MEDICATIONS  (STANDING):  ascorbic acid 500 milliGRAM(s) Oral daily  aspirin  chewable 81 milliGRAM(s) Enteral Tube daily  cholecalciferol 1000 Unit(s) Oral daily  dextrose 40% Gel 15 Gram(s) Oral once  dextrose 5% + lactated ringers. 1000 milliLiter(s) (80 mL/Hr) IV Continuous <Continuous>  dextrose 5%. 1000 milliLiter(s) (100 mL/Hr) IV Continuous <Continuous>  dextrose 5%. 1000 milliLiter(s) (50 mL/Hr) IV Continuous <Continuous>  dextrose 50% Injectable 25 Gram(s) IV Push once  dextrose 50% Injectable 12.5 Gram(s) IV Push once  dextrose 50% Injectable 25 Gram(s) IV Push once  DULoxetine 30 milliGRAM(s) Oral daily  FLUoxetine 20 milliGRAM(s) Oral daily  gabapentin 200 milliGRAM(s) Oral three times a day  glucagon  Injectable 1 milliGRAM(s) IntraMuscular once  heparin   Injectable 5000 Unit(s) SubCutaneous every 12 hours  lactulose Syrup 10 Gram(s) Oral three times a day  methocarbamol 250 milliGRAM(s) Oral every 8 hours  midodrine. 15 milliGRAM(s) Oral three times a day  montelukast 10 milliGRAM(s) Oral at bedtime  multivitamin/minerals/iron Oral Solution (CENTRUM) 15 milliLiter(s) Oral daily  norepinephrine Infusion 0.05 MICROgram(s)/kG/Min (4.16 mL/Hr) IV Continuous <Continuous>  pantoprazole   Suspension 40 milliGRAM(s) Oral daily  piperacillin/tazobactam IVPB.. 3.375 Gram(s) IV Intermittent every 8 hours  senna 2 Tablet(s) Oral at bedtime  tiotropium 18 MICROgram(s) Capsule 1 Capsule(s) Inhalation daily  traZODone 50 milliGRAM(s) Oral at bedtime    MEDICATIONS  (PRN):  acetaminophen    Suspension .. 650 milliGRAM(s) Enteral Tube every 6 hours PRN Temp greater or equal to 38C (100.4F), Mild Pain (1 - 3)  ALBUTerol    90 MICROgram(s) HFA Inhaler 2 Puff(s) Inhalation every 6 hours PRN Shortness of Breath and/or Wheezing  magnesium hydroxide Suspension 15 milliLiter(s) Oral at bedtime PRN Constipation      Vital Signs Last 24 Hrs  T(C): 36.4 (01 Dec 2020 12:31), Max: 36.7 (01 Dec 2020 07:55)  T(F): 97.5 (01 Dec 2020 12:31), Max: 98 (01 Dec 2020 07:55)  HR: 65 (01 Dec 2020 18:11) (57 - 83)  BP: 100/59 (01 Dec 2020 18:11) (70/45 - 150/66)  BP(mean): 77 (01 Dec 2020 18:11) (53 - 96)  RR: 10 (01 Dec 2020 18:11) (8 - 29)  SpO2: 98% (01 Dec 2020 18:11) (91% - 100%)    I&O's Summary    30 Nov 2020 07:01  -  01 Dec 2020 07:00  --------------------------------------------------------  IN: 3281.8 mL / OUT: 1000 mL / NET: 2281.8 mL    01 Dec 2020 07:01  -  01 Dec 2020 18:22  --------------------------------------------------------  IN: 728.7 mL / OUT: 0 mL / NET: 728.7 mL        PHYSICAL EXAM:  HEENT: NC/AT; PERRLA  Neck: Soft; no tenderness  Lungs: CTA bilaterally; no wheezing.   Heart:  Abdomen:  Genital/ Rectal:  Extremities:  Neurologic:  Vascular:      LABORATORY:    CBC Full  -  ( 01 Dec 2020 06:10 )  WBC Count : 10.72 K/uL  RBC Count : 3.05 M/uL  Hemoglobin : 7.6 g/dL  Hematocrit : 26.1 %  Platelet Count - Automated : 516 K/uL  Mean Cell Volume : 85.6 fl  Mean Cell Hemoglobin : 24.9 pg  Mean Cell Hemoglobin Concentration : 29.1 gm/dL  Auto Neutrophil # : 8.32 K/uL  Auto Lymphocyte # : 1.07 K/uL  Auto Monocyte # : 1.01 K/uL  Auto Eosinophil # : 0.20 K/uL  Auto Basophil # : 0.03 K/uL  Auto Neutrophil % : 77.6 %  Auto Lymphocyte % : 10.0 %  Auto Monocyte % : 9.4 %  Auto Eosinophil % : 1.9 %  Auto Basophil % : 0.3 %      ESR:                   11-28 @ 01:24  --    C-Reactive Protein:     11-28 @ 01:24  20.94    Procalcitonin:           11-28 @ 01:24   --  ESR:                   11-27 @ 23:42  --    C-Reactive Protein:     11-27 @ 23:42  --    Procalcitonin:           11-27 @ 23:42   0.19  ESR:                   11-27 @ 21:20  98    C-Reactive Protein:     11-27 @ 21:20  --    Procalcitonin:           11-27 @ 21:20   --      12-01    139  |  102  |  5<L>  ----------------------------<  111<H>  3.6   |  29  |  <0.20<L>    Ca    7.8<L>      01 Dec 2020 06:10  Phos  3.2     12-01  Mg     1.7     12-01    TPro  5.1<L>  /  Alb  1.2<L>  /  TBili  0.2  /  DBili  <.10  /  AST  15  /  ALT  11<L>  /  AlkPhos  102  12-01      Rapid Respiratory Viral Panel Result        11-27 @ 13:21  [facility]  Coronovirus --  Adenovirus --  Bordetella Pertussis --  Chlamydia Pneumonia --  Entero/Rhinovirus--  HKU1 Coronovirus --  HMPV Coronovirus --  Influenza A --  Influenza AH1 --  Influenza AH1 2009 --  Influenza AH3 --  Influenza B --  Mycoplasma Pneumoniae --  NL63 Coronovirus --  OC43 Coronovirus --  Parainfluenza 1 --  Parainfluenza 2 --  Parainfluenza 3 --  Parainfluenza 4 --  Resp Syncytial Virus --      Assessment and Plan:          Lorenzo Zelaya MD   [phone].   She is sleeping in bed.  No fevers today.      MEDICATIONS  (STANDING):  ascorbic acid 500 milliGRAM(s) Oral daily  aspirin  chewable 81 milliGRAM(s) Enteral Tube daily  cholecalciferol 1000 Unit(s) Oral daily  dextrose 40% Gel 15 Gram(s) Oral once  dextrose 5% + lactated ringers. 1000 milliLiter(s) (80 mL/Hr) IV Continuous <Continuous>  dextrose 5%. 1000 milliLiter(s) (100 mL/Hr) IV Continuous <Continuous>  dextrose 5%. 1000 milliLiter(s) (50 mL/Hr) IV Continuous <Continuous>  dextrose 50% Injectable 25 Gram(s) IV Push once  dextrose 50% Injectable 12.5 Gram(s) IV Push once  dextrose 50% Injectable 25 Gram(s) IV Push once  DULoxetine 30 milliGRAM(s) Oral daily  FLUoxetine 20 milliGRAM(s) Oral daily  gabapentin 200 milliGRAM(s) Oral three times a day  glucagon  Injectable 1 milliGRAM(s) IntraMuscular once  heparin   Injectable 5000 Unit(s) SubCutaneous every 12 hours  lactulose Syrup 10 Gram(s) Oral three times a day  methocarbamol 250 milliGRAM(s) Oral every 8 hours  midodrine. 15 milliGRAM(s) Oral three times a day  montelukast 10 milliGRAM(s) Oral at bedtime  multivitamin/minerals/iron Oral Solution (CENTRUM) 15 milliLiter(s) Oral daily  norepinephrine Infusion 0.05 MICROgram(s)/kG/Min (4.16 mL/Hr) IV Continuous <Continuous>  pantoprazole   Suspension 40 milliGRAM(s) Oral daily  piperacillin/tazobactam IVPB.. 3.375 Gram(s) IV Intermittent every 8 hours  senna 2 Tablet(s) Oral at bedtime  tiotropium 18 MICROgram(s) Capsule 1 Capsule(s) Inhalation daily  traZODone 50 milliGRAM(s) Oral at bedtime    MEDICATIONS  (PRN):  acetaminophen    Suspension .. 650 milliGRAM(s) Enteral Tube every 6 hours PRN Temp greater or equal to 38C (100.4F), Mild Pain (1 - 3)  ALBUTerol    90 MICROgram(s) HFA Inhaler 2 Puff(s) Inhalation every 6 hours PRN Shortness of Breath and/or Wheezing  magnesium hydroxide Suspension 15 milliLiter(s) Oral at bedtime PRN Constipation      Vital Signs Last 24 Hrs  T(C): 36.4 (01 Dec 2020 12:31), Max: 36.7 (01 Dec 2020 07:55)  T(F): 97.5 (01 Dec 2020 12:31), Max: 98 (01 Dec 2020 07:55)  HR: 65 (01 Dec 2020 18:11) (57 - 83)  BP: 100/59 (01 Dec 2020 18:11) (70/45 - 150/66)  BP(mean): 77 (01 Dec 2020 18:11) (53 - 96)  RR: 10 (01 Dec 2020 18:11) (8 - 29)  SpO2: 98% (01 Dec 2020 18:11) (91% - 100%)    I&O's Summary    30 Nov 2020 07:01  -  01 Dec 2020 07:00  --------------------------------------------------------  IN: 3281.8 mL / OUT: 1000 mL / NET: 2281.8 mL    01 Dec 2020 07:01  -  01 Dec 2020 18:22  --------------------------------------------------------  IN: 728.7 mL / OUT: 0 mL / NET: 728.7 mL      PHYSICAL EXAM:  HEENT: NC/AT; PERRLA  Neck: Soft; no tenderness  Lungs: Coarse BS bilaterally; no wheezing.   Heart: RRR; no murmurs.   Abdomen: Soft; tenderness of lower quadrants. Decreased erythema pf PEG site.   Genital/ Rectal: No goldman   Extremities: No ulcers.   Neurologic: Awake,         LABORATORY:    CBC Full  -  ( 01 Dec 2020 06:10 )  WBC Count : 10.72 K/uL  RBC Count : 3.05 M/uL  Hemoglobin : 7.6 g/dL  Hematocrit : 26.1 %  Platelet Count - Automated : 516 K/uL  Mean Cell Volume : 85.6 fl  Mean Cell Hemoglobin : 24.9 pg  Mean Cell Hemoglobin Concentration : 29.1 gm/dL  Auto Neutrophil # : 8.32 K/uL  Auto Lymphocyte # : 1.07 K/uL  Auto Monocyte # : 1.01 K/uL  Auto Eosinophil # : 0.20 K/uL  Auto Basophil # : 0.03 K/uL  Auto Neutrophil % : 77.6 %  Auto Lymphocyte % : 10.0 %  Auto Monocyte % : 9.4 %  Auto Eosinophil % : 1.9 %  Auto Basophil % : 0.3 %      ESR:                   11-28 @ 01:24  --    C-Reactive Protein:     11-28 @ 01:24  20.94    Procalcitonin:           11-28 @ 01:24   --  ESR:                   11-27 @ 23:42  --    C-Reactive Protein:     11-27 @ 23:42  --    Procalcitonin:           11-27 @ 23:42   0.19  ESR:                   11-27 @ 21:20  98    C-Reactive Protein:     11-27 @ 21:20  --    Procalcitonin:           11-27 @ 21:20   --      12-01    139  |  102  |  5<L>  ----------------------------<  111<H>  3.6   |  29  |  <0.20<L>    Ca    7.8<L>      01 Dec 2020 06:10  Phos  3.2     12-01  Mg     1.7     12-01    TPro  5.1<L>  /  Alb  1.2<L>  /  TBili  0.2  /  DBili  <.10  /  AST  15  /  ALT  11<L>  /  AlkPhos  102  12-01    Assessment and Plan:    1. Cellulitis of PEG site.  2. Left gluteal abscess with extensive myositis.   3. Left greater trochanter abscess.  4. Right gluteal abscess.  5. Suspected osteomyelitis of sacrum and L-spine.  6. Bacteremia with Enterococcus.   6. :Leak of rectosigmoid anastomosis.   7. History of colon cancer with sigmoid resection and anastomosis.     . Blood cultures in the ED grew Enterococcus faecalis (not VRE).   . Continue IV Zosyn 3.375 gm q8h for now. Discontinue IV Daptomycin and Clindamycin.   . IR attempted to drain left gluteal abscess but only about 5 cc of fluid was obtained. Culture is negative.   . CT of abdomen and pelvis today showed rectosigmoid leak. Surgery to re-evaluate.,   . Waiting to repeat MRI to evaluate for osteomyelitis of sacrum and L-pine.  . Follow culture of left gluteal abscess.         Lorenzo Zelaya MD   [phone].

## 2020-12-02 NOTE — CONSULT NOTE ADULT - REASON FOR ADMISSION
the L. hip ill-defined multi septated fluid collection containing foci of air measuring appx 3.8 x 3.2 x3.9cm concerning for abscess, WBC of 20, normal LA. Pt was seen by both surgical and ortho team who recommenced no surgical intervention at this time. MICU consulted for hypotension,
the L. hip ill-defined multi septated fluid collection containing foci of air measuring appx 3.8 x 3.2 x3.9cm concerning for abscess, WBC of 20, normal LA. Pt was seen by both surgical and ortho team who recommenced no surgical intervention at this time. MICU consulted for hypotension,
L. hip abscess, sepsis, hypotension
peg leaking   r/o appendicitis

## 2020-12-02 NOTE — PROGRESS NOTE ADULT - SUBJECTIVE AND OBJECTIVE BOX
INTERVAL HPI/OVERNIGHT EVENTS:  pt seen and examined earlier this am  more awake alert  c/o hip pain this am  gj not leaking overnight per rn, remains on pressors, ct noted    MEDICATIONS  (STANDING):  albumin human 25% IVPB 50 milliLiter(s) IV Intermittent every 6 hours  ascorbic acid 500 milliGRAM(s) Oral daily  aspirin  chewable 81 milliGRAM(s) Enteral Tube daily  cholecalciferol 1000 Unit(s) Oral daily  dextrose 40% Gel 15 Gram(s) Oral once  dextrose 5% + lactated ringers. 1000 milliLiter(s) (80 mL/Hr) IV Continuous <Continuous>  dextrose 5%. 1000 milliLiter(s) (100 mL/Hr) IV Continuous <Continuous>  dextrose 5%. 1000 milliLiter(s) (50 mL/Hr) IV Continuous <Continuous>  dextrose 50% Injectable 25 Gram(s) IV Push once  dextrose 50% Injectable 12.5 Gram(s) IV Push once  dextrose 50% Injectable 25 Gram(s) IV Push once  DULoxetine 30 milliGRAM(s) Oral daily  FLUoxetine 20 milliGRAM(s) Oral daily  gabapentin 200 milliGRAM(s) Oral three times a day  glucagon  Injectable 1 milliGRAM(s) IntraMuscular once  heparin   Injectable 5000 Unit(s) SubCutaneous every 12 hours  methadone    Tablet 10 milliGRAM(s) Oral daily  methocarbamol 250 milliGRAM(s) Oral every 8 hours  midodrine. 15 milliGRAM(s) Oral three times a day  norepinephrine Infusion 0.05 MICROgram(s)/kG/Min (4.16 mL/Hr) IV Continuous <Continuous>  pantoprazole  Injectable 40 milliGRAM(s) IV Push daily  piperacillin/tazobactam IVPB.. 3.375 Gram(s) IV Intermittent every 8 hours  tiotropium 18 MICROgram(s) Capsule 1 Capsule(s) Inhalation daily    MEDICATIONS  (PRN):  ALBUTerol    90 MICROgram(s) HFA Inhaler 2 Puff(s) Inhalation every 6 hours PRN Shortness of Breath and/or Wheezing      Allergies    Levaquin (Unknown)    Intolerances        Review of Systems:  see above      Vital Signs Last 24 Hrs  T(C): 36.4 (02 Dec 2020 08:08), Max: 36.4 (01 Dec 2020 12:31)  T(F): 97.5 (02 Dec 2020 08:08), Max: 97.6 (02 Dec 2020 00:33)  HR: 63 (02 Dec 2020 11:15) (52 - 67)  BP: 127/71 (02 Dec 2020 11:15) (80/50 - 145/71)  BP(mean): 94 (02 Dec 2020 11:15) (61 - 102)  RR: 22 (02 Dec 2020 11:15) (8 - 23)  SpO2: 84% (02 Dec 2020 11:15) (84% - 100%)    PHYSICAL EXAM:    Constitutional: lying in bed  HEENT: ncat  Gastrointestinal: soft mild generalized ttp mild dt +gj tube w surrounding erythema no active leaking appreciated dressing dry and intact   Extremities: No edema  Vascular: + peripheral pulses  Neurological: awake alert    LABS:                        7.5    9.01  )-----------( 386      ( 02 Dec 2020 06:32 )             25.5     12-02    137  |  101  |  5<L>  ----------------------------<  98  4.0   |  30  |  0.25<L>    Ca    7.5<L>      02 Dec 2020 06:32  Phos  3.5     12-02  Mg     1.8     12-02    TPro  5.3<L>  /  Alb  1.7<L>  /  TBili  0.2  /  DBili  x   /  AST  19  /  ALT  7<L>  /  AlkPhos  94  12-02    PT/INR - ( 01 Dec 2020 08:47 )   PT: 14.5 sec;   INR: 1.25 ratio         PTT - ( 01 Dec 2020 08:47 )  PTT:33.0 sec      RADIOLOGY & ADDITIONAL TESTS:  < from: CT Abdomen and Pelvis w/ Oral Cont (12.01.20 @ 14:01) >    EXAM:  CT ABDOMEN AND PELVIS OC                            *** ADDENDUM 12/01/2020  ***    Finding communicated to ICU physician assistant Donte at the time of interpretation on 12/1/2020.    *** END OF ADDENDUM 12/01/2020  ***      PROCEDURE DATE:  12/01/2020          INTERPRETATION:  CLINICAL INFORMATION: Abdominal pain. Rectosigmoid anastomosis, evaluate for leak.    COMPARISON: CT scan abdomen pelvis 11/27/2020.    PROCEDURE:  CT of the Abdomen and Pelvis was performed without intravenous contrast.  Intravenous contrast: None.  Oral contrast: None.  Rectal contrast was administered.  Sagittal and coronal reformats were performed.    FINDINGS:    LOWER CHEST: Not entirely included on this exam. Small left pleural effusion atelectasis.    Streak artifact related to patient's arms degrades image quality.    The evaluation of the solid organ parenchyma is limited without intravenous contrast.    LIVER: Not entirely included on this exam.  BILE DUCTS: Mild prominence of the common bile duct measuring up to 9 mm.  GALLBLADDER: Prior cholecystectomy.  SPLEEN: Within normal limits.  PANCREAS: Within normal limits.  ADRENALS: Within normal limits.  KIDNEYS/URETERS: Within normal limits.    BLADDER: Within normal limits.  REPRODUCTIVE ORGANS: No pelvic mass noted.    BOWEL:  PERITONEUM: Rectosigmoid surgical anastomosis.  There is minimal localized extraluminal extravasation of contrast at the posterior aspect of the rectosigmoid anastomosis. There are adjacent small bubbles of extraluminal air.  There is soft tissue density tracking along the bilateral presacral soft tissues.  No localized intra-abdominal fluid collection or pneumoperitoneum within the greater peritoneal cavity is noted.    There is fecal retention with generalizeddistention of the colon.    There is a percutaneous gastrojejunostomy tube.    VESSELS: Atherosclerotic calcification.  RETROPERITONEUM/LYMPH NODES: No lymphadenopathy.  ABDOMINAL WALL: Soft tissue fullness is present in the periarticular soft tissues of the left hip, with small bubbles of air at the level of the left greater sciatic notch.    BONES: Degenerative changes spine with scoliosis    IMPRESSION:    Localized anastomotic leak at the rectosigmoid surgical anastomosis.  There is diffuse soft tissues prominence in the presacral soft tissues, without discrete fluid collection noted.    See separate report from MRI of the hip and pelvis from 11/28/2020.      ***Please see the addendum at the top of this report. It may contain additional important information or changes.****        VINI WILLOUGHBY M.D., ATTENDING RADIOLOGIST  This document has been electronically signed. Dec  1 2020  3:41PM  Addend:VINI WILLOUGHBY M.D., ATTENDING RADIOLOGIST  This addendum was electronically signed on: Dec  1 2020  3:55PM.    < end of copied text >

## 2020-12-02 NOTE — PROGRESS NOTE ADULT - PROBLEM SELECTOR PLAN 5
MRI showed - There is minimal localized extraluminal extravasation of contrast at the posterior aspect of the rectosigmoid anastomosis. There are adjacent small bubbles of extraluminal air .No sx interventions as per surgery consult Dr Ahumada  Surgery is high risk with current issues. D/w pt above who doesn't want surgery at this time .Continue conservative management

## 2020-12-02 NOTE — CONSULT NOTE ADULT - SUBJECTIVE AND OBJECTIVE BOX
Patient is a 65y Female admitted to the ICU on 11/27 for sepsis secondary to a left gluteal abscess, shown to be exclude the hip joint on MRI, now s/p IR drainage/biopsy on 11/30.  Hip MRI demonstrated sacral edema for which lumbar/thoracic/cervical MRI imaging was obtained for rule out epidural collection. Patient has been stable in the ICU on pressors and Zosyn without evidence or neurological compromise. Patient denies numbness/tingling/paresthesias/weakness. Denies bowel/bladder incontinence. Denies fevers/chills. No other complaints at this time.    HEALTH ISSUES - PROBLEM Dx:  Prophylactic measure  Prophylactic measure    Major depressive disorder  Major depressive disorder    Chronic back pain  Chronic back pain    GERD (gastroesophageal reflux disease)  GERD (gastroesophageal reflux disease)    Severe malnutrition  Severe malnutrition    Malignant neoplasm of colon  Malignant neoplasm of colon    COPD (chronic obstructive pulmonary disease)  COPD (chronic obstructive pulmonary disease)    Sepsis  Sepsis    Gastrostomy complication  Gastrostomy complication    HTN (hypertension)  HTN (hypertension)    Abscess of hip  Abscess of hip    Abdominal pain, unspecified abdominal location  Abdominal pain, unspecified abdominal location      MEDICATIONS  (STANDING):  albumin human 25% IVPB 50 milliLiter(s) IV Intermittent every 6 hours  ascorbic acid 500 milliGRAM(s) Oral daily  aspirin  chewable 81 milliGRAM(s) Enteral Tube daily  cholecalciferol 1000 Unit(s) Oral daily  dextrose 40% Gel 15 Gram(s) Oral once  dextrose 5% + lactated ringers. 1000 milliLiter(s) IV Continuous <Continuous>  dextrose 5%. 1000 milliLiter(s) IV Continuous <Continuous>  dextrose 5%. 1000 milliLiter(s) IV Continuous <Continuous>  dextrose 50% Injectable 25 Gram(s) IV Push once  dextrose 50% Injectable 12.5 Gram(s) IV Push once  dextrose 50% Injectable 25 Gram(s) IV Push once  DULoxetine 30 milliGRAM(s) Oral daily  FLUoxetine 20 milliGRAM(s) Oral daily  gabapentin 200 milliGRAM(s) Oral three times a day  glucagon  Injectable 1 milliGRAM(s) IntraMuscular once  heparin   Injectable 5000 Unit(s) SubCutaneous every 12 hours  methadone    Tablet 10 milliGRAM(s) Oral daily  methocarbamol 250 milliGRAM(s) Oral every 8 hours  methylnaltrexone Injectable 12 milliGRAM(s) SubCutaneous once  midodrine. 20 milliGRAM(s) Oral three times a day  norepinephrine Infusion 0.05 MICROgram(s)/kG/Min IV Continuous <Continuous>  pantoprazole  Injectable 40 milliGRAM(s) IV Push daily  piperacillin/tazobactam IVPB.. 3.375 Gram(s) IV Intermittent every 8 hours  polyethylene glycol/electrolyte Solution 1000 milliLiter(s) Oral every 4 hours  tiotropium 18 MICROgram(s) Capsule 1 Capsule(s) Inhalation daily      Allergies    Levaquin (Unknown)    Intolerances        PAST MEDICAL & SURGICAL HISTORY:  Candidal stomatitis    Malignant neoplasm of colon    Major depressive disorder    HTN (hypertension)    COPD (chronic obstructive pulmonary disease)          Vital Signs Last 24 Hrs  T(C): 36.1 (12-02-20 @ 13:35), Max: 36.4 (12-02-20 @ 00:33)  T(F): 97 (12-02-20 @ 13:35), Max: 97.6 (12-02-20 @ 00:33)  HR: 51 (12-02-20 @ 12:30) (51 - 67)  BP: 149/72 (12-02-20 @ 12:30) (80/50 - 149/72)  BP(mean): 103 (12-02-20 @ 12:30) (61 - 103)  RR: 16 (12-02-20 @ 12:30) (8 - 23)  SpO2: 100% (12-02-20 @ 12:30) (84% - 100%)    Gen: NAD  Spine PE:  Skin intact  No gross deformity  No midline TTP C/T/L/S spine  No bony step offs  No paraspinal muscle ttp/hypertonicity   Negative clonus  Negative babinski  Negative anne  No saddle anesthesia  Rectal tone Intact    Motor:                   C5                C6              C7               C8           T1   R            5/5                5/5            5/5             5/5          5/5  L             5/5               5/5             5/5             5/5          5/5                L2             L3             L4               L5            S1  R         4/5           5/5          5/5             5/5           5/5  L          4/5          5/5           5/5             4/5           5/5    Sensory:            C5         C6         C7      C8       T1        (0=absent, 1=impaired, 2=normal, NT=not testable)  R         2            2           2        2         2  L          2            2           2        2         2               L2          L3         L4      L5       S1         (0=absent, 1=impaired, 2=normal, NT=not testable)  R         2            2            2        2        2  L          2            2           2        2         2                          7.5    9.01  )-----------( 386      ( 02 Dec 2020 06:32 )             25.5     02 Dec 2020 06:32    137    |  101    |  5      ----------------------------<  98     4.0     |  30     |  0.25     Ca    7.5        02 Dec 2020 06:32  Phos  3.5       02 Dec 2020 06:32  Mg     1.8       02 Dec 2020 06:32    TPro  5.3    /  Alb  1.7    /  TBili  0.2    /  DBili  x      /  AST  19     /  ALT  7      /  AlkPhos  94     02 Dec 2020 06:32    PT/INR - ( 01 Dec 2020 08:47 )   PT: 14.5 sec;   INR: 1.25 ratio         PTT - ( 01 Dec 2020 08:47 )  PTT:33.0 sec      Imaging:     MRI L/T/C Spine: demonstrating sacral edema at the S1/2 level consistent with osteomyelitis as well phlegmon extending into the sacral canal. Thoracic and Cervical imaging are relatively benign without evidence of skip lesions, degenerative changes.

## 2020-12-02 NOTE — PROGRESS NOTE ADULT - SUBJECTIVE AND OBJECTIVE BOX
PROGRESS NOTE .late entry   Patient is a 65y old  Female who presents with a chief complaint of the L. hip ill-defined multi septated fluid collection containing foci of air measuring appx 3.8 x 3.2 x3.9cm concerning for abscess, WBC of 20, normal LA. Pt was seen by both surgical and ortho team who recommenced no surgical intervention at this time. MICU consulted for hypotension, (02 Dec 2020 14:13)  Chart and available morning labs /imaging are reviewed electronically , urgent issues addressed . More information  is being added upon completion of rounds , when more information is collected and management discussed with consultants , medical staff and social service/case management on the floor   OVERNIGHT -   Underwent CT abdomen and found to have a leak of here rectosigmoid anastomosis. MRI CTL spine showed degenerative changed in the C spine, possible early discitis/ OM and a sacral phlegmon possibly extending into the sacral canal. Seen by palliative care team and [facility] discussion took place   No surgical interventions for leaking anastomosis as per sx team .Palliative care would be appropriate level of care .Pain addressed by pall team and dose of  methadone adjusted   HPI:  HPI: 66 y/o F w/ pmh of recovered alcoholic, former smoker, copd, RA, chronic low back pain (on methadone 60mg daily), hx of colon ca s/p sigmoid resection (2015), hx of open R. inguinal hernia repair w/ mesh (2010), hx of hiatal hernia repair (2019), zenker diverticulum surgery c/b vocal cord paralysis's requiring peg for feeding, s/p peg (replaced peg 3 weeks ago [facility]) transfer from [facility] to [facility] earlier today for leaking PEJ tube and on/off lower abd pain over the last 2-3 days. In the ED pt underwent an CT of abd/pelvis was found to have a Abscess around the L. hip ill-defined multi septated fluid collection containing foci of air measuring appx 3.8 x 3.2 x3.9cm concerning for abscess, WBC of 20, normal LA. Pt was seen by both surgical and ortho team who recommenced no surgical intervention at this time. MICU consulted for hypotension, (27 Nov 2020 19:45)  PAST MEDICAL & SURGICAL HISTORY:  Candidal stomatitis  Malignant neoplasm of colon  Major depressive disorder  HTN (hypertension)  COPD (chronic obstructive pulmonary disease)  MEDICATIONS  (STANDING): NOTE In accordance with  [facility] policy ICU final medical management decisions/MD orders are  determined/done only by ICU Intensivists   albumin human 25% IVPB 50 milliLiter(s) IV Intermittent every 6 hours  ascorbic acid 500 milliGRAM(s) Oral daily  aspirin  chewable 81 milliGRAM(s) Enteral Tube daily  cholecalciferol 1000 Unit(s) Oral daily  dextrose 40% Gel 15 Gram(s) Oral once  dextrose 5% + lactated ringers. 1000 milliLiter(s) (80 mL/Hr) IV Continuous <Continuous>  dextrose 5%. 1000 milliLiter(s) (100 mL/Hr) IV Continuous <Continuous>  dextrose 5%. 1000 milliLiter(s) (50 mL/Hr) IV Continuous <Continuous>  dextrose 50% Injectable 25 Gram(s) IV Push once  dextrose 50% Injectable 12.5 Gram(s) IV Push once  dextrose 50% Injectable 25 Gram(s) IV Push once  DULoxetine 30 milliGRAM(s) Oral daily  FLUoxetine 20 milliGRAM(s) Oral daily  gabapentin 200 milliGRAM(s) Oral three times a day  glucagon  Injectable 1 milliGRAM(s) IntraMuscular once  heparin   Injectable 5000 Unit(s) SubCutaneous every 12 hours  methadone    Tablet 30 milliGRAM(s) Oral every 12 hours  methocarbamol 250 milliGRAM(s) Oral every 8 hours  midodrine. 20 milliGRAM(s) Oral three times a day  pantoprazole  Injectable 40 milliGRAM(s) IV Push daily  piperacillin/tazobactam IVPB.. 3.375 Gram(s) IV Intermittent every 8 hours  polyethylene glycol/electrolyte Solution 1000 milliLiter(s) Oral every 4 hours  tiotropium 18 MICROgram(s) Capsule 1 Capsule(s) Inhalation daily  MEDICATIONS  (PRN):  ALBUTerol    90 MICROgram(s) HFA Inhaler 2 Puff(s) Inhalation every 6 hours PRN Shortness of Breath and/or Wheezing  OBJECTIVE  T(C): 36.1 (12-02-20 @ 13:35), Max: 36.4 (12-02-20 @ 00:33)  HR: 62 (12-02-20 @ 18:00) (51 - 67)  BP: 123/59 (12-02-20 @ 18:00) (80/50 - 149/72)  RR: 17 (12-02-20 @ 18:00) (8 - 23)  SpO2: 100% (12-02-20 @ 18:00) (84% - 100%)  Wt(kg): --  I&O's Summary  01 Dec 2020 07:01  -  02 Dec 2020 07:00  --------------------------------------------------------  IN: 2229.8 mL / OUT: 1560 mL / NET: 669.8 mL  02 Dec 2020 07:01  -  02 Dec 2020 18:24  --------------------------------------------------------  IN: 2939.7 mL / OUT: 1550 mL / NET: 1389.7 mL  REVIEW OF SYSTEMS:  limited due to mental status   PHYSICAL EXAM:  Appearance: NAD. VS past 24 hrs -as above   HEENT:   Moist oral mucosa. Conjunctiva clear b/l.   Neck : supple  Respiratory: Lungs CTAB.  Gastrointestinal:  Soft, nontender. No rebound. No rigidity. BS present	  Cardiovascular: RRR ,S1S2 present  Neurologic: Non-focal. Moving all extremities.  Extremities: No edema. No erythema. No calf tenderness.  Skin: No rashes, No ecchymoses, No cyanosis.	  wounds ,skin lesions-See skin assesment flow sheet   LABS:                        7.5    9.01  )-----------( 386      ( 02 Dec 2020 06:32 )             25.5     12-02  137  |  101  |  5<L>  ----------------------------<  98  4.0   |  30  |  0.25<L>  Ca    7.5<L>      02 Dec 2020 06:32  Phos  3.5     12-02  Mg     1.8     12-02  TPro  5.3<L>  /  Alb  1.7<L>  /  TBili  0.2  /  DBili  x   /  AST  19  /  ALT  7<L>  /  AlkPhos  94  12-02  CAPILLARY BLOOD GLUCOSE  POCT Blood Glucose.: 84 mg/dL (02 Dec 2020 17:27)  POCT Blood Glucose.: 86 mg/dL (02 Dec 2020 12:03)  POCT Blood Glucose.: 89 mg/dL (02 Dec 2020 06:42)  POCT Blood Glucose.: 100 mg/dL (01 Dec 2020 23:51)  PT/INR - ( 01 Dec 2020 08:47 )   PT: 14.5 sec;   INR: 1.25 ratio    PTT - ( 01 Dec 2020 08:47 )  PTT:33.0 sec  Culture - Abscess with Gram Stain (collected 30 Nov 2020 19:13)  Source: .Abscess Hip - Left  Preliminary Report (01 Dec 2020 16:43):    No growth  Culture - Blood (collected 29 Nov 2020 11:41)  Source: .Blood Blood-Peripheral  Preliminary Report (30 Nov 2020 12:01):    No growth to date.  Culture - Blood (collected 29 Nov 2020 11:41)  Source: .Blood Blood-Peripheral  Preliminary Report (30 Nov 2020 12:01):    No growth to date.  Culture - Urine (collected 28 Nov 2020 04:55)  Source: .Urine Clean Catch (Midstream)  Final Report (29 Nov 2020 00:44):    <10,000 CFU/mL Normal Urogenital Ami  Culture - Blood (collected 27 Nov 2020 18:29)  Source: .Blood Blood-Peripheral  Gram Stain (28 Nov 2020 11:34):    Growth in anaerobic bottle: Gram Positive Cocci in Pairs and Chains  Final Report (30 Nov 2020 11:00):    Growth in anaerobic bottle: Enterococcus faecalis    "Due to technical problems, Proteus sp. will Not be reported as part of    the BCID panel until further notice" ***Blood Panel PCR results on this    specimen are available    approximately 3 hours afterthe Gram stain result.***    Gram stain, PCR, and/or culture results may not always    correspond due to difference in methodologies.    ************************************************************    This PCR assay was performed using [email or web address].    The following targets are tested for: Enterococcus,    vancomycin resistant enterococci, Listeria monocytogenes,    coagulase negative staphylococci, S. aureus,    methicillin resistant S. aureus, Streptococcus agalactiae    (Group B), S. pneumoniae, S. pyogenes (Group A),    Acinetobacter baumannii, Enterobacter cloacae, E. coli,    Klebsiella oxytoca, K. pneumoniae, Proteus sp.,    Serratia marcescens, Haemophilus influenzae,    Neisseria meningitidis, Pseudomonas aeruginosa, Candida    albicans, C. glabrata, C krusei, C parapsilosis,    C. tropicalis and the KPC resistance gene.  Organism: Blood Culture PCR  Enterococcus faecalis (30 Nov 2020 11:00)  Organism: Enterococcus faecalis (30 Nov 2020 11:00)  Organism: Blood Culture PCR (30 Nov 2020 11:00)  Culture - Blood (collected 27 Nov 2020 18:29)  Source: .Blood Blood-Peripheral  Preliminary Report (28 Nov 2020 19:01):    No growth to date.  RADIOLOGY & ADDITIONAL TESTS:< from: MR Cervical Spine w/wo IV Cont (12.01.20 @ 17:59) >  EXAM:  MR SPINE CERVICAL WAW IC                            PROCEDURE DATE:  12/01/2020          INTERPRETATION:  CLINICAL STATEMENT: S1-S2 3 neural foraminal edema  TECHNIQUE: MRI of the cervical spine was performed with and without gadolinium. 4.5 cc administered  COMPARISON: None.  FINDINGS:  Limited exam due to motion  There is no gross abnormal leptomeningeal or intramedullary enhancement.  Nonspecific mild prevertebral edema noted in the cervical spine. No gross loculated fluid collection in the prevertebral space.  The vertebral body heights are intact. Grade 1 anterolisthesis C3 on C4.  There is no cord compression or abnormal cord edema.  Multilevel osteophytes. Multilevel degenerative disc disease with loss of signal. Mild disc space narrowing C5-C6, C6-7, C7-T1.  C3-4: Disc bulge and facet/uncovertebral hypertrophy noted resulting in mild spinal canal stenosis. Moderate left neural foraminal narrowing.  C4-5: Disc bulge and facet/uncovertebral per atrophy noted resulting in mild spinal canal stenosis. Mild left neural foraminal narrowing.  C5-C6: Disc osteophyte complex and facet/uncovertebral hypertrophy noted resulting in mild spinal canal stenosis and flattening of the ventral cord. Moderate right,mild left neural foraminal narrowing.  C6-7: Disc osteophyte complex and facet/uncovertebral hypertrophy noted resulting in mild spinal canal stenosis. Mild bilateral neural foraminal narrowing.  C7-T1: Disc bulge and facet/uncovertebral hypertrophy noted resulting in mild effacement of ventral thecal sac. Mild bilateral neural foraminal narrowing.  T1-T2: Incidental small nerve root cyst left neural foramen.  IMPRESSION:  Markedly limited exam due to motion.  Mild nonspecific prevertebral edema in the cervical region. No gross loculated fluid collection to suggest abscess collection.  Preliminary report provided by [employer] [facility] on 12/1/2020  < end of copied text >   reviewed elctronically  ASSESSMENT/PLAN:

## 2020-12-02 NOTE — CONSULT NOTE ADULT - SUBJECTIVE AND OBJECTIVE BOX
HPI:  HPI: 64 y/o F w/ pmh of recovered alcoholic, former smoker, copd, RA, chronic low back pain (on methadone 60mg daily), hx of colon ca s/p sigmoid resection (2015), hx of open R. inguinal hernia repair w/ mesh (2010), hx of hiatal hernia repair (2019), zenker diverticulum surgery c/b vocal cord paralysis's requiring peg for feeding, s/p peg (replaced peg 3 weeks ago [facility]) transfer from [facility] to [facility] earlier today for leaking PEJ tube and on/off lower abd pain over the last 2-3 days. In the ED pt underwent an CT of abd/pelvis was found to have a Abscess around the L. hip ill-defined multi septated fluid collection containing foci of air measuring appx 3.8 x 3.2 x3.9cm concerning for abscess, WBC of 20, normal LA. Pt was seen by both surgical and ortho team who recommenced no surgical intervention at this time. MICU consulted for hypotension, (27 Nov 2020 19:45)    PERTINENT PM/SXH:   Candidal stomatitis    Malignant neoplasm of colon    Major depressive disorder    HTN (hypertension)    COPD (chronic obstructive pulmonary disease)        FAMILY HISTORY:    ITEMS NOT CHECKED ARE NOT PRESENT    SOCIAL HISTORY:   Significant other/partner[ ]  Children[ ]  Adventist/Spirituality:  Substance hx:  [ ]   Tobacco hx:  [ ]   Alcohol hx: [ ]   Home Opioid hx:  [ ] I-Stop Reference No:  Living Situation: [ ]Home  [ ]Long term care  [ ]Rehab [ ]Other    ADVANCE DIRECTIVES:    DNR  MOLST  [ ]  Living Will  [ ]   DECISION MAKER(s):  [ ] Health Care Proxy(s)  [ ] Surrogate(s)  [ ] Guardian           Name(s): Phone Number(s):    BASELINE (I)ADL(s) (prior to admission):  [place]: [ ]Total  [ ] Moderate [ ]Dependent    Allergies    Levaquin (Unknown)    Intolerances    MEDICATIONS  (STANDING):  albumin human 25% IVPB 50 milliLiter(s) IV Intermittent every 6 hours  ascorbic acid 500 milliGRAM(s) Oral daily  aspirin  chewable 81 milliGRAM(s) Enteral Tube daily  cholecalciferol 1000 Unit(s) Oral daily  dextrose 40% Gel 15 Gram(s) Oral once  dextrose 5% + lactated ringers. 1000 milliLiter(s) (80 mL/Hr) IV Continuous <Continuous>  dextrose 5%. 1000 milliLiter(s) (100 mL/Hr) IV Continuous <Continuous>  dextrose 5%. 1000 milliLiter(s) (50 mL/Hr) IV Continuous <Continuous>  dextrose 50% Injectable 25 Gram(s) IV Push once  dextrose 50% Injectable 12.5 Gram(s) IV Push once  dextrose 50% Injectable 25 Gram(s) IV Push once  DULoxetine 30 milliGRAM(s) Oral daily  FLUoxetine 20 milliGRAM(s) Oral daily  gabapentin 200 milliGRAM(s) Oral three times a day  glucagon  Injectable 1 milliGRAM(s) IntraMuscular once  heparin   Injectable 5000 Unit(s) SubCutaneous every 12 hours  methadone    Tablet 10 milliGRAM(s) Oral daily  methocarbamol 250 milliGRAM(s) Oral every 8 hours  midodrine. 15 milliGRAM(s) Oral three times a day  norepinephrine Infusion 0.05 MICROgram(s)/kG/Min (4.16 mL/Hr) IV Continuous <Continuous>  pantoprazole  Injectable 40 milliGRAM(s) IV Push daily  piperacillin/tazobactam IVPB.. 3.375 Gram(s) IV Intermittent every 8 hours  tiotropium 18 MICROgram(s) Capsule 1 Capsule(s) Inhalation daily    MEDICATIONS  (PRN):  ALBUTerol    90 MICROgram(s) HFA Inhaler 2 Puff(s) Inhalation every 6 hours PRN Shortness of Breath and/or Wheezing    PRESENT SYMPTOMS: [ ]Unable to obtain due to poor mentation   Source if other than patient:  [ ]Family   [ ]Team     Pain: [ ]yes [ ]no  QOL impact -   Location -                    Aggravating factors -  Quality -  Radiation -  Timing-  Severity (0-10 scale):  Minimal acceptable level (0-10 scale):     CPOT:    https://www.sccm.org/getattachment/[email or web address]/Critical-Care-Pain-Observation-Tool-(CPOT)      PAIN AD Score:     [email or web address]/[email or web address] (press ctrl +  left click to view)    Dyspnea:                           [ ]Mild [ ]Moderate [ ]Severe  Anxiety:                             [ ]Mild [ ]Moderate [ ]Severe  Fatigue:                             [ ]Mild [ ]Moderate [ ]Severe  Nausea:                             [ ]Mild [ ]Moderate [ ]Severe  Loss of appetite:              [ ]Mild [ ]Moderate [ ]Severe  Constipation:                    [ ]Mild [ ]Moderate [ ]Severe    Other Symptoms:  [ ]All other review of systems negative     Palliative Performance Status Version 2:         %    [email or web address]/[email or web address]_performance_[email or web address]_[email or web address].pdf  PHYSICAL EXAM:  Vital Signs Last 24 Hrs  T(C): 36.4 (02 Dec 2020 08:08), Max: 36.4 (01 Dec 2020 12:31)  T(F): 97.5 (02 Dec 2020 08:08), Max: 97.6 (02 Dec 2020 00:33)  HR: 63 (02 Dec 2020 11:15) (52 - 67)  BP: 127/71 (02 Dec 2020 11:15) (80/50 - 145/71)  BP(mean): 94 (02 Dec 2020 11:15) (61 - 102)  RR: 22 (02 Dec 2020 11:15) (8 - 23)  SpO2: 84% (02 Dec 2020 11:15) (84% - 100%) I&O's Summary    01 Dec 2020 07:01  -  02 Dec 2020 07:00  --------------------------------------------------------  IN: 2229.8 mL / OUT: 1560 mL / NET: 669.8 mL    02 Dec 2020 07:01  -  02 Dec 2020 11:22  --------------------------------------------------------  IN: 296.4 mL / OUT: 300 mL / NET: -3.6 mL      GENERAL:  [ ]Alert  [ ]Oriented x   [ ]Lethargic  [ ]Cachexia  [ ]Unarousable  [ ]Verbal  [ ]Non-Verbal  Behavioral:   [ ] Anxiety  [ ] Delirium [ ] Agitation [ ] Other  HEENT:  [ ]Normal   [ ]Dry mouth   [ ]ET Tube/Trach  [ ]Oral lesions  PULMONARY:   [ ]Clear [ ]Tachypnea  [ ]Audible excessive secretions   [ ]Rhonchi        [ ]Right [ ]Left [ ]Bilateral  [ ]Crackles        [ ]Right [ ]Left [ ]Bilateral  [ ]Wheezing     [ ]Right [ ]Left [ ]Bilateral  [ ]Diminished breath sounds [ ]right [ ]left [ ]bilateral  CARDIOVASCULAR:    [ ]Regular [ ]Irregular [ ]Tachy  [ ]Roge [ ]Murmur [ ]Other  GASTROINTESTINAL:  [ ]Soft  [ ]Distended   [ ]+BS  [ ]Non tender [ ]Tender  [ ]PEG [ ]OGT/ NGT  Last BM:     GENITOURINARY:  [ ]Normal [ ] Incontinent   [ ]Oliguria/Anuria   [ ]Dalal  MUSCULOSKELETAL:   [ ]Normal   [ ]Weakness  [ ]Bed/Wheelchair bound [ ]Edema  NEUROLOGIC:   [ ]No focal deficits  [ ]Cognitive impairment  [ ]Dysphagia [ ]Dysarthria [ ]Paresis [ ]Other   SKIN:   [ ]Normal    [ ]Rash  [ ]Pressure ulcer(s)       Present on admission [ ]y [ ]n    CRITICAL CARE:  [ ] Shock Present  [ ]Septic [ ]Cardiogenic [ ]Neurologic [ ]Hypovolemic  [ ]  Vasopressors [ ]  Inotropes   [ ]Respiratory failure present [ ]Mechanical ventilation [ ]Non-invasive ventilatory support [ ]High flow    [ ]Acute  [ ]Chronic [ ]Hypoxic  [ ]Hypercarbic [ ]Other  [ ]Other organ failure     LABS:                        7.5    9.01  )-----------( 386      ( 02 Dec 2020 06:32 )             25.5   12-02    137  |  101  |  5<L>  ----------------------------<  98  4.0   |  30  |  0.25<L>    Ca    7.5<L>      02 Dec 2020 06:32  Phos  3.5     12-02  Mg     1.8     12-02    TPro  5.3<L>  /  Alb  1.7<L>  /  TBili  0.2  /  DBili  x   /  AST  19  /  ALT  7<L>  /  AlkPhos  94  12-02  PT/INR - ( 01 Dec 2020 08:47 )   PT: 14.5 sec;   INR: 1.25 ratio         PTT - ( 01 Dec 2020 08:47 )  PTT:33.0 sec      RADIOLOGY & ADDITIONAL STUDIES:    PROTEIN CALORIE MALNUTRITION PRESENT: [ ]mild [ ]moderate [ ]severe [ ]underweight [ ]morbid obesity  https://www.andeal.org/vault/2440/web/files/ONC/Table_Clinical%20Characteristics%20to%20Document%20Malnutrition-White%20JV%20et%20al%202012.pdf    Height (cm): 152.4 (11-27-20 @ 20:43)  Weight (kg): 44.4 (11-27-20 @ 20:43)  BMI (kg/m2): 19.1 (11-27-20 @ 20:43)    [ ]PPSV2 < or = to 30% [ ]significant weight loss  [ ]poor nutritional intake  [ ]anasarca     Albumin, Serum: 1.7 g/dL (12-02-20 @ 06:32)   [ ]Artificial Nutrition      REFERRALS:   [ ]Chaplaincy  [ ]Hospice  [ ]Child Life  [ ]Social Work  [ ]Case management [ ]Holistic Therapy     Goals of Care Document: HPI:  HPI: 64 y/o F w/ pmh of recovered alcoholic, former smoker, copd, RA, chronic low back pain (on methadone 60mg daily), hx of colon ca s/p sigmoid resection (2015), hx of open R. inguinal hernia repair w/ mesh (2010), hx of hiatal hernia repair (2019), zenker diverticulum surgery c/b vocal cord paralysis's requiring peg for feeding, s/p peg (replaced peg 3 weeks ago [facility]) transfer from [facility] to [facility] earlier today for leaking PEJ tube and on/off lower abd pain over the last 2-3 days. In the ED pt underwent an CT of abd/pelvis was found to have a Abscess around the L. hip ill-defined multi septated fluid collection containing foci of air measuring appx 3.8 x 3.2 x3.9cm concerning for abscess, WBC of 20, normal LA. Pt was seen by both surgical and ortho team who recommenced no surgical intervention at this time. MICU consulted for hypotension, (27 Nov 2020 19:45)    PERTINENT PM/SXH:   Candidal stomatitis    Malignant neoplasm of colon    Major depressive disorder    HTN (hypertension)    COPD (chronic obstructive pulmonary disease)        FAMILY HISTORY:    ITEMS NOT CHECKED ARE NOT PRESENT    SOCIAL HISTORY:   Significant other/partner[ ]  Children[x ]  Synagogue/Spirituality:  Substance hx:  [ ]   Tobacco hx:  [ ]   Alcohol hx: [ ]   Home Opioid hx:  [ ] I-Stop Reference No:  Living Situation: [ ]Home  [ ]Long term care  [x ]Rehab [ ]Other    ADVANCE DIRECTIVES:    DNR  MOLST  [ ]  Living Will  [ ]   DECISION MAKER(s):  [x ] Health Care Proxy(s)  [ ] Surrogate(s)  [ ] Guardian           Name(s): Rodolfo Louis  Phone Number(s):    BASELINE (I)ADL(s) (prior to admission):  [place]: [ ]Total  [ ] Moderate [x ]Dependent    Allergies    Levaquin (Unknown)    Intolerances    MEDICATIONS  (STANDING):  albumin human 25% IVPB 50 milliLiter(s) IV Intermittent every 6 hours  ascorbic acid 500 milliGRAM(s) Oral daily  aspirin  chewable 81 milliGRAM(s) Enteral Tube daily  cholecalciferol 1000 Unit(s) Oral daily  dextrose 40% Gel 15 Gram(s) Oral once  dextrose 5% + lactated ringers. 1000 milliLiter(s) (80 mL/Hr) IV Continuous <Continuous>  dextrose 5%. 1000 milliLiter(s) (100 mL/Hr) IV Continuous <Continuous>  dextrose 5%. 1000 milliLiter(s) (50 mL/Hr) IV Continuous <Continuous>  dextrose 50% Injectable 25 Gram(s) IV Push once  dextrose 50% Injectable 12.5 Gram(s) IV Push once  dextrose 50% Injectable 25 Gram(s) IV Push once  DULoxetine 30 milliGRAM(s) Oral daily  FLUoxetine 20 milliGRAM(s) Oral daily  gabapentin 200 milliGRAM(s) Oral three times a day  glucagon  Injectable 1 milliGRAM(s) IntraMuscular once  heparin   Injectable 5000 Unit(s) SubCutaneous every 12 hours  methadone    Tablet 10 milliGRAM(s) Oral daily  methocarbamol 250 milliGRAM(s) Oral every 8 hours  midodrine. 15 milliGRAM(s) Oral three times a day  norepinephrine Infusion 0.05 MICROgram(s)/kG/Min (4.16 mL/Hr) IV Continuous <Continuous>  pantoprazole  Injectable 40 milliGRAM(s) IV Push daily  piperacillin/tazobactam IVPB.. 3.375 Gram(s) IV Intermittent every 8 hours  tiotropium 18 MICROgram(s) Capsule 1 Capsule(s) Inhalation daily    MEDICATIONS  (PRN):  ALBUTerol    90 MICROgram(s) HFA Inhaler 2 Puff(s) Inhalation every 6 hours PRN Shortness of Breath and/or Wheezing    PRESENT SYMPTOMS: [ ]Unable to obtain due to poor mentation   Source if other than patient:  [ ]Family   [ ]Team     Pain: [ x]yes [ ]no  QOL impact - yes  Location -  lower back, R buttock, pelvis               Aggravating factors - movement  Quality - dull  Radiation - no  Timing- constant  Severity (0-10 scale): 8/10  Minimal acceptable level (0-10 scale): 3/10    CPOT:    https://www.scc.org/getattachment/[email or web address]-[email or web address]-5854o3175w4z/Critical-Care-Pain-Observation-Tool-(CPOT)      PAIN AD Score:     [email or web address]/painad.[email or web address] (press ctrl +  left click to view)    Dyspnea:                           [ x]Mild [ ]Moderate [ ]Severe  Anxiety:                             [ ]Mild [ x]Moderate [ ]Severe  Fatigue:                             [ ]Mild [x ]Moderate [ ]Severe  Nausea:                             [ ]Mild [ ]Moderate [ ]Severe  Loss of appetite:              [ ]Mild [ x]Moderate [ ]Severe  Constipation:                    [ ]Mild [ ]Moderate [ ]Severe    Other Symptoms:  [x ]All other review of systems negative     Palliative Performance Status Version 2:         20%    http://[email or web address]/files/news/[email or web address]_performance_scale_[email or web address].pdf  PHYSICAL EXAM:  Vital Signs Last 24 Hrs  T(C): 36.4 (02 Dec 2020 08:08), Max: 36.4 (01 Dec 2020 12:31)  T(F): 97.5 (02 Dec 2020 08:08), Max: 97.6 (02 Dec 2020 00:33)  HR: 63 (02 Dec 2020 11:15) (52 - 67)  BP: 127/71 (02 Dec 2020 11:15) (80/50 - 145/71)  BP(mean): 94 (02 Dec 2020 11:15) (61 - 102)  RR: 22 (02 Dec 2020 11:15) (8 - 23)  SpO2: 84% (02 Dec 2020 11:15) (84% - 100%) I&O's Summary    01 Dec 2020 07:01  -  02 Dec 2020 07:00  --------------------------------------------------------  IN: 2229.8 mL / OUT: 1560 mL / NET: 669.8 mL    02 Dec 2020 07:01  -  02 Dec 2020 11:22  --------------------------------------------------------  IN: 296.4 mL / OUT: 300 mL / NET: -3.6 mL      Gen: grimacing, cachectic, lying in bed, no memory of somnolence yesterday.    HEENT: PERRL, normocephalic, atraumatic  Resp: CTA b/l  CVS: +RRR  Abd: BSx4, soft, mild distention, NT, PEJ tube in place  Ext: L. hip with mild swelling and TTP along the gluteal region, no erythema  Skin: warm/dry  Neuro; AAOx3, cr ns grossly intact  Psych: mildly anxious    LABS:                        7.5    9.01  )-----------( 386      ( 02 Dec 2020 06:32 )             25.5   12-02    137  |  101  |  5<L>  ----------------------------<  98  4.0   |  30  |  0.25<L>    Ca    7.5<L>      02 Dec 2020 06:32  Phos  3.5     12-02  Mg     1.8     12-02    TPro  5.3<L>  /  Alb  1.7<L>  /  TBili  0.2  /  DBili  x   /  AST  19  /  ALT  7<L>  /  AlkPhos  94  12-02  PT/INR - ( 01 Dec 2020 08:47 )   PT: 14.5 sec;   INR: 1.25 ratio         PTT - ( 01 Dec 2020 08:47 )  PTT:33.0 sec      RADIOLOGY & ADDITIONAL STUDIES:    PROTEIN CALORIE MALNUTRITION PRESENT: [ ]mild [ ]moderate [ ]severe [ ]underweight [ ]morbid obesity  https://www.andeal.org/vault/2440/web/files/ONC/Table_Clinical%20Characteristics%20to%20Document%20Malnutrition-White%20JV%20et%20al%926220.pdf    Height (cm): 152.4 (11-27-20 @ 20:43)  Weight (kg): 44.4 (11-27-20 @ 20:43)  BMI (kg/m2): 19.1 (11-27-20 @ 20:43)    [ ]PPSV2 < or = to 30% [ ]significant weight loss  [ ]poor nutritional intake  [ ]anasarca     Albumin, Serum: 1.7 g/dL (12-02-20 @ 06:32)   [ ]Artificial Nutrition      REFERRALS:   [ ]Chaplaincy  [ ]Hospice  [ ]Child Life  [ ]Social Work  [ ]Case management [ ]Holistic Therapy     Goals of Care Document:

## 2020-12-02 NOTE — CONSULT NOTE ADULT - PROVIDER SPECIALTY LIST ADULT
Orthopedics
Infectious Disease
Orthopedics
Pulmonology
Gastroenterology
Palliative Care
Surgery
Critical Care
Cardiology

## 2020-12-02 NOTE — PROGRESS NOTE ADULT - ATTENDING COMMENTS
Advanced care planning was discussed with patient and family.  Advanced care planning forms were reviewed and discussed.  Risks, benefits and alternatives of gastroenterologic procedures were discussed in detail and all questions were answered.    30 minutes spent. patient s/p colon resection ~ 5 years ago for cancer  unclear if she had post surgical colonoscopy (per her GI no record of colonoscopy)  would give bowel prep to evacuate her colon stool burden (moviprep and relistor)  thereafter would do CT a/p with IV contrast r/o recurrent mass  if above confirmed may need flex sig to confirm diagnosis  d/w surgery and ICU

## 2020-12-02 NOTE — PROGRESS NOTE ADULT - ASSESSMENT
66 yo female with poss anastomotic leak from colon surgery 5 years ago.  Pt very malnourished at this time.  PT leak is small with no collection in the area.  PT stable at this time with no signs of peritonitis.  Management of possible leak ranges from conservative with antibiotics vs surgery with resection and colostomy.  Surgery is high risk with current issues. D/w pt above who doesn't want surgery at this time.        will d/w GI plan

## 2020-12-02 NOTE — PROVIDER CONTACT NOTE (CRITICAL VALUE NOTIFICATION) - TEST AND RESULT REPORTED:
Bands  13%
H/H 6.1 /20.2
blood c/s
[facility] Medicine, 5M Care Model Team B

## 2020-12-02 NOTE — PROGRESS NOTE ADULT - PROBLEM SELECTOR PLAN 1
small foci of air noted on MRI  ct showing localized anastomotic leak at rectosigmoid surgical anastomosis  will obtain op colon report  f/u surgery recs  npo/ivf  cont abx  ppi ppx  prn pain control  monitor cbc daily  to further discuss plan w sx; continue icu care

## 2020-12-02 NOTE — PROGRESS NOTE ADULT - SUBJECTIVE AND OBJECTIVE BOX
Patient is a 65y old  Female who presents with a chief complaint of the L. hip ill-defined multi septated fluid collection containing foci of air measuring appx 3.8 x 3.2 x3.9cm concerning for abscess, WBC of 20, normal LA. Pt was seen by both surgical and ortho team who recommenced no surgical intervention at this time. MICU consulted for hypotension, (02 Dec 2020 14:13)      INTERVAL HPI/OVERNIGHT EVENTS:    complains of abdominal discomfort and weakness    MEDICATIONS  (STANDING):  albumin human 25% IVPB 50 milliLiter(s) IV Intermittent every 6 hours  ascorbic acid 500 milliGRAM(s) Oral daily  aspirin  chewable 81 milliGRAM(s) Enteral Tube daily  cholecalciferol 1000 Unit(s) Oral daily  dextrose 40% Gel 15 Gram(s) Oral once  dextrose 5% + lactated ringers. 1000 milliLiter(s) (80 mL/Hr) IV Continuous <Continuous>  dextrose 5%. 1000 milliLiter(s) (100 mL/Hr) IV Continuous <Continuous>  dextrose 5%. 1000 milliLiter(s) (50 mL/Hr) IV Continuous <Continuous>  dextrose 50% Injectable 25 Gram(s) IV Push once  dextrose 50% Injectable 12.5 Gram(s) IV Push once  dextrose 50% Injectable 25 Gram(s) IV Push once  DULoxetine 30 milliGRAM(s) Oral daily  FLUoxetine 20 milliGRAM(s) Oral daily  gabapentin 200 milliGRAM(s) Oral three times a day  glucagon  Injectable 1 milliGRAM(s) IntraMuscular once  heparin   Injectable 5000 Unit(s) SubCutaneous every 12 hours  methadone    Tablet 30 milliGRAM(s) Oral every 12 hours  methocarbamol 250 milliGRAM(s) Oral every 8 hours  midodrine. 20 milliGRAM(s) Oral three times a day  pantoprazole  Injectable 40 milliGRAM(s) IV Push daily  piperacillin/tazobactam IVPB.. 3.375 Gram(s) IV Intermittent every 8 hours  polyethylene glycol/electrolyte Solution 1000 milliLiter(s) Oral every 4 hours  tiotropium 18 MICROgram(s) Capsule 1 Capsule(s) Inhalation daily      MEDICATIONS  (PRN):  ALBUTerol    90 MICROgram(s) HFA Inhaler 2 Puff(s) Inhalation every 6 hours PRN Shortness of Breath and/or Wheezing      Allergies    Levaquin (Unknown)    Intolerances        PAST MEDICAL & SURGICAL HISTORY:  Candidal stomatitis    Malignant neoplasm of colon    Major depressive disorder    HTN (hypertension)    COPD (chronic obstructive pulmonary disease)        Vital Signs Last 24 Hrs  T(C): 36.1 (02 Dec 2020 13:35), Max: 36.4 (02 Dec 2020 00:33)  T(F): 97 (02 Dec 2020 13:35), Max: 97.6 (02 Dec 2020 00:33)  HR: 62 (02 Dec 2020 18:00) (51 - 67)  BP: 123/59 (02 Dec 2020 18:00) (80/50 - 149/72)  BP(mean): 85 (02 Dec 2020 18:00) (61 - 103)  RR: 17 (02 Dec 2020 18:00) (8 - 23)  SpO2: 100% (02 Dec 2020 18:00) (84% - 100%)    PHYSICAL EXAMINATION:    GENERAL: The patient is cachectic but awake and alert in no apparent distress.     HEENT: Head is normocephalic and atraumatic. Extraocular muscles are intact. Mucous membranes are moist.    NECK: Supple.    LUNGS: diminished breath sounds bilateral    HEART: Regular rate and rhythm without murmur.    ABDOMEN: Soft with mild tenderness    EXTREMITIES: Without any cyanosis, clubbing, rash, lesions or edema.    NEUROLOGIC: Grossly intact.    SKIN: No ulceration or induration present.      LABS:                        7.5    9.01  )-----------( 386      ( 02 Dec 2020 06:32 )             25.5     12-02    137  |  101  |  5<L>  ----------------------------<  98  4.0   |  30  |  0.25<L>    Ca    7.5<L>      02 Dec 2020 06:32  Phos  3.5     12-02  Mg     1.8     12-02    TPro  5.3<L>  /  Alb  1.7<L>  /  TBili  0.2  /  DBili  x   /  AST  19  /  ALT  7<L>  /  AlkPhos  94  12-02    PT/INR - ( 01 Dec 2020 08:47 )   PT: 14.5 sec;   INR: 1.25 ratio         PTT - ( 01 Dec 2020 08:47 )  PTT:33.0 sec                    MICROBIOLOGY:  Culture Results:   No growth (11-30 @ 19:13)  Culture Results:   No growth to date. (11-29 @ 11:41)  Culture Results:   No growth to date. (11-29 @ 11:41)      Assessment:    Enterococcal sepsis due to bowel perforation - history of previous colon resection for CA  Hx COPD  Recurrent Aspiration Pneumonia  Cachexia    Plan:    Continue IV antibiotics  IV fluids  Spiriva 1 puff daily  Oxygen PRN  SQ heparin for DVT prophylaxis  The patient is at high risk for post-op complications due to multiple comorbidities

## 2020-12-02 NOTE — PROGRESS NOTE ADULT - SUBJECTIVE AND OBJECTIVE BOX
pt seen  c/o back pain  ICU Vital Signs Last 24 Hrs  T(C): 36.4 (02 Dec 2020 08:08), Max: 36.4 (01 Dec 2020 12:31)  T(F): 97.5 (02 Dec 2020 08:08), Max: 97.6 (02 Dec 2020 00:33)  HR: 63 (02 Dec 2020 11:15) (52 - 67)  BP: 127/71 (02 Dec 2020 11:15) (80/50 - 145/71)  BP(mean): 94 (02 Dec 2020 11:15) (61 - 102)  ABP: --  ABP(mean): --  RR: 22 (02 Dec 2020 11:15) (8 - 23)  SpO2: 84% (02 Dec 2020 11:15) (84% - 100%)  gen-NAD  resp-clear  abd-soft ND, tender suprapubic area                        7.5    9.01  )-----------( 386      ( 02 Dec 2020 06:32 )             25.5   12-02    137  |  101  |  5<L>  ----------------------------<  98  4.0   |  30  |  0.25<L>    Ca    7.5<L>      02 Dec 2020 06:32  Phos  3.5     12-02  Mg     1.8     12-02    TPro  5.3<L>  /  Alb  1.7<L>  /  TBili  0.2  /  DBili  x   /  AST  19  /  ALT  7<L>  /  AlkPhos  94  12-02

## 2020-12-02 NOTE — PROGRESS NOTE ADULT - PROBLEM SELECTOR PLAN 1
WITH ENTEROCOCCAL BACTEREMIA ,POA -CONTINUE ABX AS PER ID , ON MIDODRINE AND LEVAPHED FOR HYPOTENSION WITH ENTEROCOCCAL BACTEREMIA ,POA -CONTINUE ZOSYN  AS PER ID , ON MIDODRINE

## 2020-12-02 NOTE — CONSULT NOTE ADULT - ASSESSMENT
1) Chronic intractable pain  change methadone to 30 mg PO q12h  continue cymbalta   continue gabapentin    2) Severe protein calorie malnutrition with cachexia   - comfort feeding as tolerated  - no feeding tubes; trial of IVF  - Trial of marinol as appetite stimulant if desired by pt    3) Failure to thrive, functional decline  3) Goals of care/advance care planning  - Palliative performance scale score: 20% (poor)  - Prognosis:   - Code status:   - GOC: no surgical interventions related to leaking anastomosis  - HCP:   - Psychosocial support provided    Appreciate consult. Discussed with the primary team.    Sp Bueno MD     64 y/o F w/ pmh of recovered alcoholic, former smoker, copd, RA, chronic low back pain, hx of colon ca s/p sigmoid resection (2015), hx of open R. inguinal hernia repair w/ mesh (2010), hx of hiatal hernia repair (2019), zenker diverticulum surgery c/b vocal cord paralysis's requiring peg for feeding, s/p peg (replaced peg 3 weeks ago [facility]) transfer from [facility] to [facility] earlier today for leaking PEJ tube and on/off lower abd pain over the last 2-3 days. In the ED pt underwent an CT of abd/pelvis was found to have a Abscess around the L. hip ill-defined multi septated fluid collection containing foci of air measuring appx 3.8 x 3.2 x3.9cm concerning for abscess, WBC of 20, normal LA. MICU consulted for hypotension after 3L of IVF in the setting of sepsis from L. hip abscess.    1) Chronic intractable pain  change methadone to 30 mg via PEG q12h  continue cymbalta   continue gabapentin    2) Severe protein calorie malnutrition with cachexia & dysphagia s/p PEG, weight loss  - comfort feeding as tolerated  - no feeding tubes; trial of IVF  - Trial of marinol as appetite stimulant if desired by pt    3) Failure to thrive, functional decline  4) Goals of care/advance care planning  - Palliative performance scale score: 20% (poor)  - Prognosis: poor (days-weeks)  - Code status: Full code (by default; will readdress tomorrow; pt is leaning towards DNR/DNI)  - GOC: no surgical interventions related to leaking anastomosis  - HCP: matilde Louis  - Psychosocial support provided    5) Constipation ppx 2/2 opioid use  - senna and miralax.    Appreciate consult. Discussed with the primary team.    Sp Bueno MD     66 y/o F w/ pmh of recovered alcoholic, former smoker, copd, RA, chronic low back pain, hx of colon ca s/p sigmoid resection (2015), hx of open R. inguinal hernia repair w/ mesh (2010), hx of hiatal hernia repair (2019), zenker diverticulum surgery c/b vocal cord paralysis's requiring peg for feeding, s/p peg (replaced peg 3 weeks ago [facility]) transfer from [facility] to [facility] earlier today for leaking PEJ tube and on/off lower abd pain over the last 2-3 days. In the ED pt underwent an CT of abd/pelvis was found to have a Abscess around the L. hip ill-defined multi septated fluid collection containing foci of air measuring appx 3.8 x 3.2 x3.9cm concerning for abscess, WBC of 20, normal LA. MICU consulted for hypotension after 3L of IVF in the setting of sepsis from L. hip abscess.    1) Chronic intractable pain  change methadone to 30 mg via PEG q12h  continue cymbalta   continue gabapentin    2) Severe protein calorie malnutrition with cachexia & dysphagia s/p PEG, weight loss  - PEG feeding as tolerated  - Trial of marinol as appetite stimulant if desired by pt    3) Failure to thrive, functional decline  4) Goals of care/advance care planning  - Palliative performance scale score: 20% (poor)  - Prognosis: poor (days-weeks)  - Code status: Full code (by default; will readdress tomorrow; pt is leaning towards DNR/DNI)  - GOC: no surgical interventions related to leaking anastomosis  - HCP: matilde Louis  - Psychosocial support provided    5) Constipation ppx 2/2 opioid use  - senna and miralax.    Appreciate consult. Discussed with the primary team.    Sp Bueno MD

## 2020-12-02 NOTE — PROGRESS NOTE ADULT - SUBJECTIVE AND OBJECTIVE BOX
Patient is a 65y old  Female who presents with a chief complaint of the L. hip ill-defined multi septated fluid collection containing foci of air measuring appx 3.8 x 3.2 x3.9cm concerning for abscess, WBC of 20, normal LA. Pt was seen by both surgical and ortho team who recommenced no surgical intervention at this time. MICU consulted for hypotension, (01 Dec 2020 20:44)    HPI:  66 yo F pmhx depression, Colon CA s/p sigmoid resection w/ PEJ, HTN, COPD presented for abdominal pain. Patient admitted with leaking PEJ and left hip collection concern for abscess. Hospital course further complicated by septic shock, enterococcal bacteremia. 11/30 patient underwent left hip IR drainage. 12/1 CT abdomen/pelvis noted small anastomotic leak.    Patient noted to be somnolent at bedside; of note received higher Methadone dose then home dose this AM. Patient hypotensive on pressors    Allergies: Levaquin    PAST MEDICAL & SURGICAL HISTORY:  Candidal stomatitis    Malignant neoplasm of colon    Major depressive disorder    HTN (hypertension)    COPD (chronic obstructive pulmonary disease)      FAMILY HISTORY:    SOCIAL HISTORY:    Home Medications:    Review of Systems:  Unable to participate in ROS due to lethargy    T(F): 96.4 (12-01-20 @ 21:29), Max: 98 (12-01-20 @ 07:55)  HR: 60 (12-02-20 @ 00:00) (59 - 83)  BP: 82/51 (12-02-20 @ 00:00) (75/48 - 112/65)  RR: 9 (12-02-20 @ 00:00) (8 - 29)  SpO2: 100% (12-02-20 @ 00:00)  Wt(kg): --    CAPILLARY BLOOD GLUCOSE      POCT Blood Glucose.: 100 mg/dL (01 Dec 2020 23:51)    I&O's Summary    30 Nov 2020 07:01  -  01 Dec 2020 07:00  --------------------------------------------------------  IN: 3281.8 mL / OUT: 1000 mL / NET: 2281.8 mL    01 Dec 2020 07:01  -  02 Dec 2020 00:25  --------------------------------------------------------  IN: 1517.9 mL / OUT: 1160 mL / NET: 357.9 mL        Physical Exam:     Gen: ill appearing, cachectic  Neuro: awake and alert  CVS: +S1S2  Resp: CTA   Abd: soft, nt, nd, +J tube  Ext: warm, dry, no edema    Meds:  piperacillin/tazobactam IVPB.. 3.375 Gram(s) IV Intermittent every 8 hours    midodrine. 15 milliGRAM(s) Oral three times a day  norepinephrine Infusion 0.05 MICROgram(s)/kG/Min IV Continuous <Continuous>     dextrose 40% Gel 15 Gram(s) Oral once  dextrose 50% Injectable 25 Gram(s) IV Push once  dextrose 50% Injectable 12.5 Gram(s) IV Push once  dextrose 50% Injectable 25 Gram(s) IV Push once  glucagon  Injectable 1 milliGRAM(s) IntraMuscular once     ALBUTerol    90 MICROgram(s) HFA Inhaler 2 Puff(s) Inhalation every 6 hours PRN  tiotropium 18 MICROgram(s) Capsule 1 Capsule(s) Inhalation daily     DULoxetine 30 milliGRAM(s) Oral daily  FLUoxetine 20 milliGRAM(s) Oral daily  gabapentin 200 milliGRAM(s) Oral three times a day  methadone    Tablet 10 milliGRAM(s) Oral daily  methocarbamol 250 milliGRAM(s) Oral every 8 hours        aspirin  chewable 81 milliGRAM(s) Enteral Tube daily  heparin   Injectable 5000 Unit(s) SubCutaneous every 12 hours     pantoprazole  Injectable 40 milliGRAM(s) IV Push daily        albumin human 25% IVPB 50 milliLiter(s) IV Intermittent every 6 hours  ascorbic acid 500 milliGRAM(s) Oral daily  cholecalciferol 1000 Unit(s) Oral daily  dextrose 5% + lactated ringers. 1000 milliLiter(s) IV Continuous <Continuous>  dextrose 5%. 1000 milliLiter(s) IV Continuous <Continuous>  dextrose 5%. 1000 milliLiter(s) IV Continuous <Continuous>                                    7.6    10.72 )-----------( 516      ( 01 Dec 2020 06:10 )             26.1       12-01    139  |  102  |  5<L>  ----------------------------<  111<H>  3.6   |  29  |  <0.20<L>    Ca    7.8<L>      01 Dec 2020 06:10  Phos  3.2     12-01  Mg     1.7     12-01    TPro  5.1<L>  /  Alb  1.2<L>  /  TBili  0.2  /  DBili  <.10  /  AST  15  /  ALT  11<L>  /  AlkPhos  102  12-01          PT/INR - ( 01 Dec 2020 08:47 )   PT: 14.5 sec;   INR: 1.25 ratio         PTT - ( 01 Dec 2020 08:47 )  PTT:33.0 sec    .Abscess Hip - Left   No growth -- 11-30 @ 19:13  .Blood Blood-Peripheral   No growth to date. -- 11-29 @ 11:41  .Urine Clean Catch (Midstream)   <10,000 CFU/mL Normal Urogenital Ami -- 11-28 @ 04:55  .Blood Blood-Peripheral   No growth to date.   Growth in anaerobic bottle: Gram Positive Cocci in Pairs and Chains 11-27 @ 18:29      Rapid RVP Result: NotDetec (11-27 @ 13:21)      Radiology:   EXAM:  CT ABDOMEN AND PELVIS OC                            *** ADDENDUM 12/01/2020  ***    Finding communicated to ICU physician assistant Donte at the time of interpretation on 12/1/2020.    *** END OF ADDENDUM 12/01/2020  ***      PROCEDURE DATE:  12/01/2020          INTERPRETATION:  CLINICAL INFORMATION: Abdominal pain. Rectosigmoid anastomosis, evaluate for leak.    COMPARISON: CT scan abdomen pelvis 11/27/2020.    PROCEDURE:  CT of the Abdomen and Pelvis was performed without intravenous contrast.  Intravenous contrast: None.  Oral contrast: None.  Rectal contrast was administered.  Sagittal and coronal reformats were performed.    FINDINGS:    LOWER CHEST: Not entirely included on this exam. Small left pleural effusion atelectasis.    Streak artifact related to patient's arms degrades image quality.    The evaluation of the solid organ parenchyma is limited without intravenous contrast.    LIVER: Not entirely included on this exam.  BILE DUCTS: Mild prominence of the common bile duct measuring up to 9 mm.  GALLBLADDER: Prior cholecystectomy.  SPLEEN: Within normal limits.  PANCREAS: Within normal limits.  ADRENALS: Within normal limits.  KIDNEYS/URETERS: Within normal limits.    BLADDER: Within normal limits.  REPRODUCTIVE ORGANS: No pelvic mass noted.    BOWEL:  PERITONEUM: Rectosigmoid surgical anastomosis.  There is minimal localized extraluminal extravasation of contrast at the posterior aspect of the rectosigmoid anastomosis. There are adjacent small bubbles of extraluminal air.  There is soft tissue density tracking along the bilateral presacral soft tissues.  No localized intra-abdominal fluid collection or pneumoperitoneum within the greater peritoneal cavity is noted.    There is fecal retention with generalizeddistention of the colon.    There is a percutaneous gastrojejunostomy tube.    VESSELS: Atherosclerotic calcification.  RETROPERITONEUM/LYMPH NODES: No lymphadenopathy.  ABDOMINAL WALL: Soft tissue fullness is present in the periarticular soft tissues of the left hip, with small bubbles of air at the level of the left greater sciatic notch.    BONES: Degenerative changes spine with scoliosis    IMPRESSION:    Localized anastomotic leak at the rectosigmoid surgical anastomosis.  There is diffuse soft tissues prominence in the presacral soft tissues, without discrete fluid collection noted.    See separate report from MRI of the hip and pelvis from 11/28/2020.      ***Please see the addendum at the top of this report. It may contain additional important information or changes.****        VINI WILLOUGHBY M.D., ATTENDING RADIOLOGIST  This document has been electronically signed. Dec  1 2020  3:41PM  Addend:VINI WILLOUGHBY M.D., ATTENDING RADIOLOGIST  This addendum was electronically signed on: Dec  1 2020  3:55PM.    Problems  -Septic shock  -Bacteremia    66 yo F pmhx depression, Colon CA s/p sigmoid resection w/ PEJ, HTN, COPD presented for abdominal pain. Patient admitted with leaking PEJ and left hip collection concern for abscess. Hospital course further complicated by septic shock, enterococcal bacteremia     -Encephalopathy secondary to multiple sedatives; holding all sedating medications. Methadone dose decreased for AM, hold for lethargy  -Septic shock requiring Levophed gtt. Actively titrating to maintain MAP >65. Additional Midodrine for BP support  -COPD; standing bronchodilators w/ Spiriva. PRN Proventil  -CTAP noted anastamotic leak. Surgery involved. Plan for IR vs. surgical intervention in AM. Meds only via J tube  -Left gluteal collection s/p IR drainage. culture without growth. MRI spine negative  -Enterococcus bacteremia. Abx de-escalated to Zosyn  -DVT PPX: Heparin SC    Critical care time spent (mins): 37 minutes including time spent reviewing chart, ordering tests/labs, discussing with interdisciplinary team. Not including time spent performing procedures.

## 2020-12-02 NOTE — PROGRESS NOTE ADULT - ASSESSMENT
66 y/o F w/ pmh of recovered alcoholic, former smoker, copd, RA, chronic low back pain, hx of colon ca s/p sigmoid resection (2015), hx of open R. inguinal hernia repair w/ mesh (2010), hx of hiatal hernia repair (2019), zenker diverticulum surgery c/b vocal cord paralysis's requiring peg for feeding, s/p peg (replaced peg 3 weeks ago [facility]) transfer from [facility] to [facility] earlier today for leaking PEJ tube and on/off lower abd pain over the last 2-3 days. In the ED pt underwent an CT of abd/pelvis was found to have a Abscess around the L. hip ill-defined multi septated fluid collection containing foci of air measuring appx 3.8 x 3.2 x3.9cm concerning for abscess, WBC of 20, normal LA. MICU consulted for hypotension after 3L of IVF in the setting of sepsis from L. hip abscess.    Neuro:   Pt somnolent on exam this morning. Methadone dose reduced. Trazadone reduced. Gabapentin reduced.   Home meds: Duloxetine Po, Fluoxetine PO, Trazadone. Chronic pain will continue with methadone/methocarbamol (back pain). Gabapentin.     Cardiac:   Hypotension possible from sepsis, continue Midodrine for BP Support. Levophed restarted for hypotension.   CAD- continue with asa    Resp:   No acute issues, maintain o2 >92%. nasal cannula. No home O2. Continue with montelukast, albuterol and tiotropium inhaler.     GI:  NPO for now, GI ppx w/ protonix, constipation bowel regimen w/ lactulose, mag and senna, Intra abdominal inflammatory changes. Noted to have possible rectosigmoid anastomotic leak on MRI, will get follow up imaging to r/o extravasation.   CT Abd w/ PO/rectal contrast ord.   Plan for bedside speech and swallow when all tests are complete.     Renal:   Renal function stable, lytes stable, cont to monitor and trend and replete prn    ID:   L. piriformis abscess- continue with clindamycin and Daptomycin. Zosyn, vanco changed to daptomycin. 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 Follow up repeat BCx. MRI hip shows no evidence of septic arthritis, no intervention at this time as per ortho. Edema present at S1-S3 neural foramina, f/u MRI C/T/L spine to r/o epidural abscess.   Undergoing IT drainage of L gluteal abscess today.     Endo:   no acute issues, monitor FS q6h given NPO status    Heme:   Started on heparin 5000U BID as per ortho, A81    Msk:   Chronic low back pain on methadone 60mg daily checked HCS data-base and confirmed pt reports being on methadone for appx 15 years) along with gabapentin and Robaxin    Dispo: ICU management for sepsis. Awaiting MRI for r/o epidural abscess and CT abd for r/o anastomosis intra-abdominal. 66 y/o F w/ pmh of recovered alcoholic, former smoker, copd, RA, chronic low back pain, hx of colon ca s/p sigmoid resection (2015), hx of open R. inguinal hernia repair w/ mesh (2010), hx of hiatal hernia repair (2019), zenker diverticulum surgery c/b vocal cord paralysis's requiring peg for feeding, s/p peg (replaced peg 3 weeks ago [facility]) transfer from [facility] to [facility] earlier today for leaking PEJ tube and on/off lower abd pain over the last 2-3 days. In the ED pt underwent an CT of abd/pelvis was found to have a Abscess around the L. hip ill-defined multi septated fluid collection containing foci of air measuring appx 3.8 x 3.2 x3.9cm concerning for abscess, WBC of 20, normal LA. MICU consulted for hypotension after 3L of IVF in the setting of sepsis from L. hip abscess.    Neuro:   Pt more awake this morning, requesting more methadone.  Methadone dose was reduced. Trazadone reduced. Gabapentin reduced. Will supplement medication for adequate pain control.   Home meds: Duloxetine Po, Fluoxetine PO, Trazadone. Chronic pain will continue with methadone/methocarbamol (back pain). Gabapentin.     Cardiac:   Hypotension possible from sepsis, continue Midodrine for BP Support. Continue Levophed for hypotension.   CAD- continue with asa    Resp:   No acute issues, maintain o2 >92%. nasal cannula. No home O2. Continue with montelukast, albuterol and tiotropium inhaler.     GI:  NPO for now, GI ppx w/ protonix, constipation bowel regimen w/ lactulose, mag and senna, Intra abdominal inflammatory changes. Noted to have possible rectosigmoid anastomotic leak on MRI, will get follow up imaging to r/o extravasation.   CT Abd w/ PO/rectal contrast showing rectosigmoid anastomotic leak.       Renal:   Renal function stable, lytes stable, cont to monitor and trend and replete prn    ID:   L. piriformis abscess- continue with clindamycin and Daptomycin. Zosyn, vanco changed to daptomycin. 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 Follow up repeat BCx. MRI hip shows no evidence of septic arthritis, no intervention at this time as per ortho. Edema present at S1-S3 neural foramina,  MRI C/T/L spine demonstrating changes consistent with osteomyelitis. .   SA/p IR drainage. Culture of fluid NGTD.     Endo:   no acute issues, monitor FS q6h given NPO status    Heme:   Started on heparin 5000U BID as per ortho, A81    Msk:   Chronic low back pain on methadone 60mg daily checked HCS data-base and confirmed pt reports being on methadone for appx 15 years) along with gabapentin and Robaxin. Methadone dose reduced.     Dispo: ICU management for sepsis. Will need general surgery intervention. 66 y/o F w/ pmh of recovered alcoholic, former smoker, copd, RA, chronic low back pain, hx of colon ca s/p sigmoid resection (2015), hx of open R. inguinal hernia repair w/ mesh (2010), hx of hiatal hernia repair (2019), zenker diverticulum surgery c/b vocal cord paralysis's requiring peg for feeding, s/p peg (replaced peg 3 weeks ago [facility]) transfer from [facility] to [facility] earlier today for leaking PEJ tube and on/off lower abd pain over the last 2-3 days. In the ED pt underwent an CT of abd/pelvis was found to have a Abscess around the L. hip ill-defined multi septated fluid collection containing foci of air measuring appx 3.8 x 3.2 x3.9cm concerning for abscess, WBC of 20, normal LA. MICU consulted for hypotension after 3L of IVF in the setting of sepsis from L. hip abscess.    Neuro:   Pt more awake this morning, requesting more methadone.  Methadone dose was reduced. Trazadone reduced. Gabapentin reduced. Will supplement medication for adequate pain control.   Home meds: Duloxetine Po, Fluoxetine PO, Trazadone. Chronic pain will continue with methadone/methocarbamol (back pain). Gabapentin.     Cardiac:   Hypotension possible from sepsis, continue Midodrine 20q8h for BP Support. Continue Levophed for hypotension.   CAD- continue with asa    Resp:   No acute issues, maintain o2 >92%. nasal cannula. No home O2. Continue with montelukast, albuterol and tiotropium inhaler.     GI:  Clear liquid diet, GI ppx w/ protonix, constipation bowel regimen w/ lactulose, mag and senna, Intra abdominal inflammatory changes.   CT Abd w/ PO/rectal contrast showing rectosigmoid anastomotic leak. Gen Sx stating pt is physiologically not a surgical candidate at this time. Pt will discuss options with family.   Per GI: Will order Relistor, Muviprep to clear fecal load and repeat CT to better visualize anastomotic leak.     Renal:   Renal function stable, lytes stable, cont to monitor and trend and replete prn    ID:   L. piriformis abscess- continue with clindamycin and Daptomycin. Zosyn, vanco changed to daptomycin. 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 Follow up repeat BCx. MRI hip shows no evidence of septic arthritis, no intervention at this time as per ortho. Edema present at S1-S3 neural foramina,  MRI C/T/L spine demonstrating possible phlegmon/abscess in the sacral region of the spine. Ortho consult pending.   S/p IR drainage. Culture of fluid NGTD.     Endo:   no acute issues, monitor FS q6h given NPO status    Heme:   Started on heparin 5000U BID as per ortho, A81    Msk:   Chronic low back pain on methadone 60mg daily checked HCS data-base and confirmed pt reports being on methadone for appx 15 years) along with gabapentin and Robaxin. Methadone dose reduced.     Dispo: Goals of care discussion with patient and family. Bowel prep and repeat CT abdomen.

## 2020-12-02 NOTE — PROGRESS NOTE ADULT - SUBJECTIVE AND OBJECTIVE BOX
Patient is a 65y old  Female who presents with a chief complaint of the L. hip ill-defined multi septated fluid collection containing foci of air measuring appx 3.8 x 3.2 x3.9cm concerning for abscess, WBC of 20, normal LA. Pt was seen by both surgical and ortho team who recommenced no surgical intervention at this time. MICU consulted for hypotension, (02 Dec 2020 00:25)    24 hour events: ***    REVIEW OF SYSTEMS  Complaining of back pain and pain in the left hip.  No other complaints at this time.    T(F): 97.5 (12-02-20 @ 08:08), Max: 97.6 (12-02-20 @ 00:33)  HR: 56 (12-02-20 @ 07:30) (52 - 67)  BP: 132/71 (12-02-20 @ 07:30) (80/50 - 133/72)  RR: 19 (12-02-20 @ 07:30) (8 - 23)  SpO2: 100% (12-02-20 @ 07:30) (97% - 100%)  Wt(kg): --            I&O's Summary    12-01 @ 07:01  -  12-02 @ 07:00  --------------------------------------------------------  IN: 2229.8 mL / OUT: 1560 mL / NET: 669.8 mL      PHYSICAL EXAM  Gen: NAD, Awake and alert, no memory of somnolence yesterday.    HEENT: PERRL, normocephalic, atraumatic  Resp: CTA b/l  CVS: +RRR  Abd: BSx4, soft, mild distention, NT, PEJ tube in place  Ext: L. hip with mild swelling and TTP along the gluteal region, no erythema  Skin: warm/dry    MEDICATIONS  piperacillin/tazobactam IVPB.. IV Intermittent    midodrine. Oral  norepinephrine Infusion IV Continuous    dextrose 40% Gel Oral  dextrose 50% Injectable IV Push  dextrose 50% Injectable IV Push  dextrose 50% Injectable IV Push  glucagon  Injectable IntraMuscular    ALBUTerol    90 MICROgram(s) HFA Inhaler Inhalation PRN  tiotropium 18 MICROgram(s) Capsule Inhalation    DULoxetine Oral  FLUoxetine Oral  gabapentin Oral  methadone    Tablet Oral  methocarbamol Oral      aspirin  chewable Enteral Tube  heparin   Injectable SubCutaneous    pantoprazole  Injectable IV Push      albumin human 25% IVPB IV Intermittent  ascorbic acid Oral  cholecalciferol Oral  dextrose 5% + lactated ringers. IV Continuous  dextrose 5%. IV Continuous  dextrose 5%. IV Continuous                                7.5    9.01  )-----------( 386      ( 02 Dec 2020 06:32 )             25.5       12-02    137  |  101  |  5<L>  ----------------------------<  98  4.0   |  30  |  0.25<L>    Ca    7.5<L>      02 Dec 2020 06:32  Phos  3.5     12-02  Mg     1.8     12-02    TPro  5.3<L>  /  Alb  1.7<L>  /  TBili  0.2  /  DBili  x   /  AST  19  /  ALT  7<L>  /  AlkPhos  94  12-02          PT/INR - ( 01 Dec 2020 08:47 )   PT: 14.5 sec;   INR: 1.25 ratio         PTT - ( 01 Dec 2020 08:47 )  PTT:33.0 sec    .Abscess Hip - Left   No growth -- 11-30 @ 19:13  .Blood Blood-Peripheral   No growth to date. -- 11-29 @ 11:41  .Urine Clean Catch (Midstream)   <10,000 CFU/mL Normal Urogenital Ami -- 11-28 @ 04:55  .Blood Blood-Peripheral   No growth to date.   Growth in anaerobic bottle: Gram Positive Cocci in Pairs and Chains 11-27 @ 18:29      Rapid RVP Result: NotDetec (11-27 @ 13:21)    Radiology: ***< from: MR Cervical Spine w/wo IV Cont (12.01.20 @ 17:59) >  FINDINGS:  Vertebrae: Degenerative endplate marrow signal changes. No acute fracture.  Alignment anatomic.  Spinal cord: Normal signal. No cord compression.  C2-C3: No significant disc disease. No significant spinal stenosis.  C3-C4: Degenerative disc and spondylitic changes. Mild central canal narrowing  without cord compression. No significant foraminal stenosis.  C4-C5: Degenerative disc and spondylitic changes. Mild-to-moderate central  canal stenosis with slight cord flattening. No cord signal abnormality. No  significant foraminal stenosis.  C5-C6: Degenerative disc and spondylitic changes. Mild-to-moderate central  canal stenosis with slight cord flattening. No cord signal abnormality. Mild  right foraminal narrowing.  C6-C7: Degenerative disc and spondylitic changes. Mild central canal stenosis  without cord compression. No foraminal stenosis.  C7-T1: No significant disc disease. No significant spinal stenosis.    Vertebral arteries: Expected flow voids in the vertebral arteries.  Soft tissues: Unremarkable.    < end of copied text >  < from: MR Thoracic Spine w/wo IV Cont (12.01.20 @ 17:57) >  IMPRESSION:  1. Small bilateral pleural effusions.  2. Degenerative disc and spondylitic changes T11-T12. Mild increased T2 signal  or fluid at the right aspect of the disc space, with mild adjacent increased T1  and T2 signal at the adjacent endplates. While this is consistent with Modic  type 2 degenerative signal changes, the increased T2 signal at the right aspect  of the disc space may represent early changes of discitis/osteomyelitis as  well. Correlate with clinical situation.    < end of copied text >  < from: MR Lumbar Spine w/wo IV Cont (12.01.20 @ 17:19) >  IMPRESSION:  1. Degenerative changes as described.  2. Edematous change and enhancement at the gluteus and buttock regions  bilaterally extending into the pelvis through the greater sciatic notches  bilaterally and into the presacral space, incompletely visualized on this exam  and best demonstrated on postcontrast images. Mild associated abnormal marrow  signal at the upper sacrum at the S1-S2 level, and early findings of  osteomyelitis or a consideration. No definite focal fluid collections  identified. Correlate clinically.    < end of copied text >  < from: CT Abdomen and Pelvis w/ Oral Cont (12.01.20 @ 14:01) >  IMPRESSION:    Localized anastomotic leak at the rectosigmoid surgical anastomosis.  There is diffuse soft tissues prominence in the presacral soft tissues, without discrete fluid collection noted.    See separate report from MRI of the hip and pelvis from 11/28/2020.    < end of copied text >    Bedside lung ultrasound: ***  Bedside ECHO: ***    PEG: Y  GREWAL: N                              GLOBAL ISSUE/BEST PRACTICE  Analgesia:  Y  Sedation:  N  HOB elevation: yes  Stress ulcer prophylaxis:  Y  VTE prophylaxis:  Y  Glycemic control: Y  Nutrition:  NPO    CODE STATUS: FULL       Patient is a 65y old  Female who presents with a chief complaint of the L. hip ill-defined multi septated fluid collection containing foci of air measuring appx 3.8 x 3.2 x3.9cm concerning for abscess, WBC of 20, normal LA. Pt was seen by both surgical and ortho team who recommenced no surgical intervention at this time. MICU consulted for hypotension, (02 Dec 2020 00:25)    24 hour events: ***    REVIEW OF SYSTEMS  Complaining of back pain and pain in the left hip.  No other complaints at this time.    T(F): 97.5 (12-02-20 @ 08:08), Max: 97.6 (12-02-20 @ 00:33)  HR: 56 (12-02-20 @ 07:30) (52 - 67)  BP: 132/71 (12-02-20 @ 07:30) (80/50 - 133/72)  RR: 19 (12-02-20 @ 07:30) (8 - 23)  SpO2: 100% (12-02-20 @ 07:30) (97% - 100%)  Wt(kg): --        I&O's Summary    12-01 @ 07:01  -  12-02 @ 07:00  --------------------------------------------------------  IN: 2229.8 mL / OUT: 1560 mL / NET: 669.8 mL      PHYSICAL EXAM  Gen: NAD, Awake and alert, no memory of somnolence yesterday.    HEENT: PERRL, normocephalic, atraumatic  Resp: CTA b/l  CVS: +RRR  Abd: BSx4, soft, mild distention, NT, PEJ tube in place  Ext: L. hip with mild swelling and TTP along the gluteal region, no erythema  Skin: warm/dry    MEDICATIONS  piperacillin/tazobactam IVPB.. IV Intermittent    midodrine. Oral  norepinephrine Infusion IV Continuous    dextrose 40% Gel Oral  dextrose 50% Injectable IV Push  dextrose 50% Injectable IV Push  dextrose 50% Injectable IV Push  glucagon  Injectable IntraMuscular    ALBUTerol    90 MICROgram(s) HFA Inhaler Inhalation PRN  tiotropium 18 MICROgram(s) Capsule Inhalation    DULoxetine Oral  FLUoxetine Oral  gabapentin Oral  methadone    Tablet Oral  methocarbamol Oral      aspirin  chewable Enteral Tube  heparin   Injectable SubCutaneous    pantoprazole  Injectable IV Push      albumin human 25% IVPB IV Intermittent  ascorbic acid Oral  cholecalciferol Oral  dextrose 5% + lactated ringers. IV Continuous  dextrose 5%. IV Continuous  dextrose 5%. IV Continuous                                7.5    9.01  )-----------( 386      ( 02 Dec 2020 06:32 )             25.5       12-02    137  |  101  |  5<L>  ----------------------------<  98  4.0   |  30  |  0.25<L>    Ca    7.5<L>      02 Dec 2020 06:32  Phos  3.5     12-02  Mg     1.8     12-02    TPro  5.3<L>  /  Alb  1.7<L>  /  TBili  0.2  /  DBili  x   /  AST  19  /  ALT  7<L>  /  AlkPhos  94  12-02          PT/INR - ( 01 Dec 2020 08:47 )   PT: 14.5 sec;   INR: 1.25 ratio         PTT - ( 01 Dec 2020 08:47 )  PTT:33.0 sec    .Abscess Hip - Left   No growth -- 11-30 @ 19:13  .Blood Blood-Peripheral   No growth to date. -- 11-29 @ 11:41  .Urine Clean Catch (Midstream)   <10,000 CFU/mL Normal Urogenital Ami -- 11-28 @ 04:55  .Blood Blood-Peripheral   No growth to date.   Growth in anaerobic bottle: Gram Positive Cocci in Pairs and Chains 11-27 @ 18:29      Rapid RVP Result: NotDetec (11-27 @ 13:21)    Radiology: ***< from: MR Cervical Spine w/wo IV Cont (12.01.20 @ 17:59) >  FINDINGS:  Vertebrae: Degenerative endplate marrow signal changes. No acute fracture.  Alignment anatomic.  Spinal cord: Normal signal. No cord compression.  C2-C3: No significant disc disease. No significant spinal stenosis.  C3-C4: Degenerative disc and spondylitic changes. Mild central canal narrowing  without cord compression. No significant foraminal stenosis.  C4-C5: Degenerative disc and spondylitic changes. Mild-to-moderate central  canal stenosis with slight cord flattening. No cord signal abnormality. No  significant foraminal stenosis.  C5-C6: Degenerative disc and spondylitic changes. Mild-to-moderate central  canal stenosis with slight cord flattening. No cord signal abnormality. Mild  right foraminal narrowing.  C6-C7: Degenerative disc and spondylitic changes. Mild central canal stenosis  without cord compression. No foraminal stenosis.  C7-T1: No significant disc disease. No significant spinal stenosis.    Vertebral arteries: Expected flow voids in the vertebral arteries.  Soft tissues: Unremarkable.    < end of copied text >  < from: MR Thoracic Spine w/wo IV Cont (12.01.20 @ 17:57) >  IMPRESSION:  1. Small bilateral pleural effusions.  2. Degenerative disc and spondylitic changes T11-T12. Mild increased T2 signal  or fluid at the right aspect of the disc space, with mild adjacent increased T1  and T2 signal at the adjacent endplates. While this is consistent with Modic  type 2 degenerative signal changes, the increased T2 signal at the right aspect  of the disc space may represent early changes of discitis/osteomyelitis as  well. Correlate with clinical situation.    < end of copied text >  < from: MR Lumbar Spine w/wo IV Cont (12.01.20 @ 17:19) >  IMPRESSION:  1. Degenerative changes as described.  2. Edematous change and enhancement at the gluteus and buttock regions  bilaterally extending into the pelvis through the greater sciatic notches  bilaterally and into the presacral space, incompletely visualized on this exam  and best demonstrated on postcontrast images. Mild associated abnormal marrow  signal at the upper sacrum at the S1-S2 level, and early findings of  osteomyelitis or a consideration. No definite focal fluid collections  identified. Correlate clinically.    < end of copied text >  < from: CT Abdomen and Pelvis w/ Oral Cont (12.01.20 @ 14:01) >  IMPRESSION:    Localized anastomotic leak at the rectosigmoid surgical anastomosis.  There is diffuse soft tissues prominence in the presacral soft tissues, without discrete fluid collection noted.    See separate report from MRI of the hip and pelvis from 11/28/2020.    < end of copied text >    Bedside lung ultrasound: ***  Bedside ECHO: ***    PEG: Y  GREWAL: N                              GLOBAL ISSUE/BEST PRACTICE  Analgesia:  Y  Sedation:  N  HOB elevation: yes  Stress ulcer prophylaxis:  Y  VTE prophylaxis:  Y  Glycemic control: Y  Nutrition:  NPO    CODE STATUS: FULL       Patient is a 65y old  Female who presents with a chief complaint of the L. hip ill-defined multi septated fluid collection containing foci of air measuring appx 3.8 x 3.2 x3.9cm concerning for abscess, WBC of 20, normal LA. Pt was seen by both surgical and ortho team who recommenced no surgical intervention at this time. MICU consulted for hypotension, (02 Dec 2020 00:25)    24 hour events: Underwent CT abdomen and found to have a leak of here rectosigmoid anastomosis. MRI CTL spine showed degenerative changed in the C spine, possible early discitis/ OM and a sacral phlegmon possibly extending into the sacral canal.      REVIEW OF SYSTEMS  Complaining of back pain and pain in the left hip.  No other complaints at this time.    T(F): 97.5 (12-02-20 @ 08:08), Max: 97.6 (12-02-20 @ 00:33)  HR: 56 (12-02-20 @ 07:30) (52 - 67)  BP: 132/71 (12-02-20 @ 07:30) (80/50 - 133/72)  RR: 19 (12-02-20 @ 07:30) (8 - 23)  SpO2: 100% (12-02-20 @ 07:30) (97% - 100%)  Wt(kg): --        I&O's Summary    12-01 @ 07:01  -  12-02 @ 07:00  --------------------------------------------------------  IN: 2229.8 mL / OUT: 1560 mL / NET: 669.8 mL      PHYSICAL EXAM  Gen: NAD, Awake and alert, no memory of somnolence yesterday.    HEENT: PERRL, normocephalic, atraumatic  Resp: CTA b/l  CVS: +RRR  Abd: BSx4, soft, mild distention, NT, PEJ tube in place  Ext: L. hip with mild swelling and TTP along the gluteal region, no erythema  Skin: warm/dry    MEDICATIONS  piperacillin/tazobactam IVPB.. IV Intermittent    midodrine. Oral  norepinephrine Infusion IV Continuous    dextrose 40% Gel Oral  dextrose 50% Injectable IV Push  dextrose 50% Injectable IV Push  dextrose 50% Injectable IV Push  glucagon  Injectable IntraMuscular    ALBUTerol    90 MICROgram(s) HFA Inhaler Inhalation PRN  tiotropium 18 MICROgram(s) Capsule Inhalation    DULoxetine Oral  FLUoxetine Oral  gabapentin Oral  methadone    Tablet Oral  methocarbamol Oral      aspirin  chewable Enteral Tube  heparin   Injectable SubCutaneous    pantoprazole  Injectable IV Push      albumin human 25% IVPB IV Intermittent  ascorbic acid Oral  cholecalciferol Oral  dextrose 5% + lactated ringers. IV Continuous  dextrose 5%. IV Continuous  dextrose 5%. IV Continuous                                7.5    9.01  )-----------( 386      ( 02 Dec 2020 06:32 )             25.5       12-02    137  |  101  |  5<L>  ----------------------------<  98  4.0   |  30  |  0.25<L>    Ca    7.5<L>      02 Dec 2020 06:32  Phos  3.5     12-02  Mg     1.8     12-02    TPro  5.3<L>  /  Alb  1.7<L>  /  TBili  0.2  /  DBili  x   /  AST  19  /  ALT  7<L>  /  AlkPhos  94  12-02          PT/INR - ( 01 Dec 2020 08:47 )   PT: 14.5 sec;   INR: 1.25 ratio         PTT - ( 01 Dec 2020 08:47 )  PTT:33.0 sec    .Abscess Hip - Left   No growth -- 11-30 @ 19:13  < from: MR Cervical Spine w/wo IV Cont (12.01.20 @ 17:59) >  IMPRESSION:  Markedly limited exam due to motion.    Mild nonspecific prevertebral edema in the cervical region. No gross loculated fluid collection to suggest abscess collection.    Preliminary report provided by [facility] on 12/1/2020    < end of copied text >  < from: MR Thoracic Spine w/wo IV Cont (12.01.20 @ 17:57) >  IMPRESSION:  Mild nonspecific endplate signal changes at T11-T12 with slightly high T2 disc signal at described above likely degenerative. If there is clinical concern for early discitis osteomyelitis, short-term follow-up MRI exam with contrast recommended.    Preliminary report provided by [facility] on 12/1/2020    < end of copied text >  < from: MR Lumbar Spine w/wo IV Cont (12.01.20 @ 17:19) >  IMPRESSION:  No MR evidence of discitis osteomyelitis in the lumbar spine. No epidural abscess collection in the lumbar spine    Edema and enhancement involving the sacrum and adjacent soft tissues compatible with osteomyelitis, partially visualized. Enhancement involving the sacral canal suggestive of phlegmon extending to the sacral canal.. Nonspecific 1.2 x 0.8 cm loculated low T1 high T2 signal within the sacral canal at the level of S1-S2 as described above. Although this could be relatedto sacral Tarlov cyst, abscess collection not excluded. Follow-up dedicated MRI of the sacrum with contrast recommended    < end of copied text >  < from: CT Abdomen and Pelvis w/ Oral Cont (12.01.20 @ 14:01) >  IMPRESSION:    Localized anastomotic leak at the rectosigmoid surgical anastomosis.  There is diffuse soft tissues prominence in the presacral soft tissues, without discrete fluid collection noted.    See separate report from MRI of the hip and pelvis from 11/28/2020.    < end of copied text >  .Blood Blood-Peripheral   No growth to date. -- 11-29 @ 11:41  .Urine Clean Catch (Midstream)   <10,000 CFU/mL Normal Urogenital Ami -- 11-28 @ 04:55  .Blood Blood-Peripheral   No growth to date.   Growth in anaerobic bottle: Gram Positive Cocci in Pairs and Chains 11-27 @ 18:29      Rapid RVP Result: NotDetec (11-27 @ 13:21)        Bedside lung ultrasound: ***  Bedside ECHO: ***    PEG: Y  GREWAL: N                              GLOBAL ISSUE/BEST PRACTICE  Analgesia:  Y  Sedation:  N  HOB elevation: yes  Stress ulcer prophylaxis:  Y  VTE prophylaxis:  Y  Glycemic control: Y  Nutrition:  NPO    CODE STATUS: FULL

## 2020-12-02 NOTE — CONSULT NOTE ADULT - CONSULT REQUESTED DATE/TIME
02-Dec-2020 11:22
27-Nov-2020
27-Nov-2020
27-Nov-2020 17:27
27-Nov-2020 18:57
27-Nov-2020 20:19
27-Nov-2020 20:23
28-Nov-2020 10:37
02-Dec-2020 14:13

## 2020-12-02 NOTE — PROGRESS NOTE ADULT - ASSESSMENT
s/p peg   s/p colon resection for ca   abdominal pain- abnormal ct abdomen r/o appendicitis  r/o hip absces  hypertension  further management as per surgery  mri of left hip  - r/o abscess -on antibiotics  hypotension- on vasopressor

## 2020-12-02 NOTE — PROGRESS NOTE ADULT - SUBJECTIVE AND OBJECTIVE BOX
Patient is a 65y Female with a known history of :  Prophylactic measure [Z29.9]    Major depressive disorder [F32.9]    Chronic back pain [M54.9]    GERD (gastroesophageal reflux disease) [K21.9]    Severe malnutrition [E43]    Malignant neoplasm of colon [C18.9]    COPD (chronic obstructive pulmonary disease) [J44.9]    Sepsis [A41.9]    Gastrostomy complication [K94.20]    HTN (hypertension) [I10]    Abscess of hip [L02.419]    Abdominal pain, unspecified abdominal location [R10.9]      HPI:  HPI: 66 y/o F w/ pmh of recovered alcoholic, former smoker, copd, RA, chronic low back pain (on methadone 60mg daily), hx of colon ca s/p sigmoid resection (2015), hx of open R. inguinal hernia repair w/ mesh (2010), hx of hiatal hernia repair (2019), zenker diverticulum surgery c/b vocal cord paralysis's requiring peg for feeding, s/p peg (replaced peg 3 weeks ago [facility]) transfer from [facility] to [facility] earlier today for leaking PEJ tube and on/off lower abd pain over the last 2-3 days. In the ED pt underwent an CT of abd/pelvis was found to have a Abscess around the L. hip ill-defined multi septated fluid collection containing foci of air measuring appx 3.8 x 3.2 x3.9cm concerning for abscess, WBC of 20, normal LA. Pt was seen by both surgical and ortho team who recommenced no surgical intervention at this time. MICU consulted for hypotension, (27 Nov 2020 19:45)      REVIEW OF SYSTEMS:    CONSTITUTIONAL: No fever, weight loss, or fatigue  EYES: No eye pain, visual disturbances, or discharge  ENMT:  No difficulty hearing, tinnitus, vertigo; No sinus or throat pain  NECK: No pain or stiffness  BREASTS: No pain, masses, or nipple discharge  RESPIRATORY: No cough, wheezing, chills or hemoptysis; No shortness of breath  CARDIOVASCULAR: No chest pain, palpitations, dizziness, or leg swelling  GASTROINTESTINAL: No abdominal or epigastric pain. No nausea, vomiting, or hematemesis; No diarrhea or constipation. No melena or hematochezia.  GENITOURINARY: No dysuria, frequency, hematuria, or incontinence  NEUROLOGICAL: No headaches, memory loss, loss of strength, numbness, or tremors  SKIN: No itching, burning, rashes, or lesions   LYMPH NODES: No enlarged glands  ENDOCRINE: No heat or cold intolerance; No hair loss  MUSCULOSKELETAL: No joint pain or swelling; No muscle, back, or extremity pain  PSYCHIATRIC: No depression, anxiety, mood swings, or difficulty sleeping  HEME/LYMPH: No easy bruising, or bleeding gums  ALLERGY AND IMMUNOLOGIC: No hives or eczema    MEDICATIONS  (STANDING):  albumin human 25% IVPB 50 milliLiter(s) IV Intermittent every 6 hours  ascorbic acid 500 milliGRAM(s) Oral daily  aspirin  chewable 81 milliGRAM(s) Enteral Tube daily  cholecalciferol 1000 Unit(s) Oral daily  dextrose 40% Gel 15 Gram(s) Oral once  dextrose 5% + lactated ringers. 1000 milliLiter(s) (80 mL/Hr) IV Continuous <Continuous>  dextrose 5%. 1000 milliLiter(s) (100 mL/Hr) IV Continuous <Continuous>  dextrose 5%. 1000 milliLiter(s) (50 mL/Hr) IV Continuous <Continuous>  dextrose 50% Injectable 25 Gram(s) IV Push once  dextrose 50% Injectable 12.5 Gram(s) IV Push once  dextrose 50% Injectable 25 Gram(s) IV Push once  DULoxetine 30 milliGRAM(s) Oral daily  FLUoxetine 20 milliGRAM(s) Oral daily  gabapentin 200 milliGRAM(s) Oral three times a day  glucagon  Injectable 1 milliGRAM(s) IntraMuscular once  heparin   Injectable 5000 Unit(s) SubCutaneous every 12 hours  methadone    Tablet 10 milliGRAM(s) Oral daily  methocarbamol 250 milliGRAM(s) Oral every 8 hours  midodrine. 15 milliGRAM(s) Oral three times a day  norepinephrine Infusion 0.05 MICROgram(s)/kG/Min (4.16 mL/Hr) IV Continuous <Continuous>  pantoprazole  Injectable 40 milliGRAM(s) IV Push daily  piperacillin/tazobactam IVPB.. 3.375 Gram(s) IV Intermittent every 8 hours  tiotropium 18 MICROgram(s) Capsule 1 Capsule(s) Inhalation daily    MEDICATIONS  (PRN):  ALBUTerol    90 MICROgram(s) HFA Inhaler 2 Puff(s) Inhalation every 6 hours PRN Shortness of Breath and/or Wheezing      ALLERGIES: Levaquin (Unknown)      FAMILY HISTORY:      PHYSICAL EXAMINATION:  -----------------------------  T(C): 36.4 (12-02-20 @ 08:08), Max: 36.4 (12-01-20 @ 12:31)  HR: 63 (12-02-20 @ 11:15) (52 - 67)  BP: 127/71 (12-02-20 @ 11:15) (80/50 - 145/71)  RR: 22 (12-02-20 @ 11:15) (8 - 23)  SpO2: 84% (12-02-20 @ 11:15) (84% - 100%)  Wt(kg): --    12-01 @ 07:01  -  12-02 @ 07:00  --------------------------------------------------------  IN:    Albumin 25%  -  50 mL: 150 mL    dextrose 5% + lactated ringers: 1600 mL    Enteral Tube Flush: 150 mL    IV PiggyBack: 200 mL    Norepinephrine: 129.8 mL  Total IN: 2229.8 mL    OUT:    Intermittent Catheterization - Urethral (mL): 760 mL    Voided (mL): 800 mL  Total OUT: 1560 mL    Total NET: 669.8 mL      12-02 @ 07:01  -  12-02 @ 11:18  --------------------------------------------------------  IN:    dextrose 5% + lactated ringers: 240 mL    Enteral Tube Flush: 30 mL    Norepinephrine: 26.4 mL  Total IN: 296.4 mL    OUT:    Voided (mL): 300 mL  Total OUT: 300 mL    Total NET: -3.6 mL            VITALS  T(C): 36.4 (12-02-20 @ 08:08), Max: 36.4 (12-01-20 @ 12:31)  HR: 63 (12-02-20 @ 11:15) (52 - 67)  BP: 127/71 (12-02-20 @ 11:15) (80/50 - 145/71)  RR: 22 (12-02-20 @ 11:15) (8 - 23)  SpO2: 84% (12-02-20 @ 11:15) (84% - 100%)    Constitutional: well developed, normal appearance, well groomed, well nourished, no deformities and no acute distress.   Eyes: the conjunctiva exhibited no abnormalities and the eyelids demonstrated no xanthelasmas.   HEENT: normal oral mucosa, no oral pallor and no oral cyanosis.   Neck: normal jugular venous A waves present, normal jugular venous V waves present and no jugular venous morales A waves.   Pulmonary: no respiratory distress, normal respiratory rhythm and effort, no accessory muscle use and lungs were clear to auscultation bilaterally.   Cardiovascular: heart rate and rhythm were normal, normal S1 and S2 and no murmur, gallop, rub, heave or thrill are present.   Abdomen: soft, non-tender, no hepato-splenomegaly and no abdominal mass palpated.   Musculoskeletal: the gait could not be assessed..   Extremities: no clubbing of the fingernails, no localized cyanosis, no petechial hemorrhages and no ischemic changes.   Skin: normal skin color and pigmentation, no rash, no venous stasis, no skin lesions, no skin ulcer and no xanthoma was observed.   Psychiatric: oriented to person, place, and time, the affect was normal, the mood was normal and not feeling anxious.     LABS:   --------  12-02    137  |  101  |  5<L>  ----------------------------<  98  4.0   |  30  |  0.25<L>    Ca    7.5<L>      02 Dec 2020 06:32  Phos  3.5     12-02  Mg     1.8     12-02    TPro  5.3<L>  /  Alb  1.7<L>  /  TBili  0.2  /  DBili  x   /  AST  19  /  ALT  7<L>  /  AlkPhos  94  12-02                         7.5    9.01  )-----------( 386      ( 02 Dec 2020 06:32 )             25.5     PT/INR - ( 01 Dec 2020 08:47 )   PT: 14.5 sec;   INR: 1.25 ratio         PTT - ( 01 Dec 2020 08:47 )  PTT:33.0 sec        Culture Results:   No growth (11-30 @ 19:13)      RADIOLOGY:  -----------------    ECG:     ECHO:

## 2020-12-02 NOTE — GOALS OF CARE CONVERSATION - ADVANCED CARE PLANNING - CONVERSATION DETAILS
-diagnostic findings including anastomotic leak, thoracic discitis/osteo, sacral abscess  -treatment options including abx, surgery  -diagnostic options including CT scan, colonoscopy   -poor nutrition and overall health status   -comfort care, hospice

## 2020-12-02 NOTE — PROGRESS NOTE ADULT - ATTENDING COMMENTS
Patient seen and examined    66 y/o female with hx past EtOH abuse, COPD, RA, chronic pain on methadone, colon cancer s/p sigmoid resection 2015 presented with a leaking PEJ tube and found to have severe sepsis from a left buttock abscess and myositis, causing hypotension/septic shock. Found to have enterococcus bacteremia.      More awake and interactive today   On low dose Levo     CT and spine MRIs reviewed  Patient's clinical status is mostly unchanged     In view of suspected anastomotic leak and possible cancer recurrence plan is to do bowel prep and colonoscopy for diagnostic purposes. Will also perform CT A/P with po and iv contrast to better evaluate the leak. MRI with thoracic discitis/osteo and sacral abscess, seen again by ortho however no intervention is currently recommended. Continue Zosyn for now, repeat BCx were neg. She may need surgery despite being a poor surgical candidate however will coordinate decision making with patient, daughter and clinical teams.     Consult palliative care to help with pain mx and GOC    Start clears today - d/w GI     SC heparin for DVT ppx     Patient critically ill, 39 mins spent

## 2020-12-02 NOTE — CONSULT NOTE ADULT - CONSULT REASON
Evaluation for sepsis.
Hypotension
LUQ pain, RLQ inflammation
cardiac evaluation
leaking feeding tube
left hip
pain management & GOC
seps
Sacral OM/Phlegmon

## 2020-12-02 NOTE — PROGRESS NOTE ADULT - ATTENDING COMMENTS
Patient is a 65y old  Female PMHx of depression, colon CA sp sigmoid resection, IHR with mesh, HH repair, PEG for vocal cord paralysis, HTN, COPD who presented with leaking PEJ tube and abd discomfort.who presents with a chief complaint of the L. hip ill-defined multi septated fluid collection containing foci of air measuring appx 3.8 x 3.2 x3.9cm concerning for abscess, WBC of 20, normal LA. Pt was seen by both surgical and ortho team who recommenced no surgical intervention at this time. MICU consulted for hypotension, (30 Nov 2020 00:44) Admitted for septic workup and evaluation ,send blood and urine cx ,serial lactate levels, monitor vitals closely hydration, monitor urine output and renal profile ,iv abx initiated L. piriformis abscess- continue with clindamycin and Daptomycin. Zosynathalia vanco changed to daptomycin. Noted to have enterococcus bacteremia ,Left gluteal abscess with extensive myositis. ,Left greater trochanter abscess ,Suspected right gluteal abscess ,Suspected osteomyelitis of sacrum and L-spine ,Bacteremia with Enterococcus ,ruled in for Sepsis History of colon cancer with sigmoid resection and anastomosis.   . Blood cultures in the ED are growing Enterococcus. Still waiting for susceptibilities but would cover for VRE. Continue Daptomycin 6 mg/kg iv daily for suspected VRE bacteremia. With the bilateral gluteal abscesses and extensive myositis of left gluteus ID consult suggested early surgical drainage and debridements rather than just IR drainage. Also requested surgery to evaluate for leaks of the rectosigmoid anastomosis as the source of abscesses and to  to repeat MRI to evaluate for osteomyelitis of sacrum and L-spine. Patient is a 65y old  Female PMHx of depression, colon CA sp sigmoid resection, IHR with mesh, HH repair, PEG for vocal cord paralysis, HTN, COPD who presented with leaking PEJ tube and abd discomfort.who presents with a chief complaint of the L. hip ill-defined multi septated fluid collection containing foci of air measuring appx 3.8 x 3.2 x3.9cm concerning for abscess, WBC of 20, normal LA. Pt was seen by both surgical and ortho team who recommenced no surgical intervention at this time. MICU consulted for hypotension, (30 Nov 2020 00:44) Admitted for septic workup and evaluation ,send blood and urine cx ,serial lactate levels, monitor vitals closely hydration, monitor urine output and renal profile ,iv abx initiated L. piriformis abscess- continue with clindamycin and Daptomycin. Zosyn, vanco changed to daptomycin. Noted to have enterococcus bacteremia ,Left gluteal abscess with extensive myositis. ,Left greater trochanter abscess ,Suspected right gluteal abscess ,Suspected osteomyelitis of sacrum and L-spine ,Bacteremia with Enterococcus ,ruled in for Sepsis History of colon cancer with sigmoid resection and anastomosis.MRI showed - There is minimal localized extraluminal extravasation of contrast at the posterior aspect of the rectosigmoid anastomosis. There are adjacent small bubbles of extraluminal air .No sx interventions as per surgery consult Dr Ahumada  Surgery is high risk with current issues. D/w pt above who doesn't want surgery at this time .Continue conservative management Palliative care consult requested ,to discuss advance directives ,GOC and review  LIZ

## 2020-12-02 NOTE — PROGRESS NOTE ADULT - SUBJECTIVE AND OBJECTIVE BOX
Interval History:    CENTRAL LINE:   [  ] YES       [  ] NO  GREWAL:                 [  ] YES       [  ] NO         REVIEW OF SYSTEMS:  All Systems below were reviewed and are negative [  ]  HEENT:  ID:  Pulmonary:  Cardiac:  GI:  Renal:  Musculoskeletal:  All other systems above were reviewed and are negative   [  ]      MEDICATIONS  (STANDING):  albumin human 25% IVPB 50 milliLiter(s) IV Intermittent every 6 hours  ascorbic acid 500 milliGRAM(s) Oral daily  aspirin  chewable 81 milliGRAM(s) Enteral Tube daily  cholecalciferol 1000 Unit(s) Oral daily  dextrose 40% Gel 15 Gram(s) Oral once  dextrose 5% + lactated ringers. 1000 milliLiter(s) (80 mL/Hr) IV Continuous <Continuous>  dextrose 5%. 1000 milliLiter(s) (100 mL/Hr) IV Continuous <Continuous>  dextrose 5%. 1000 milliLiter(s) (50 mL/Hr) IV Continuous <Continuous>  dextrose 50% Injectable 25 Gram(s) IV Push once  dextrose 50% Injectable 12.5 Gram(s) IV Push once  dextrose 50% Injectable 25 Gram(s) IV Push once  DULoxetine 30 milliGRAM(s) Oral daily  FLUoxetine 20 milliGRAM(s) Oral daily  gabapentin 200 milliGRAM(s) Oral three times a day  glucagon  Injectable 1 milliGRAM(s) IntraMuscular once  heparin   Injectable 5000 Unit(s) SubCutaneous every 12 hours  methadone    Tablet 30 milliGRAM(s) Oral every 12 hours  methocarbamol 250 milliGRAM(s) Oral every 8 hours  midodrine. 20 milliGRAM(s) Oral three times a day  pantoprazole  Injectable 40 milliGRAM(s) IV Push daily  piperacillin/tazobactam IVPB.. 3.375 Gram(s) IV Intermittent every 8 hours  polyethylene glycol/electrolyte Solution 1000 milliLiter(s) Oral every 4 hours  tiotropium 18 MICROgram(s) Capsule 1 Capsule(s) Inhalation daily    MEDICATIONS  (PRN):  ALBUTerol    90 MICROgram(s) HFA Inhaler 2 Puff(s) Inhalation every 6 hours PRN Shortness of Breath and/or Wheezing      Vital Signs Last 24 Hrs  T(C): 36.1 (02 Dec 2020 13:35), Max: 36.4 (02 Dec 2020 00:33)  T(F): 97 (02 Dec 2020 13:35), Max: 97.6 (02 Dec 2020 00:33)  HR: 62 (02 Dec 2020 18:00) (51 - 67)  BP: 123/59 (02 Dec 2020 18:00) (80/50 - 149/72)  BP(mean): 85 (02 Dec 2020 18:00) (61 - 103)  RR: 17 (02 Dec 2020 18:00) (8 - 23)  SpO2: 100% (02 Dec 2020 18:00) (84% - 100%)    I&O's Summary    01 Dec 2020 07:01  -  02 Dec 2020 07:00  --------------------------------------------------------  IN: 2229.8 mL / OUT: 1560 mL / NET: 669.8 mL    02 Dec 2020 07:01  -  02 Dec 2020 19:44  --------------------------------------------------------  IN: 2939.7 mL / OUT: 1550 mL / NET: 1389.7 mL        PHYSICAL EXAM:  HEENT: NC/AT; PERRLA  Neck: Soft; no tenderness  Lungs: CTA bilaterally; no wheezing.   Heart:  Abdomen:  Genital/ Rectal:  Extremities:  Neurologic:  Vascular:      LABORATORY:    CBC Full  -  ( 02 Dec 2020 06:32 )  WBC Count : 9.01 K/uL  RBC Count : 2.94 M/uL  Hemoglobin : 7.5 g/dL  Hematocrit : 25.5 %  Platelet Count - Automated : 386 K/uL  Mean Cell Volume : 86.7 fl  Mean Cell Hemoglobin : 25.5 pg  Mean Cell Hemoglobin Concentration : 29.4 gm/dL  Auto Neutrophil # : x  Auto Lymphocyte # : x  Auto Monocyte # : x  Auto Eosinophil # : x  Auto Basophil # : x  Auto Neutrophil % : x  Auto Lymphocyte % : x  Auto Monocyte % : x  Auto Eosinophil % : x  Auto Basophil % : x      ESR:                   11-28 @ 01:24  --    C-Reactive Protein:     11-28 @ 01:24  20.94    Procalcitonin:           11-28 @ 01:24   --  ESR:                   11-27 @ 23:42  --    C-Reactive Protein:     11-27 @ 23:42  --    Procalcitonin:           11-27 @ 23:42   0.19  ESR:                   11-27 @ 21:20  98    C-Reactive Protein:     11-27 @ 21:20  --    Procalcitonin:           11-27 @ 21:20   --      12-02    137  |  101  |  5<L>  ----------------------------<  98  4.0   |  30  |  0.25<L>    Ca    7.5<L>      02 Dec 2020 06:32  Phos  3.5     12-02  Mg     1.8     12-02    TPro  5.3<L>  /  Alb  1.7<L>  /  TBili  0.2  /  DBili  x   /  AST  19  /  ALT  7<L>  /  AlkPhos  94  12-02      Rapid Respiratory Viral Panel Result        11-27 @ 13:21  Rapid RVP NotDete  Coronovirus --  Adenovirus --  Bordetella Pertussis --  Chlamydia Pneumonia --  Entero/Rhinovirus--  HKU1 Coronovirus --  HMPV Coronovirus --  Influenza A --  Influenza AH1 --  Influenza AH1 2009 --  Influenza AH3 --  Influenza B --  Mycoplasma Pneumoniae --  NL63 Coronovirus --  OC43 Coronovirus --  Parainfluenza 1 --  Parainfluenza 2 --  Parainfluenza 3 --  Parainfluenza 4 --  Resp Syncytial Virus --      Assessment and Plan:          Lorenzo Zelaya MD   [phone].   She is vert weak  No fevers. Lethargic.     MEDICATIONS  (STANDING):  albumin human 25% IVPB 50 milliLiter(s) IV Intermittent every 6 hours  ascorbic acid 500 milliGRAM(s) Oral daily  aspirin  chewable 81 milliGRAM(s) Enteral Tube daily  cholecalciferol 1000 Unit(s) Oral daily  dextrose 40% Gel 15 Gram(s) Oral once  dextrose 5% + lactated ringers. 1000 milliLiter(s) (80 mL/Hr) IV Continuous <Continuous>  dextrose 5%. 1000 milliLiter(s) (100 mL/Hr) IV Continuous <Continuous>  dextrose 5%. 1000 milliLiter(s) (50 mL/Hr) IV Continuous <Continuous>  dextrose 50% Injectable 25 Gram(s) IV Push once  dextrose 50% Injectable 12.5 Gram(s) IV Push once  dextrose 50% Injectable 25 Gram(s) IV Push once  DULoxetine 30 milliGRAM(s) Oral daily  FLUoxetine 20 milliGRAM(s) Oral daily  gabapentin 200 milliGRAM(s) Oral three times a day  glucagon  Injectable 1 milliGRAM(s) IntraMuscular once  heparin   Injectable 5000 Unit(s) SubCutaneous every 12 hours  methadone    Tablet 30 milliGRAM(s) Oral every 12 hours  methocarbamol 250 milliGRAM(s) Oral every 8 hours  midodrine. 20 milliGRAM(s) Oral three times a day  pantoprazole  Injectable 40 milliGRAM(s) IV Push daily  piperacillin/tazobactam IVPB.. 3.375 Gram(s) IV Intermittent every 8 hours  polyethylene glycol/electrolyte Solution 1000 milliLiter(s) Oral every 4 hours  tiotropium 18 MICROgram(s) Capsule 1 Capsule(s) Inhalation daily    MEDICATIONS  (PRN):  ALBUTerol    90 MICROgram(s) HFA Inhaler 2 Puff(s) Inhalation every 6 hours PRN Shortness of Breath and/or Wheezing      Vital Signs Last 24 Hrs  T(C): 36.1 (02 Dec 2020 13:35), Max: 36.4 (02 Dec 2020 00:33)  T(F): 97 (02 Dec 2020 13:35), Max: 97.6 (02 Dec 2020 00:33)  HR: 62 (02 Dec 2020 18:00) (51 - 67)  BP: 123/59 (02 Dec 2020 18:00) (80/50 - 149/72)  BP(mean): 85 (02 Dec 2020 18:00) (61 - 103)  RR: 17 (02 Dec 2020 18:00) (8 - 23)  SpO2: 100% (02 Dec 2020 18:00) (84% - 100%)    I&O's Summary    01 Dec 2020 07:01  -  02 Dec 2020 07:00  --------------------------------------------------------  IN: 2229.8 mL / OUT: 1560 mL / NET: 669.8 mL    02 Dec 2020 07:01  -  02 Dec 2020 19:44  --------------------------------------------------------  IN: 2939.7 mL / OUT: 1550 mL / NET: 1389.7 mL        PHYSICAL EXAM:  HEENT: NC/AT; PERRLA  Neck: Soft; no tenderness  Lungs: Coarse BS bilaterally; no wheezing.   Heart: RRR; no murmurs.   Abdomen: Soft. tenderness in lower quadrants   Extremities: No ulcers  Neurologic: Lethargic.      LABORATORY:    CBC Full  -  ( 02 Dec 2020 06:32 )  WBC Count : 9.01 K/uL  RBC Count : 2.94 M/uL  Hemoglobin : 7.5 g/dL  Hematocrit : 25.5 %  Platelet Count - Automated : 386 K/uL  Mean Cell Volume : 86.7 fl  Mean Cell Hemoglobin : 25.5 pg  Mean Cell Hemoglobin Concentration : 29.4 gm/dL  Auto Neutrophil # : x  Auto Lymphocyte # : x  Auto Monocyte # : x  Auto Eosinophil # : x  Auto Basophil # : x  Auto Neutrophil % : x  Auto Lymphocyte % : x  Auto Monocyte % : x  Auto Eosinophil % : x  Auto Basophil % : x      ESR:                   11-28 @ 01:24  --    C-Reactive Protein:     11-28 @ 01:24  20.94    Procalcitonin:           11-28 @ 01:24   --  ESR:                   11-27 @ 23:42  --    C-Reactive Protein:     11-27 @ 23:42  --    Procalcitonin:           11-27 @ 23:42   0.19  ESR:                   11-27 @ 21:20  98    C-Reactive Protein:     11-27 @ 21:20  --    Procalcitonin:           11-27 @ 21:20   --      12-02    137  |  101  |  5<L>  ----------------------------<  98  4.0   |  30  |  0.25<L>    Ca    7.5<L>      02 Dec 2020 06:32  Phos  3.5     12-02  Mg     1.8     12-02    TPro  5.3<L>  /  Alb  1.7<L>  /  TBili  0.2  /  DBili  x   /  AST  19  /  ALT  7<L>  /  AlkPhos  94  12-02      Rapid Respiratory Viral Panel Result        11-27 @ 13:21  [facility]  Coronovirus --  Adenovirus --  Bordetella Pertussis --  Chlamydia Pneumonia --  Entero/Rhinovirus--  HKU1 Coronovirus --  HMPV Coronovirus --  Influenza A --  Influenza AH1 --  Influenza AH1 2009 --  Influenza AH3 --  Influenza B --  Mycoplasma Pneumoniae --  NL63 Coronovirus --  OC43 Coronovirus --  Parainfluenza 1 --  Parainfluenza 2 --  Parainfluenza 3 --  Parainfluenza 4 --  Resp Syncytial Virus --      Assessment and Plan:    1. Cellulitis of PEG site.  2. Left gluteal abscess with extensive myositis.   3. Left greater trochanter abscess.  4. Right gluteal abscess.  5. Suspected osteomyelitis of sacrum and L-spine.  6. Bacteremia with Enterococcus.   6. :Leak of rectosigmoid anastomosis.   7. History of colon cancer with sigmoid resection and anastomosis.     . Poor prognosis. No surgeries planned  . Comfort care evaluation in progress.  . Continue IV Zosyn for now.      Lorenzo Zelaya MD   [phone].

## 2020-12-02 NOTE — CONSULT NOTE ADULT - ASSESSMENT
A/P: 65y Female, admitted to the ICU for sepsis 2/2 left gluteal abscess, now with sacral OM/Phlegmon collection   - No acute surgical intervention necessary at this point  - Low concern for acute cord impingement given reassuring physical exam   - Recommend continued IV Abx treatment   - Serial neurological monitoring   - WBAT  - PT/OT  - Appreciate excellent ICU care   - Ortho Stable   - Please re-consult as needed for any progressive neurological changes  - Discussed with attending who agrees with above plan, change as needed

## 2020-12-03 NOTE — PROGRESS NOTE ADULT - PROBLEM SELECTOR PLAN 2
routine gj tube site care  dressing changes prn
routine gj tube site care  dressing changes prn
small foci of air noted on MRI  ? anastamotic leak  check abdominal xray with rectal contrast to r/o extravasation  d/w ICU
small foci of air noted on MRI  ? anastamotic leak  check abdominal xray with rectal contrast to r/o extravasation  d/w ICU
small foci of air noted on MRI  ? anastomotic leak  f/u CT w rectal contrast to r/o extravasation  will follow
LEFT GLUTEAL ABSCESS WITH MYOSITIS ,S/P IR  US GUIDED ASPIRATION 11/30/20

## 2020-12-03 NOTE — PROGRESS NOTE ADULT - SUBJECTIVE AND OBJECTIVE BOX
Patient is a 65y Female with a known history of :  Prophylactic measure [Z29.9]    Major depressive disorder [F32.9]    Chronic back pain [M54.9]    GERD (gastroesophageal reflux disease) [K21.9]    Severe malnutrition [E43]    Malignant neoplasm of colon [C18.9]    COPD (chronic obstructive pulmonary disease) [J44.9]    Sepsis [A41.9]    Gastrostomy complication [K94.20]    HTN (hypertension) [I10]    Abscess of hip [L02.419]    Abdominal pain, unspecified abdominal location [R10.9]      HPI:  HPI: 66 y/o F w/ pmh of recovered alcoholic, former smoker, copd, RA, chronic low back pain (on methadone 60mg daily), hx of colon ca s/p sigmoid resection (2015), hx of open R. 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) transfer from [facility] to [facility] earlier today for leaking PEJ tube and on/off lower abd pain over the last 2-3 days. In the ED pt underwent an CT of abd/pelvis was found to have a Abscess around the L. hip ill-defined multi septated fluid collection containing foci of air measuring appx 3.8 x 3.2 x3.9cm concerning for abscess, WBC of 20, normal LA. Pt was seen by both surgical and ortho team who recommenced no surgical intervention at this time. MICU consulted for hypotension, (27 Nov 2020 19:45)      REVIEW OF SYSTEMS:    CONSTITUTIONAL: No fever, weight loss, or fatigue  EYES: No eye pain, visual disturbances, or discharge  ENMT:  No difficulty hearing, tinnitus, vertigo; No sinus or throat pain  NECK: No pain or stiffness  BREASTS: No pain, masses, or nipple discharge  RESPIRATORY: No cough, wheezing, chills or hemoptysis; No shortness of breath  CARDIOVASCULAR: No chest pain, palpitations, dizziness, or leg swelling  GASTROINTESTINAL: No abdominal or epigastric pain. No nausea, vomiting, or hematemesis; No diarrhea or constipation. No melena or hematochezia.  GENITOURINARY: No dysuria, frequency, hematuria, or incontinence  NEUROLOGICAL: No headaches, memory loss, loss of strength, numbness, or tremors  SKIN: No itching, burning, rashes, or lesions   LYMPH NODES: No enlarged glands  ENDOCRINE: No heat or cold intolerance; No hair loss  MUSCULOSKELETAL: No joint pain or swelling; No muscle, back, or extremity pain  PSYCHIATRIC: No depression, anxiety, mood swings, or difficulty sleeping  HEME/LYMPH: No easy bruising, or bleeding gums  ALLERGY AND IMMUNOLOGIC: No hives or eczema    MEDICATIONS  (STANDING):  albumin human 25% IVPB 50 milliLiter(s) IV Intermittent every 6 hours  ascorbic acid 500 milliGRAM(s) Oral daily  aspirin  chewable 81 milliGRAM(s) Enteral Tube daily  cholecalciferol 1000 Unit(s) Oral daily  dextrose 40% Gel 15 Gram(s) Oral once  dextrose 5% + lactated ringers. 1000 milliLiter(s) (80 mL/Hr) IV Continuous <Continuous>  dextrose 5%. 1000 milliLiter(s) (100 mL/Hr) IV Continuous <Continuous>  dextrose 5%. 1000 milliLiter(s) (50 mL/Hr) IV Continuous <Continuous>  dextrose 50% Injectable 25 Gram(s) IV Push once  dextrose 50% Injectable 12.5 Gram(s) IV Push once  dextrose 50% Injectable 25 Gram(s) IV Push once  DULoxetine 30 milliGRAM(s) Oral daily  FLUoxetine 20 milliGRAM(s) Oral daily  gabapentin 200 milliGRAM(s) Oral three times a day  glucagon  Injectable 1 milliGRAM(s) IntraMuscular once  heparin   Injectable 5000 Unit(s) SubCutaneous every 12 hours  methadone    Tablet 30 milliGRAM(s) Oral every 12 hours  methocarbamol 250 milliGRAM(s) Oral every 8 hours  midodrine. 20 milliGRAM(s) Oral three times a day  pantoprazole  Injectable 40 milliGRAM(s) IV Push daily  piperacillin/tazobactam IVPB.. 3.375 Gram(s) IV Intermittent every 8 hours  tiotropium 18 MICROgram(s) Capsule 1 Capsule(s) Inhalation daily    MEDICATIONS  (PRN):  ALBUTerol    90 MICROgram(s) HFA Inhaler 2 Puff(s) Inhalation every 6 hours PRN Shortness of Breath and/or Wheezing      ALLERGIES: Levaquin (Unknown)      FAMILY HISTORY:      PHYSICAL EXAMINATION:  -----------------------------  T(C): 36.3 (12-03-20 @ 07:38), Max: 36.4 (12-02-20 @ 23:59)  HR: 63 (12-03-20 @ 08:00) (51 - 69)  BP: 111/68 (12-03-20 @ 08:00) (111/68 - 151/72)  RR: 21 (12-03-20 @ 08:00) (11 - 22)  SpO2: 100% (12-03-20 @ 08:00) (84% - 100%)  Wt(kg): --    12-02 @ 07:01  -  12-03 @ 07:00  --------------------------------------------------------  IN:    Albumin 25%  -  50 mL: 200 mL    dextrose 5% + lactated ringers: 1920 mL    Enteral Tube Flush: 2160 mL    IV PiggyBack: 300 mL    Norepinephrine: 29.7 mL    Oral Fluid: 670 mL  Total IN: 5279.7 mL    OUT:    Voided (mL): 1550 mL  Total OUT: 1550 mL    Total NET: 3729.7 mL      12-03 @ 07:01  -  12-03 @ 08:53  --------------------------------------------------------  IN:    dextrose 5% + lactated ringers: 80 mL  Total IN: 80 mL    OUT:  Total OUT: 0 mL    Total NET: 80 mL            VITALS  T(C): 36.3 (12-03-20 @ 07:38), Max: 36.4 (12-02-20 @ 23:59)  HR: 63 (12-03-20 @ 08:00) (51 - 69)  BP: 111/68 (12-03-20 @ 08:00) (111/68 - 151/72)  RR: 21 (12-03-20 @ 08:00) (11 - 22)  SpO2: 100% (12-03-20 @ 08:00) (84% - 100%)    Constitutional: well developed, normal appearance, well groomed, well nourished, no deformities and no acute distress.   Eyes: the conjunctiva exhibited no abnormalities and the eyelids demonstrated no xanthelasmas.   HEENT: normal oral mucosa, no oral pallor and no oral cyanosis.   Neck: normal jugular venous A waves present, normal jugular venous V waves present and no jugular venous morales A waves.   Pulmonary: no respiratory distress, normal respiratory rhythm and effort, no accessory muscle use and lungs were clear to auscultation bilaterally.   Cardiovascular: heart rate and rhythm were normal, normal S1 and S2 and no murmur, gallop, rub, heave or thrill are present.   Abdomen: soft, non-tender, no hepato-splenomegaly and no abdominal mass palpated.   Musculoskeletal: the gait could not be assessed..   Extremities: no clubbing of the fingernails, no localized cyanosis, no petechial hemorrhages and no ischemic changes.   Skin: normal skin color and pigmentation, no rash, no venous stasis, no skin lesions, no skin ulcer and no xanthoma was observed.   Psychiatric: oriented to person, place, and time, the affect was normal, the mood was normal and not feeling anxious.     LABS:   --------  12-03    139  |  100  |  3<L>  ----------------------------<  97  3.4<L>   |  34<H>  |  0.29<L>    Ca    8.2<L>      03 Dec 2020 05:37  Phos  3.1     12-03  Mg     1.8     12-03    TPro  5.7<L>  /  Alb  2.2<L>  /  TBili  0.3  /  DBili  x   /  AST  9<L>  /  ALT  11<L>  /  AlkPhos  104  12-03                         8.0    7.80  )-----------( 494      ( 03 Dec 2020 05:37 )             27.5                 RADIOLOGY:  -----------------    ECG:     ECHO:

## 2020-12-03 NOTE — PROGRESS NOTE ADULT - PROBLEM SELECTOR PROBLEM 1
Abdominal pain, unspecified abdominal location
Abdominal pain, unspecified abdominal location
Gastrostomy complication
Sepsis

## 2020-12-03 NOTE — PROGRESS NOTE ADULT - PROBLEM SELECTOR PLAN 1
small foci of air noted on MRI  ct showing localized anastomotic leak at rectosigmoid surgical anastomosis  f/u surgery recs  npo/ivf  cont abx  ppi ppx  prn pain control  monitor cbc daily  to further discuss plan w sx; continue icu care

## 2020-12-03 NOTE — PROGRESS NOTE ADULT - TREATMENT GUIDELINES
DNI/No blood draws/No antibiotics/DNR Order/Do not re-hospitalize/No artificial nutrition/No IV fluids/Comfort measures only

## 2020-12-03 NOTE — PROGRESS NOTE ADULT - NSHPATTENDINGPLANDISCUSS_GEN_ALL_CORE
icu
icu
daughter by phone
MED STAFF ,  , , 
MED STAFF ,  , ,  ,ICU TEAM .FAMILY CONSIDERS PALLIATIVE MEASURES AND REQUESTNG  HOSPICE EVALUATION .
MED STAFF ,  , ,DR.SHALSHIN INIGUEZ
MED STAFF ,  , ,DR.SHALSHIN INIGUEZ

## 2020-12-03 NOTE — HOSPICE CARE NOTE - HOSPICE APPROVAL ADDITIONAL DETAILS
Pt approved  for in patient hospice care by a doc to doc assessment  between Dr NAFISA Denny and Edmundo Lyn NP

## 2020-12-03 NOTE — PROGRESS NOTE ADULT - NUTRITIONAL ASSESSMENT
This patient has been assessed with a concern for Malnutrition and has been determined to have a diagnosis/diagnoses of Severe protein-calorie malnutrition.      
This patient has been assessed with a concern for Malnutrition and has been determined to have a diagnosis/diagnoses of Severe protein-calorie malnutrition.      
This patient has been assessed with a concern for Malnutrition and has been determined to have a diagnosis/diagnoses of Severe protein-calorie malnutrition.    This patient is being managed with:   Diet Clear Liquid-  Entered: Dec  2 2020  3:04PM    
This patient has been assessed with a concern for Malnutrition and has been determined to have a diagnosis/diagnoses of Severe protein-calorie malnutrition.    This patient is being managed with:   Diet Clear Liquid-  Entered: Dec  2 2020  3:04PM    
This patient has been assessed with a concern for Malnutrition and has been determined to have a diagnosis/diagnoses of Severe protein-calorie malnutrition.    This patient is being managed with:   Diet NPO with Tube Feed-  Tube Feeding Modality: Jejunostomy  Vital 1.5 Hernan  Total Volume for 24 Hours (mL): 1000  Continuous  Starting Tube Feed Rate {mL per Hour}: 20  Increase Tube Feed Rate by (mL): 10     Every 8 hours  Until Goal Tube Feed Rate (mL per Hour): 50  Tube Feed Duration (in Hours): 20  Tube Feed Start Time: 06:00  Entered: Nov 29 2020  9:14AM    
This patient has been assessed with a concern for Malnutrition and has been determined to have a diagnosis/diagnoses of Severe protein-calorie malnutrition.    This patient is being managed with:   Diet NPO-  Entered: Nov 29 2020  9:49AM    
This patient has been assessed with a concern for Malnutrition and has been determined to have a diagnosis/diagnoses of Severe protein-calorie malnutrition.    This patient is being managed with:   Diet NPO-  Except Medications  Entered: Nov 30 2020 10:59AM    
This patient has been assessed with a concern for Malnutrition and has been determined to have a diagnosis/diagnoses of Severe protein-calorie malnutrition.      
This patient has been assessed with a concern for Malnutrition and has been determined to have a diagnosis/diagnoses of Severe protein-calorie malnutrition.    This patient is being managed with:   Diet Clear Liquid-  Entered: Dec  2 2020  3:04PM    
This patient has been assessed with a concern for Malnutrition and has been determined to have a diagnosis/diagnoses of Severe protein-calorie malnutrition.    This patient is being managed with:   Diet Clear Liquid-  Entered: Dec  2 2020  3:04PM    
This patient has been assessed with a concern for Malnutrition and has been determined to have a diagnosis/diagnoses of Severe protein-calorie malnutrition.    This patient is being managed with:   Diet NPO-  Entered: Nov 29 2020  9:49AM    
This patient has been assessed with a concern for Malnutrition and has been determined to have a diagnosis/diagnoses of Severe protein-calorie malnutrition.    This patient is being managed with:   Diet NPO-  Except Medications  Entered: Nov 30 2020 10:59AM    
This patient has been assessed with a concern for Malnutrition and has been determined to have a diagnosis/diagnoses of Severe protein-calorie malnutrition.    This patient is being managed with:   Diet Clear Liquid-  Entered: Dec  2 2020  3:04PM    
This patient has been assessed with a concern for Malnutrition and has been determined to have a diagnosis/diagnoses of Severe protein-calorie malnutrition.    This patient is being managed with:   Diet NPO-  Entered: Nov 29 2020  9:49AM    
This patient has been assessed with a concern for Malnutrition and has been determined to have a diagnosis/diagnoses of Severe protein-calorie malnutrition.    This patient is being managed with:   Diet NPO-  Except Medications  Entered: Nov 30 2020 10:59AM    
This patient has been assessed with a concern for Malnutrition and has been determined to have a diagnosis/diagnoses of Severe protein-calorie malnutrition.    This patient is being managed with:   Diet Clear Liquid-  Entered: Dec  2 2020  3:04PM    
This patient has been assessed with a concern for Malnutrition and has been determined to have a diagnosis/diagnoses of Severe protein-calorie malnutrition.    This patient is being managed with:   Diet Clear Liquid-  Entered: Dec  2 2020  3:04PM    
This patient has been assessed with a concern for Malnutrition and has been determined to have a diagnosis/diagnoses of Severe protein-calorie malnutrition.    This patient is being managed with:   Diet NPO-  Entered: Nov 29 2020  9:49AM    
This patient has been assessed with a concern for Malnutrition and has been determined to have a diagnosis/diagnoses of Severe protein-calorie malnutrition.    This patient is being managed with:   Diet NPO-  Except Medications  Entered: Nov 30 2020 10:59AM    
This patient has been assessed with a concern for Malnutrition and has been determined to have a diagnosis/diagnoses of Severe protein-calorie malnutrition.    This patient is being managed with:   Diet Clear Liquid-  Entered: Dec  2 2020  3:04PM    
This patient has been assessed with a concern for Malnutrition and has been determined to have a diagnosis/diagnoses of Severe protein-calorie malnutrition.    This patient is being managed with:   Diet NPO-  Entered: Dec  3 2020 12:46PM    
This patient has been assessed with a concern for Malnutrition and has been determined to have a diagnosis/diagnoses of Severe protein-calorie malnutrition.    This patient is being managed with:   Diet NPO-  Except Medications  Entered: Nov 30 2020 10:59AM    
This patient has been assessed with a concern for Malnutrition and has been determined to have a diagnosis/diagnoses of Severe protein-calorie malnutrition.    This patient is being managed with:   Diet NPO-  Except Medications  Entered: Nov 30 2020 10:59AM

## 2020-12-03 NOTE — PROGRESS NOTE ADULT - PROBLEM SELECTOR PROBLEM 2
Abdominal pain, unspecified abdominal location
Gastrostomy complication
Gastrostomy complication
Abscess of hip

## 2020-12-03 NOTE — DISCHARGE NOTE PROVIDER - NSDCMRMEDTOKEN_GEN_ALL_CORE_FT
acetaminophen 325 mg oral tablet: 2 tab(s) by jejunostomy tube every 6 hours, As Needed  albuterol 0.63 mg/3 mL (0.021%) inhalation solution: 3 milliliter(s) inhaled every 6 hours  aspirin 81 mg oral tablet: 1 tab(s) by jejunostomy tube once a day  cholecalciferol 1000 intl units (25 mcg) oral tablet: 1 tab(s) by jejunostomy tube once a day  dilTIAZem 30 mg oral tablet: 1 tab(s) by jejunostomy tube every 8 hours  DULoxetine 30 mg oral delayed release capsule: 1 cap(s) by jejunostomy tube once a day  FLUoxetine 20 mg oral capsule: 1 cap(s) by jejunostomy tube once a day  gabapentin 600 mg oral tablet: 1 tab(s) orally 3 times a day  heparin 5000 units/mL injectable solution: 1 milliliter(s) injectable every 8 hours  ipratropium 500 mcg/2.5 mL inhalation solution: 2.5 milliliter(s) inhaled every 6 hours  lactulose 10 g/15 mL oral syrup: 30 milliliter(s) by jejunostomy tube 2 times a day  methadone: 60 milligram(s) by jejunostomy tube once a day  methocarbamol 500 mg oral tablet: 0.5 tab(s) by jejunostomy tube every 8 hours for 10 days  Milk of Magnesia 8% oral suspension: 30 milliliter(s) by jejunostomy tube once a day (at bedtime), As Needed  montelukast 10 mg oral tablet: 1 tab(s) by jejunostomy tube once a day (at bedtime)  pantoprazole 40 mg oral granule, delayed release: 1 each by jejunostomy tube once a day  Senna 8.6 mg oral tablet: 2 tab(s) by jejunostomy tube once a day (at bedtime)  Therapeutic Multiple Vitamins oral liquid: 15 milliliter(s) by jejunostomy tube once a day  traZODone 100 mg oral tablet: 1 tab(s) by jejunostomy tube once a day (at bedtime)  Vitamin C 500 mg oral tablet: 1 tab(s) by jejunostomy tube once a day   methadone 10 mg oral tablet: 3 tab(s) orally every 12 hours  piperacillin-tazobactam: 3.375 gram(s) intravenous every 8 hours   methadone 10 mg oral tablet: 3 tab(s) orally every 12 hours

## 2020-12-03 NOTE — PROGRESS NOTE ADULT - ASSESSMENT
64 y/o F w/ pmh of recovered alcoholic, former smoker, copd, RA, chronic low back pain, hx of colon ca s/p sigmoid resection (2015), hx of open R. inguinal hernia repair w/ mesh (2010), hx of hiatal hernia repair (2019), zenker diverticulum surgery c/b vocal cord paralysis's requiring peg for feeding, s/p peg (replaced peg 3 weeks ago [facility]) transfer from [facility] to [facility] earlier today for leaking PEJ tube and on/off lower abd pain over the last 2-3 days. In the ED pt underwent an CT of abd/pelvis was found to have a Abscess around the L. hip ill-defined multi septated fluid collection containing foci of air measuring appx 3.8 x 3.2 x3.9cm concerning for abscess, WBC of 20, normal LA. MICU consulted for hypotension after 3L of IVF in the setting of sepsis from L. hip abscess.    Neuro:   Pt more awake this morning, requesting more methadone.  Methadone dose was reduced. Trazadone reduced. Gabapentin reduced. Will supplement medication for adequate pain control.   Home meds: Duloxetine Po, Fluoxetine PO, Trazadone. Chronic pain will continue with methadone/methocarbamol (back pain). Gabapentin.     Cardiac:   Hypotension possible from sepsis, continue Midodrine 20q8h for BP Support. Continue Levophed for hypotension.   CAD- continue with asa    Resp:   No acute issues, maintain o2 >92%. nasal cannula. No home O2. Continue with montelukast, albuterol and tiotropium inhaler.     GI:  Clear liquid diet, GI ppx w/ protonix, constipation bowel regimen w/ lactulose, mag and senna, Intra abdominal inflammatory changes.   CT Abd w/ PO/rectal contrast showing rectosigmoid anastomotic leak. Gen Sx stating pt is physiologically not a surgical candidate at this time. Pt will discuss options with family.   Per GI: Will order Relistor, Muviprep to clear fecal load and repeat CT to better visualize anastomotic leak.     Renal:   Renal function stable, lytes stable, cont to monitor and trend and replete prn    ID:   L. piriformis abscess- continue with clindamycin and Daptomycin. Zosyn, vanco changed to daptomycin. 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 Follow up repeat BCx. MRI hip shows no evidence of septic arthritis, no intervention at this time as per ortho. Edema present at S1-S3 neural foramina,  MRI C/T/L spine demonstrating possible phlegmon/abscess in the sacral region of the spine. Ortho consult pending.   S/p IR drainage. Culture of fluid NGTD.     Endo:   no acute issues, monitor FS q6h given NPO status    Heme:   Started on heparin 5000U BID as per ortho, A81    Msk:   Chronic low back pain on methadone 60mg daily checked HCS data-base and confirmed pt reports being on methadone for appx 15 years) along with gabapentin and Robaxin. Methadone dose reduced. 66 y/o F w/ pmh of recovered alcoholic, former smoker, copd, RA, chronic low back pain, hx of colon ca s/p sigmoid resection (2015), hx of open R. 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) transfer from [facility] to [facility] earlier today for leaking PEJ tube and on/off lower abd pain over the last 2-3 days. In the ED pt underwent an CT of abd/pelvis was found to have a Abscess around the L. hip ill-defined multi septated fluid collection containing foci of air measuring appx 3.8 x 3.2 x3.9cm concerning for abscess, WBC of 20, normal LA. MICU consulted for hypotension after 3L of IVF in the setting of sepsis from L. hip abscess.    Neuro:   Pt more awake this morning, requesting more methadone.  Methadone dose was reduced. Trazadone reduced. Gabapentin reduced. Will supplement medication for adequate pain control.   Home meds: Duloxetine Po, Fluoxetine PO, Trazadone. Chronic pain will continue with methadone/methocarbamol (back pain). Gabapentin.     Cardiac:   Hypotension possible from sepsis, continue Midodrine 20q8h for BP Support. Continue Levophed for hypotension.   CAD- continue with asa    Resp:   No acute issues, maintain o2 >92%. nasal cannula. No home O2. Continue with montelukast, albuterol and tiotropium inhaler.     GI:  Clear liquid diet, GI ppx w/ protonix, constipation bowel regimen w/ lactulose, mag and senna, Intra abdominal inflammatory changes.   CT Abd w/ PO/rectal contrast showing rectosigmoid anastomotic leak. Gen Sx stating pt is physiologically not a surgical candidate at this time. Pt will discuss options with family.   Per GI: Will order Relistor, Muviprep to clear fecal load and repeat CT to better visualize anastomotic leak.     Renal:   Renal function stable, lytes stable, cont to monitor and trend and replete prn    ID:   L. piriformis abscess- continue with Zosyn.   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 Rpt BCx Neg.   MRI hip shows no evidence of septic arthritis, no intervention at this time as per ortho. Edema present at S1-S3 neural foramina,  MRI C/T/L spine demonstrating possible phlegmon/abscess in the sacral region of the spine. Ortho consult apprec.   S/p IR drainage. Culture of fluid NG.     Endo:   no acute issues, monitor FS q6h given NPO status    Heme:   Started on heparin 5000U BID as per ortho, A81    Msk:   Chronic low back pain on methadone 60mg daily checked HCS data-base and confirmed pt reports being on methadone for appx 15 years) along with gabapentin and Robaxin. Methadone dose reduced.     Dispo: 64 y/o F w/ pmh of recovered alcoholic, former smoker, copd, RA, chronic low back pain, hx of colon ca s/p sigmoid resection (2015), hx of open R. inguinal hernia repair w/ mesh (2010), hx of hiatal hernia repair (2019), zenker diverticulum surgery c/b vocal cord paralysis's requiring peg for feeding, s/p peg (replaced peg 3 weeks ago [facility]) transfer from [facility] to [facility] earlier today for leaking PEJ tube and on/off lower abd pain over the last 2-3 days. In the ED pt underwent an CT of abd/pelvis was found to have a Abscess around the L. hip ill-defined multi septated fluid collection containing foci of air measuring appx 3.8 x 3.2 x3.9cm concerning for abscess, WBC of 20, normal LA. MICU consulted for hypotension after 3L of IVF in the setting of sepsis from L. hip abscess.    Neuro:   Pt more alert this morning before meds, on recheck this afternoon she was more sedated. Trazadone reduced. Gabapentin reduced. Will supplement medication for adequate pain control.   Home meds: Duloxetine Po, Fluoxetine PO, Trazadone. Chronic pain will continue with methadone/methocarbamol (back pain). Gabapentin.     Cardiac:   Hypotension 2/2 from sepsis, continue Midodrine 20q8h for BP Support. Levophed discontinued.   CAD- continue with asa    Resp:   No acute issues, maintain o2 >92%. nasal cannula. No home O2. Continue with montelukast, albuterol and tiotropium inhaler.     GI:  Pt did not tolerate clear liquid diet, required suction after aspirating contents.   GI ppx w/ protonix, constipation bowel regimen w/ lactulose, mag and senna, Intra abdominal inflammatory changes.   CT Abd w/ PO/rectal contrast showing rectosigmoid anastomotic leak. Gen Sx stating pt is physiologically not a surgical candidate at this time.   FU repeat CT abd/pelvis.   Per GI: Will order Relistor, Muviprep to clear fecal load and repeat CT to better visualize anastomotic leak.     Renal:   Renal function stable, lytes stable, cont to monitor and trend and replete prn    ID:   L. piriformis abscess- continue with Zosyn.   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 Rpt BCx Neg.   MRI hip shows no evidence of septic arthritis, no intervention at this time as per ortho. Edema present at S1-S3 neural foramina,  MRI C/T/L spine demonstrating possible phlegmon/abscess in the sacral region of the spine. Ortho recs apprec.   S/p IR drainage of L gluteal abscess. Culture of fluid NG.     Endo:   no acute issues, monitor FS q6h given NPO status    Heme:   Started on heparin 5000U BID as per ortho, A81    Msk:   Chronic low back pain on methadone 60mg daily checked HCS data-base and confirmed pt reports being on methadone for appx 15 years) along with gabapentin and Robaxin. Methadone dose reduced.     Dispo: FU CT abdomen/pelvis, evaluate further consultant recs and speak with family about options.    Discussed with daughter Missy status and plan of care this afternoon 12/3

## 2020-12-03 NOTE — PROGRESS NOTE ADULT - ASSESSMENT
64 y/o F w/ pmh of recovered alcoholic, former smoker, copd, RA, chronic low back pain, hx of colon ca s/p sigmoid resection (2015), hx of open R. 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) transfer from [facility] to [facility] earlier today for leaking PEJ tube and on/off lower abd pain over the last 2-3 days. In the ED pt underwent an CT of abd/pelvis was found to have a Abscess around the L. hip ill-defined multi septated fluid collection containing foci of air measuring appx 3.8 x 3.2 x3.9cm concerning for abscess, WBC of 20, normal LA. MICU consulted for hypotension after 3L of IVF in the setting of sepsis from L. hip abscess.    1) Chronic intractable pain  methadone 30 mg via PEG q12h  continue cymbalta   continue gabapentin  dilaudid prn    2) Severe protein calorie malnutrition with cachexia & dysphagia s/p PEG, weight loss  - comfort feeding as tolerated    3) Failure to thrive, functional decline  4) Goals of care/advance care planning  - Palliative performance scale score: 10% (poor)  - Prognosis: poor (hours-days)  - Code status: DNR/DNI  - GOC: comfort measures only. D/c to inpatient hospice.   - HCP: matilde Louis  - Psychosocial support provided    5) Constipation ppx 2/2 opioid use  - senna and miralax.    Discussed with the primary team.    Sp Bueno MD

## 2020-12-03 NOTE — PROGRESS NOTE ADULT - SUBJECTIVE AND OBJECTIVE BOX
INTERVAL HPI/OVERNIGHT EVENTS:  pt seen and examined earlier this am  ++ bm    MEDICATIONS  (STANDING):  albumin human 25% IVPB 50 milliLiter(s) IV Intermittent every 6 hours  ascorbic acid 500 milliGRAM(s) Oral daily  aspirin  chewable 81 milliGRAM(s) Enteral Tube daily  cholecalciferol 1000 Unit(s) Oral daily  dextrose 40% Gel 15 Gram(s) Oral once  dextrose 5% + lactated ringers. 1000 milliLiter(s) (80 mL/Hr) IV Continuous <Continuous>  dextrose 5%. 1000 milliLiter(s) (100 mL/Hr) IV Continuous <Continuous>  dextrose 5%. 1000 milliLiter(s) (50 mL/Hr) IV Continuous <Continuous>  dextrose 50% Injectable 25 Gram(s) IV Push once  dextrose 50% Injectable 12.5 Gram(s) IV Push once  dextrose 50% Injectable 25 Gram(s) IV Push once  DULoxetine 30 milliGRAM(s) Oral daily  FLUoxetine 20 milliGRAM(s) Oral daily  gabapentin 200 milliGRAM(s) Oral three times a day  glucagon  Injectable 1 milliGRAM(s) IntraMuscular once  heparin   Injectable 5000 Unit(s) SubCutaneous every 12 hours  methadone    Tablet 10 milliGRAM(s) Oral daily  methocarbamol 250 milliGRAM(s) Oral every 8 hours  midodrine. 15 milliGRAM(s) Oral three times a day  norepinephrine Infusion 0.05 MICROgram(s)/kG/Min (4.16 mL/Hr) IV Continuous <Continuous>  pantoprazole  Injectable 40 milliGRAM(s) IV Push daily  piperacillin/tazobactam IVPB.. 3.375 Gram(s) IV Intermittent every 8 hours  tiotropium 18 MICROgram(s) Capsule 1 Capsule(s) Inhalation daily    MEDICATIONS  (PRN):  ALBUTerol    90 MICROgram(s) HFA Inhaler 2 Puff(s) Inhalation every 6 hours PRN Shortness of Breath and/or Wheezing      Allergies    Levaquin (Unknown)    Intolerances        Review of Systems:  see above      Vital Signs Last 24 Hrs  T(C): 36.4 (02 Dec 2020 08:08), Max: 36.4 (01 Dec 2020 12:31)  T(F): 97.5 (02 Dec 2020 08:08), Max: 97.6 (02 Dec 2020 00:33)  HR: 63 (02 Dec 2020 11:15) (52 - 67)  BP: 127/71 (02 Dec 2020 11:15) (80/50 - 145/71)  BP(mean): 94 (02 Dec 2020 11:15) (61 - 102)  RR: 22 (02 Dec 2020 11:15) (8 - 23)  SpO2: 84% (02 Dec 2020 11:15) (84% - 100%)    PHYSICAL EXAM:    Constitutional: lying in bed  HEENT: ncat  Gastrointestinal: soft mild generalized ttp mild dt +gj tube w surrounding erythema no active leaking appreciated dressing dry and intact   Extremities: No edema  Vascular: + peripheral pulses  Neurological: awake alert    LABS:                        7.5    9.01  )-----------( 386      ( 02 Dec 2020 06:32 )             25.5     12-02    137  |  101  |  5<L>  ----------------------------<  98  4.0   |  30  |  0.25<L>    Ca    7.5<L>      02 Dec 2020 06:32  Phos  3.5     12-02  Mg     1.8     12-02    TPro  5.3<L>  /  Alb  1.7<L>  /  TBili  0.2  /  DBili  x   /  AST  19  /  ALT  7<L>  /  AlkPhos  94  12-02    PT/INR - ( 01 Dec 2020 08:47 )   PT: 14.5 sec;   INR: 1.25 ratio         PTT - ( 01 Dec 2020 08:47 )  PTT:33.0 sec      RADIOLOGY & ADDITIONAL TESTS:  < from: CT Abdomen and Pelvis w/ Oral Cont (12.01.20 @ 14:01) >    EXAM:  CT ABDOMEN AND PELVIS OC                            *** ADDENDUM 12/01/2020  ***    Finding communicated to ICU physician assistant Donte at the time of interpretation on 12/1/2020.    *** END OF ADDENDUM 12/01/2020  ***      PROCEDURE DATE:  12/01/2020          INTERPRETATION:  CLINICAL INFORMATION: Abdominal pain. Rectosigmoid anastomosis, evaluate for leak.    COMPARISON: CT scan abdomen pelvis 11/27/2020.    PROCEDURE:  CT of the Abdomen and Pelvis was performed without intravenous contrast.  Intravenous contrast: None.  Oral contrast: None.  Rectal contrast was administered.  Sagittal and coronal reformats were performed.    FINDINGS:    LOWER CHEST: Not entirely included on this exam. Small left pleural effusion atelectasis.    Streak artifact related to patient's arms degrades image quality.    The evaluation of the solid organ parenchyma is limited without intravenous contrast.    LIVER: Not entirely included on this exam.  BILE DUCTS: Mild prominence of the common bile duct measuring up to 9 mm.  GALLBLADDER: Prior cholecystectomy.  SPLEEN: Within normal limits.  PANCREAS: Within normal limits.  ADRENALS: Within normal limits.  KIDNEYS/URETERS: Within normal limits.    BLADDER: Within normal limits.  REPRODUCTIVE ORGANS: No pelvic mass noted.    BOWEL:  PERITONEUM: Rectosigmoid surgical anastomosis.  There is minimal localized extraluminal extravasation of contrast at the posterior aspect of the rectosigmoid anastomosis. There are adjacent small bubbles of extraluminal air.  There is soft tissue density tracking along the bilateral presacral soft tissues.  No localized intra-abdominal fluid collection or pneumoperitoneum within the greater peritoneal cavity is noted.    There is fecal retention with generalizeddistention of the colon.    There is a percutaneous gastrojejunostomy tube.    VESSELS: Atherosclerotic calcification.  RETROPERITONEUM/LYMPH NODES: No lymphadenopathy.  ABDOMINAL WALL: Soft tissue fullness is present in the periarticular soft tissues of the left hip, with small bubbles of air at the level of the left greater sciatic notch.    BONES: Degenerative changes spine with scoliosis    IMPRESSION:    Localized anastomotic leak at the rectosigmoid surgical anastomosis.  There is diffuse soft tissues prominence in the presacral soft tissues, without discrete fluid collection noted.    See separate report from MRI of the hip and pelvis from 11/28/2020.      ***Please see the addendum at the top of this report. It may contain additional important information or changes.****        VINI WILLOUGHBY M.D., ATTENDING RADIOLOGIST  This document has been electronically signed. Dec  1 2020  3:41PM  Addend:VINI WILLOUGHBY M.D., ATTENDING RADIOLOGIST  This addendum was electronically signed on: Dec  1 2020  3:55PM.    < end of copied text >

## 2020-12-03 NOTE — PROGRESS NOTE ADULT - SUBJECTIVE AND OBJECTIVE BOX
SUBJECTIVE AND OBJECTIVE:  INTERVAL HPI/OVERNIGHT EVENTS:    DNR on chart:   Allergies    Levaquin (Unknown)    Intolerances    MEDICATIONS  (STANDING):  ascorbic acid 500 milliGRAM(s) Oral daily  aspirin  chewable 81 milliGRAM(s) Enteral Tube daily  cholecalciferol 1000 Unit(s) Oral daily  dextrose 40% Gel 15 Gram(s) Oral once  dextrose 5% + lactated ringers. 1000 milliLiter(s) (80 mL/Hr) IV Continuous <Continuous>  dextrose 5%. 1000 milliLiter(s) (100 mL/Hr) IV Continuous <Continuous>  dextrose 5%. 1000 milliLiter(s) (50 mL/Hr) IV Continuous <Continuous>  dextrose 50% Injectable 25 Gram(s) IV Push once  dextrose 50% Injectable 12.5 Gram(s) IV Push once  dextrose 50% Injectable 25 Gram(s) IV Push once  DULoxetine 30 milliGRAM(s) Oral daily  FLUoxetine 20 milliGRAM(s) Oral daily  gabapentin 200 milliGRAM(s) Oral three times a day  glucagon  Injectable 1 milliGRAM(s) IntraMuscular once  heparin   Injectable 5000 Unit(s) SubCutaneous every 12 hours  methadone    Tablet 30 milliGRAM(s) Oral every 12 hours  methocarbamol 250 milliGRAM(s) Oral every 8 hours  midodrine. 20 milliGRAM(s) Oral three times a day  pantoprazole  Injectable 40 milliGRAM(s) IV Push daily  piperacillin/tazobactam IVPB.. 3.375 Gram(s) IV Intermittent every 8 hours  tiotropium 18 MICROgram(s) Capsule 1 Capsule(s) Inhalation daily    MEDICATIONS  (PRN):  ALBUTerol    90 MICROgram(s) HFA Inhaler 2 Puff(s) Inhalation every 6 hours PRN Shortness of Breath and/or Wheezing      ITEMS UNCHECKED ARE NOT PRESENT    PRESENT SYMPTOMS: [ ]Unable to obtain due to poor mentation   Source if other than patient:  [ ]Family   [ ]Team     Pain:  [ ]yes [ ]no  QOL impact -   Location -                    Aggravating factors -  Quality -  Radiation -  Timing-  Severity (0-10 scale):  Minimal acceptable level (0-10 scale):     Dyspnea:                           [ ]Mild [ ]Moderate [ ]Severe  Anxiety:                             [ ]Mild [ ]Moderate [ ]Severe  Fatigue:                             [ ]Mild [ ]Moderate [ ]Severe  Nausea:                             [ ]Mild [ ]Moderate [ ]Severe  Loss of appetite:              [ ]Mild [ ]Moderate [ ]Severe  Constipation:                    [ ]Mild [ ]Moderate [ ]Severe    CPOT:    https://www.[facility].org/getattachment/[email or web address]/Critical-Care-Pain-Observation-Tool-(CPOT)    PAIN AD Score:	  http://geriatrictoolkit.[email or web address]/cog/painad.pdf (Ctrl + left click to view)    Other Symptoms:  [ ]All other review of systems negative     Palliative Performance Status Version 2:         %      http://[email or web address].org/files/news/palliative_performance_scale_ppsv2.pdf  PHYSICAL EXAM:  Vital Signs Last 24 Hrs  T(C): 36.3 (03 Dec 2020 07:38), Max: 36.4 (02 Dec 2020 23:59)  T(F): 97.3 (03 Dec 2020 07:38), Max: 97.6 (02 Dec 2020 23:59)  HR: 64 (03 Dec 2020 09:00) (51 - 69)  BP: 148/67 (03 Dec 2020 09:00) (111/68 - 151/72)  BP(mean): 97 (03 Dec 2020 09:00) (84 - 104)  RR: 21 (03 Dec 2020 09:00) (11 - 22)  SpO2: 99% (03 Dec 2020 09:00) (94% - 100%) I&O's Summary    02 Dec 2020 07:01  -  03 Dec 2020 07:00  --------------------------------------------------------  IN: 5279.7 mL / OUT: 1550 mL / NET: 3729.7 mL    03 Dec 2020 07:01  -  03 Dec 2020 11:49  --------------------------------------------------------  IN: 160 mL / OUT: 0 mL / NET: 160 mL    General: appears of appropriate age, lying in bed, cachectic, NAD  HEENT: NCAT, anicteric sclerae, EOMI, PERRL, moist mucous membranes  Neck: Supple, nontender, no mass  Neurology: A&Ox3, cr. ns. grossly intact, nonfocal, sensation intact   Respiratory: CTA B/L, No W/R/R  CV: RRR, +S1/S2, no murmurs, rubs or gallops  Abdominal: Soft, NT, ND +BS, no palpable masses  Extremities: No cyanosis, no edema, + peripheral pulses  MSK: no joint erythema or warmth, no joint swelling   Heme: No obvious ecchymosis or petechiae   Skin: warm, dry, normal color  Psych: no agitation, normal affect    LABS:                        8.0    7.80  )-----------( 494      ( 03 Dec 2020 05:37 )             27.5   12-03    139  |  100  |  3<L>  ----------------------------<  97  3.4<L>   |  34<H>  |  0.29<L>    Ca    8.2<L>      03 Dec 2020 05:37  Phos  3.1     12-03  Mg     1.8     12-03    TPro  5.7<L>  /  Alb  2.2<L>  /  TBili  0.3  /  DBili  x   /  AST  9<L>  /  ALT  11<L>  /  AlkPhos  104  12-03        RADIOLOGY & ADDITIONAL STUDIES: reviewed    Protein Calorie Malnutrition Present: [ ]mild [ ]moderate [x ]severe [ ]underweight [ ]morbid obesity  https://www.andeal.org/vault/8440/web/files/ONC/Table_Clinical%20Characteristics%20to%20Document%20Malnutrition-White%20JV%20et%20al%875898.pdf    Height (cm): 152.4 (11-27-20 @ 20:43)  Weight (kg): 44.4 (11-27-20 @ 20:43)  BMI (kg/m2): 19.1 (11-27-20 @ 20:43)    [ ]PPSV2 < or = 30%  [ ]significant weight loss [x ]poor nutritional intake [ ]anasarca   Albumin, Serum: 2.2 g/dL (12-03-20 @ 05:37)   [ ]Artificial Nutrition    REFERRALS:   [ ]Chaplaincy  [ ]Hospice  [ ]Child Life  [ ]Social Work  [ ]Case management [ ]Holistic Therapy     Goals of Care Document:  CRISTIAN Monroe (12-02-20 @ 18:00)  Goals of Care Conversation:   Participants:  · Participants  Patient; Family  · Child(mana Thomas    Advance Directives:  · Does patient have Advance Directive  No  · Does Patient Have a Surrogate  Yes  · Surrogate's Name  Missy Thomas  · Surrogate's Phone Number  [phone]  · Caregiver:  yes  · Name  Sheeba Cardoso  · Phone Number  [facility]    Conversation Discussion:  · Conversation  Diagnosis; Prognosis; Treatment Options; Palliative Care Referral  · Conversation Details  -diagnostic findings including anastomotic leak, thoracic discitis/osteo, sacral abscess  -treatment options including abx, surgery  -diagnostic options including CT scan, colonoscopy   -poor nutrition and overall health status   -comfort care, hospice    What Matters Most To Patient and Family:  · What matters most to patient and family  treatment, comfort    Personal Advance Directives Treatment Guidelines:   Treatment Guidelines:  · Decision Maker  Surrogate    Location of Discussion:   Duration of Advanced Care Planning Meeting:  · Time spent (in minutes)  25    Location of Discussion:  · Location of discussion  Telephone      Electronic Signatures:  Maximilian Monroe)  (Signed 02-Dec-2020 18:02)  	Authored: Goals of Care Conversation, Personal Advance Directives Treatment Guidelines, Location of Discussion      Last Updated: 02-Dec-2020 18:02 by Maximilian Monroe)       SUBJECTIVE AND OBJECTIVE/INTERVAL HPI/OVERNIGHT EVENTS:  - no acute events overnight  - This AM pt noted to have aspirated while eating > mental status deteriorated and food was suctioned out. Continued to have rapid decline throughout the day with audible secretions and decreased responsiveness as well as desaturation.     DNR on chart:   Allergies    Levaquin (Unknown)    Intolerances    MEDICATIONS  (STANDING):  ascorbic acid 500 milliGRAM(s) Oral daily  aspirin  chewable 81 milliGRAM(s) Enteral Tube daily  cholecalciferol 1000 Unit(s) Oral daily  dextrose 40% Gel 15 Gram(s) Oral once  dextrose 5% + lactated ringers. 1000 milliLiter(s) (80 mL/Hr) IV Continuous <Continuous>  dextrose 5%. 1000 milliLiter(s) (100 mL/Hr) IV Continuous <Continuous>  dextrose 5%. 1000 milliLiter(s) (50 mL/Hr) IV Continuous <Continuous>  dextrose 50% Injectable 25 Gram(s) IV Push once  dextrose 50% Injectable 12.5 Gram(s) IV Push once  dextrose 50% Injectable 25 Gram(s) IV Push once  DULoxetine 30 milliGRAM(s) Oral daily  FLUoxetine 20 milliGRAM(s) Oral daily  gabapentin 200 milliGRAM(s) Oral three times a day  glucagon  Injectable 1 milliGRAM(s) IntraMuscular once  heparin   Injectable 5000 Unit(s) SubCutaneous every 12 hours  methadone    Tablet 30 milliGRAM(s) Oral every 12 hours  methocarbamol 250 milliGRAM(s) Oral every 8 hours  midodrine. 20 milliGRAM(s) Oral three times a day  pantoprazole  Injectable 40 milliGRAM(s) IV Push daily  piperacillin/tazobactam IVPB.. 3.375 Gram(s) IV Intermittent every 8 hours  tiotropium 18 MICROgram(s) Capsule 1 Capsule(s) Inhalation daily    MEDICATIONS  (PRN):  ALBUTerol    90 MICROgram(s) HFA Inhaler 2 Puff(s) Inhalation every 6 hours PRN Shortness of Breath and/or Wheezing      ITEMS UNCHECKED ARE NOT PRESENT    PRESENT SYMPTOMS: [ x]Unable to obtain due to poor mentation   Source if other than patient:  [ ]Family   [ ]Team     Pain:  [ ]yes [ ]no  QOL impact -   Location -                    Aggravating factors -  Quality -  Radiation -  Timing-  Severity (0-10 scale):  Minimal acceptable level (0-10 scale):     Dyspnea:                           [ ]Mild [ ]Moderate [ ]Severe  Anxiety:                             [ ]Mild [ ]Moderate [ ]Severe  Fatigue:                             [ ]Mild [ ]Moderate [ ]Severe  Nausea:                             [ ]Mild [ ]Moderate [ ]Severe  Loss of appetite:              [ ]Mild [ ]Moderate [ ]Severe  Constipation:                    [ ]Mild [ ]Moderate [ ]Severe    CPOT:    https://www.[facility].org/getattachment/[email or web address]/Critical-Care-Pain-Observation-Tool-(CPOT)    PAIN AD Score:	  http://geriatrictoolkit.[facility]/cog/painad.pdf (Ctrl + left click to view)    Other Symptoms:  [ ]All other review of systems negative     Palliative Performance Status Version 2:      10   %      http://[facility].org/files/news/palliative_performance_scale_[email or web address].pdf  PHYSICAL EXAM:  Vital Signs Last 24 Hrs  T(C): 36.3 (03 Dec 2020 07:38), Max: 36.4 (02 Dec 2020 23:59)  T(F): 97.3 (03 Dec 2020 07:38), Max: 97.6 (02 Dec 2020 23:59)  HR: 64 (03 Dec 2020 09:00) (51 - 69)  BP: 148/67 (03 Dec 2020 09:00) (111/68 - 151/72)  BP(mean): 97 (03 Dec 2020 09:00) (84 - 104)  RR: 21 (03 Dec 2020 09:00) (11 - 22)  SpO2: 99% (03 Dec 2020 09:00) (94% - 100%) I&O's Summary    02 Dec 2020 07:01  -  03 Dec 2020 07:00  --------------------------------------------------------  IN: 5279.7 mL / OUT: 1550 mL / NET: 3729.7 mL    03 Dec 2020 07:01  -  03 Dec 2020 11:49  --------------------------------------------------------  IN: 160 mL / OUT: 0 mL / NET: 160 mL      Gen: cachectic, audible secretions, lying in bed, minimally responsive to tactile stimuli ( at the end of day)    HEENT: PERRL, normocephalic, atraumatic  Resp: CTA b/l  CVS: +RRR  Abd: BSx4, soft, mild distention, NT, PEJ tube in place  Ext: L. hip with mild swelling and TTP along the gluteal region, no erythema  Skin: warm/dry  Neuro; not oriented, not able to follow commands,, cr ns grossly intact  Psych: flat affect    LABS:                        8.0    7.80  )-----------( 494      ( 03 Dec 2020 05:37 )             27.5   12-03    139  |  100  |  3<L>  ----------------------------<  97  3.4<L>   |  34<H>  |  0.29<L>    Ca    8.2<L>      03 Dec 2020 05:37  Phos  3.1     12-03  Mg     1.8     12-03    TPro  5.7<L>  /  Alb  2.2<L>  /  TBili  0.3  /  DBili  x   /  AST  9<L>  /  ALT  11<L>  /  AlkPhos  104  12-03        RADIOLOGY & ADDITIONAL STUDIES: reviewed    Protein Calorie Malnutrition Present: [ ]mild [ ]moderate [x ]severe [ ]underweight [ ]morbid obesity  https://www.andeal.org/vault/9080/web/files/ONC/Table_Clinical%20Characteristics%20to%20Document%20Malnutrition-White%20JV%20et%20al%202012.pdf    Height (cm): 152.4 (11-27-20 @ 20:43)  Weight (kg): 44.4 (11-27-20 @ 20:43)  BMI (kg/m2): 19.1 (11-27-20 @ 20:43)    [ x]PPSV2 < or = 30%  [ x]significant weight loss [x ]poor nutritional intake [ ]anasarca   Albumin, Serum: 2.2 g/dL (12-03-20 @ 05:37)   [ ]Artificial Nutrition    REFERRALS:   [ ]Chaplaincy  [x ]Hospice  [ ]Child Life  [ ]Social Work  [ ]Case management [ ]Holistic Therapy     Goals of Care Document:  CRISTIAN Monroe (12-02-20 @ 18:00)  Goals of Care Conversation:   Participants:  · Participants  Patient; Family  · Child(chioma)  Missy Thomas    Advance Directives:  · Does patient have Advance Directive  No  · Does Patient Have a Surrogate  Yes  · Surrogate's Name  Missy Thomas  · Surrogate's Phone Number  [phone]  · Caregiver:  yes  · Name  Sheeba Cardoso  · Phone Number  [facility]    Conversation Discussion:  · Conversation  Diagnosis; Prognosis; Treatment Options; Palliative Care Referral  · Conversation Details  -diagnostic findings including anastomotic leak, thoracic discitis/osteo, sacral abscess  -treatment options including abx, surgery  -diagnostic options including CT scan, colonoscopy   -poor nutrition and overall health status   -comfort care, hospice    What Matters Most To Patient and Family:  · What matters most to patient and family  treatment, comfort    Personal Advance Directives Treatment Guidelines:   Treatment Guidelines:  · Decision Maker  Surrogate    Location of Discussion:   Duration of Advanced Care Planning Meeting:  · Time spent (in minutes)  25    Location of Discussion:  · Location of discussion  Telephone      Electronic Signatures:  Maximilian Monroe)  (Signed 02-Dec-2020 18:02)  	Authored: Goals of Care Conversation, Personal Advance Directives Treatment Guidelines, Location of Discussion      Last Updated: 02-Dec-2020 18:02 by Maximilian Monroe)

## 2020-12-03 NOTE — PROGRESS NOTE ADULT - REASON FOR ADMISSION
the L. hip ill-defined multi septated fluid collection containing foci of air measuring appx 3.8 x 3.2 x3.9cm concerning for abscess, WBC of 20, normal LA. Pt was seen by both surgical and ortho team who recommenced no surgical intervention at this time. MICU consulted for hypotension,
hypotension
the L. hip ill-defined multi septated fluid collection containing foci of air measuring appx 3.8 x 3.2 x3.9cm concerning for abscess, WBC of 20, normal LA. Pt was seen by both surgical and ortho team who recommenced no surgical intervention at this time. MICU consulted for hypotension,
leaking peg
the L. hip ill-defined multi septated fluid collection containing foci of air measuring appx 3.8 x 3.2 x3.9cm concerning for abscess, WBC of 20, normal LA. Pt was seen by both surgical and ortho team who recommenced no surgical intervention at this time. MICU consulted for hypotension,

## 2020-12-03 NOTE — HOSPICE CARE NOTE - CONVESATION DETAILS
Call from Dr NAFISA Denny who reported that Pt had been approved for in patient hospice care at the [facility] by Edmundo Lyn NP via doctor to doctor assessment . Pt's daughter Missy Cardoso  is in agreement to admit Pt to the [facility] this evening . As per Edmundo Lyn a 9 pm  time was requested.  Pt's daughter Missy Cardoso  and Pt's  sister Keke Felix both in agreement with in patient hospice care and comfort measures.

## 2020-12-03 NOTE — PROGRESS NOTE ADULT - SUBJECTIVE AND OBJECTIVE BOX
pt seen and examined  very lethargic  no complaints  ICU Vital Signs Last 24 Hrs  T(C): 36.3 (03 Dec 2020 07:38), Max: 36.4 (02 Dec 2020 23:59)  T(F): 97.3 (03 Dec 2020 07:38), Max: 97.6 (02 Dec 2020 23:59)  HR: 64 (03 Dec 2020 09:00) (51 - 69)  BP: 148/67 (03 Dec 2020 09:00) (111/68 - 151/72)  BP(mean): 97 (03 Dec 2020 09:00) (84 - 104)  ABP: --  ABP(mean): --  RR: 21 (03 Dec 2020 09:00) (12 - 22)  SpO2: 99% (03 Dec 2020 09:00) (94% - 100%)  gen-NAD  resp-clear  abd-soft NT/ND                        8.0    7.80  )-----------( 494      ( 03 Dec 2020 05:37 )             27.5   12-03    139  |  100  |  3<L>  ----------------------------<  97  3.4<L>   |  34<H>  |  0.29<L>    Ca    8.2<L>      03 Dec 2020 05:37  Phos  3.1     12-03  Mg     1.8     12-03    TPro  5.7<L>  /  Alb  2.2<L>  /  TBili  0.3  /  DBili  x   /  AST  9<L>  /  ALT  11<L>  /  AlkPhos  104  12-03

## 2020-12-03 NOTE — PROGRESS NOTE ADULT - SUBJECTIVE AND OBJECTIVE BOX
PROGRESS NOTE  Patient is a 65y old  Female who presents with a chief complaint of the L. hip ill-defined multi septated fluid collection containing foci of air measuring appx 3.8 x 3.2 x3.9cm concerning for abscess, WBC of 20, normal LA. Pt was seen by both surgical and ortho team who recommenced no surgical intervention at this time. MICU consulted for hypotension, (03 Dec 2020 14:50)  OVERNIGHT Patient is resting in a bed comfortably .Seen by palliative care MD and    Yesterday began goals of care discussion with patient and family. Levophed for BP stopped. Bowel prep given in preparation for another scan to better diagnose anastomotic leak. later during the day family requested hospice inn transfer .PALLIATIVE CARE TEAM consult appreciated   HPI:  HPI: 64 y/o F w/ pmh of recovered alcoholic, former smoker, copd, RA, chronic low back pain (on methadone 60mg daily), hx of colon ca s/p sigmoid resection (2015), hx of open R. inguinal hernia repair w/ mesh (2010), hx of hiatal hernia repair (2019), zenker diverticulum surgery c/b vocal cord paralysis's requiring peg for feeding, s/p peg (replaced peg 3 weeks ago [facility]) transfer from [facility] to [facility] earlier today for leaking PEJ tube and on/off lower abd pain over the last 2-3 days. In the ED pt underwent an CT of abd/pelvis was found to have a Abscess around the L. hip ill-defined multi septated fluid collection containing foci of air measuring appx 3.8 x 3.2 x3.9cm concerning for abscess, WBC of 20, normal LA. Pt was seen by both surgical and ortho team who recommenced no surgical intervention at this time. MICU consulted for hypotension, (27 Nov 2020 19:45)  PAST MEDICAL & SURGICAL HISTORY:  Candidal stomatitis  Malignant neoplasm of colon  Major depressive disorder  HTN (hypertension)  COPD (chronic obstructive pulmonary disease)  MEDICATIONS  (STANDING):  dextrose 40% Gel 15 Gram(s) Oral once  dextrose 5%. 1000 milliLiter(s) (100 mL/Hr) IV Continuous <Continuous>  dextrose 5%. 1000 milliLiter(s) (50 mL/Hr) IV Continuous <Continuous>  dextrose 50% Injectable 25 Gram(s) IV Push once  dextrose 50% Injectable 12.5 Gram(s) IV Push once  dextrose 50% Injectable 25 Gram(s) IV Push once  glucagon  Injectable 1 milliGRAM(s) IntraMuscular once  methadone    Tablet 30 milliGRAM(s) Oral every 12 hours  piperacillin/tazobactam IVPB.. 3.375 Gram(s) IV Intermittent once  MEDICATIONS  (PRN):  OBJECTIVE  T(C): 36.8 (12-03-20 @ 15:25), Max: 36.8 (12-03-20 @ 15:25)  HR: 106 (12-03-20 @ 17:00) (53 - 114)  BP: 125/69 (12-03-20 @ 17:00) (111/68 - 151/72)  RR: 19 (12-03-20 @ 17:00) (11 - 22)  SpO2: 99% (12-03-20 @ 17:00) (84% - 100%)  Wt(kg): --  I&O's Summary  02 Dec 2020 07:01  -  03 Dec 2020 07:00  --------------------------------------------------------  IN: 5279.7 mL / OUT: 1550 mL / NET: 3729.7 mL  03 Dec 2020 07:01  -  03 Dec 2020 17:26  --------------------------------------------------------  IN: 870 mL / OUT: 0 mL / NET: 870 mL  REVIEW OF SYSTEMS:  limited due to ms   PHYSICAL EXAM:  Appearance: NAD. VS past 24 hrs -as above   HEENT:   Moist oral mucosa. Conjunctiva clear b/l.   Neck : supple  Respiratory: Lungs CTAB.  Gastrointestinal:  Soft, nontender. No rebound. No rigidity. BS present	  Cardiovascular: RRR ,S1S2 present  Neurologic: Non-focal. Moving all extremities.  Extremities: l hip  edema. No erythema. No calf tenderness.  Skin: No rashes, No ecchymoses, No cyanosis.	  wounds ,skin lesions-See skin assesment flow sheet   LABS:                        8.0    7.80  )-----------( 494      ( 03 Dec 2020 05:37 )             27.5     12-03    139  |  100  |  3<L>  ----------------------------<  97  3.4<L>   |  34<H>  |  0.29<L>    Ca    8.2<L>      03 Dec 2020 05:37  Phos  3.1     12-03  Mg     1.8     12-03    TPro  5.7<L>  /  Alb  2.2<L>  /  TBili  0.3  /  DBili  x   /  AST  9<L>  /  ALT  11<L>  /  AlkPhos  104  12-03    CAPILLARY BLOOD GLUCOSE      POCT Blood Glucose.: 240 mg/dL (03 Dec 2020 11:51)  POCT Blood Glucose.: 86 mg/dL (03 Dec 2020 05:39)  POCT Blood Glucose.: 84 mg/dL (02 Dec 2020 23:28)  POCT Blood Glucose.: 61 mg/dL (02 Dec 2020 23:26)  POCT Blood Glucose.: 84 mg/dL (02 Dec 2020 17:27)          Culture - Abscess with Gram Stain (collected 30 Nov 2020 19:13)  Source: .Abscess Hip - Left  Preliminary Report (01 Dec 2020 16:43):    No growth    Culture - Blood (collected 29 Nov 2020 11:41)  Source: .Blood Blood-Peripheral  Preliminary Report (30 Nov 2020 12:01):    No growth to date.    Culture - Blood (collected 29 Nov 2020 11:41)  Source: .Blood Blood-Peripheral  Preliminary Report (30 Nov 2020 12:01):    No growth to date.    Culture - Urine (collected 28 Nov 2020 04:55)  Source: .Urine Clean Catch (Midstream)  Final Report (29 Nov 2020 00:44):    <10,000 CFU/mL Normal Urogenital Ami    Culture - Blood (collected 27 Nov 2020 18:29)  Source: .Blood Blood-Peripheral  Gram Stain (28 Nov 2020 11:34):    Growth in anaerobic bottle: Gram Positive Cocci in Pairs and Chains  Final Report (30 Nov 2020 11:00):    Growth in anaerobic bottle: Enterococcus faecalis    "Due to technical problems, Proteus sp. will Not be reported as part of    the BCID panel until further notice" ***Blood Panel PCR results on this    specimen are available    approximately 3 hours afterthe Gram stain result.***    Gram stain, PCR, and/or culture results may not always    correspond due to difference in methodologies.    ************************************************************    This PCR assay was performed using [employer].    The following targets are tested for: Enterococcus,    vancomycin resistant enterococci, Listeria monocytogenes,    coagulase negative staphylococci, S. aureus,    methicillin resistant S. aureus, Streptococcus agalactiae    (Group B), S. pneumoniae, S. pyogenes (Group A),    Acinetobacter baumannii, Enterobacter cloacae, E. coli,    Klebsiella oxytoca, K. pneumoniae, Proteus sp.,    Serratia marcescens, Haemophilus influenzae,    Neisseria meningitidis, Pseudomonas aeruginosa, Candida    albicans, C. glabrata, C krusei, C parapsilosis,    C. tropicalis and the KPC resistance gene.  Organism: Blood Culture PCR  Enterococcus faecalis (30 Nov 2020 11:00)  Organism: Enterococcus faecalis (30 Nov 2020 11:00)  Organism: Blood Culture PCR (30 Nov 2020 11:00)  Culture - Blood (collected 27 Nov 2020 18:29)  Source: .Blood Blood-Peripheral  Final Report (02 Dec 2020 19:01):    No Growth Final  RADIOLOGY & ADDITIONAL TESTS:   reviewed elctronically  ASSESSMENT/PLAN:

## 2020-12-03 NOTE — PROGRESS NOTE ADULT - ATTENDING COMMENTS
patient s/p colon resection ~ 5 years ago for cancer  no recent colonoscopy per her op gi  now moving her bowels  next step would be repeat CT with IV contrast r/o recurrent disease in her colon if it is in line with goals of care

## 2020-12-03 NOTE — PROGRESS NOTE ADULT - ATTENDING COMMENTS
Patient seen and examined    64 y/o female with hx past EtOH abuse, COPD, RA, chronic pain on methadone, colon cancer s/p sigmoid resection 2015 presented with a leaking PEJ tube and found to have severe sepsis from a left buttock abscess and myositis, causing hypotension/septic shock. Found to have enterococcus bacteremia.      Episode of aspiration in pm today with subsequent resp distress, mental status change     CT A/P was ordered for evaluation of anastomotic leak however after palliative care discussions with the daughter plan is to send her to inpatient hospice, will discharge when a bed becomes available

## 2020-12-03 NOTE — DISCHARGE NOTE PROVIDER - NSDCCPCAREPLAN_GEN_ALL_CORE_FT
PRINCIPAL DISCHARGE DIAGNOSIS  Diagnosis: Abdominal pain, unspecified abdominal location  Assessment and Plan of Treatment: hospice care      SECONDARY DISCHARGE DIAGNOSES  Diagnosis: Leukocytosis, unspecified type  Assessment and Plan of Treatment:     Diagnosis: Abscess of hip  Assessment and Plan of Treatment:

## 2020-12-03 NOTE — PROGRESS NOTE ADULT - ATTENDING COMMENTS
Patient is a 65y old  Female PMHx of depression, colon CA sp sigmoid resection, IHR with mesh, HH repair, PEG for vocal cord paralysis, HTN, COPD who presented with leaking PEJ tube and abd discomfort.who presents with a chief complaint of the L. hip ill-defined multi septated fluid collection containing foci of air measuring appx 3.8 x 3.2 x3.9cm concerning for abscess, WBC of 20, normal LA. Pt was seen by both surgical and ortho team who recommenced no surgical intervention at this time. MICU consulted for hypotension, (30 Nov 2020 00:44) Admitted for septic workup and evaluation ,send blood and urine cx ,serial lactate levels, monitor vitals closely hydration, monitor urine output and renal profile ,iv abx initiated L. piriformis abscess- continue with clindamycin and Daptomycin. Zosyn, vanco changed to daptomycin. Noted to have enterococcus bacteremia ,Left gluteal abscess with extensive myositis. ,Left greater trochanter abscess ,Suspected right gluteal abscess ,Suspected osteomyelitis of sacrum and L-spine ,Bacteremia with Enterococcus ,ruled in for Sepsis History of colon cancer with sigmoid resection and anastomosis.MRI showed - There is minimal localized extraluminal extravasation of contrast at the posterior aspect of the rectosigmoid anastomosis. There are adjacent small bubbles of extraluminal air .No sx interventions as per surgery consult Dr Ahumada  Surgery is high risk with current issues. D/w pt above who doesn't want surgery at this time .Continue conservative management Palliative care consult requested ,to discuss advance directives ,GOC and review  LIZ

## 2020-12-03 NOTE — DISCHARGE NOTE NURSING/CASE MANAGEMENT/SOCIAL WORK - PATIENT PORTAL LINK FT
You can access the FollowMyHealth Patient Portal offered by [facility] by registering at the following website: [email or web address]. By joining [facility]’s FollowMyHealth portal, you will also be able to view your health information using other applications (apps) compatible with our system.

## 2020-12-03 NOTE — DISCHARGE NOTE PROVIDER - HOSPITAL COURSE
HPI:  HPI: 66 y/o F w/ pmh of recovered alcoholic, former smoker, copd, RA, chronic low back pain (on methadone 60mg daily), hx of colon ca s/p sigmoid resection (2015), hx of open R. inguinal hernia repair w/ mesh (2010), hx of hiatal hernia repair (2019), zenker diverticulum surgery c/b vocal cord paralysis's requiring peg for feeding, s/p peg (replaced peg 3 weeks ago [facility]) transfer from [facility] to [facility] earlier today for leaking PEJ tube and on/off lower abd pain over the last 2-3 days. In the ED pt underwent an CT of abd/pelvis was found to have a Abscess around the L. hip ill-defined multi septated fluid collection containing foci of air measuring appx 3.8 x 3.2 x3.9cm concerning for abscess, WBC of 20, normal LA. Pt was seen by both surgical and ortho team who recommenced no surgical intervention at this time. MICU consulted for hypotension, (27 Nov 2020 19:45)      ---  HOSPITAL COURSE:   Pt was admitted to the ICU with septic shock and placed on Levophed for BP support. She was found on MRI to have an abscess in the L gluteal region w/o extension to hip joint and a sacral abscess with extension into the sacral canal. CT abd showed a leak of the rectosigmoid anastomosis from her colon cancer resection. She was found to have an enterococcus bacteremia and was started on the appropriate antibiotics for treatment. GI was consulted, recommended to give the patient bowel prep and repeat CT Abdomen/pelvis with Oral and IV contrast. CT Scan showed ....  She had some episodes of destaturation that requiered the use of BiPAP for a short period, the patient recovered with management of sedative medications The patient was started on a diet and transferred to the med floor with remote tele and continuous O2 support when she was adequately awake and breathing.   ---  CONSULTANTS:   ID: Nathalie  Gastro:   ---  TIME SPENT:  I, the attending physician, was physically present for the key portions of the evaluation and management (E/M) service provided. The total amount of time spent reviewing the hospital notes, laboratory values, imaging findings, assessing/counseling the patient, discussing with consultant physicians, social work, nursing staff was -- minutes    ---  Primary care provider was made aware of plan for discharge:      [  ] NO     [  ] YES     HPI:  HPI: 64 y/o F w/ pmh of recovered alcoholic, former smoker, copd, RA, chronic low back pain (on methadone 60mg daily), hx of colon ca s/p sigmoid resection (2015), hx of open R. inguinal hernia repair w/ mesh (2010), hx of hiatal hernia repair (2019), zenker diverticulum surgery c/b vocal cord paralysis's requiring peg for feeding, s/p peg (replaced peg 3 weeks ago [facility]) transfer from [facility] to [facility] earlier today for leaking PEJ tube and on/off lower abd pain over the last 2-3 days. In the ED pt underwent an CT of abd/pelvis was found to have a Abscess around the L. hip ill-defined multi septated fluid collection containing foci of air measuring appx 3.8 x 3.2 x3.9cm concerning for abscess, WBC of 20, normal LA. Pt was seen by both surgical and ortho team who recommenced no surgical intervention at this time. MICU consulted for hypotension, (27 Nov 2020 19:45)      ---  HOSPITAL COURSE:   Pt was admitted to the ICU with septic shock and placed on Levophed for BP support. She was found on MRI to have an abscess in the L gluteal region w/o extension to hip joint and a sacral abscess with extension into the sacral canal. CT abd showed a leak of the rectosigmoid anastomosis from her colon cancer resection. She was found to have an enterococcus bacteremia and was started on the appropriate antibiotics for treatment. GI was consulted, recommended to give the patient bowel prep and repeat CT Abdomen/pelvis with Oral and IV contrast. CT Scan showed ....  She had some episodes of destaturation that requiered the use of BiPAP for a short period, the patient recovered with management of sedative medications. The patient continued to deteroriate despite all efforts. Her daughter who is her hcp and she elected to make the patient comfort measures and transfer her to inpatient hospice. Patient is stable for transfer at this time.   ---  CONSULTANTS:   ID: Nathalie  Gastro:   Palliative: Dr. Denver Denny  ---  TIME SPENT:  I, the attending physician, was physically present for the key portions of the evaluation and management (E/M) service provided. The total amount of time spent reviewing the hospital notes, laboratory values, imaging findings, assessing/counseling the patient, discussing with consultant physicians, social work, nursing staff was -- minutes    ---  Primary care provider was made aware of plan for discharge:      [  ] NO     [  ] YES

## 2020-12-03 NOTE — PROGRESS NOTE ADULT - CONVERSATION DETAILS
Writer spoke with dtr/HCP day. Reviewed patient's medical and social history as well as events leading to patient's hospitalization. Writer discussed patient's current diagnosis (h/o colon cancer, multiple abscess c/f leak at surgical anastomosis, aspiration today with AMS, cachexia and bedbound status, severe malnutrition, poor prognosis, r/o recurrent colon ca may be noted by endoscopy but not a candidate for chemotherapy or surgical repair due to poor condition; pt stated she does not want surgery), medical condition and management,  poor prognosis, and life expectancy. Inquired about patient's wishes regarding extent of medical care to be provided including escalation of medical care into the ICU and use of vasopressor support. In addition, the writer inquired about thoughts regarding cardiopulmonary resuscitation, artificial nutrition and hydration including use of feeding tubes and IVF, antibiotics, and further investigative studies such as blood draws and radiology. Dtr who is a nurse showed good insight into medical condition. All questions answered. Writer recommended DNR/DNI status and d/c to hospice care based on dtr's wishes not to prolong suffering and allow comfort care while letting pt and family spend time together. She also wanted pt to get to see her first grandchild. Dtr consented to DNR/DNI & d/c to inpatient hospice. MOLST form filled out and placed in chart. Dtr was grieving appropriately.  Psychosocial support provided.

## 2020-12-03 NOTE — PROGRESS NOTE ADULT - SUBJECTIVE AND OBJECTIVE BOX
Patient is a 65y old  Female who presents with a chief complaint of the L. hip ill-defined multi septated fluid collection containing foci of air measuring appx 3.8 x 3.2 x3.9cm concerning for abscess, WBC of 20, normal LA. Pt was seen by both surgical and ortho team who recommenced no surgical intervention at this time. MICU consulted for hypotension, (02 Dec 2020 19:43)    24 hour events: ***    REVIEW OF SYSTEMS  Constitutional: No fever, chills, fatigue  Neuro: No headache, numbness, weakness  Resp: No cough, wheezing, shortness of breath  CVS: No chest pain, palpitations, leg swelling  GI: No abdominal pain, nausea, vomiting, diarrhea   : No dysuria, frequency, incontinence  Skin: No itching, burning, rashes, or lesions   Msk: No joint pain or swelling  Psych: No depression, anxiety, mood swings  Heme: No bleeding    T(F): 97.6 (12-03-20 @ 04:36), Max: 97.6 (12-02-20 @ 23:59)  HR: 63 (12-03-20 @ 06:00) (51 - 69)  BP: 127/67 (12-03-20 @ 06:00) (114/64 - 151/72)  RR: 21 (12-03-20 @ 06:00) (11 - 22)  SpO2: 100% (12-03-20 @ 06:00) (84% - 100%)  Wt(kg): --            I&O's Summary    12-02 @ 07:01  -  12-03 @ 07:00  --------------------------------------------------------  IN: 5279.7 mL / OUT: 1550 mL / NET: 3729.7 mL      PHYSICAL EXAM  Gen: NAD, Awake and alert, no memory of somnolence yesterday.    HEENT: PERRL, normocephalic, atraumatic  Resp: CTA b/l  CVS: +RRR  Abd: BSx4, soft, mild distention, NT, PEJ tube in place  Ext: L. hip with mild swelling and TTP along the gluteal region, no erythema  Skin: warm/dry    MEDICATIONS  piperacillin/tazobactam IVPB.. IV Intermittent    midodrine. Oral    dextrose 40% Gel Oral  dextrose 50% Injectable IV Push  dextrose 50% Injectable IV Push  dextrose 50% Injectable IV Push  glucagon  Injectable IntraMuscular    ALBUTerol    90 MICROgram(s) HFA Inhaler Inhalation PRN  tiotropium 18 MICROgram(s) Capsule Inhalation    DULoxetine Oral  FLUoxetine Oral  gabapentin Oral  methadone    Tablet Oral  methocarbamol Oral      aspirin  chewable Enteral Tube  heparin   Injectable SubCutaneous    pantoprazole  Injectable IV Push      albumin human 25% IVPB IV Intermittent  ascorbic acid Oral  cholecalciferol Oral  dextrose 5% + lactated ringers. IV Continuous  dextrose 5%. IV Continuous  dextrose 5%. IV Continuous                                8.0    7.80  )-----------( 494      ( 03 Dec 2020 05:37 )             27.5       12-03    139  |  100  |  3<L>  ----------------------------<  97  3.4<L>   |  34<H>  |  0.29<L>    Ca    8.2<L>      03 Dec 2020 05:37  Phos  3.1     12-03  Mg     1.8     12-03    TPro  5.7<L>  /  Alb  2.2<L>  /  TBili  0.3  /  DBili  x   /  AST  9<L>  /  ALT  11<L>  /  AlkPhos  104  12-03          PT/INR - ( 01 Dec 2020 08:47 )   PT: 14.5 sec;   INR: 1.25 ratio         PTT - ( 01 Dec 2020 08:47 )  PTT:33.0 sec    .Abscess Hip - Left   No growth -- 11-30 @ 19:13  .Blood Blood-Peripheral   No growth to date. -- 11-29 @ 11:41        Radiology: ***  Bedside lung ultrasound: ***  Bedside ECHO: ***    PEG: Y  GREWAL: N                              GLOBAL ISSUE/BEST PRACTICE  Analgesia:  Y  Sedation:  N  HOB elevation: yes  Stress ulcer prophylaxis:  Y  VTE prophylaxis:  Y  Glycemic control: Y  Nutrition:  NPO    CODE STATUS: FULL       Patient is a 65y old  Female who presents with a chief complaint of the L. hip ill-defined multi septated fluid collection containing foci of air measuring appx 3.8 x 3.2 x3.9cm concerning for abscess, WBC of 20, normal LA. Pt was seen by both surgical and ortho team who recommenced no surgical intervention at this time. MICU consulted for hypotension, (02 Dec 2020 19:43)    24 hour events: Yesterday began goals of care discussion with patient and family. Levophed for BP stopped. Bowel prep given in preparation for another scan to better diagnose anastomotic leak.    REVIEW OF SYSTEMS  Low back pain and pain in the right hip .    Pt states back pain is on the left side paraspinals lumbar spine      T(F): 97.6 (12-03-20 @ 04:36), Max: 97.6 (12-02-20 @ 23:59)  HR: 63 (12-03-20 @ 06:00) (51 - 69)  BP: 127/67 (12-03-20 @ 06:00) (114/64 - 151/72)  RR: 21 (12-03-20 @ 06:00) (11 - 22)  SpO2: 100% (12-03-20 @ 06:00) (84% - 100%)  Wt(kg): --        I&O's Summary    12-02 @ 07:01  -  12-03 @ 07:00  --------------------------------------------------------  IN: 5279.7 mL / OUT: 1550 mL / NET: 3729.7 mL      PHYSICAL EXAM  Gen: NAD, Awake and alert, no memory of somnolence yesterday.    HEENT: PERRL, normocephalic, atraumatic  Resp: CTA b/l  CVS: +RRR  Abd: BSx4, soft, mild distention, NT, PEJ tube in place  Ext: L. gluteal TTP along the gluteal region, no erythema  Neuro: No neuro gross deficits, SILT.   Skin: warm/dry    MEDICATIONS  piperacillin/tazobactam IVPB.. IV Intermittent    midodrine. Oral    dextrose 40% Gel Oral  dextrose 50% Injectable IV Push  dextrose 50% Injectable IV Push  dextrose 50% Injectable IV Push  glucagon  Injectable IntraMuscular    ALBUTerol    90 MICROgram(s) HFA Inhaler Inhalation PRN  tiotropium 18 MICROgram(s) Capsule Inhalation    DULoxetine Oral  FLUoxetine Oral  gabapentin Oral  methadone    Tablet Oral  methocarbamol Oral      aspirin  chewable Enteral Tube  heparin   Injectable SubCutaneous    pantoprazole  Injectable IV Push      albumin human 25% IVPB IV Intermittent  ascorbic acid Oral  cholecalciferol Oral  dextrose 5% + lactated ringers. IV Continuous  dextrose 5%. IV Continuous  dextrose 5%. IV Continuous                                8.0    7.80  )-----------( 494      ( 03 Dec 2020 05:37 )             27.5       12-03    139  |  100  |  3<L>  ----------------------------<  97  3.4<L>   |  34<H>  |  0.29<L>    Ca    8.2<L>      03 Dec 2020 05:37  Phos  3.1     12-03  Mg     1.8     12-03    TPro  5.7<L>  /  Alb  2.2<L>  /  TBili  0.3  /  DBili  x   /  AST  9<L>  /  ALT  11<L>  /  AlkPhos  104  12-03          PT/INR - ( 01 Dec 2020 08:47 )   PT: 14.5 sec;   INR: 1.25 ratio         PTT - ( 01 Dec 2020 08:47 )  PTT:33.0 sec    .Abscess Hip - Left   No growth -- 11-30 @ 19:13  .Blood Blood-Peripheral   No growth to date. -- 11-29 @ 11:41        Radiology: ***  Bedside lung ultrasound: ***  Bedside ECHO: ***    PEG: Y  GREWAL: N                              GLOBAL ISSUE/BEST PRACTICE  Analgesia:  Y  Sedation:  N  HOB elevation: yes  Stress ulcer prophylaxis:  Y  VTE prophylaxis:  Y  Glycemic control: Y  Nutrition:  NPO    CODE STATUS: FULL       Patient is a 65y old  Female who presents with a chief complaint of the L. hip ill-defined multi septated fluid collection containing foci of air measuring appx 3.8 x 3.2 x3.9cm concerning for abscess, WBC of 20, normal LA. Pt was seen by both surgical and ortho team who recommenced no surgical intervention at this time. MICU consulted for hypotension, (02 Dec 2020 19:43)    24 hour events: Yesterday began goals of care discussion with patient and family. Levophed for BP stopped. Bowel prep given in preparation for another scan to better diagnose anastomotic leak.    REVIEW OF SYSTEMS  Low back pain and pain in the right hip .    Pt states back pain is on the left side paraspinals lumbar spine      T(F): 97.6 (12-03-20 @ 04:36), Max: 97.6 (12-02-20 @ 23:59)  HR: 63 (12-03-20 @ 06:00) (51 - 69)  BP: 127/67 (12-03-20 @ 06:00) (114/64 - 151/72)  RR: 21 (12-03-20 @ 06:00) (11 - 22)  SpO2: 100% (12-03-20 @ 06:00) (84% - 100%)  Wt(kg): --        I&O's Summary    12-02 @ 07:01  -  12-03 @ 07:00  --------------------------------------------------------  IN: 5279.7 mL / OUT: 1550 mL / NET: 3729.7 mL      PHYSICAL EXAM  Gen: NAD, Awake and alert.     HEENT: PERRL, normocephalic, atraumatic  Resp: CTA b/l  CVS: +RRR  Abd: BSx4, soft, mild distention, mildly tender to palpation. PEJ tube in place  Ext: L. gluteal TTP along the gluteal region, no erythema  Neuro: No neuro gross deficits, SILT.   Skin: warm/dry    MEDICATIONS  piperacillin/tazobactam IVPB.. IV Intermittent    midodrine. Oral    dextrose 40% Gel Oral  dextrose 50% Injectable IV Push  dextrose 50% Injectable IV Push  dextrose 50% Injectable IV Push  glucagon  Injectable IntraMuscular    ALBUTerol    90 MICROgram(s) HFA Inhaler Inhalation PRN  tiotropium 18 MICROgram(s) Capsule Inhalation    DULoxetine Oral  FLUoxetine Oral  gabapentin Oral  methadone    Tablet Oral  methocarbamol Oral      aspirin  chewable Enteral Tube  heparin   Injectable SubCutaneous    pantoprazole  Injectable IV Push      albumin human 25% IVPB IV Intermittent  ascorbic acid Oral  cholecalciferol Oral  dextrose 5% + lactated ringers. IV Continuous  dextrose 5%. IV Continuous  dextrose 5%. IV Continuous                                8.0    7.80  )-----------( 494      ( 03 Dec 2020 05:37 )             27.5       12-03    139  |  100  |  3<L>  ----------------------------<  97  3.4<L>   |  34<H>  |  0.29<L>    Ca    8.2<L>      03 Dec 2020 05:37  Phos  3.1     12-03  Mg     1.8     12-03    TPro  5.7<L>  /  Alb  2.2<L>  /  TBili  0.3  /  DBili  x   /  AST  9<L>  /  ALT  11<L>  /  AlkPhos  104  12-03          PT/INR - ( 01 Dec 2020 08:47 )   PT: 14.5 sec;   INR: 1.25 ratio         PTT - ( 01 Dec 2020 08:47 )  PTT:33.0 sec    .Abscess Hip - Left   No growth -- 11-30 @ 19:13  .Blood Blood-Peripheral   No growth to date. -- 11-29 @ 11:41        Radiology: ***  Bedside lung ultrasound: ***  Bedside ECHO: ***    PEG: Y  GREWAL: N                              GLOBAL ISSUE/BEST PRACTICE  Analgesia:  Y  Sedation:  N  HOB elevation: yes  Stress ulcer prophylaxis:  Y  VTE prophylaxis:  Y  Glycemic control: Y  Nutrition:  NPO after failed attempt at PO.     CODE STATUS: FULL  Goals of care: Y

## 2020-12-07 NOTE — ED ADULT NURSE NOTE - OBJECTIVE STATEMENT
65y Female came by EMS from [facility] for weakness. PMH of COPD, emphysema on 2L O2, gastroparesis, RA, colon CA resection in 2016, Gerd, ESBL. Pt has been in poor nutrution 2/2 diverticulum repair and trouble swallowing 2/2 vocal cord paralysis and is now s/p GJ tube. Pt had not been able to have intake via tube for 1 week, unclear if tube was being used. Pt was discharged on hospice and was expected to decline however, 3 days ago, mental status improved with Zosyn for E. coli ABD/bowel leak at discharge. Pt was sent by Hospice for further evaluation. Pt presents with weakness, malnutrition, SPO2 97 on 5 L nasal canula. Denies chest pain, ha, n/v/d, abdominal pain, f/c, hematuria. Upon examination, full facial symmetry, no JVD or trach deviation, lung sounds clear and adequate chest rise, S1 and S2 heart sounds present, +2 pulses in all extremities, cap refill <2 seconds, ABD soft, non distended and non tender, pelvis intact. Pt has GJ tube present, Dalal catheter present from hospice with yellow urine present.   65y.o F with pmhx of COPD, emphysema on 2L on oxygen, gastroparesis, rheumatoid arthritis, colon CA resection in 2016, GERD, ESBL UTI, and anemia presents from hospice for weakness. As per daughter, pt has had poor nutrition 2/2 diverticulum repair and trouble swallowing 2/2 vocal cord paralysis and is now s/p GJ tube. Pt had not had intake via tube x 1 week. Unclear if tube is being used. Was discharged on hospice 2/2 mental status and expected decline 1-2 weeks ago, however x3 days ago, pt mental status has been improving. Pt was placed on zosyn 2/2 E. coli for abdominal/bowel leak at discharge. Was sent here for further evaluation of reversal cause of generalized weakness. 65y Female came by EMS from [facility] for weakness. PMH of COPD, emphysema on 2L O2, gastroparesis, RA, colon CA resection in 2016, Gerd, ESBL. Pt has been in poor nutrution 2/2 diverticulum repair and trouble swallowing 2/2 vocal cord paralysis and is now s/p GJ tube. Pt had not been able to have intake via tube for 1 week, unclear if tube was being used. Pt was discharged on hospice and was expected to decline however, 3 days ago, mental status improved with Zosyn for E. coli ABD/bowel leak at discharge. Pt was sent by Hospice for further evaluation. Pt presents with weakness, malnutrition, SPO2 97 on 5 L nasal canula. Denies chest pain, ha, n/v/d, abdominal pain, f/c, hematuria. Upon examination, full facial symmetry, no JVD or trach deviation, lung sounds clear and adequate chest rise, S1 and S2 heart sounds present, +2 pulses in all extremities, cap refill <2 seconds, ABD soft, non distended and non tender, pelvis intact. Pt has GJ tube present, Dalal catheter present from hospice with yellow urine present.

## 2020-12-07 NOTE — ED PROVIDER NOTE - OBJECTIVE STATEMENT
65y.o F with pmhx of COPD, emphysema on 2L on oxygen, gastroparesis, rheumatoid arthritis, colon CA resection in 2016, GERD, ESBL and anemia presents from hospice for weakness. As per daughter, pt has had poor nutrition 2/2 diverticulum repair and trouble swallowing. Had GJ tube placed but has not had oral intake x1 week. Unclear if tube is being used. Was placed on hospice 2/2 mental status, however x3 days, pt mental status has been improving. Pt was placed on zosyn 2/2 E.coli. Was sent here for further evaluation of possible colon CA. 65y.o F with pmhx of COPD, emphysema on 2L on oxygen, gastroparesis, rheumatoid arthritis, colon CA resection in 2016, GERD, ESBL UTI, and anemia presents from hospice for weakness. As per daughter, pt has had poor nutrition 2/2 diverticulum repair and trouble swallowing 2/2 vocal cord paralysis and is now s/p GJ tube. Pt had not had intake via tube x 1 week. Unclear if tube is being used. Was discharged on hospice 2/2 mental status and expected decline 1-2 weeks ago, however x3 days ago, pt mental status has been improving. Pt was placed on zosyn 2/2 E. coli for abdominal/bowel leak at discharge. Was sent here for further evaluation of reversal cause of generalized weakness. 65y.o F with pmhx of COPD, emphysema on 2L on oxygen, gastroparesis, rheumatoid arthritis, colon CA resection in 2016, GERD, ESBL UTI, and anemia presents from hospice for weakness. As per daughter, pt has had poor nutrition 2/2 diverticulum repair and trouble swallowing 2/2 vocal cord paralysis and is now s/p GJ tube. Pt had not had intake via tube x 1 week. Unclear if tube is being used. Was discharged on hospice 2/2 mental status and expected decline 1-2 weeks ago, however x3 days ago, pt mental status has been improving. Pt was placed on zosyn 2/2 E. coli for abdominal/bowel leak at discharge. Was sent here for further evaluation of reversal cause of generalized weakness.    Per chart review, pt was admitted to ICU for septic shock likely 2/2 hip abscess requiring ICU care with pressors and occasional bipap with minimal improvement with treatment and was placed on hospice care due to worsening conditions. 65y.o F with pmhx of COPD, emphysema on 2L on oxygen, gastroparesis, rheumatoid arthritis, colon CA resection in 2016, GERD, ESBL UTI, and anemia presents from hospice for weakness. As per daughter, pt has had poor nutrition 2/2 diverticulum repair and trouble swallowing 2/2 vocal cord paralysis from zenker diverticulum repair and is now s/p GJ tube. Pt had not had intake via tube x 1 week. Unclear if tube is being used. Was discharged on hospice 2/2 mental status and expected decline 1-2 weeks ago, however x3 days ago, pt mental status has been improving. Pt was placed on zosyn 2/2 E. coli for abdominal/bowel leak at discharge. Was sent here for further evaluation of reversal cause of generalized weakness.    Per chart review, pt was admitted to ICU for septic shock likely 2/2 hip abscess requiring ICU care with pressors and occasional bipap with minimal improvement with treatment and was placed on hospice care due to worsening conditions.

## 2020-12-07 NOTE — ED ADULT NURSE NOTE - NSIMPLEMENTINTERV_GEN_ALL_ED
Implemented All Fall with Harm Risk Interventions:  Wise to call system. Call bell, personal items and telephone within reach. Instruct patient to call for assistance. Room bathroom lighting operational. Non-slip footwear when patient is off stretcher. Physically safe environment: no spills, clutter or unnecessary equipment. Stretcher in lowest position, wheels locked, appropriate side rails in place. Provide visual cue, wrist band, yellow gown, etc. Monitor gait and stability. Monitor for mental status changes and reorient to person, place, and time. Review medications for side effects contributing to fall risk. Reinforce activity limits and safety measures with patient and family. Provide visual clues: red socks.

## 2020-12-07 NOTE — CONSULT NOTE ADULT - SUBJECTIVE AND OBJECTIVE BOX
Patient is a 65yFemale with recent history of septic shock treated at [facility] and discharged to hospice a week ago presents to ED for evaluation of weakness. Pt is minimally functional and nonverbal, history obtained from daughter via phone.          levofloxacin (Unknown)      PHYSICAL EXAM:  T(C): 37 (12-07-20 @ 21:18), Max: 37.3 (12-07-20 @ 14:00)  HR: 85 (12-07-20 @ 21:18) (85 - 100)  BP: 123/77 (12-07-20 @ 21:18) (92/58 - 123/77)  RR: 18 (12-07-20 @ 21:18) (12 - 18)  SpO2: 100% (12-07-20 @ 21:18) (95% - 100%)    Gen: nonverbal    LLE:  Skin intact, no erythema or ecchymosis  +TTP left hip  mild pain with ROM at hip  +EHL/FHL/TA/GSC  +SILT L3-S1  + DP  Compartments soft and compressible  No calf tenderness    A/P: 65F with left hip abscess    Discussed case with Daughter Angelina who do not wish to pursue surgical management; pt has hip fluid collection that was present on last admission and treated nonop  Continue suppressive abx  medical management per primary team  Analgesia  WBAT  DVT ppx  PT/OT  Ice and elevate as tolerated  No acute orthopedic surgical intervention indicated at this time  Orthopedically stable  Discussed with Dr. Carroll who agrees    Ashwin Ramey,   PGY2, Orthopaedic Surgery     Patient is a 65yFemale with recent history of septic shock treated at [facility] and discharged to hospice a week ago presents to ED for evaluation of weakness. Pt is minimally functional and nonverbal, history obtained from daughter via phone. Pt has history of presacral fluid collection and left hip abscess that was treated nonoperatively.           levofloxacin (Unknown)      PHYSICAL EXAM:  T(C): 37 (12-07-20 @ 21:18), Max: 37.3 (12-07-20 @ 14:00)  HR: 85 (12-07-20 @ 21:18) (85 - 100)  BP: 123/77 (12-07-20 @ 21:18) (92/58 - 123/77)  RR: 18 (12-07-20 @ 21:18) (12 - 18)  SpO2: 100% (12-07-20 @ 21:18) (95% - 100%)    Gen: nonverbal    LLE:  Skin intact, no erythema or ecchymosis  +TTP left hip  mild pain with ROM at hip  +EHL/FHL/TA/GSC  +SILT L3-S1  + DP  Compartments soft and compressible  No calf tenderness    A/P: 65F with left hip abscess    Discussed case with Daughter Angelina who do not wish to pursue surgical management; pt has hip fluid collection that was present on last admission and treated nonop  Continue suppressive abx  medical management per primary team  Analgesia  WBAT  DVT ppx  PT/OT  Ice and elevate as tolerated  No acute orthopedic surgical intervention indicated at this time  Orthopedically stable  Discussed with Dr. Carroll who agrees    Ashwin Ramey,   PGY2, Orthopaedic Surgery

## 2020-12-07 NOTE — ED PROVIDER NOTE - SHIFT CHANGE DETAILS
Karol Barrett MD - Attending Physician: On hospice, on peripheral Zosyn at home. Here with worsening weakness. Daughter would no longer like hospice. Work-up ordered for likely admission

## 2020-12-07 NOTE — PATIENT PROFILE ADULT - NSPRONUTRITIONRISK_GEN_A_NUR
Unintentional weight loss prior to admission Pressure injury stage 2 or greater/Unintentional weight loss prior to admission

## 2020-12-07 NOTE — ED CLERICAL - NS ED CLERK NOTE PRE-ARRIVAL INFORMATION; ADDITIONAL PRE-ARRIVAL INFORMATION
CC/Reason For referral: sepsis, abd abscess needs gi work up and antibiotics   Preferred Consultant(if applicable):  Who admits for you (if needed):  Do you have documents you would like to fax over?  Would you still like to speak to an ED attending?  please call after patient is seen

## 2020-12-07 NOTE — ED PROVIDER NOTE - PHYSICAL EXAMINATION
GEN: Bed bound, cachectic appearing    CHEST/LUNGS: Non-tachypneic, CTAB, bilateral breath sounds  CARDIAC: normal perfusion  ABDOMEN: GJ tube in LUQ, no drainage, or skin changes around site  MSK: No edema, no gross deformity of extremities  SKIN: No rashes, no petechiae, no vesicles  PSYCH: Alert, appropriate, cooperative, with capacity and insight GEN: Bed bound, cachectic appearing    CHEST/LUNGS: Non-tachypneic, CTAB, bilateral breath sounds  CARDIAC: normal perfusion  ABDOMEN: GJ tube in LUQ, no drainage, or skin changes around site  : goldman catheter in place  MSK: No edema, no gross deformity of extremities  SKIN: No rashes, no petechiae, no vesicles  NEURO: Alert and awake but tired appearing; generalized weakness and limited speech

## 2020-12-08 NOTE — H&P ADULT - ASSESSMENT
64yo F w/ PMH of emphysema on home 2L NC, colon CA s/p distal ileum resection in 2016, ESBL UTI, zenker's diverticulum s/p repair in 2019 c/b vocal cord paralysis req GJ tube for feeding, RA, gastroparesis, GERD, anemia presenting from home hospice for weakness likely in the setting of malnutrition vs infection 2/2 presacral fluid collection vs RLL Aspiration PNA

## 2020-12-08 NOTE — CONSULT NOTE ADULT - PROBLEM SELECTOR RECOMMENDATION 9
- Not currently candidate for injection given pts poor effort   - Diet per SLP  - Call with questions or concerns - PPI  - Not currently candidate for injection at this time given pts poor effort   - Diet per SLP  - Call with questions or concerns - PPI  - Plan for vocal cord injection with Dr. Kim as outpatient upon discharge  - Diet per SLP  - Call with questions or concerns - PPI  - d/w her ENT Dr. Kim and plan for vocal cord injection with Dr. Kim as outpatient upon discharge as he has been following patient as outpatient and feels in office injection (without anesthesia) is best for patient  - Diet per SLP  - Call with questions or concerns

## 2020-12-08 NOTE — H&P ADULT - NSHPLABSRESULTS_GEN_ALL_CORE
LABS:                      7.0    18.72 )-----------( 389      ( 07 Dec 2020 15:14 )             24.6     142  |  101  |  24<H>  ----------------------------<  87  3.9   |  31  |  0.31<L>    Ca    9.0      07 Dec 2020 15:14    TPro  5.6<L>  /  Alb  2.7<L>  /  TBili  0.5  /  DBili  x   /  AST  19  /  ALT  6<L>  /  AlkPhos  99  12-07    LIVER FUNCTIONS - ( 07 Dec 2020 15:14 )  Alb: 2.7 g/dL / Pro: 5.6 g/dL / ALK PHOS: 99 U/L / ALT: 6 U/L / AST: 19 U/L / GGT: x           Urinalysis Basic - ( 07 Dec 2020 18:20 )  Color: Yellow / Appearance: Clear / SG: >1.050 / pH: x  Gluc: x / Ketone: Moderate  / Bili: Negative / Urobili: 2 mg/dL   Blood: x / Protein: 30 mg/dL / Nitrite: Negative   Leuk Esterase: Negative / RBC: 8 /hpf / WBC 3 /HPF   Sq Epi: x / Non Sq Epi: 10 /hpf / Bacteria: Negative    PT/INR - ( 07 Dec 2020 15:14 )   PT: 18.8 sec;   INR: 1.60 ratio    PTT - ( 07 Dec 2020 15:14 )  PTT:35.8 sec    IMAGING:  CT Abdomen and Pelvis w/ IV Cont (12.07.20 @ 17:44)    IMPRESSION:  - Right lower lobe consolidation and bilateral patchy lung opacities suspicious for multifocal pneumonia.  - Ill-defined gas-containing presacral fluid collection and/or phlegmon which tracks along to the left hip joint. Findings consistent with infection. MRI with contrast is recommended to further evaluate.    Xray Chest 1 View- PORTABLE-Urgent (12.07.20 @ 14:50)    Impression:  - The heart is slightly enlarged. Increased interstitial markings seen throughout both lungs compatible with pulmonary vascular congestion. No pleural effusion. No pneumothorax. Chronic lung changes cannot be ruled out. Labs, imaging and EKG tracing personally reviewed  LABS:                      7.0    18.72 )-----------( 389      ( 07 Dec 2020 15:14 )             24.6     142  |  101  |  24<H>  ----------------------------<  87  3.9   |  31  |  0.31<L>    Ca    9.0      07 Dec 2020 15:14    TPro  5.6<L>  /  Alb  2.7<L>  /  TBili  0.5  /  DBili  x   /  AST  19  /  ALT  6<L>  /  AlkPhos  99  12-07    LIVER FUNCTIONS - ( 07 Dec 2020 15:14 )  Alb: 2.7 g/dL / Pro: 5.6 g/dL / ALK PHOS: 99 U/L / ALT: 6 U/L / AST: 19 U/L / GGT: x           Urinalysis Basic - ( 07 Dec 2020 18:20 )  Color: Yellow / Appearance: Clear / SG: >1.050 / pH: x  Gluc: x / Ketone: Moderate  / Bili: Negative / Urobili: 2 mg/dL   Blood: x / Protein: 30 mg/dL / Nitrite: Negative   Leuk Esterase: Negative / RBC: 8 /hpf / WBC 3 /HPF   Sq Epi: x / Non Sq Epi: 10 /hpf / Bacteria: Negative    PT/INR - ( 07 Dec 2020 15:14 )   PT: 18.8 sec;   INR: 1.60 ratio    PTT - ( 07 Dec 2020 15:14 )  PTT:35.8 sec    IMAGING:  CT Abdomen and Pelvis w/ IV Cont (12.07.20 @ 17:44)    IMPRESSION:  - Right lower lobe consolidation and bilateral patchy lung opacities suspicious for multifocal pneumonia.  - Ill-defined gas-containing presacral fluid collection and/or phlegmon which tracks along to the left hip joint. Findings consistent with infection. MRI with contrast is recommended to further evaluate.    Xray Chest 1 View- PORTABLE-Urgent (12.07.20 @ 14:50)    Impression:  - The heart is slightly enlarged. Increased interstitial markings seen throughout both lungs compatible with pulmonary vascular congestion. No pleural effusion. No pneumothorax. Chronic lung changes cannot be ruled out.

## 2020-12-08 NOTE — DIETITIAN INITIAL EVALUATION ADULT. - CHIEF COMPLAINT
The patient is a 65y Female c Hx of of emphysema on home 2L NC, colon CA s/p distal ileum resection in 2016, ESBL UTI, zenker's diverticulum s/p repair in 2019 c/b vocal cord paralysis req GJ tube for feeding, RA, gastroparesis, GERD, anemia presenting from home hospice for weakness.

## 2020-12-08 NOTE — H&P ADULT - PROBLEM SELECTOR PLAN 3
- Per records on HIE pt w/ L vocal cord paralysis s/p Zenker Diverticulum repair s/p injection at OSH  - Consider ENT consult in AM for laryngoscopy to further assess need for injection   - S&S for speech therapy

## 2020-12-08 NOTE — H&P ADULT - PROBLEM SELECTOR PLAN 8
DVT ppx: Lovenox   Diet: NPO w/ tube feeds  Dispo: Pending clinical improvement/family discussion  Code Status: DNR/DNI

## 2020-12-08 NOTE — CONSULT NOTE ADULT - ASSESSMENT
66yo F w/ PMH of emphysema on home 2L NC, colon CA s/p distal ileum resection in 2016, ESBL UTI, zenker's diverticulum s/p repair in 2019 c/b vocal cord paralysis req GJ tube for feeding, RA, gastroparesis, GERD, anemia presenting from home hospice for weakness. Now presenting with hoarseness and dysphagia. Indirect laryngoscopy performed at bedside which was notable for L VC paresis, however pt with extremely poor effort during exam. 66yo F w/ PMH of emphysema on home 2L NC, colon CA s/p distal ileum resection in 2016, ESBL UTI, zenker's diverticulum s/p repair in 2019 c/b vocal cord paralysis req GJ tube for feeding, RA, gastroparesis, GERD, anemia presenting from home hospice for weakness. Now presenting with hoarseness and dysphagia. Indirect laryngoscopy performed at bedside which was notable for L VC paresis with small glottic gap, however pt with extremely poor effort during exam. 64yo F w/ PMH of emphysema on home 2L NC, colon CA s/p distal ileum resection in 2016, ESBL UTI, zenker's diverticulum s/p repair in 2019 c/b vocal cord paralysis and ultimately required GJ tube for feeding, RA, gastroparesis, GERD, anemia presenting from home hospice for weakness. Now presenting with hoarseness and dysphagia. Indirect laryngoscopy performed at bedside which was notable for L VC paresis with small glottic gap, however pt with extremely poor effort during exam.

## 2020-12-08 NOTE — CONSULT NOTE ADULT - SUBJECTIVE AND OBJECTIVE BOX
CC: hoarseness / dysphagia    HPI: 66yo F w/ PMH of emphysema on home 2L NC, colon CA s/p distal ileum resection in 2016, ESBL UTI, zenker's diverticulum s/p repair in 2019 c/b vocal cord paralysis req GJ tube for feeding, RA, gastroparesis, GERD, anemia presenting from home hospice for weakness. Pt was clinically improving with hospice. Now presenting with continued dysphagia and hoarseness. ENT consulted to evaluate for possible vocal cord injection. Unable to obtain further history from pt at the bedside.         PAST MEDICAL & SURGICAL HISTORY:  Vocal cord paralysis    Chronic GERD    Rheumatoid arthritis    Zenker diverticulum    UTI due to extended-spectrum beta lactamase (ESBL) producing Escherichia coli    Colon cancer    Emphysematous COPD    Candidal stomatitis    Malignant neoplasm of colon    Major depressive disorder    HTN (hypertension)    COPD (chronic obstructive pulmonary disease)    Encounter for gastrojejunal (GJ) tube placement    History of Nissen fundoplication    S/P removal of Zenker&#x27;s diverticulum    History of colon resection      Allergies    Levaquin (Unknown)  levofloxacin (Unknown)    Intolerances      MEDICATIONS  (STANDING):  enoxaparin Injectable 40 milliGRAM(s) SubCutaneous daily  famotidine    Tablet 20 milliGRAM(s) Oral two times a day  FLUoxetine Solution 20 milliGRAM(s) Oral daily  gabapentin   Solution 200 milliGRAM(s) Oral three times a day  methadone    Tablet 30 milliGRAM(s) Oral every 12 hours  piperacillin/tazobactam IVPB.. 3.375 Gram(s) IV Intermittent every 8 hours    MEDICATIONS  (PRN):  ALBUTerol    90 MICROgram(s) HFA Inhaler 2 Puff(s) Inhalation every 6 hours PRN Shortness of Breath and/or Wheezing      Social History: - From home hospice  - Previous tobacco use- 80 pack year hx, quit 2011  - Extensive EtOH abuse in the past, quit 20 yeas ago  - No illicit drug use    Family history: Family history of peripheral vascular disease  FH: heart failure  FH: rheumatoid arthritis.    ROS:   unable to obtain    Vital Signs Last 24 Hrs  T(C): 36.4 (08 Dec 2020 04:53), Max: 37.3 (07 Dec 2020 14:00)  T(F): 97.5 (08 Dec 2020 04:53), Max: 99.1 (07 Dec 2020 14:00)  HR: 95 (08 Dec 2020 04:53) (85 - 100)  BP: 126/76 (08 Dec 2020 04:53) (92/58 - 126/76)  BP(mean): --  RR: 18 (08 Dec 2020 04:53) (12 - 18)  SpO2: 97% (08 Dec 2020 04:53) (95% - 100%)                          6.7    16.61 )-----------( 365      ( 08 Dec 2020 07:22 )             23.8    12-08    141  |  101  |  18  ----------------------------<  115<H>  3.6   |  30  |  <0.30<L>    Ca    8.6      08 Dec 2020 07:22  Phos  2.4     12-08  Mg     1.6     12-08    TPro  5.3<L>  /  Alb  2.7<L>  /  TBili  0.4  /  DBili  x   /  AST  11  /  ALT  6<L>  /  AlkPhos  96  12-08   PT/INR - ( 07 Dec 2020 15:14 )   PT: 18.8 sec;   INR: 1.60 ratio         PTT - ( 07 Dec 2020 15:14 )  PTT:35.8 sec    PHYSICAL EXAM:  Gen: NAD, lethargic  Skin: No rashes, bruises, or lesions  Head: Normocephalic, Atraumatic  Face: no edema, erythema, or fluctuance. Parotid glands soft without mass  Eyes: no scleral injection  Nose: NC in place. Nares bilaterally patent, no discharge  Mouth: No Stridor / Drooling / Trismus.  Mucosa dry, tongue/uvula midline, oropharynx clear  Neck: Flat, supple, no lymphadenopathy, trachea midline, no masses  Lymphatic: No lymphadenopathy  Resp: breathing easily on NC, no stridor  CV: no peripheral edema/cyanosis  GI: nondistended   Peripheral vascular: no JVD or edema  Neuro: unable to evaluate        Fiberoptic Indirect laryngoscopy:  (Scope #2 used)  Reason for Laryngoscopy: dysphagia, hoarseness    Patient was unable to cooperate with mirror.  +L VC paresis. Copious thick secretions. Nasopharynx, oropharynx, and hypopharynx clear, no bleeding. Tongue base, posterior pharyngeal wall, vallecula, epiglottis, and subglottis appear normal. No erythema, edema, pooling of secretions, masses or lesions. Airway patent, no foreign body visualized. No glottic/supraglottic edema. Arytenoids, vestibular folds, ventricles, pyriform sinuses, and aryepiglottic folds appear normal bilaterally.        CC: hoarseness / dysphagia    HPI: 64yo F w/ PMH of emphysema on home 2L NC, colon CA s/p distal ileum resection in 2016, ESBL UTI, zenker's diverticulum s/p repair in 2019 c/b vocal cord paralysis req GJ tube for feeding, RA, gastroparesis, GERD, anemia presenting from home hospice for weakness. Pt was clinically improving with hospice. Now presenting with continued dysphagia and hoarseness. ENT consulted to evaluate for possible vocal cord injection. Unable to obtain further history from pt at the bedside.         PAST MEDICAL & SURGICAL HISTORY:  Vocal cord paralysis    Chronic GERD    Rheumatoid arthritis    Zenker diverticulum    UTI due to extended-spectrum beta lactamase (ESBL) producing Escherichia coli    Colon cancer    Emphysematous COPD    Candidal stomatitis    Malignant neoplasm of colon    Major depressive disorder    HTN (hypertension)    COPD (chronic obstructive pulmonary disease)    Encounter for gastrojejunal (GJ) tube placement    History of Nissen fundoplication    S/P removal of Zenker&#x27;s diverticulum    History of colon resection      Allergies    Levaquin (Unknown)  levofloxacin (Unknown)    Intolerances      MEDICATIONS  (STANDING):  enoxaparin Injectable 40 milliGRAM(s) SubCutaneous daily  famotidine    Tablet 20 milliGRAM(s) Oral two times a day  FLUoxetine Solution 20 milliGRAM(s) Oral daily  gabapentin   Solution 200 milliGRAM(s) Oral three times a day  methadone    Tablet 30 milliGRAM(s) Oral every 12 hours  piperacillin/tazobactam IVPB.. 3.375 Gram(s) IV Intermittent every 8 hours    MEDICATIONS  (PRN):  ALBUTerol    90 MICROgram(s) HFA Inhaler 2 Puff(s) Inhalation every 6 hours PRN Shortness of Breath and/or Wheezing      Social History: - From home hospice  - Previous tobacco use- 80 pack year hx, quit 2011  - Extensive EtOH abuse in the past, quit 20 yeas ago  - No illicit drug use    Family history: Family history of peripheral vascular disease  FH: heart failure  FH: rheumatoid arthritis.    ROS:   unable to obtain    Vital Signs Last 24 Hrs  T(C): 36.4 (08 Dec 2020 04:53), Max: 37.3 (07 Dec 2020 14:00)  T(F): 97.5 (08 Dec 2020 04:53), Max: 99.1 (07 Dec 2020 14:00)  HR: 95 (08 Dec 2020 04:53) (85 - 100)  BP: 126/76 (08 Dec 2020 04:53) (92/58 - 126/76)  BP(mean): --  RR: 18 (08 Dec 2020 04:53) (12 - 18)  SpO2: 97% (08 Dec 2020 04:53) (95% - 100%)                          6.7    16.61 )-----------( 365      ( 08 Dec 2020 07:22 )             23.8    12-08    141  |  101  |  18  ----------------------------<  115<H>  3.6   |  30  |  <0.30<L>    Ca    8.6      08 Dec 2020 07:22  Phos  2.4     12-08  Mg     1.6     12-08    TPro  5.3<L>  /  Alb  2.7<L>  /  TBili  0.4  /  DBili  x   /  AST  11  /  ALT  6<L>  /  AlkPhos  96  12-08   PT/INR - ( 07 Dec 2020 15:14 )   PT: 18.8 sec;   INR: 1.60 ratio         PTT - ( 07 Dec 2020 15:14 )  PTT:35.8 sec    PHYSICAL EXAM:  Gen: NAD, lethargic  Skin: No rashes, bruises, or lesions  Head: Normocephalic, Atraumatic  Face: no edema, erythema, or fluctuance. Parotid glands soft without mass  Eyes: no scleral injection  Nose: NC in place. Nares bilaterally patent, no discharge  Mouth: No Stridor / Drooling / Trismus.  Mucosa dry, tongue/uvula midline, oropharynx clear  Neck: Flat, supple, no lymphadenopathy, trachea midline, no masses  Lymphatic: No lymphadenopathy  Resp: breathing easily on NC, no stridor  CV: no peripheral edema/cyanosis  GI: nondistended   Peripheral vascular: no JVD or edema  Neuro: unable to evaluate        Fiberoptic Indirect laryngoscopy:  (Scope #2 used)  Reason for Laryngoscopy: dysphagia, hoarseness    Patient was unable to cooperate with mirror.  +L VC paresis with small glottic gap. Copious thick secretions. +Post cricoid edema and pachydermia consistent with gerd. Nasopharynx, oropharynx, and hypopharynx clear, no bleeding. Tongue base, posterior pharyngeal wall, vallecula, epiglottis, and subglottis appear normal. No erythema, edema, pooling of secretions, masses or lesions. Airway patent, no foreign body visualized. No glottic/supraglottic edema. Arytenoids, vestibular folds, ventricles, pyriform sinuses, and aryepiglottic folds appear normal bilaterally.        CC: hoarseness / dysphagia    HPI: 64yo F w/ PMH of emphysema on home 2L NC, colon CA s/p distal ileum resection in 2016, ESBL UTI, zenker's diverticulum s/p repair in 2019 c/b left vocal cord paralysis req GJ tube for feeding, RA, gastroparesis, GERD, anemia presenting from home hospice for weakness. Pt was clinically improving with hospice. Now presenting with continued dysphagia and hoarseness. ENT consulted to evaluate for possible vocal cord injection. Unable to obtain further history from pt at the bedside.         PAST MEDICAL & SURGICAL HISTORY:  Vocal cord paralysis    Chronic GERD    Rheumatoid arthritis    Zenker diverticulum    UTI due to extended-spectrum beta lactamase (ESBL) producing Escherichia coli    Colon cancer    Emphysematous COPD    Candidal stomatitis    Malignant neoplasm of colon    Major depressive disorder    HTN (hypertension)    COPD (chronic obstructive pulmonary disease)    Encounter for gastrojejunal (GJ) tube placement    History of Nissen fundoplication    S/P removal of Zenker&#x27;s diverticulum    History of colon resection      Allergies    Levaquin (Unknown)  levofloxacin (Unknown)    Intolerances      MEDICATIONS  (STANDING):  enoxaparin Injectable 40 milliGRAM(s) SubCutaneous daily  famotidine    Tablet 20 milliGRAM(s) Oral two times a day  FLUoxetine Solution 20 milliGRAM(s) Oral daily  gabapentin   Solution 200 milliGRAM(s) Oral three times a day  methadone    Tablet 30 milliGRAM(s) Oral every 12 hours  piperacillin/tazobactam IVPB.. 3.375 Gram(s) IV Intermittent every 8 hours    MEDICATIONS  (PRN):  ALBUTerol    90 MICROgram(s) HFA Inhaler 2 Puff(s) Inhalation every 6 hours PRN Shortness of Breath and/or Wheezing      Social History: - From home hospice  - Previous tobacco use- 80 pack year hx, quit 2011  - Extensive EtOH abuse in the past, quit 20 yeas ago  - No illicit drug use    Family history: Family history of peripheral vascular disease  FH: heart failure  FH: rheumatoid arthritis.    ROS:   unable to obtain    Vital Signs Last 24 Hrs  T(C): 36.4 (08 Dec 2020 04:53), Max: 37.3 (07 Dec 2020 14:00)  T(F): 97.5 (08 Dec 2020 04:53), Max: 99.1 (07 Dec 2020 14:00)  HR: 95 (08 Dec 2020 04:53) (85 - 100)  BP: 126/76 (08 Dec 2020 04:53) (92/58 - 126/76)  BP(mean): --  RR: 18 (08 Dec 2020 04:53) (12 - 18)  SpO2: 97% (08 Dec 2020 04:53) (95% - 100%)                          6.7    16.61 )-----------( 365      ( 08 Dec 2020 07:22 )             23.8    12-08    141  |  101  |  18  ----------------------------<  115<H>  3.6   |  30  |  <0.30<L>    Ca    8.6      08 Dec 2020 07:22  Phos  2.4     12-08  Mg     1.6     12-08    TPro  5.3<L>  /  Alb  2.7<L>  /  TBili  0.4  /  DBili  x   /  AST  11  /  ALT  6<L>  /  AlkPhos  96  12-08   PT/INR - ( 07 Dec 2020 15:14 )   PT: 18.8 sec;   INR: 1.60 ratio         PTT - ( 07 Dec 2020 15:14 )  PTT:35.8 sec    PHYSICAL EXAM:  Gen: NAD, lethargic  Skin: No rashes, bruises, or lesions  Head: Normocephalic, Atraumatic  Face: no edema, erythema, or fluctuance. Parotid glands soft without mass  Eyes: no scleral injection  Nose: NC in place. Nares bilaterally patent, no discharge  Mouth: No Stridor / Drooling / Trismus.  Mucosa dry, tongue/uvula midline, oropharynx clear  Neck: Flat, supple, no lymphadenopathy, trachea midline, no masses  Lymphatic: No lymphadenopathy  Resp: breathing easily on NC, no stridor  CV: no peripheral edema/cyanosis  GI: nondistended   Peripheral vascular: no JVD or edema  Neuro: unable to evaluate        Fiberoptic Indirect laryngoscopy:  (Scope #2 used)  Reason for Laryngoscopy: dysphagia, hoarseness    Patient was unable to cooperate with mirror.  +L VC paresis with small glottic gap. Copious thick secretions. +Post cricoid edema and pachydermia consistent with gerd. Nasopharynx, oropharynx, and hypopharynx clear, no bleeding. Tongue base, posterior pharyngeal wall, vallecula, epiglottis, and subglottis appear normal. No erythema, edema, pooling of secretions, masses or lesions. Airway patent, no foreign body visualized. No glottic/supraglottic edema. Arytenoids, vestibular folds, ventricles, pyriform sinuses, and aryepiglottic folds appear normal bilaterally.

## 2020-12-08 NOTE — SWALLOW BEDSIDE ASSESSMENT ADULT - SLP GENERAL OBSERVATIONS
Pt encountered awake and alert, upright in bed on 2/min via NC. A&Ox1. Dysphonic vocal quality suspect related to L vocal cord paresis. Pt unable to follow directives for evaluation purposes. Tangental speech. +Confusion. Pt unable to answer questions appropriately on exam.

## 2020-12-08 NOTE — DIETITIAN INITIAL EVALUATION ADULT. - ADD RECOMMEND
3) Recommend multivitamin and Vitamin B1 for refeeding risk. 4) RD to follow up for GOC. If PO diet, recommend low fiber. 5) Continue to trend labs, weight, skin integrity, and intake.

## 2020-12-08 NOTE — H&P ADULT - PROBLEM SELECTOR PLAN 1
- Pt w/ minimal nutrition at home hospice and cachectic on exam w/ elevated ketones on UA likely cause of weakness   - Keep NPO, pending S&S in AM   - Will resume feeds through PEG tube ON- discussed w/ daughter re pleasure feeds- will await S&S in AM before deciding to resume oral feeds

## 2020-12-08 NOTE — H&P ADULT - NSICDXPASTMEDICALHX_GEN_ALL_CORE_FT
PAST MEDICAL HISTORY:  Chronic GERD     Colon cancer     Emphysematous COPD     Rheumatoid arthritis     UTI due to extended-spectrum beta lactamase (ESBL) producing Escherichia coli     Vocal cord paralysis     Zenker diverticulum

## 2020-12-08 NOTE — DIETITIAN INITIAL EVALUATION ADULT. - REASON
Nutrition focused physical exam deferred as pt asleep and unarousable, however visually pt appears as stated above.

## 2020-12-08 NOTE — H&P ADULT - NSHPPHYSICALEXAM_GEN_ALL_CORE
VITALS:   T(C): 36.4 (12-07-20 @ 23:10), Max: 37.3 (12-07-20 @ 14:00)  HR: 86 (12-07-20 @ 23:10) (85 - 100)  BP: 123/82 (12-07-20 @ 23:10) (92/58 - 123/82)  RR: 18 (12-07-20 @ 23:10) (12 - 18)  SpO2: 97% (12-07-20 @ 23:10) (95% - 100%)    GEN: Bed bound, cachectic appearing    CHEST/LUNGS: CTABL, no crackles or rales noted  CARDIAC: RRR no M/R/G  ABDOMEN: Non-tender, non-distended, normoactive BS, GJ tube in LUQ, no drainage, or skin changes around site  : goldman catheter in place  MSK: No edema, no gross deformity of extremities  SKIN: No rashes, no petechiae, no vesicles  NEURO: Alert and awake but tired appearing; generalized weakness and limited speech  PSYCH: A&O x2 (Person and year) VITALS:   T(C): 36.4 (12-07-20 @ 23:10), Max: 37.3 (12-07-20 @ 14:00)  HR: 86 (12-07-20 @ 23:10) (85 - 100)  BP: 123/82 (12-07-20 @ 23:10) (92/58 - 123/82)  RR: 18 (12-07-20 @ 23:10) (12 - 18)  SpO2: 97% (12-07-20 @ 23:10) (95% - 100%)    GEN: Bed bound, cachectic appearing    CHEST/LUNGS: CTABL, no crackles or rales noted  CARDIAC: RRR no M/R/G  ABDOMEN: Non-tender, non-distended, normoactive BS, GJ tube in LUQ, no drainage, or skin changes around site  : goldman catheter in place  MSK: No edema, no gross deformity of extremities. Not following commands for ROM assessment  SKIN: No rashes, no petechiae, no vesicles  NEURO: Alert and awake but tired appearing; generalized weakness and limited speech  PSYCH: A&O x2 (Person and year)

## 2020-12-08 NOTE — PROGRESS NOTE ADULT - SUBJECTIVE AND OBJECTIVE BOX
NOTE INCOMPLETE/ IN PROGRESS    PROGRESS NOTE:   Authored by Dr. Cate Vaughan MD  Pager [facility]: [phone]; [facility]: [id number]     Patient is a 65y old  Female who presents with a chief complaint of Weakness (08 Dec 2020 03:10)      SUBJECTIVE / OVERNIGHT EVENTS:    ADDITIONAL REVIEW OF SYSTEMS:    MEDICATIONS  (STANDING):  enoxaparin Injectable 40 milliGRAM(s) SubCutaneous daily  famotidine    Tablet 20 milliGRAM(s) Oral two times a day  FLUoxetine Solution 20 milliGRAM(s) Oral daily  gabapentin   Solution 200 milliGRAM(s) Oral three times a day  methadone    Tablet 30 milliGRAM(s) Oral every 12 hours  piperacillin/tazobactam IVPB.. 3.375 Gram(s) IV Intermittent every 8 hours    MEDICATIONS  (PRN):  ALBUTerol    90 MICROgram(s) HFA Inhaler 2 Puff(s) Inhalation every 6 hours PRN Shortness of Breath and/or Wheezing      CAPILLARY BLOOD GLUCOSE        I&O's Summary    07 Dec 2020 07:01  -  08 Dec 2020 07:00  --------------------------------------------------------  IN: 180 mL / OUT: 390 mL / NET: -210 mL        PHYSICAL EXAM:  Vital Signs Last 24 Hrs  T(C): 36.4 (08 Dec 2020 04:53), Max: 37.3 (07 Dec 2020 14:00)  T(F): 97.5 (08 Dec 2020 04:53), Max: 99.1 (07 Dec 2020 14:00)  HR: 95 (08 Dec 2020 04:53) (85 - 100)  BP: 126/76 (08 Dec 2020 04:53) (92/58 - 126/76)  BP(mean): --  RR: 18 (08 Dec 2020 04:53) (12 - 18)  SpO2: 97% (08 Dec 2020 04:53) (95% - 100%)    GEN: Bed bound, cachectic appearing    CHEST/LUNGS: CTABL, no crackles or rales noted  CARDIAC: RRR no M/R/G  ABDOMEN: Non-tender, non-distended, normoactive BS, GJ tube in LUQ, no drainage, or skin changes around site  : goldman catheter in place  MSK: No edema, no gross deformity of extremities. Not following commands for ROM assessment  SKIN: No rashes, no petechiae, no vesicles  NEURO: Alert and awake but tired appearing; generalized weakness and limited speech  PSYCH: A&O x2 (Person and year)    LABS:                        7.0    18.72 )-----------( 389      ( 07 Dec 2020 15:14 )             24.6     12-07    142  |  101  |  24<H>  ----------------------------<  87  3.9   |  31  |  0.31<L>    Ca    9.0      07 Dec 2020 15:14    TPro  5.6<L>  /  Alb  2.7<L>  /  TBili  0.5  /  DBili  x   /  AST  19  /  ALT  6<L>  /  AlkPhos  99  12-07    PT/INR - ( 07 Dec 2020 15:14 )   PT: 18.8 sec;   INR: 1.60 ratio         PTT - ( 07 Dec 2020 15:14 )  PTT:35.8 sec      Urinalysis Basic - ( 07 Dec 2020 18:20 )    Color: Yellow / Appearance: Clear / SG: >1.050 / pH: x  Gluc: x / Ketone: Moderate  / Bili: Negative / Urobili: 2 mg/dL   Blood: x / Protein: 30 mg/dL / Nitrite: Negative   Leuk Esterase: Negative / RBC: 8 /hpf / WBC 3 /HPF   Sq Epi: x / Non Sq Epi: 10 /hpf / Bacteria: Negative          RADIOLOGY & ADDITIONAL TESTS:  Results Reviewed:   Imaging Personally Reviewed:  Electrocardiogram Personally Reviewed:    COORDINATION OF CARE:  Care Discussed with Consultants/Other Providers [Y/N]:  Prior or Outpatient Records Reviewed [Y/N]:   PROGRESS NOTE:   Authored by Dr. Cate Vaughan MD  Pager [facility]: [phone]; [facility]: [id number]     Patient is a 65y old  Female who presents with a chief complaint of Weakness (08 Dec 2020 03:10)      SUBJECTIVE / OVERNIGHT EVENTS:  Pt's daughter at bedside today. Requesting "non-invasive" onc w/u. According to daughter, pt has colon cancer diagnosis in 2016, but s/p distal ileum resection, pt was cancer free. Pall care c/s, as daughter would still want pt to be discharged back to hospice facility.     MEDICATIONS  (STANDING):  enoxaparin Injectable 40 milliGRAM(s) SubCutaneous daily  famotidine    Tablet 20 milliGRAM(s) Oral two times a day  FLUoxetine Solution 20 milliGRAM(s) Oral daily  gabapentin   Solution 200 milliGRAM(s) Oral three times a day  methadone    Tablet 30 milliGRAM(s) Oral every 12 hours  piperacillin/tazobactam IVPB.. 3.375 Gram(s) IV Intermittent every 8 hours    MEDICATIONS  (PRN):  ALBUTerol    90 MICROgram(s) HFA Inhaler 2 Puff(s) Inhalation every 6 hours PRN Shortness of Breath and/or Wheezing      CAPILLARY BLOOD GLUCOSE        I&O's Summary    07 Dec 2020 07:01  -  08 Dec 2020 07:00  --------------------------------------------------------  IN: 180 mL / OUT: 390 mL / NET: -210 mL        PHYSICAL EXAM:  Vital Signs Last 24 Hrs  T(C): 36.4 (08 Dec 2020 04:53), Max: 37.3 (07 Dec 2020 14:00)  T(F): 97.5 (08 Dec 2020 04:53), Max: 99.1 (07 Dec 2020 14:00)  HR: 95 (08 Dec 2020 04:53) (85 - 100)  BP: 126/76 (08 Dec 2020 04:53) (92/58 - 126/76)  BP(mean): --  RR: 18 (08 Dec 2020 04:53) (12 - 18)  SpO2: 97% (08 Dec 2020 04:53) (95% - 100%)    GEN: Bed bound, cachectic appearing    CHEST/LUNGS: CTABL, no crackles or rales noted  CARDIAC: RRR no M/R/G  ABDOMEN: Non-tender, non-distended, normoactive BS, GJ tube in LUQ, no drainage, or skin changes around site  : goldman catheter in place  MSK: No edema, no gross deformity of extremities. Not following commands for ROM assessment  SKIN: No rashes, no petechiae, no vesicles  NEURO: Alert and awake but tired appearing; generalized weakness and limited speech  PSYCH: A&O x2 (Person and year)    LABS:                        7.0    18.72 )-----------( 389      ( 07 Dec 2020 15:14 )             24.6     12-07    142  |  101  |  24<H>  ----------------------------<  87  3.9   |  31  |  0.31<L>    Ca    9.0      07 Dec 2020 15:14    TPro  5.6<L>  /  Alb  2.7<L>  /  TBili  0.5  /  DBili  x   /  AST  19  /  ALT  6<L>  /  AlkPhos  99  12-07    PT/INR - ( 07 Dec 2020 15:14 )   PT: 18.8 sec;   INR: 1.60 ratio         PTT - ( 07 Dec 2020 15:14 )  PTT:35.8 sec      Urinalysis Basic - ( 07 Dec 2020 18:20 )    Color: Yellow / Appearance: Clear / SG: >1.050 / pH: x  Gluc: x / Ketone: Moderate  / Bili: Negative / Urobili: 2 mg/dL   Blood: x / Protein: 30 mg/dL / Nitrite: Negative   Leuk Esterase: Negative / RBC: 8 /hpf / WBC 3 /HPF   Sq Epi: x / Non Sq Epi: 10 /hpf / Bacteria: Negative          RADIOLOGY & ADDITIONAL TESTS:  Results Reviewed:   Imaging Personally Reviewed:  Electrocardiogram Personally Reviewed:    COORDINATION OF CARE:  Care Discussed with Consultants/Other Providers [Y/N]:  Prior or Outpatient Records Reviewed [Y/N]:

## 2020-12-08 NOTE — H&P ADULT - NSHPSOCIALHISTORY_GEN_ALL_CORE
- From home hospice  - Previous tobacco use- 80 pack year hx, quit 2011  - Extensive EtOH abuse in the past, quit 20 yeas ago  - No illicit drug use

## 2020-12-08 NOTE — H&P ADULT - PROBLEM SELECTOR PLAN 6
- s/p distal ileum resection c/b leak at anastomosis site noted on recent CT imaging w/ enterococcal bacteremia   - Will continue to monitor leukocytosis and blood cx pending  - If recurrent enterococcus bacteremia will discuss w/ family on going infection in setting of no source control

## 2020-12-08 NOTE — H&P ADULT - PROBLEM SELECTOR PLAN 2
- RLL consolidation w/ leukocytosis w/ hx of vocal cord paralysis and oral intake at home   - c/w Zosyn   -  Pending S/S

## 2020-12-08 NOTE — H&P ADULT - PROBLEM SELECTOR PLAN 4
- Findings as above previously noted on imaging at OSH w/ MRI as well s/p I&D  - No surgical intervention  - Supportive therapy w/ ice packs and elevation  - Abx as above  - Pain control PRN

## 2020-12-08 NOTE — PROGRESS NOTE ADULT - ATTENDING COMMENTS
Seen, examined the patient this am with house staff  This sis a 65F with h/o emphysema on home 2L NC, colon CA s/p distal ileum resection in 2016, ESBL UTI, zenker's diverticulum s/p repair in 2019 c/b vocal cord paralysis req G-J tube for feeding, RA, gastroparesis, GERD, anemia presenting from home hospice for weakness ,found to have RLL pneumonia  Resting in bed, very weak, lethargic, afebrile  1. Sepsis due to pneumonia  2, Acute encephalopathy due to sepsis  3. Dysphagia, vocal cord paralysis, s/p G-J tube  4. Ca colon, s/p distal ileum resection in 2016    - Reviewed labs, imaging, c/w O2 support, IV anbx- Zosyn/Vancomycin, aspiration precaution, NPO. F/U blood and urine c/s    Nutrition consult for G-J feeding, S & S consult  - spoke to Hospice [phone], no plan for inpatient plan, they will be involve if reinstituted Hospice on d/c with family consent    DNR/DNI

## 2020-12-08 NOTE — SWALLOW BEDSIDE ASSESSMENT ADULT - ADDITIONAL RECOMMENDATIONS
maintain adequate oral hygiene.   Follow up with ENT Dr. Kim outpatient for vocal cord injection. Maintain adequate oral hygiene.   Follow up with ENT Dr. Kim outpatient for vocal cord injection.  This service will continue to follow to determine role of this service in Pt's care

## 2020-12-08 NOTE — SWALLOW BEDSIDE ASSESSMENT ADULT - SWALLOW EVAL: RECOMMENDED DIET
NPO, with non-oral nutrition/hydration/medications. NPO, with non-oral nutrition/hydration/medications. Consider palliative/GOC conversation to determine Pt/family wishes for pleasure feeds given Pt was previously on hospice eating. and daughter reported she would likely want to continue w/ PO. NPO, with non-oral nutrition/hydration/medications. Consider palliative/GOC conversation to determine Pt/family wishes for pleasure feeds given Pt was previously eating on hospice and daughter reported she would likely want to continue w/ PO. If Pt to resume comfort feeds, consider conservative texture of puree with honey thick liquids.

## 2020-12-08 NOTE — H&P ADULT - NSICDXFAMILYHX_GEN_ALL_CORE_FT
FAMILY HISTORY:  Family history of peripheral vascular disease  FH: heart failure  FH: rheumatoid arthritis

## 2020-12-08 NOTE — H&P ADULT - NSICDXPASTSURGICALHX_GEN_ALL_CORE_FT
PAST SURGICAL HISTORY:  Encounter for gastrojejunal (GJ) tube placement     History of colon resection     History of Nissen fundoplication     S/P removal of Zenker's diverticulum

## 2020-12-08 NOTE — DIETITIAN INITIAL EVALUATION ADULT. - ORAL INTAKE PTA/DIET HISTORY
Per previous RD note 11/28, pt was receiving Vital 1.2 75cc/hr x 20 hrs (1500mL) via PEJ PTA. Per conversation with SLP, pt was also on dysphagia diet (unknown consistency) at home as well. No known food allergies. Pt c Hx of swallowing issues, noted c vocal cord paralysis. No known micronutrient use. Noted as receiving minimal nutrition at home, placing pt at risk for refeeding.

## 2020-12-08 NOTE — H&P ADULT - PROBLEM SELECTOR PLAN 5
- On Spiriva and Albuterol PRN at home, will continue   - Maintain 2L NC and wean to an O2 goal sat of 88-92%

## 2020-12-08 NOTE — DIETITIAN INITIAL EVALUATION ADULT. - OTHER INFO
Dosing wt: 93.2 lbs. Unknown UBW. Per previous RD note, pt c wt of 95 lbs 11/28. Indicating 2 lb wt loss x1.5 weeks, suspect unintentional.     RD observed Vital AF running at 20cc/hr. Pt presently ordered for Vital AF at goal rate 50cc/hr x18 hours for total volume 900cc in 24 hours. No known recent N/V, diarrhea, or constipation. Unknown last BM.

## 2020-12-08 NOTE — SWALLOW BEDSIDE ASSESSMENT ADULT - COMMENTS
Hospital Course: In the ED Tmax 99.1, HR 92, BP 96/81, satting 99% on 5L NC. Labs notable for leukocytosis to 18 neutrophil predominant, and an anemia to 7 w/ UA showing mod ketones and protein. CT A/P done showing possible RLL pna and ill-defined gas containing presacral fluid collection which tracks along the L hip joint. CXR pulm congestion. She recieved a dose of Vanc/Zosyn, 2L IVF. General weakness likely related to minimal nutrition at home hospice and cachectic on exam w/ elevated ketones on UA. Discussed pleasure feeds with daughter. +S&S exam. Continues on Zosyn for aspiration pneumonia- RLL consolidation w/ leukocytosis w/ hx of vocal cord paralysis and oral intake at home. Vocal cord paralysis- Per records, Pt w/ L vocal cord paralysis s/p Zenker Diverticulum repair s/p injection at OSH. Consider ENT consult and Speech therapy.     CXR 12/7: Impression: The heart is slightly enlarged. Increased interstitial markings seen throughout both lungs compatible with pulmonary vascular congestion. No pleural effusion. No pneumothorax. Chronic lung changes cannot be ruled out.    CT Abd/Pelvis 12/7: Impression: Right lower lobe consolidation and bilateral patchy lung opacities suspicious for multifocal pneumonia. Ill-defined gas-containing presacral fluid collection and/or phlegmon which tracks along to the left hip joint. Findings consistent with infection. MRI with contrast is recommended to further evaluate. Hospital Course: In the ED Tmax 99.1, HR 92, BP 96/81, satting 99% on 5L NC. Labs notable for leukocytosis to 18 neutrophil predominant, and an anemia to 7 w/ UA showing mod ketones and protein. CT A/P done showing possible RLL pna and ill-defined gas containing presacral fluid collection which tracks along the L hip joint. CXR pulm congestion. She received a dose of Vanc/Zosyn, 2L IVF. General weakness likely related to minimal nutrition at home hospice and cachectic on exam w/ elevated ketones on UA. Discussed pleasure feeds with daughter. +S&S exam. Continues on Zosyn for aspiration pneumonia- RLL consolidation w/ leukocytosis w/ hx of vocal cord paralysis and oral intake at home. Vocal cord paralysis- Per records, Pt w/ L vocal cord paralysis s/p Zenker Diverticulum repair s/p injection at OSH. Consider ENT consult and Speech therapy.     CXR 12/7: Impression: The heart is slightly enlarged. Increased interstitial markings seen throughout both lungs compatible with pulmonary vascular congestion. No pleural effusion. No pneumothorax. Chronic lung changes cannot be ruled out.    CT Abd/Pelvis 12/7: Impression: Right lower lobe consolidation and bilateral patchy lung opacities suspicious for multifocal pneumonia. Ill-defined gas-containing presacral fluid collection and/or phlegmon which tracks along to the left hip joint. Findings consistent with infection. MRI with contrast is recommended to further evaluate.  SEE ADDENDUM FOR HISTORY PER DAUGHTER.. Hospital Course: In the ED Tmax 99.1, HR 92, BP 96/81, satting 99% on 5L NC. Labs notable for leukocytosis to 18 neutrophil predominant, and an anemia to 7 w/ UA showing mod ketones and protein. CT A/P done showing possible RLL pna and ill-defined gas containing presacral fluid collection which tracks along the L hip joint. CXR pulm congestion. She received a dose of Vanc/Zosyn, 2L IVF. General weakness likely related to minimal nutrition at home hospice and cachectic on exam w/ elevated ketones on UA. Discussed pleasure feeds with daughter. +S&S exam. Continues on Zosyn for aspiration pneumonia- RLL consolidation w/ leukocytosis w/ hx of vocal cord paralysis and oral intake at home. Vocal cord paralysis- Per records, Pt w/ L vocal cord paralysis s/p Zenker Diverticulum repair s/p injection at OSH. Consider ENT consult and Speech therapy.     CXR 12/7: Impression: The heart is slightly enlarged. Increased interstitial markings seen throughout both lungs compatible with pulmonary vascular congestion. No pleural effusion. No pneumothorax. Chronic lung changes cannot be ruled out.    CT Abd/Pelvis 12/7: Impression: Right lower lobe consolidation and bilateral patchy lung opacities suspicious for multifocal pneumonia. Ill-defined gas-containing presacral fluid collection and/or phlegmon which tracks along to the left hip joint. Findings consistent with infection. MRI with contrast is recommended to further evaluate.  Per ENT 12/8, Indirect laryngoscopy performed at bedside which was notable for L VC paresis with small glottic gap, however pt with extremely poor effort during exam. Not currently candidate for injection at this time given pts poor effort.   SEE ADDENDUM FOR HISTORY PER DAUGHTER.. Hospital Course: In the ED Tmax 99.1, HR 92, BP 96/81, satting 99% on 5L NC. Labs notable for leukocytosis to 18 neutrophil predominant, and an anemia to 7 w/ UA showing mod ketones and protein. CT A/P done showing possible RLL pna and ill-defined gas containing presacral fluid collection which tracks along the L hip joint. CXR pulm congestion. She received a dose of Vanc/Zosyn, 2L IVF. General weakness likely related to minimal nutrition at home hospice and cachectic on exam w/ elevated ketones on UA. Discussed pleasure feeds with daughter. +S&S exam. Continues on Zosyn for aspiration pneumonia- RLL consolidation w/ leukocytosis w/ hx of vocal cord paralysis and oral intake at home. Vocal cord paralysis- Per records, Pt w/ L vocal cord paralysis s/p Zenker Diverticulum repair s/p injection at OSH. Per ENT 12/8, Indirect laryngoscopy performed at bedside which was notable for L VC paresis with small glottic gap, however pt with extremely poor effort during exam. Not currently candidate for injection at this time given pts poor effort.     CXR 12/7: Impression: The heart is slightly enlarged. Increased interstitial markings seen throughout both lungs compatible with pulmonary vascular congestion. No pleural effusion. No pneumothorax. Chronic lung changes cannot be ruled out.    CT Abd/Pelvis 12/7: Impression: Right lower lobe consolidation and bilateral patchy lung opacities suspicious for multifocal pneumonia. Ill-defined gas-containing presacral fluid collection and/or phlegmon which tracks along to the left hip joint. Findings consistent with infection. MRI with contrast is recommended to further evaluate.    SEE ADDENDUM FOR HISTORY PER DAUGHTER.. Hospital Course: In the ED Tmax 99.1, HR 92, BP 96/81, satting 99% on 5L NC. Labs notable for leukocytosis to 18 neutrophil predominant, and an anemia to 7 w/ UA showing mod ketones and protein. CT A/P done showing possible RLL pna and ill-defined gas containing presacral fluid collection which tracks along the L hip joint. CXR pulm congestion. She received a dose of Vanc/Zosyn, 2L IVF. General weakness likely related to minimal nutrition at home hospice and cachectic on exam w/ elevated ketones on UA. Discussed pleasure feeds with daughter. +S&S exam. Continues on Zosyn for aspiration pneumonia- RLL consolidation w/ leukocytosis w/ hx of vocal cord paralysis and oral intake at home. Vocal cord paralysis- Per records, Pt w/ L vocal cord paralysis s/p Zenker Diverticulum repair s/p injection at OSH. Per ENT 12/8, Indirect laryngoscopy performed at bedside which was notable for L VC paresis with small glottic gap. Plan for vocal cord injection with Dr. Kim as outpatient upon discharge    CXR 12/7: Impression: The heart is slightly enlarged. Increased interstitial markings seen throughout both lungs compatible with pulmonary vascular congestion. No pleural effusion. No pneumothorax. Chronic lung changes cannot be ruled out.    CT Abd/Pelvis 12/7: Impression: Right lower lobe consolidation and bilateral patchy lung opacities suspicious for multifocal pneumonia. Ill-defined gas-containing presacral fluid collection and/or phlegmon which tracks along to the left hip joint. Findings consistent with infection. MRI with contrast is recommended to further evaluate.    SEE ADDENDUM FOR HISTORY PER DAUGHTER..

## 2020-12-08 NOTE — H&P ADULT - PROBLEM SELECTOR PLAN 7
- On home methadone 30mg q12 hrs, will continue  - Home Cymbalta and Prozac, will hold for now - On home methadone 30mg q12 hrs, will continue  - Home Cymbalta and Prozac, will hold for Cymbalta for now and c/w Prozac through PEG tube

## 2020-12-08 NOTE — PROGRESS NOTE ADULT - PROBLEM SELECTOR PLAN 7
- On home methadone 30mg q12 hrs, will continue  - Home Cymbalta and Prozac, will hold for Cymbalta for now and c/w Prozac through PEG tube

## 2020-12-08 NOTE — SWALLOW BEDSIDE ASSESSMENT ADULT - SLP PERTINENT HISTORY OF CURRENT PROBLEM
66yo F w/ PMH of emphysema on home 2L NC, colon CA s/p distal ileum resection in 2016, ESBL UTI, Zenker's diverticulum s/p repair in 2019 c/b vocal cord paralysis req GJ tube for feeding, RA, gastroparesis, GERD, anemia presenting from home hospice for weakness. Pt was prev admitted to [facility] from 11/27 to 12/3 for leaking PEG tube and found L hip ill-defined fluid collection w/ no sx intervention apart from I&D w/ minimal fluid output. Pt had a CT A/P which showed a leak from the rectosigmoid anastomosis from her previous operation w/ enterococcus bacteremia requiring a MICU stay for vasopressor support in the setting of septic shock. Pt continued to deteriorate and daughter decided on comfort measures and home hospice. At home Pt continued to improve. She was then restarted on Zosyn for abx coverage and allowed PO intake. Per daughter and hospice physician they decided Pt improving and to present to the hospital for continued management w/ IV abx and fluids if needed.

## 2020-12-08 NOTE — CHART NOTE - TREATMENT: THE FOLLOWING DIET HAS BEEN RECOMMENDED
Diet, NPO with Tube Feed:   Tube Feeding Modality: Gastro-Jejunostomy  Vital AF (VITALAFRTH)  Total Volume for 24 Hours (mL): 900  Continuous  Starting Tube Feed Rate {mL per Hour}: 10  Increase Tube Feed Rate by (mL): 10     Every 4 hours  Until Goal Tube Feed Rate (mL per Hour): 50  Tube Feed Duration (in Hours): 18  Tube Feed Start Time: 05:00 (12-08-20 @ 04:27) [Active]

## 2020-12-08 NOTE — PROGRESS NOTE ADULT - PROBLEM SELECTOR PLAN 2
- RLL consolidation w/ leukocytosis w/ hx of vocal cord paralysis and oral intake at home   - c/w Zosyn   -  Pending S/S - Pt w/ minimal nutrition at home hospice and cachectic on exam w/ elevated ketones on UA likely cause of weakness   - Keep NPO, pending S&S in AM   - Will resume feeds through PEG tube ON- discussed w/ daughter re pleasure feeds- will await S&S in AM before deciding to resume oral feeds - Pt w/ minimal nutrition at home hospice and cachectic on exam w/ elevated ketones on UA likely cause of weakness   - Keep NPO, pending S&S  - Will resume feeds through PEG tube ON- discussed w/ daughter re pleasure feeds- will await S&S before deciding to resume oral feeds

## 2020-12-08 NOTE — DIETITIAN INITIAL EVALUATION ADULT. - REASON INDICATOR FOR ASSESSMENT
Consult received and appreciated 12/8 for tube feeding recommendation and unintentional wt loss PTA.  Source: Medical record, SLP, and RN (pt asleep and unarousable x3 attempts, unable to reach family by phone)

## 2020-12-08 NOTE — H&P ADULT - NSHPREVIEWOFSYSTEMS_GEN_ALL_CORE
REVIEW OF SYSTEMS:    CONSTITUTIONAL: No weakness, fevers or chills  EYES/ENT: No visual changes;  No vertigo or throat pain   NECK: No pain or stiffness  RESPIRATORY: No cough, wheezing, hemoptysis; No shortness of breath  CARDIOVASCULAR: No chest pain or palpitations  GASTROINTESTINAL: No abdominal or epigastric pain. No nausea, vomiting, or hematemesis; No diarrhea or constipation. No melena or hematochezia.  GENITOURINARY: No dysuria, frequency or hematuria  NEUROLOGICAL: No numbness or weakness  SKIN: No rashes  All other review of systems is negative unless indicated above.

## 2020-12-08 NOTE — SWALLOW BEDSIDE ASSESSMENT ADULT - SWALLOW EVAL: DIAGNOSIS
Pt seen for clinical bedside swallow evaluation s/p admission from home hospice for weakness likely in the setting of malnutrition vs infection 2/2 presacral fluid collection vs RLL Aspiration PNA. Pt has history of zenker's diverticulum s/p repair in 2019 c/b vocal cord paralysis req GJ tube for feeding, Today, Pt presented with 1) Suspected pharyngeal dysphagia characterized by suspected delayed pharyngeal swallow trigger, multiple swallows, and s/s suggestive of laryngeal penetration/aspiration with conservative PO textures. 2) cognitive communication impairement

## 2020-12-08 NOTE — H&P ADULT - ATTENDING COMMENTS
RLL consolidation with clinical aspiration on PEG feed  - S&& eval, asp precaution, Zosyn for aspiration and recent enterococcus bacteremia  - further GOC discussion depending clinical course

## 2020-12-08 NOTE — DIETITIAN INITIAL EVALUATION ADULT. - PERTINENT LABORATORY DATA
12-08 Na 141 mmol/L Glu 115 mg/dL<H> K+ 3.6 mmol/L Cr  <0.30 mg/dL<L> BUN 18 mg/dL Phos 2.4 mg/dL<L> Alb 2.7 g/dL<L>  Hgb 6.7 g/dL<LL> Hct 23.8 %<L>  11-30 A1C 54.%   CAPILLARY BLOOD GLUCOSE  POCT Blood Glucose.: 120 mg/dL (08 Dec 2020 11:58)  POCT Blood Glucose.: 114 mg/dL (08 Dec 2020 08:44)

## 2020-12-08 NOTE — DIETITIAN INITIAL EVALUATION ADULT. - ENTERAL
1) Recommend Vital 1.5 at goal 40 cc/hr x24 hours to provide total volume 960cc, 1440 kcals, 65 gm protein, and 733cc free water. Meets 34 kcals/kg and 1.5 gm protein/kg based on dosing wt 42.3 kg via PEJ. Free water deferred to provider. Recommend starting feeds at 10cc/hr and increasing by 10 cc/hr q12 hours as pt tolerated and electrolytes WNL (Check phos, Mg, and K and replete as needed).

## 2020-12-08 NOTE — PROGRESS NOTE ADULT - PROBLEM SELECTOR PLAN 1
- Pt w/ minimal nutrition at home hospice and cachectic on exam w/ elevated ketones on UA likely cause of weakness   - Keep NPO, pending S&S in AM   - Will resume feeds through PEG tube ON- discussed w/ daughter re pleasure feeds- will await S&S in AM before deciding to resume oral feeds - RLL consolidation w/ leukocytosis w/ hx of vocal cord paralysis and oral intake at home   - c/w Zosyn   -  Pending S/S - RLL consolidation w/ leukocytosis w/ hx of vocal cord paralysis and oral intake at home   - c/w Zosyn, Vanc  -  Pending S/S

## 2020-12-09 NOTE — PROGRESS NOTE ADULT - SUBJECTIVE AND OBJECTIVE BOX
NOTE INCOMPLETE/ IN PROGRESS    PROGRESS NOTE:   Authored by Dr. Cate Vaughan MD  Pager [facility]: [phone]; [facility]: [id number]     Patient is a 65y old  Female who presents with a chief complaint of Weakness (08 Dec 2020 12:06)      SUBJECTIVE / OVERNIGHT EVENTS:    ADDITIONAL REVIEW OF SYSTEMS:    MEDICATIONS  (STANDING):  enoxaparin Injectable 40 milliGRAM(s) SubCutaneous daily  famotidine    Tablet 20 milliGRAM(s) Oral two times a day  FLUoxetine Solution 20 milliGRAM(s) Oral daily  gabapentin   Solution 200 milliGRAM(s) Oral three times a day  methadone    Tablet 30 milliGRAM(s) Oral every 12 hours  multivitamin 1 Tablet(s) Oral daily  piperacillin/tazobactam IVPB.. 3.375 Gram(s) IV Intermittent every 8 hours  thiamine 100 milliGRAM(s) Oral daily    MEDICATIONS  (PRN):  ALBUTerol    90 MICROgram(s) HFA Inhaler 2 Puff(s) Inhalation every 6 hours PRN Shortness of Breath and/or Wheezing      CAPILLARY BLOOD GLUCOSE      POCT Blood Glucose.: 122 mg/dL (08 Dec 2020 17:44)  POCT Blood Glucose.: 120 mg/dL (08 Dec 2020 11:58)  POCT Blood Glucose.: 114 mg/dL (08 Dec 2020 08:44)    I&O's Summary    08 Dec 2020 07:01  -  09 Dec 2020 07:00  --------------------------------------------------------  IN: 1010 mL / OUT: 750 mL / NET: 260 mL        PHYSICAL EXAM:  Vital Signs Last 24 Hrs  T(C): 36.5 (09 Dec 2020 04:38), Max: 37.2 (08 Dec 2020 20:25)  T(F): 97.7 (09 Dec 2020 04:38), Max: 98.9 (08 Dec 2020 20:25)  HR: 82 (09 Dec 2020 04:38) (80 - 96)  BP: 104/66 (09 Dec 2020 04:38) (98/63 - 107/69)  BP(mean): --  RR: 18 (09 Dec 2020 04:38) (18 - 18)  SpO2: 96% (09 Dec 2020 04:38) (93% - 96%)    GEN: Bed bound, cachectic appearing, somnolent, arousable to sternal rub  CHEST/LUNGS: CTABL, no crackles or rales noted  CARDIAC: RRR no M/R/G  ABDOMEN: Non-tender, non-distended, normoactive BS, GJ tube in LUQ, no drainage, or skin changes around site  : goldman catheter in place  MSK: No edema, no gross deformity of extremities. Not following commands for ROM assessment  SKIN: No rashes, no petechiae, no vesicles  NEURO: Somnolent, arousable to sternal rub; generalized weakness and limited speech when awake  PSYCH: A&Ox0 (very somnolent)    LABS:                        6.7    16.61 )-----------( 365      ( 08 Dec 2020 07:22 )             23.8     12-08    141  |  101  |  18  ----------------------------<  115<H>  3.6   |  30  |  <0.30<L>    Ca    8.6      08 Dec 2020 07:22  Phos  2.4     12-08  Mg     1.6     12-08    TPro  5.3<L>  /  Alb  2.7<L>  /  TBili  0.4  /  DBili  x   /  AST  11  /  ALT  6<L>  /  AlkPhos  96  12-08    PT/INR - ( 07 Dec 2020 15:14 )   PT: 18.8 sec;   INR: 1.60 ratio         PTT - ( 07 Dec 2020 15:14 )  PTT:35.8 sec      Urinalysis Basic - ( 07 Dec 2020 18:20 )    Color: Yellow / Appearance: Clear / SG: >1.050 / pH: x  Gluc: x / Ketone: Moderate  / Bili: Negative / Urobili: 2 mg/dL   Blood: x / Protein: 30 mg/dL / Nitrite: Negative   Leuk Esterase: Negative / RBC: 8 /hpf / WBC 3 /HPF   Sq Epi: x / Non Sq Epi: 10 /hpf / Bacteria: Negative        Culture - Urine (collected 08 Dec 2020 00:41)  Source: .Urine Clean Catch (Midstream)  Final Report (08 Dec 2020 21:21):    <10,000 CFU/mL Normal Urogenital Ami    Culture - Blood (collected 07 Dec 2020 20:47)  Source: .Blood Blood-Peripheral  Preliminary Report (08 Dec 2020 21:01):    No growth to date.    Culture - Blood (collected 07 Dec 2020 20:46)  Source: .Blood Blood-Peripheral  Preliminary Report (08 Dec 2020 21:01):    No growth to date.        RADIOLOGY & ADDITIONAL TESTS:  Results Reviewed:   Imaging Personally Reviewed:  Electrocardiogram Personally Reviewed:    COORDINATION OF CARE:  Care Discussed with Consultants/Other Providers [Y/N]:  Prior or Outpatient Records Reviewed [Y/N]:   PROGRESS NOTE:   Authored by Dr. Cate Vaughan MD  Pager [facility]: [phone]; [facility]: [phone]     Patient is a 65y old  Female who presents with a chief complaint of Weakness (08 Dec 2020 12:06)      SUBJECTIVE / OVERNIGHT EVENTS:  NAOE. Spoke w/ daughter Angelina at the bedside today. Discussed w/ her CT A/P not showing any mass and CEA was wnl, and that likely, her mother's presentation appears to be secondary to decreased PO intake following impaired swallowing w/ vocal cord dysfunction. Also discussed bacteremia and that we are starting vancomycin. Daughter in conversation w/ pall care attending, and possibly interested in transferring mom to the PCU here. Will f/u w/ pall care.     MEDICATIONS  (STANDING):  enoxaparin Injectable 40 milliGRAM(s) SubCutaneous daily  famotidine    Tablet 20 milliGRAM(s) Oral two times a day  FLUoxetine Solution 20 milliGRAM(s) Oral daily  gabapentin   Solution 200 milliGRAM(s) Oral three times a day  methadone    Tablet 30 milliGRAM(s) Oral every 12 hours  multivitamin 1 Tablet(s) Oral daily  piperacillin/tazobactam IVPB.. 3.375 Gram(s) IV Intermittent every 8 hours  thiamine 100 milliGRAM(s) Oral daily    MEDICATIONS  (PRN):  ALBUTerol    90 MICROgram(s) HFA Inhaler 2 Puff(s) Inhalation every 6 hours PRN Shortness of Breath and/or Wheezing      CAPILLARY BLOOD GLUCOSE  POCT Blood Glucose.: 122 mg/dL (08 Dec 2020 17:44)  POCT Blood Glucose.: 120 mg/dL (08 Dec 2020 11:58)  POCT Blood Glucose.: 114 mg/dL (08 Dec 2020 08:44)    I&O's Summary    08 Dec 2020 07:01  -  09 Dec 2020 07:00  --------------------------------------------------------  IN: 1010 mL / OUT: 750 mL / NET: 260 mL        PHYSICAL EXAM:  Vital Signs Last 24 Hrs  T(C): 36.5 (09 Dec 2020 04:38), Max: 37.2 (08 Dec 2020 20:25)  T(F): 97.7 (09 Dec 2020 04:38), Max: 98.9 (08 Dec 2020 20:25)  HR: 82 (09 Dec 2020 04:38) (80 - 96)  BP: 104/66 (09 Dec 2020 04:38) (98/63 - 107/69)  BP(mean): --  RR: 18 (09 Dec 2020 04:38) (18 - 18)  SpO2: 96% (09 Dec 2020 04:38) (93% - 96%)    GEN: Bed bound, cachectic appearing, somnolent, arousable to sternal rub  CHEST/LUNGS: CTABL, no crackles or rales noted  CARDIAC: RRR no M/R/G  ABDOMEN: Non-tender, non-distended, normoactive BS, GJ tube in LUQ, no drainage, or skin changes around site  : goldman catheter in place  MSK: No edema, no gross deformity of extremities. Not following commands for ROM assessment  SKIN: No rashes, no petechiae, no vesicles  NEURO: Somnolent, arousable to sternal rub; generalized weakness and limited speech when awake  PSYCH: A&Ox0 (very somnolent)    LABS:                        6.7    16.61 )-----------( 365      ( 08 Dec 2020 07:22 )             23.8     12-08    141  |  101  |  18  ----------------------------<  115<H>  3.6   |  30  |  <0.30<L>    Ca    8.6      08 Dec 2020 07:22  Phos  2.4     12-08  Mg     1.6     12-08    TPro  5.3<L>  /  Alb  2.7<L>  /  TBili  0.4  /  DBili  x   /  AST  11  /  ALT  6<L>  /  AlkPhos  96  12-08    PT/INR - ( 07 Dec 2020 15:14 )   PT: 18.8 sec;   INR: 1.60 ratio         PTT - ( 07 Dec 2020 15:14 )  PTT:35.8 sec      Urinalysis Basic - ( 07 Dec 2020 18:20 )    Color: Yellow / Appearance: Clear / SG: >1.050 / pH: x  Gluc: x / Ketone: Moderate  / Bili: Negative / Urobili: 2 mg/dL   Blood: x / Protein: 30 mg/dL / Nitrite: Negative   Leuk Esterase: Negative / RBC: 8 /hpf / WBC 3 /HPF   Sq Epi: x / Non Sq Epi: 10 /hpf / Bacteria: Negative        Culture - Urine (collected 08 Dec 2020 00:41)  Source: .Urine Clean Catch (Midstream)  Final Report (08 Dec 2020 21:21):    <10,000 CFU/mL Normal Urogenital Ami    Culture - Blood (collected 07 Dec 2020 20:47)  Source: .Blood Blood-Peripheral  Preliminary Report (08 Dec 2020 21:01):    No growth to date.    Culture - Blood (collected 07 Dec 2020 20:46)  Source: .Blood Blood-Peripheral  Preliminary Report (08 Dec 2020 21:01):    No growth to date.        RADIOLOGY & ADDITIONAL TESTS:  Results Reviewed:   Imaging Personally Reviewed:  Electrocardiogram Personally Reviewed:    COORDINATION OF CARE:  Care Discussed with Consultants/Other Providers [Y/N]:  Prior or Outpatient Records Reviewed [Y/N]:

## 2020-12-09 NOTE — CONSULT NOTE ADULT - PROBLEM SELECTOR RECOMMENDATION 4
- discussed at length with daughter (HCP), who states patient was at [facility] and appeared to be actively dying, but upon transfer to [facility] was much more alert. Family is realistic, and want her to pass peacefully, but feel she would not want to pass in a hospice facility. For now, they want to understand if her current AMS and malnutrition are medically reversible; ongoing GOC

## 2020-12-09 NOTE — PROGRESS NOTE ADULT - PROBLEM SELECTOR PLAN 1
- RLL consolidation w/ leukocytosis w/ hx of vocal cord paralysis and oral intake at home   - c/w Zosyn, Vanc  -  Pending S/S - RLL consolidation w/ leukocytosis w/ hx of vocal cord paralysis and oral intake at home   - c/w Zosyn, Vanc  - NPO per S&S

## 2020-12-09 NOTE — PROGRESS NOTE ADULT - ASSESSMENT
66yo F w/ PMH of emphysema on home 2L NC, colon CA s/p distal ileum resection in 2016, ESBL UTI, zenker's diverticulum s/p repair in 2019 c/b vocal cord paralysis req GJ tube for feeding, RA, gastroparesis, GERD, anemia presenting from home hospice for weakness likely in the setting of malnutrition vs infection 2/2 presacral fluid collection vs RLL Aspiration PNA

## 2020-12-09 NOTE — CONSULT NOTE ADULT - SUBJECTIVE AND OBJECTIVE BOX
HPI: 65F with PMH including emphysema on home 2L NC, colon CA s/p distal ileum resection in 2016, ESBL UTI, zenker's diverticulum s/p repair in 2019 c/b vocal cord paralysis s/p GJ tube for feeding, RA, gastroparesis, GERD, anemia presenting from hospice for weakness, likely from malnutrition vs infection (presacral fluid collection) vs RLL aspiration PNA. Patient is on broad-spectrum abx, and awaiting S&S evaluation. Has had enterococcal infection in pas, possible from leak at distal ileal anastomosis site. For pain, is on 30mg methadone BID at home. Daughter is HCP and is a pediatric RN in [place], DNR/DNI. Palliative called for GOC.    PERTINENT PM/SXH:   Colon cancer    Rheumatoid arthritis    Anemia    COPD (chronic obstructive pulmonary disease)    Vocal cord paralysis    Chronic GERD    Rheumatoid arthritis    Zenker diverticulum    UTI due to extended-spectrum beta lactamase (ESBL) producing Escherichia coli    Colon cancer    Emphysematous COPD    Candidal stomatitis    Malignant neoplasm of colon    Major depressive disorder    HTN (hypertension)    COPD (chronic obstructive pulmonary disease)      Encounter for gastrojejunal (GJ) tube placement    History of Nissen fundoplication    S/P removal of Zenker&#x27;s diverticulum    History of colon resection      FAMILY HISTORY:  Family history of peripheral vascular disease    FH: heart failure    FH: rheumatoid arthritis      ITEMS NOT CHECKED ARE NOT PRESENT    SOCIAL HISTORY:   Significant other/partner[ ]  Children[ X]  Judaism/Spirituality:  Substance hx:  [ ]   Tobacco hx:  [ ]   Alcohol hx: [ ]   Home Opioid hx:  [ ] I-Stop Reference No: [id number]    10/21/2020	10/30/2020	methadone hcl 10 mg tablet	180	30	Safdariusick, Alise	Insurance	[facility]  10/21/2020	10/24/2020	morphine sulfate ir 15 mg tab	51	30	Saftchick, Alise	Insurance	[facility]  10/01/2020	10/01/2020	methadone hcl 10 mg tablet	180	30	Saftchick, Alise	[facility]  08/10/2020	08/10/2020	methadone hcl 10 mg tablet	180	30	Constantino Watters	Insurance	[facility]  07/02/2020	07/02/2020	methadone hcl 10 mg tablet	90	15	Saftchick, [facility]    Living Situation: [X ]Home with mother and sister [ ]Long term care  [ ]Rehab [ ]Other  Home Services: [ ] HHA [ ] Visting RN [ ] Hospice    ADVANCE DIRECTIVES:    DNR  Yes    MOLST  [ ]  Living Will  [ ]   DECISION MAKER(s):  [X ] Health Care Proxy(s)  [ ] Surrogate(s)  [ ] Guardian           Name(s): Phone Number(s): chriss WREN (I)ADL(s) (prior to admission):  [place]: [ ]Total  [ ] Moderate [ ]Dependent    Allergies    Levaquin (Unknown)  levofloxacin (Unknown)    Intolerances    MEDICATIONS  (STANDING):  enoxaparin Injectable 40 milliGRAM(s) SubCutaneous daily  famotidine    Tablet 20 milliGRAM(s) Oral two times a day  FLUoxetine Solution 20 milliGRAM(s) Oral daily  gabapentin   Solution 200 milliGRAM(s) Oral three times a day  methadone    Tablet 30 milliGRAM(s) Oral every 12 hours  multivitamin 1 Tablet(s) Oral daily  piperacillin/tazobactam IVPB.. 3.375 Gram(s) IV Intermittent every 8 hours  thiamine 100 milliGRAM(s) Oral daily  vancomycin  IVPB 750 milliGRAM(s) IV Intermittent every 12 hours    MEDICATIONS  (PRN):  ALBUTerol    90 MICROgram(s) HFA Inhaler 2 Puff(s) Inhalation every 6 hours PRN Shortness of Breath and/or Wheezing    PRESENT SYMPTOMS: [ ]Unable to obtain due to poor mentation   Source if other than patient:  [ ]Family   [ ]Team     Pain: [ ]yes [X ]no  QOL impact -   Location -                    Aggravating factors -  Quality -  Radiation -  Timing-  Severity (0-10 scale):  Minimal acceptable level (0-10 scale):     CPOT:    https://www.[email or web address]/getattachment/[email or web address]/Critical-Care-Pain-Observation-Tool-(CPOT)      PAIN AD Score:     http://geriatrictoolkit.[place].[email or web address]/painad.pdf (press ctrl +  left click to view)    Dyspnea:                           [ ]Mild [ ]Moderate [ ]Severe  Anxiety:                             [ ]Mild [ ]Moderate [ ]Severe  Fatigue:                             [ ]Mild [ ]Moderate [ ]Severe  Nausea:                             [ ]Mild [ ]Moderate [ ]Severe  Loss of appetite:              [ ]Mild [ ]Moderate [ ]Severe  Constipation:                    [ ]Mild [ ]Moderate [ ]Severe    Other Symptoms:  [X ]All other review of systems negative     Palliative Performance Status Version 2:         %    http://[email or web address]/files/news/palliative_performance_scale_[email or web address].pdf  PHYSICAL EXAM:  Vital Signs Last 24 Hrs  T(C): 36.7 (09 Dec 2020 12:10), Max: 37.2 (08 Dec 2020 20:25)  T(F): 98 (09 Dec 2020 12:10), Max: 98.9 (08 Dec 2020 20:25)  HR: 93 (09 Dec 2020 12:10) (80 - 96)  BP: 116/75 (09 Dec 2020 12:10) (98/63 - 116/75)  BP(mean): --  RR: 18 (09 Dec 2020 12:10) (18 - 18)  SpO2: 95% (09 Dec 2020 12:10) (93% - 96%) I&O's Summary    08 Dec 2020 07:01  -  09 Dec 2020 07:00  --------------------------------------------------------  IN: 1010 mL / OUT: 750 mL / NET: 260 mL    09 Dec 2020 07:01  -  09 Dec 2020 16:16  --------------------------------------------------------  IN: 120 mL / OUT: 0 mL / NET: 120 mL    Limited exam for patient comfort due to concurrent phlebotomy  GENERAL:  [ ]Alert  [ ]Oriented x   [ ]Lethargic  [ ]Cachexia  [ ]Unarousable  [x ]Verbal  [ ]Non-Verbal  Behavioral:   [ ] Anxiety  [ ] Delirium [ ] Agitation [ ] Other  HEENT:  [X ]Normal   [ ]Dry mouth   [ ]ET Tube/Trach  [ ]Oral lesions  PULMONARY:   [ ]Clear [ ]Tachypnea  [ ]Audible excessive secretions [X] No labored breathing  [ ]Rhonchi        [ ]Right [ ]Left [ ]Bilateral  [ ]Crackles        [ ]Right [ ]Left [ ]Bilateral  [ ]Wheezing     [ ]Right [ ]Left [ ]Bilateral  [ ]Diminished breath sounds [ ]right [ ]left [ ]bilateral  CARDIOVASCULAR:    [ ]Regular [ ]Irregular [ ]Tachy  [ ]Roge [ ]Murmur [ ]Other  GASTROINTESTINAL:  [ ]Soft  [ ]Distended   [ ]+BS  [ ]Non tender [ ]Tender  [ ]PEG [ ]OGT/ NGT  Last BM:     GENITOURINARY:  [ ]Normal [ ] Incontinent   [ ]Oliguria/Anuria   [ ]Dalal  MUSCULOSKELETAL:   [ ]Normal   [ ]Weakness  [ ]Bed/Wheelchair bound [ ]Edema  NEUROLOGIC:   [ ]No focal deficits  [X ]Cognitive impairment  [ ]Dysphagia [ ]Dysarthria [ ]Paresis [ ]Other   SKIN:   [ ]Normal    [ ]Rash  [ ]Pressure ulcer(s)       Present on admission [ ]y [ ]n    CRITICAL CARE:  [ ] Shock Present  [ ]Septic [ ]Cardiogenic [ ]Neurologic [ ]Hypovolemic  [ ]  Vasopressors [ ]  Inotropes   [ ]Respiratory failure present [ ]Mechanical ventilation [ ]Non-invasive ventilatory support [ ]High flow  [ ]Acute  [ ]Chronic [ ]Hypoxic  [ ]Hypercarbic [ ]Other  [ ]Other organ failure     LABS: reviewed                        7.9    17.92 )-----------( 365      ( 09 Dec 2020 07:30 )             26.6   12-08    141  |  101  |  18  ----------------------------<  115<H>  3.6   |  30  |  <0.30<L>    Ca    8.6      08 Dec 2020 07:22  Phos  2.4     12-08  Mg     1.6     12-08    TPro  5.3<L>  /  Alb  2.7<L>  /  TBili  0.4  /  DBili  x   /  AST  11  /  ALT  6<L>  /  AlkPhos  96  12-08      Urinalysis Basic - ( 07 Dec 2020 18:20 )    Color: Yellow / Appearance: Clear / SG: >1.050 / pH: x  Gluc: x / Ketone: Moderate  / Bili: Negative / Urobili: 2 mg/dL   Blood: x / Protein: 30 mg/dL / Nitrite: Negative   Leuk Esterase: Negative / RBC: 8 /hpf / WBC 3 /HPF   Sq Epi: x / Non Sq Epi: 10 /hpf / Bacteria: Negative      RADIOLOGY & ADDITIONAL STUDIES:  CT A/P 12/7/2020    Right lower lobe consolidation and bilateral patchy lung opacities suspicious for multifocal pneumonia.    Ill-defined gas-containing presacral fluid collection and/or phlegmon which tracks along to the left hip joint. Findings consistent with infection. MRI with contrast is recommended to further evaluate.    PROTEIN CALORIE MALNUTRITION PRESENT: [ ]mild [ ]moderate [ ]severe [ ]underweight [ ]morbid obesity  https://www.andeal.org/vault/2440/web/files/ONC/Table_Clinical%20Characteristics%20to%20Document%20Malnutrition-White%20JV%20et%20al%202012.pdf    Height (cm): 152.4 (12-07-20 @ 23:10), 152.4 (11-27-20 @ 20:43)  Weight (kg): 42.3 (12-07-20 @ 23:10), 44.4 (11-27-20 @ 20:43)  BMI (kg/m2): 18.2 (12-07-20 @ 23:10), 19.1 (11-27-20 @ 20:43)    [ ]PPSV2 < or = to 30% [ ]significant weight loss  [ ]poor nutritional intake  [ ]anasarca     Albumin, Serum: 2.7 g/dL (12-08-20 @ 07:22)   [ ]Artificial Nutrition      REFERRALS:   [ ]Chaplaincy  [ ]Hospice  [ ]Child Life  [ ]Social Work  [ ]Case management [ ]Holistic Therapy     Goals of Care Document: CRISTIAN Monroe (12-02-20 @ 18:00)  Goals of Care Conversation:   Participants:  · Participants  Patient; Family  · Child(chioma)  Missy Thomas    Advance Directives:  · Does patient have Advance Directive  No  · Does Patient Have a Surrogate  Yes  · Surrogate's Name  Missy Thomas  · Surrogate's Phone Number  [phone]  · Caregiver:  yes  · Name  Sheeba Walker  · Phone Number  Stuart Merida    Conversation Discussion:  · Conversation  Diagnosis; Prognosis; Treatment Options; Palliative Care Referral  · Conversation Details  -diagnostic findings including anastomotic leak, thoracic discitis/osteo, sacral abscess  -treatment options including abx, surgery  -diagnostic options including CT scan, colonoscopy   -poor nutrition and overall health status   -comfort care, hospice    What Matters Most To Patient and Family:  · What matters most to patient and family  treatment, comfort    Personal Advance Directives Treatment Guidelines:   Treatment Guidelines:  · Decision Maker  Surrogate    Location of Discussion:   Duration of Advanced Care Planning Meeting:  · Time spent (in minutes)  25    Location of Discussion:  · Location of discussion  Telephone      Electronic Signatures:  Maximilian Monroe)  (Signed 02-Dec-2020 18:02)  	Authored: Goals of Care Conversation, Personal Advance Directives Treatment Guidelines, Location of Discussion      Last Updated: 02-Dec-2020 18:02 by Maximilian Monroe)        ______________  Henrique Gustafson MD   of Geriatric and Palliative Medicine  [facility]     Please page the following number for clinical matters between the hours of 9AM and 5PM   from Monday through Friday : [phone]    After 5PM and on weekends, please page: [phone]. The Geriatric and Palliative Medicine consult service has 24/7 coverage for medical recommendations, including for symptom management needs.

## 2020-12-09 NOTE — CONSULT NOTE ADULT - ASSESSMENT
65F with PMH including emphysema on home 2L NC, colon CA s/p distal ileum resection in 2016, ESBL UTI, zenker's diverticulum s/p repair in 2019 c/b vocal cord paralysis s/p GJ tube for feeding, RA, gastroparesis, GERD, anemia presenting from hospice for weakness, likely from malnutrition vs infection (presacral fluid collection) vs RLL aspiration PNA. Patient is on broad-spectrum abx, and awaiting S&S evaluation. Has had enterococcal infection in pas, possible from leak at distal ileal anastomosis site. For pain, is on 30mg methadone BID at home. Daughter is HCP and is a pediatric RN in [place], DNR/DNI. Palliative called for GOC.

## 2020-12-09 NOTE — CHART NOTE - NSCHARTNOTEFT_GEN_A_CORE
Brief Nutrition Follow-up  Patient seen for: refeeding risk follow-up    Source: EMR, ( )     Hospital course as per chart: 65y Female with PMH of emphysema on home 2L NC, colon ca s/p distal ileum resection in 2016, ESBL UTI, Zenker's diverticulum s/p repair (2019) complicated by vocal cord paralysis requiring GJ tube for feeding, RA, gastroparesis, GERD, and anemia who presented from home hospice for weakness, admitted 12/7. Chart reviewed, events noted. Per team: "weakness likely in the setting of malnutrition vs infection 2/2 presacral fluid collection vs RLL aspiration PNA." S/p Swallow Bedside Assessment yesterday (12/8), recommended NPO. Tube feeds started yesterday (12/8).     Diet, NPO with Tube Feed:   Tube Feeding Modality: Gastro-Jejunostomy  Vital 1.5 Hernan (VITAL1.5RTH)  Total Volume for 24 Hours (mL): 960  Continuous  Starting Tube Feed Rate {mL per Hour}: 20  Increase Tube Feed Rate by (mL): 10     Every 12 hours  Until Goal Tube Feed Rate (mL per Hour): 40  Tube Feed Duration (in Hours): 24  Tube Feed Start Time: 14:00 (12-08-20 @ 13:51) [Active]    Patient reports ( )    Pertinent Medications: MEDICATIONS  (STANDING):  enoxaparin Injectable 40 milliGRAM(s) SubCutaneous daily  famotidine    Tablet 20 milliGRAM(s) Oral two times a day  FLUoxetine Solution 20 milliGRAM(s) Oral daily  gabapentin   Solution 200 milliGRAM(s) Oral three times a day  methadone    Tablet 30 milliGRAM(s) Oral every 12 hours  multivitamin 1 Tablet(s) Oral daily  piperacillin/tazobactam IVPB.. 3.375 Gram(s) IV Intermittent every 8 hours  thiamine 100 milliGRAM(s) Oral daily    Pertinent Labs:  12-08 Na141 mmol/L Glu 115 mg/dL<H> K+ 3.6 mmol/L Cr  <0.30 mg/dL<L> BUN 18 mg/dL 12-08 Phos 2.4 mg/dL<L> 12-08 Alb 2.7 g/dL<L>    Previous Nutrition Diagnosis: Severe malnutrition, Swallowing difficulty  Nutrition Diagnosis is [x] ongoing  [ ] resolved [ ] not applicable   Care plan: [x] in progress [ ] achieved  Being addressed with: enteral nutrition     New Nutrition Diagnosis: [ ] not applicable    [ ] Inadequate Protein Energy Intake [ ]Inadequate Oral Intake [ ] Excessive Energy Intake     [ ] Underweight [ ] Increased Nutrient Needs [ ] Overweight/Obesity     [ ] Altered GI Function [ ] Unintended Weight Loss [ ] Food & Nutrition Related Knowledge Deficit[ ] Limited Adherence to nutrition related recommendations [ ] Malnutrition  [ ] other: Free text    Recommendations:      Monitoring and Evaluation: Monitor enteral nutrition provision and tolerance, weight, labs, skin, GI status, diet     RD remains available upon request and will continue to follow-up per protocol.   Terese Lockhart MS RD CDN pager #[phone] Brief Nutrition Follow-up  Patient seen for: refeeding risk follow-up    Source: EMR, RN    Hospital course as per chart: 65y Female with PMH of emphysema on home 2L NC, colon ca s/p distal ileum resection in 2016, ESBL UTI, Zenker's diverticulum s/p repair (2019) complicated by vocal cord paralysis requiring GJ tube for feeding, RA, gastroparesis, GERD, and anemia who presented from home hospice for weakness, admitted 12/7. Chart reviewed, events noted. Per team: "weakness likely in the setting of malnutrition vs infection 2/2 presacral fluid collection vs RLL aspiration PNA." S/p Swallow Bedside Assessment yesterday (12/8), recommended NPO. Tube feeds started yesterday (12/8).     Diet, NPO with Tube Feed:   Tube Feeding Modality: Gastro-Jejunostomy  Vital 1.5 Hernan (VITAL1.5RTH)  Total Volume for 24 Hours (mL): 960  Continuous  Starting Tube Feed Rate {mL per Hour}: 20  Increase Tube Feed Rate by (mL): 10     Every 12 hours  Until Goal Tube Feed Rate (mL per Hour): 40  Tube Feed Duration (in Hours): 24  Tube Feed Start Time: 14:00 (12-08-20 @ 13:51) [Active]    Patient sleeping soundly this morning, with team at attempts to visit this afternoon. Noted to be disoriented. Subjective information obtained from RN at this time. Observed tube feeds running at bedside (Vital 1.5 at goal rate 40 ml/hr). RN reports receiving pt at 40 ml/hr this morning. RN reports pt tolerating tube feeds with no issues at this time. RN reports unable to obtain blood for labs from pt x multiple attempts, phlebotomy called. Will continue to monitor labs.     Pertinent Medications: MEDICATIONS  (STANDING):  enoxaparin Injectable 40 milliGRAM(s) SubCutaneous daily  famotidine    Tablet 20 milliGRAM(s) Oral two times a day  FLUoxetine Solution 20 milliGRAM(s) Oral daily  gabapentin   Solution 200 milliGRAM(s) Oral three times a day  methadone    Tablet 30 milliGRAM(s) Oral every 12 hours  multivitamin 1 Tablet(s) Oral daily  piperacillin/tazobactam IVPB.. 3.375 Gram(s) IV Intermittent every 8 hours  thiamine 100 milliGRAM(s) Oral daily    Pertinent Labs:  12-08 Na141 mmol/L Glu 115 mg/dL<H> K+ 3.6 mmol/L Cr  <0.30 mg/dL<L> BUN 18 mg/dL 12-08 Phos 2.4 mg/dL<L> 12-08 Alb 2.7 g/dL<L>    Previous Nutrition Diagnosis: Severe malnutrition, Swallowing difficulty  Nutrition Diagnosis is [x] ongoing  [ ] resolved [ ] not applicable   Care plan: [x] in progress [ ] achieved  Being addressed with: enteral nutrition     New Nutrition Diagnosis: not applicable    Recommendations:  1) Recommend decreasing tube feed to 10 ml/hr until able to assess electrolytes - pt at risk for refeeding syndrome.   Once able to assess electrolytes and electrolytes WNL, recommend increasing Vital 1.5 by 10 ml/hr q12 hours (pending tolerance and electrolytes WNL) to goal rate of 40 ml/hr x 24 hours.   At goal, this regimen provides 960 ml total volume, 1440 kcal, 65 g protein, and 733 ml free water. Meets 34 kcal/kg, 1.5 g/kg protein based on dosing wt 42.3 kg. Defer additional free water to team. RD remains available to adjust tube feed regimen PRN.  2) Pt remains at risk for refeeding syndrome  - Continue multivitamin and thiamine supplementation if no medical contraindications  - Monitor electrolytes (potassium, phosphorus, and magnesium) before advancing feeds and PRN after    Monitoring and Evaluation: Monitor enteral nutrition provision and tolerance, weight, labs, skin, GI status, diet     RD remains available upon request and will continue to follow-up per protocol.   Terese Lockhart MS RD CDN pager #[phone] Brief Nutrition Follow-up  Patient seen for: refeeding risk follow-up    Source: EMR, RN    Hospital course as per chart: 65y Female with PMH of emphysema on home 2L NC, colon ca s/p distal ileum resection in 2016, ESBL UTI, Zenker's diverticulum s/p repair (2019) complicated by vocal cord paralysis requiring GJ tube for feeding, RA, gastroparesis, GERD, and anemia who presented from home hospice for weakness, admitted 12/7. Chart reviewed, events noted. Per team: "weakness likely in the setting of malnutrition vs infection 2/2 presacral fluid collection vs RLL aspiration PNA." S/p Swallow Bedside Assessment yesterday (12/8), recommended NPO. Tube feeds started yesterday (12/8).     Diet, NPO with Tube Feed:   Tube Feeding Modality: Gastro-Jejunostomy  Vital 1.5 Hernan (VITAL1.5RTH)  Total Volume for 24 Hours (mL): 960  Continuous  Starting Tube Feed Rate {mL per Hour}: 20  Increase Tube Feed Rate by (mL): 10     Every 12 hours  Until Goal Tube Feed Rate (mL per Hour): 40  Tube Feed Duration (in Hours): 24  Tube Feed Start Time: 14:00 (12-08-20 @ 13:51) [Active]    Patient sleeping soundly this morning, with team at attempts to visit this afternoon. Noted to be disoriented. Subjective information obtained from RN at this time. Observed tube feeds running at bedside (Vital 1.5 at goal rate 40 ml/hr). RN reports receiving pt at 40 ml/hr this morning. RN reports pt tolerating tube feeds with no issues at this time. RN reports unable to obtain blood for labs from pt x multiple attempts, phlebotomy called. Will continue to monitor labs.     Pertinent Medications: MEDICATIONS  (STANDING):  enoxaparin Injectable 40 milliGRAM(s) SubCutaneous daily  famotidine    Tablet 20 milliGRAM(s) Oral two times a day  FLUoxetine Solution 20 milliGRAM(s) Oral daily  gabapentin   Solution 200 milliGRAM(s) Oral three times a day  methadone    Tablet 30 milliGRAM(s) Oral every 12 hours  multivitamin 1 Tablet(s) Oral daily  piperacillin/tazobactam IVPB.. 3.375 Gram(s) IV Intermittent every 8 hours  thiamine 100 milliGRAM(s) Oral daily    Pertinent Labs:  12-08 Na141 mmol/L Glu 115 mg/dL<H> K+ 3.6 mmol/L Cr  <0.30 mg/dL<L> BUN 18 mg/dL 12-08 Phos 2.4 mg/dL<L> 12-08 Alb 2.7 g/dL<L>    Previous Nutrition Diagnosis: Severe malnutrition, Swallowing difficulty  Nutrition Diagnosis is [x] ongoing  [ ] resolved [ ] not applicable   Care plan: [x] in progress [ ] achieved  Being addressed with: enteral nutrition     New Nutrition Diagnosis: not applicable    Recommendations:  1) Recommend decreasing tube feed to 20 ml/hr until able to assess electrolytes - pt at risk for refeeding syndrome.   Once able to assess electrolytes and electrolytes WNL, recommend increasing Vital 1.5 by 10 ml/hr q12 hours (pending tolerance and electrolytes WNL) to goal rate of 40 ml/hr x 24 hours.   At goal, this regimen provides 960 ml total volume, 1440 kcal, 65 g protein, and 733 ml free water. Meets 34 kcal/kg, 1.5 g/kg protein based on dosing wt 42.3 kg. Defer additional free water to team. RD remains available to adjust tube feed regimen PRN.  2) Pt remains at risk for refeeding syndrome  - Continue multivitamin and thiamine supplementation if no medical contraindications  - Monitor electrolytes (potassium, phosphorus, and magnesium) before advancing feeds and PRN after    Monitoring and Evaluation: Monitor enteral nutrition provision and tolerance, weight, labs, skin, GI status, diet     RD remains available upon request and will continue to follow-up per protocol.   Terese Lockhart MS RD CDN pager #[phone] Brief Nutrition Follow-up  Patient seen for: refeeding risk follow-up    Source: EMR, RN    Hospital course as per chart: 65y Female with PMH of emphysema on home 2L NC, colon ca s/p distal ileum resection in 2016, ESBL UTI, Zenker's diverticulum s/p repair (2019) complicated by vocal cord paralysis requiring GJ tube for feeding, RA, gastroparesis, GERD, and anemia who presented from home hospice for weakness, admitted 12/7. Chart reviewed, events noted. Per team: "weakness likely in the setting of malnutrition vs infection 2/2 presacral fluid collection vs RLL aspiration PNA." S/p Swallow Bedside Assessment yesterday (12/8), recommended NPO. Tube feeds started yesterday (12/8).     Diet, NPO with Tube Feed:   Tube Feeding Modality: Gastro-Jejunostomy  Vital 1.5 Hernan (VITAL1.5RTH)  Total Volume for 24 Hours (mL): 960  Continuous  Starting Tube Feed Rate {mL per Hour}: 20  Increase Tube Feed Rate by (mL): 10     Every 12 hours  Until Goal Tube Feed Rate (mL per Hour): 40  Tube Feed Duration (in Hours): 24  Tube Feed Start Time: 14:00 (12-08-20 @ 13:51) [Active]    Patient sleeping soundly this morning, with team at attempts to visit this afternoon. Noted to be disoriented. Subjective information obtained from RN at this time. Observed tube feeds running at bedside (Vital 1.5 at goal rate 40 ml/hr). RN reports receiving pt at 40 ml/hr this morning. RN reports pt tolerating tube feeds with no issues at this time. RN reports unable to obtain blood for labs from pt x multiple attempts, phlebotomy called. Will continue to monitor labs.     Pertinent Medications: MEDICATIONS  (STANDING):  enoxaparin Injectable 40 milliGRAM(s) SubCutaneous daily  famotidine    Tablet 20 milliGRAM(s) Oral two times a day  FLUoxetine Solution 20 milliGRAM(s) Oral daily  gabapentin   Solution 200 milliGRAM(s) Oral three times a day  methadone    Tablet 30 milliGRAM(s) Oral every 12 hours  multivitamin 1 Tablet(s) Oral daily  piperacillin/tazobactam IVPB.. 3.375 Gram(s) IV Intermittent every 8 hours  thiamine 100 milliGRAM(s) Oral daily    Pertinent Labs:  12-08 Na141 mmol/L Glu 115 mg/dL<H> K+ 3.6 mmol/L Cr  <0.30 mg/dL<L> BUN 18 mg/dL 12-08 Phos 2.4 mg/dL<L> 12-08 Alb 2.7 g/dL<L>    Previous Nutrition Diagnosis: Severe malnutrition, Swallowing difficulty  Nutrition Diagnosis is [x] ongoing  [ ] resolved [ ] not applicable   Care plan: [x] in progress [ ] achieved  Being addressed with: enteral nutrition     New Nutrition Diagnosis: not applicable    Recommendations:  1) Recommend decreasing tube feed to 20 ml/hr until able to assess electrolytes - pt at risk for refeeding syndrome.   Once able to assess electrolytes and electrolytes WNL, recommend increasing Vital 1.5 by 10 ml/hr q12 hours (pending tolerance and electrolytes WNL) to goal rate of 40 ml/hr x 24 hours.   At goal, this regimen provides 960 ml total volume, 1440 kcal, 65 g protein, and 733 ml free water. Meets 34 kcal/kg, 1.5 g/kg protein based on dosing wt 42.3 kg. Defer additional free water to team. RD remains available to adjust tube feed regimen PRN.  2) Pt remains at risk for refeeding syndrome  - Continue multivitamin and thiamine supplementation if no medical contraindications  - Monitor electrolytes (potassium, phosphorus, and magnesium) before advancing feeds and PRN after    Monitoring and Evaluation: Monitor enteral nutrition provision and tolerance, weight, labs, skin, GI status, diet     Discussed with Team 4.  RD remains available upon request and will continue to follow-up per protocol.   Terese Lockhart MS RD CDN pager #[phone]

## 2020-12-09 NOTE — PROGRESS NOTE ADULT - PROBLEM SELECTOR PLAN 2
- Pt w/ minimal nutrition at home hospice and cachectic on exam w/ elevated ketones on UA likely cause of weakness   - Keep NPO, pending S&S  - Will resume feeds through PEG tube ON- discussed w/ daughter re pleasure feeds- will await S&S before deciding to resume oral feeds - Pt w/ minimal nutrition at home hospice and cachectic on exam w/ elevated ketones on UA likely cause of weakness   - Keep NPO per S&S eval  - Will resume feeds through PEG tube ON- discussed w/ daughter re pleasure feeds- will await S&S before deciding to resume oral feeds

## 2020-12-09 NOTE — PROGRESS NOTE ADULT - ATTENDING COMMENTS
Seen, examined the patient this am with house staff  This is a 65F with h/o emphysema on home 2L NC, colon CA s/p distal ileum resection in 2016, ESBL UTI, zenker's diverticulum s/p repair in 2019 c/b vocal cord paralysis req G-J tube for feeding, RA, gastroparesis, GERD, anemia presenting from home hospice for weakness ,found to have RLL pneumonia   More alert today, resting in bed, very weak, afebrile  1. Sepsis due to pneumonia  2, Acute encephalopathy due to sepsis  3. Dysphagia, vocal cord paralysis, s/p G-J tube  4. Ca colon, s/p distal ileum resection in 2016    - Reviewed labs- WBC 17, blood c/s shows GPC in pair, chain and cluster.    c/w O2 support, IV anbx- Zosyn/Vancomycin, aspiration precaution, NPO. F/U repeat blood c/s ( done today)    Nutrition consult for G-J feeding, S & S consult  - spoke to Hospice [phone], no plan for inpatient plan, they will be involve if reinstituted Hospice on d/c with family consent    DNR/DNI.  ** Family is aware of plan from the team

## 2020-12-09 NOTE — PROGRESS NOTE ADULT - PROBLEM SELECTOR PLAN 6
cholecystostomy tube placement complete. Pt tolerated well. VSS. No signs or symptoms of distress noted. Pt will be transferred to [facility] bed 2 and verbal report will be given to [facility] RN and report called to floor RN May..      - s/p distal ileum resection c/b leak at anastomosis site noted on recent CT imaging w/ enterococcal bacteremia   - Will continue to monitor leukocytosis and blood cx pending  - If recurrent enterococcus bacteremia will discuss w/ family on going infection in setting of no source control

## 2020-12-10 NOTE — CONSULT NOTE ADULT - ASSESSMENT
Vascular & Interventional Radiology Brief Consult Note    Evaluate for Procedure: pelvic/left gluteal drain    HPI: 65y Female with extensive soft tissue infection    Allergies: Levaquin (Unknown)  levofloxacin (Unknown)    Medications (Abx/Cardiac/Anticoagulation/Blood Products)  enoxaparin Injectable: 40 milliGRAM(s) SubCutaneous (12-10 @ 12:28)  piperacillin/tazobactam IVPB..: 25 mL/Hr IV Intermittent (12-10 @ 06:33)  vancomycin  IVPB: 250 mL/Hr IV Intermittent (12-10 @ 06:33)    Data:    T(C): 36.9  HR: 86  BP: 109/74  RR: 18  SpO2: 96%    -WBC 17.71 / HgB 8.5 / Hct 27.9 / Plt 390  -Na 134 / Cl 94 / BUN 8 / Glucose 69  -K 4.1 / CO2 30 / Cr <0.30  -ALT -- / Alk Phos -- / T.Bili --  -INR 1.60 / PTT 35.8    Imaging: minimal area of inflammation and subq air near the left gluteal region extending into pelvis, unchanged compared to prior    Assessment/Plan:   -65y Female with minimal area of inflammation and subq air near the left gluteal region extending into pelvis, unchanged compared to   -No drainable fluid collection.

## 2020-12-10 NOTE — PROGRESS NOTE ADULT - SUBJECTIVE AND OBJECTIVE BOX
NOTE INCOMPLETE/ IN PROGRESS    PROGRESS NOTE:   Authored by Dr. Cate Vaughan MD  Pager [facility]: [phone]; [facility]: [id number]     Patient is a 65y old  Female who presents with a chief complaint of Weakness (09 Dec 2020 16:13)      SUBJECTIVE / OVERNIGHT EVENTS:    ADDITIONAL REVIEW OF SYSTEMS:    MEDICATIONS  (STANDING):  enoxaparin Injectable 40 milliGRAM(s) SubCutaneous daily  famotidine    Tablet 20 milliGRAM(s) Oral two times a day  FLUoxetine Solution 20 milliGRAM(s) Oral daily  gabapentin   Solution 200 milliGRAM(s) Oral three times a day  methadone    Tablet 30 milliGRAM(s) Oral every 12 hours  multivitamin 1 Tablet(s) Oral daily  piperacillin/tazobactam IVPB.. 3.375 Gram(s) IV Intermittent every 8 hours  thiamine 100 milliGRAM(s) Oral daily  vancomycin  IVPB 750 milliGRAM(s) IV Intermittent every 12 hours    MEDICATIONS  (PRN):  ALBUTerol    90 MICROgram(s) HFA Inhaler 2 Puff(s) Inhalation every 6 hours PRN Shortness of Breath and/or Wheezing      CAPILLARY BLOOD GLUCOSE      POCT Blood Glucose.: 109 mg/dL (09 Dec 2020 11:44)    I&O's Summary    09 Dec 2020 07:01  -  10 Dec 2020 07:00  --------------------------------------------------------  IN: 2120 mL / OUT: 1350 mL / NET: 770 mL        PHYSICAL EXAM:  Vital Signs Last 24 Hrs  T(C): 36.6 (10 Dec 2020 05:09), Max: 37.5 (09 Dec 2020 21:02)  T(F): 97.9 (10 Dec 2020 05:09), Max: 99.5 (09 Dec 2020 21:02)  HR: 94 (10 Dec 2020 05:09) (89 - 94)  BP: 117/76 (10 Dec 2020 05:09) (116/75 - 145/69)  BP(mean): --  RR: 18 (10 Dec 2020 05:09) (18 - 20)  SpO2: 98% (10 Dec 2020 05:09) (94% - 98%)    GEN: Bed bound, cachectic appearing, somnolent, arousable to sternal rub  CHEST/LUNGS: CTABL, no crackles or rales noted  CARDIAC: RRR no M/R/G  ABDOMEN: Non-tender, non-distended, normoactive BS, GJ tube in LUQ, no drainage, or skin changes around site  : goldman catheter in place  MSK: No edema, no gross deformity of extremities. Not following commands for ROM assessment  SKIN: No rashes, no petechiae, no vesicles  NEURO: Somnolent, arousable to sternal rub; generalized weakness and limited speech when awake  PSYCH: A&Ox0 (very somnolent)    LABS:                        7.9    17.92 )-----------( 365      ( 09 Dec 2020 07:30 )             26.6         Culture - Urine (collected 08 Dec 2020 00:41)  Source: .Urine Clean Catch (Midstream)  Final Report (08 Dec 2020 21:21):    <10,000 CFU/mL Normal Urogenital Ami    Culture - Blood (collected 07 Dec 2020 20:47)  Source: .Blood Blood-Peripheral  Preliminary Report (08 Dec 2020 21:01):    No growth to date.    Culture - Blood (collected 07 Dec 2020 20:46)  Source: .Blood Blood-Peripheral  Gram Stain (09 Dec 2020 13:50):    Growth in aerobic bottle: Gram positive cocci in pairs, chains and    clusters  Preliminary Report (09 Dec 2020 13:49):    Growth in aerobic bottle: Gram positive cocci in pairs, chains and    clusters    "Due to technical problems, Proteus sp. will Not be reported as part of    the BCID panel until further notice"    ***Blood Panel PCR results on this specimen are available    approximately 3 hours after the Gram stain result.***    Gram stain, PCR, and/or culture results may not always    correspond due to difference in methodologies.    ************************************************************    This PCR assay was performed using [employer].    The following targets are tested for: Enterococcus,    vancomycin resistant enterococci, Listeria monocytogenes,    coagulase negative staphylococci, S. aureus,    methicillin resistant S. aureus, Streptococcus agalactiae    (Group B), S. pneumoniae, S. pyogenes (Group A),    Acinetobacter baumannii, Enterobacter cloacae, E. coli,    Klebsiella oxytoca, K. pneumoniae, Proteus sp.,    Serratia marcescens, Haemophilus influenzae,    Neisseria meningitidis, Pseudomonas aeruginosa, Candida    albicans, C. glabrata, C krusei, C parapsilosis,    C. tropicalis and the KPC resistance gene.  Organism: Blood Culture PCR (09 Dec 2020 15:38)  Organism: Blood Culture PCR (09 Dec 2020 15:38)        RADIOLOGY & ADDITIONAL TESTS:  Results Reviewed:   Imaging Personally Reviewed:  Electrocardiogram Personally Reviewed:    COORDINATION OF CARE:  Care Discussed with Consultants/Other Providers [Y/N]:  Prior or Outpatient Records Reviewed [Y/N]:   PROGRESS NOTE:   Authored by Dr. Cate Vaughan MD  Pager [facility]: [phone]; [facility]: [id number]     Patient is a 65y old  Female who presents with a chief complaint of Weakness (09 Dec 2020 16:13)      SUBJECTIVE / OVERNIGHT EVENTS:  Pt more awake, alert and responsive today. Pt w/ no complaints.  D/w daughter GOC and continued w/u. Now that pt is more awake and alert, daughter is interested in reconsulting ENT for possible vocal cord injection and S&S for swallow eval. Also interested in possible IR drainage of sacral fluid collection.     MEDICATIONS  (STANDING):  enoxaparin Injectable 40 milliGRAM(s) SubCutaneous daily  famotidine    Tablet 20 milliGRAM(s) Oral two times a day  FLUoxetine Solution 20 milliGRAM(s) Oral daily  gabapentin   Solution 200 milliGRAM(s) Oral three times a day  methadone    Tablet 30 milliGRAM(s) Oral every 12 hours  multivitamin 1 Tablet(s) Oral daily  piperacillin/tazobactam IVPB.. 3.375 Gram(s) IV Intermittent every 8 hours  thiamine 100 milliGRAM(s) Oral daily  vancomycin  IVPB 750 milliGRAM(s) IV Intermittent every 12 hours    MEDICATIONS  (PRN):  ALBUTerol    90 MICROgram(s) HFA Inhaler 2 Puff(s) Inhalation every 6 hours PRN Shortness of Breath and/or Wheezing      CAPILLARY BLOOD GLUCOSE      POCT Blood Glucose.: 109 mg/dL (09 Dec 2020 11:44)    I&O's Summary    09 Dec 2020 07:01  -  10 Dec 2020 07:00  --------------------------------------------------------  IN: 2120 mL / OUT: 1350 mL / NET: 770 mL        PHYSICAL EXAM:  Vital Signs Last 24 Hrs  T(C): 36.6 (10 Dec 2020 05:09), Max: 37.5 (09 Dec 2020 21:02)  T(F): 97.9 (10 Dec 2020 05:09), Max: 99.5 (09 Dec 2020 21:02)  HR: 94 (10 Dec 2020 05:09) (89 - 94)  BP: 117/76 (10 Dec 2020 05:09) (116/75 - 145/69)  BP(mean): --  RR: 18 (10 Dec 2020 05:09) (18 - 20)  SpO2: 98% (10 Dec 2020 05:09) (94% - 98%)    GEN: Bed bound, cachectic appearing, more alert  CHEST/LUNGS: CTABL, no crackles or rales noted  CARDIAC: RRR no M/R/G  ABDOMEN: Non-tender, non-distended, normoactive BS, GJ tube in LUQ, no drainage, or skin changes around site  : goldman catheter in place  MSK: No edema, no gross deformity of extremities.  SKIN: No rashes, no petechiae, no vesicles  NEURO: Slow to respond, but more awake and alert today. Following commands.   PSYCH: A&Ox3     LABS:                        7.9    17.92 )-----------( 365      ( 09 Dec 2020 07:30 )             26.6         Culture - Urine (collected 08 Dec 2020 00:41)  Source: .Urine Clean Catch (Midstream)  Final Report (08 Dec 2020 21:21):    <10,000 CFU/mL Normal Urogenital Ami    Culture - Blood (collected 07 Dec 2020 20:47)  Source: .Blood Blood-Peripheral  Preliminary Report (08 Dec 2020 21:01):    No growth to date.    Culture - Blood (collected 07 Dec 2020 20:46)  Source: .Blood Blood-Peripheral  Gram Stain (09 Dec 2020 13:50):    Growth in aerobic bottle: Gram positive cocci in pairs, chains and    clusters  Preliminary Report (09 Dec 2020 13:49):    Growth in aerobic bottle: Gram positive cocci in pairs, chains and    clusters    "Due to technical problems, Proteus sp. will Not be reported as part of    the BCID panel until further notice"    ***Blood Panel PCR results on this specimen are available    approximately 3 hours after the Gram stain result.***    Gram stain, PCR, and/or culture results may not always    correspond due to difference in methodologies.    ************************************************************    This PCR assay was performed using [employer].    The following targets are tested for: Enterococcus,    vancomycin resistant enterococci, Listeria monocytogenes,    coagulase negative staphylococci, S. aureus,    methicillin resistant S. aureus, Streptococcus agalactiae    (Group B), S. pneumoniae, S. pyogenes (Group A),    Acinetobacter baumannii, Enterobacter cloacae, E. coli,    Klebsiella oxytoca, K. pneumoniae, Proteus sp.,    Serratia marcescens, Haemophilus influenzae,    Neisseria meningitidis, Pseudomonas aeruginosa, Candida    albicans, C. glabrata, C krusei, C parapsilosis,    C. tropicalis and the KPC resistance gene.  Organism: Blood Culture PCR (09 Dec 2020 15:38)  Organism: Blood Culture PCR (09 Dec 2020 15:38)        RADIOLOGY & ADDITIONAL TESTS:  Results Reviewed:   Imaging Personally Reviewed:  Electrocardiogram Personally Reviewed:    COORDINATION OF CARE:  Care Discussed with Consultants/Other Providers [Y/N]:  Prior or Outpatient Records Reviewed [Y/N]:

## 2020-12-10 NOTE — CONSULT NOTE ADULT - ASSESSMENT
65 f with HTN, COPD, stage I colon ca s/p resection and no chemo, RA on humira, zenker diverticulum s/p repair 2019 which resulted in vocal paralysis and multiple aspiration pneumonias, had a PEG before but s/p a J tube about 6 weeks ago, was hospitalized at [facility] from 11/27 to 12/3 initially for GJ tube leaking and ?cellulitis but CT showed ? rectosigmoid anastomosis leak, L hip abscess and myositis extending to trochanter bursa, but no hip effusion, also R hip myositis and ?forming abscess, sacral osteo with phlegmon involving the central canal  s/p IR drainage of L hip abscess which grew enterococcus  faecalis R to vanco and amp (I called the lab and it is sensitive to dapto and linezolid)  blood cx showed E. faecalis S to amp  pt was deteriorating so she was discharged to hospice with no antibiotics but there she improved clinically and was sent back to the hospital, she is confused but stable  blood cx 11/7 now with GPC in pairs and chains but not on PCR panel so not enterococcus  WBC 16-18  abd/pelvis CT: Right lower lobe consolidation and bilateral patchy lung opacities suspicious for multifocal pneumonia. Ill-defined gas-containing presacral fluid collection and/or phlegmon which tracks along to the left hip joint. Findings consistent with infection. MRI with contrast is recommended to further evaluate.    leukocytosis with L hip abscess, myositis s/p IR drain 11/30 which showed VRE  also sacral osteo and abscess with no decubitus so the source is likely in the abdomen, there was question of rectosigmoid anastomosis leak on initial CTs but that surgery was long time ago and pt had no complications after that, the J tube however was only placed 6 weeks ago  E. faecalis bacteremia   new GPC in pairs and chains bacteremia  malnutrition, dysphagia and risk of aspiration    * DC vanco and start dapto 6 mg/kg, check a CK now and them will monitor  * c/w zosyn for now  * will need TTE  * repeat blood cx    The above assessment and plan was discussed with the primary team    Maria Antonia Hunter MD  Pager [phone]  After 5pm and on weekends call [phone]

## 2020-12-10 NOTE — CONSULT NOTE ADULT - SUBJECTIVE AND OBJECTIVE BOX
HPI:  65 f emphysema on home 2L NC, colon CA s/p distal ileum resection in 2016, ESBL UTI, zenker's diverticulum s/p repair in 2019 c/b vocal cord paralysis req GJ tube for feeding, RA, gastroparesis, GERD, anemia presenting from home hospice for weakness.     Pt was previously admitted to [facility] from 11/27 to 12/3 for which she initially presented w/ leaking PEG tube and noted L hip ill-defined fluid collection w/ no surgical intervention at that time apart from I&D w/ minimal fluid output. During that hospitalization pt had had a CT A/P which showed a leak from the rectosigmoid anastomosis from her previous operation w/ enterococcus bacteremia requiring a MICU stay for vasopressor support in the setting of septic shock. While there pt continued to deteriorate and discussion was had w/ pt's daughter who is the healthcare surrogate and the decision was made to pursue comfort measures and home hospice.     At home hospice pt continued to improve despite prior discussion of rapid deterioration. She was then restarted on Zosyn for abx coverage and allowed PO intake. Per daughter and hospice physician they decided pt improving and to present to the hospital for continued management w/ IV abx and fluids if needed. Would like to re-puruse hospice care afterwards if warranted/    In the ED Tmax 99.1, HR 92, BP 96/81, satting 99% on 5L NC. Labs notable for leukocytosis to 18 neutrophil predominant, and an anemia to 7 w/ UA showing mod ketones and protein. CT A/P done showing possible RLL pna and ill defined gas containing presacral fluid collection which tracks along the L hip joint. CXR pulm congestion.    She recieved a dose of Vanc/Zosyn, 2L IVF (08 Dec 2020 03:10)      PAST MEDICAL & SURGICAL HISTORY:  Colon cancer    Rheumatoid arthritis    Anemia    COPD (chronic obstructive pulmonary disease)    Vocal cord paralysis    Chronic GERD    Rheumatoid arthritis    Zenker diverticulum    UTI due to extended-spectrum beta lactamase (ESBL) producing Escherichia coli    Colon cancer    Emphysematous COPD    Candidal stomatitis    Malignant neoplasm of colon    Major depressive disorder    HTN (hypertension)    COPD (chronic obstructive pulmonary disease)    Encounter for gastrojejunal (GJ) tube placement    History of Nissen fundoplication    S/P removal of Zenker&#x27;s diverticulum    History of colon resection        Allergies    Levaquin (Unknown)  levofloxacin (Unknown)    Intolerances        ANTIMICROBIALS:  piperacillin/tazobactam IVPB.. 3.375 every 8 hours  vancomycin  IVPB 750 every 12 hours      OTHER MEDS:  ALBUTerol    90 MICROgram(s) HFA Inhaler 2 Puff(s) Inhalation every 6 hours PRN  enoxaparin Injectable 40 milliGRAM(s) SubCutaneous daily  famotidine    Tablet 20 milliGRAM(s) Oral two times a day  FLUoxetine Solution 20 milliGRAM(s) Oral daily  gabapentin   Solution 200 milliGRAM(s) Oral three times a day  methadone    Tablet 30 milliGRAM(s) Oral every 12 hours  multivitamin 1 Tablet(s) Oral daily  thiamine 100 milliGRAM(s) Oral daily      SOCIAL HISTORY:  lives with her mother and sister, former smoker, previous alcohol use and quit  no drug abuse    FAMILY HISTORY:  Family history of peripheral vascular disease    FH: heart failure    FH: rheumatoid arthritis        ROS:    All other systems negative     Constitutional: no fever, no chills, + weight loss  Eye: no eye pain, no redness, no vision changes  ENT:  no sore throat, no rhinorrhea, +dysphagia  Cardiovascular:  no chest pain, no palpitation  Respiratory:  no SOB, no cough  GI:  no abd pain, no vomiting, no diarrhea  urinary: no dysuria, no hematuria, no flank pain  : no vaginal discharge or bleeding  musculoskeletal:  no joint pain, no joint swelling  skin:  no rash  neurology:  no headache, no seizure, + confusion  psych: no anxiety, no depression     Physical Exam:    General:   cachectic  Head: atraumatic, normocephalic  Eyes: normal sclera and conjunctiva  ENT:   no oropharyngeal lesions, no LAD, neck supple  Cardio:    regular S1,S2, no murmur  Respiratory:   clear b/l, no wheezing  abd:   soft, BS +, not tender, Jtube with no surrounding tenderness  :     no CVAT, no suprapubic tenderness, + goldman  Musculoskeletal : no joint swelling, no edema  Skin:    no rash, no decubitus  vascular: no phlebitis  Neurologic:   pt awake and answers questions but is confused  psych: normal affect      Drug Dosing Weight  Height (cm): 152.4 (07 Dec 2020 23:10)  Weight (kg): 42.3 (07 Dec 2020 23:10)  BMI (kg/m2): 18.2 (07 Dec 2020 23:10)  BSA (m2): 1.35 (07 Dec 2020 23:10)    Vital Signs Last 24 Hrs  T(F): 98.5 (12-10-20 @ 12:09), Max: 99.5 (12-09-20 @ 21:02)    Vital Signs Last 24 Hrs  HR: 86 (12-10-20 @ 12:09) (86 - 94)  BP: 109/74 (12-10-20 @ 12:09) (109/74 - 145/69)  RR: 18 (12-10-20 @ 12:09)  SpO2: 96% (12-10-20 @ 12:09) (94% - 98%)  Wt(kg): --                          8.5    17.71 )-----------( 390      ( 10 Dec 2020 07:39 )             27.9       12-10    134<L>  |  94<L>  |  8   ----------------------------<  69<L>  4.1   |  30  |  <0.30<L>    Ca    8.5      10 Dec 2020 07:39  Phos  2.6     12-10  Mg     1.6     12-10            MICROBIOLOGY:  v  .Urine Clean Catch (Midstream)  12-08-20   <10,000 CFU/mL Normal Urogenital Ami  --  --      .Blood Blood-Peripheral  12-07-20   No growth to date.  --  --      .Blood Blood-Peripheral  12-07-20   Growth in aerobic bottle: Gram positive cocci in pairs, chains and  clusters      .Abscess Hip - Left  11-30-20   Rare Enterococcus faecium  --  Enterococcus faecium      .Blood Blood-Peripheral  11-29-20   No Growth Final  --  --      .Urine Clean Catch (Midstream)  11-28-20   <10,000 CFU/mL Normal Urogenital Ami  --  --      .Blood Blood-Peripheral  11-27-20   No Growth Final  --  Blood Culture PCR  Enterococcus faecalis                  RADIOLOGY:    Images independently visualized and reviewed personally,  findings as below  < from: Xray Femur 2 Views, Left (12.07.20 @ 20:39) >  IMPRESSION:  No acute fracture or dislocation of the pelvis or left hip. Soft tissue swelling in the region of the left gluteus medius/minimus musculature, better detailed on previously performed CT      < from: CT Abdomen and Pelvis w/ IV Cont (12.07.20 @ 17:44) >  IMPRESSION:  Right lower lobe consolidation and bilateral patchy lung opacities suspicious for multifocal pneumonia.    Ill-defined gas-containing presacral fluid collection and/or phlegmon which tracks along to the left hip joint. Findings consistent with infection. MRI with contrast is recommended to further evaluate.        < end of copied text >  < from: MR Lumbar Spine w/wo IV Cont (12.01.20 @ 17:19) >  IMPRESSION:  No MR evidence of discitis osteomyelitis in the lumbar spine. No epidural abscess collection in the lumbar spine    Edema and enhancement involving the sacrum and adjacent soft tissues compatible with osteomyelitis, partially visualized. Enhancement involving the sacral canal suggestive of phlegmon extending to the sacral canal.. Nonspecific 1.2 x 0.8 cm loculated low T1 high T2 signal within the sacral canal at the level of S1-S2 as described above. Although this could be relatedto sacral Tarlov cyst, abscess collection not excluded. Follow-up dedicated MRI of the sacrum with contrast recommended

## 2020-12-10 NOTE — PROGRESS NOTE ADULT - PROBLEM SELECTOR PLAN 6
- s/p distal ileum resection c/b leak at anastomosis site noted on recent CT imaging w/ enterococcal bacteremia   - Will continue to monitor leukocytosis and blood cx pending  - If recurrent enterococcus bacteremia will discuss w/ family on going infection in setting of no source control - s/p distal ileum resection c/b leak at anastomosis site noted on recent CT imaging w/ enterococcal bacteremia   - Will continue to monitor leukocytosis and blood cx pending  - If recurrent enterococcus bacteremia will discuss w/ family on going infection in setting of no source control  - CEA wnl

## 2020-12-10 NOTE — PROGRESS NOTE ADULT - PROBLEM SELECTOR PLAN 4
- Findings as above previously noted on imaging at OSH w/ MRI as well s/p I&D  - No surgical intervention  - Supportive therapy w/ ice packs and elevation  - Abx as above  - Pain control PRN - Findings as above previously noted on imaging at OSH w/ MRI as well s/p I&D  - No surgical intervention  - Supportive therapy w/ ice packs and elevation  - IR consulted--> daughter interested in drainage if possible   - Abx as above  - Pain control PRN

## 2020-12-10 NOTE — PROGRESS NOTE ADULT - ATTENDING COMMENTS
Seen, examined the patient this am with house staff  This is a 65F with h/o emphysema on home 2L NC, colon CA s/p distal ileum resection in 2016, ESBL UTI, zenker's diverticulum s/p repair in 2019 c/b vocal cord paralysis req G-J tube for feeding, RA, gastroparesis, GERD, anemia presenting from home hospice for weakness ,found to have RLL pneumonia   Feels better, more alert, resting in bed, weak, afebrile  1. Sepsis due to pneumonia  2, Acute encephalopathy due to sepsis  3. Dysphagia, vocal cord paralysis, s/p G-J tube  4. Ca colon, s/p distal ileum resection in 2016    - Afebrile, WBC 17, blood c/s shows GPC in pair, chain and cluster. Had positive blood c/s in Nov 27, 2020 with Enterococcus fecalis    c/w O2 support, IV anbx- Zosyn/Vancomycin, aspiration precaution, NPO. F/U repeat blood c/s ( done today)    Nutrition consult for G-J feeding, S & S consult   ID consult called for possible IE, TTE ordered  - Ongoing GOC discussion with palliative care    DNR/DNI.  ** Family is aware of plan from the team. Seen, examined the patient this am with house staff  This is a 65F with h/o emphysema on home 2L NC, colon CA s/p distal ileum resection in 2016, ESBL UTI, zenker's diverticulum s/p repair in 2019 c/b vocal cord paralysis req G-J tube for feeding, RA, gastroparesis, GERD, anemia presenting from home hospice for weakness ,found to have RLL pneumonia   Feels better, more alert, resting in bed, weak, afebrile  1. Sepsis due to pneumonia  2, Acute encephalopathy due to sepsis  3. Dysphagia, vocal cord paralysis, s/p G-J tube  4. Ca colon, s/p distal ileum resection in 2016    - Afebrile, WBC 17, blood c/s shows GPC in pair, chain and cluster. Had positive blood c/s in Nov 27, 2020 with Enterococcus fecalis    c/w O2 support, IV anbx- Zosyn/Vancomycin, aspiration precaution, NPO. F/U repeat blood c/s ( done today)    Nutrition consult for G-J feeding, S & S consult    CT abd/pelvis showed- Right lower lobe consolidation and bilateral patchy lung opacities suspicious for multifocal pneumonia. And ill-defined gas-    containing presacral fluid collection and/or phlegmon which tracks along to the left hip joint. Findings consistent with infection    spoke to IR to reviewed the scan and see if the collection can be drained    ID consult called, also TTE ordered  - Ongoing GOC discussion with palliative care    DNR/DNI.  ** Family is aware of plan from the team.

## 2020-12-10 NOTE — PROGRESS NOTE ADULT - PROBLEM SELECTOR PLAN 1
- RLL consolidation w/ leukocytosis w/ hx of vocal cord paralysis and oral intake at home   - c/w Zosyn, Vanc  - NPO per S&S

## 2020-12-10 NOTE — CHART NOTE - NSCHARTNOTEFT_GEN_A_CORE
Brief Nutrition Follow-up  Patient seen for: refeeding risk follow-up    Source: EMR, ( )     Hospital course as per chart: 65y Female with PMH of emphysema on home 2L NC, colon ca s/p distal ileum resection in 2016, ESBL UTI, Zenker's diverticulum s/p repair (2019) complicated by vocal cord paralysis requiring GJ tube for feeding, RA, gastroparesis, GERD, and anemia who presented from home hospice for weakness, admitted 12/7. Per team: "weakness likely in the setting of malnutrition vs infection 2/2 presacral fluid collection vs RLL aspiration PNA." S/p Swallow Bedside Assessment (12/8), recommended NPO. Tube feeds started 12/8. Chart reviewed, events noted. Palliative following for GOC.    Diet, NPO with Tube Feed:   Tube Feeding Modality: Gastro-Jejunostomy  Vital 1.5 Hernan (VITAL1.5RTH)  Total Volume for 24 Hours (mL): 480  Continuous  Starting Tube Feed Rate {mL per Hour}: 20  Increase Tube Feed Rate by (mL): 0     Every 24 hours  Until Goal Tube Feed Rate (mL per Hour): 20  Tube Feed Duration (in Hours): 24  Tube Feed Start Time: 14:00 (12-09-20 @ 16:59) [Active]    Patient reports ( )    Pertinent labs: potassium, magnesium, phosphorus WNL    Previous Nutrition Diagnosis: Severe malnutrition, Swallowing difficulty  Nutrition Diagnosis is [x] ongoing  [ ] resolved [ ] not applicable   Care plan: [x] in progress [ ] achieved  Being addressed with: enteral nutrition     New Nutrition Diagnosis: [ ] not applicable    [ ] Inadequate Protein Energy Intake [ ]Inadequate Oral Intake [ ] Excessive Energy Intake     [ ] Underweight [ ] Increased Nutrient Needs [ ] Overweight/Obesity     [ ] Altered GI Function [ ] Unintended Weight Loss [ ] Food & Nutrition Related Knowledge Deficit[ ] Limited Adherence to nutrition related recommendations [ ] Malnutrition  [ ] other: Free text    Recommendations:      Monitoring and Evaluation: Monitor enteral nutrition provision and tolerance, weight, labs, skin, GI status, diet     Nutrition to remain consistent with GOC.  RD remains available upon request and will continue to follow-up per protocol.   Terese Lockhart MS, RD CDN pager #[phone] Brief Nutrition Follow-up  Patient seen for: refeeding risk follow-up    Source: EMR, team    Hospital course as per chart: 65y Female with PMH of emphysema on home 2L NC, colon ca s/p distal ileum resection in 2016, ESBL UTI, Zenker's diverticulum s/p repair (2019) complicated by vocal cord paralysis requiring GJ tube for feeding, RA, gastroparesis, GERD, and anemia who presented from home hospice for weakness, admitted 12/7. Per team: "weakness likely in the setting of malnutrition vs infection 2/2 presacral fluid collection vs RLL aspiration PNA." S/p Swallow Bedside Assessment (12/8), recommended NPO. Tube feeds started 12/8. Chart reviewed, events noted. Palliative following for GOC.    Diet, NPO with Tube Feed:   Tube Feeding Modality: Gastro-Jejunostomy  Vital 1.5 Hernan (VITAL1.5RTH)  Total Volume for 24 Hours (mL): 480  Continuous  Starting Tube Feed Rate {mL per Hour}: 20  Increase Tube Feed Rate by (mL): 0     Every 24 hours  Until Goal Tube Feed Rate (mL per Hour): 20  Tube Feed Duration (in Hours): 24  Tube Feed Start Time: 14:00 (12-09-20 @ 16:59) [Active]    Patient sleeping soundly at time of visit. Noted to be disoriented with incoherent speech, likely a poor candidate for RD interview at this time. Observed tube feeds running at bedside (Vital 1.5 at 20 ml/hr).     Pertinent labs: potassium, magnesium, phosphorus WNL    Previous Nutrition Diagnosis: Severe malnutrition, Swallowing difficulty  Nutrition Diagnosis is [x] ongoing  [ ] resolved [ ] not applicable   Care plan: [x] in progress [ ] achieved  Being addressed with: enteral nutrition     New Nutrition Diagnosis: not applicable    Recommendations:  1) Recommend continuing current tube feed regimen: Vital 1.5 at 20 ml/hr, increasing by 10 ml/hr q12 hours (pending tolerance and electrolytes WNL) to goal rate of 40 ml/hr x 24 hours.   At goal, this regimen provides 960 ml total volume, 1440 kcal, 65 g protein, and 733 ml free water. Meets 34 kcal/kg, 1.5 g/kg protein based on dosing wt 42.3 kg. Defer additional free water to team. RD remains available to adjust tube feed regimen PRN.   2) Pt remains at risk for refeeding syndrome  - Continue multivitamin and thiamine supplementation if no medical contraindications  - Monitor electrolytes (potassium, phosphorus, and magnesium) before advancing feeds and PRN after    Monitoring and Evaluation: Monitor enteral nutrition provision and tolerance, weight, labs, skin, GI status, diet     Nutrition to remain consistent with GOC.  RD remains available upon request and will continue to follow-up per protocol.   Terese Lockhart MS, RD CDN pager #[phone]

## 2020-12-10 NOTE — PROGRESS NOTE ADULT - PROBLEM SELECTOR PLAN 1
- continues to appears altered, continue to monitor  - possibly metabolic, possible IR drainage of fluid collection, will reassess

## 2020-12-10 NOTE — PROGRESS NOTE ADULT - PROBLEM SELECTOR PLAN 2
- Pt w/ minimal nutrition at home hospice and cachectic on exam w/ elevated ketones on UA likely cause of weakness   - Keep NPO per S&S eval  - Will resume feeds through PEG tube ON- discussed w/ daughter re pleasure feeds- will await S&S before deciding to resume oral feeds

## 2020-12-10 NOTE — PROGRESS NOTE ADULT - SUBJECTIVE AND OBJECTIVE BOX
HPI: 65F with PMH including emphysema on home 2L NC, colon CA s/p distal ileum resection in 2016, ESBL UTI, zenker's diverticulum s/p repair in 2019 c/b vocal cord paralysis s/p GJ tube for feeding, RA, gastroparesis, GERD, anemia presenting from hospice for weakness, likely from malnutrition vs infection (presacral fluid collection) vs RLL aspiration PNA. Patient is on broad-spectrum abx, and awaiting S&S evaluation. Has had enterococcal infection in pas, possible from leak at distal ileal anastomosis site. For pain, is on 30mg methadone BID at home. Daughter is HCP and is a pediatric RN in [place], DNR/DNI. Palliative called for GOC.    INTERVAL EVENTS:  12/10: patient denies any concerns    ADVANCE DIRECTIVES:    DNR  Yes    MOLST  [ ]  Living Will  [ ]   DECISION MAKER(s):  [X ] Health Care Proxy(s)  [ ] Surrogate(s)  [ ] Guardian           Name(s): Phone Number(s): chriss Alfaro    BASELINE (I)ADL(s) (prior to admission):  [place]: [ ]Total  [ ] Moderate [ ]Dependent    Allergies    Levaquin (Unknown)  levofloxacin (Unknown)    Intolerances    MEDICATIONS  (STANDING):  enoxaparin Injectable 40 milliGRAM(s) SubCutaneous daily  famotidine    Tablet 20 milliGRAM(s) Oral two times a day  FLUoxetine Solution 20 milliGRAM(s) Oral daily  gabapentin   Solution 200 milliGRAM(s) Oral three times a day  methadone    Tablet 30 milliGRAM(s) Oral every 12 hours  multivitamin 1 Tablet(s) Oral daily  piperacillin/tazobactam IVPB.. 3.375 Gram(s) IV Intermittent every 8 hours  thiamine 100 milliGRAM(s) Oral daily  vancomycin  IVPB 750 milliGRAM(s) IV Intermittent every 12 hours    MEDICATIONS  (PRN):  ALBUTerol    90 MICROgram(s) HFA Inhaler 2 Puff(s) Inhalation every 6 hours PRN Shortness of Breath and/or Wheezing      PRESENT SYMPTOMS: [ ]Unable to obtain due to poor mentation   Source if other than patient:  [ ]Family   [ ]Team     Pain: [ ]yes [X ]no  QOL impact -   Location -                    Aggravating factors -  Quality -  Radiation -  Timing-  Severity (0-10 scale):  Minimal acceptable level (0-10 scale):     CPOT:    https://www.[facility].org/getattachment/zvd48m77-6a7p-7u5o-7a6y-1576p2780n6g/Critical-Care-Pain-Observation-Tool-(CPOT)      PAIN AD Score:     http://geriatrictoolkit.[facility]/cog/painad.pdf (press ctrl +  left click to view)    Dyspnea:                           [ ]Mild [ ]Moderate [ ]Severe  Anxiety:                             [ ]Mild [ ]Moderate [ ]Severe  Fatigue:                             [ ]Mild [ ]Moderate [ ]Severe  Nausea:                             [ ]Mild [ ]Moderate [ ]Severe  Loss of appetite:              [ ]Mild [ ]Moderate [ ]Severe  Constipation:                    [ ]Mild [ ]Moderate [ ]Severe    Other Symptoms:  [X ]All other review of systems negative     Palliative Performance Status Version 2:         %    http://[facility].org/files/news/palliative_performance_scale_ppsv2.pdf    PHYSICAL EXAM:  Vital Signs Last 24 Hrs  T(C): 36.9 (10 Dec 2020 12:09), Max: 37.5 (09 Dec 2020 21:02)  T(F): 98.5 (10 Dec 2020 12:09), Max: 99.5 (09 Dec 2020 21:02)  HR: 86 (10 Dec 2020 12:09) (86 - 94)  BP: 109/74 (10 Dec 2020 12:09) (109/74 - 145/69)  BP(mean): --  RR: 18 (10 Dec 2020 12:09) (18 - 20)  SpO2: 96% (10 Dec 2020 12:09) (94% - 98%)    GENERAL:  [ ]Alert  [ ]Oriented x   [ ]Lethargic  [ ]Cachexia  [ ]Unarousable  [x ]Verbal  [ ]Non-Verbal  Behavioral:   [ ] Anxiety  [ ] Delirium [ ] Agitation [ ] Other  HEENT:  [X ]Normal   [ ]Dry mouth   [ ]ET Tube/Trach  [ ]Oral lesions  PULMONARY:   [X ]Clear [ ]Tachypnea  [ ]Audible excessive secretions [X] No labored breathing  [ ]Rhonchi        [ ]Right [ ]Left [ ]Bilateral  [ ]Crackles        [ ]Right [ ]Left [ ]Bilateral  [ ]Wheezing     [ ]Right [ ]Left [ ]Bilateral  [ ]Diminished breath sounds [ ]right [ ]left [ ]bilateral  CARDIOVASCULAR:    [X ]Regular [ ]Irregular [ ]Tachy  [ ]Roge [ ]Murmur [ ]Other  GASTROINTESTINAL:  [X ]Soft  [ ]Distended   [X ]+BS  [X ]Non tender [ ]Tender  [ ]PEG [ ]OGT/ NGT  Last BM:     GENITOURINARY:  [ ]Normal [ ] Incontinent   [ ]Oliguria/Anuria   [ ]Dalal  MUSCULOSKELETAL:   [ ]Normal   [ ]Weakness  [ ]Bed/Wheelchair bound [ ]Edema  NEUROLOGIC:   [ ]No focal deficits  [X ]Cognitive impairment  [ ]Dysphagia [ ]Dysarthria [ ]Paresis [ ]Other   SKIN:   [ ]Normal    [ ]Rash  [ ]Pressure ulcer(s)       Present on admission [ ]y [ ]n    CRITICAL CARE:  [ ] Shock Present  [ ]Septic [ ]Cardiogenic [ ]Neurologic [ ]Hypovolemic  [ ]  Vasopressors [ ]  Inotropes   [ ]Respiratory failure present [ ]Mechanical ventilation [ ]Non-invasive ventilatory support [ ]High flow  [ ]Acute  [ ]Chronic [ ]Hypoxic  [ ]Hypercarbic [ ]Other  [ ]Other organ failure     LABS: reviewed                                   8.5    17.71 )-----------( 390      ( 10 Dec 2020 07:39 )             27.9     12-10    134<L>  |  94<L>  |  8   ----------------------------<  69<L>  4.1   |  30  |  <0.30<L>    Ca    8.5      10 Dec 2020 07:39  Phos  2.6     12-10  Mg     1.6     12-10          RADIOLOGY & ADDITIONAL STUDIES:  CT A/P 12/7/2020    Right lower lobe consolidation and bilateral patchy lung opacities suspicious for multifocal pneumonia.    Ill-defined gas-containing presacral fluid collection and/or phlegmon which tracks along to the left hip joint. Findings consistent with infection. MRI with contrast is recommended to further evaluate.    PROTEIN CALORIE MALNUTRITION PRESENT: [ ]mild [ ]moderate [ ]severe [ ]underweight [ ]morbid obesity  https://www.andeal.org/vault/8310/web/files/ONC/Table_Clinical%20Characteristics%20to%20Document%20Malnutrition-White%20JV%20et%20al%202012.pdf    Height (cm): 152.4 (12-07-20 @ 23:10), 152.4 (11-27-20 @ 20:43)  Weight (kg): 42.3 (12-07-20 @ 23:10), 44.4 (11-27-20 @ 20:43)  BMI (kg/m2): 18.2 (12-07-20 @ 23:10), 19.1 (11-27-20 @ 20:43)    [ ]PPSV2 < or = to 30% [ ]significant weight loss  [ ]poor nutritional intake  [ ]anasarca     Albumin, Serum: 2.7 g/dL (12-08-20 @ 07:22)   [ ]Artificial Nutrition      REFERRALS:   [ ]Chaplaincy  [ ]Hospice  [ ]Child Life  [ ]Social Work  [ ]Case management [ ]Holistic Therapy     Goals of Care Document: CRISTIAN Monroe (12-02-20 @ 18:00)  Goals of Care Conversation:   Participants:  · Participants  Patient; Family  · Child(chioma)  Missy Thomas    Advance Directives:  · Does patient have Advance Directive  No  · Does Patient Have a Surrogate  Yes  · Surrogate's Name  Missy Thomas  · Surrogate's Phone Number  [phone]  · Caregiver:  yes  · Name  Sheeba Cadroso  · Phone Number  [facility] [place]    Conversation Discussion:  · Conversation  Diagnosis; Prognosis; Treatment Options; Palliative Care Referral  · Conversation Details  -diagnostic findings including anastomotic leak, thoracic discitis/osteo, sacral abscess  -treatment options including abx, surgery  -diagnostic options including CT scan, colonoscopy   -poor nutrition and overall health status   -comfort care, hospice    What Matters Most To Patient and Family:  · What matters most to patient and family  treatment, comfort    Personal Advance Directives Treatment Guidelines:   Treatment Guidelines:  · Decision Maker  Surrogate    Location of Discussion:   Duration of Advanced Care Planning Meeting:  · Time spent (in minutes)  25    Location of Discussion:  · Location of discussion  Telephone      Electronic Signatures:  Maximilian Monroe)  (Signed 02-Dec-2020 18:02)  	Authored: Goals of Care Conversation, Personal Advance Directives Treatment Guidelines, Location of Discussion      Last Updated: 02-Dec-2020 18:02 by Maximilian Monroe)        ______________  Henrique Gustafson MD   of Geriatric and Palliative Medicine  [facility]     Please page the following number for clinical matters between the hours of 9AM and 5PM   from Monday through Friday : [phone]    After 5PM and on weekends, please page: [phone]. The Geriatric and Palliative Medicine consult service has 24/7 coverage for medical recommendations, including for symptom management needs.

## 2020-12-11 NOTE — PROGRESS NOTE ADULT - ATTENDING COMMENTS
Seen, examined the patient this am with house staff  This is a 65F with h/o emphysema on home 2L NC, colon CA s/p distal ileum resection in 2016, ESBL UTI, zenker's diverticulum s/p repair in 2019 c/b vocal cord paralysis req G-J tube for feeding, RA, gastroparesis, GERD, anemia presenting from home hospice for weakness ,found to have RLL pneumonia  More alert, afebrile, resting in bed, weak, no SOB  1. Sepsis due to Enterococci bacteremia/pneumonia  2. Enterococci fecalis bacteremia  3. Presacral fluid collection, possible infectious  4. Dysphagia, vocal cord paralysis, s/p G-J tube  5. Ca colon, s/p distal ileum resection in 2016    - Afebrile, but uptrending WBC 20, blood c/s shows GPC in pair, chain and cluster. Had positive blood c/s in Nov 27, 2020 with Enterococcus fecalis    ID cosult and plan appreciated.    On IV Dapto and Zosyn for now, repeat blood c/s to follow    aspiration precaution, NPO.    G-J feeding per Nutrition rec  -CT abd/pelvis showed- Right lower lobe consolidation and bilateral patchy lung opacities suspicious for multifocal pneumonia. And ill-defined gas-     containing presacral fluid collection and/or phlegmon which tracks along to the left hip joint. Findings consistent with infection    IR f/u plans no intervention   TTE ordered  - Ongoing GOC discussion with palliative care    DNR/DNI.  ** Family is aware of plan from the team.

## 2020-12-11 NOTE — PROGRESS NOTE ADULT - PROBLEM SELECTOR PLAN 4
- will continue to follow  - further GOC based on clinical course, will re-discuss with HCP daughter next week

## 2020-12-11 NOTE — PROGRESS NOTE ADULT - PROBLEM SELECTOR PLAN 4
- Findings as above previously noted on imaging at OSH w/ MRI as well s/p I&D  - No surgical intervention  - Supportive therapy w/ ice packs and elevation  - IR consulted--> daughter interested in drainage if possible   - Abx as above  - Pain control PRN - Findings as above previously noted on imaging at OSH w/ MRI as well s/p I&D  - No surgical intervention  - Supportive therapy w/ ice packs and elevation  - IR consulted--> daughter interested in drainage if possible; per IR, not drainable  - Abx as above  - Pain control PRN

## 2020-12-11 NOTE — PROGRESS NOTE ADULT - SUBJECTIVE AND OBJECTIVE BOX
HPI: 65F with PMH including emphysema on home 2L NC, colon CA s/p distal ileum resection in 2016, ESBL UTI, zenker's diverticulum s/p repair in 2019 c/b vocal cord paralysis s/p GJ tube for feeding, RA, gastroparesis, GERD, anemia presenting from hospice for weakness, likely from malnutrition vs infection (presacral fluid collection) vs RLL aspiration PNA. Patient is on broad-spectrum abx, and awaiting S&S evaluation. Has had enterococcal infection in pas, possible from leak at distal ileal anastomosis site. For pain, is on 30mg methadone BID at home. Daughter is HCP and is a pediatric RN in [place], DNR/DNI. Palliative called for GOC.    INTERVAL EVENTS:  12/10: patient denies any concerns  12/11: denies any concerns, appears comfortable    ADVANCE DIRECTIVES:    DNR  Yes    MOLST  [ ]  Living Will  [ ]   DECISION MAKER(s):  [X ] Health Care Proxy(s)  [ ] Surrogate(s)  [ ] Guardian           Name(s): Phone Number(s): chriss Alfaro    BASELINE (I)ADL(s) (prior to admission):  [place]: [ ]Total  [ ] Moderate [ ]Dependent    Allergies    Levaquin (Unknown)  levofloxacin (Unknown)    Intolerances    MEDICATIONS  (STANDING):  DAPTOmycin IVPB 350 milliGRAM(s) IV Intermittent every 24 hours  enoxaparin Injectable 40 milliGRAM(s) SubCutaneous daily  famotidine    Tablet 20 milliGRAM(s) Oral two times a day  FLUoxetine Solution 20 milliGRAM(s) Oral daily  gabapentin   Solution 200 milliGRAM(s) Oral three times a day  methadone    Tablet 30 milliGRAM(s) Oral every 12 hours  multivitamin 1 Tablet(s) Oral daily  piperacillin/tazobactam IVPB.. 3.375 Gram(s) IV Intermittent every 8 hours  thiamine 100 milliGRAM(s) Oral daily    MEDICATIONS  (PRN):  ALBUTerol    90 MICROgram(s) HFA Inhaler 2 Puff(s) Inhalation every 6 hours PRN Shortness of Breath and/or Wheezing      PRESENT SYMPTOMS: [ ]Unable to obtain due to poor mentation   Source if other than patient:  [ ]Family   [ ]Team     Pain: [ ]yes [X ]no  QOL impact -   Location -                    Aggravating factors -  Quality -  Radiation -  Timing-  Severity (0-10 scale):  Minimal acceptable level (0-10 scale):     CPOT:    https://www.[facility].org/getattachment/thi10m52-7e7l-0d1r-3z8b-7490t4244y9i/Critical-Care-Pain-Observation-Tool-(CPOT)      PAIN AD Score:     http://geriatrictoolkit.[facility]/cog/painad.pdf (press ctrl +  left click to view)    Dyspnea:                           [ ]Mild [ ]Moderate [ ]Severe  Anxiety:                             [ ]Mild [ ]Moderate [ ]Severe  Fatigue:                             [ ]Mild [ ]Moderate [ ]Severe  Nausea:                             [ ]Mild [ ]Moderate [ ]Severe  Loss of appetite:              [ ]Mild [ ]Moderate [ ]Severe  Constipation:                    [ ]Mild [ ]Moderate [ ]Severe    Other Symptoms:  [X ]All other review of systems negative     Palliative Performance Status Version 2:         %    http://[facility].org/files/news/palliative_performance_scale_ppsv2.pdf    PHYSICAL EXAM:  Vital Signs Last 24 Hrs  T(C): 36.7 (11 Dec 2020 13:33), Max: 37.2 (11 Dec 2020 05:08)  T(F): 98 (11 Dec 2020 13:33), Max: 99 (11 Dec 2020 05:08)  HR: 104 (11 Dec 2020 13:33) (91 - 104)  BP: 143/85 (11 Dec 2020 13:33) (111/72 - 143/85)  BP(mean): --  RR: 18 (11 Dec 2020 13:33) (18 - 20)  SpO2: 95% (11 Dec 2020 13:33) (91% - 95%)    Limited exam for patient comfort  GENERAL:  [ ]Alert  [ ]Oriented x   [ ]Lethargic  [ ]Cachexia  [ ]Unarousable  [x ]Verbal  [ ]Non-Verbal  Behavioral:   [ ] Anxiety  [ ] Delirium [ ] Agitation [ ] Other  HEENT:  [ ]Normal   [ ]Dry mouth   [ ]ET Tube/Trach  [ ]Oral lesions  PULMONARY:   [ ]Clear [ ]Tachypnea  [ ]Audible excessive secretions [X] No labored breathing  [ ]Rhonchi        [ ]Right [ ]Left [ ]Bilateral  [ ]Crackles        [ ]Right [ ]Left [ ]Bilateral  [ ]Wheezing     [ ]Right [ ]Left [ ]Bilateral  [ ]Diminished breath sounds [ ]right [ ]left [ ]bilateral  CARDIOVASCULAR:    [ ]Regular [ ]Irregular [ ]Tachy  [ ]Roge [ ]Murmur [ ]Other  GASTROINTESTINAL:  [ ]Soft  [ ]Distended   [ ]+BS  [ ]Non tender [ ]Tender  [ ]PEG [ ]OGT/ NGT  Last BM:     GENITOURINARY:  [ ]Normal [ ] Incontinent   [ ]Oliguria/Anuria   [ ]Dalal  MUSCULOSKELETAL:   [ ]Normal   [ ]Weakness  [ ]Bed/Wheelchair bound [ ]Edema  NEUROLOGIC:   [ ]No focal deficits  [X ]Cognitive impairment  [ ]Dysphagia [ ]Dysarthria [ ]Paresis [ ]Other   SKIN:   [ ]Normal    [ ]Rash  [ ]Pressure ulcer(s)       Present on admission [ ]y [ ]n    CRITICAL CARE:  [ ] Shock Present  [ ]Septic [ ]Cardiogenic [ ]Neurologic [ ]Hypovolemic  [ ]  Vasopressors [ ]  Inotropes   [ ]Respiratory failure present [ ]Mechanical ventilation [ ]Non-invasive ventilatory support [ ]High flow  [ ]Acute  [ ]Chronic [ ]Hypoxic  [ ]Hypercarbic [ ]Other  [ ]Other organ failure     LABS: reviewed                                   8.3    20.35 )-----------( 376      ( 11 Dec 2020 06:50 )             28.3     12-11    134<L>  |  95<L>  |  8   ----------------------------<  96  4.1   |  28  |  <0.30<L>    Ca    8.5      11 Dec 2020 06:50  Phos  2.8     12-11  Mg     1.9     12-11        RADIOLOGY & ADDITIONAL STUDIES:  CT A/P 12/7/2020    Right lower lobe consolidation and bilateral patchy lung opacities suspicious for multifocal pneumonia.    Ill-defined gas-containing presacral fluid collection and/or phlegmon which tracks along to the left hip joint. Findings consistent with infection. MRI with contrast is recommended to further evaluate.    PROTEIN CALORIE MALNUTRITION PRESENT: [ ]mild [ ]moderate [ ]severe [ ]underweight [ ]morbid obesity  https://www.andeal.org/vault/2440/web/files/ONC/Table_Clinical%20Characteristics%20to%20Document%20Malnutrition-White%20JV%20et%20al%202012.pdf    Height (cm): 152.4 (12-07-20 @ 23:10), 152.4 (11-27-20 @ 20:43)  Weight (kg): 42.3 (12-07-20 @ 23:10), 44.4 (11-27-20 @ 20:43)  BMI (kg/m2): 18.2 (12-07-20 @ 23:10), 19.1 (11-27-20 @ 20:43)    [ ]PPSV2 < or = to 30% [ ]significant weight loss  [ ]poor nutritional intake  [ ]anasarca     Albumin, Serum: 2.7 g/dL (12-08-20 @ 07:22)   [ ]Artificial Nutrition      REFERRALS:   [ ]Chaplaincy  [ ]Hospice  [ ]Child Life  [ ]Social Work  [ ]Case management [ ]Holistic Therapy     Goals of Care Document: CRISTIAN Monroe (12-02-20 @ 18:00)  Goals of Care Conversation:   Participants:  · Participants  Patient; Family  · Child(chioma)  Missy Thomas    Advance Directives:  · Does patient have Advance Directive  No  · Does Patient Have a Surrogate  Yes  · Surrogate's Name  Missy Thomas  · Surrogate's Phone Number  [phone]  · Caregiver:  yes  · Name  Sheeba Cardoso  · [facility] [place]    Conversation Discussion:  · Conversation  Diagnosis; Prognosis; Treatment Options; Palliative Care Referral  · Conversation Details  -diagnostic findings including anastomotic leak, thoracic discitis/osteo, sacral abscess  -treatment options including abx, surgery  -diagnostic options including CT scan, colonoscopy   -poor nutrition and overall health status   -comfort care, hospice    What Matters Most To Patient and Family:  · What matters most to patient and family  treatment, comfort    Personal Advance Directives Treatment Guidelines:   Treatment Guidelines:  · Decision Maker  Surrogate    Location of Discussion:   Duration of Advanced Care Planning Meeting:  · Time spent (in minutes)  25    Location of Discussion:  · Location of discussion  Telephone      Electronic Signatures:  Maximilian Monroe)  (Signed 02-Dec-2020 18:02)  	Authored: Goals of Care Conversation, Personal Advance Directives Treatment Guidelines, Location of Discussion      Last Updated: 02-Dec-2020 18:02 by Maximilian Monroe)        ______________  Henrique Gustafson MD   of Geriatric and Palliative Medicine  [facility]     Please page the following number for clinical matters between the hours of 9AM and 5PM   from Monday through Friday : [phone]    After 5PM and on weekends, please page: [phone]. The Geriatric and Palliative Medicine consult service has 24/7 coverage for medical recommendations, including for symptom management needs.

## 2020-12-11 NOTE — PROGRESS NOTE ADULT - PROBLEM SELECTOR PLAN 6
- s/p distal ileum resection c/b leak at anastomosis site noted on recent CT imaging w/ enterococcal bacteremia   - Will continue to monitor leukocytosis and blood cx pending  - If recurrent enterococcus bacteremia will discuss w/ family on going infection in setting of no source control  - CEA wnl

## 2020-12-11 NOTE — PROGRESS NOTE ADULT - PROBLEM SELECTOR PLAN 1
- RLL consolidation w/ leukocytosis w/ hx of vocal cord paralysis and oral intake at home   - c/w Zosyn, Vanc  - NPO per S&S - RLL consolidation w/ leukocytosis w/ hx of vocal cord paralysis and oral intake at home   - c/w Zosyn, now s/p vanc and switched to daptomycin per ID  - NPO per S&S

## 2020-12-11 NOTE — PROGRESS NOTE ADULT - ASSESSMENT
65 f with HTN, COPD, stage I colon ca s/p resection and no chemo, RA on humira, zenker diverticulum s/p repair 2019 which resulted in vocal paralysis and multiple aspiration pneumonias, had a PEG before but s/p a J tube about 6 weeks ago, was hospitalized at [facility] from 11/27 to 12/3 initially for GJ tube leaking and ?cellulitis but CT showed ? rectosigmoid anastomosis leak, L hip abscess and myositis extending to trochanter bursa, but no hip effusion, also R hip myositis and ?forming abscess, sacral osteo with phlegmon involving the central canal  s/p IR drainage of L hip abscess which grew enterococcus  faecalis R to vanco and amp (I called the lab and it is sensitive to dapto and linezolid)  blood cx showed E. faecalis S to amp  pt was deteriorating so she was discharged to hospice with no antibiotics but there she improved clinically and was sent back to the hospital, she is confused but stable  blood cx 11/7 now with GPC in pairs and chains but not on PCR panel so not enterococcus  WBC 16-18  abd/pelvis CT: Right lower lobe consolidation and bilateral patchy lung opacities suspicious for multifocal pneumonia. Ill-defined gas-containing presacral fluid collection and/or phlegmon which tracks along to the left hip joint. Findings consistent with infection. MRI with contrast is recommended to further evaluate.    leukocytosis with L hip abscess, myositis s/p IR drain 11/30 which showed VRE (S to dapto and linezolid)  also sacral osteo and abscess with no decubitus so the source is likely in the abdomen, there was question of rectosigmoid anastomosis leak on initial CTs but that surgery was long time ago and pt had no complications after that, the J tube however was  placed 6 weeks ago  E. faecalis bacteremia   new GPC in pairs and chains bacteremia  malnutrition, dysphagia and risk of aspiration    * IR did not believe there is a drainable collection  * c/w dapto but increase to 8 mg/kg,  CK 32  * c/w zosyn for now  * will need TTE  * f/u the GPC in pairs and chains in the blood  * f/u the repeat blood cx, negative for now  * monitor the WBC    The above assessment and plan was discussed with the primary team    Maria Antonia Hunter MD  Pager [phone]  After 5pm and on weekends call [phone]

## 2020-12-11 NOTE — PROGRESS NOTE ADULT - SUBJECTIVE AND OBJECTIVE BOX
NOTE INCOMPLETE/ IN PROGRESS    PROGRESS NOTE:   Authored by Dr. Cate Vaughan MD  Pager [facility]: [phone]; [facility]: [id number]     Patient is a 65y old  Female who presents with a chief complaint of Weakness (10 Dec 2020 16:31)      SUBJECTIVE / OVERNIGHT EVENTS:    ADDITIONAL REVIEW OF SYSTEMS:    MEDICATIONS  (STANDING):  DAPTOmycin IVPB 250 milliGRAM(s) IV Intermittent every 24 hours  enoxaparin Injectable 40 milliGRAM(s) SubCutaneous daily  famotidine    Tablet 20 milliGRAM(s) Oral two times a day  FLUoxetine Solution 20 milliGRAM(s) Oral daily  gabapentin   Solution 200 milliGRAM(s) Oral three times a day  methadone    Tablet 30 milliGRAM(s) Oral every 12 hours  multivitamin 1 Tablet(s) Oral daily  piperacillin/tazobactam IVPB.. 3.375 Gram(s) IV Intermittent every 8 hours  thiamine 100 milliGRAM(s) Oral daily    MEDICATIONS  (PRN):  ALBUTerol    90 MICROgram(s) HFA Inhaler 2 Puff(s) Inhalation every 6 hours PRN Shortness of Breath and/or Wheezing      CAPILLARY BLOOD GLUCOSE      POCT Blood Glucose.: 110 mg/dL (10 Dec 2020 13:06)    I&O's Summary    10 Dec 2020 07:01  -  11 Dec 2020 07:00  --------------------------------------------------------  IN: 1160 mL / OUT: 2800 mL / NET: -1640 mL        PHYSICAL EXAM:  Vital Signs Last 24 Hrs  T(C): 37.2 (11 Dec 2020 05:08), Max: 37.2 (11 Dec 2020 05:08)  T(F): 99 (11 Dec 2020 05:08), Max: 99 (11 Dec 2020 05:08)  HR: 101 (11 Dec 2020 05:08) (86 - 101)  BP: 127/85 (11 Dec 2020 05:08) (109/74 - 127/85)  BP(mean): --  RR: 18 (11 Dec 2020 05:08) (18 - 20)  SpO2: 94% (11 Dec 2020 05:08) (91% - 96%)    GEN: Bed bound, cachectic appearing, more alert  CHEST/LUNGS: CTABL, no crackles or rales noted  CARDIAC: RRR no M/R/G  ABDOMEN: Non-tender, non-distended, normoactive BS, GJ tube in LUQ, no drainage, or skin changes around site  : goldman catheter in place  MSK: No edema, no gross deformity of extremities.  SKIN: No rashes, no petechiae, no vesicles  NEURO: Slow to respond, but more awake and alert today. Following commands.   PSYCH: A&Ox3     LABS:                        8.5    17.71 )-----------( 390      ( 10 Dec 2020 07:39 )             27.9     12-10    134<L>  |  94<L>  |  8   ----------------------------<  69<L>  4.1   |  30  |  <0.30<L>    Ca    8.5      10 Dec 2020 07:39  Phos  2.6     12-10  Mg     1.6     12-10        CARDIAC MARKERS ( 10 Dec 2020 19:24 )  x     / x     / 32 U/L / x     / x              Culture - Blood (collected 09 Dec 2020 18:54)  Source: .Blood Blood-Venous  Preliminary Report (10 Dec 2020 19:02):    No growth to date.    Culture - Blood (collected 09 Dec 2020 18:52)  Source: .Blood Blood-Peripheral  Preliminary Report (10 Dec 2020 19:02):    No growth to date.        RADIOLOGY & ADDITIONAL TESTS:  Results Reviewed:   Imaging Personally Reviewed:  Electrocardiogram Personally Reviewed:    COORDINATION OF CARE:  Care Discussed with Consultants/Other Providers [Y/N]:  Prior or Outpatient Records Reviewed [Y/N]:   PROGRESS NOTE:   Authored by Dr. Cate Vaughan MD  Pager [facility]: [phone]; [facility]: [id number]     Patient is a 65y old  Female who presents with a chief complaint of Weakness (10 Dec 2020 16:31)      SUBJECTIVE / OVERNIGHT EVENTS:  Pt continues to be more awake/ alert today. Switched to daptomycin from vanc per ID. Per IR, no drainable collection. Will continue GOC w/ daughter. Daughter interested in pursuing ENT/ S&S reconsult today.     MEDICATIONS  (STANDING):  DAPTOmycin IVPB 250 milliGRAM(s) IV Intermittent every 24 hours  enoxaparin Injectable 40 milliGRAM(s) SubCutaneous daily  famotidine    Tablet 20 milliGRAM(s) Oral two times a day  FLUoxetine Solution 20 milliGRAM(s) Oral daily  gabapentin   Solution 200 milliGRAM(s) Oral three times a day  methadone    Tablet 30 milliGRAM(s) Oral every 12 hours  multivitamin 1 Tablet(s) Oral daily  piperacillin/tazobactam IVPB.. 3.375 Gram(s) IV Intermittent every 8 hours  thiamine 100 milliGRAM(s) Oral daily    MEDICATIONS  (PRN):  ALBUTerol    90 MICROgram(s) HFA Inhaler 2 Puff(s) Inhalation every 6 hours PRN Shortness of Breath and/or Wheezing      CAPILLARY BLOOD GLUCOSE      POCT Blood Glucose.: 110 mg/dL (10 Dec 2020 13:06)    I&O's Summary    10 Dec 2020 07:01  -  11 Dec 2020 07:00  --------------------------------------------------------  IN: 1160 mL / OUT: 2800 mL / NET: -1640 mL        PHYSICAL EXAM:  Vital Signs Last 24 Hrs  T(C): 37.2 (11 Dec 2020 05:08), Max: 37.2 (11 Dec 2020 05:08)  T(F): 99 (11 Dec 2020 05:08), Max: 99 (11 Dec 2020 05:08)  HR: 101 (11 Dec 2020 05:08) (86 - 101)  BP: 127/85 (11 Dec 2020 05:08) (109/74 - 127/85)  BP(mean): --  RR: 18 (11 Dec 2020 05:08) (18 - 20)  SpO2: 94% (11 Dec 2020 05:08) (91% - 96%)    GEN: Bed bound, cachectic appearing, more alert  CHEST/LUNGS: CTABL, no crackles or rales noted  CARDIAC: RRR no M/R/G  ABDOMEN: Non-tender, non-distended, normoactive BS, GJ tube in LUQ, no drainage, or skin changes around site  : goldman catheter in place  MSK: No edema, no gross deformity of extremities.  SKIN: No rashes, no petechiae, no vesicles  NEURO: Slow to respond, but more awake and alert today. Following commands.   PSYCH: A&Ox2 today (name and month/ year, not place)     LABS:                        8.5    17.71 )-----------( 390      ( 10 Dec 2020 07:39 )             27.9     12-10    134<L>  |  94<L>  |  8   ----------------------------<  69<L>  4.1   |  30  |  <0.30<L>    Ca    8.5      10 Dec 2020 07:39  Phos  2.6     12-10  Mg     1.6     12-10        CARDIAC MARKERS ( 10 Dec 2020 19:24 )  x     / x     / 32 U/L / x     / x              Culture - Blood (collected 09 Dec 2020 18:54)  Source: .Blood Blood-Venous  Preliminary Report (10 Dec 2020 19:02):    No growth to date.    Culture - Blood (collected 09 Dec 2020 18:52)  Source: .Blood Blood-Peripheral  Preliminary Report (10 Dec 2020 19:02):    No growth to date.        RADIOLOGY & ADDITIONAL TESTS:  Results Reviewed:   Imaging Personally Reviewed:  Electrocardiogram Personally Reviewed:    COORDINATION OF CARE:  Care Discussed with Consultants/Other Providers [Y/N]:  Prior or Outpatient Records Reviewed [Y/N]:

## 2020-12-11 NOTE — CHART NOTE - NSCHARTNOTEFT_GEN_A_CORE
Nutrition Follow-up  Patient seen for: malnutrition and refeeding risk follow-up    Source: EMR, ( )     Hospital course as per chart: 65y Female with PMH of emphysema on home 2L NC, colon ca s/p distal ileum resection in 2016, ESBL UTI, Zenker's diverticulum s/p repair (2019) complicated by vocal cord paralysis requiring GJ tube for feeding, RA, gastroparesis, GERD, and anemia who presented from home hospice for weakness, admitted 12/7. Per team: "weakness likely in the setting of malnutrition vs infection 2/2 presacral fluid collection vs RLL aspiration PNA." S/p Swallow Bedside Assessment (12/8), recommended NPO. Tube feeds started 12/8. Chart reviewed, events noted. Palliative following for GOC.    Diet, NPO with Tube Feed:   Tube Feeding Modality: Gastro-Jejunostomy  Vital 1.5 Hernan (VITAL1.5RTH)  Total Volume for 24 Hours (mL): 480  Continuous  Starting Tube Feed Rate {mL per Hour}: 20  Increase Tube Feed Rate by (mL): 0     Every 24 hours  Until Goal Tube Feed Rate (mL per Hour): 20  Tube Feed Duration (in Hours): 24  Tube Feed Start Time: 14:00 (12-09-20 @ 16:59) [Active]    Patient reports ( )    Most recent weight: 93 pounds (12/9 bed) - suggests wt stability (93.2 pounds 12/7)  Will continue to monitor and trend weights.     Pertinent Medications: MEDICATIONS  (STANDING):  acetaminophen  IVPB .. 650 milliGRAM(s) IV Intermittent once  DAPTOmycin IVPB 250 milliGRAM(s) IV Intermittent every 24 hours  enoxaparin Injectable 40 milliGRAM(s) SubCutaneous daily  famotidine    Tablet 20 milliGRAM(s) Oral two times a day  FLUoxetine Solution 20 milliGRAM(s) Oral daily  gabapentin   Solution 200 milliGRAM(s) Oral three times a day  methadone    Tablet 30 milliGRAM(s) Oral every 12 hours  multivitamin 1 Tablet(s) Oral daily  piperacillin/tazobactam IVPB.. 3.375 Gram(s) IV Intermittent every 8 hours  thiamine 100 milliGRAM(s) Oral daily    MEDICATIONS  (PRN):  ALBUTerol    90 MICROgram(s) HFA Inhaler 2 Puff(s) Inhalation every 6 hours PRN Shortness of Breath and/or Wheezing    Pertinent Labs:  12-11 Na134 mmol/L<L> Glu 96 mg/dL K+ 4.1 mmol/L Cr  <0.30 mg/dL<L> BUN 8 mg/dL 12-11 Phos 2.8 mg/dL 12-08 Alb 2.7 g/dL<L>    Skin: no pressure injuries per flowsheets   Edema: no edema per flowsheets     Estimated Needs: no change since previous assessment  Based on dosing wt 93.2 pounds   Estimated Energy Needs (30-35 kcal/kg): 2776-7752 kcal/day  Estimated Protein Needs (1.4-1.6 g/kg): 59-68 g/day  Estimated Fluid Needs (30-35 ml/kg): 1551-5933 ml/day    Previous Nutrition Diagnosis: Severe malnutrition, Underweight, Swallowing difficulty  Nutrition Diagnosis is [x] ongoing  [ ] resolved [ ] not applicable   Care plan: [ ] in progress [ ] achieved  Being addressed with: enteral nutrition     New Nutrition Diagnosis: [ ] not applicable    [ ] Inadequate Protein Energy Intake [ ]Inadequate Oral Intake [ ] Excessive Energy Intake     [ ] Underweight [ ] Increased Nutrient Needs [ ] Overweight/Obesity     [ ] Altered GI Function [ ] Unintended Weight Loss [ ] Food & Nutrition Related Knowledge Deficit[ ] Limited Adherence to nutrition related recommendations [ ] Malnutrition  [ ] other: Free text    Recommendations:      Monitoring and Evaluation: Monitor enteral nutrition provision and tolerance, weight, labs, skin, GI status, diet     RD remains available upon request and will continue to follow-up per protocol.   Terese Lockhart MS RD CDN pager #[phone] Nutrition Follow-up  Patient seen for: malnutrition and refeeding risk follow-up    Source: EMR, team     Hospital course as per chart: 65y Female with PMH of emphysema on home 2L NC, colon ca s/p distal ileum resection in 2016, ESBL UTI, Zenker's diverticulum s/p repair (2019) complicated by vocal cord paralysis requiring GJ tube for feeding, RA, gastroparesis, GERD, and anemia who presented from home hospice for weakness, admitted 12/7. Per team: "weakness likely in the setting of malnutrition vs infection 2/2 presacral fluid collection vs RLL aspiration PNA." S/p Swallow Bedside Assessment (12/8), recommended NPO. Tube feeds started 12/8. Chart reviewed, events noted. Palliative following for GOC.    Diet, NPO with Tube Feed:   Tube Feeding Modality: Gastro-Jejunostomy  Vital 1.5 Hernan (VITAL1.5RTH)  Total Volume for 24 Hours (mL): 480  Continuous  Starting Tube Feed Rate {mL per Hour}: 20  Increase Tube Feed Rate by (mL): 0     Every 24 hours  Until Goal Tube Feed Rate (mL per Hour): 20  Tube Feed Duration (in Hours): 24  Tube Feed Start Time: 14:00 (12-09-20 @ 16:59) [Active]    Patient noted to be confused, attempted to speak with pt and pt reports "I don't know." Observed tube feeds running at bedside (Vital 1.5 at 20 ml/hr). No acute GI distress reported. No documented BMs since admission (12/7).     Most recent weight: 93 pounds (12/9 bed) - suggests wt stability (93.2 pounds 12/7)  Will continue to monitor and trend weights.     Pertinent Medications: MEDICATIONS  (STANDING):  acetaminophen  IVPB .. 650 milliGRAM(s) IV Intermittent once  DAPTOmycin IVPB 250 milliGRAM(s) IV Intermittent every 24 hours  enoxaparin Injectable 40 milliGRAM(s) SubCutaneous daily  famotidine    Tablet 20 milliGRAM(s) Oral two times a day  FLUoxetine Solution 20 milliGRAM(s) Oral daily  gabapentin   Solution 200 milliGRAM(s) Oral three times a day  methadone    Tablet 30 milliGRAM(s) Oral every 12 hours  multivitamin 1 Tablet(s) Oral daily  piperacillin/tazobactam IVPB.. 3.375 Gram(s) IV Intermittent every 8 hours  thiamine 100 milliGRAM(s) Oral daily    MEDICATIONS  (PRN):  ALBUTerol    90 MICROgram(s) HFA Inhaler 2 Puff(s) Inhalation every 6 hours PRN Shortness of Breath and/or Wheezing    Pertinent Labs:  12-11 Na134 mmol/L<L> Glu 96 mg/dL K+ 4.1 mmol/L Cr  <0.30 mg/dL<L> BUN 8 mg/dL 12-11 Phos 2.8 mg/dL 12-08 Alb 2.7 g/dL<L>    Skin: no pressure injuries per flowsheets   Edema: no edema per flowsheets     Estimated Needs: no change since previous assessment  Based on dosing wt 93.2 pounds   Estimated Energy Needs (30-35 kcal/kg): 0614-5913 kcal/day  Estimated Protein Needs (1.4-1.6 g/kg): 59-68 g/day  Estimated Fluid Needs (30-35 ml/kg): 3802-5069 ml/day    Previous Nutrition Diagnosis: Severe malnutrition, Underweight, Swallowing difficulty  Nutrition Diagnosis is [x] ongoing  [ ] resolved [ ] not applicable   Care plan: [x] in progress [ ] achieved  Being addressed with: enteral nutrition     New Nutrition Diagnosis: not applicable    Recommendations:  1) Recommend increasing tube feeds of Vital 1.5 to 30 ml/hr, increasing by 10 ml/hr q12 hours (pending tolerance and electrolytes WNL) to goal rate of 40 ml/hr x 24 hours.   At goal, this regimen provides 960 ml total volume, 1440 kcal, 65 g protein, and 733 ml free water. Meets 34 kcal/kg, 1.5 g/kg protein based on dosing wt 42.3 kg. Defer additional free water to team. RD remains available to adjust tube feed regimen PRN. Nutrition to remain consistent with GOC.  2) Pt remains at risk for refeeding syndrome   - Continue multivitamin and thiamine supplementation if no medical contraindications  - Monitor electrolytes (potassium, phosphorus, and magnesium) before advancing feeds and PRN after     Monitoring and Evaluation: Monitor enteral nutrition provision and tolerance, weight, labs, skin, GI status, diet     Nutrition to remain consistent with GOC.   RD remains available upon request and will continue to follow-up per protocol.   Terese Lockhart MS RD CDN pager #[phone]

## 2020-12-11 NOTE — PROGRESS NOTE ADULT - SUBJECTIVE AND OBJECTIVE BOX
Follow Up:  bacteremia, gluteal and sacral abscess with osteo    Interval History: pt afebrile, WBC increased to 20    ROS:      All other systems negative    Constitutional: no fever, no chills  Cardiovascular:  no chest pain, no palpitation  Respiratory:  no SOB, no cough  GI:  +dysphagia, no abd pain, no vomiting, no diarrhea  urinary: no dysuria, no hematuria, no flank pain  musculoskeletal:  today c/o L side/hip pain  skin:  no rash  neurology:  no headache, no seizure, still confused          Allergies  Levaquin (Unknown)  levofloxacin (Unknown)        ANTIMICROBIALS:  DAPTOmycin IVPB 250 every 24 hours  piperacillin/tazobactam IVPB.. 3.375 every 8 hours      OTHER MEDS:  acetaminophen  IVPB .. 650 milliGRAM(s) IV Intermittent once  ALBUTerol    90 MICROgram(s) HFA Inhaler 2 Puff(s) Inhalation every 6 hours PRN  enoxaparin Injectable 40 milliGRAM(s) SubCutaneous daily  famotidine    Tablet 20 milliGRAM(s) Oral two times a day  FLUoxetine Solution 20 milliGRAM(s) Oral daily  gabapentin   Solution 200 milliGRAM(s) Oral three times a day  methadone    Tablet 30 milliGRAM(s) Oral every 12 hours  multivitamin 1 Tablet(s) Oral daily  thiamine 100 milliGRAM(s) Oral daily      Vital Signs Last 24 Hrs  T(C): 36.7 (11 Dec 2020 13:33), Max: 37.2 (11 Dec 2020 05:08)  T(F): 98 (11 Dec 2020 13:33), Max: 99 (11 Dec 2020 05:08)  HR: 104 (11 Dec 2020 13:33) (91 - 104)  BP: 143/85 (11 Dec 2020 13:33) (111/72 - 143/85)  BP(mean): --  RR: 18 (11 Dec 2020 13:33) (18 - 20)  SpO2: 95% (11 Dec 2020 13:33) (91% - 95%)    Physical Exam:  General: NAD  Respiratory:   clear b/l, no wheezing  abd:   soft, BS +, not tender, Jtube with no surrounding tenderness  :     no CVAT, no suprapubic tenderness, + goldman  Musculoskeletal : no joint swelling, no edema  Skin:    no rash, no decubitus  vascular: no phlebitis  Neurologic:   pt awake and answers questions but is confused                            8.3    20.35 )-----------( 376      ( 11 Dec 2020 06:50 )             28.3       12-11    134<L>  |  95<L>  |  8   ----------------------------<  96  4.1   |  28  |  <0.30<L>    Ca    8.5      11 Dec 2020 06:50  Phos  2.8     12-11  Mg     1.9     12-11            MICROBIOLOGY:  v  .Blood Blood-Venous  12-09-20   No growth to date.  --  --      .Blood Blood-Peripheral  12-09-20   No growth to date.  --  --      .Urine Clean Catch (Midstream)  12-08-20   <10,000 CFU/mL Normal Urogenital Ami  --  --      .Blood Blood-Peripheral  12-07-20   No growth to date.  --  --      .Blood Blood-Peripheral  12-07-20   Growth in aerobic bottle: Gram positive cocci in pairs, chains and  clusters  "Due to technical problems, Proteus sp. will Not be reported as part of  the BCID panel until further notice"  ***Blood Panel PCR results on this specimen are available  approximately 3 hours after the Gram stain result.***  Gram stain, PCR, and/or culture results may not always  correspond due to difference in methodologies.  ************************************************************  This PCR assay was performed using [employer].  The following targets are tested for: Enterococcus,  vancomycin resistant enterococci, Listeria monocytogenes,  coagulase negative staphylococci, S. aureus,  methicillin resistant S. aureus, Streptococcus agalactiae  (Group B), S. pneumoniae, S. pyogenes (Group A),  Acinetobacter baumannii, Enterobacter cloacae, E. coli,  Klebsiella oxytoca, K. pneumoniae, Proteus sp.,  Serratia marcescens, Haemophilus influenzae,  Neisseria meningitidis, Pseudomonas aeruginosa, Candida  albicans, C. glabrata, C krusei, C parapsilosis,  C. tropicalis and the KPC resistance gene.  --  Blood Culture PCR      .Abscess Hip - Left  11-30-20   Rare Enterococcus faecium  --  Enterococcus faecium      .Blood Blood-Peripheral  11-29-20   No Growth Final  --  --      .Urine Clean Catch (Midstream)  11-28-20   <10,000 CFU/mL Normal Urogenital Ami  --  --      .Blood Blood-Peripheral  11-27-20   No Growth Final  --  Blood Culture PCR  Enterococcus faecalis                RADIOLOGY:  Images independently visualized and reviewed personally, findings as below  < from: CT Abdomen and Pelvis w/ IV Cont (12.07.20 @ 17:44) >  IMPRESSION:  Right lower lobe consolidation and bilateral patchy lung opacities suspicious for multifocal pneumonia.    Ill-defined gas-containing presacral fluid collection and/or phlegmon which tracks along to the left hip joint. Findings consistent with infection. MRI with contrast is recommended to further evaluate.

## 2020-12-12 NOTE — PROGRESS NOTE ADULT - PROBLEM SELECTOR PLAN 1
- RLL consolidation w/ leukocytosis w/ hx of vocal cord paralysis and oral intake at home   - c/w Zosyn, now s/p vanc and switched to daptomycin per ID  - NPO per S&S

## 2020-12-12 NOTE — PROGRESS NOTE ADULT - PROBLEM SELECTOR PLAN 4
- Findings as above previously noted on imaging at OSH w/ MRI as well s/p I&D  - No surgical intervention  - Supportive therapy w/ ice packs and elevation  - IR consulted--> daughter interested in drainage if possible; per IR, not drainable  - Abx as above  - Pain control PRN - Findings as above previously noted on imaging at OSH w/ MRI as well s/p I&D  - No surgical intervention  - Supportive therapy w/ ice packs and elevation  - IR consulted--> daughter interested in drainage if possible; per IR, not drainable  - Abx as above  - Pain control PRN  - XRay H&P ordered given worsening b/l hip pain

## 2020-12-12 NOTE — PROGRESS NOTE ADULT - ATTENDING COMMENTS
Seen, examined the patient this am   This is a 65F with h/o emphysema on home 2L NC, colon CA s/p distal ileum resection in 2016, ESBL UTI, zenker's diverticulum s/p repair in 2019 c/b vocal cord paralysis req G-J tube for feeding, RA, gastroparesis, GERD, anemia presenting from home hospice for weakness ,found to have RLL pneumonia  Afebrile, resting in bed, weak, but feels ok, no SOB  1. Sepsis due to Enterococci bacteremia/pneumonia  2. Enterococci fecalis bacteremia  3. Presacral fluid collection, possible infectious  4. Dysphagia, vocal cord paralysis, s/p G-J tube  5. Ca colon, s/p distal ileum resection in 2016    - Afebrile, but uptrending WBC, blood c/s shows GPC in pair, chain and cluster. Had positive blood c/s in Nov 27, 2020 with Enterococcus fecalis    ID f/u plan noted.    On IV Dapto and Zosyn for now, repeat blood c/s to follow    aspiration precaution, NPO, on G-J feeding per Nutrition rec  - CT abd/pelvis showed- Right lower lobe consolidation and bilateral patchy lung opacities suspicious for multifocal pneumonia. And ill-defined gas-     containing presacral fluid collection and/or phlegmon which tracks along to the left hip joint. Findings consistent with infection    IR f/u plans no intervention    TTE pending  - Ongoing GOC discussion with palliative care    DNR/DNI.  ** Family is aware of plan from the team.

## 2020-12-12 NOTE — PROGRESS NOTE ADULT - SUBJECTIVE AND OBJECTIVE BOX
NOTE INCOMPLETE/ IN PROGRESS    PROGRESS NOTE:   Authored by Dr. Cate Vaughan MD  Pager [facility]: [phone]; [facility]: [id number]     Patient is a 65y old  Female who presents with a chief complaint of Weakness (11 Dec 2020 16:16)      SUBJECTIVE / OVERNIGHT EVENTS:    ADDITIONAL REVIEW OF SYSTEMS:    MEDICATIONS  (STANDING):  DAPTOmycin IVPB 350 milliGRAM(s) IV Intermittent every 24 hours  enoxaparin Injectable 40 milliGRAM(s) SubCutaneous daily  famotidine    Tablet 20 milliGRAM(s) Oral two times a day  FLUoxetine Solution 20 milliGRAM(s) Oral daily  gabapentin   Solution 200 milliGRAM(s) Oral three times a day  methadone    Tablet 30 milliGRAM(s) Oral every 12 hours  multivitamin 1 Tablet(s) Oral daily  piperacillin/tazobactam IVPB.. 3.375 Gram(s) IV Intermittent every 8 hours  thiamine 100 milliGRAM(s) Oral daily    MEDICATIONS  (PRN):  ALBUTerol    90 MICROgram(s) HFA Inhaler 2 Puff(s) Inhalation every 6 hours PRN Shortness of Breath and/or Wheezing      CAPILLARY BLOOD GLUCOSE        I&O's Summary    11 Dec 2020 07:01  -  12 Dec 2020 07:00  --------------------------------------------------------  IN: 680 mL / OUT: 1505 mL / NET: -825 mL        PHYSICAL EXAM:  Vital Signs Last 24 Hrs  T(C): 36.6 (12 Dec 2020 04:36), Max: 36.7 (11 Dec 2020 13:33)  T(F): 97.9 (12 Dec 2020 04:36), Max: 98 (11 Dec 2020 13:33)  HR: 98 (12 Dec 2020 04:36) (87 - 104)  BP: 115/75 (12 Dec 2020 04:36) (106/74 - 143/85)  BP(mean): --  RR: 19 (12 Dec 2020 04:36) (18 - 19)  SpO2: 94% (12 Dec 2020 04:36) (94% - 96%)    GEN: Bed bound, cachectic appearing, more alert  CHEST/LUNGS: CTABL, no crackles or rales noted  CARDIAC: RRR no M/R/G  ABDOMEN: Non-tender, non-distended, normoactive BS, GJ tube in LUQ, no drainage, or skin changes around site  : goldman catheter in place  MSK: No edema, no gross deformity of extremities.  SKIN: No rashes, no petechiae, no vesicles  NEURO: Slow to respond, but more awake and alert today. Following commands.   PSYCH: A&Ox2 today (name and month/ year, not place)     LABS:                        9.0    20.66 )-----------( 487      ( 12 Dec 2020 06:36 )             29.8     12-12    133<L>  |  94<L>  |  14  ----------------------------<  107<H>  4.3   |  27  |  0.32<L>    Ca    8.8      12 Dec 2020 06:36  Phos  3.4     12-12  Mg     2.0     12-12    TPro  6.7  /  Alb  3.2<L>  /  TBili  0.4  /  DBili  x   /  AST  14  /  ALT  12  /  AlkPhos  122<H>  12-12      CARDIAC MARKERS ( 10 Dec 2020 19:24 )  x     / x     / 32 U/L / x     / x              Culture - Blood (collected 10 Dec 2020 23:09)  Source: .Blood Blood-Peripheral  Preliminary Report (12 Dec 2020 01:02):    No growth to date.    Culture - Blood (collected 10 Dec 2020 23:09)  Source: .Blood Blood-Peripheral  Preliminary Report (12 Dec 2020 01:03):    No growth to date.    Culture - Blood (collected 09 Dec 2020 18:54)  Source: .Blood Blood-Venous  Preliminary Report (10 Dec 2020 19:02):    No growth to date.    Culture - Blood (collected 09 Dec 2020 18:52)  Source: .Blood Blood-Peripheral  Preliminary Report (10 Dec 2020 19:02):    No growth to date.        RADIOLOGY & ADDITIONAL TESTS:  Results Reviewed:   Imaging Personally Reviewed:  Electrocardiogram Personally Reviewed:    COORDINATION OF CARE:  Care Discussed with Consultants/Other Providers [Y/N]:  Prior or Outpatient Records Reviewed [Y/N]:   PROGRESS NOTE:   Authored by Dr. Cate Vaughan MD  Pager [facility]: [phone]; [facility]: [id number]     Patient is a 65y old  Female who presents with a chief complaint of Weakness (11 Dec 2020 16:16)      SUBJECTIVE / OVERNIGHT EVENTS:  Pt c/o b/l hip pain, new on the R and worsening on the L. Ordered Hip/ Pelvis XRay, and will follow up. TFs increased to 30 ml/hr. Pain control regimen modified. Repeat Bcx in the AM.     MEDICATIONS  (STANDING):  DAPTOmycin IVPB 350 milliGRAM(s) IV Intermittent every 24 hours  enoxaparin Injectable 40 milliGRAM(s) SubCutaneous daily  famotidine    Tablet 20 milliGRAM(s) Oral two times a day  FLUoxetine Solution 20 milliGRAM(s) Oral daily  gabapentin   Solution 200 milliGRAM(s) Oral three times a day  methadone    Tablet 30 milliGRAM(s) Oral every 12 hours  multivitamin 1 Tablet(s) Oral daily  piperacillin/tazobactam IVPB.. 3.375 Gram(s) IV Intermittent every 8 hours  thiamine 100 milliGRAM(s) Oral daily    MEDICATIONS  (PRN):  ALBUTerol    90 MICROgram(s) HFA Inhaler 2 Puff(s) Inhalation every 6 hours PRN Shortness of Breath and/or Wheezing      CAPILLARY BLOOD GLUCOSE        I&O's Summary    11 Dec 2020 07:01  -  12 Dec 2020 07:00  --------------------------------------------------------  IN: 680 mL / OUT: 1505 mL / NET: -825 mL        PHYSICAL EXAM:  Vital Signs Last 24 Hrs  T(C): 36.6 (12 Dec 2020 04:36), Max: 36.7 (11 Dec 2020 13:33)  T(F): 97.9 (12 Dec 2020 04:36), Max: 98 (11 Dec 2020 13:33)  HR: 98 (12 Dec 2020 04:36) (87 - 104)  BP: 115/75 (12 Dec 2020 04:36) (106/74 - 143/85)  BP(mean): --  RR: 19 (12 Dec 2020 04:36) (18 - 19)  SpO2: 94% (12 Dec 2020 04:36) (94% - 96%)    GEN: Bed bound, cachectic appearing, more alert  CHEST/LUNGS: CTABL, no crackles or rales noted  CARDIAC: RRR no M/R/G  ABDOMEN: Non-tender, non-distended, normoactive BS, GJ tube in LUQ, no drainage, or skin changes around site  : goldman catheter in place  MSK: No edema, no gross deformity of extremities.  SKIN: No rashes, no petechiae, no vesicles  NEURO: Slow to respond, but more awake and alert today. Following commands.   PSYCH: A&Ox3 today (name, month/ year, hospital)     LABS:                        9.0    20.66 )-----------( 487      ( 12 Dec 2020 06:36 )             29.8     12-12    133<L>  |  94<L>  |  14  ----------------------------<  107<H>  4.3   |  27  |  0.32<L>    Ca    8.8      12 Dec 2020 06:36  Phos  3.4     12-12  Mg     2.0     12-12    TPro  6.7  /  Alb  3.2<L>  /  TBili  0.4  /  DBili  x   /  AST  14  /  ALT  12  /  AlkPhos  122<H>  12-12      CARDIAC MARKERS ( 10 Dec 2020 19:24 )  x     / x     / 32 U/L / x     / x              Culture - Blood (collected 10 Dec 2020 23:09)  Source: .Blood Blood-Peripheral  Preliminary Report (12 Dec 2020 01:02):    No growth to date.    Culture - Blood (collected 10 Dec 2020 23:09)  Source: .Blood Blood-Peripheral  Preliminary Report (12 Dec 2020 01:03):    No growth to date.    Culture - Blood (collected 09 Dec 2020 18:54)  Source: .Blood Blood-Venous  Preliminary Report (10 Dec 2020 19:02):    No growth to date.    Culture - Blood (collected 09 Dec 2020 18:52)  Source: .Blood Blood-Peripheral  Preliminary Report (10 Dec 2020 19:02):    No growth to date.        RADIOLOGY & ADDITIONAL TESTS:  Results Reviewed:   Imaging Personally Reviewed:  Electrocardiogram Personally Reviewed:    COORDINATION OF CARE:  Care Discussed with Consultants/Other Providers [Y/N]:  Prior or Outpatient Records Reviewed [Y/N]:

## 2020-12-12 NOTE — PROGRESS NOTE ADULT - SUBJECTIVE AND OBJECTIVE BOX
INFECTIOUS DISEASES FOLLOW UP--Prashanth Chacon MD  Pager [phone]    This is a follow up note for this  65y Female with  weakness  sacral wound and on broad coverage    Further ROS:  limited    Allergies    Levaquin (Unknown)  levofloxacin (Unknown)    ANTIBIOTICS/RELEVANT:  antimicrobials  DAPTOmycin IVPB 350 milliGRAM(s) IV Intermittent every 24 hours  piperacillin/tazobactam IVPB.. 3.375 Gram(s) IV Intermittent every 8 hours    OTHER:  ALBUTerol    90 MICROgram(s) HFA Inhaler 2 Puff(s) Inhalation every 6 hours PRN  enoxaparin Injectable 40 milliGRAM(s) SubCutaneous daily  famotidine    Tablet 20 milliGRAM(s) Oral two times a day  FLUoxetine Solution 20 milliGRAM(s) Oral daily  gabapentin   Solution 200 milliGRAM(s) Oral three times a day  methadone    Tablet 30 milliGRAM(s) Oral every 12 hours  multivitamin 1 Tablet(s) Oral daily  thiamine 100 milliGRAM(s) Oral daily      Objective  Vital Signs Last 24 Hrs  T(C): 36.6 (12 Dec 2020 04:36), Max: 36.7 (11 Dec 2020 13:33)  T(F): 97.9 (12 Dec 2020 04:36), Max: 98 (11 Dec 2020 13:33)  HR: 98 (12 Dec 2020 04:36) (87 - 104)  BP: 115/75 (12 Dec 2020 04:36) (106/74 - 143/85)  BP(mean): --  RR: 19 (12 Dec 2020 04:36) (18 - 19)  SpO2: 94% (12 Dec 2020 04:36) (94% - 96%)    PHYSICAL EXAM:  cachectic  Constitutional:no acute distress  Cardiovascular: S1S2  Gastrointestinal:soft, (+) BS, no tenderness  Extremities:no e/e/c  No Lymphadenopathy  IV sites not inflammed.    LABS:                        9.0    20.66 )-----------( 487      ( 12 Dec 2020 06:36 )             29.8     12-12    133<L>  |  94<L>  |  14  ----------------------------<  107<H>  4.3   |  27  |  0.32<L>    Ca    8.8      12 Dec 2020 06:36  Phos  3.4     12-12  Mg     2.0     12-12    TPro  6.7  /  Alb  3.2<L>  /  TBili  0.4  /  DBili  x   /  AST  14  /  ALT  12  /  AlkPhos  122<H>  12-12    MICROBIOLOGY: no new data    RADIOLOGY & ADDITIONAL STUDIES:    Imaging: minimal area of inflammation and subq air near the left gluteal region extending into pelvis, unchanged compared to prior    Assessment/Plan:   -65y Female with minimal area of inflammation and subq air near the left gluteal region extending into pelvis, unchanged compared to   -No drainable fluid collection.

## 2020-12-12 NOTE — CHART NOTE - NSCHARTNOTEFT_GEN_A_CORE
Refeeding Risk Follow-Up    Pt seen for refeeding risk and tubefeeding tolerance follow-up. EN currently held. Per provider, pt was tolerating EN at a rate of 20 ml/hour however tubefeeding was held this morning 2/2 pt c/o pain. They however plan on resuming EN today at 30 ml/hr. Pt's K+, Magnesium, and Phos currently WDL. MVI and thiamine in order via gastrostomy. Per discussion with pt and daughter they are also planning on seeing SLP for input re: possible pleasure feeding. Pt and pt's daughter's questions were answered.     Recommend to continue tubefeeding advancement start at 30 ml/hr and advance by 10 ml Q12 hours as tolerated until goal of 40 ml/hour is reached. Will continue to monitor pertinent labs.    RD remains available as needed: Tiny Martinez MS, RDN, CDN, CDE, CSOWM. #[phone]

## 2020-12-13 NOTE — PROGRESS NOTE ADULT - ATTENDING COMMENTS
Seen, examined the patient this morning  This is a 65F with h/o emphysema on home 2L NC, colon CA s/p distal ileum resection in 2016, ESBL UTI, Zenker's diverticulum s/p repair in 2019 c/b vocal cord paralysis req G-J tube for feeding, RA, gastroparesis, GERD, anemia presenting from home hospice for weakness ,found to have RLL pneumonia  Feels ok, remains afebrile, resting in bed, weak, no SOB  1. Sepsis due to Enterococci bacteremia/pneumonia  2. Enterococci fecalis bacteremia  3. Presacral fluid collection, possible infectious  4. Dysphagia, vocal cord paralysis, s/p G-J tube  5. Ca colon, s/p distal ileum resection in 2016    - Afebrile, but uptrending WBC 20K, blood c/s shows GPC in pair, chain and cluster. Had positive blood c/s in Nov 27, 2020 with Enterococcus fecalis    ID f/u plan noted.    On IV Dapto and Zosyn for now, repeat blood c/s to follow    aspiration precaution, NPO, on G-J feeding per Nutrition rec  - CT abd/pelvis showed- Right lower lobe consolidation and bilateral patchy lung opacities suspicious for multifocal pneumonia. And ill-defined gas-     containing presacral fluid collection and/or phlegmon which tracks along to the left hip joint. Findings consistent with infection    IR f/u plans no intervention    TTE pending  - Ongoing GOC discussion with palliative care    DNR/DNI.  ** Family is aware of plan from the team.

## 2020-12-13 NOTE — PROGRESS NOTE ADULT - PROBLEM SELECTOR PLAN 4
- Findings as above previously noted on imaging at [facility]SH w/ MRI as well s/p I&D  - No surgical intervention  - Supportive therapy w/ ice packs and elevation  - IR consulted--> daughter interested in drainage if possible; per IR, not drainable  as pocket to small   - Abx as above  - Pain control PRN  - XRay H&P ordered given worsening b/l hip pain

## 2020-12-13 NOTE — CHART NOTE - NSCHARTNOTEFT_GEN_A_CORE
Refeeding Risk Follow-Up    Pt seen for refeeding risk and tubefeeding tolerance follow-up. EN currently running at a rate of 30 ml/hour. Pt reports tolerating EN well, she had a BM today, states it's been soft, denies watery BM or abd. cramping, denies N/V. She wishes to eat, asking for Ensure supplements. Pt's K+, Magnesium, and Phos currently WDL. MVI and thiamine in order via gastrostomy.     -Recommend to continue tubefeeding rate advancement per order until goal of 40 ml/hr is reached. Will continue to monitor pertinent labs and GI tolerance.  -Noted pending SLP assessment for further input re: pleasure feeds as pt is wishing to eat/drink po     RD remains available as needed: Tiny Martinez MS, RDN, CDN, CDE, CSOWM. #[phone].

## 2020-12-13 NOTE — PROGRESS NOTE ADULT - SUBJECTIVE AND OBJECTIVE BOX
Patient is a 65y old  Female who presents with a chief complaint of Weakness (12 Dec 2020 08:16)      SUBJECTIVE / OVERNIGHT EVENTS: No acute events overnight. VSS    MEDICATIONS  (STANDING):  DAPTOmycin IVPB 350 milliGRAM(s) IV Intermittent every 24 hours  enoxaparin Injectable 40 milliGRAM(s) SubCutaneous daily  famotidine    Tablet 20 milliGRAM(s) Oral two times a day  FLUoxetine Solution 20 milliGRAM(s) Oral daily  gabapentin   Solution 200 milliGRAM(s) Oral three times a day  methadone    Tablet 30 milliGRAM(s) Oral every 12 hours  multivitamin 1 Tablet(s) Oral daily  piperacillin/tazobactam IVPB.. 3.375 Gram(s) IV Intermittent every 8 hours  thiamine 100 milliGRAM(s) Oral daily    MEDICATIONS  (PRN):  acetaminophen   Tablet .. 650 milliGRAM(s) Oral every 6 hours PRN Mild Pain (1 - 3), Moderate Pain (4 - 6)  ALBUTerol    90 MICROgram(s) HFA Inhaler 2 Puff(s) Inhalation every 6 hours PRN Shortness of Breath and/or Wheezing  polyethylene glycol 3350 17 Gram(s) Oral daily PRN Constipation  senna 2 Tablet(s) Oral at bedtime PRN Constipation  traMADol 25 milliGRAM(s) Oral every 6 hours PRN Severe Pain (7 - 10)      Vital Signs Last 24 Hrs  T(C): 36.8 (13 Dec 2020 04:35), Max: 36.9 (12 Dec 2020 20:58)  T(F): 98.3 (13 Dec 2020 04:35), Max: 98.5 (12 Dec 2020 20:58)  HR: 92 (13 Dec 2020 04:35) (89 - 96)  BP: 113/74 (13 Dec 2020 04:35) (110/76 - 113/74)  BP(mean): --  RR: 18 (13 Dec 2020 04:35) (18 - 18)  SpO2: 94% (13 Dec 2020 04:35) (94% - 96%)  CAPILLARY BLOOD GLUCOSE      POCT Blood Glucose.: 102 mg/dL (12 Dec 2020 13:11)    I&O's Summary    12 Dec 2020 07:01  -  13 Dec 2020 07:00  --------------------------------------------------------  IN: 650 mL / OUT: 950 mL / NET: -300 mL        PHYSICAL EXAM:  GENERAL: NAD, well-developed  HEAD:  Atraumatic, Normocephalic  EYES: EOMI, PERRLA, conjunctiva and sclera clear  NECK: Supple, No JVD  CHEST/LUNG: Clear to auscultation bilaterally; No wheeze  HEART: Regular rate and rhythm; No murmurs, rubs, or gallops  ABDOMEN: Soft, Nontender, Nondistended; Bowel sounds present  EXTREMITIES:  2+ Peripheral Pulses, No clubbing, cyanosis, or edema  PSYCH: AAOx3  NEUROLOGY: non-focal  SKIN: No rashes or lesions    LABS:                        9.0    20.66 )-----------( 487      ( 12 Dec 2020 06:36 )             29.8     12-13    133<L>  |  94<L>  |  19  ----------------------------<  58<L>  4.4   |  27  |  0.34<L>    Ca    8.8      13 Dec 2020 06:48  Phos  3.6     12-13  Mg     2.1     12-13    TPro  6.6  /  Alb  3.0<L>  /  TBili  0.4  /  DBili  x   /  AST  10  /  ALT  9<L>  /  AlkPhos  111  12-13              Culture - Blood (collected 10 Dec 2020 23:09)  Source: .Blood Blood-Peripheral  Gram Stain (12 Dec 2020 08:27):    Growth in anaerobic bottle: Gram Positive Cocci in Clusters  Preliminary Report (12 Dec 2020 08:28):    Growth in anaerobic bottle: Gram Positive Cocci in Clusters    Culture - Blood (collected 10 Dec 2020 23:09)  Source: .Blood Blood-Peripheral  Preliminary Report (12 Dec 2020 01:03):    No growth to date.          RADIOLOGY & ADDITIONAL TESTS:    Imaging Personally Reviewed:    Consultant(s) Notes Reviewed:      Care Discussed with Consultants/Other Providers:

## 2020-12-13 NOTE — PROGRESS NOTE ADULT - PROBLEM SELECTOR PLAN 1
- RLL consolidation w/ leukocytosis w/ hx of vocal cord paralysis and oral intake at home   - c/w Zosyn, now s/p vanc and switched to daptomycin per ID  - weekly CPK per ID while on daptomycin, last CPK 32 on 12/10   - NPO per S&S

## 2020-12-14 NOTE — PROGRESS NOTE ADULT - ASSESSMENT
65 f with HTN, COPD, stage I colon ca s/p resection and no chemo, RA on humira, zenker diverticulum s/p repair 2019 which resulted in vocal paralysis and multiple aspiration pneumonias, had a PEG before but s/p a J tube about 6 weeks ago, was hospitalized at [facility] from 11/27 to 12/3 initially for GJ tube leaking and ?cellulitis but CT showed ? rectosigmoid anastomosis leak, L hip abscess and myositis extending to trochanter bursa, but no hip effusion, also R hip myositis and ?forming abscess, sacral osteo with phlegmon involving the central canal  s/p IR drainage of L hip abscess which grew enterococcus  faecalis R to vanco and amp (I called the lab and it is sensitive to dapto and linezolid)  blood cx showed E. faecalis S to amp  pt was deteriorating so she was discharged to hospice with no antibiotics but there she improved clinically and was sent back to the hospital, she is confused but stable  blood cx 11/7 now with GPC in pairs and chains but not on PCR panel so not enterococcus  WBC 16-18  abd/pelvis CT: Right lower lobe consolidation and bilateral patchy lung opacities suspicious for multifocal pneumonia. Ill-defined gas-containing presacral fluid collection and/or phlegmon which tracks along to the left hip joint. Findings consistent with infection. MRI with contrast is recommended to further evaluate.    leukocytosis with L hip abscess, myositis s/p IR drain 11/30 which showed VRE (S to dapto and linezolid)  also sacral osteo and abscess with no decubitus so the source is likely in the abdomen, there was question of rectosigmoid anastomosis leak on initial CTs but that surgery was long time ago and pt had no complications after that, the J tube however was  placed 6 weeks ago  E. faecalis bacteremia   2 different coag neg staph bacteremia: staph epi and staph pettenkoferi, ?contaminant , repeat cultures 12/13 negative for now  malnutrition, dysphagia and risk of aspiration    * IR did not believe there is a drainable collection  * c/w dapto 8 mg/kg and monitor the CK  * c/w zosyn for now  * will need TTE  * monitor the WBC  * GOC discussion     The above assessment and plan was discussed with the primary team    Maria Antonia Hunter MD  Pager [phone]  After 5pm and on weekends call [phone]

## 2020-12-14 NOTE — CHART NOTE - NSCHARTNOTEFT_GEN_A_CORE
Brief Nutrition Follow-up  Patient seen for: Refeeding Risk Follow-up    Source: EMR, ( )     Hospital course as per chart: 65y Female with PMH of emphysema on home 2L NC, colon ca s/p distal ileum resection in 2016, ESBL UTI, Zenker's diverticulum s/p repair (2019) complicated by vocal cord paralysis requiring GJ tube for feeding, RA, gastroparesis, GERD, and anemia who presented from home hospice for weakness, admitted 12/7. Per team: "weakness likely in the setting of malnutrition vs infection 2/2 presacral fluid collection vs RLL aspiration PNA." S/p Swallow Bedside Assessment (12/8), recommended NPO. Tube feeds started 12/8. Chart reviewed, events noted. Palliative following for GOC.    Of note, per provider contact note yesterday (12/13): "Concern that patient is not tolerating tube feed rate at 30cc. Patient was getting tube feeds at home at 20cc and per daughter she could not tolerate >20. Patient had 4 loose BMs on shift. No laxative." Tube feeds were then decreased to 20 ml/hr.     Diet, NPO with Tube Feed:   Tube Feeding Modality: Gastro-Jejunostomy  Vital 1.5 Hernan (VITAL1.5RTH)  Total Volume for 24 Hours (mL): 480  Continuous  Starting Tube Feed Rate {mL per Hour}: 20  Increase Tube Feed Rate by (mL): 0     Every 24 hours  Until Goal Tube Feed Rate (mL per Hour): 20  Tube Feed Duration (in Hours): 24  Tube Feed Start Time: 14:00 (12-13-20 @ 17:16) [Active]    Patient reports ( )    Pertinent Labs:  12-13 Na133 mmol/L<L> Glu 58 mg/dL<L> K+ 4.4 mmol/L Cr  0.34 mg/dL<L> BUN 19 mg/dL 12-13 Phos 3.6 mg/dL 12-13 Alb 3.0 g/dL<L>    Previous Nutrition Diagnosis: Severe malnutrition, Underweight, Swallowing difficulty  Nutrition Diagnosis is [x] ongoing  [ ] resolved [ ] not applicable   Care plan: [x] in progress [ ] achieved  Being addressed with: enteral nutrition     New Nutrition Diagnosis: [ ] not applicable    [ ] Inadequate Protein Energy Intake [ ]Inadequate Oral Intake [ ] Excessive Energy Intake     [ ] Underweight [ ] Increased Nutrient Needs [ ] Overweight/Obesity     [ ] Altered GI Function [ ] Unintended Weight Loss [ ] Food & Nutrition Related Knowledge Deficit[ ] Limited Adherence to nutrition related recommendations [ ] Malnutrition  [ ] other: Free text    Recommendations:      Monitoring and Evaluation: Monitor enteral nutrition provision and tolerance, weight, labs, skin, GI status, diet     Nutrition to remain consistent with GOC.  RD remains available upon request and will continue to follow-up per protocol.   Terese Lockhart MS, RD CDN pager #[phone] Brief Nutrition Follow-up  Patient seen for: Refeeding Risk Follow-up    Source: EMR, patient, RN    Hospital course as per chart: 65y Female with PMH of emphysema on home 2L NC, colon ca s/p distal ileum resection in 2016, ESBL UTI, Zenker's diverticulum s/p repair (2019) complicated by vocal cord paralysis requiring GJ tube for feeding, RA, gastroparesis, GERD, and anemia who presented from home hospice for weakness, admitted 12/7. Per team: "weakness likely in the setting of malnutrition vs infection 2/2 presacral fluid collection vs RLL aspiration PNA." S/p Swallow Bedside Assessment (12/8), recommended NPO. Tube feeds started 12/8. Chart reviewed, events noted. Palliative following for GOC.    Of note, per provider contact note yesterday (12/13): "Concern that patient is not tolerating tube feed rate at 30cc. Patient was getting tube feeds at home at 20cc and per daughter she could not tolerate >20. Patient had 4 loose BMs on shift. No laxative." Tube feeds were then decreased to 20 ml/hr.     Diet, NPO with Tube Feed:   Tube Feeding Modality: Gastro-Jejunostomy  Vital 1.5 Hernan (VITAL1.5RTH)  Total Volume for 24 Hours (mL): 480  Continuous  Starting Tube Feed Rate {mL per Hour}: 20  Increase Tube Feed Rate by (mL): 0     Every 24 hours  Until Goal Tube Feed Rate (mL per Hour): 20  Tube Feed Duration (in Hours): 24  Tube Feed Start Time: 14:00 (12-13-20 @ 17:16) [Active]    Patient reports tolerating tube feeds with no acute issues. RN reports same. Pt denies nausea/vomiting/diarrhea/constipation, states she had 1 BM today (12/14), states it was not loose. Pt continues to request SLP evaluation, wants to eat by mouth. Pt made aware RD remains available.    Pertinent Labs:  12-13 Na133 mmol/L<L> Glu 58 mg/dL<L> K+ 4.4 mmol/L Cr  0.34 mg/dL<L> BUN 19 mg/dL 12-13 Phos 3.6 mg/dL 12-13 Alb 3.0 g/dL<L>    Previous Nutrition Diagnosis: Severe malnutrition, Underweight, Swallowing difficulty  Nutrition Diagnosis is [x] ongoing  [ ] resolved [ ] not applicable   Care plan: [x] in progress [ ] achieved  Being addressed with: enteral nutrition     New Nutrition Diagnosis: not applicable    Recommendations:  1) As per team, recommend increasing tube feeds of Vital 1.5 to 30 ml/hr, then increasing by 10 ml/hr q12 hours (pending tolerance and electrolytes WNL) to goal rate of 40 ml/hr x 24 hours.   At goal, this regimen provides 960 ml total volume, 1440 kcal, 65 g protein, and 733 ml free water. Meets 34 kcal/kg, 1.5 g/kg protein based on dosing wt 42.3 kg. Defer additional free water to team. RD remains available to adjust tube feed regimen PRN. Nutrition to remain consistent with GOC.  2) Consider formal swallowing evaluation with speech pathologist, pt requesting to eat by mouth.   3) Pt remains at risk for refeeding syndrome.  - Continue multivitamin and thiamine supplementation if no medical contraindications  - Monitor and replete electrolytes (potassium, phosphorus, and magnesium) PRN    Monitoring and Evaluation: Monitor enteral nutrition provision and tolerance, weight, labs, skin, GI status, diet     Nutrition to remain consistent with GOC.  RD remains available upon request and will continue to follow-up per protocol.   Terese Lockhart MS RD CDN pager #[phone]

## 2020-12-14 NOTE — PROGRESS NOTE ADULT - ATTENDING COMMENTS
Agree with above note by R2 Dr. Nevarez incl findings and plan, edited where appropriate  abx for aspiration pna  f/u ID  f/u palliative - originally admitted from hospice but revoked hospice status. unclear hospice diagnosis - pt is quite cachectic and has severe malnutrition.   Attending Physician Dr. Gt Ching [phone]

## 2020-12-14 NOTE — PROGRESS NOTE ADULT - SUBJECTIVE AND OBJECTIVE BOX
Patient is a 65y old  Female who presents with a chief complaint of Weakness (12 Dec 2020 08:16)      SUBJECTIVE / OVERNIGHT EVENTS: No acute events overnight. VSS. No fevers, chills. Mild cough.    MEDICATIONS  (STANDING):  DAPTOmycin IVPB 350 milliGRAM(s) IV Intermittent every 24 hours  enoxaparin Injectable 40 milliGRAM(s) SubCutaneous daily  famotidine    Tablet 20 milliGRAM(s) Oral two times a day  FLUoxetine Solution 20 milliGRAM(s) Oral daily  gabapentin   Solution 200 milliGRAM(s) Oral three times a day  methadone    Tablet 30 milliGRAM(s) Oral every 12 hours  multivitamin 1 Tablet(s) Oral daily  piperacillin/tazobactam IVPB.. 3.375 Gram(s) IV Intermittent every 8 hours  thiamine 100 milliGRAM(s) Oral daily    MEDICATIONS  (PRN):  acetaminophen   Tablet .. 650 milliGRAM(s) Oral every 6 hours PRN Mild Pain (1 - 3), Moderate Pain (4 - 6)  ALBUTerol    90 MICROgram(s) HFA Inhaler 2 Puff(s) Inhalation every 6 hours PRN Shortness of Breath and/or Wheezing  polyethylene glycol 3350 17 Gram(s) Oral daily PRN Constipation  senna 2 Tablet(s) Oral at bedtime PRN Constipation  traMADol 25 milliGRAM(s) Oral every 6 hours PRN Severe Pain (7 - 10)    Vital Signs Last 24 Hrs  T(C): 36.8 (14 Dec 2020 05:12), Max: 36.9 (13 Dec 2020 22:05)  T(F): 98.3 (14 Dec 2020 05:12), Max: 98.5 (13 Dec 2020 22:05)  HR: 83 (14 Dec 2020 05:12) (83 - 93)  BP: 120/76 (14 Dec 2020 05:12) (102/68 - 120/76)  BP(mean): --  RR: 18 (14 Dec 2020 05:12) (18 - 18)  SpO2: 100% (14 Dec 2020 05:12) (94% - 100%)  CAPILLARY BLOOD GLUCOSE      POCT Blood Glucose.: 102 mg/dL (12 Dec 2020 13:11)    I&O's Detail    13 Dec 2020 07:01  -  14 Dec 2020 07:00  --------------------------------------------------------  IN:    Enteral Tube Flush: 300 mL    IV PiggyBack: 350 mL    Jevity 1.2: 540 mL  Total IN: 1190 mL    OUT:    Indwelling Catheter - Urethral (mL): 910 mL  Total OUT: 910 mL    Total NET: 280 mL            PHYSICAL EXAM:  GENERAL: NAD, well-developed  HEAD:  Atraumatic, Normocephalic  EYES: EOMI, PERRLA, conjunctiva and sclera clear  NECK: Supple, No JVD  CHEST/LUNG: mild bilateral rhoncherous sounds. No wheeze  HEART: Regular rate and rhythm; No murmurs, rubs, or gallops  ABDOMEN: Soft, Nontender, Nondistended; Bowel sounds present  EXTREMITIES:  2+ Peripheral Pulses, No clubbing, cyanosis, or edema  PSYCH: AAOx3  NEUROLOGY: non-focal  SKIN: No rashes or lesions    LABS:                                   8.9    20.46 )-----------( 505      ( 13 Dec 2020 10:00 )             30.0     12-13    133<L>  |  94<L>  |  19  ----------------------------<  58<L>  4.4   |  27  |  0.34<L>    Ca    8.8      13 Dec 2020 06:48  Phos  3.6     12-13  Mg     2.1     12-13    TPro  6.6  /  Alb  3.0<L>  /  TBili  0.4  /  DBili  x   /  AST  10  /  ALT  9<L>  /  AlkPhos  111  12-13          CULTURE RESULTS:                12-10-20 @ 23:09  Specimen Source: --  Method Type: --  Gram Stain - RRL: --  Gram Stain - Wound: --  Bacteria: --  Culture Results:   No growth to date.      Specimen Source:   Method Type: Method Type: PCR (12-07-20 @ 20:46)    Gram Stain:   Culture Results: Culture Results:   No growth to date. (12-10-20 @ 23:09)  Culture Results:   Growth in anaerobic bottle: Staphylococcus epidermidis  Coag Negative Staphylococcus  Single set isolate, possible contaminant. Contact  Microbiology if susceptibility testing clinically  indicated. (12-10-20 @ 23:09)  Culture Results:   No growth to date. (12-09-20 @ 18:54)  Culture Results:   No growth to date. (12-09-20 @ 18:52)  Culture Results:   <10,000 CFU/mL Normal Urogenital Ami (12-08-20 @ 00:41)  Culture Results:   No Growth Final (12-07-20 @ 20:47)  Culture Results:   Growth in aerobic bottle: Staphylococcus pettenkoferi Coag Negative  Staphylococcus  Single set isolate, possible contaminant. Contact  Microbiology if susceptibility testing clinically  indicated.  "Due to technical problems, Proteus sp. will Not be reported as part of  the BCID panel until further notice"  ***Blood Panel PCR results on this specimen are available  approximately 3 hours after the Gram stain result.***  Gram stain, PCR, and/or culture results may not always  correspond due to difference in methodologies.  ************************************************************  This PCR assay was performed using [employer].  The following targets are tested for: Enterococcus,  vancomycin resistant enterococci, Listeria monocytogenes,  coagulase negative staphylococci, S. aureus,  methicillin resistant S. aureus, Streptococcus agalactiae  (Group B), S. pneumoniae, S. pyogenes (Group A),  Acinetobacter baumannii, Enterobacter cloacae, E. coli,  Klebsiella oxytoca, K. pneumoniae, Proteus sp.,  Serratia marcescens, Haemophilus influenzae,  Neisseria meningitidis, Pseudomonas aeruginosa, Candida  albicans, C. glabrata, C krusei, C parapsilosis,  C. tropicalis and the KPC resistance gene. (12-07-20 @ 20:46)    Bacteria: Bacteria: Negative (12-07-20 @ 18:20)          Culture - Blood (collected 10 Dec 2020 23:09)  Source: .Blood Blood-Peripheral  Gram Stain (12 Dec 2020 08:27):    Growth in anaerobic bottle: Gram Positive Cocci in Clusters  Preliminary Report (12 Dec 2020 08:28):    Growth in anaerobic bottle: Gram Positive Cocci in Clusters    Culture - Blood (collected 10 Dec 2020 23:09)  Source: .Blood Blood-Peripheral  Preliminary Report (12 Dec 2020 01:03):    No growth to date.          RADIOLOGY & ADDITIONAL TESTS:    Imaging Personally Reviewed:    Consultant(s) Notes Reviewed:      Care Discussed with Consultants/Other Providers:   Patient is a 65y old  Female who presents with a chief complaint of Weakness (12 Dec 2020 08:16)      SUBJECTIVE / OVERNIGHT EVENTS: No acute events overnight. VSS. No fevers, chills. Mild cough.    MEDICATIONS  (STANDING):  DAPTOmycin IVPB 350 milliGRAM(s) IV Intermittent every 24 hours  enoxaparin Injectable 40 milliGRAM(s) SubCutaneous daily  famotidine    Tablet 20 milliGRAM(s) Oral two times a day  FLUoxetine Solution 20 milliGRAM(s) Oral daily  gabapentin   Solution 200 milliGRAM(s) Oral three times a day  methadone    Tablet 30 milliGRAM(s) Oral every 12 hours  multivitamin 1 Tablet(s) Oral daily  piperacillin/tazobactam IVPB.. 3.375 Gram(s) IV Intermittent every 8 hours  thiamine 100 milliGRAM(s) Oral daily    MEDICATIONS  (PRN):  acetaminophen   Tablet .. 650 milliGRAM(s) Oral every 6 hours PRN Mild Pain (1 - 3), Moderate Pain (4 - 6)  ALBUTerol    90 MICROgram(s) HFA Inhaler 2 Puff(s) Inhalation every 6 hours PRN Shortness of Breath and/or Wheezing  polyethylene glycol 3350 17 Gram(s) Oral daily PRN Constipation  senna 2 Tablet(s) Oral at bedtime PRN Constipation  traMADol 25 milliGRAM(s) Oral every 6 hours PRN Severe Pain (7 - 10)    Vital Signs Last 24 Hrs  T(C): 36.8 (14 Dec 2020 05:12), Max: 36.9 (13 Dec 2020 22:05)  T(F): 98.3 (14 Dec 2020 05:12), Max: 98.5 (13 Dec 2020 22:05)  HR: 83 (14 Dec 2020 05:12) (83 - 93)  BP: 120/76 (14 Dec 2020 05:12) (102/68 - 120/76)  BP(mean): --  RR: 18 (14 Dec 2020 05:12) (18 - 18)  SpO2: 100% (14 Dec 2020 05:12) (94% - 100%)  CAPILLARY BLOOD GLUCOSE      POCT Blood Glucose.: 102 mg/dL (12 Dec 2020 13:11)    I&O's Detail    13 Dec 2020 07:01  -  14 Dec 2020 07:00  --------------------------------------------------------  IN:    Enteral Tube Flush: 300 mL    IV PiggyBack: 350 mL    Jevity 1.2: 540 mL  Total IN: 1190 mL    OUT:    Indwelling Catheter - Urethral (mL): 910 mL  Total OUT: 910 mL    Total NET: 280 mL            PHYSICAL EXAM:  GEN: Bed bound, cachectic appearing, more alert  CHEST/LUNGS: CTABL, no crackles or rales noted  CARDIAC: RRR no M/R/G  ABDOMEN: Non-tender, non-distended, normoactive BS, GJ tube in LUQ, no drainage, or skin changes around site  : goldman catheter in place  MSK: No edema, no gross deformity of extremities.  SKIN: No rashes, no petechiae, no vesicles  NEURO: Slow to respond, but more awake and alert today. Following commands.   PSYCH: A&Ox3 today (name, month/ year, hospital)     LABS:                                   8.9    20.46 )-----------( 505      ( 13 Dec 2020 10:00 )             30.0     12-13    133<L>  |  94<L>  |  19  ----------------------------<  58<L>  4.4   |  27  |  0.34<L>    Ca    8.8      13 Dec 2020 06:48  Phos  3.6     12-13  Mg     2.1     12-13    TPro  6.6  /  Alb  3.0<L>  /  TBili  0.4  /  DBili  x   /  AST  10  /  ALT  9<L>  /  AlkPhos  111  12-13          CULTURE RESULTS:                12-10-20 @ 23:09  Specimen Source: --  Method Type: --  Gram Stain - RRL: --  Gram Stain - Wound: --  Bacteria: --  Culture Results:   No growth to date.      Specimen Source:   Method Type: Method Type: PCR (12-07-20 @ 20:46)    Gram Stain:   Culture Results: Culture Results:   No growth to date. (12-10-20 @ 23:09)  Culture Results:   Growth in anaerobic bottle: Staphylococcus epidermidis  Coag Negative Staphylococcus  Single set isolate, possible contaminant. Contact  Microbiology if susceptibility testing clinically  indicated. (12-10-20 @ 23:09)  Culture Results:   No growth to date. (12-09-20 @ 18:54)  Culture Results:   No growth to date. (12-09-20 @ 18:52)  Culture Results:   <10,000 CFU/mL Normal Urogenital Ami (12-08-20 @ 00:41)  Culture Results:   No Growth Final (12-07-20 @ 20:47)  Culture Results:   Growth in aerobic bottle: Staphylococcus pettenkoferi Coag Negative  Staphylococcus  Single set isolate, possible contaminant. Contact  Microbiology if susceptibility testing clinically  indicated.  "Due to technical problems, Proteus sp. will Not be reported as part of  the BCID panel until further notice"  ***Blood Panel PCR results on this specimen are available  approximately 3 hours after the Gram stain result.***  Gram stain, PCR, and/or culture results may not always  correspond due to difference in methodologies.  ************************************************************  This PCR assay was performed using [employer].  The following targets are tested for: Enterococcus,  vancomycin resistant enterococci, Listeria monocytogenes,  coagulase negative staphylococci, S. aureus,  methicillin resistant S. aureus, Streptococcus agalactiae  (Group B), S. pneumoniae, S. pyogenes (Group A),  Acinetobacter baumannii, Enterobacter cloacae, E. coli,  Klebsiella oxytoca, K. pneumoniae, Proteus sp.,  Serratia marcescens, Haemophilus influenzae,  Neisseria meningitidis, Pseudomonas aeruginosa, Candida  albicans, C. glabrata, C krusei, C parapsilosis,  C. tropicalis and the KPC resistance gene. (12-07-20 @ 20:46)    Bacteria: Bacteria: Negative (12-07-20 @ 18:20)          Culture - Blood (collected 10 Dec 2020 23:09)  Source: .Blood Blood-Peripheral  Gram Stain (12 Dec 2020 08:27):    Growth in anaerobic bottle: Gram Positive Cocci in Clusters  Preliminary Report (12 Dec 2020 08:28):    Growth in anaerobic bottle: Gram Positive Cocci in Clusters    Culture - Blood (collected 10 Dec 2020 23:09)  Source: .Blood Blood-Peripheral  Preliminary Report (12 Dec 2020 01:03):    No growth to date.          RADIOLOGY & ADDITIONAL TESTS:    Imaging Personally Reviewed:    Consultant(s) Notes Reviewed:      Care Discussed with Consultants/Other Providers:

## 2020-12-14 NOTE — PROGRESS NOTE ADULT - PROBLEM SELECTOR PLAN 1
- c/w tramadol, changed indication for moderate pain  - added dilaudid 2mg PO via PEG PRN for severe pain, as patient states current regimen isn't working  - continue home dose of methadone  - unable to drain fluid collection in hip via IR

## 2020-12-14 NOTE — PROGRESS NOTE ADULT - SUBJECTIVE AND OBJECTIVE BOX
Follow Up:  bacteremia, gluteal and sacral abscess with osteo    Interval History: WBC still 20, speech and swallow recommended just PEG feeds    ROS:      All other systems negative    Constitutional: no fever, no chills  Cardiovascular:  no chest pain, no palpitation  Respiratory:  no SOB, no cough  GI:  +dysphagia, no abd pain, no vomiting, no diarrhea  urinary: no dysuria, no hematuria, no flank pain  musculoskeletal: no joint swelling  skin:  no rash  neurology:  no headache, no seizure, still confused          Allergies  Levaquin (Unknown)  levofloxacin (Unknown)        ANTIMICROBIALS:  DAPTOmycin IVPB 350 every 24 hours  piperacillin/tazobactam IVPB.. 3.375 every 8 hours      OTHER MEDS:  acetaminophen   Tablet .. 650 milliGRAM(s) Oral every 6 hours PRN  ALBUTerol    90 MICROgram(s) HFA Inhaler 2 Puff(s) Inhalation every 6 hours PRN  enoxaparin Injectable 40 milliGRAM(s) SubCutaneous daily  famotidine    Tablet 20 milliGRAM(s) Oral two times a day  FLUoxetine Solution 20 milliGRAM(s) Oral daily  gabapentin   Solution 200 milliGRAM(s) Oral three times a day  HYDROmorphone   Tablet 2 milliGRAM(s) Oral every 4 hours PRN  methadone    Tablet 30 milliGRAM(s) Oral every 12 hours  multivitamin 1 Tablet(s) Oral daily  polyethylene glycol 3350 17 Gram(s) Oral daily PRN  senna 2 Tablet(s) Oral at bedtime PRN  thiamine 100 milliGRAM(s) Oral daily  traMADol 25 milliGRAM(s) Oral every 6 hours PRN      Vital Signs Last 24 Hrs  T(C): 36.5 (14 Dec 2020 14:00), Max: 36.9 (13 Dec 2020 22:05)  T(F): 97.7 (14 Dec 2020 14:00), Max: 98.5 (13 Dec 2020 22:05)  HR: 82 (14 Dec 2020 14:00) (82 - 93)  BP: 102/70 (14 Dec 2020 14:00) (102/70 - 120/76)  BP(mean): --  RR: 18 (14 Dec 2020 14:00) (18 - 18)  SpO2: 100% (14 Dec 2020 14:00) (100% - 100%)    Physical Exam:  General: NAD  Respiratory:   clear b/l, no wheezing  abd:   soft, BS +, not tender, Jtube with no surrounding tenderness  :     no CVAT, no suprapubic tenderness, + goldman  Musculoskeletal : no joint swelling, no edema  Skin:    no rash, no decubitus  vascular: no phlebitis  Neurologic:   pt awake and answers questions but is confused                          8.9    20.46 )-----------( 505      ( 13 Dec 2020 10:00 )             30.0       12-13    133<L>  |  94<L>  |  19  ----------------------------<  58<L>  4.4   |  27  |  0.34<L>    Ca    8.8      13 Dec 2020 06:48  Phos  3.6     12-13  Mg     2.1     12-13    TPro  6.6  /  Alb  3.0<L>  /  TBili  0.4  /  DBili  x   /  AST  10  /  ALT  9<L>  /  AlkPhos  111  12-13          MICROBIOLOGY:  v  .Blood Blood-Peripheral  12-13-20   No growth to date.  --  --      .Blood Blood-Peripheral  12-10-20   No growth to date.  --    Growth in anaerobic bottle: Gram Positive Cocci in Clusters      .Blood Blood-Venous  12-09-20   No growth to date.  --  --      .Blood Blood-Peripheral  12-09-20   No growth to date.  --  --      .Urine Clean Catch (Midstream)  12-08-20   <10,000 CFU/mL Normal Urogenital Ami  --  --      .Blood Blood-Peripheral  12-07-20   No Growth Final  --  --      .Blood Blood-Peripheral  12-07-20   Growth in aerobic bottle: Staphylococcus pettenkoferi Coag Negative  Staphylococcus  Single set isolate, possible contaminant. Contact  Microbiology if susceptibility testing clinically  indicated.  "Due to technical problems, Proteus sp. will Not be reported as part of  the BCID panel until further notice"  ***Blood Panel PCR results on this specimen are available  approximately 3 hours after the Gram stain result.***  Gram stain, PCR, and/or culture results may not always  correspond due to difference in methodologies.  ************************************************************  This PCR assay was performed using [employer].  The following targets are tested for: Enterococcus,  vancomycin resistant enterococci, Listeria monocytogenes,  coagulase negative staphylococci, S. aureus,  methicillin resistant S. aureus, Streptococcus agalactiae  (Group B), S. pneumoniae, S. pyogenes (Group A),  Acinetobacter baumannii, Enterobacter cloacae, E. coli,  Klebsiella oxytoca, K. pneumoniae, Proteus sp.,  Serratia marcescens, Haemophilus influenzae,  Neisseria meningitidis, Pseudomonas aeruginosa, Candida  albicans, C. glabrata, C krusei, C parapsilosis,  C. tropicalis and the KPC resistance gene.  --  Blood Culture PCR      .Abscess Hip - Left  11-30-20   Rare Enterococcus faecium  --  Enterococcus faecium      .Blood Blood-Peripheral  11-29-20   No Growth Final  --  --      .Urine Clean Catch (Midstream)  11-28-20   <10,000 CFU/mL Normal Urogenital Ami  --  --      .Blood Blood-Peripheral  11-27-20   No Growth Final  --  Blood Culture PCR  Enterococcus faecalis                RADIOLOGY:  Images independently visualized and reviewed personally, findings as below  < from: Xray Pelvis AP only (12.12.20 @ 15:30) >    IMPRESSION:  Soft tissue air is present in the gluteal soft tissues just superior to the left hip and overlying the left hip, consistent with history of wound/collection. Hip joint spaces are maintained. Sacroiliac joints are intact. There is lower lumbar discogenic degenerative disease.    < end of copied text >  < from: CT Abdomen and Pelvis w/ IV Cont (12.07.20 @ 17:44) >  IMPRESSION:  Right lower lobe consolidation and bilateral patchy lung opacities suspicious for multifocal pneumonia.    Ill-defined gas-containing presacral fluid collection and/or phlegmon which tracks along to the left hip joint. Findings consistent with infection. MRI with contrast is recommended to further evaluate.

## 2020-12-14 NOTE — PROGRESS NOTE ADULT - SUBJECTIVE AND OBJECTIVE BOX
HPI: 65F with PMH including emphysema on home 2L NC, colon CA s/p distal ileum resection in 2016, ESBL UTI, zenker's diverticulum s/p repair in 2019 c/b vocal cord paralysis s/p GJ tube for feeding, RA, gastroparesis, GERD, anemia presenting from hospice for weakness, likely from malnutrition vs infection (presacral fluid collection) vs RLL aspiration PNA. Patient is on broad-spectrum abx, and awaiting S&S evaluation. Has had enterococcal infection in pas, possible from leak at distal ileal anastomosis site. For pain, is on 30mg methadone BID at home. Daughter is HCP and is a pediatric RN in [place], DNR/DNI. Palliative called for GOC.    INTERVAL EVENTS:  12/10: patient denies any concerns  12/11: denies any concerns, appears comfortable  12/14: states having 8/10 pain in her hip, 3/10 is tolerable; much more alert today    ADVANCE DIRECTIVES:    DNR  Yes    MOLST  [ ]  Living Will  [ ]   DECISION MAKER(s):  [X ] Health Care Proxy(s)  [ ] Surrogate(s)  [ ] Guardian           Name(s): Phone Number(s): daughter Angelina    BASELINE (I)ADL(s) (prior to admission):  [place]: [ ]Total  [ ] Moderate [ ]Dependent    Allergies    Levaquin (Unknown)  levofloxacin (Unknown)    Intolerances    MEDICATIONS  (STANDING):  DAPTOmycin IVPB 350 milliGRAM(s) IV Intermittent every 24 hours  enoxaparin Injectable 40 milliGRAM(s) SubCutaneous daily  famotidine    Tablet 20 milliGRAM(s) Oral two times a day  FLUoxetine Solution 20 milliGRAM(s) Oral daily  gabapentin   Solution 200 milliGRAM(s) Oral three times a day  methadone    Tablet 30 milliGRAM(s) Oral every 12 hours  multivitamin 1 Tablet(s) Oral daily  piperacillin/tazobactam IVPB.. 3.375 Gram(s) IV Intermittent every 8 hours  thiamine 100 milliGRAM(s) Oral daily    MEDICATIONS  (PRN):  acetaminophen   Tablet .. 650 milliGRAM(s) Oral every 6 hours PRN Mild Pain (1 - 3), Moderate Pain (4 - 6)  ALBUTerol    90 MICROgram(s) HFA Inhaler 2 Puff(s) Inhalation every 6 hours PRN Shortness of Breath and/or Wheezing  HYDROmorphone   Tablet 2 milliGRAM(s) Oral every 4 hours PRN Severe Pain (7 - 10)  polyethylene glycol 3350 17 Gram(s) Oral daily PRN Constipation  senna 2 Tablet(s) Oral at bedtime PRN Constipation  traMADol 25 milliGRAM(s) Oral every 6 hours PRN Moderate Pain (4 - 6)        PRESENT SYMPTOMS: [ ]Unable to obtain due to poor mentation   Source if other than patient:  [ ]Family   [ ]Team     Pain: [ X]yes [ ]no  QOL impact -   Location - L hip             Aggravating factors - minimal relief with tramadol and methadone  Quality -  Radiation -  Timing-  Severity (0-10 scale): 8/10  Minimal acceptable level (0-10 scale): 3/10    CPOT:    https://www.[email or web address]/getattachment/[email or web address]/Critical-Care-Pain-Observation-Tool-(CPOT)      PAIN AD Score:     http://geriatrictoolkit.[email or web address]/painad.pdf (press ctrl +  left click to view)    Dyspnea:                           [ ]Mild [ ]Moderate [ ]Severe  Anxiety:                             [ ]Mild [ ]Moderate [ ]Severe  Fatigue:                             [ ]Mild [ ]Moderate [ ]Severe  Nausea:                             [ ]Mild [ ]Moderate [ ]Severe  Loss of appetite:              [ ]Mild [ ]Moderate [ ]Severe  Constipation:                    [ ]Mild [ ]Moderate [ ]Severe    Other Symptoms:  [X ]All other review of systems negative     Palliative Performance Status Version 2:         %    http://npcrc.org/files/news/palliative_performance_scale_ppsv2.pdf    PHYSICAL EXAM:  Vital Signs Last 24 Hrs  T(C): 36.5 (14 Dec 2020 14:00), Max: 36.9 (13 Dec 2020 22:05)  T(F): 97.7 (14 Dec 2020 14:00), Max: 98.5 (13 Dec 2020 22:05)  HR: 82 (14 Dec 2020 14:00) (82 - 93)  BP: 102/70 (14 Dec 2020 14:00) (102/70 - 120/76)  BP(mean): --  RR: 18 (14 Dec 2020 14:00) (18 - 18)  SpO2: 100% (14 Dec 2020 14:00) (100% - 100%)    Limited exam for patient comfort  GENERAL:  [ ]Alert  [ ]Oriented x   [ ]Lethargic  [ ]Cachexia  [ ]Unarousable  [x ]Verbal  [ ]Non-Verbal  Behavioral:   [ ] Anxiety  [ ] Delirium [ ] Agitation [ ] Other  HEENT:  [ ]Normal   [ ]Dry mouth   [ ]ET Tube/Trach  [ ]Oral lesions  PULMONARY:   [ ]Clear [ ]Tachypnea  [ ]Audible excessive secretions [X] No labored breathing  [ ]Rhonchi        [ ]Right [ ]Left [ ]Bilateral  [ ]Crackles        [ ]Right [ ]Left [ ]Bilateral  [ ]Wheezing     [ ]Right [ ]Left [ ]Bilateral  [ ]Diminished breath sounds [ ]right [ ]left [ ]bilateral  CARDIOVASCULAR:    [ ]Regular [ ]Irregular [ ]Tachy  [ ]Roge [ ]Murmur [ ]Other  GASTROINTESTINAL:  [ ]Soft  [ ]Distended   [ ]+BS  [ ]Non tender [ ]Tender  [ ]PEG [ ]OGT/ NGT  Last BM:     GENITOURINARY:  [ ]Normal [ ] Incontinent   [ ]Oliguria/Anuria   [ ]Dalal  MUSCULOSKELETAL:   [ ]Normal   [ ]Weakness  [ ]Bed/Wheelchair bound [ ]Edema  NEUROLOGIC:   [ ]No focal deficits  [X ]Cognitive impairment  [ ]Dysphagia [ ]Dysarthria [ ]Paresis [ ]Other   SKIN:   [ ]Normal    [ ]Rash  [ ]Pressure ulcer(s)       Present on admission [ ]y [ ]n    CRITICAL CARE:  [ ] Shock Present  [ ]Septic [ ]Cardiogenic [ ]Neurologic [ ]Hypovolemic  [ ]  Vasopressors [ ]  Inotropes   [ ]Respiratory failure present [ ]Mechanical ventilation [ ]Non-invasive ventilatory support [ ]High flow  [ ]Acute  [ ]Chronic [ ]Hypoxic  [ ]Hypercarbic [ ]Other  [ ]Other organ failure     LABS: reviewed                                   8.9    20.46 )-----------( 505      ( 13 Dec 2020 10:00 )             30.0     12-13    133<L>  |  94<L>  |  19  ----------------------------<  58<L>  4.4   |  27  |  0.34<L>    Ca    8.8      13 Dec 2020 06:48  Phos  3.6     12-13  Mg     2.1     12-13          RADIOLOGY & ADDITIONAL STUDIES:  CT A/P 12/7/2020    Right lower lobe consolidation and bilateral patchy lung opacities suspicious for multifocal pneumonia.    Ill-defined gas-containing presacral fluid collection and/or phlegmon which tracks along to the left hip joint. Findings consistent with infection. MRI with contrast is recommended to further evaluate.    PROTEIN CALORIE MALNUTRITION PRESENT: [ ]mild [ ]moderate [ ]severe [ ]underweight [ ]morbid obesity  https://www.andeal.org/vault/2440/web/files/ONC/Table_Clinical%20Characteristics%20to%20Document%20Malnutrition-White%20JV%20et%20al%419045.pdf    Height (cm): 152.4 (12-07-20 @ 23:10), 152.4 (11-27-20 @ 20:43)  Weight (kg): 42.3 (12-07-20 @ 23:10), 44.4 (11-27-20 @ 20:43)  BMI (kg/m2): 18.2 (12-07-20 @ 23:10), 19.1 (11-27-20 @ 20:43)    [ ]PPSV2 < or = to 30% [ ]significant weight loss  [ ]poor nutritional intake  [ ]anasarca     Albumin, Serum: 2.7 g/dL (12-08-20 @ 07:22)   [ ]Artificial Nutrition      REFERRALS:   [ ]Chaplaincy  [ ]Hospice  [ ]Child Life  [ ]Social Work  [ ]Case management [ ]Holistic Therapy     Goals of Care Document: CRISTIAN Monroe (12-02-20 @ 18:00)  Goals of Care Conversation:   Participants:  · Participants  Patient; Family  · Child(chioma)  Missy Thomas    Advance Directives:  · Does patient have Advance Directive  No  · Does Patient Have a Surrogate  Yes  · Surrogate's Name  Missy Thomas  · Surrogate's Phone Number  [phone]  · Caregiver:  yes  · Name  Sheeba Walker  · Phone Number  Stuart Merida    Conversation Discussion:  · Conversation  Diagnosis; Prognosis; Treatment Options; Palliative Care Referral  · Conversation Details  -diagnostic findings including anastomotic leak, thoracic discitis/osteo, sacral abscess  -treatment options including abx, surgery  -diagnostic options including CT scan, colonoscopy   -poor nutrition and overall health status   -comfort care, hospice    What Matters Most To Patient and Family:  · What matters most to patient and family  treatment, comfort    Personal Advance Directives Treatment Guidelines:   Treatment Guidelines:  · Decision Maker  Surrogate    Location of Discussion:   Duration of Advanced Care Planning Meeting:  · Time spent (in minutes)  25    Location of Discussion:  · Location of discussion  Telephone      Electronic Signatures:  Maximilian Monroe)  (Signed 02-Dec-2020 18:02)  	Authored: Goals of Care Conversation, Personal Advance Directives Treatment Guidelines, Location of Discussion      Last Updated: 02-Dec-2020 18:02 by Maximilian Monroe)        ______________  Henrique Gustafson MD   of Geriatric and Palliative Medicine  [facility]     Please page the following number for clinical matters between the hours of 9AM and 5PM   from Monday through Friday : [phone]    After 5PM and on weekends, please page: [phone]. The Geriatric and Palliative Medicine consult service has 24/7 coverage for medical recommendations, including for symptom management needs.

## 2020-12-14 NOTE — PROGRESS NOTE ADULT - ASSESSMENT
66yo F w/ PMH of emphysema on home 2L NC, colon CA s/p distal ileum resection in 2016, ESBL UTI, zenker's diverticulum s/p repair in 2019 c/b vocal cord paralysis req GJ tube for feeding, RA, gastroparesis, GERD, anemia presenting from home hospice for weakness likely in the setting of malnutrition vs infection 2/2 presacral fluid collection vs RLL Aspiration PNA 65F w/ emphysema on home 2L NC, colon CA s/p distal ileum resection 6in 2016, ESBL UTI, zenker's diverticulum s/p repair in 2019 c/b vocal cord paralysis req GJ tube for feeding, RA, gastroparesis, GERD, anemia presenting from home hospice for weakness likely in the setting of malnutrition vs infection 2/2 presacral fluid collection vs RLL Aspiration PNA

## 2020-12-14 NOTE — CHART NOTE - NSCHARTNOTEFT_GEN_A_CORE
Pt being followed by this service for dysphagia.     64yo F w/ PMH of emphysema on home 2L NC, colon CA s/p distal ileum resection in 2016, ESBL UTI, Zenker's diverticulum s/p repair in 2019 c/b vocal cord paralysis req GJ tube for feeding, RA, gastroparesis, GERD, anemia presenting from home hospice for weakness. Pt was prev admitted to [facility] from 11/27 to 12/3 for leaking PEG tube and found L hip ill-defined fluid collection w/ no sx intervention apart from I&D w/ minimal fluid output. Pt had a CT A/P which showed a leak from the rectosigmoid anastomosis from her previous operation w/ enterococcus bacteremia requiring a MICU stay for vasopressor support in the setting of septic shock. Pt continued to deteriorate and daughter decided on comfort measures and home hospice. At home Pt continued to improve. She was then restarted on Zosyn for abx coverage and allowed PO intake. Per daughter and hospice physician they decided Pt improving and to present to the hospital for continued management w/ IV abx and fluids if needed.    In the ED Tmax 99.1, HR 92, BP 96/81, satting 99% on 5L NC. Labs notable for leukocytosis to 18 neutrophil predominant, and an anemia to 7 w/ UA showing mod ketones and protein. CT A/P done showing possible RLL pna and ill-defined gas containing presacral fluid collection which tracks along the L hip joint. CXR pulm congestion. She received a dose of Vanc/Zosyn, 2L IVF. General weakness likely related to minimal nutrition at home hospice and cachectic on exam w/ elevated ketones on UA. Discussed pleasure feeds with daughter. +S&S exam. Continues on Zosyn for aspiration pneumonia- RLL consolidation w/ leukocytosis w/ hx of vocal cord paralysis and oral intake at home. Vocal cord paralysis- Per records, Pt w/ L vocal cord paralysis s/p Zenker Diverticulum repair s/p injection at OSH. Per ENT 12/8, Indirect laryngoscopy performed at bedside which was notable for L VC paresis with small glottic gap. Plan for vocal cord injection with Dr. Kim as outpatient upon discharge    SLP had discussion with Pt daughter to obtain history. Per daughter, Pt had began symptoms of dysphagia in Spring/Winter 2019. Imaging revealed a Zenker's. Pt had the Zenker's surgically repaired which damaged her left vocal cord. She had failed multiple speech evaluations and a PEG was placed. Pt had multiple complications with the PEG. Per daughter, at that time she also had some food by mouth, believes it was a nectar or honey thick consistency. Unable to adequately recall details. Pt's caloric intake was reduced and given malfunction with PEG, a PEJ was placed for nutrition. She also had a vocal cord injection (at some point) which she benefited from. She developed fevers and Ecoli in blood at [facility] and was sent to [facility]. She was discharged with home hospice and was eating fish, jello, and other items.    During this admission, SLP initially assessed Pt on 12/8 with recommendations for NPO, with non-oral nutrition/hydration/medications. She presented with 1) Pharyngeal dysphagia characterized by suspected delayed pharyngeal swallow trigger, multiple swallows, and s/s suggestive of laryngeal penetration/aspiration with conservative PO textures.     Today, Pt re-evaluated. Encountered awake and alert, upright in bed. HOB elevated for purposes of evaluation. Significant improvement in mentation compared to initial evaluation. A&Ox4. Hoarse vocal quality. Able to follow directives for purposes of evaluation. Oral motor judged adequate. Pt reported she had a recent MBS at [facility] which recommended NPO, with non-oral nutrition/hydration/medications because she "Sometimes aspirates." SLP provided thin and thick puree and honey thick liquids via teaspoon. There was evidence of pharyngeal dysphagia characterized by multiple swallows (x2) and suspected delayed pharyngeal swallow. No overt, clinical s/s of aspiration/penetration. However, given Pt with history of aspiration, limited trials provided at bedside. Pt requesting repeat objective testing to determine if she can safely have anything PO. Stating, "I can't only have my tube feedings, it is not enough."     -Purposeful proactive rounding reinforced and 5 Ps addressed. Pt left in no distress. Discussed with Pt, Team 4.     -Recommendations: 1) NPO, with non-oral nutrition/hydration/medications. Continue with PEG for primary means of nutrition/hydration 2) MBS to objectively assess swallow function and determine safest and least restrictive PO diet 3) Aspiration and reflux precautions for enteral feeds    Michelle Ayala MS CCC-SLP  Speech-Language Pathologist  ; [phone] Pt being followed by this service for dysphagia.     66yo F w/ PMH of emphysema on home 2L NC, colon CA s/p distal ileum resection in 2016, ESBL UTI, Zenker's diverticulum s/p repair in 2019 c/b vocal cord paralysis req GJ tube for feeding, RA, gastroparesis, GERD, anemia presenting from home hospice for weakness. Pt was prev admitted to [facility] from 11/27 to 12/3 for leaking PEG tube and found L hip ill-defined fluid collection w/ no sx intervention apart from I&D w/ minimal fluid output. Pt had a CT A/P which showed a leak from the rectosigmoid anastomosis from her previous operation w/ enterococcus bacteremia requiring a MICU stay for vasopressor support in the setting of septic shock. Pt continued to deteriorate and daughter decided on comfort measures and home hospice. At home Pt continued to improve. She was then restarted on Zosyn for abx coverage and allowed PO intake. Per daughter and hospice physician they decided Pt improving and to present to the hospital for continued management w/ IV abx and fluids if needed. Labs notable for leukocytosis with neutrophil predominant, and an anemia to 7 w/ UA showing mod ketones and protein. CT A/P done showing possible RLL pna and ill-defined gas containing presacral fluid collection which tracks along the L hip joint. CXR pulm congestion. General weakness likely related to minimal nutrition at home hospice and cachectic on exam w/ elevated ketones on UA. Discussed pleasure feeds with daughter. +S&S exam. Continues on Zosyn for aspiration pneumonia- RLL consolidation w/ leukocytosis w/ hx of vocal cord paralysis and oral intake at home. Per records, Pt w/ L vocal cord paralysis s/p Zenker Diverticulum repair s/p injection at OSH. Per ENT 12/8, Indirect laryngoscopy performed at bedside which was notable for L VC paresis with small glottic gap. Plan for vocal cord injection with Dr. Kim as outpatient upon discharge    SLP had discussion with Pt daughter to obtain history. Per daughter, Pt had began symptoms of dysphagia in Spring/Winter 2019. Imaging revealed a Zenker's. Pt had the Zenker's surgically repaired which damaged her left vocal cord. She had failed multiple speech evaluations and a PEG was placed. Pt had multiple complications with the PEG. Per daughter, at that time she also had some food by mouth, believes it was a nectar or honey thick consistency. Unable to adequately recall details. Pt's caloric intake was reduced and given malfunction with PEG, a PEJ was placed for nutrition. She also had a vocal cord injection (at some point) which she benefited from. She developed fevers and Ecoli in blood at [facility] and was sent to [facility]. She was discharged with home hospice and was eating fish, jello, and other items.    During this admission, SLP initially assessed Pt on 12/8 with recommendations for NPO, with non-oral nutrition/hydration/medications. She presented with 1) Pharyngeal dysphagia characterized by suspected delayed pharyngeal swallow trigger, multiple swallows, and s/s suggestive of laryngeal penetration/aspiration with conservative PO textures.     Today, Pt re-evaluated. Encountered awake and alert, upright in bed. HOB elevated for purposes of evaluation. Significant improvement in mentation compared to initial evaluation. A&Ox4. Hoarse vocal quality. Able to follow directives for purposes of evaluation. Oral motor judged adequate. Pt reported she had a recent MBS at [facility] which recommended NPO, with non-oral nutrition/hydration/medications because she "Sometimes aspirates." SLP provided thin and thick puree and honey thick liquids via teaspoon. There was evidence of pharyngeal dysphagia characterized by multiple swallows (x2) and suspected delayed pharyngeal swallow. No overt, clinical s/s of aspiration/penetration. However, given Pt with history of aspiration, limited trials provided at bedside. Pt requesting repeat objective testing to determine if she can safely have anything PO. Stating, "I can't only have my tube feedings, it is not enough."     -Purposeful proactive rounding reinforced and 5 Ps addressed. Pt left in no distress. Discussed with Pt, Team 4.     -Recommendations: 1) NPO, with non-oral nutrition/hydration/medications. Continue with PEG for primary means of nutrition/hydration 2) MBS to objectively assess swallow function and determine safest and least restrictive PO diet 3) Aspiration and reflux precautions for enteral feeds    Michelle Ayala MS CCC-SLP  Speech-Language Pathologist  ; [phone] Pt being followed by this service for dysphagia.     64yo F w/ PMH of emphysema on home 2L NC, colon CA s/p distal ileum resection in 2016, ESBL UTI, Zenker's diverticulum s/p repair in 2019 c/b vocal cord paralysis req GJ tube for feeding, RA, gastroparesis, GERD, anemia presenting from home hospice for weakness. Pt was prev admitted to [facility] from 11/27 to 12/3 for leaking PEG tube and found L hip ill-defined fluid collection w/ no sx intervention apart from I&D w/ minimal fluid output. Pt had a CT A/P which showed a leak from the rectosigmoid anastomosis from her previous operation w/ enterococcus bacteremia requiring a MICU stay for vasopressor support in the setting of septic shock. Pt continued to deteriorate and daughter decided on comfort measures and home hospice. At home Pt continued to improve. She was then restarted on Zosyn for abx coverage and allowed PO intake. Per daughter and hospice physician they decided Pt improving and to present to the hospital for continued management w/ IV abx and fluids if needed.   On admission to [facility], Labs notable for leukocytosis with neutrophil predominance and an anemia to 7 w/ UA showing mod ketones and protein. CT A/P done showing possible RLL pna and ill-defined gas containing presacral fluid collection which tracks along the L hip joint. CXR pulm congestion. General weakness likely related to minimal nutrition at home hospice and cachectic on exam w/ elevated ketones on UA. Discussed pleasure feeds with daughter. +S&S exam. Continues on Zosyn for aspiration pneumonia- RLL consolidation w/ leukocytosis w/ hx of vocal cord paralysis and oral intake at home. Per records, Pt w/ L vocal cord paralysis s/p Zenker Diverticulum repair s/p injection at OSH. Per ENT 12/8, Indirect laryngoscopy performed at bedside which was notable for L VC paresis with small glottic gap. Plan for vocal cord injection with Dr. Kim as outpatient upon discharge    SLP had discussion with Pt daughter to obtain history. Per daughter, Pt began symptoms of dysphagia in Spring/Winter 2019. Imaging revealed a Zenker's. Zenker's was surgically repaired which damaged her left vocal cord. She failed multiple speech evaluations and a PEG was placed. Pt had multiple complications with the PEG. Per daughter, at that time she also had some food by mouth, believes it was a nectar or honey thick consistency. Unable to adequately recall details. Pt's caloric intake was reduced and given malfunction of PEG, a PEJ was placed for nutrition. She also had a vocal cord injection (at some point) which she benefited from. She developed fevers and Ecoli in blood at [facility] and was sent to [facility]. She was discharged with home hospice and was eating fish, jello, and other items.    During this admission, SLP initially assessed Pt on 12/8 with recommendations for NPO, with non-oral nutrition/hydration/medications. She presented with 1) Pharyngeal dysphagia characterized by suspected delayed pharyngeal swallow trigger, multiple swallows, and s/s suggestive of laryngeal penetration/aspiration with conservative PO textures.     Today, Pt re-evaluated. Encountered awake and alert, upright in bed. HOB elevated for purposes of evaluation. Significant improvement in mentation compared to initial evaluation. A&Ox4. Hoarse vocal quality. Able to follow directives for purposes of evaluation. Oral motor judged adequate. Pt reported she had a recent MBS at [facility] which recommended NPO, with non-oral nutrition/hydration/medications because she "Sometimes aspirates." SLP provided thin and thick puree and honey thick liquids via teaspoon. There was evidence of pharyngeal dysphagia characterized by multiple swallows (x2) and suspected delayed pharyngeal swallow. No overt, clinical s/s of aspiration/penetration. However, given Pt with history of aspiration, limited trials provided at bedside. Pt requesting repeat objective testing to determine if any consistency is safe for pleasure feeds. Stating, "I can't only have my tube feedings, it is not enough."     -Purposeful proactive rounding reinforced and 5 Ps addressed. Pt left in no distress. Discussed with Pt, Team 4.     -Recommendations: 1) NPO, with non-oral nutrition/hydration/medications. Continue with PEG for primary means of nutrition/hydration 2) MBS to objectively assess swallow function and determine safest and least restrictive PO diet 3) Aspiration and reflux precautions for enteral feeds    Michelle Ayala MS CCC-SLP  Speech-Language Pathologist  ; [phone]

## 2020-12-15 NOTE — SWALLOW VFSS/MBS ASSESSMENT ADULT - SLP GENERAL OBSERVATIONS
Pt received in radiology secured in VIELKA chair. AA+Ox3; able to follow simple commands for purposes of exam; 2L O2 NC in place

## 2020-12-15 NOTE — SWALLOW VFSS/MBS ASSESSMENT ADULT - DIAGNOSTIC IMPRESSIONS
MBS completed. Full results to follow. 66 yo F presenting from hospice for weakness, likely from malnutrition vs infection (presacral fluid collection) vs RLL aspiration PNA. Pt with h/o left vocal cord paresis s/p surgical repair of Zenker's; s/p GJ tube for feeding, presents on MBS with an oropharyngeal dysphagia. Silent aspiration is noted across all consistencies trialed. Cued cough is not fully effective in clearing aspirated material. Patient with limited head/neck flexion for use of chin tuck. Additional positional strategies (head rotation L/R) are not effective in improving airway protection. 66 yo F presenting from hospice for weakness, likely from malnutrition vs infection (presacral fluid collection) vs RLL aspiration PNA. Pt with h/o left vocal cord paresis after surgical repair of Zenker's; s/p tvc injection; s/p GJ tube for feeding, presents on MBS with an oropharyngeal dysphagia. Premature spillage, delayed pharyngeal trigger, reduced airway closure and reduced laryngeal sensation result in silent penetration and aspiration on conservative textures. View of subglottis is somewhat limited due to shoulder girdle however cued cough is not fully effective in clearing material from airway. Patient with limited head/neck flexion for use of chin tuck. Additional positional strategies (head rotation L/R) are not effective in improving airway protection. Material is ultimately cleared from the airway via repeated cued expectoration.    Swallow disorders: reduced linguapalatal contact; reduced hyolaryngeal elevation; reduced epiglottic retroflexion; delayed pharyngeal trigger; reduced airway closure; reduced supra and subglottic sensation

## 2020-12-15 NOTE — PROGRESS NOTE ADULT - PROBLEM SELECTOR PLAN 1
- c/w tramadol for moderate pain  - c/w dilaudid 2mg PO via PEG PRN for severe pain, used 0 doses before my evaluation, encouraged to try to assess pain relief  - continue home dose of methadone  - unable to drain fluid collection in hip via IR

## 2020-12-15 NOTE — SWALLOW VFSS/MBS ASSESSMENT ADULT - UNSUCCESSFUL STRATEGIES TRIALED DURING PROCEDURE
chin tuck/head turn to the right/head turn to the left/productive volitional cough following clinician cue/hard swallow hard swallow/repeated cued cough + cued expectoration/chin tuck/head turn to the right/head turn to the left/productive volitional cough following clinician cue

## 2020-12-15 NOTE — PROGRESS NOTE ADULT - ATTENDING COMMENTS
Seen, examined the patient this morning  This is a 65F with h/o emphysema on home 2L NC, colon CA s/p distal ileum resection in 2016, ESBL UTI, Zenker's diverticulum s/p repair in 2019 c/b vocal cord paralysis req G-J tube for feeding, RA, gastroparesis, GERD, anemia presenting from home hospice for weakness ,found to have RLL pneumonia    1. Sepsis due to Enterococci bacteremia/aspiration pneumonia, now resolved  2. Presacral fluid collection, possible infectious  3. Dysphagia, vocal cord paralysis, s/p G-J tube  4. h/o Ca colon, s/p distal ileum resection in 2016    - Afebrile, WBC 13, repeat blood c/s neg    ID f/u plan noted. Repeat CT abd/pelvis to see the collection status. Per IR not enough for drain     1st CT abd/pelvis showed- Right lower lobe consolidation and bilateral patchy lung opacities suspicious for multifocal pneumonia. And ill-defined   gas- containing presacral fluid collection and/or phlegmon which tracks along to the left hip joint. Findings consistent with infection    On IV Dapto and Zosyn for now      - S/& S rec noted, NPO for now    aspiration precaution, NPO, on G-J feeding per Nutrition rec  - Ongoing GOC discussion with palliative care    DNR/DNI.  ** Family is aware of plan from the team. Seen, examined the patient this morning  This is a 65F with h/o emphysema on home 2L NC, colon CA s/p distal ileum resection in 2016, ESBL UTI, Zenker's diverticulum s/p repair in 2019 c/b vocal cord paralysis req G-J tube for feeding, RA, gastroparesis, GERD, anemia presenting from home hospice for weakness ,found to have RLL pneumonia    1. Coag neg staph bacteremia: staph epi and staph pettenkoferi, resolved sepsis  2 Suspected aspiration pneumonia  3. Presacral fluid collection, possible infectious  4. Dysphagia, vocal cord paralysis, s/p G-J tube  5. h/o Ca colon, s/p distal ileum resection in 2016    - Afebrile, WBC 13, repeat blood c/s neg    ID f/u plan noted. Repeat CT abd/pelvis to see the collection status. Per IR not enough for drain     1st CT abd/pelvis showed- Right lower lobe consolidation and bilateral patchy lung opacities suspicious for multifocal pneumonia. And ill-defined   gas- containing presacral fluid collection and/or phlegmon which tracks along to the left hip joint. Findings consistent with infection    On IV Dapto and Zosyn for now      - S/& S rec noted, NPO for now    aspiration precaution, NPO, on G-J feeding per Nutrition rec  - Ongoing GOC discussion with palliative care    DNR/DNI.  ** Family is aware of plan from the team.

## 2020-12-15 NOTE — SWALLOW VFSS/MBS ASSESSMENT ADULT - LARYNGEAL PENETRATION DURING THE SWALLOW - SILENT
Moderate/over arytenoids; incomplete retrieval Severe/deep to the vocal cords over the laryngeal surface of the epiglottis and over the arytenoids; incomplete retrieval

## 2020-12-15 NOTE — PROGRESS NOTE ADULT - PROBLEM SELECTOR PLAN 3
- c/w PEG feedings  - did not pass MBS  - will discuss option of pleasure feeds with daughter, discussed with patient today

## 2020-12-15 NOTE — CHART NOTE - NSCHARTNOTEFT_GEN_A_CORE
Nutrition Follow Up Note    ****IN PROGRESS****    Patient seen for malnutrition/refeeding risk follow up.    Source: team, RN, EMR    Chart reviewed, events noted. Per chart, pt is a 65 y.o F w/ emphysema on home 2L NC, colon CA s/p distal ileum resection 6 inches in 2016, ESBL UTI, zenker's diverticulum s/p repair in 2019 c/b vocal cord paralysis req GJ tube for feeding, RA, gastroparesis, GERD, anemia presenting from home hospice for weakness likely in the setting of malnutrition vs infection 2/2 presacral fluid collection vs RLL Aspiration PNA. Pt s/p MBS today failed, recommended by S&S for NPO. GOC discussions ongoing.    Diet: Diet, NPO with Tube Feed:   Tube Feeding Modality: Gastro-Jejunostomy  Vital 1.5 Hernan (VITAL1.5RTH)  Total Volume for 24 Hours (mL): 480  Continuous  Starting Tube Feed Rate {mL per Hour}: 20  Increase Tube Feed Rate by (mL): 0     Every 24 hours  Until Goal Tube Feed Rate (mL per Hour): 20  Tube Feed Duration (in Hours): 24  Tube Feed Start Time: 14:00 (20 @ 17:16)    Patient reports     Enteral/Parenteral Nutrition: Vital 1.5 at 20mL/hr x 24 hr (480mL), provides 720kcal, 32g protein.  -- Electrolytes WNL  -- last BM today, PRN bowel regimen ordered  -- TF infusing at 20mL/hr    Daily Weight in k.2 (12-09)    Pertinent Medications: MEDICATIONS  (STANDING):  acetaminophen  IVPB .. 1000 milliGRAM(s) IV Intermittent once  DAPTOmycin IVPB 350 milliGRAM(s) IV Intermittent every 24 hours  enoxaparin Injectable 40 milliGRAM(s) SubCutaneous daily  famotidine    Tablet 20 milliGRAM(s) Oral two times a day  FLUoxetine Solution 20 milliGRAM(s) Oral daily  gabapentin   Solution 200 milliGRAM(s) Oral three times a day  multivitamin 1 Tablet(s) Oral daily  piperacillin/tazobactam IVPB.. 3.375 Gram(s) IV Intermittent every 8 hours  thiamine 100 milliGRAM(s) Oral daily    MEDICATIONS  (PRN):  acetaminophen   Tablet .. 650 milliGRAM(s) Oral every 6 hours PRN Mild Pain (1 - 3), Moderate Pain (4 - 6)  ALBUTerol    90 MICROgram(s) HFA Inhaler 2 Puff(s) Inhalation every 6 hours PRN Shortness of Breath and/or Wheezing  HYDROmorphone   Tablet 2 milliGRAM(s) Oral every 4 hours PRN Severe Pain (7 - 10)  polyethylene glycol 3350 17 Gram(s) Oral daily PRN Constipation  senna 2 Tablet(s) Oral at bedtime PRN Constipation  traMADol 25 milliGRAM(s) Oral every 6 hours PRN Moderate Pain (4 - 6)    Pertinent Labs: -15 @ 07:27: Na 129<L>, BUN 19, Cr 0.35<L>, BG 97, K+ 4.4, Phos 3.3, Mg 2.0, Alk Phos --, ALT/SGPT --, AST/SGOT --, HbA1c --  -- Noted hyponatremia    Finger Sticks: N/A    Skin per nursing documentation: no noted pressure injuries  Edema per nursing documentation: none noted    Estimated Needs: no change since previous assessment  Based on dosing wt 93.2 pounds   Estimated Energy Needs (30-35 kcal/kg): 0254-9716 kcal/day  Estimated Protein Needs (1.4-1.6 g/kg): 59-68 g/day  Estimated Fluid Needs (30-35 ml/kg): 6248-3927 ml/day    Previous Nutrition Diagnosis:  Severe malnutrition, Underweight, Swallowing difficulty  Nutrition Diagnosis is: ongoing, care plan in progress, being addressed with EN feedings as tolerated    New Nutrition Diagnosis: n/a    Recommend  1.    Monitoring and Evaluation:     Continue to monitor nutritional intake, tolerance to diet prescription, weights, labs, skin integrity    RD remains available upon request and will follow up per protocol    Bernie Duarte RD, Pager # [phone] Nutrition Follow Up Note    Patient seen for malnutrition/refeeding risk follow up.    Source: team, RN, EMR    Chart reviewed, events noted. Per chart, pt is a 65 y.o F w/ emphysema on home 2L NC, colon CA s/p distal ileum resection 6 inches in 2016, ESBL UTI, zenker's diverticulum s/p repair in 2019 c/b vocal cord paralysis req GJ tube for feeding, RA, gastroparesis, GERD, anemia presenting from home hospice for weakness likely in the setting of malnutrition vs infection 2/2 presacral fluid collection vs RLL Aspiration PNA. Pt s/p MBS today failed, recommended by S&S for NPO. GOC discussions ongoing.    Diet: Diet, NPO with Tube Feed:   Tube Feeding Modality: Gastro-Jejunostomy  Vital 1.5 Hernan (VITAL1.5RTH)  Total Volume for 24 Hours (mL): 480  Continuous  Starting Tube Feed Rate {mL per Hour}: 20  Increase Tube Feed Rate by (mL): 0     Every 24 hours  Until Goal Tube Feed Rate (mL per Hour): 20  Tube Feed Duration (in Hours): 24  Tube Feed Start Time: 14:00 (20 @ 17:16)    Enteral/Parenteral Nutrition: Vital 1.5 at 20mL/hr x 24 hr (480mL), provides 720kcal, 32g protein.  -- Electrolytes WNL  -- last BM today, PRN bowel regimen ordered  -- TF off at visit as pt being moved off unit for scan, per RN previously infusing at 20mL/hr, pt tolerating  -- RN states pt asking for food by mouth    Daily Weight in k.2 (12-09)    Pertinent Medications: MEDICATIONS  (STANDING):  acetaminophen  IVPB .. 1000 milliGRAM(s) IV Intermittent once  DAPTOmycin IVPB 350 milliGRAM(s) IV Intermittent every 24 hours  enoxaparin Injectable 40 milliGRAM(s) SubCutaneous daily  famotidine    Tablet 20 milliGRAM(s) Oral two times a day  FLUoxetine Solution 20 milliGRAM(s) Oral daily  gabapentin   Solution 200 milliGRAM(s) Oral three times a day  multivitamin 1 Tablet(s) Oral daily  piperacillin/tazobactam IVPB.. 3.375 Gram(s) IV Intermittent every 8 hours  thiamine 100 milliGRAM(s) Oral daily    MEDICATIONS  (PRN):  acetaminophen   Tablet .. 650 milliGRAM(s) Oral every 6 hours PRN Mild Pain (1 - 3), Moderate Pain (4 - 6)  ALBUTerol    90 MICROgram(s) HFA Inhaler 2 Puff(s) Inhalation every 6 hours PRN Shortness of Breath and/or Wheezing  HYDROmorphone   Tablet 2 milliGRAM(s) Oral every 4 hours PRN Severe Pain (7 - 10)  polyethylene glycol 3350 17 Gram(s) Oral daily PRN Constipation  senna 2 Tablet(s) Oral at bedtime PRN Constipation  traMADol 25 milliGRAM(s) Oral every 6 hours PRN Moderate Pain (4 - 6)    Pertinent Labs: -15 @ 07:27: Na 129<L>, BUN 19, Cr 0.35<L>, BG 97, K+ 4.4, Phos 3.3, Mg 2.0, Alk Phos --, ALT/SGPT --, AST/SGOT --, HbA1c --  -- Noted hyponatremia    Finger Sticks: N/A    Skin per nursing documentation: no noted pressure injuries  Edema per nursing documentation: none noted    Estimated Needs: no change since previous assessment  Based on dosing wt 93.2 pounds   Estimated Energy Needs (30-35 kcal/kg): 5173-2250 kcal/day  Estimated Protein Needs (1.4-1.6 g/kg): 59-68 g/day  Estimated Fluid Needs (30-35 ml/kg): 0170-2655 ml/day    Previous Nutrition Diagnosis:  Severe malnutrition, Underweight, Swallowing difficulty  Nutrition Diagnosis is: ongoing, care plan in progress, being addressed with EN feedings as tolerated    New Nutrition Diagnosis: n/a    Recommend  1. Recommend increasing tube feeds of Vital 1.5 to 30 ml/hr, then increasing by 10 ml/hr q12 hours (pending tolerance and electrolytes WNL) to goal rate of 40 ml/hr x 24 hours.   At goal, this regimen provides 960 ml total volume, 1440 kcal, 65 g protein, and 733 ml free water. Meets 34 kcal/kg, 1.5 g/kg protein based on dosing wt 42.3 kg. Defer additional free water to team.   2. Pt remains at risk for refeeding syndrome. Monitor phosphorus, potassium and magnesium daily and replete as needed. Continue multivitamin and thiamine supplementation.  3. RD remains available to adjust tube feed regimen PRN. Nutrition interventions to remain consistent with GOC.    Discussed with Team 4.     Monitoring and Evaluation:     Continue to monitor nutritional intake, tolerance to diet prescription, weights, labs, skin integrity    RD remains available upon request and will follow up per protocol    Bernie Duarte RD, Pager # [phone]

## 2020-12-15 NOTE — PROGRESS NOTE ADULT - ASSESSMENT
65F w/ emphysema on home 2L NC, colon CA s/p distal ileum resection 6in 2016, ESBL UTI, zenker's diverticulum s/p repair in 2019 c/b vocal cord paralysis req GJ tube for feeding, RA, gastroparesis, GERD, anemia presenting from home hospice for weakness likely in the setting of malnutrition vs infection 2/2 presacral fluid collection vs RLL Aspiration PNA

## 2020-12-15 NOTE — SWALLOW VFSS/MBS ASSESSMENT ADULT - ORAL PHASE
Delayed oral transit time/min residue cleared on repeat swallow Uncontrolled bolus / spillover in hypopharynx/Laryngeal penetration before swallow - silent

## 2020-12-15 NOTE — SWALLOW VFSS/MBS ASSESSMENT ADULT - ROSENBEK'S PENETRATION ASPIRATION SCALE
(5) contrast contacts vocal cords, visible residue remains (penetration) (8) contrast passes glottis, visible subglottic residue remains, absent patient response (aspiration)

## 2020-12-15 NOTE — SWALLOW VFSS/MBS ASSESSMENT ADULT - SLP PERTINENT HISTORY OF CURRENT PROBLEM
66yo F w/ PMH of emphysema on home 2L NC, colon CA s/p distal ileum resection in 2016, ESBL UTI, Zenker's diverticulum s/p repair in 2019 c/b vocal cord paralysis req GJ tube for feeding, RA, gastroparesis, GERD, anemia presenting from home hospice for weakness. Pt was prev admitted to [facility] from 11/27 to 12/3 for leaking PEG tube and found L hip ill-defined fluid collection w/ no sx intervention apart from I&D w/ minimal fluid output. Pt had a CT A/P which showed a leak from the rectosigmoid anastomosis from her previous operation w/ enterococcus bacteremia requiring a MICU stay for vasopressor support in the setting of septic shock. Pt continued to deteriorate and daughter decided on comfort measures and home hospice. At home Pt continued to improve. She was then restarted on Zosyn for abx coverage and allowed PO intake. Per daughter and hospice physician they decided Pt improving and to present to the hospital for continued management w/ IV abx and fluids if needed.

## 2020-12-15 NOTE — PROGRESS NOTE ADULT - ASSESSMENT
65 f with HTN, COPD, stage I colon ca s/p resection and no chemo, RA on humira, zenker diverticulum s/p repair 2019 which resulted in vocal paralysis and multiple aspiration pneumonias, had a PEG before but s/p a J tube about 6 weeks ago, was hospitalized at [facility] from 11/27 to 12/3 initially for GJ tube leaking and ?cellulitis but CT showed ? rectosigmoid anastomosis leak, L hip abscess and myositis extending to trochanter bursa, but no hip effusion, also R hip myositis and ?forming abscess, sacral osteo with phlegmon involving the central canal  s/p IR drainage of L hip abscess which grew enterococcus  faecalis R to vanco and amp (I called the lab and it is sensitive to dapto and linezolid)  blood cx showed E. faecalis S to amp  pt was deteriorating so she was discharged to hospice with no antibiotics but there she improved clinically and was sent back to the hospital, she is confused but stable  blood cx 11/7 now with GPC in pairs and chains but not on PCR panel so not enterococcus  WBC 16-18  abd/pelvis CT: Right lower lobe consolidation and bilateral patchy lung opacities suspicious for multifocal pneumonia. Ill-defined gas-containing presacral fluid collection and/or phlegmon which tracks along to the left hip joint. Findings consistent with infection. MRI with contrast is recommended to further evaluate.    leukocytosis with L hip abscess, myositis s/p IR drain 11/30 which showed VRE (S to dapto and linezolid)  also sacral osteo and abscess with no decubitus so the source is likely in the abdomen, there was question of rectosigmoid anastomosis leak on initial CTs but that surgery was long time ago and pt had no complications after that, the J tube however was  placed 6 weeks ago  E. faecalis bacteremia   2 different coag neg staph bacteremia: staph epi and staph pettenkoferi, ?contaminant , repeat cultures 12/13 negative for now  malnutrition, dysphagia and risk of aspiration    * IR did not believe there is a drainable collection  * c/w dapto 8 mg/kg and monitor the CK  * c/w zosyn for now to cover for the ? anastomotic leak  * repeat an abd/pelvis CT with contrast to evaluate for improvement  * monitor the WBC  * GOC discussion     The above assessment and plan was discussed with the medicine resident    MD Kaykay Acostar [phone]  After 5pm and on weekends call [phone]

## 2020-12-15 NOTE — PROGRESS NOTE ADULT - SUBJECTIVE AND OBJECTIVE BOX
NOTE INCOMPLETE/ IN PROGRESS    PROGRESS NOTE:   Authored by Dr. Cate Vaughan MD  Pager [facility]: [phone]; [facility]: [phone]     Patient is a 65y old  Female who presents with a chief complaint of Weakness (14 Dec 2020 17:27)      SUBJECTIVE / OVERNIGHT EVENTS:    ADDITIONAL REVIEW OF SYSTEMS:    MEDICATIONS  (STANDING):  acetaminophen  IVPB .. 1000 milliGRAM(s) IV Intermittent once  DAPTOmycin IVPB 350 milliGRAM(s) IV Intermittent every 24 hours  enoxaparin Injectable 40 milliGRAM(s) SubCutaneous daily  famotidine    Tablet 20 milliGRAM(s) Oral two times a day  FLUoxetine Solution 20 milliGRAM(s) Oral daily  gabapentin   Solution 200 milliGRAM(s) Oral three times a day  methadone    Tablet 30 milliGRAM(s) Oral every 12 hours  multivitamin 1 Tablet(s) Oral daily  thiamine 100 milliGRAM(s) Oral daily    MEDICATIONS  (PRN):  acetaminophen   Tablet .. 650 milliGRAM(s) Oral every 6 hours PRN Mild Pain (1 - 3), Moderate Pain (4 - 6)  ALBUTerol    90 MICROgram(s) HFA Inhaler 2 Puff(s) Inhalation every 6 hours PRN Shortness of Breath and/or Wheezing  HYDROmorphone   Tablet 2 milliGRAM(s) Oral every 4 hours PRN Severe Pain (7 - 10)  polyethylene glycol 3350 17 Gram(s) Oral daily PRN Constipation  senna 2 Tablet(s) Oral at bedtime PRN Constipation  traMADol 25 milliGRAM(s) Oral every 6 hours PRN Moderate Pain (4 - 6)      CAPILLARY BLOOD GLUCOSE        I&O's Summary    14 Dec 2020 07:01  -  15 Dec 2020 07:00  --------------------------------------------------------  IN: 0 mL / OUT: 760 mL / NET: -760 mL        PHYSICAL EXAM:  Vital Signs Last 24 Hrs  T(C): 36.6 (15 Dec 2020 05:08), Max: 36.7 (14 Dec 2020 21:34)  T(F): 97.8 (15 Dec 2020 05:08), Max: 98.1 (14 Dec 2020 21:34)  HR: 78 (15 Dec 2020 05:08) (78 - 93)  BP: 106/65 (15 Dec 2020 05:08) (102/70 - 106/65)  BP(mean): --  RR: 18 (15 Dec 2020 05:08) (18 - 18)  SpO2: 97% (15 Dec 2020 05:08) (91% - 100%)    PHYSICAL EXAM:  GEN: Bed bound, cachectic appearing, more alert  CHEST/LUNGS: CTABL, no crackles or rales noted  CARDIAC: RRR no M/R/G  ABDOMEN: Non-tender, non-distended, normoactive BS, GJ tube in LUQ, no drainage, or skin changes around site  : goldman catheter in place  MSK: No edema, no gross deformity of extremities.  SKIN: No rashes, no petechiae, no vesicles  NEURO: Slow to respond, but more awake and alert today. Following commands.   PSYCH: A&Ox3 today (name, month/ year, hospital)     LABS:                        8.9    20.46 )-----------( 505      ( 13 Dec 2020 10:00 )             30.0                     Culture - Blood (collected 13 Dec 2020 10:01)  Source: .Blood Blood-Venous  Preliminary Report (14 Dec 2020 11:01):    No growth to date.    Culture - Blood (collected 13 Dec 2020 10:01)  Source: .Blood Blood-Peripheral  Preliminary Report (14 Dec 2020 11:01):    No growth to date.        RADIOLOGY & ADDITIONAL TESTS:  Results Reviewed:   Imaging Personally Reviewed:  Electrocardiogram Personally Reviewed:    COORDINATION OF CARE:  Care Discussed with Consultants/Other Providers [Y/N]:  Prior or Outpatient Records Reviewed [Y/N]:   PROGRESS NOTE:   Authored by Dr. Cate Vaughan MD  Pager [facility]: [phone]; [facility]: [phone]     Patient is a 65y old  Female who presents with a chief complaint of Weakness (14 Dec 2020 17:27)      SUBJECTIVE / OVERNIGHT EVENTS:  Pt w/ no acute complaints O/N. Pending MBS today. Per ID, c/w zosyn and obtain CT A/P w/ IVC.    MEDICATIONS  (STANDING):  acetaminophen  IVPB .. 1000 milliGRAM(s) IV Intermittent once  DAPTOmycin IVPB 350 milliGRAM(s) IV Intermittent every 24 hours  enoxaparin Injectable 40 milliGRAM(s) SubCutaneous daily  famotidine    Tablet 20 milliGRAM(s) Oral two times a day  FLUoxetine Solution 20 milliGRAM(s) Oral daily  gabapentin   Solution 200 milliGRAM(s) Oral three times a day  methadone    Tablet 30 milliGRAM(s) Oral every 12 hours  multivitamin 1 Tablet(s) Oral daily  thiamine 100 milliGRAM(s) Oral daily    MEDICATIONS  (PRN):  acetaminophen   Tablet .. 650 milliGRAM(s) Oral every 6 hours PRN Mild Pain (1 - 3), Moderate Pain (4 - 6)  ALBUTerol    90 MICROgram(s) HFA Inhaler 2 Puff(s) Inhalation every 6 hours PRN Shortness of Breath and/or Wheezing  HYDROmorphone   Tablet 2 milliGRAM(s) Oral every 4 hours PRN Severe Pain (7 - 10)  polyethylene glycol 3350 17 Gram(s) Oral daily PRN Constipation  senna 2 Tablet(s) Oral at bedtime PRN Constipation  traMADol 25 milliGRAM(s) Oral every 6 hours PRN Moderate Pain (4 - 6)      CAPILLARY BLOOD GLUCOSE        I&O's Summary    14 Dec 2020 07:01  -  15 Dec 2020 07:00  --------------------------------------------------------  IN: 0 mL / OUT: 760 mL / NET: -760 mL        PHYSICAL EXAM:  Vital Signs Last 24 Hrs  T(C): 36.6 (15 Dec 2020 05:08), Max: 36.7 (14 Dec 2020 21:34)  T(F): 97.8 (15 Dec 2020 05:08), Max: 98.1 (14 Dec 2020 21:34)  HR: 78 (15 Dec 2020 05:08) (78 - 93)  BP: 106/65 (15 Dec 2020 05:08) (102/70 - 106/65)  BP(mean): --  RR: 18 (15 Dec 2020 05:08) (18 - 18)  SpO2: 97% (15 Dec 2020 05:08) (91% - 100%)    PHYSICAL EXAM:  GEN: Bed bound, cachectic appearing, more alert  CHEST/LUNGS: CTABL, no crackles or rales noted  CARDIAC: RRR no M/R/G  ABDOMEN: Non-tender, non-distended, normoactive BS, GJ tube in LUQ, no drainage, or skin changes around site  : goldman catheter in place  MSK: No edema, no gross deformity of extremities.  SKIN: No rashes, no petechiae, no vesicles  NEURO: More awake and alert today. Following commands. Hoarse voice.   PSYCH: A&Ox3 today (name, month/ year, hospital)     LABS:                        8.9    20.46 )-----------( 505      ( 13 Dec 2020 10:00 )             30.0         Culture - Blood (collected 13 Dec 2020 10:01)  Source: .Blood Blood-Venous  Preliminary Report (14 Dec 2020 11:01):    No growth to date.    Culture - Blood (collected 13 Dec 2020 10:01)  Source: .Blood Blood-Peripheral  Preliminary Report (14 Dec 2020 11:01):    No growth to date.        RADIOLOGY & ADDITIONAL TESTS:  Results Reviewed:   Imaging Personally Reviewed:  Electrocardiogram Personally Reviewed:    COORDINATION OF CARE:  Care Discussed with Consultants/Other Providers [Y/N]:  Prior or Outpatient Records Reviewed [Y/N]:

## 2020-12-15 NOTE — PROGRESS NOTE ADULT - SUBJECTIVE AND OBJECTIVE BOX
Follow Up:  bacteremia, gluteal and sacral abscess with osteo    Interval History: improved mental status and WBC down to 13    ROS:      All other systems negative    Constitutional: no fever, no chills  Cardiovascular:  no chest pain, no palpitation  Respiratory:  no SOB, no cough  GI:  +dysphagia, no abd pain, no vomiting, no diarrhea  urinary: no dysuria, no hematuria, no flank pain  musculoskeletal: no joint swelling  skin:  no rash  neurology:  no headache, no seizure        Allergies  Levaquin (Unknown)  levofloxacin (Unknown)        ANTIMICROBIALS:  DAPTOmycin IVPB 350 every 24 hours      OTHER MEDS:  acetaminophen   Tablet .. 650 milliGRAM(s) Oral every 6 hours PRN  acetaminophen  IVPB .. 1000 milliGRAM(s) IV Intermittent once  ALBUTerol    90 MICROgram(s) HFA Inhaler 2 Puff(s) Inhalation every 6 hours PRN  enoxaparin Injectable 40 milliGRAM(s) SubCutaneous daily  famotidine    Tablet 20 milliGRAM(s) Oral two times a day  FLUoxetine Solution 20 milliGRAM(s) Oral daily  gabapentin   Solution 200 milliGRAM(s) Oral three times a day  HYDROmorphone   Tablet 2 milliGRAM(s) Oral every 4 hours PRN  methadone    Tablet 30 milliGRAM(s) Oral every 12 hours  multivitamin 1 Tablet(s) Oral daily  polyethylene glycol 3350 17 Gram(s) Oral daily PRN  senna 2 Tablet(s) Oral at bedtime PRN  thiamine 100 milliGRAM(s) Oral daily  traMADol 25 milliGRAM(s) Oral every 6 hours PRN      Vital Signs Last 24 Hrs  T(C): 36.6 (15 Dec 2020 05:08), Max: 36.7 (14 Dec 2020 21:34)  T(F): 97.8 (15 Dec 2020 05:08), Max: 98.1 (14 Dec 2020 21:34)  HR: 78 (15 Dec 2020 05:08) (78 - 93)  BP: 106/65 (15 Dec 2020 05:08) (102/70 - 106/65)  BP(mean): --  RR: 18 (15 Dec 2020 05:08) (18 - 18)  SpO2: 97% (15 Dec 2020 05:08) (91% - 100%)    Physical Exam:  General: NAD  Respiratory:   clear b/l, no wheezing  abd:   soft, BS +, not tender, Jtube with no surrounding tenderness  :     no CVAT, no suprapubic tenderness, + goldman  Musculoskeletal : no joint swelling, no edema  Skin:    no rash, no decubitus  vascular: no phlebitis  Neurologic:   pt awake and answers questions, confusion also improved                          8.6    13.57 )-----------( 544      ( 15 Dec 2020 07:27 )             28.8       12-15    129<L>  |  92<L>  |  19  ----------------------------<  97  4.4   |  26  |  0.35<L>    Ca    8.7      15 Dec 2020 07:27  Phos  3.3     12-15  Mg     2.0     12-15            MICROBIOLOGY:  v  .Blood Blood-Peripheral  12-13-20   No growth to date.  --  --      .Blood Blood-Peripheral  12-10-20   No growth to date.  --    Growth in anaerobic bottle: Gram Positive Cocci in Clusters      .Blood Blood-Venous  12-09-20   No Growth Final  --  --      .Blood Blood-Peripheral  12-09-20   No Growth Final  --  --      .Urine Clean Catch (Midstream)  12-08-20   <10,000 CFU/mL Normal Urogenital Ami  --  --      .Blood Blood-Peripheral  12-07-20   No Growth Final  --  --      .Blood Blood-Peripheral  12-07-20   Growth in aerobic bottle: Staphylococcus pettenkoferi Coag Negative  Staphylococcus  Single set isolate, possible contaminant. Contact  Microbiology if susceptibility testing clinically  indicated.  "Due to technical problems, Proteus sp. will Not be reported as part of  the BCID panel until further notice"  ***Blood Panel PCR results on this specimen are available  approximately 3 hours after the Gram stain result.***  Gram stain, PCR, and/or culture results may not always  correspond due to difference in methodologies.  ************************************************************  This PCR assay was performed using [employer].  The following targets are tested for: Enterococcus,  vancomycin resistant enterococci, Listeria monocytogenes,  coagulase negative staphylococci, S. aureus,  methicillin resistant S. aureus, Streptococcus agalactiae  (Group B), S. pneumoniae, S. pyogenes (Group A),  Acinetobacter baumannii, Enterobacter cloacae, E. coli,  Klebsiella oxytoca, K. pneumoniae, Proteus sp.,  Serratia marcescens, Haemophilus influenzae,  Neisseria meningitidis, Pseudomonas aeruginosa, Candida  albicans, C. glabrata, C krusei, C parapsilosis,  C. tropicalis and the KPC resistance gene.  --  Blood Culture PCR      .Abscess Hip - Left  11-30-20   Rare Enterococcus faecium  --  Enterococcus faecium      .Blood Blood-Peripheral  11-29-20   No Growth Final  --  --      .Urine Clean Catch (Midstream)  11-28-20   <10,000 CFU/mL Normal Urogenital Ami  --  --      .Blood Blood-Peripheral  11-27-20   No Growth Final  --  Blood Culture PCR  Enterococcus faecalis                RADIOLOGY:  Images independently visualized and reviewed personally, findings as below  < from: CT Abdomen and Pelvis w/ IV Cont (12.07.20 @ 17:44) >  IMPRESSION:  Right lower lobe consolidation and bilateral patchy lung opacities suspicious for multifocal pneumonia.    Ill-defined gas-containing presacral fluid collection and/or phlegmon which tracks along to the left hip joint. Findings consistent with infection. MRI with contrast is recommended to further evaluate.

## 2020-12-15 NOTE — SWALLOW VFSS/MBS ASSESSMENT ADULT - ADDITIONAL RECOMMENDATIONS
Objective study (MBS or FEES) may be repeated in 4-6 weeks after course of dysphagia therapy. Patient is at high risk for silent aspiration. Patient should not be evaluated subjectively with po trials at bedside.

## 2020-12-15 NOTE — PROGRESS NOTE ADULT - SUBJECTIVE AND OBJECTIVE BOX
HPI: 65F with PMH including emphysema on home 2L NC, colon CA s/p distal ileum resection in 2016, ESBL UTI, zenker's diverticulum s/p repair in 2019 c/b vocal cord paralysis s/p GJ tube for feeding, RA, gastroparesis, GERD, anemia presenting from hospice for weakness, likely from malnutrition vs infection (presacral fluid collection) vs RLL aspiration PNA. Patient is on broad-spectrum abx, and awaiting S&S evaluation. Has had enterococcal infection in pas, possible from leak at distal ileal anastomosis site. For pain, is on 30mg methadone BID at home. Daughter is HCP and is a pediatric RN in [place], DNR/DNI. Palliative called for GOC.    INTERVAL EVENTS:  12/10: patient denies any concerns  12/11: denies any concerns, appears comfortable  12/14: states having 8/10 pain in her hip, 3/10 is tolerable; much more alert today  12/15: continues to be alert, has pain in her hip but did not realize she had dilaudid ordered    ADVANCE DIRECTIVES:    DNR  Yes    LIZ  [ ]  Living Will  [ ]   DECISION MAKER(s):  [X ] Health Care Proxy(s)  [ ] Surrogate(s)  [ ] Guardian           Name(s): Phone Number(s): daughter Angelina    BASELINE (I)ADL(s) (prior to admission):  [place]: [ ]Total  [ ] Moderate [ ]Dependent    Allergies    Levaquin (Unknown)  levofloxacin (Unknown)    Intolerances    MEDICATIONS  (STANDING):  acetaminophen  IVPB .. 1000 milliGRAM(s) IV Intermittent once  DAPTOmycin IVPB 350 milliGRAM(s) IV Intermittent every 24 hours  enoxaparin Injectable 40 milliGRAM(s) SubCutaneous daily  famotidine    Tablet 20 milliGRAM(s) Oral two times a day  FLUoxetine Solution 20 milliGRAM(s) Oral daily  gabapentin   Solution 200 milliGRAM(s) Oral three times a day  multivitamin 1 Tablet(s) Oral daily  piperacillin/tazobactam IVPB.. 3.375 Gram(s) IV Intermittent every 8 hours  thiamine 100 milliGRAM(s) Oral daily    MEDICATIONS  (PRN):  acetaminophen   Tablet .. 650 milliGRAM(s) Oral every 6 hours PRN Mild Pain (1 - 3), Moderate Pain (4 - 6)  ALBUTerol    90 MICROgram(s) HFA Inhaler 2 Puff(s) Inhalation every 6 hours PRN Shortness of Breath and/or Wheezing  HYDROmorphone   Tablet 2 milliGRAM(s) Oral every 4 hours PRN Severe Pain (7 - 10)  polyethylene glycol 3350 17 Gram(s) Oral daily PRN Constipation  senna 2 Tablet(s) Oral at bedtime PRN Constipation  traMADol 25 milliGRAM(s) Oral every 6 hours PRN Moderate Pain (4 - 6)          PRESENT SYMPTOMS: [ ]Unable to obtain due to poor mentation   Source if other than patient:  [ ]Family   [ ]Team     Pain: [ X]yes [ ]no  QOL impact -   Location - L hip             Aggravating factors - minimal relief with tramadol and methadone  Quality -  Radiation -  Timing-  Severity (0-10 scale): 8/10  Minimal acceptable level (0-10 scale): 3/10    CPOT:    https://www.[facility].org/getattachment/[email or web address]-[email or web address]/Critical-Care-Pain-Observation-Tool-(CPOT)      PAIN AD Score:     http://geriatrictoolkit.[facility]/cog/painad.pdf (press ctrl +  left click to view)    Dyspnea:                           [ ]Mild [ ]Moderate [ ]Severe  Anxiety:                             [ ]Mild [ ]Moderate [ ]Severe  Fatigue:                             [ ]Mild [ ]Moderate [ ]Severe  Nausea:                             [ ]Mild [ ]Moderate [ ]Severe  Loss of appetite:              [ ]Mild [ ]Moderate [ ]Severe  Constipation:                    [ ]Mild [ ]Moderate [ ]Severe    Other Symptoms:  [X ]All other review of systems negative     Palliative Performance Status Version 2:         %    http://npcrc.org/files/news/palliative_performance_scale_ppsv2.pdf    PHYSICAL EXAM:  Vital Signs Last 24 Hrs  T(C): 36.3 (15 Dec 2020 13:39), Max: 36.7 (14 Dec 2020 21:34)  T(F): 97.3 (15 Dec 2020 13:39), Max: 98.1 (14 Dec 2020 21:34)  HR: 86 (15 Dec 2020 13:39) (78 - 93)  BP: 94/63 (15 Dec 2020 13:39) (94/63 - 106/65)  BP(mean): --  RR: 18 (15 Dec 2020 13:39) (18 - 18)  SpO2: 94% (15 Dec 2020 13:39) (91% - 97%)    Limited exam for patient comfort  GENERAL:  [ ]Alert  [ ]Oriented x   [ ]Lethargic  [ ]Cachexia  [ ]Unarousable  [x ]Verbal  [ ]Non-Verbal  Behavioral:   [ ] Anxiety  [ ] Delirium [ ] Agitation [ ] Other  HEENT:  [ ]Normal   [ ]Dry mouth   [ ]ET Tube/Trach  [ ]Oral lesions  PULMONARY:   [ ]Clear [ ]Tachypnea  [ ]Audible excessive secretions [X] No labored breathing  [ ]Rhonchi        [ ]Right [ ]Left [ ]Bilateral  [ ]Crackles        [ ]Right [ ]Left [ ]Bilateral  [ ]Wheezing     [ ]Right [ ]Left [ ]Bilateral  [ ]Diminished breath sounds [ ]right [ ]left [ ]bilateral  CARDIOVASCULAR:    [ ]Regular [ ]Irregular [ ]Tachy  [ ]Roge [ ]Murmur [ ]Other  GASTROINTESTINAL:  [ ]Soft  [ ]Distended   [ ]+BS  [ ]Non tender [ ]Tender  [ ]PEG [ ]OGT/ NGT  Last BM:     GENITOURINARY:  [ ]Normal [ ] Incontinent   [ ]Oliguria/Anuria   [ ]Dalal  MUSCULOSKELETAL:   [ ]Normal   [ ]Weakness  [ ]Bed/Wheelchair bound [ ]Edema  NEUROLOGIC:   [ ]No focal deficits  [X ]Cognitive impairment  [ ]Dysphagia [ ]Dysarthria [ ]Paresis [ ]Other   SKIN:   [ ]Normal    [ ]Rash  [ ]Pressure ulcer(s)       Present on admission [ ]y [ ]n    CRITICAL CARE:  [ ] Shock Present  [ ]Septic [ ]Cardiogenic [ ]Neurologic [ ]Hypovolemic  [ ]  Vasopressors [ ]  Inotropes   [ ]Respiratory failure present [ ]Mechanical ventilation [ ]Non-invasive ventilatory support [ ]High flow  [ ]Acute  [ ]Chronic [ ]Hypoxic  [ ]Hypercarbic [ ]Other  [ ]Other organ failure     LABS: reviewed                                   8.6    13.57 )-----------( 544      ( 15 Dec 2020 07:27 )             28.8     12-15    129<L>  |  92<L>  |  19  ----------------------------<  97  4.4   |  26  |  0.35<L>    Ca    8.7      15 Dec 2020 07:27  Phos  3.3     12-15  Mg     2.0     12-15          RADIOLOGY & ADDITIONAL STUDIES:  CT A/P 12/7/2020    Right lower lobe consolidation and bilateral patchy lung opacities suspicious for multifocal pneumonia.    Ill-defined gas-containing presacral fluid collection and/or phlegmon which tracks along to the left hip joint. Findings consistent with infection. MRI with contrast is recommended to further evaluate.    PROTEIN CALORIE MALNUTRITION PRESENT: [ ]mild [ ]moderate [ ]severe [ ]underweight [ ]morbid obesity  https://www.andeal.org/vault/2440/web/files/ONC/Table_Clinical%20Characteristics%20to%20Document%20Malnutrition-White%20JV%20et%20al%218543.pdf    Height (cm): 152.4 (12-07-20 @ 23:10), 152.4 (11-27-20 @ 20:43)  Weight (kg): 42.3 (12-07-20 @ 23:10), 44.4 (11-27-20 @ 20:43)  BMI (kg/m2): 18.2 (12-07-20 @ 23:10), 19.1 (11-27-20 @ 20:43)    [ ]PPSV2 < or = to 30% [ ]significant weight loss  [ ]poor nutritional intake  [ ]anasarca     Albumin, Serum: 2.7 g/dL (12-08-20 @ 07:22)   [ ]Artificial Nutrition      REFERRALS:   [ ]Chaplaincy  [ ]Hospice  [ ]Child Life  [ ]Social Work  [ ]Case management [ ]Holistic Therapy     Goals of Care Document: CRISTIAN Monroe (12-02-20 @ 18:00)  Goals of Care Conversation:   Participants:  · Participants  Patient; Family  · Child(chioma)  Missy Thomas    Advance Directives:  · Does patient have Advance Directive  No  · Does Patient Have a Surrogate  Yes  · Surrogate's Name  Missy Thomas  · Surrogate's Phone Number  [phone]  · Caregiver:  yes  · Name  Sheeba Cardoso  · Phone Number  [place]    Conversation Discussion:  · Conversation  Diagnosis; Prognosis; Treatment Options; Palliative Care Referral  · Conversation Details  -diagnostic findings including anastomotic leak, thoracic discitis/osteo, sacral abscess  -treatment options including abx, surgery  -diagnostic options including CT scan, colonoscopy   -poor nutrition and overall health status   -comfort care, hospice    What Matters Most To Patient and Family:  · What matters most to patient and family  treatment, comfort    Personal Advance Directives Treatment Guidelines:   Treatment Guidelines:  · Decision Maker  Surrogate    Location of Discussion:   Duration of Advanced Care Planning Meeting:  · Time spent (in minutes)  25    Location of Discussion:  · Location of discussion  Telephone      Electronic Signatures:  Maximilian Monroe)  (Signed 02-Dec-2020 18:02)  	Authored: Goals of Care Conversation, Personal Advance Directives Treatment Guidelines, Location of Discussion      Last Updated: 02-Dec-2020 18:02 by Maximilian Monroe)        ______________  Henrique Gustafson MD   of Geriatric and Palliative Medicine  [facility]     Please page the following number for clinical matters between the hours of 9AM and 5PM   from Monday through Friday : [phone]    After 5PM and on weekends, please page: [phone]. The Geriatric and Palliative Medicine consult service has 24/7 coverage for medical recommendations, including for symptom management needs.

## 2020-12-15 NOTE — SWALLOW VFSS/MBS ASSESSMENT ADULT - COMMENTS
Per daughter, Pt began symptoms of dysphagia in Spring/Winter 2019. +Zenker's; surgically repaired which damaged her left vocal cord. Failed multiple speech evaluations; PEG placed. Multiple complications with the PEG. Per daughter, at that time she also had some food by mouth, believes it was a nectar or honey thick consistency. Unable to adequately recall details. Malfunction of PEG; PEJ placed. Had a vocal cord injection which she benefited from. Fevers and Ecoli in blood at OSH. D/c'd with home hospice and was eating fish, jello, and other items.  Per pt report, recent MBS at [facility] recommended NPO, with non-oral nutrition/hydration/medications because she "Sometimes aspirates."   CT A/P 12/7: Right lower lobe consolidation and bilateral patchy lung opacities suspicious for multifocal pneumonia. Ill-defined gas-containing presacral fluid collection and/or phlegmon which tracks along to the left hip joint. Findings consistent with infection. MRI with contrast is recommended to further evaluate.   Per ENT 12/8: +L VC paresis with small glottic gap. Copious thick secretions. +Post cricoid edema and pachydermia consistent with gerd. Plan for vocal cord injection with Dr. Kim as outpatient.  Most recent bedside swallow evaluation this admission 12/14: Pt presents with pharyngeal dysphagia; multiple swallows and suspected delayed pharyngeal swallow. No overt, clinical s/s of aspiration/penetration. However, given Pt with history of aspiration, limited trials provided at bedside. Pt requesting repeat objective testing to determine if any consistency is safe for pleasure feeds. Stating, "I can't only have my tube feedings, it is not enough." NPO, with non-oral nutrition/hydration/medications and MBS recommended.

## 2020-12-16 NOTE — PROGRESS NOTE ADULT - PROBLEM SELECTOR PLAN 3
- Per records on HIE pt w/ L vocal cord paralysis s/p Zenker Diverticulum repair s/p injection at OSH  - Consider ENT consult in AM for laryngoscopy to further assess need for injection   - S&S for speech therapy - Per records on HIE pt w/ L vocal cord paralysis s/p Zenker Diverticulum repair s/p injection at OSH  - ENT reconsult in AM for laryngoscopy to further assess need for injection?

## 2020-12-16 NOTE — PROGRESS NOTE ADULT - PROBLEM SELECTOR PLAN 2
- Pt w/ minimal nutrition at home hospice and cachectic on exam w/ elevated ketones on UA likely cause of weakness   - Keep NPO per S&S eval  - Will resume feeds through PEG tube ON- discussed w/ daughter re pleasure feeds- will await S&S before deciding to resume oral feeds - Pt w/ minimal nutrition at home hospice and cachectic on exam w/ elevated ketones on UA likely cause of weakness   - NPO per S&S eval (failed MBS), but now allowed to have pleasure feeds

## 2020-12-16 NOTE — PROGRESS NOTE ADULT - SUBJECTIVE AND OBJECTIVE BOX
HPI: 65F with PMH including emphysema on home 2L NC, colon CA s/p distal ileum resection in 2016, ESBL UTI, zenker's diverticulum s/p repair in 2019 c/b vocal cord paralysis s/p GJ tube for feeding, RA, gastroparesis, GERD, anemia presenting from hospice for weakness, likely from malnutrition vs infection (presacral fluid collection) vs RLL aspiration PNA. Patient is on broad-spectrum abx, and awaiting S&S evaluation. Has had enterococcal infection in pas, possible from leak at distal ileal anastomosis site. For pain, is on 30mg methadone BID at home. Daughter is HCP and is a pediatric RN in [place], DNR/DNI. Palliative called for GOC.    INTERVAL EVENTS:  12/10: patient denies any concerns  12/11: denies any concerns, appears comfortable  12/14: states having 8/10 pain in her hip, 3/10 is tolerable; much more alert today  12/15: continues to be alert, has pain in her hip but did not realize she had dilaudid ordered  12/16: continues to be alert, states pain is tolerable with dilaudid, and has a strong desire to eat; used dilaudid PO x 4, tramadol x 2/24 hours    ADVANCE DIRECTIVES:    DNR  Yes    MOLST  [ ]  Living Will  [ ]   DECISION MAKER(s):  [X ] Health Care Proxy(s)  [ ] Surrogate(s)  [ ] Guardian           Name(s): Phone Number(s): chriss Alfaro    BASELINE (I)ADL(s) (prior to admission):  [place]: [ ]Total  [ ] Moderate [ ]Dependent    Allergies    Levaquin (Unknown)  levofloxacin (Unknown)    Intolerances    MEDICATIONS  (STANDING):  acetaminophen  IVPB .. 1000 milliGRAM(s) IV Intermittent once  DAPTOmycin IVPB 350 milliGRAM(s) IV Intermittent every 24 hours  enoxaparin Injectable 40 milliGRAM(s) SubCutaneous daily  famotidine    Tablet 20 milliGRAM(s) Oral two times a day  FLUoxetine Solution 20 milliGRAM(s) Oral daily  gabapentin   Solution 200 milliGRAM(s) Oral three times a day  methadone    Tablet 30 milliGRAM(s) Oral every 12 hours  multivitamin 1 Tablet(s) Oral daily  piperacillin/tazobactam IVPB.. 3.375 Gram(s) IV Intermittent every 8 hours  thiamine 100 milliGRAM(s) Oral daily    MEDICATIONS  (PRN):  acetaminophen   Tablet .. 650 milliGRAM(s) Oral every 6 hours PRN Mild Pain (1 - 3), Moderate Pain (4 - 6)  ALBUTerol    90 MICROgram(s) HFA Inhaler 2 Puff(s) Inhalation every 6 hours PRN Shortness of Breath and/or Wheezing  HYDROmorphone   Tablet 2 milliGRAM(s) Oral every 4 hours PRN Severe Pain (7 - 10)  polyethylene glycol 3350 17 Gram(s) Oral daily PRN Constipation  senna 2 Tablet(s) Oral at bedtime PRN Constipation  traMADol 25 milliGRAM(s) Oral every 6 hours PRN Moderate Pain (4 - 6)    PRESENT SYMPTOMS: [ ]Unable to obtain due to poor mentation   Source if other than patient:  [ ]Family   [ ]Team     Pain: [ X]yes [ ]no  QOL impact -   Location - L hip             Aggravating factors - minimal relief with tramadol and methadone  Quality -  Radiation -  Timing-  Severity (0-10 scale): 8/10  Minimal acceptable level (0-10 scale): 3/10    CPOT:    https://www.[email or web address]/getattachment/[email or web address]-[email or web address]-[email or web address]/Critical-Care-Pain-Observation-Tool-(CPOT)      PAIN AD Score:     http://geriatrictoolkit.[email or web address]/cog/painad.pdf (press ctrl +  left click to view)    Dyspnea:                           [ ]Mild [ ]Moderate [ ]Severe  Anxiety:                             [ ]Mild [ ]Moderate [ ]Severe  Fatigue:                             [ ]Mild [ ]Moderate [ ]Severe  Nausea:                             [ ]Mild [ ]Moderate [ ]Severe  Loss of appetite:              [ ]Mild [ ]Moderate [ ]Severe  Constipation:                    [ ]Mild [ ]Moderate [ ]Severe    Other Symptoms:  [X ]All other review of systems negative     Palliative Performance Status Version 2:         %    http://npcrc.org/files/news/palliative_performance_scale_ppsv2.pdf    PHYSICAL EXAM:  Vital Signs Last 24 Hrs  T(C): 36.8 (16 Dec 2020 14:44), Max: 36.8 (16 Dec 2020 14:44)  T(F): 98.3 (16 Dec 2020 14:44), Max: 98.3 (16 Dec 2020 14:44)  HR: 83 (16 Dec 2020 14:44) (78 - 83)  BP: 102/60 (16 Dec 2020 14:44) (100/72 - 108/70)  BP(mean): --  RR: 18 (16 Dec 2020 14:44) (18 - 18)  SpO2: 98% (16 Dec 2020 14:44) (97% - 98%)    Limited exam for patient comfort  GENERAL:  [ ]Alert  [ ]Oriented x   [ ]Lethargic  [ ]Cachexia  [ ]Unarousable  [x ]Verbal  [ ]Non-Verbal  Behavioral:   [ ] Anxiety  [ ] Delirium [ ] Agitation [ ] Other  HEENT:  [ ]Normal   [ ]Dry mouth   [ ]ET Tube/Trach  [ ]Oral lesions  PULMONARY:   [ ]Clear [ ]Tachypnea  [ ]Audible excessive secretions [X] No labored breathing  [ ]Rhonchi        [ ]Right [ ]Left [ ]Bilateral  [ ]Crackles        [ ]Right [ ]Left [ ]Bilateral  [ ]Wheezing     [ ]Right [ ]Left [ ]Bilateral  [ ]Diminished breath sounds [ ]right [ ]left [ ]bilateral  CARDIOVASCULAR:    [ ]Regular [ ]Irregular [ ]Tachy  [ ]Roge [ ]Murmur [ ]Other  GASTROINTESTINAL:  [ ]Soft  [ ]Distended   [ ]+BS  [ ]Non tender [ ]Tender  [ ]PEG [ ]OGT/ NGT  Last BM:     GENITOURINARY:  [ ]Normal [ ] Incontinent   [ ]Oliguria/Anuria   [ ]Dalal  MUSCULOSKELETAL:   [ ]Normal   [ ]Weakness  [ ]Bed/Wheelchair bound [ ]Edema  NEUROLOGIC:   [ ]No focal deficits  [X ]Cognitive impairment  [ ]Dysphagia [ ]Dysarthria [ ]Paresis [ ]Other   SKIN:   [ ]Normal    [ ]Rash  [ ]Pressure ulcer(s)       Present on admission [ ]y [ ]n    CRITICAL CARE:  [ ] Shock Present  [ ]Septic [ ]Cardiogenic [ ]Neurologic [ ]Hypovolemic  [ ]  Vasopressors [ ]  Inotropes   [ ]Respiratory failure present [ ]Mechanical ventilation [ ]Non-invasive ventilatory support [ ]High flow  [ ]Acute  [ ]Chronic [ ]Hypoxic  [ ]Hypercarbic [ ]Other  [ ]Other organ failure     LABS: reviewed                                   9.0    12.98 )-----------( 645      ( 16 Dec 2020 06:41 )             29.8     12-16    128<L>  |  90<L>  |  22  ----------------------------<  102<H>  4.4   |  27  |  0.35<L>    Ca    8.8      16 Dec 2020 06:41  Phos  3.5     12-16  Mg     1.9     12-16        RADIOLOGY & ADDITIONAL STUDIES:  CT A/P 12/7/2020    Right lower lobe consolidation and bilateral patchy lung opacities suspicious for multifocal pneumonia.    Ill-defined gas-containing presacral fluid collection and/or phlegmon which tracks along to the left hip joint. Findings consistent with infection. MRI with contrast is recommended to further evaluate.    PROTEIN CALORIE MALNUTRITION PRESENT: [ ]mild [ ]moderate [ ]severe [ ]underweight [ ]morbid obesity  https://www.andeal.org/vault/2440/web/files/ONC/Table_Clinical%20Characteristics%20to%20Document%20Malnutrition-White%20JV%20et%20al%682913.pdf    Height (cm): 152.4 (12-07-20 @ 23:10), 152.4 (11-27-20 @ 20:43)  Weight (kg): 42.3 (12-07-20 @ 23:10), 44.4 (11-27-20 @ 20:43)  BMI (kg/m2): 18.2 (12-07-20 @ 23:10), 19.1 (11-27-20 @ 20:43)    [ ]PPSV2 < or = to 30% [ ]significant weight loss  [ ]poor nutritional intake  [ ]anasarca     Albumin, Serum: 2.7 g/dL (12-08-20 @ 07:22)   [ ]Artificial Nutrition      REFERRALS:   [ ]Chaplaincy  [ ]Hospice  [ ]Child Life  [ ]Social Work  [ ]Case management [ ]Holistic Therapy     Goals of Care Document: CRISTIAN Monroe (12-02-20 @ 18:00)  Goals of Care Conversation:   Participants:  · Participants  Patient; Family  · Child(chioma)  Missy Thomas    Advance Directives:  · Does patient have Advance Directive  No  · Does Patient Have a Surrogate  Yes  · Surrogate's Name  Missy Thomas  · Surrogate's Phone Number  [phone]  · Caregiver:  yes  · Name  Sheeba Walker  · Phone Number  [facility]    Conversation Discussion:  · Conversation  Diagnosis; Prognosis; Treatment Options; Palliative Care Referral  · Conversation Details  -diagnostic findings including anastomotic leak, thoracic discitis/osteo, sacral abscess  -treatment options including abx, surgery  -diagnostic options including CT scan, colonoscopy   -poor nutrition and overall health status   -comfort care, hospice    What Matters Most To Patient and Family:  · What matters most to patient and family  treatment, comfort    Personal Advance Directives Treatment Guidelines:   Treatment Guidelines:  · Decision Maker  Surrogate    Location of Discussion:   Duration of Advanced Care Planning Meeting:  · Time spent (in minutes)  25    Location of Discussion:  · Location of discussion  Telephone      Electronic Signatures:  Maximilian Monroe)  (Signed 02-Dec-2020 18:02)  	Authored: Goals of Care Conversation, Personal Advance Directives Treatment Guidelines, Location of Discussion      Last Updated: 02-Dec-2020 18:02 by Maximilian Monroe)        ______________  Henrique Gustafson MD   of Geriatric and Palliative Medicine  [facility]     Please page the following number for clinical matters between the hours of 9AM and 5PM   from Monday through Friday : [phone]    After 5PM and on weekends, please page: [phone]. The Geriatric and Palliative Medicine consult service has 24/7 coverage for medical recommendations, including for symptom management needs.

## 2020-12-16 NOTE — PROGRESS NOTE ADULT - SUBJECTIVE AND OBJECTIVE BOX
NOTE INCOMPLETE/ IN PROGRESS    PROGRESS NOTE:   Authored by Dr. Cate Vaughan MD  Pager [facility]: [phone]; [facility]: [phone]     Patient is a 65y old  Female who presents with a chief complaint of Weakness (15 Dec 2020 16:42)      SUBJECTIVE / OVERNIGHT EVENTS:    ADDITIONAL REVIEW OF SYSTEMS:    MEDICATIONS  (STANDING):  acetaminophen  IVPB .. 1000 milliGRAM(s) IV Intermittent once  DAPTOmycin IVPB 350 milliGRAM(s) IV Intermittent every 24 hours  enoxaparin Injectable 40 milliGRAM(s) SubCutaneous daily  famotidine    Tablet 20 milliGRAM(s) Oral two times a day  FLUoxetine Solution 20 milliGRAM(s) Oral daily  gabapentin   Solution 200 milliGRAM(s) Oral three times a day  methadone    Tablet 30 milliGRAM(s) Oral every 12 hours  multivitamin 1 Tablet(s) Oral daily  piperacillin/tazobactam IVPB.. 3.375 Gram(s) IV Intermittent every 8 hours  thiamine 100 milliGRAM(s) Oral daily    MEDICATIONS  (PRN):  acetaminophen   Tablet .. 650 milliGRAM(s) Oral every 6 hours PRN Mild Pain (1 - 3), Moderate Pain (4 - 6)  ALBUTerol    90 MICROgram(s) HFA Inhaler 2 Puff(s) Inhalation every 6 hours PRN Shortness of Breath and/or Wheezing  HYDROmorphone   Tablet 2 milliGRAM(s) Oral every 4 hours PRN Severe Pain (7 - 10)  polyethylene glycol 3350 17 Gram(s) Oral daily PRN Constipation  senna 2 Tablet(s) Oral at bedtime PRN Constipation  traMADol 25 milliGRAM(s) Oral every 6 hours PRN Moderate Pain (4 - 6)      CAPILLARY BLOOD GLUCOSE        I&O's Summary    15 Dec 2020 07:01  -  16 Dec 2020 07:00  --------------------------------------------------------  IN: 0 mL / OUT: 350 mL / NET: -350 mL        PHYSICAL EXAM:  Vital Signs Last 24 Hrs  T(C): 36.4 (16 Dec 2020 04:58), Max: 36.4 (16 Dec 2020 04:58)  T(F): 97.6 (16 Dec 2020 04:58), Max: 97.6 (16 Dec 2020 04:58)  HR: 83 (16 Dec 2020 04:58) (78 - 86)  BP: 108/70 (16 Dec 2020 04:58) (94/63 - 108/70)  BP(mean): --  RR: 18 (16 Dec 2020 04:58) (18 - 18)  SpO2: 98% (16 Dec 2020 04:58) (94% - 98%)      GEN: Bed bound, cachectic appearing, more alert  CHEST/LUNGS: CTABL, no crackles or rales noted  CARDIAC: RRR no M/R/G  ABDOMEN: Non-tender, non-distended, normoactive BS, GJ tube in LUQ, no drainage, or skin changes around site  : goldman catheter in place  MSK: No edema, no gross deformity of extremities.  SKIN: No rashes, no petechiae, no vesicles  NEURO: More awake and alert today. Following commands. Hoarse voice.   PSYCH: A&Ox3 today (name, month/ year, hospital)     LABS:                        9.0    12.98 )-----------( 645      ( 16 Dec 2020 06:41 )             29.8     12-16    128<L>  |  90<L>  |  22  ----------------------------<  102<H>  4.4   |  27  |  0.35<L>    Ca    8.8      16 Dec 2020 06:41  Phos  3.5     12-16  Mg     1.9     12-16                Culture - Blood (collected 13 Dec 2020 10:01)  Source: .Blood Blood-Venous  Preliminary Report (14 Dec 2020 11:01):    No growth to date.    Culture - Blood (collected 13 Dec 2020 10:01)  Source: .Blood Blood-Peripheral  Preliminary Report (14 Dec 2020 11:01):    No growth to date.        RADIOLOGY & ADDITIONAL TESTS:  Results Reviewed:   Imaging Personally Reviewed:  Electrocardiogram Personally Reviewed:    COORDINATION OF CARE:  Care Discussed with Consultants/Other Providers [Y/N]:  Prior or Outpatient Records Reviewed [Y/N]:   PROGRESS NOTE:   Authored by Dr. Cate Vaughan MD  Pager [facility]: [phone]; [facility]: [phone]     Patient is a 65y old  Female who presents with a chief complaint of Weakness (15 Dec 2020 16:42)      SUBJECTIVE / OVERNIGHT EVENTS:  IV contrast infiltrated when pt went to get CT A/P last night. O/w, no other acute issues. Pt and pt's daughter spoke w/ pall care, and all parties in agreement to make pt have pleasure feeds.     MEDICATIONS  (STANDING):  acetaminophen  IVPB .. 1000 milliGRAM(s) IV Intermittent once  DAPTOmycin IVPB 350 milliGRAM(s) IV Intermittent every 24 hours  enoxaparin Injectable 40 milliGRAM(s) SubCutaneous daily  famotidine    Tablet 20 milliGRAM(s) Oral two times a day  FLUoxetine Solution 20 milliGRAM(s) Oral daily  gabapentin   Solution 200 milliGRAM(s) Oral three times a day  methadone    Tablet 30 milliGRAM(s) Oral every 12 hours  multivitamin 1 Tablet(s) Oral daily  piperacillin/tazobactam IVPB.. 3.375 Gram(s) IV Intermittent every 8 hours  thiamine 100 milliGRAM(s) Oral daily    MEDICATIONS  (PRN):  acetaminophen   Tablet .. 650 milliGRAM(s) Oral every 6 hours PRN Mild Pain (1 - 3), Moderate Pain (4 - 6)  ALBUTerol    90 MICROgram(s) HFA Inhaler 2 Puff(s) Inhalation every 6 hours PRN Shortness of Breath and/or Wheezing  HYDROmorphone   Tablet 2 milliGRAM(s) Oral every 4 hours PRN Severe Pain (7 - 10)  polyethylene glycol 3350 17 Gram(s) Oral daily PRN Constipation  senna 2 Tablet(s) Oral at bedtime PRN Constipation  traMADol 25 milliGRAM(s) Oral every 6 hours PRN Moderate Pain (4 - 6)      CAPILLARY BLOOD GLUCOSE        I&O's Summary    15 Dec 2020 07:01  -  16 Dec 2020 07:00  --------------------------------------------------------  IN: 0 mL / OUT: 350 mL / NET: -350 mL        PHYSICAL EXAM:  Vital Signs Last 24 Hrs  T(C): 36.4 (16 Dec 2020 04:58), Max: 36.4 (16 Dec 2020 04:58)  T(F): 97.6 (16 Dec 2020 04:58), Max: 97.6 (16 Dec 2020 04:58)  HR: 83 (16 Dec 2020 04:58) (78 - 86)  BP: 108/70 (16 Dec 2020 04:58) (94/63 - 108/70)  BP(mean): --  RR: 18 (16 Dec 2020 04:58) (18 - 18)  SpO2: 98% (16 Dec 2020 04:58) (94% - 98%)      GEN: Bed bound, cachectic appearing, more alert  CHEST/LUNGS: CTABL, no crackles or rales noted  CARDIAC: RRR no M/R/G  ABDOMEN: Non-tender, non-distended, normoactive BS, GJ tube in LUQ, no drainage, or skin changes around site  : goldman catheter in place  MSK: No edema, no gross deformity of extremities.  SKIN: No rashes, no petechiae, no vesicles  NEURO: More awake and alert today. Following commands. Hoarse voice.   PSYCH: A&Ox3 today (name, month/ year, hospital)     LABS:                        9.0    12.98 )-----------( 645      ( 16 Dec 2020 06:41 )             29.8     12-16    128<L>  |  90<L>  |  22  ----------------------------<  102<H>  4.4   |  27  |  0.35<L>    Ca    8.8      16 Dec 2020 06:41  Phos  3.5     12-16  Mg     1.9     12-16                Culture - Blood (collected 13 Dec 2020 10:01)  Source: .Blood Blood-Venous  Preliminary Report (14 Dec 2020 11:01):    No growth to date.    Culture - Blood (collected 13 Dec 2020 10:01)  Source: .Blood Blood-Peripheral  Preliminary Report (14 Dec 2020 11:01):    No growth to date.        RADIOLOGY & ADDITIONAL TESTS:  Results Reviewed:   Imaging Personally Reviewed:  Electrocardiogram Personally Reviewed:    COORDINATION OF CARE:  Care Discussed with Consultants/Other Providers [Y/N]:  Prior or Outpatient Records Reviewed [Y/N]:

## 2020-12-16 NOTE — CHART NOTE - NSCHARTNOTEFT_GEN_A_CORE
Nutrition Services:    Pt seen for refeeding risk follow up. Chart reviewed, events noted. Labs and medications reviewed - electrolytes remain WNL, pt hyponatremic (128). Daughter at bedside, states pt seems to have more energy today.  Pt reports feeling good today, denies any abdominal discomfort/distention. TF advanced to 30mL/hr yesterday afternoon. Pt interested in increasing TF to 40mL/hr (goal). Continues to report hunger pains, wants to take food by mouth. Risk of aspiration discussed with pt and daughter, appropriate questions answered. Palliative care following. Pt and daughter made aware RD remains available for any questions/concerns regarding TF.    Recommendations:  1. Recommend advancing TF as tolerated to Vital 1.5 at 40mL/hr x 24 hr, provides 960 ml total volume, 1440 kcal, 65 g protein, and 733 ml free water. Meets 34 kcal/kg, 1.5 g/kg protein based on dosing wt 42.3 kg. Defer additional free water to team.  2. Pt remains at risk for refeeding syndrome. Monitor phosphorus, potassium and magnesium daily and replete as needed. Continue multivitamin and thiamine supplementation.  3. RD remains available to adjust tube feed regimen PRN. Nutrition interventions to remain consistent with GOC.    Continue to monitor nutritional intake, tolerance to diet prescription, weights, labs, skin integrity    RD remains available upon request and will follow up per protocol    Bernie Duarte RD, Pager # [phone]

## 2020-12-16 NOTE — PROGRESS NOTE ADULT - PROBLEM SELECTOR PLAN 8
DVT ppx: Lovenox   Diet: NPO w/ tube feeds  Dispo: Pending clinical improvement/family discussion  Code Status: DNR/DNI DVT ppx: Lovenox   Diet: Pleasure feeds w/ TFs  Dispo: Pending clinical improvement/family discussion  Code Status: DNR/DNI

## 2020-12-16 NOTE — PROGRESS NOTE ADULT - PROBLEM SELECTOR PLAN 2
- was on cymbalta at home, not ordered currently; would consider restarting, may also help with pain  - c/w prozac

## 2020-12-16 NOTE — PROGRESS NOTE ADULT - PROBLEM SELECTOR PLAN 3
- c/w tramadol for moderate pain, used 2 doses/24 hours  - c/w dilaudid 2mg PO via PEG PRN for severe pain, used 4 doses/24 hours   - continue home dose of methadone  - unable to drain fluid collection in hip via IR

## 2020-12-16 NOTE — PROGRESS NOTE ADULT - PROBLEM SELECTOR PLAN 1
- RLL consolidation w/ leukocytosis w/ hx of vocal cord paralysis and oral intake at home   - c/w Zosyn, now s/p vanc and switched to daptomycin per ID  - weekly CPK per ID while on daptomycin, last CPK 32 on 12/10   - NPO per S&S - RLL consolidation w/ leukocytosis w/ hx of vocal cord paralysis and oral intake at home   - c/w Zosyn, now s/p vanc and switched to daptomycin per ID  - weekly CPK per ID while on daptomycin, last CPK 32 on 12/10   - NPO per S&S, but pt now allowed to have pleasure feeds

## 2020-12-16 NOTE — PROGRESS NOTE ADULT - SUBJECTIVE AND OBJECTIVE BOX
Follow Up:      Interval History:    ROS:    Unobtainable because:  All other systems negative    Constitutional: no fever, no chills  Head: no trauma  Eyes: no vision changes, no eye pain  ENT:  no sore throat, no rhinorrhea  Cardiovascular:  no chest pain, no palpitation  Respiratory:  no SOB, no cough  GI:  no abd pain, no vomiting, no diarrhea  urinary: no dysuria, no hematuria, no flank pain  musculoskeletal:  no joint pain, no joint swelling  skin:  no rash  neurology:  no headache, no seizure, no change in mental status  psych: no anxiety, no depression         Allergies  Levaquin (Unknown)  levofloxacin (Unknown)        ANTIMICROBIALS:  DAPTOmycin IVPB 350 every 24 hours  piperacillin/tazobactam IVPB.. 3.375 every 8 hours      OTHER MEDS:  acetaminophen   Tablet .. 650 milliGRAM(s) Oral every 6 hours PRN  acetaminophen  IVPB .. 1000 milliGRAM(s) IV Intermittent once  ALBUTerol    90 MICROgram(s) HFA Inhaler 2 Puff(s) Inhalation every 6 hours PRN  enoxaparin Injectable 40 milliGRAM(s) SubCutaneous daily  famotidine    Tablet 20 milliGRAM(s) Oral two times a day  FLUoxetine Solution 20 milliGRAM(s) Oral daily  gabapentin   Solution 200 milliGRAM(s) Oral three times a day  HYDROmorphone   Tablet 2 milliGRAM(s) Oral every 4 hours PRN  methadone    Tablet 30 milliGRAM(s) Oral every 12 hours  multivitamin 1 Tablet(s) Oral daily  polyethylene glycol 3350 17 Gram(s) Oral daily PRN  senna 2 Tablet(s) Oral at bedtime PRN  thiamine 100 milliGRAM(s) Oral daily  traMADol 25 milliGRAM(s) Oral every 6 hours PRN      Vital Signs Last 24 Hrs  T(C): 36.4 (16 Dec 2020 04:58), Max: 36.4 (16 Dec 2020 04:58)  T(F): 97.6 (16 Dec 2020 04:58), Max: 97.6 (16 Dec 2020 04:58)  HR: 83 (16 Dec 2020 04:58) (78 - 83)  BP: 108/70 (16 Dec 2020 04:58) (100/72 - 108/70)  BP(mean): --  RR: 18 (16 Dec 2020 04:58) (18 - 18)  SpO2: 98% (16 Dec 2020 04:58) (97% - 98%)    Physical Exam:  General:    NAD,  non toxic  Head: atraumatic, normocephalic  Eye: normal sclera and conjunctiva  ENT:    no oropharyngeal lesions,   no LAD,   neck supple  Cardio:     regular S1, S2,  no murmur  Respiratory:    clear b/l,    no wheezing  abd:     soft,   BS +,   no tenderness  :   no CVAT,  no suprapubic tenderness,   no  goldman  Musculoskeletal:   no joint swelling  vascular: no phlebitis  Skin:    no rash  Neurologic:     no focal deficit  psych: normal affect                          9.0    12.98 )-----------( 645      ( 16 Dec 2020 06:41 )             29.8       12-16    128<L>  |  90<L>  |  22  ----------------------------<  102<H>  4.4   |  27  |  0.35<L>    Ca    8.8      16 Dec 2020 06:41  Phos  3.5     12-16  Mg     1.9     12-16            MICROBIOLOGY:  v  .Blood Blood-Peripheral  12-13-20   No growth to date.  --  --      .Blood Blood-Peripheral  12-10-20   No Growth Final  --    Growth in anaerobic bottle: Gram Positive Cocci in Clusters      .Blood Blood-Venous  12-09-20   No Growth Final  --  --      .Blood Blood-Peripheral  12-09-20   No Growth Final  --  --      .Urine Clean Catch (Midstream)  12-08-20   <10,000 CFU/mL Normal Urogenital Ami  --  --      .Blood Blood-Peripheral  12-07-20   No Growth Final  --  --      .Blood Blood-Peripheral  12-07-20   Growth in aerobic bottle: Staphylococcus pettenkoferi Coag Negative  Staphylococcus  Single set isolate, possible contaminant. Contact  Microbiology if susceptibility testing clinically  indicated.  Growth in aerobic bottle: Gram Positive Cocci in Pairs and Chains  NONVIABLE UPON REPEATED SUBCULTURES  "Due to technical problems, Proteus sp. will Not be reported as part of  the BCID panel until further notice"  ***Blood Panel PCR results on this specimen are available  approximately 3 hours after the Gram stain result.***  Gram stain, PCR, and/or culture results may not always  correspond due to difference in methodologies.  ************************************************************  This PCR assay was performed using [employer].  The following targets are tested for: Enterococcus,  vancomycin resistant enterococci, Listeria monocytogenes,  coagulase negative staphylococci, S. aureus,  methicillin resistant S. aureus, Streptococcus agalactiae  (Group B), S. pneumoniae, S. pyogenes (Group A),  Acinetobacter baumannii, Enterobacter cloacae, E. coli,  Klebsiella oxytoca, K. pneumoniae, Proteus sp.,  Serratia marcescens, Haemophilus influenzae,  Neisseria meningitidis, Pseudomonas aeruginosa, Candida  albicans, C. glabrata, C krusei, C parapsilosis,  C. tropicalis and the KPC resistance gene.  --  Blood Culture PCR      .Abscess Hip - Left  11-30-20   Rare Enterococcus faecium  --  Enterococcus faecium      .Blood Blood-Peripheral  11-29-20   No Growth Final  --  --      .Urine Clean Catch (Midstream)  11-28-20   <10,000 CFU/mL Normal Urogenital Ami  --  --      .Blood Blood-Peripheral  11-27-20   No Growth Final  --  Blood Culture PCR  Enterococcus faecalis                RADIOLOGY:  Images independently visualized and reviewed personally, findings as below  < from: Xray Pelvis AP only (12.12.20 @ 15:30) >  IMPRESSION:  Soft tissue air is present in the gluteal soft tissues just superior to the left hip and overlying the left hip, consistent with history of wound/collection. Hip joint spaces are maintained. Sacroiliac joints are intact. There is lower lumbar discogenic degenerative disease.    < end of copied text >  < from: CT Abdomen and Pelvis w/ IV Cont (12.07.20 @ 17:44) >    IMPRESSION:  Right lower lobe consolidation and bilateral patchy lung opacities suspicious for multifocal pneumonia.    Ill-defined gas-containing presacral fluid collection and/or phlegmon which tracks along to the left hip joint. Findings consistent with infection. MRI with contrast is recommended to further evaluate.         Follow Up:  bacteremia, gluteal and sacral abscess with osteo    Interval History: improved mental status and WBC down to 12    ROS:      All other systems negative    Constitutional: no fever, no chills  Cardiovascular:  no chest pain, no palpitation  Respiratory:  no SOB, no cough  GI:  +dysphagia, no abd pain, no vomiting, no diarrhea  urinary: no dysuria, no hematuria, no flank pain  musculoskeletal: no joint swelling  skin:  no rash  neurology:  no headache, no seizure          Allergies  Levaquin (Unknown)  levofloxacin (Unknown)        ANTIMICROBIALS:  DAPTOmycin IVPB 350 every 24 hours  piperacillin/tazobactam IVPB.. 3.375 every 8 hours      OTHER MEDS:  acetaminophen   Tablet .. 650 milliGRAM(s) Oral every 6 hours PRN  acetaminophen  IVPB .. 1000 milliGRAM(s) IV Intermittent once  ALBUTerol    90 MICROgram(s) HFA Inhaler 2 Puff(s) Inhalation every 6 hours PRN  enoxaparin Injectable 40 milliGRAM(s) SubCutaneous daily  famotidine    Tablet 20 milliGRAM(s) Oral two times a day  FLUoxetine Solution 20 milliGRAM(s) Oral daily  gabapentin   Solution 200 milliGRAM(s) Oral three times a day  HYDROmorphone   Tablet 2 milliGRAM(s) Oral every 4 hours PRN  methadone    Tablet 30 milliGRAM(s) Oral every 12 hours  multivitamin 1 Tablet(s) Oral daily  polyethylene glycol 3350 17 Gram(s) Oral daily PRN  senna 2 Tablet(s) Oral at bedtime PRN  thiamine 100 milliGRAM(s) Oral daily  traMADol 25 milliGRAM(s) Oral every 6 hours PRN      Vital Signs Last 24 Hrs  T(C): 36.4 (16 Dec 2020 04:58), Max: 36.4 (16 Dec 2020 04:58)  T(F): 97.6 (16 Dec 2020 04:58), Max: 97.6 (16 Dec 2020 04:58)  HR: 83 (16 Dec 2020 04:58) (78 - 83)  BP: 108/70 (16 Dec 2020 04:58) (100/72 - 108/70)  BP(mean): --  RR: 18 (16 Dec 2020 04:58) (18 - 18)  SpO2: 98% (16 Dec 2020 04:58) (97% - 98%)    Physical Exam:  General: NAD  Respiratory:   clear b/l, no wheezing  abd:   soft, BS +, not tender, Jtube with no surrounding tenderness  :     no CVAT, no suprapubic tenderness, + goldman  Musculoskeletal : no joint swelling, no edema  Skin:    no rash, no decubitus  vascular: no phlebitis                            9.0    12.98 )-----------( 645      ( 16 Dec 2020 06:41 )             29.8       12-16    128<L>  |  90<L>  |  22  ----------------------------<  102<H>  4.4   |  27  |  0.35<L>    Ca    8.8      16 Dec 2020 06:41  Phos  3.5     12-16  Mg     1.9     12-16            MICROBIOLOGY:  v  .Blood Blood-Peripheral  12-13-20   No growth to date.  --  --      .Blood Blood-Peripheral  12-10-20   No Growth Final  --    Growth in anaerobic bottle: Gram Positive Cocci in Clusters      .Blood Blood-Venous  12-09-20   No Growth Final  --  --      .Blood Blood-Peripheral  12-09-20   No Growth Final  --  --      .Urine Clean Catch (Midstream)  12-08-20   <10,000 CFU/mL Normal Urogenital Ami  --  --      .Blood Blood-Peripheral  12-07-20   No Growth Final  --  --      .Blood Blood-Peripheral  12-07-20   Growth in aerobic bottle: Staphylococcus pettenkoferi Coag Negative  Staphylococcus  Single set isolate, possible contaminant. Contact  Microbiology if susceptibility testing clinically  indicated.  Growth in aerobic bottle: Gram Positive Cocci in Pairs and Chains  NONVIABLE UPON REPEATED SUBCULTURES  "Due to technical problems, Proteus sp. will Not be reported as part of  the BCID panel until further notice"  ***Blood Panel PCR results on this specimen are available  approximately 3 hours after the Gram stain result.***  Gram stain, PCR, and/or culture results may not always  correspond due to difference in methodologies.  ************************************************************  This PCR assay was performed using [employer].  The following targets are tested for: Enterococcus,  vancomycin resistant enterococci, Listeria monocytogenes,  coagulase negative staphylococci, S. aureus,  methicillin resistant S. aureus, Streptococcus agalactiae  (Group B), S. pneumoniae, S. pyogenes (Group A),  Acinetobacter baumannii, Enterobacter cloacae, E. coli,  Klebsiella oxytoca, K. pneumoniae, Proteus sp.,  Serratia marcescens, Haemophilus influenzae,  Neisseria meningitidis, Pseudomonas aeruginosa, Candida  albicans, C. glabrata, C krusei, C parapsilosis,  C. tropicalis and the KPC resistance gene.  --  Blood Culture PCR      .Abscess Hip - Left  11-30-20   Rare Enterococcus faecium  --  Enterococcus faecium      .Blood Blood-Peripheral  11-29-20   No Growth Final  --  --      .Urine Clean Catch (Midstream)  11-28-20   <10,000 CFU/mL Normal Urogenital Ami  --  --      .Blood Blood-Peripheral  11-27-20   No Growth Final  --  Blood Culture PCR  Enterococcus faecalis                RADIOLOGY:  Images independently visualized and reviewed personally, findings as below  < from: Xray Pelvis AP only (12.12.20 @ 15:30) >  IMPRESSION:  Soft tissue air is present in the gluteal soft tissues just superior to the left hip and overlying the left hip, consistent with history of wound/collection. Hip joint spaces are maintained. Sacroiliac joints are intact. There is lower lumbar discogenic degenerative disease.    < end of copied text >  < from: CT Abdomen and Pelvis w/ IV Cont (12.07.20 @ 17:44) >    IMPRESSION:  Right lower lobe consolidation and bilateral patchy lung opacities suspicious for multifocal pneumonia.    Ill-defined gas-containing presacral fluid collection and/or phlegmon which tracks along to the left hip joint. Findings consistent with infection. MRI with contrast is recommended to further evaluate.

## 2020-12-16 NOTE — PROGRESS NOTE ADULT - ASSESSMENT
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 rectosigmoid anastomosis leak, L hip abscess and myositis extending to trochanter bursa, but no hip effusion, also R hip myositis and ?forming abscess, sacral osteo with phlegmon involving the central canal  s/p IR drainage of L hip abscess which grew enterococcus  faecalis R to vanco and amp (I called the lab and it is sensitive to dapto and linezolid)  blood cx showed E. faecalis S to amp  pt was deteriorating so she was discharged to hospice with no antibiotics but there she improved clinically and was sent back to the hospital, she is confused but stable  blood cx 11/7 now with GPC in pairs and chains but not on PCR panel so not enterococcus  WBC 16-18  abd/pelvis CT: Right lower lobe consolidation and bilateral patchy lung opacities suspicious for multifocal pneumonia. Ill-defined gas-containing presacral fluid collection and/or phlegmon which tracks along to the left hip joint. Findings consistent with infection. MRI with contrast is recommended to further evaluate.    leukocytosis with L hip abscess, myositis s/p IR drain 11/30 which showed VRE (S to dapto and linezolid)  also sacral osteo and abscess with no decubitus so the source is likely in the abdomen, there was question of rectosigmoid anastomosis leak on initial CTs but that surgery was long time ago and pt had no complications after that, the J tube however was  placed 6 weeks ago  E. faecalis bacteremia   2 different coag neg staph bacteremia: staph epi and staph pettenkoferi, ?contaminant , repeat cultures 12/13 negative   malnutrition, dysphagia and risk of aspiration    * IR did not believe there is a drainable collection  * c/w dapto 8 mg/kg and monitor the CK, started 12/10  * c/w zosyn for now to cover for the ?anastomotic leak and abdominal source, antibiotics started 12/7, now day 10  * repeat an abd/pelvis CT with contrast to evaluate for improvement  * monitor the WBC  * GOC discussion     The above assessment and plan was discussed with the medicine resident    Maria Antonia Hunter MD  Pager [phone]  After 5pm and on weekends call [phone]

## 2020-12-16 NOTE — PROGRESS NOTE ADULT - PROBLEM SELECTOR PLAN 1
- c/w PEG feedings  - did not pass MBS  - discussed pleasure feeds with daughter and patient, and both understand the risk and benefits of pleasure feeds, including increased aspiration risk. Patient would like pleasure feeds with continued PEG feeding, and is also interested in further ENT evaluation - c/w PEG feedings  - did not pass MBS  - discussed pleasure feeds with daughter and patient, and both understand the risk and benefits of pleasure feeds, including increased aspiration risk. Patient would like pleasure feeds with continued PEG feeding, and is also interested in further ENT evaluation  - d/w team

## 2020-12-16 NOTE — PROGRESS NOTE ADULT - ATTENDING COMMENTS
maxime, examined the patient this morning  This is a 65F with h/o emphysema on home 2L NC, colon CA s/p distal ileum resection in 2016, ESBL UTI, Zenker's diverticulum s/p repair in 2019 c/b vocal cord paralysis req G-J tube for feeding, RA, gastroparesis, GERD, anemia presenting from home hospice for weakness ,found to have RLL pneumonia    1. Coag neg staph bacteremia: staph epi and staph pettenkoferi, resolved sepsis  2 Suspected aspiration pneumonia  3. Presacral fluid collection, possible infectious  4. Dysphagia, vocal cord paralysis, s/p G-J tube  5. h/o Ca colon, s/p distal ileum resection in 2016    - Feels ok, afebrile, WBC 12, repeat blood c/s neg    Repeat CT abd/pelvis to see the status of intraabdominal collection per ID request. Per IR not enough for drain    1st CT abd/pelvis showed- Right lower lobe consolidation and bilateral patchy lung opacities suspicious for multifocal pneumonia. And ill-defined    gas- containing presacral fluid collection and/or phlegmon which tracks along to the left hip joint. Findings consistent with infection  - c/w On IV Dapto and Zosyn for now    - S/& S rec noted, NPO for now    aspiration precaution, NPO, on G-J feeding per Nutrition rec  - Ongoing GOC discussion with palliative care    DNR/DNI.  ** Family is aware of plan from the team.

## 2020-12-17 NOTE — CONSULT NOTE ADULT - SUBJECTIVE AND OBJECTIVE BOX
Left hip fluid similar to prior exam. Not big enough for drainage catheter. Can aspirate again to obtain organism but this was sampled on 11/30 with positive identification of organism.

## 2020-12-17 NOTE — PROGRESS NOTE ADULT - ATTENDING COMMENTS
Patient seen and examined, agree with resident note.   This is a 65F with h/o emphysema on home 2L NC, colon CA s/p distal ileum resection in 2016, ESBL UTI, Zenker's diverticulum s/p repair in 2019 c/b vocal cord paralysis req G-J tube for feeding, RA, gastroparesis, GERD, anemia presenting from home hospice for weakness ,found to have RLL pneumonia (likely due to aspiration). Also found to have fluid collections in the hip.     1. Coag neg staph bacteremia: staph epi and staph pettenkoferi, resolved sepsis – last + 12/10  2 Suspected aspiration pneumonia  3. Presacral fluid collection, possible infectious  4. Dysphagia, vocal cord paralysis, s/p G-J tube  5. h/o Ca colon, s/p distal ileum resection in 2016    - leukocytosis is stable, afebrile, but repeat CT abd pelvis with an increased left hip fluid collection which extends along presacral space and into the R hip – team discussed re-discuss possible drainage with IR who reviewed images, suggested ortho for possible debridement but no role currently for drainage. Daughter currently prefers conservative management w/ abx to avoid aggressive treatments   - c/w On IV Dapto and Zosyn for now, ID following  - S/& S rec noted, NPO – patient and family wished to trial pleasure feeds which patient tolerated well this AM, G-J feeding per Nutrition rec  - Ongoing C discussion with palliative care   DNR/DNI.  Daughter updated by team.

## 2020-12-17 NOTE — PROGRESS NOTE ADULT - SUBJECTIVE AND OBJECTIVE BOX
NOTE INCOMPLETE/ IN PROGRESS    PROGRESS NOTE:   Authored by Dr. Cate Vaughan MD  Pager [facility]: [phone]; [facility]: [id number]     Patient is a 65y old  Female who presents with a chief complaint of Weakness (16 Dec 2020 14:58)      SUBJECTIVE / OVERNIGHT EVENTS:    ADDITIONAL REVIEW OF SYSTEMS:    MEDICATIONS  (STANDING):  acetaminophen  IVPB .. 1000 milliGRAM(s) IV Intermittent once  DAPTOmycin IVPB 350 milliGRAM(s) IV Intermittent every 24 hours  enoxaparin Injectable 40 milliGRAM(s) SubCutaneous daily  famotidine    Tablet 20 milliGRAM(s) Oral two times a day  FLUoxetine Solution 20 milliGRAM(s) Oral daily  gabapentin   Solution 200 milliGRAM(s) Oral three times a day  methadone    Tablet 30 milliGRAM(s) Oral every 12 hours  multivitamin 1 Tablet(s) Oral daily  piperacillin/tazobactam IVPB.. 3.375 Gram(s) IV Intermittent every 8 hours  thiamine 100 milliGRAM(s) Oral daily    MEDICATIONS  (PRN):  acetaminophen   Tablet .. 650 milliGRAM(s) Oral every 6 hours PRN Mild Pain (1 - 3), Moderate Pain (4 - 6)  ALBUTerol    90 MICROgram(s) HFA Inhaler 2 Puff(s) Inhalation every 6 hours PRN Shortness of Breath and/or Wheezing  HYDROmorphone   Tablet 2 milliGRAM(s) Oral every 4 hours PRN Severe Pain (7 - 10)  polyethylene glycol 3350 17 Gram(s) Oral daily PRN Constipation  senna 2 Tablet(s) Oral at bedtime PRN Constipation  traMADol 25 milliGRAM(s) Oral every 6 hours PRN Moderate Pain (4 - 6)      CAPILLARY BLOOD GLUCOSE        I&O's Summary    16 Dec 2020 07:01  -  17 Dec 2020 07:00  --------------------------------------------------------  IN: 360 mL / OUT: 550 mL / NET: -190 mL        PHYSICAL EXAM:  Vital Signs Last 24 Hrs  T(C): 36.4 (17 Dec 2020 05:05), Max: 36.8 (16 Dec 2020 14:44)  T(F): 97.6 (17 Dec 2020 05:05), Max: 98.3 (16 Dec 2020 14:44)  HR: 84 (17 Dec 2020 05:05) (83 - 94)  BP: 103/69 (17 Dec 2020 05:05) (102/60 - 111/75)  BP(mean): --  RR: 18 (17 Dec 2020 05:05) (18 - 18)  SpO2: 96% (17 Dec 2020 05:05) (96% - 100%)    GEN: Bed bound, cachectic appearing, more alert  CHEST/LUNGS: CTABL, no crackles or rales noted  CARDIAC: RRR no M/R/G  ABDOMEN: Non-tender, non-distended, normoactive BS, GJ tube in LUQ, no drainage, or skin changes around site  : goldman catheter in place  MSK: No edema, no gross deformity of extremities.  SKIN: No rashes, no petechiae, no vesicles  NEURO: More awake and alert today. Following commands. Hoarse voice.   PSYCH: A&Ox3 today (name, month/ year, hospital)     LABS:                        9.0    12.98 )-----------( 645      ( 16 Dec 2020 06:41 )             29.8     12-16    128<L>  |  90<L>  |  22  ----------------------------<  102<H>  4.4   |  27  |  0.35<L>    Ca    8.8      16 Dec 2020 06:41  Phos  3.5     12-16  Mg     1.9     12-16                  RADIOLOGY & ADDITIONAL TESTS:  Results Reviewed:   Imaging Personally Reviewed:  Electrocardiogram Personally Reviewed:    COORDINATION OF CARE:  Care Discussed with Consultants/Other Providers [Y/N]:  Prior or Outpatient Records Reviewed [Y/N]:   PROGRESS NOTE:   Authored by Dr. Cate Vaughan MD  Pager [facility]: [phone]; [facility]: [id number]     Patient is a 65y old  Female who presents with a chief complaint of Weakness (16 Dec 2020 14:58)      SUBJECTIVE / OVERNIGHT EVENTS:  Pt w/ no complaints O/N. Underwent CTAP last night. Per IR, still no drainable fluid. Will discuss w/ family if ortho/ surgical intervention is within GOC before pursuing ortho consult for possible debridement.     MEDICATIONS  (STANDING):  acetaminophen  IVPB .. 1000 milliGRAM(s) IV Intermittent once  DAPTOmycin IVPB 350 milliGRAM(s) IV Intermittent every 24 hours  enoxaparin Injectable 40 milliGRAM(s) SubCutaneous daily  famotidine    Tablet 20 milliGRAM(s) Oral two times a day  FLUoxetine Solution 20 milliGRAM(s) Oral daily  gabapentin   Solution 200 milliGRAM(s) Oral three times a day  methadone    Tablet 30 milliGRAM(s) Oral every 12 hours  multivitamin 1 Tablet(s) Oral daily  piperacillin/tazobactam IVPB.. 3.375 Gram(s) IV Intermittent every 8 hours  thiamine 100 milliGRAM(s) Oral daily    MEDICATIONS  (PRN):  acetaminophen   Tablet .. 650 milliGRAM(s) Oral every 6 hours PRN Mild Pain (1 - 3), Moderate Pain (4 - 6)  ALBUTerol    90 MICROgram(s) HFA Inhaler 2 Puff(s) Inhalation every 6 hours PRN Shortness of Breath and/or Wheezing  HYDROmorphone   Tablet 2 milliGRAM(s) Oral every 4 hours PRN Severe Pain (7 - 10)  polyethylene glycol 3350 17 Gram(s) Oral daily PRN Constipation  senna 2 Tablet(s) Oral at bedtime PRN Constipation  traMADol 25 milliGRAM(s) Oral every 6 hours PRN Moderate Pain (4 - 6)      CAPILLARY BLOOD GLUCOSE        I&O's Summary    16 Dec 2020 07:01  -  17 Dec 2020 07:00  --------------------------------------------------------  IN: 360 mL / OUT: 550 mL / NET: -190 mL        PHYSICAL EXAM:  Vital Signs Last 24 Hrs  T(C): 36.4 (17 Dec 2020 05:05), Max: 36.8 (16 Dec 2020 14:44)  T(F): 97.6 (17 Dec 2020 05:05), Max: 98.3 (16 Dec 2020 14:44)  HR: 84 (17 Dec 2020 05:05) (83 - 94)  BP: 103/69 (17 Dec 2020 05:05) (102/60 - 111/75)  BP(mean): --  RR: 18 (17 Dec 2020 05:05) (18 - 18)  SpO2: 96% (17 Dec 2020 05:05) (96% - 100%)    GEN: Bed bound, cachectic appearing, more alert  CHEST/LUNGS: CTABL, no crackles or rales noted  CARDIAC: RRR no M/R/G  ABDOMEN: Non-tender, non-distended, normoactive BS, GJ tube in LUQ, no drainage, or skin changes around site  : goldman catheter in place  MSK: No edema, no gross deformity of extremities.  SKIN: No rashes, no petechiae, no vesicles  NEURO: More awake and alert today. Following commands. Hoarse voice.   PSYCH: A&Ox3 today (name, month/ year, hospital)     LABS:                        9.0    12.98 )-----------( 645      ( 16 Dec 2020 06:41 )             29.8     12-16    128<L>  |  90<L>  |  22  ----------------------------<  102<H>  4.4   |  27  |  0.35<L>    Ca    8.8      16 Dec 2020 06:41  Phos  3.5     12-16  Mg     1.9     12-16                  RADIOLOGY & ADDITIONAL TESTS:  Results Reviewed:   Imaging Personally Reviewed:  Electrocardiogram Personally Reviewed:    COORDINATION OF CARE:  Care Discussed with Consultants/Other Providers [Y/N]:  Prior or Outpatient Records Reviewed [Y/N]:

## 2020-12-17 NOTE — PROGRESS NOTE ADULT - PROBLEM SELECTOR PLAN 8
DVT ppx: Lovenox   Diet: Pleasure feeds w/ TFs  Dispo: Pending clinical improvement/family discussion  Code Status: DNR/DNI

## 2020-12-17 NOTE — PROGRESS NOTE ADULT - ASSESSMENT
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 rectosigmoid anastomosis leak, L hip abscess and myositis extending to trochanter bursa, but no hip effusion, also R hip myositis and ?forming abscess, sacral osteo with phlegmon involving the central canal  s/p IR drainage of L hip abscess which grew enterococcus  faecalis R to vanco and amp (I called the lab and it is sensitive to dapto and linezolid)  blood cx showed E. faecalis S to amp  pt was deteriorating so she was discharged to hospice with no antibiotics but there she improved clinically and was sent back to the hospital, she is confused but stable  blood cx 11/7 now with GPC in pairs and chains but not on PCR panel so not enterococcus  WBC 16-18  abd/pelvis CT: Right lower lobe consolidation and bilateral patchy lung opacities suspicious for multifocal pneumonia. Ill-defined gas-containing presacral fluid collection and/or phlegmon which tracks along to the left hip joint. Findings consistent with infection. MRI with contrast is recommended to further evaluate.    leukocytosis with L hip abscess, myositis s/p IR drain 11/30 which showed VRE (S to dapto and linezolid)  also sacral osteo and abscess with no decubitus so the source is likely in the abdomen, there was question of rectosigmoid anastomosis leak on initial CTs but that surgery was long time ago and pt had no complications after that, the J tube however was  placed 6 weeks ago  repeat CT 12/16 showed increase size of the L hip abscess to 8.8 cm tracking to presacral space and R hip, RLL patchy opacities s/o aspiration  E. faecalis bacteremia   2 different coag neg staph bacteremia: staph epi and staph pettenkoferi, ?contaminant , repeat cultures 12/13 negative   malnutrition, dysphagia and risk of aspiration    * IR did not believe there is a drainable collection but now repeat CT is showing increased size of the abscess, f/u with IR for possible drainage  * c/w dapto 8 mg/kg and monitor the CK, started 12/10  * c/w zosyn for now to cover for the ?anastomotic leak, abdominal source and also aspiration, antibiotics started 12/7, now day 11  * monitor the WBC  * GOC discussion, will need a 6 week course of antibiotics if within goals of care    The above assessment and plan was discussed with the medicine resident    Maria Antonia Hunter MD  Pager [phone]  After 5pm and on weekends call [phone]

## 2020-12-17 NOTE — PROGRESS NOTE ADULT - PROBLEM SELECTOR PLAN 1
- RLL consolidation w/ leukocytosis w/ hx of vocal cord paralysis and oral intake at home   - c/w Zosyn, now s/p vanc and switched to daptomycin per ID  - weekly CPK per ID while on daptomycin, last CPK 32 on 12/10   - NPO per S&S, but pt now allowed to have pleasure feeds

## 2020-12-17 NOTE — PROGRESS NOTE ADULT - PROBLEM SELECTOR PLAN 2
- was on cymbalta at home, not ordered currently; would consider restarting, may also help with pain  - c/w prozac  - patient is open to psychiatry referral for help with medical management

## 2020-12-17 NOTE — PROGRESS NOTE ADULT - PROBLEM SELECTOR PLAN 3
- Per records on HIE pt w/ L vocal cord paralysis s/p Zenker Diverticulum repair s/p injection at OSH  - ENT reconsult in AM for laryngoscopy to further assess need for injection?

## 2020-12-17 NOTE — PROGRESS NOTE ADULT - PROBLEM SELECTOR PLAN 2
- Pt w/ minimal nutrition at home hospice and cachectic on exam w/ elevated ketones on UA likely cause of weakness   - NPO per S&S eval (failed MBS), but now allowed to have pleasure feeds

## 2020-12-17 NOTE — CHART NOTE - NSCHARTNOTEFT_GEN_A_CORE
Pt seen at bedside s/p large volume diarrhea while on tube feeds. Added banana flakes to thicken. Pt wanting to speak to doctor, consistently saying "help me" but when specifies complaint pt says she feels weak which was admitting dx. Checked fs - 150. Vitals checked - stable, afebrile, on home 2L. Ordered q6 fs while on tube feeds. Ordered 1L LR bolus over 120 min given pt size and ativan 0.5 prn if continued anxiety over condition.

## 2020-12-17 NOTE — PROGRESS NOTE ADULT - SUBJECTIVE AND OBJECTIVE BOX
Follow Up:  bacteremia, gluteal and sacral abscess with osteo    Interval History: improved mental status and WBC down to 12    ROS:      All other systems negative    Constitutional: no fever, no chills  Cardiovascular:  no chest pain, no palpitation  Respiratory:  no SOB, no cough  GI:  no abd pain, no vomiting, no diarrhea  urinary: no dysuria, no hematuria, no flank pain  musculoskeletal: no joint swelling, slight L hip pain  skin:  no rash  neurology:  no headache, no seizure            Allergies  Levaquin (Unknown)  levofloxacin (Unknown)        ANTIMICROBIALS:  DAPTOmycin IVPB 350 every 24 hours  piperacillin/tazobactam IVPB.. 3.375 every 8 hours      OTHER MEDS:  acetaminophen   Tablet .. 650 milliGRAM(s) Oral every 6 hours PRN  acetaminophen  IVPB .. 1000 milliGRAM(s) IV Intermittent once  ALBUTerol    90 MICROgram(s) HFA Inhaler 2 Puff(s) Inhalation every 6 hours PRN  enoxaparin Injectable 40 milliGRAM(s) SubCutaneous daily  famotidine    Tablet 20 milliGRAM(s) Oral two times a day  FLUoxetine Solution 20 milliGRAM(s) Oral daily  gabapentin   Solution 200 milliGRAM(s) Oral three times a day  HYDROmorphone   Tablet 2 milliGRAM(s) Oral every 4 hours PRN  methadone    Tablet 30 milliGRAM(s) Oral every 12 hours  multivitamin 1 Tablet(s) Oral daily  polyethylene glycol 3350 17 Gram(s) Oral daily PRN  senna 2 Tablet(s) Oral at bedtime PRN  thiamine 100 milliGRAM(s) Oral daily  traMADol 25 milliGRAM(s) Oral every 6 hours PRN      Vital Signs Last 24 Hrs  T(C): 36.4 (17 Dec 2020 05:05), Max: 36.8 (16 Dec 2020 14:44)  T(F): 97.6 (17 Dec 2020 05:05), Max: 98.3 (16 Dec 2020 14:44)  HR: 84 (17 Dec 2020 05:05) (83 - 94)  BP: 103/69 (17 Dec 2020 05:05) (102/60 - 111/75)  BP(mean): --  RR: 18 (17 Dec 2020 05:05) (18 - 18)  SpO2: 96% (17 Dec 2020 05:05) (96% - 100%)    Physical Exam:  General: NAD  Respiratory:   clear b/l, no wheezing  abd:   soft, BS +, not tender, Jtube with no surrounding tenderness  :     no CVAT, no suprapubic tenderness, + goldman  Musculoskeletal : no joint swelling, no edema  Skin:    no rash, no decubitus  vascular: no phlebitis                        9.2    12.89 )-----------( 829      ( 17 Dec 2020 07:13 )             31.2       12-17    130<L>  |  92<L>  |  18  ----------------------------<  79  4.7   |  26  |  0.43<L>    Ca    9.1      17 Dec 2020 07:11  Phos  3.5     12-17  Mg     2.0     12-17            MICROBIOLOGY:  v  .Blood Blood-Peripheral  12-13-20   No growth to date.  --  --      .Blood Blood-Peripheral  12-10-20   No Growth Final  --    Growth in anaerobic bottle: Gram Positive Cocci in Clusters      .Blood Blood-Venous  12-09-20   No Growth Final  --  --      .Blood Blood-Peripheral  12-09-20   No Growth Final  --  --      .Urine Clean Catch (Midstream)  12-08-20   <10,000 CFU/mL Normal Urogenital Ami  --  --      .Blood Blood-Peripheral  12-07-20   No Growth Final  --  --      .Blood Blood-Peripheral  12-07-20   Growth in aerobic bottle: Staphylococcus pettenkoferi Coag Negative  Staphylococcus  Single set isolate, possible contaminant. Contact  Microbiology if susceptibility testing clinically  indicated.  Growth in aerobic bottle: Gram Positive Cocci in Pairs and Chains  NONVIABLE UPON REPEATED SUBCULTURES  "Due to technical problems, Proteus sp. will Not be reported as part of  the BCID panel until further notice"  ***Blood Panel PCR results on this specimen are available  approximately 3 hours after the Gram stain result.***  Gram stain, PCR, and/or culture results may not always  correspond due to difference in methodologies.  ************************************************************  This PCR assay was performed using [employer].  The following targets are tested for: Enterococcus,  vancomycin resistant enterococci, Listeria monocytogenes,  coagulase negative staphylococci, S. aureus,  methicillin resistant S. aureus, Streptococcus agalactiae  (Group B), S. pneumoniae, S. pyogenes (Group A),  Acinetobacter baumannii, Enterobacter cloacae, E. coli,  Klebsiella oxytoca, K. pneumoniae, Proteus sp.,  Serratia marcescens, Haemophilus influenzae,  Neisseria meningitidis, Pseudomonas aeruginosa, Candida  albicans, C. glabrata, C krusei, C parapsilosis,  C. tropicalis and the KPC resistance gene.  --  Blood Culture PCR      .Abscess Hip - Left  11-30-20   Rare Enterococcus faecium  --  Enterococcus faecium      .Blood Blood-Peripheral  11-29-20   No Growth Final  --  --      .Urine Clean Catch (Midstream)  11-28-20   <10,000 CFU/mL Normal Urogenital Ami  --  --      .Blood Blood-Peripheral  11-27-20   No Growth Final  --  Blood Culture PCR  Enterococcus faecalis                RADIOLOGY:  Images independently visualized and reviewed personally, findings as below  < from: CT Abdomen and Pelvis w/ IV Cont (12.16.20 @ 20:58) >  IMPRESSION:  Increased left hip fluid collection which extends along the presacral space and into the right hip.    Small amount of fluid in the distal esophagus with patchy opacities in the right lower lobe, suspicious for aspiration.    Approximately 40 mL of Omnipaque 350 extravasated into the patient's left forearm. Patient was seen by Dr. Doe.Neurosensory examination remained intact. Difficulty was applied and arm was elevated.. ANA [place] was notified on December 15, 2020 at approximately 3:27 PM.

## 2020-12-17 NOTE — PROGRESS NOTE ADULT - PROBLEM SELECTOR PLAN 3
- c/w tramadol for moderate pain, used 1 dose/24 hours  - c/w dilaudid 2mg PO via PEG PRN for severe pain, used 2 doses/24 hours   - continue home dose of methadone  - unable to drain fluid collection in hip via IR

## 2020-12-17 NOTE — CHART NOTE - NSCHARTNOTEFT_GEN_A_CORE
Nutrition Services:    Pt seen for malnutrition/refeeding risk follow up. Chart reviewed, events noted. Pt started on pleasure feeds this AM, per RN pt ate a large amount of food by mouth. TF off at visit, per RN has not been running today d/t issues with ordering PO diet with TF. No documentation available re: TF provision. Labs reviewed - electrolytes all WNL.    Pt upset at visit, states she does not feel well and had diarrhea today however per RN pt with no episodes. Pt happy to take food by mouth, does not report any abdominal discomfort/distention.     Recommend:  1. PO diet per team, no therapeutic restrictions necessary.  2. Initiate Vital 1.5 at 40mL/hr x 24 hr, provides 960 ml total volume, 1440 kcal, 65 g protein, and 733 ml free water. Meets 34 kcal/kg, 1.5 g/kg protein based on dosing wt 42.3 kg. Defer additional free water to team.  3. Continue to monitor and replete electrolytes as needed. Continue multivitamin and thiamine.   4. RD remains available to adjust tube feed regimen PRN. Nutrition interventions to remain consistent with GOC.    Continue to monitor nutritional intake, tolerance to diet prescription, weights, labs, skin integrity    RD remains available upon request and will follow up per protocol    Bernie Duaret RD, Pager # [phone]. Nutrition Services:    Pt seen for malnutrition/refeeding risk follow up. Chart reviewed, events noted. Pt started on pleasure feeds this AM, per RN pt ate a large amount of food by mouth. TF off at visit, per RN has not been running today d/t issues with ordering PO diet with TF. No documentation available re: TF provision, unsure how long TF has been off. Labs reviewed - electrolytes all WNL.    Pt upset at visit, states she does not feel well and had diarrhea today, PCA confirmed. Pt happy to take food by mouth, does not report any abdominal discomfort/distention.     Recommend:  1. PO diet per team, no therapeutic restrictions necessary.  2. Initiate Vital 1.5 at 40mL/hr x 24 hr, provides 960 ml total volume, 1440 kcal, 65 g protein, and 733 ml free water. Meets 34 kcal/kg, 1.5 g/kg protein based on dosing wt 42.3 kg. Defer additional free water to team.  3. Continue to monitor and replete electrolytes as needed. Continue multivitamin and thiamine.   4. RD remains available to adjust tube feed regimen PRN. Nutrition interventions to remain consistent with GOC.    Continue to monitor nutritional intake, tolerance to diet prescription, weights, labs, skin integrity    RD remains available upon request and will follow up per protocol    Bernie Duarte RD, Pager # [phone].

## 2020-12-17 NOTE — PROGRESS NOTE ADULT - SUBJECTIVE AND OBJECTIVE BOX
HPI: 65F with PMH including emphysema on home 2L NC, colon CA s/p distal ileum resection in 2016, ESBL UTI, zenker's diverticulum s/p repair in 2019 c/b vocal cord paralysis s/p GJ tube for feeding, RA, gastroparesis, GERD, anemia presenting from hospice for weakness, likely from malnutrition vs infection (presacral fluid collection) vs RLL aspiration PNA. Patient is on broad-spectrum abx, and awaiting S&S evaluation. Has had enterococcal infection in pas, possible from leak at distal ileal anastomosis site. For pain, is on 30mg methadone BID at home. Daughter is HCP and is a pediatric RN in [place], DNR/DNI. Palliative called for GOC.    INTERVAL EVENTS:  12/10: patient denies any concerns  12/11: denies any concerns, appears comfortable  12/14: states having 8/10 pain in her hip, 3/10 is tolerable; much more alert today  12/15: continues to be alert, has pain in her hip but did not realize she had dilaudid ordered  12/16: continues to be alert, states pain is tolerable with dilaudid, and has a strong desire to eat; used dilaudid PO x 4, tramadol x 2/24 hours  12/17: alert, states being much happier after having PO     ADVANCE DIRECTIVES:    DNR  Yes    MOLST  [ ]  Living Will  [ ]   DECISION MAKER(s):  [X ] Health Care Proxy(s)  [ ] Surrogate(s)  [ ] Guardian           Name(s): Phone Number(s): chriss Alfaro    BASELINE (I)ADL(s) (prior to admission):  [place]: [ ]Total  [ ] Moderate [ ]Dependent    Allergies    Levaquin (Unknown)  levofloxacin (Unknown)    Intolerances    MEDICATIONS  (STANDING):  acetaminophen  IVPB .. 1000 milliGRAM(s) IV Intermittent once  DAPTOmycin IVPB 350 milliGRAM(s) IV Intermittent every 24 hours  enoxaparin Injectable 40 milliGRAM(s) SubCutaneous daily  famotidine    Tablet 20 milliGRAM(s) Oral two times a day  FLUoxetine Solution 20 milliGRAM(s) Oral daily  gabapentin   Solution 200 milliGRAM(s) Oral three times a day  methadone    Tablet 30 milliGRAM(s) Oral every 12 hours  multivitamin 1 Tablet(s) Oral daily  piperacillin/tazobactam IVPB.. 3.375 Gram(s) IV Intermittent every 8 hours  thiamine 100 milliGRAM(s) Oral daily    MEDICATIONS  (PRN):  acetaminophen   Tablet .. 650 milliGRAM(s) Oral every 6 hours PRN Mild Pain (1 - 3), Moderate Pain (4 - 6)  ALBUTerol    90 MICROgram(s) HFA Inhaler 2 Puff(s) Inhalation every 6 hours PRN Shortness of Breath and/or Wheezing  HYDROmorphone   Tablet 2 milliGRAM(s) Oral every 4 hours PRN Severe Pain (7 - 10)  polyethylene glycol 3350 17 Gram(s) Oral daily PRN Constipation  senna 2 Tablet(s) Oral at bedtime PRN Constipation  traMADol 25 milliGRAM(s) Oral every 6 hours PRN Moderate Pain (4 - 6)    PRESENT SYMPTOMS: [ ]Unable to obtain due to poor mentation   Source if other than patient:  [ ]Family   [ ]Team     Pain: [ X]yes [ ]no  QOL impact -   Location - L hip             Aggravating factors - minimal relief with tramadol and methadone  Quality -  Radiation -  Timing-  Severity (0-10 scale): 8/10  Minimal acceptable level (0-10 scale): 3/10    CPOT:    https://www.[facility].org/getattachment/pgt71f53-8w8l-5y2c-5q8a-2171s3141h1b/Critical-Care-Pain-Observation-Tool-(CPOT)      PAIN AD Score:     http://geriatrictoolkit.[facility]/cog/painad.pdf (press ctrl +  left click to view)    Dyspnea:                           [ ]Mild [ ]Moderate [ ]Severe  Anxiety:                             [ ]Mild [ ]Moderate [ ]Severe  Fatigue:                             [ ]Mild [ ]Moderate [ ]Severe  Nausea:                             [ ]Mild [ ]Moderate [ ]Severe  Loss of appetite:              [ ]Mild [ ]Moderate [ ]Severe  Constipation:                    [ ]Mild [ ]Moderate [ ]Severe    Other Symptoms:  [X ]All other review of systems negative     Palliative Performance Status Version 2:         %    http://npcrc.org/files/news/palliative_performance_scale_ppsv2.pdf    PHYSICAL EXAM:  Vital Signs Last 24 Hrs  T(C): 36.6 (17 Dec 2020 13:56), Max: 36.6 (16 Dec 2020 20:27)  T(F): 97.8 (17 Dec 2020 13:56), Max: 97.8 (16 Dec 2020 20:27)  HR: 75 (17 Dec 2020 13:56) (75 - 94)  BP: 95/63 (17 Dec 2020 13:56) (95/63 - 111/75)  BP(mean): --  RR: 18 (17 Dec 2020 13:56) (18 - 18)  SpO2: 100% (17 Dec 2020 13:56) (96% - 100%)    Limited exam for patient comfort  GENERAL:  [ ]Alert  [ ]Oriented x   [ ]Lethargic  [ ]Cachexia  [ ]Unarousable  [x ]Verbal  [ ]Non-Verbal  Behavioral:   [ ] Anxiety  [ ] Delirium [ ] Agitation [ ] Other  HEENT:  [ ]Normal   [ ]Dry mouth   [ ]ET Tube/Trach  [ ]Oral lesions  PULMONARY:   [ ]Clear [ ]Tachypnea  [ ]Audible excessive secretions [X] No labored breathing  [ ]Rhonchi        [ ]Right [ ]Left [ ]Bilateral  [ ]Crackles        [ ]Right [ ]Left [ ]Bilateral  [ ]Wheezing     [ ]Right [ ]Left [ ]Bilateral  [ ]Diminished breath sounds [ ]right [ ]left [ ]bilateral  CARDIOVASCULAR:    [ ]Regular [ ]Irregular [ ]Tachy  [ ]Roge [ ]Murmur [ ]Other  GASTROINTESTINAL:  [ ]Soft  [ ]Distended   [ ]+BS  [ ]Non tender [ ]Tender  [ ]PEG [ ]OGT/ NGT  Last BM:     GENITOURINARY:  [ ]Normal [ ] Incontinent   [ ]Oliguria/Anuria   [ ]Dalal  MUSCULOSKELETAL:   [ ]Normal   [ ]Weakness  [ ]Bed/Wheelchair bound [ ]Edema  NEUROLOGIC:   [ ]No focal deficits  [X ]Cognitive impairment  [ ]Dysphagia [ ]Dysarthria [ ]Paresis [ ]Other   SKIN:   [ ]Normal    [ ]Rash  [ ]Pressure ulcer(s)       Present on admission [ ]y [ ]n    CRITICAL CARE:  [ ] Shock Present  [ ]Septic [ ]Cardiogenic [ ]Neurologic [ ]Hypovolemic  [ ]  Vasopressors [ ]  Inotropes   [ ]Respiratory failure present [ ]Mechanical ventilation [ ]Non-invasive ventilatory support [ ]High flow  [ ]Acute  [ ]Chronic [ ]Hypoxic  [ ]Hypercarbic [ ]Other  [ ]Other organ failure     LABS: reviewed                             9.2    12.89 )-----------( 829      ( 17 Dec 2020 07:13 )             31.2     12-17    130<L>  |  92<L>  |  18  ----------------------------<  79  4.7   |  26  |  0.43<L>    Ca    9.1      17 Dec 2020 07:11  Phos  3.5     12-17  Mg     2.0     12-17        RADIOLOGY & ADDITIONAL STUDIES:  CT A/P 12/7/2020    Right lower lobe consolidation and bilateral patchy lung opacities suspicious for multifocal pneumonia.    Ill-defined gas-containing presacral fluid collection and/or phlegmon which tracks along to the left hip joint. Findings consistent with infection. MRI with contrast is recommended to further evaluate.    PROTEIN CALORIE MALNUTRITION PRESENT: [ ]mild [ ]moderate [ ]severe [ ]underweight [ ]morbid obesity  https://www.andeal.org/vault/2440/web/files/ONC/Table_Clinical%20Characteristics%20to%20Document%20Malnutrition-White%20JV%20et%20al%459235.pdf    Height (cm): 152.4 (12-07-20 @ 23:10), 152.4 (11-27-20 @ 20:43)  Weight (kg): 42.3 (12-07-20 @ 23:10), 44.4 (11-27-20 @ 20:43)  BMI (kg/m2): 18.2 (12-07-20 @ 23:10), 19.1 (11-27-20 @ 20:43)    [ ]PPSV2 < or = to 30% [ ]significant weight loss  [ ]poor nutritional intake  [ ]anasarca     Albumin, Serum: 2.7 g/dL (12-08-20 @ 07:22)   [ ]Artificial Nutrition      REFERRALS:   [ ]Chaplaincy  [ ]Hospice  [ ]Child Life  [ ]Social Work  [ ]Case management [ ]Holistic Therapy     Goals of Care Document: CRISTIAN Monroe (12-02-20 @ 18:00)  Goals of Care Conversation:   Participants:  · Participants  Patient; Family  · Child(chioma)  Missy Thomas    Advance Directives:  · Does patient have Advance Directive  No  · Does Patient Have a Surrogate  Yes  · Surrogate's Name  Missy Thomas  · Surrogate's Phone Number  [phone]  · Caregiver:  yes  · Name  Sheeba Cardoso  · Phone Number  Stuart Merida    Conversation Discussion:  · Conversation  Diagnosis; Prognosis; Treatment Options; Palliative Care Referral  · Conversation Details  -diagnostic findings including anastomotic leak, thoracic discitis/osteo, sacral abscess  -treatment options including abx, surgery  -diagnostic options including CT scan, colonoscopy   -poor nutrition and overall health status   -comfort care, hospice    What Matters Most To Patient and Family:  · What matters most to patient and family  treatment, comfort    Personal Advance Directives Treatment Guidelines:   Treatment Guidelines:  · Decision Maker  Surrogate    Location of Discussion:   Duration of Advanced Care Planning Meeting:  · Time spent (in minutes)  25    Location of Discussion:  · Location of discussion  Telephone      Electronic Signatures:  Maximilian Monroe)  (Signed 02-Dec-2020 18:02)  	Authored: Goals of Care Conversation, Personal Advance Directives Treatment Guidelines, Location of Discussion      Last Updated: 02-Dec-2020 18:02 by Maximilian Monroe)        ______________  Henrique Gustafson MD   of Geriatric and Palliative Medicine  [facility]     Please page the following number for clinical matters between the hours of 9AM and 5PM   from Monday through Friday : [phone]    After 5PM and on weekends, please page: [phone]. The Geriatric and Palliative Medicine consult service has 24/7 coverage for medical recommendations, including for symptom management needs.

## 2020-12-18 NOTE — PROGRESS NOTE ADULT - SUBJECTIVE AND OBJECTIVE BOX
Follow Up:  bacteremia, gluteal and sacral abscess with osteo    Interval History: WBC normalized but pt had ?leak from the J tube    ROS:      All other systems negative    Constitutional: no fever, no chills  Cardiovascular:  no chest pain, no palpitation  Respiratory:  no SOB, no cough  GI:  no abd pain, no vomiting, had diarrhea last night now resolved  urinary: no dysuria, no hematuria, no flank pain  musculoskeletal: no joint swelling, slight L hip pain  skin:  no rash  neurology:  no headache, no seizure          Allergies  Levaquin (Unknown)  levofloxacin (Unknown)        ANTIMICROBIALS:  DAPTOmycin IVPB 350 every 24 hours  piperacillin/tazobactam IVPB.. 3.375 every 8 hours      OTHER MEDS:  acetaminophen   Tablet .. 650 milliGRAM(s) Oral every 6 hours PRN  acetaminophen  IVPB .. 1000 milliGRAM(s) IV Intermittent once  ALBUTerol    90 MICROgram(s) HFA Inhaler 2 Puff(s) Inhalation every 6 hours PRN  enoxaparin Injectable 40 milliGRAM(s) SubCutaneous daily  famotidine    Tablet 20 milliGRAM(s) Oral two times a day  FLUoxetine Solution 20 milliGRAM(s) Oral daily  gabapentin   Solution 200 milliGRAM(s) Oral three times a day  HYDROmorphone   Tablet 2 milliGRAM(s) Oral every 4 hours PRN  methadone    Tablet 30 milliGRAM(s) Oral every 12 hours  multivitamin 1 Tablet(s) Oral daily  polyethylene glycol 3350 17 Gram(s) Oral daily PRN  senna 2 Tablet(s) Oral at bedtime PRN  thiamine 100 milliGRAM(s) Oral daily  traMADol 25 milliGRAM(s) Oral every 6 hours PRN      Vital Signs Last 24 Hrs  T(C): 36.6 (18 Dec 2020 05:17), Max: 36.8 (17 Dec 2020 21:15)  T(F): 97.8 (18 Dec 2020 05:17), Max: 98.3 (17 Dec 2020 21:15)  HR: 76 (18 Dec 2020 05:17) (75 - 85)  BP: 108/66 (18 Dec 2020 05:17) (95/63 - 108/66)  BP(mean): --  RR: 18 (18 Dec 2020 05:17) (18 - 18)  SpO2: 98% (18 Dec 2020 05:17) (97% - 100%)    Physical Exam:  General: NAD  Respiratory:   clear b/l, no wheezing  abd:   soft, BS +, not tender, Jtube with no surrounding tenderness  :     no CVAT, no suprapubic tenderness, + goldman  Musculoskeletal : no joint swelling, no edema  Skin:    no rash, no decubitus  vascular: no phlebitis                        7.3    9.39  )-----------( 619      ( 18 Dec 2020 07:16 )             24.3       12-18    133<L>  |  97  |  13  ----------------------------<  77  4.0   |  28  |  0.32<L>    Ca    8.1<L>      18 Dec 2020 07:16  Phos  3.1     12-18  Mg     1.7     12-18            MICROBIOLOGY:  v  .Blood Blood-Peripheral  12-13-20   No Growth Final  --  --      .Blood Blood-Peripheral  12-10-20   No Growth Final  --    Growth in anaerobic bottle: Gram Positive Cocci in Clusters      .Blood Blood-Venous  12-09-20   No Growth Final  --  --      .Blood Blood-Peripheral  12-09-20   No Growth Final  --  --      .Urine Clean Catch (Midstream)  12-08-20   <10,000 CFU/mL Normal Urogenital Ami  --  --      .Blood Blood-Peripheral  12-07-20   No Growth Final  --  --      .Blood Blood-Peripheral  12-07-20   Growth in aerobic bottle: Staphylococcus pettenkoferi Coag Negative  Staphylococcus  Single set isolate, possible contaminant. Contact  Microbiology if susceptibility testing clinically  indicated.  Growth in aerobic bottle: Gram Positive Cocci in Pairs and Chains  NONVIABLE UPON REPEATED SUBCULTURES  "Due to technical problems, Proteus sp. will Not be reported as part of  the BCID panel until further notice"  ***Blood Panel PCR results on this specimen are available  approximately 3 hours after the Gram stain result.***  Gram stain, PCR, and/or culture results may not always  correspond due to difference in methodologies.  ************************************************************  This PCR assay was performed using [employer].  The following targets are tested for: Enterococcus,  vancomycin resistant enterococci, Listeria monocytogenes,  coagulase negative staphylococci, S. aureus,  methicillin resistant S. aureus, Streptococcus agalactiae  (Group B), S. pneumoniae, S. pyogenes (Group A),  Acinetobacter baumannii, Enterobacter cloacae, E. coli,  Klebsiella oxytoca, K. pneumoniae, Proteus sp.,  Serratia marcescens, Haemophilus influenzae,  Neisseria meningitidis, Pseudomonas aeruginosa, Candida  albicans, C. glabrata, C krusei, C parapsilosis,  C. tropicalis and the KPC resistance gene.  --  Blood Culture PCR      .Abscess Hip - Left  11-30-20   Rare Enterococcus faecium  --  Enterococcus faecium      .Blood Blood-Peripheral  11-29-20   No Growth Final  --  --      .Urine Clean Catch (Midstream)  11-28-20   <10,000 CFU/mL Normal Urogenital Ami  --  --      .Blood Blood-Peripheral  11-27-20   No Growth Final  --  Blood Culture PCR  Enterococcus faecalis                RADIOLOGY:  Images independently visualized and reviewed personally, findings as below  < from: CT Abdomen and Pelvis w/ IV Cont (12.16.20 @ 20:58) >    IMPRESSION:  Increased left hip fluid collection which extends along the presacral space and into the right hip.    Small amount of fluid in the distal esophagus with patchy opacities in the right lower lobe, suspicious for aspiration.    Approximately 40 mL of Omnipaque 350 extravasated into the patient's left forearm. Patient was seen by Dr. Doe.Neurosensory examination remained intact. Difficulty was applied and arm was elevated.. ANA [place] was notified on December 15, 2020 at approximately 3:27 PM.

## 2020-12-18 NOTE — CONSULT NOTE ADULT - PROBLEM SELECTOR RECOMMENDATION 9
- no drainage from GJ tube appreciated on abdominal exam  - CT a/p from 12/16 without evidence of malpositioned GJ tube   - no GI objection to resume enteral feeds   - routine GJ site care  - c/w pleasure feeds as tolerated  - monitor GI function  - d/w GI attending Dr. Loaiza and RN

## 2020-12-18 NOTE — PROGRESS NOTE ADULT - SUBJECTIVE AND OBJECTIVE BOX
NOTE INCOMPLETE/ IN PROGRESS    PROGRESS NOTE:   Authored by Dr. Cate Vaughan MD  Pager [facility]: [phone]; [facility]: [phone]     Patient is a 65y old  Female who presents with a chief complaint of Weakness (17 Dec 2020 15:22)      SUBJECTIVE / OVERNIGHT EVENTS:    ADDITIONAL REVIEW OF SYSTEMS:    MEDICATIONS  (STANDING):  acetaminophen  IVPB .. 1000 milliGRAM(s) IV Intermittent once  DAPTOmycin IVPB 350 milliGRAM(s) IV Intermittent every 24 hours  enoxaparin Injectable 40 milliGRAM(s) SubCutaneous daily  famotidine    Tablet 20 milliGRAM(s) Oral two times a day  FLUoxetine Solution 20 milliGRAM(s) Oral daily  gabapentin   Solution 200 milliGRAM(s) Oral three times a day  methadone    Tablet 30 milliGRAM(s) Oral every 12 hours  multivitamin 1 Tablet(s) Oral daily  piperacillin/tazobactam IVPB.. 3.375 Gram(s) IV Intermittent every 8 hours  thiamine 100 milliGRAM(s) Oral daily    MEDICATIONS  (PRN):  acetaminophen   Tablet .. 650 milliGRAM(s) Oral every 6 hours PRN Mild Pain (1 - 3), Moderate Pain (4 - 6)  ALBUTerol    90 MICROgram(s) HFA Inhaler 2 Puff(s) Inhalation every 6 hours PRN Shortness of Breath and/or Wheezing  HYDROmorphone   Tablet 2 milliGRAM(s) Oral every 4 hours PRN Severe Pain (7 - 10)  polyethylene glycol 3350 17 Gram(s) Oral daily PRN Constipation  senna 2 Tablet(s) Oral at bedtime PRN Constipation  traMADol 25 milliGRAM(s) Oral every 6 hours PRN Moderate Pain (4 - 6)      CAPILLARY BLOOD GLUCOSE      POCT Blood Glucose.: 150 mg/dL (17 Dec 2020 23:48)    I&O's Summary    17 Dec 2020 07:01  -  18 Dec 2020 07:00  --------------------------------------------------------  IN: 1250 mL / OUT: 960 mL / NET: 290 mL        PHYSICAL EXAM:  Vital Signs Last 24 Hrs  T(C): 36.6 (18 Dec 2020 05:17), Max: 36.8 (17 Dec 2020 21:15)  T(F): 97.8 (18 Dec 2020 05:17), Max: 98.3 (17 Dec 2020 21:15)  HR: 76 (18 Dec 2020 05:17) (75 - 85)  BP: 108/66 (18 Dec 2020 05:17) (95/63 - 108/66)  BP(mean): --  RR: 18 (18 Dec 2020 05:17) (18 - 18)  SpO2: 98% (18 Dec 2020 05:17) (97% - 100%)    GEN: Bed bound, cachectic appearing, more alert  CHEST/LUNGS: CTABL, no crackles or rales noted  CARDIAC: RRR no M/R/G  ABDOMEN: Non-tender, non-distended, normoactive BS, GJ tube in LUQ, no drainage, or skin changes around site  : goldman catheter in place  MSK: No edema, no gross deformity of extremities.  SKIN: No rashes, no petechiae, no vesicles  NEURO: More awake and alert today. Following commands. Hoarse voice.   PSYCH: A&Ox3 today (name, month/ year, hospital)     LABS:                        9.2    12.89 )-----------( 829      ( 17 Dec 2020 07:13 )             31.2     12-17    130<L>  |  92<L>  |  18  ----------------------------<  79  4.7   |  26  |  0.43<L>    Ca    9.1      17 Dec 2020 07:11  Phos  3.5     12-17  Mg     2.0     12-17                  RADIOLOGY & ADDITIONAL TESTS:  Results Reviewed:   Imaging Personally Reviewed:  Electrocardiogram Personally Reviewed:    COORDINATION OF CARE:  Care Discussed with Consultants/Other Providers [Y/N]:  Prior or Outpatient Records Reviewed [Y/N]:       PROGRESS NOTE:   Authored by Dr. Cate Vaughan MD  Pager [facility]: [phone]; [facility]: [id number]     Patient is a 65y old  Female who presents with a chief complaint of Weakness (17 Dec 2020 15:22)      SUBJECTIVE / OVERNIGHT EVENTS:  Pt c/o anxiety overnight, getting additional 0.5 IV ativan. PEG tube reportedly leaking O/N and clamped. GI consulted, and assessed the tube. Stated that it was ok to resume TFs. Also attempted to reorder pt's cymbalta; however, cymbalta does not come in a crushable/ PEG-friendly form. Pt and family also adamant about not wanting any surgical intervention, so holding off on ortho reconsult for fluid collection. Per conversation w/ ID attending, will pursue PICC line instead pending better understanding of pt's GOC and dispo plan (acute rehab vs hospice).      MEDICATIONS  (STANDING):  acetaminophen  IVPB .. 1000 milliGRAM(s) IV Intermittent once  DAPTOmycin IVPB 350 milliGRAM(s) IV Intermittent every 24 hours  enoxaparin Injectable 40 milliGRAM(s) SubCutaneous daily  famotidine    Tablet 20 milliGRAM(s) Oral two times a day  FLUoxetine Solution 20 milliGRAM(s) Oral daily  gabapentin   Solution 200 milliGRAM(s) Oral three times a day  methadone    Tablet 30 milliGRAM(s) Oral every 12 hours  multivitamin 1 Tablet(s) Oral daily  piperacillin/tazobactam IVPB.. 3.375 Gram(s) IV Intermittent every 8 hours  thiamine 100 milliGRAM(s) Oral daily    MEDICATIONS  (PRN):  acetaminophen   Tablet .. 650 milliGRAM(s) Oral every 6 hours PRN Mild Pain (1 - 3), Moderate Pain (4 - 6)  ALBUTerol    90 MICROgram(s) HFA Inhaler 2 Puff(s) Inhalation every 6 hours PRN Shortness of Breath and/or Wheezing  HYDROmorphone   Tablet 2 milliGRAM(s) Oral every 4 hours PRN Severe Pain (7 - 10)  polyethylene glycol 3350 17 Gram(s) Oral daily PRN Constipation  senna 2 Tablet(s) Oral at bedtime PRN Constipation  traMADol 25 milliGRAM(s) Oral every 6 hours PRN Moderate Pain (4 - 6)      CAPILLARY BLOOD GLUCOSE      POCT Blood Glucose.: 150 mg/dL (17 Dec 2020 23:48)    I&O's Summary    17 Dec 2020 07:01  -  18 Dec 2020 07:00  --------------------------------------------------------  IN: 1250 mL / OUT: 960 mL / NET: 290 mL        PHYSICAL EXAM:  Vital Signs Last 24 Hrs  T(C): 36.6 (18 Dec 2020 05:17), Max: 36.8 (17 Dec 2020 21:15)  T(F): 97.8 (18 Dec 2020 05:17), Max: 98.3 (17 Dec 2020 21:15)  HR: 76 (18 Dec 2020 05:17) (75 - 85)  BP: 108/66 (18 Dec 2020 05:17) (95/63 - 108/66)  BP(mean): --  RR: 18 (18 Dec 2020 05:17) (18 - 18)  SpO2: 98% (18 Dec 2020 05:17) (97% - 100%)    GEN: Bed bound, cachectic appearing, more alert  CHEST/LUNGS: CTABL, no crackles or rales noted  CARDIAC: RRR no M/R/G  ABDOMEN: Non-tender, non-distended, normoactive BS, GJ tube in LUQ, no drainage, or skin changes around site  : goldman catheter in place  MSK: No edema, no gross deformity of extremities.  SKIN: No rashes, no petechiae, no vesicles  NEURO: More awake and alert today. Following commands. Hoarse voice.   PSYCH: A&Ox3 today (name, month/ year, hospital)     LABS:                        9.2    12.89 )-----------( 829      ( 17 Dec 2020 07:13 )             31.2     12-17    130<L>  |  92<L>  |  18  ----------------------------<  79  4.7   |  26  |  0.43<L>    Ca    9.1      17 Dec 2020 07:11  Phos  3.5     12-17  Mg     2.0     12-17                  RADIOLOGY & ADDITIONAL TESTS:  Results Reviewed:   Imaging Personally Reviewed:  Electrocardiogram Personally Reviewed:    COORDINATION OF CARE:  Care Discussed with Consultants/Other Providers [Y/N]:  Prior or Outpatient Records Reviewed [Y/N]:

## 2020-12-18 NOTE — PHYSICAL THERAPY INITIAL EVALUATION ADULT - GAIT DEVIATIONS NOTED, PT EVAL
decreased olivia/decreased velocity of limb motion/decreased step length/decreased weight-shifting ability

## 2020-12-18 NOTE — CONSULT NOTE ADULT - PROBLEM SELECTOR RECOMMENDATION 2
- f/u S&S evaluation  - currently on PEG feedings  - meds via PEG
- s/p distal ileum resection in 2016  - CT a/p from prior admission at [facility] with evidence of anastomotic leak   - DNR/DNI   - supportive care   - palliative input appreciated

## 2020-12-18 NOTE — PROGRESS NOTE ADULT - ATTENDING COMMENTS
Patient seen and examined, agree with resident note.   This is a 65F with h/o emphysema on home 2L NC, colon CA s/p distal ileum resection in 2016, ESBL UTI, Zenker's diverticulum s/p repair in 2019 c/b vocal cord paralysis req G-J tube for feeding, RA, gastroparesis, GERD, anemia presenting from home hospice for weakness ,found to have RLL pneumonia (likely due to aspiration). Also found to have fluid collections in the hip.     1. Coag neg staph bacteremia: staph epi and staph pettenkoferi, resolved sepsis – last + 12/10  2 Suspected aspiration pneumonia  3. Presacral fluid collection, possible infectious  4. Dysphagia, vocal cord paralysis, s/p G-J tube  5. h/o Ca colon, s/p distal ileum resection in 2016    - Team discussed with patient and daughter, invasive procedures not in line with overall GOC  - ID recommendations appreciated  - GI evaluated peg tube, functioning well. Can resume tube feeds in conjunction with pleasure feeds     DNR/DNI, ongoing GOC  PT

## 2020-12-18 NOTE — CHART NOTE - NSCHARTNOTEFT_GEN_A_CORE
Notified about leakage from peg tube while feeds running. Feeds put on hold. Abdominal exam benign, pt stable. Notified nurse we will hold tube feeds for remainder of night.    Plan for abdominal XRay w GI consult in AM to ensure proper tube placement prior to restarting feeds.

## 2020-12-18 NOTE — PROGRESS NOTE ADULT - SUBJECTIVE AND OBJECTIVE BOX
HPI: 65F with PMH including emphysema on home 2L NC, colon CA s/p distal ileum resection in 2016, ESBL UTI, zenker's diverticulum s/p repair in 2019 c/b vocal cord paralysis s/p GJ tube for feeding, RA, gastroparesis, GERD, anemia presenting from hospice for weakness, likely from malnutrition vs infection (presacral fluid collection) vs RLL aspiration PNA. Patient is on broad-spectrum abx, and awaiting S&S evaluation. Has had enterococcal infection in pas, possible from leak at distal ileal anastomosis site. For pain, is on 30mg methadone BID at home. Daughter is HCP and is a pediatric RN in [place], DNR/DNI. Palliative called for GOC.    INTERVAL EVENTS:  12/10: patient denies any concerns  12/11: denies any concerns, appears comfortable  12/14: states having 8/10 pain in her hip, 3/10 is tolerable; much more alert today  12/15: continues to be alert, has pain in her hip but did not realize she had dilaudid ordered  12/16: continues to be alert, states pain is tolerable with dilaudid, and has a strong desire to eat; used dilaudid PO x 4, tramadol x 2/24 hours  12/17: alert, states being much happier after having PO   12/18: patient appears slightly more confused today, felt she was at her home yesterday; has intermittent but fleeting pain    ADVANCE DIRECTIVES:    DNR  Yes    MOLST  [ ]  Living Will  [ ]   DECISION MAKER(s):  [X ] Health Care Proxy(s)  [ ] Surrogate(s)  [ ] Guardian           Name(s): Phone Number(s): chriss Alfaro    BASELINE (I)ADL(s) (prior to admission):  [place]: [ ]Total  [ ] Moderate [ ]Dependent    Allergies    Levaquin (Unknown)  levofloxacin (Unknown)    Intolerances    MEDICATIONS  (STANDING):  acetaminophen  IVPB .. 1000 milliGRAM(s) IV Intermittent once  DAPTOmycin IVPB 350 milliGRAM(s) IV Intermittent every 24 hours  enoxaparin Injectable 40 milliGRAM(s) SubCutaneous daily  famotidine    Tablet 20 milliGRAM(s) Oral two times a day  FLUoxetine Solution 20 milliGRAM(s) Oral daily  gabapentin   Solution 200 milliGRAM(s) Oral three times a day  methadone    Tablet 30 milliGRAM(s) Oral every 12 hours  multivitamin 1 Tablet(s) Oral daily  piperacillin/tazobactam IVPB.. 3.375 Gram(s) IV Intermittent every 8 hours  thiamine 100 milliGRAM(s) Oral daily    MEDICATIONS  (PRN):  acetaminophen   Tablet .. 650 milliGRAM(s) Oral every 6 hours PRN Mild Pain (1 - 3), Moderate Pain (4 - 6)  ALBUTerol    90 MICROgram(s) HFA Inhaler 2 Puff(s) Inhalation every 6 hours PRN Shortness of Breath and/or Wheezing  HYDROmorphone   Tablet 2 milliGRAM(s) Oral every 4 hours PRN Severe Pain (7 - 10)  polyethylene glycol 3350 17 Gram(s) Oral daily PRN Constipation  senna 2 Tablet(s) Oral at bedtime PRN Constipation  traMADol 25 milliGRAM(s) Oral every 6 hours PRN Moderate Pain (4 - 6)    PRESENT SYMPTOMS: [ ]Unable to obtain due to poor mentation   Source if other than patient:  [ ]Family   [ ]Team     Pain: [ X]yes [ ]no  QOL impact -   Location - L hip             Aggravating factors - minimal relief with tramadol and methadone  Quality -  Radiation -  Timing-  Severity (0-10 scale): 8/10  Minimal acceptable level (0-10 scale): 3/10    CPOT:    https://www.[facility].org/getattachment/iax98l69-1o2t-6l7m-9j1n-7339v9072d4r/Critical-Care-Pain-Observation-Tool-(CPOT)      PAIN AD Score:     http://geriatrictoolkit.[place].[facility]/cog/painad.pdf (press ctrl +  left click to view)    Dyspnea:                           [ ]Mild [ ]Moderate [ ]Severe  Anxiety:                             [ ]Mild [ ]Moderate [ ]Severe  Fatigue:                             [ ]Mild [ ]Moderate [ ]Severe  Nausea:                             [ ]Mild [ ]Moderate [ ]Severe  Loss of appetite:              [ ]Mild [ ]Moderate [ ]Severe  Constipation:                    [ ]Mild [ ]Moderate [ ]Severe    Other Symptoms:  [X ]All other review of systems negative     Palliative Performance Status Version 2:         %    http://npcrc.org/files/news/palliative_performance_scale_ppsv2.pdf    PHYSICAL EXAM:  Vital Signs Last 24 Hrs  T(C): 36.5 (18 Dec 2020 13:41), Max: 36.8 (17 Dec 2020 21:15)  T(F): 97.7 (18 Dec 2020 13:41), Max: 98.3 (17 Dec 2020 21:15)  HR: 78 (18 Dec 2020 13:41) (76 - 85)  BP: 105/68 (18 Dec 2020 13:41) (97/61 - 108/66)  BP(mean): --  RR: 18 (18 Dec 2020 13:41) (18 - 18)  SpO2: 98% (18 Dec 2020 13:41) (97% - 98%)    Limited exam for patient comfort  GENERAL:  [ ]Alert  [ ]Oriented x   [ ]Lethargic  [ ]Cachexia  [ ]Unarousable  [x ]Verbal  [ ]Non-Verbal  Behavioral:   [ ] Anxiety  [ ] Delirium [ ] Agitation [ ] Other  HEENT:  [ ]Normal   [ ]Dry mouth   [ ]ET Tube/Trach  [ ]Oral lesions  PULMONARY:   [ ]Clear [ ]Tachypnea  [ ]Audible excessive secretions [X] No labored breathing  [ ]Rhonchi        [ ]Right [ ]Left [ ]Bilateral  [ ]Crackles        [ ]Right [ ]Left [ ]Bilateral  [ ]Wheezing     [ ]Right [ ]Left [ ]Bilateral  [ ]Diminished breath sounds [ ]right [ ]left [ ]bilateral  CARDIOVASCULAR:    [ ]Regular [ ]Irregular [ ]Tachy  [ ]Roge [ ]Murmur [ ]Other  GASTROINTESTINAL:  [ ]Soft  [ ]Distended   [ ]+BS  [ ]Non tender [ ]Tender  [ ]PEG [ ]OGT/ NGT  Last BM:     GENITOURINARY:  [ ]Normal [ ] Incontinent   [ ]Oliguria/Anuria   [ ]Dalal  MUSCULOSKELETAL:   [ ]Normal   [ ]Weakness  [ ]Bed/Wheelchair bound [ ]Edema  NEUROLOGIC:   [ ]No focal deficits  [X ]Cognitive impairment  [ ]Dysphagia [ ]Dysarthria [ ]Paresis [ ]Other   SKIN:   [ ]Normal    [ ]Rash  [ ]Pressure ulcer(s)       Present on admission [ ]y [ ]n    CRITICAL CARE:  [ ] Shock Present  [ ]Septic [ ]Cardiogenic [ ]Neurologic [ ]Hypovolemic  [ ]  Vasopressors [ ]  Inotropes   [ ]Respiratory failure present [ ]Mechanical ventilation [ ]Non-invasive ventilatory support [ ]High flow  [ ]Acute  [ ]Chronic [ ]Hypoxic  [ ]Hypercarbic [ ]Other  [ ]Other organ failure     LABS: reviewed                             7.3    9.39  )-----------( 619      ( 18 Dec 2020 07:16 )             24.3     12-18    133<L>  |  97  |  13  ----------------------------<  77  4.0   |  28  |  0.32<L>    Ca    8.1<L>      18 Dec 2020 07:16  Phos  3.1     12-18  Mg     1.7     12-18          RADIOLOGY & ADDITIONAL STUDIES:  CT A/P 12/7/2020    Right lower lobe consolidation and bilateral patchy lung opacities suspicious for multifocal pneumonia.    Ill-defined gas-containing presacral fluid collection and/or phlegmon which tracks along to the left hip joint. Findings consistent with infection. MRI with contrast is recommended to further evaluate.    PROTEIN CALORIE MALNUTRITION PRESENT: [ ]mild [ ]moderate [ ]severe [ ]underweight [ ]morbid obesity  https://www.andeal.org/vault/2440/web/files/ONC/Table_Clinical%20Characteristics%20to%20Document%20Malnutrition-White%20JV%20et%20al%377725.pdf    Height (cm): 152.4 (12-07-20 @ 23:10), 152.4 (11-27-20 @ 20:43)  Weight (kg): 42.3 (12-07-20 @ 23:10), 44.4 (11-27-20 @ 20:43)  BMI (kg/m2): 18.2 (12-07-20 @ 23:10), 19.1 (11-27-20 @ 20:43)    [ ]PPSV2 < or = to 30% [ ]significant weight loss  [ ]poor nutritional intake  [ ]anasarca     Albumin, Serum: 2.7 g/dL (12-08-20 @ 07:22)   [ ]Artificial Nutrition      REFERRALS:   [ ]Chaplaincy  [ ]Hospice  [ ]Child Life  [ ]Social Work  [ ]Case management [ ]Holistic Therapy     Goals of Care Document: CRISTIAN Monroe (12-02-20 @ 18:00)  Goals of Care Conversation:   Participants:  · Participants  Patient; Family  · Child(chioma)  Missy Thomas    Advance Directives:  · Does patient have Advance Directive  No  · Does Patient Have a Surrogate  Yes  · Surrogate's Name  Missy Thomas  · Surrogate's Phone Number  [phone]  · Caregiver:  yes  · Name  Sheeba Cardoso  · Phone Number  [place]    Conversation Discussion:  · Conversation  Diagnosis; Prognosis; Treatment Options; Palliative Care Referral  · Conversation Details  -diagnostic findings including anastomotic leak, thoracic discitis/osteo, sacral abscess  -treatment options including abx, surgery  -diagnostic options including CT scan, colonoscopy   -poor nutrition and overall health status   -comfort care, hospice    What Matters Most To Patient and Family:  · What matters most to patient and family  treatment, comfort    Personal Advance Directives Treatment Guidelines:   Treatment Guidelines:  · Decision Maker  Surrogate    Location of Discussion:   Duration of Advanced Care Planning Meeting:  · Time spent (in minutes)  25    Location of Discussion:  · Location of discussion  Telephone      Electronic Signatures:  Maximilian Monroe)  (Signed 02-Dec-2020 18:02)  	Authored: Goals of Care Conversation, Personal Advance Directives Treatment Guidelines, Location of Discussion      Last Updated: 02-Dec-2020 18:02 by Maximilian Monroe)        ______________  Henrique Gustafson MD   of Geriatric and Palliative Medicine  [facility]     Please page the following number for clinical matters between the hours of 9AM and 5PM   from Monday through Friday : [phone]    After 5PM and on weekends, please page: [phone]. The Geriatric and Palliative Medicine consult service has 24/7 coverage for medical recommendations, including for symptom management needs.

## 2020-12-18 NOTE — CONSULT NOTE ADULT - SUBJECTIVE AND OBJECTIVE BOX
Chief Complaint:  Patient is a 65y old  Female who presents with a chief complaint of Weakness (18 Dec 2020 07:14)      HPI: 66yo F w/ PMH of emphysema on home 2L NC, colon CA s/p distal ileum resection in 2016, ESBL UTI, zenker's diverticulum s/p repair in 2019 c/b vocal cord paralysis req GJ tube for feeding, RA, gastroparesis, GERD, anemia presented from home hospice for weakness.     Pt was previously admitted to [facility] from 11/27 to 12/3 for which she initially presented w/ leaking PEG tube and noted L hip ill-defined fluid collection w/ no surgical intervention at that time apart from I&D w/ minimal fluid output. During that hospitalization pt had a CT A/P which showed a leak from the rectosigmoid anastomosis from her previous operation w/ enterococcus bacteremia requiring a MICU stay for vasopressor support in the setting of septic shock. While there pt continued to deteriorate and discussion was had w/ pt's daughter who is the healthcare surrogate and the decision was made to pursue comfort measures and home hospice.     At home hospice pt continued to improve despite prior discussion of rapid deterioration. She was then restarted on Zosyn for abx coverage and allowed PO intake. Per daughter and hospice physician they decided pt improving and to present to the hospital for continued management w/ IV abx and fluids if needed. Would like to re-puruse hospice care afterwards if warranted    GI consulted for concern of GJ tube malfunction.     Allergies:  Levaquin (Unknown)  levofloxacin (Unknown)      Medications:  acetaminophen   Tablet .. 650 milliGRAM(s) Oral every 6 hours PRN  acetaminophen  IVPB .. 1000 milliGRAM(s) IV Intermittent once  ALBUTerol    90 MICROgram(s) HFA Inhaler 2 Puff(s) Inhalation every 6 hours PRN  DAPTOmycin IVPB 350 milliGRAM(s) IV Intermittent every 24 hours  enoxaparin Injectable 40 milliGRAM(s) SubCutaneous daily  famotidine    Tablet 20 milliGRAM(s) Oral two times a day  FLUoxetine Solution 20 milliGRAM(s) Oral daily  gabapentin   Solution 200 milliGRAM(s) Oral three times a day  HYDROmorphone   Tablet 2 milliGRAM(s) Oral every 4 hours PRN  methadone    Tablet 30 milliGRAM(s) Oral every 12 hours  multivitamin 1 Tablet(s) Oral daily  piperacillin/tazobactam IVPB.. 3.375 Gram(s) IV Intermittent every 8 hours  polyethylene glycol 3350 17 Gram(s) Oral daily PRN  senna 2 Tablet(s) Oral at bedtime PRN  thiamine 100 milliGRAM(s) Oral daily  traMADol 25 milliGRAM(s) Oral every 6 hours PRN      PMHX/PSHX:  Colon cancer    Rheumatoid arthritis    Anemia    COPD (chronic obstructive pulmonary disease)    Vocal cord paralysis    Chronic GERD    Rheumatoid arthritis    Zenker diverticulum    UTI due to extended-spectrum beta lactamase (ESBL) producing Escherichia coli    Colon cancer    Emphysematous COPD    Candidal stomatitis    Malignant neoplasm of colon    Major depressive disorder    HTN (hypertension)    COPD (chronic obstructive pulmonary disease)    Encounter for gastrojejunal (GJ) tube placement    History of Nissen fundoplication    S/P removal of Zenker&#x27;s diverticulum    History of colon resection        Family history:  Family history of peripheral vascular disease    FH: heart failure    FH: rheumatoid arthritis        Social History:     ROS:     General:  No wt loss, fevers, chills, night sweats, fatigue,   Eyes:  Good vision, no reported pain  ENT:  No sore throat, pain, runny nose, dysphagia  CV:  No pain, palpitations, hypo/hypertension  Resp:  No dyspnea, cough, tachypnea, wheezing  GI:  No pain, No nausea, No vomiting, No diarrhea, No constipation, No weight loss, No fever, No pruritis, No rectal bleeding, No tarry stools, +dysphagia,  :  No pain, bleeding, incontinence, nocturia  Muscle:  No pain, weakness  Neuro:  No weakness, tingling, memory problems  Psych:  No fatigue, insomnia, mood problems, depression  Endocrine:  No polyuria, polydipsia, cold/heat intolerance  Heme:  No petechiae, ecchymosis, easy bruisability  Skin:  No rash, tattoos, scars, edema      PHYSICAL EXAM:   Vital Signs:  Vital Signs Last 24 Hrs  T(C): 36.6 (18 Dec 2020 05:17), Max: 36.8 (17 Dec 2020 21:15)  T(F): 97.8 (18 Dec 2020 05:17), Max: 98.3 (17 Dec 2020 21:15)  HR: 76 (18 Dec 2020 05:17) (75 - 85)  BP: 108/66 (18 Dec 2020 05:17) (95/63 - 108/66)  BP(mean): --  RR: 18 (18 Dec 2020 05:17) (18 - 18)  SpO2: 98% (18 Dec 2020 05:17) (97% - 100%)  Daily     Daily     GENERAL:  weak, no acute distress  HEENT:  NC/AT  ABDOMEN: non-tender, softly distended, +GJ tube without drainage or surrounding erythema   EXTEREMITIES:  no cyanosis,clubbing or edema  SKIN:  No rash/erythema/ecchymoses/petechiae/wounds/abscess/warm/dry  NEURO:  Alert, oriented, no asterixis, no tremor, no encephalopathy    LABS:                        7.3    9.39  )-----------( 619      ( 18 Dec 2020 07:16 )             24.3     12-18    133<L>  |  97  |  13  ----------------------------<  77  4.0   |  28  |  0.32<L>    Ca    8.1<L>      18 Dec 2020 07:16  Phos  3.1     12-18  Mg     1.7     12-18                Imaging:      < from: CT Abdomen and Pelvis w/ IV Cont (12.16.20 @ 20:58) >    EXAM:  CT ABDOMEN AND PELVIS IC                            PROCEDURE DATE:  12/16/2020            INTERPRETATION:  CLINICAL INFORMATION: Left hip fluid collection.    COMPARISON: December 7, 2020.    PROCEDURE:  CT of the Abdomen and Pelvis was performed with intravenous contrast.  Intravenous contrast: 90 ml Omnipaque 350. 10 ml discarded.  Oral contrast: None.  Sagittal and coronal reformats were performed.    FINDINGS:  LOWER CHEST: Small amount of fluid in the distal esophagus. Patchy opacities in the right lower lobe.    LIVER: Within normal limits.  BILE DUCTS: Prominence of the common bile duct, probably related to postcholecystectomy state.  GALLBLADDER: Cholecystectomy.  SPLEEN: Within normal limits.  PANCREAS: Within normal limits.  ADRENALS: Within normal limits.  KIDNEYS/URETERS: Within normal limits.    BLADDER: Collapsed around a Dalal catheter balloon.  REPRODUCTIVE ORGANS: Uterus and adnexa within normal limits.    BOWEL: Percutaneous gastrojejunostomy tube. No bowel obstruction.  PERITONEUM: No ascites.  VESSELS: Within normal limits.  RETROPERITONEUM/LYMPH NODES: No lymphadenopathy.  ABDOMINAL WALL: Increased inflammation involving an air and fluid collection in the left hip (series 3, image 99), measuring 8.8 x 3.0 cm. Collection extends through the left sciatic notch along the presacral space and into the right sciatic notch.  BONES: Within normal limits.    IMPRESSION:  Increased left hip fluid collection which extends along the presacral space and into the right hip.    Small amount of fluid in the distal esophagus with patchy opacities in the right lower lobe, suspicious for aspiration.    Approximately 40 mL of Omnipaque 350 extravasated into the patient's left forearm. Patient was seen by Dr. Doe.Neurosensory examination remained intact. Difficulty was applied and arm was elevated.. [facility] [place] was notified on December 15, 2020 at approximately 3:27 PM.                KSENIA SWEENEY M.D., ATTENDING RADIOLOGIST  This document has been electronicallysigned. Dec 17 2020  9:30AM    < end of copied text >

## 2020-12-18 NOTE — CONSULT NOTE ADULT - ASSESSMENT
66yo F w/ PMH of emphysema on home 2L NC, colon CA s/p distal ileum resection in 2016, ESBL UTI, zenker's diverticulum s/p repair in 2019 c/b vocal cord paralysis req GJ tube for feeding, RA, gastroparesis, GERD, anemia. GI consulted for concern of GJ tube malfunction.

## 2020-12-18 NOTE — PHYSICAL THERAPY INITIAL EVALUATION ADULT - PERTINENT HX OF CURRENT PROBLEM, REHAB EVAL
Pt is a 64 y/o female admitted to [facility] on 12/7/20 w/ PMH of emphysema on home 2L NC, colon CA s/p distal ileum resection in 2016, ESBL UTI, zenker's diverticulum s/p repair in 2019 c/b vocal cord paralysis req GJ tube for feeding, RA, gastroparesis, GERD, anemia presenting from hospice for weakness.

## 2020-12-18 NOTE — PROGRESS NOTE ADULT - ASSESSMENT
65 f with HTN, COPD, stage I colon ca s/p resection and no chemo, RA on humira, zenker diverticulum s/p repair 2019 which resulted in vocal paralysis and multiple aspiration pneumonias, had a PEG before but s/p a J tube about 6 weeks ago, was hospitalized at [facility] from 11/27 to 12/3 initially for GJ tube leaking and ?cellulitis but CT showed ? rectosigmoid anastomosis leak, L hip abscess and myositis extending to trochanter bursa, but no hip effusion, also R hip myositis and ?forming abscess, sacral osteo with phlegmon involving the central canal  s/p IR drainage of L hip abscess which grew enterococcus  faecalis R to vanco and amp (I called the lab and it is sensitive to dapto and linezolid)  blood cx showed E. faecalis S to amp  pt was deteriorating so she was discharged to hospice with no antibiotics but there she improved clinically and was sent back to the hospital, she is confused but stable  blood cx 11/7 now with GPC in pairs and chains but not on PCR panel so not enterococcus  WBC 16-18  abd/pelvis CT: Right lower lobe consolidation and bilateral patchy lung opacities suspicious for multifocal pneumonia. Ill-defined gas-containing presacral fluid collection and/or phlegmon which tracks along to the left hip joint. Findings consistent with infection. MRI with contrast is recommended to further evaluate.    leukocytosis with L hip abscess, myositis s/p IR drain 11/30 which showed VRE (S to dapto and linezolid)  also sacral osteo and abscess with no decubitus so the source is likely in the abdomen, there was question of rectosigmoid anastomosis leak on initial CTs but that surgery was long time ago and pt had no complications after that, the J tube however was  placed 6 weeks ago  repeat CT 12/16 showed increase size of the L hip abscess to 8.8 cm tracking to presacral space and R hip, RLL patchy opacities s/o aspiration  E. faecalis bacteremia   2 different coag neg staph bacteremia: staph epi and staph pettenkoferi, ?contaminant , repeat cultures 12/13 negative   malnutrition, dysphagia and risk of aspiration    * IR did not believe there is a drainable collection but now repeat CT is showing increased size of the abscess, IR still believed it is unchanged, family not willing to proceed with ortho eval and any surgical drainage  * c/w dapto 8 mg/kg and monitor the CK, started 12/10  * c/w zosyn for now to cover for the ?anastomotic leak, abdominal source and also aspiration, antibiotics started 12/7, now day 12  * monitor the WBC  * will need a PICC line and 6 week course of IV antibiotics if within goals of care    The above assessment and plan was discussed with the medicine resident    Maria Antonia Hunter MD  Pager [phone]  After 5pm and on weekends call [phone]

## 2020-12-19 NOTE — BEHAVIORAL HEALTH ASSESSMENT NOTE - OTHER
Writhing tongue, possible tardive dyskinesia N/A Previously in Hospice Care, sent to [facility] after showing unexpected improvement

## 2020-12-19 NOTE — BEHAVIORAL HEALTH ASSESSMENT NOTE - RISK ASSESSMENT
Patient is low imminent risk at this time, despite chronic non-modifiable risk factors. While the patient is suffering from acute medical illness and has a history of MDD, she has a supportive family, is engaged in treatment, and is hopeful for the future. She denies any HI/SI and appears genuine in her report. Low Acute Suicide Risk

## 2020-12-19 NOTE — BEHAVIORAL HEALTH ASSESSMENT NOTE - NSBHCHARTREVIEWVS_PSY_A_CORE FT
Vital Signs Last 24 Hrs  T(C): 36.9 (19 Dec 2020 14:25), Max: 37.1 (18 Dec 2020 21:24)  T(F): 98.4 (19 Dec 2020 14:25), Max: 98.8 (18 Dec 2020 21:24)  HR: 83 (19 Dec 2020 05:05) (83 - 85)  BP: 110/68 (19 Dec 2020 14:25) (102/66 - 110/68)  BP(mean): --  RR: 18 (19 Dec 2020 14:25) (18 - 20)  SpO2: 99% (19 Dec 2020 14:25) (97% - 99%)

## 2020-12-19 NOTE — PROGRESS NOTE ADULT - PROBLEM SELECTOR PLAN 8
DVT ppx: Lovenox   Diet: Pleasure feeds w/ TFs  Dispo: Pt and family interested in acute rehab facility  Code Status: DNR/DNI

## 2020-12-19 NOTE — BEHAVIORAL HEALTH ASSESSMENT NOTE - NSBHREFERDETAILS_PSY_A_CORE_FT
Psychiatry consulted for worsening anxiety/depression in the setting of long-term hospitalization and worsening medical illness

## 2020-12-19 NOTE — BEHAVIORAL HEALTH ASSESSMENT NOTE - NSBHCHARTREVIEWLAB_PSY_A_CORE FT
7.2    10.81 )-----------( 694      ( 19 Dec 2020 07:08 )             24.5   12-19    133<L>  |  96  |  12  ----------------------------<  123<H>  4.1   |  27  |  <0.30<L>    Ca    8.3<L>      19 Dec 2020 07:08  Phos  3.1     12-19  Mg     1.7     12-19

## 2020-12-19 NOTE — PROGRESS NOTE ADULT - ATTENDING COMMENTS
Patient seen and examined, agree with resident note.   This is a 65F with h/o emphysema on home 2L NC, colon CA s/p distal ileum resection in 2016, ESBL UTI, Zenker's diverticulum s/p repair in 2019 c/b vocal cord paralysis req G-J tube for feeding, RA, gastroparesis, GERD, anemia presenting from home hospice for weakness ,found to have RLL pneumonia (likely due to aspiration). Also found to have fluid collections in the hip.     1. Coag neg staph bacteremia: staph epi and staph pettenkoferi, resolved sepsis – last + 12/10  2 Suspected aspiration pneumonia  3. Presacral fluid collection, possible infectious  4. Dysphagia, vocal cord paralysis, s/p G-J tube  5. h/o Ca colon, s/p distal ileum resection in 2016    - Team discussed with patient and daughter, invasive procedures not in line with overall GOC  - ID recommendations appreciated  - Psych c/s for anxiety     DNR/DNI, patient and daughter do not want hospice.   PT

## 2020-12-19 NOTE — PROGRESS NOTE ADULT - ASSESSMENT
leaking GJ tube  colonic leak  diarrhea  ? anastomotic leak    no further leakage at tube  cont tube feeds  suspect recurrent colon cancer at anastamosis, no plan for endoscopic evaluation as she is not candidate for DMT or surgery  may need to r/o c diff if diarrhea persist   overall prognosis is grim

## 2020-12-19 NOTE — BEHAVIORAL HEALTH ASSESSMENT NOTE - HPI (INCLUDE ILLNESS QUALITY, SEVERITY, DURATION, TIMING, CONTEXT, MODIFYING FACTORS, ASSOCIATED SIGNS AND SYMPTOMS)
65F w/ emphysema on home 2L NC, colon CA s/p distal ileum resection 6in 2016, ESBL UTI, zenker's diverticulum s/p repair in 2019 c/b vocal cord paralysis req GJ tube for feeding, RA, gastroparesis, GERD, anemia presenting from home hospice for weakness likely in the setting of malnutrition vs infection 2/2 presacral fluid collection vs RLL Aspiration PNA, psychiatry consulted for medication management of the patient's longstanding anxiety and depression    The patient's daughter was at bedside and corroborated all details; She is HCP and is a pediatric RN in [place], DNR/DNI. Palliative is following this patient.     On interview today the patient was friendly and cooperative, AOx3. Her and her daughter explained that prior to the decompensation in her health she was taking Fluoxetine 20mg for 2 years, and Cymbalta was recently added to her regimen. She currently has a J-tube in place and has been unable to take cymbalta for some time. She reports worsening depression during this time, including depressed mood, poor sleep, accompanied by AH/VH; she describes seeing people who aren't there, feeling like she is being wrestled when being changes, and has reported seeing squirrels in her room. The patient reports previously taking Abilify which was effective, but she developed parkinsonian symptoms prompting discontinuation. At this time the patient describes having hope that her condition will improve. She denies any prior psychiatric hospitalizations, denies any history of SA, and has no current SI/HI. 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 Denies other drug use.

## 2020-12-19 NOTE — PROGRESS NOTE ADULT - SUBJECTIVE AND OBJECTIVE BOX
NOTE INCOMPLETE/ IN PROGRESS    PROGRESS NOTE:   Authored by Dr. Cate Vaughan MD  Pager [facility]: [phone]; [facility]: [phone]     Patient is a 65y old  Female who presents with a chief complaint of Weakness (18 Dec 2020 16:56)      SUBJECTIVE / OVERNIGHT EVENTS:    ADDITIONAL REVIEW OF SYSTEMS:    MEDICATIONS  (STANDING):  acetaminophen  IVPB .. 1000 milliGRAM(s) IV Intermittent once  DAPTOmycin IVPB 350 milliGRAM(s) IV Intermittent every 24 hours  enoxaparin Injectable 40 milliGRAM(s) SubCutaneous daily  famotidine    Tablet 20 milliGRAM(s) Oral two times a day  FLUoxetine Solution 20 milliGRAM(s) Oral daily  gabapentin   Solution 200 milliGRAM(s) Oral three times a day  methadone    Tablet 30 milliGRAM(s) Oral every 12 hours  multivitamin 1 Tablet(s) Oral daily  piperacillin/tazobactam IVPB.. 3.375 Gram(s) IV Intermittent every 8 hours  simethicone 80 milliGRAM(s) Chew once  thiamine 100 milliGRAM(s) Oral daily    MEDICATIONS  (PRN):  acetaminophen   Tablet .. 650 milliGRAM(s) Oral every 6 hours PRN Mild Pain (1 - 3), Moderate Pain (4 - 6)  ALBUTerol    90 MICROgram(s) HFA Inhaler 2 Puff(s) Inhalation every 6 hours PRN Shortness of Breath and/or Wheezing  HYDROmorphone   Tablet 2 milliGRAM(s) Oral every 4 hours PRN Severe Pain (7 - 10)  LORazepam   Injectable 0.5 milliGRAM(s) IV Push two times a day PRN Anxiety  polyethylene glycol 3350 17 Gram(s) Oral daily PRN Constipation  senna 2 Tablet(s) Oral at bedtime PRN Constipation  traMADol 25 milliGRAM(s) Oral every 6 hours PRN Moderate Pain (4 - 6)      CAPILLARY BLOOD GLUCOSE      POCT Blood Glucose.: 122 mg/dL (19 Dec 2020 06:34)  POCT Blood Glucose.: 139 mg/dL (19 Dec 2020 00:27)  POCT Blood Glucose.: 107 mg/dL (18 Dec 2020 18:02)  POCT Blood Glucose.: 133 mg/dL (18 Dec 2020 12:17)  POCT Blood Glucose.: 80 mg/dL (18 Dec 2020 08:51)    I&O's Summary    18 Dec 2020 07:01  -  19 Dec 2020 07:00  --------------------------------------------------------  IN: 2070 mL / OUT: 1900 mL / NET: 170 mL        PHYSICAL EXAM:  Vital Signs Last 24 Hrs  T(C): 36.6 (19 Dec 2020 05:05), Max: 37.1 (18 Dec 2020 21:24)  T(F): 97.9 (19 Dec 2020 05:05), Max: 98.8 (18 Dec 2020 21:24)  HR: 83 (19 Dec 2020 05:05) (78 - 85)  BP: 109/65 (19 Dec 2020 05:05) (102/66 - 109/65)  BP(mean): --  RR: 20 (19 Dec 2020 05:05) (18 - 20)  SpO2: 99% (19 Dec 2020 05:05) (97% - 99%)    GEN: Bed bound, cachectic appearing, more alert  CHEST/LUNGS: CTABL, no crackles or rales noted  CARDIAC: RRR no M/R/G  ABDOMEN: Non-tender, non-distended, normoactive BS, GJ tube in LUQ, no drainage, or skin changes around site  : goldman catheter in place  MSK: No edema, no gross deformity of extremities.  SKIN: No rashes, no petechiae, no vesicles  NEURO: More awake and alert today. Following commands. Hoarse voice.   PSYCH: A&Ox3 today (name, month/ year, hospital)     LABS:                        7.3    9.39  )-----------( 619      ( 18 Dec 2020 07:16 )             24.3     12-18    133<L>  |  97  |  13  ----------------------------<  77  4.0   |  28  |  0.32<L>    Ca    8.1<L>      18 Dec 2020 07:16  Phos  3.1     12-18  Mg     1.7     12-18        CARDIAC MARKERS ( 18 Dec 2020 07:16 )  x     / x     / 10 U/L / x     / x                RADIOLOGY & ADDITIONAL TESTS:  Results Reviewed:   Imaging Personally Reviewed:  Electrocardiogram Personally Reviewed:    COORDINATION OF CARE:  Care Discussed with Consultants/Other Providers [Y/N]:  Prior or Outpatient Records Reviewed [Y/N]:   PROGRESS NOTE:   Authored by Dr. Cate Vaughan MD Pager [facility]: [phone]; [facility]: [phone]     Patient is a 65y old  Female who presents with a chief complaint of Weakness (18 Dec 2020 16:56)      SUBJECTIVE / OVERNIGHT EVENTS:  Pt w/ no acute complaints this AM. Today, requesting goldman removal. Per daughter, prior to hospice, pt never had goldman. Undergoing TOV later today. Psych also c/s per pt and daughter request as was PM&R given dispo goal of acute rehab. PICC line for long-term antibiotics d/w pt and daughter, and they are also interested in pursuing this option.     MEDICATIONS  (STANDING):  acetaminophen  IVPB .. 1000 milliGRAM(s) IV Intermittent once  DAPTOmycin IVPB 350 milliGRAM(s) IV Intermittent every 24 hours  enoxaparin Injectable 40 milliGRAM(s) SubCutaneous daily  famotidine    Tablet 20 milliGRAM(s) Oral two times a day  FLUoxetine Solution 20 milliGRAM(s) Oral daily  gabapentin   Solution 200 milliGRAM(s) Oral three times a day  methadone    Tablet 30 milliGRAM(s) Oral every 12 hours  multivitamin 1 Tablet(s) Oral daily  piperacillin/tazobactam IVPB.. 3.375 Gram(s) IV Intermittent every 8 hours  simethicone 80 milliGRAM(s) Chew once  thiamine 100 milliGRAM(s) Oral daily    MEDICATIONS  (PRN):  acetaminophen   Tablet .. 650 milliGRAM(s) Oral every 6 hours PRN Mild Pain (1 - 3), Moderate Pain (4 - 6)  ALBUTerol    90 MICROgram(s) HFA Inhaler 2 Puff(s) Inhalation every 6 hours PRN Shortness of Breath and/or Wheezing  HYDROmorphone   Tablet 2 milliGRAM(s) Oral every 4 hours PRN Severe Pain (7 - 10)  LORazepam   Injectable 0.5 milliGRAM(s) IV Push two times a day PRN Anxiety  polyethylene glycol 3350 17 Gram(s) Oral daily PRN Constipation  senna 2 Tablet(s) Oral at bedtime PRN Constipation  traMADol 25 milliGRAM(s) Oral every 6 hours PRN Moderate Pain (4 - 6)      CAPILLARY BLOOD GLUCOSE  POCT Blood Glucose.: 122 mg/dL (19 Dec 2020 06:34)  POCT Blood Glucose.: 139 mg/dL (19 Dec 2020 00:27)  POCT Blood Glucose.: 107 mg/dL (18 Dec 2020 18:02)  POCT Blood Glucose.: 133 mg/dL (18 Dec 2020 12:17)  POCT Blood Glucose.: 80 mg/dL (18 Dec 2020 08:51)    I&O's Summary    18 Dec 2020 07:01  -  19 Dec 2020 07:00  --------------------------------------------------------  IN: 2070 mL / OUT: 1900 mL / NET: 170 mL        PHYSICAL EXAM:  Vital Signs Last 24 Hrs  T(C): 36.6 (19 Dec 2020 05:05), Max: 37.1 (18 Dec 2020 21:24)  T(F): 97.9 (19 Dec 2020 05:05), Max: 98.8 (18 Dec 2020 21:24)  HR: 83 (19 Dec 2020 05:05) (78 - 85)  BP: 109/65 (19 Dec 2020 05:05) (102/66 - 109/65)  BP(mean): --  RR: 20 (19 Dec 2020 05:05) (18 - 20)  SpO2: 99% (19 Dec 2020 05:05) (97% - 99%)    GEN: Bed bound, cachectic appearing, more alert  CHEST/LUNGS: CTABL, no crackles or rales noted  CARDIAC: RRR no M/R/G  ABDOMEN: Non-tender, non-distended, normoactive BS, GJ tube in LUQ, no drainage, or skin changes around site  : goldman catheter in place  MSK: No edema, no gross deformity of extremities.  SKIN: No rashes, no petechiae, no vesicles  NEURO: More awake and alert today. Following commands. Hoarse voice.   PSYCH: A&Ox3 today (name, month/ year, hospital)     LABS:                        7.3    9.39  )-----------( 619      ( 18 Dec 2020 07:16 )             24.3     12-18    133<L>  |  97  |  13  ----------------------------<  77  4.0   |  28  |  0.32<L>    Ca    8.1<L>      18 Dec 2020 07:16  Phos  3.1     12-18  Mg     1.7     12-18        CARDIAC MARKERS ( 18 Dec 2020 07:16 )  x     / x     / 10 U/L / x     / x                RADIOLOGY & ADDITIONAL TESTS:  Results Reviewed:   Imaging Personally Reviewed:  Electrocardiogram Personally Reviewed:    COORDINATION OF CARE:  Care Discussed with Consultants/Other Providers [Y/N]:  Prior or Outpatient Records Reviewed [Y/N]:

## 2020-12-19 NOTE — BEHAVIORAL HEALTH ASSESSMENT NOTE - SUMMARY
65F w/ emphysema on home 2L NC, colon CA s/p distal ileum resection 6in 2016, ESBL UTI, zenker's diverticulum s/p repair in 2019 c/b vocal cord paralysis req GJ tube for feeding, RA, gastroparesis, GERD, anemia presenting from home hospice for weakness likely in the setting of malnutrition vs infection 2/2 presacral fluid collection vs RLL Aspiration PNA, psychiatry consulted for medication management of the patient's longstanding anxiety and depression.    The patient reports worsening of depressive symptoms with intermittent hallucinations. She has no history of AH/VH in the past, no prior psychiatric hospitalizations. Abilify was an effective treatment but not tolerated. 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 She is currently in the midst of severe medical illness which could also be a contributing factor to her current presentation. 65F w/ emphysema on home 2L NC, colon CA s/p distal ileum resection 6in 2016, ESBL UTI, zenker's diverticulum s/p repair in 2019 c/b vocal cord paralysis req GJ tube for feeding, RA, gastroparesis, GERD, anemia presenting from home hospice for weakness likely in the setting of malnutrition vs infection 2/2 presacral fluid collection vs RLL Aspiration PNA, psychiatry consulted for medication management of the patient's longstanding anxiety and depression.    The patient reports worsening of depressive symptoms with intermittent hallucinations. She has no history of AH/VH in the past, no prior psychiatric hospitalizations. Abilify was an effective treatment but not tolerated. 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 She is currently in the midst of severe medical illness which could also be a contributing factor to her current presentation.  Pt may benefit from small doses of Seroquel 12.5mg for the hallucinations which would not cause worsening of her movement disorder (tardive dyskinesia)

## 2020-12-19 NOTE — PROGRESS NOTE ADULT - SUBJECTIVE AND OBJECTIVE BOX
[facility]  Santosh Stapleton PA-C  [address] [place] [address]  [phone]      INTERVAL HPI/OVERNIGHT EVENTS:    sitting in chair  tolerating po diet and supplemental tube feeds  no leakage at gastrostomy     MEDICATIONS  (STANDING):  acetaminophen  IVPB .. 1000 milliGRAM(s) IV Intermittent once  DAPTOmycin IVPB 350 milliGRAM(s) IV Intermittent every 24 hours  enoxaparin Injectable 40 milliGRAM(s) SubCutaneous daily  famotidine    Tablet 20 milliGRAM(s) Oral two times a day  FLUoxetine Solution 20 milliGRAM(s) Oral daily  gabapentin   Solution 200 milliGRAM(s) Oral three times a day  methadone    Tablet 30 milliGRAM(s) Oral every 12 hours  multivitamin 1 Tablet(s) Oral daily  piperacillin/tazobactam IVPB.. 3.375 Gram(s) IV Intermittent every 8 hours  simethicone 80 milliGRAM(s) Chew once  thiamine 100 milliGRAM(s) Oral daily    MEDICATIONS  (PRN):  acetaminophen   Tablet .. 650 milliGRAM(s) Oral every 6 hours PRN Mild Pain (1 - 3), Moderate Pain (4 - 6)  ALBUTerol    90 MICROgram(s) HFA Inhaler 2 Puff(s) Inhalation every 6 hours PRN Shortness of Breath and/or Wheezing  HYDROmorphone   Tablet 2 milliGRAM(s) Oral every 4 hours PRN Severe Pain (7 - 10)  LORazepam   Injectable 0.5 milliGRAM(s) IV Push two times a day PRN Anxiety  polyethylene glycol 3350 17 Gram(s) Oral daily PRN Constipation  senna 2 Tablet(s) Oral at bedtime PRN Constipation  traMADol 25 milliGRAM(s) Oral every 6 hours PRN Moderate Pain (4 - 6)      Allergies    Levaquin (Unknown)  levofloxacin (Unknown)    Intolerances        ROS:   unable to obtain      PHYSICAL EXAM:   Vital Signs:  Vital Signs Last 24 Hrs  T(C): 36.9 (19 Dec 2020 14:25), Max: 37.1 (18 Dec 2020 21:24)  T(F): 98.4 (19 Dec 2020 14:25), Max: 98.8 (18 Dec 2020 21:24)  HR: 83 (19 Dec 2020 05:05) (83 - 85)  BP: 110/68 (19 Dec 2020 14:25) (102/66 - 110/68)  BP(mean): --  RR: 18 (19 Dec 2020 14:25) (18 - 20)  SpO2: 99% (19 Dec 2020 14:25) (97% - 99%)  Daily     Daily     nad  frail  non toxic  soft, nt +gastrostomy  no edema        LABS:                        7.2    10.81 )-----------( 694      ( 19 Dec 2020 07:08 )             24.5     12-19    133<L>  |  96  |  12  ----------------------------<  123<H>  4.1   |  27  |  <0.30<L>    Ca    8.3<L>      19 Dec 2020 07:08  Phos  3.1     12-19  Mg     1.7     12-19            RADIOLOGY & ADDITIONAL TESTS:

## 2020-12-19 NOTE — BEHAVIORAL HEALTH ASSESSMENT NOTE - CASE SUMMARY
Pt is a 65yr old woman w/ emphysema on home 2L NC, colon CA s/p distal ileum resection 6in 2016, ESBL UTI, zenker's diverticulum s/p repair in 2019 c/b vocal cord paralysis req GJ tube for feeding, RA, gastroparesis, GERD, anemia presenting from home hospice for weakness likely in the setting of malnutrition vs infection 2/2 presacral fluid collection vs RLL Aspiration PNA, psychiatry consulted for medication management of the patient's longstanding anxiety and depression.    The patient reports worsening of depressive symptoms with intermittent hallucinations. She has no history of AH/VH in the past, no prior psychiatric hospitalizations. Abilify was an effective treatment but not tolerated. 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  I agree that pt may benefit from small doses of Seroquel 12.5mg for the hallucinations which would not cause worsening of her movement disorder (tardive dyskinesia).

## 2020-12-19 NOTE — BEHAVIORAL HEALTH ASSESSMENT NOTE - NSBHCHARTREVIEWINVESTIGATE_PSY_A_CORE FT
Ventricular Rate 94 BPM    Atrial Rate 94 BPM    P-R Interval 136 ms    QRS Duration 76 ms    Q-T Interval 384 ms    QTC Calculation(Bazett) 480 ms    P Axis 33 degrees    R Axis -42 degrees    T Axis -7 degrees

## 2020-12-20 NOTE — PROGRESS NOTE ADULT - SUBJECTIVE AND OBJECTIVE BOX
NOTE INCOMPLETE/ IN PROGRESS    PROGRESS NOTE:   Authored by Dr. Cate Vaughan MD  Pager [facility]: [phone]; [facility]: [id number]     Patient is a 65y old  Female who presents with a chief complaint of Weakness (19 Dec 2020 15:44)      SUBJECTIVE / OVERNIGHT EVENTS:    ADDITIONAL REVIEW OF SYSTEMS:    MEDICATIONS  (STANDING):  acetaminophen  IVPB .. 1000 milliGRAM(s) IV Intermittent once  DAPTOmycin IVPB 350 milliGRAM(s) IV Intermittent every 24 hours  enoxaparin Injectable 40 milliGRAM(s) SubCutaneous daily  famotidine    Tablet 20 milliGRAM(s) Oral two times a day  FLUoxetine Solution 20 milliGRAM(s) Oral daily  gabapentin   Solution 200 milliGRAM(s) Oral three times a day  methadone    Tablet 30 milliGRAM(s) Oral every 12 hours  multivitamin 1 Tablet(s) Oral daily  piperacillin/tazobactam IVPB.. 3.375 Gram(s) IV Intermittent every 8 hours  thiamine 100 milliGRAM(s) Oral daily    MEDICATIONS  (PRN):  acetaminophen   Tablet .. 650 milliGRAM(s) Oral every 6 hours PRN Mild Pain (1 - 3), Moderate Pain (4 - 6)  ALBUTerol    90 MICROgram(s) HFA Inhaler 2 Puff(s) Inhalation every 6 hours PRN Shortness of Breath and/or Wheezing  HYDROmorphone   Tablet 2 milliGRAM(s) Oral every 4 hours PRN Severe Pain (7 - 10)  LORazepam   Injectable 0.5 milliGRAM(s) IV Push two times a day PRN Anxiety  polyethylene glycol 3350 17 Gram(s) Oral daily PRN Constipation  senna 2 Tablet(s) Oral at bedtime PRN Constipation  traMADol 25 milliGRAM(s) Oral every 6 hours PRN Moderate Pain (4 - 6)      CAPILLARY BLOOD GLUCOSE      POCT Blood Glucose.: 121 mg/dL (20 Dec 2020 06:27)  POCT Blood Glucose.: 115 mg/dL (19 Dec 2020 23:57)  POCT Blood Glucose.: 115 mg/dL (19 Dec 2020 13:27)    I&O's Summary    19 Dec 2020 07:01  -  20 Dec 2020 07:00  --------------------------------------------------------  IN: 1010 mL / OUT: 400 mL / NET: 610 mL        PHYSICAL EXAM:  Vital Signs Last 24 Hrs  T(C): 36.8 (20 Dec 2020 05:28), Max: 37.1 (19 Dec 2020 21:54)  T(F): 98.2 (20 Dec 2020 05:28), Max: 98.8 (19 Dec 2020 21:54)  HR: 90 (20 Dec 2020 05:28) (81 - 90)  BP: 102/61 (20 Dec 2020 05:28) (102/61 - 110/68)  BP(mean): --  RR: 20 (20 Dec 2020 05:28) (18 - 20)  SpO2: 98% (20 Dec 2020 05:28) (96% - 99%)    GEN: Bed bound, cachectic appearing, more alert  CHEST/LUNGS: CTABL, no crackles or rales noted  CARDIAC: RRR no M/R/G  ABDOMEN: Non-tender, non-distended, normoactive BS, GJ tube in LUQ, no drainage, or skin changes around site  : goldman catheter in place  MSK: No edema, no gross deformity of extremities.  SKIN: No rashes, no petechiae, no vesicles  NEURO: More awake and alert today. Following commands. Hoarse voice.   PSYCH: A&Ox3 today (name, month/ year, hospital)     LABS:                        8.1    16.19 )-----------( 756      ( 20 Dec 2020 06:39 )             26.8     12-20    131<L>  |  95<L>  |  16  ----------------------------<  108<H>  4.5   |  25  |  0.40<L>    Ca    8.4      20 Dec 2020 06:40  Phos  3.3     12-20  Mg     1.7     12-20                  RADIOLOGY & ADDITIONAL TESTS:  Results Reviewed:   Imaging Personally Reviewed:  Electrocardiogram Personally Reviewed:    COORDINATION OF CARE:  Care Discussed with Consultants/Other Providers [Y/N]:  Prior or Outpatient Records Reviewed [Y/N]:   PROGRESS NOTE:   Authored by Dr. Cate Vaughan MD  Pager [facility]: [phone]; [facility]: [id number]     Patient is a 65y old  Female who presents with a chief complaint of Weakness (19 Dec 2020 15:44)      SUBJECTIVE / OVERNIGHT EVENTS:  Pt seen this AM, happily eating her breakfast. Discussed starting seroquel qhs and her psych consult, which pt expressed tremendous appreciation for. Will f/u on PM&R recs, and then planning for PICC and possible d/c later in the week.     MEDICATIONS  (STANDING):  acetaminophen  IVPB .. 1000 milliGRAM(s) IV Intermittent once  DAPTOmycin IVPB 350 milliGRAM(s) IV Intermittent every 24 hours  enoxaparin Injectable 40 milliGRAM(s) SubCutaneous daily  famotidine    Tablet 20 milliGRAM(s) Oral two times a day  FLUoxetine Solution 20 milliGRAM(s) Oral daily  gabapentin   Solution 200 milliGRAM(s) Oral three times a day  methadone    Tablet 30 milliGRAM(s) Oral every 12 hours  multivitamin 1 Tablet(s) Oral daily  piperacillin/tazobactam IVPB.. 3.375 Gram(s) IV Intermittent every 8 hours  thiamine 100 milliGRAM(s) Oral daily    MEDICATIONS  (PRN):  acetaminophen   Tablet .. 650 milliGRAM(s) Oral every 6 hours PRN Mild Pain (1 - 3), Moderate Pain (4 - 6)  ALBUTerol    90 MICROgram(s) HFA Inhaler 2 Puff(s) Inhalation every 6 hours PRN Shortness of Breath and/or Wheezing  HYDROmorphone   Tablet 2 milliGRAM(s) Oral every 4 hours PRN Severe Pain (7 - 10)  LORazepam   Injectable 0.5 milliGRAM(s) IV Push two times a day PRN Anxiety  polyethylene glycol 3350 17 Gram(s) Oral daily PRN Constipation  senna 2 Tablet(s) Oral at bedtime PRN Constipation  traMADol 25 milliGRAM(s) Oral every 6 hours PRN Moderate Pain (4 - 6)      CAPILLARY BLOOD GLUCOSE      POCT Blood Glucose.: 121 mg/dL (20 Dec 2020 06:27)  POCT Blood Glucose.: 115 mg/dL (19 Dec 2020 23:57)  POCT Blood Glucose.: 115 mg/dL (19 Dec 2020 13:27)    I&O's Summary    19 Dec 2020 07:01  -  20 Dec 2020 07:00  --------------------------------------------------------  IN: 1010 mL / OUT: 400 mL / NET: 610 mL        PHYSICAL EXAM:  Vital Signs Last 24 Hrs  T(C): 36.8 (20 Dec 2020 05:28), Max: 37.1 (19 Dec 2020 21:54)  T(F): 98.2 (20 Dec 2020 05:28), Max: 98.8 (19 Dec 2020 21:54)  HR: 90 (20 Dec 2020 05:28) (81 - 90)  BP: 102/61 (20 Dec 2020 05:28) (102/61 - 110/68)  BP(mean): --  RR: 20 (20 Dec 2020 05:28) (18 - 20)  SpO2: 98% (20 Dec 2020 05:28) (96% - 99%)    GEN: Bed bound, cachectic appearing, more alert  CHEST/LUNGS: CTABL, no crackles or rales noted  CARDIAC: RRR no M/R/G  ABDOMEN: Non-tender, non-distended, normoactive BS, GJ tube in LUQ, no drainage, or skin changes around site  : goldman catheter in place  MSK: No edema, no gross deformity of extremities.  SKIN: No rashes, no petechiae, no vesicles  NEURO: More awake and alert today. Following commands. Hoarse voice.   PSYCH: A&Ox3 today (name, month/ year, hospital)     LABS:                        8.1    16.19 )-----------( 756      ( 20 Dec 2020 06:39 )             26.8     12-20    131<L>  |  95<L>  |  16  ----------------------------<  108<H>  4.5   |  25  |  0.40<L>    Ca    8.4      20 Dec 2020 06:40  Phos  3.3     12-20  Mg     1.7     12-20                  RADIOLOGY & ADDITIONAL TESTS:  Results Reviewed:   Imaging Personally Reviewed:  Electrocardiogram Personally Reviewed:    COORDINATION OF CARE:  Care Discussed with Consultants/Other Providers [Y/N]:  Prior or Outpatient Records Reviewed [Y/N]:

## 2020-12-20 NOTE — PROGRESS NOTE BEHAVIORAL HEALTH - SUMMARY
65F w/ emphysema on home 2L NC, colon CA s/p distal ileum resection 6in 2016, ESBL UTI, zenker's diverticulum s/p repair in 2019 c/b vocal cord paralysis req GJ tube for feeding, RA, gastroparesis, GERD, anemia presenting from home hospice for weakness likely in the setting of malnutrition vs infection 2/2 presacral fluid collection vs RLL Aspiration PNA, psychiatry consulted for medication management of the patient's longstanding anxiety and depression.    The patient reports worsening of depressive symptoms with intermittent hallucinations. She has no history of AH/VH in the past, no prior psychiatric hospitalizations. Abilify was an effective treatment but not tolerated. 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 She is currently in the midst of severe medical illness which could also be a contributing factor to her current presentation.  Pt may benefit from small doses of Seroquel 12.5mg for the hallucinations which would not cause worsening of her movement disorder (tardive dyskinesia)

## 2020-12-20 NOTE — PROGRESS NOTE BEHAVIORAL HEALTH - NSBHCHARTREVIEWVS_PSY_A_CORE FT
Vital Signs Last 24 Hrs  T(C): 36.7 (20 Dec 2020 14:12), Max: 37.1 (19 Dec 2020 21:54)  T(F): 98 (20 Dec 2020 14:12), Max: 98.8 (19 Dec 2020 21:54)  HR: 84 (20 Dec 2020 14:12) (81 - 90)  BP: 108/65 (20 Dec 2020 14:12) (102/61 - 108/65)  BP(mean): --  RR: 20 (20 Dec 2020 14:12) (20 - 20)  SpO2: 98% (20 Dec 2020 14:12) (96% - 98%)

## 2020-12-20 NOTE — DISCHARGE NOTE PROVIDER - HOSPITAL COURSE
64yo F w/ PMH of emphysema on home 2L NC, colon CA s/p distal ileum resection in 2016, ESBL UTI, zenker's diverticulum s/p repair in 2019 c/b vocal cord paralysis req GJ tube for feeding, RA, gastroparesis, GERD, anemia presenting from home hospice for weakness.     Pt was previously admitted to [facility] from 11/27 to 12/3 for which she initially presented w/ leaking PEG tube and noted L hip ill-defined fluid collection w/ no surgical intervention at that time apart from I&D w/ minimal fluid output. During that hospitalization pt had had a CT A/P which showed a leak from the rectosigmoid anastomosis from her previous operation w/ enterococcus bacteremia requiring a MICU stay for vasopressor support in the setting of septic shock. While there pt continued to deteriorate and discussion was had w/ pt's daughter who is the healthcare surrogate and the decision was made to pursue comfort measures and home hospice.     At home hospice pt continued to improve despite prior discussion of rapid deterioration. She was then restarted on Zosyn for abx coverage and allowed PO intake. Per daughter and hospice physician they decided pt improving and to present to the hospital for continued management w/ IV abx and fluids if needed. Would like to re-puruse hospice care afterwards if warranted/    In the ED Tmax 99.1, HR 92, BP 96/81, satting 99% on 5L NC. Labs notable for leukocytosis to 18 neutrophil predominant, and an anemia to 7 w/ UA showing mod ketones and protein. CT A/P done showing possible RLL pna and ill defined gas containing presacral fluid collection which tracks along the L hip joint. CXR pulm congestion.    She recieved a dose of Vanc/Zosyn, 2L IVF   64yo F w/ PMH of emphysema on home 2L NC, colon CA s/p distal ileum resection in 2016, ESBL UTI, zenker's diverticulum s/p repair in 2019 c/b vocal cord paralysis req GJ tube for feeding, RA, gastroparesis, GERD, anemia presenting from home hospice for weakness.     Pt was previously admitted to [facility] from 11/27 to 12/3 for which she initially presented w/ leaking PEG tube and noted L hip ill-defined fluid collection w/ no surgical intervention at that time apart from I&D w/ minimal fluid output. During that hospitalization pt had had a CT A/P which showed a leak from the rectosigmoid anastomosis from her previous operation w/ enterococcus bacteremia requiring a MICU stay for vasopressor support in the setting of septic shock. While there pt continued to deteriorate and discussion was had w/ pt's daughter who is the healthcare surrogate and the decision was made to pursue comfort measures and home hospice.     At home hospice pt continued to improve despite prior discussion of rapid deterioration. She was then restarted on Zosyn for abx coverage and allowed PO intake. Per daughter and hospice physician they decided pt improving and to present to the hospital for continued management w/ IV abx and fluids if needed. Would like to re-pursue hospice care afterwards if warranted. In the ED Tmax 99.1, HR 92, BP 96/81, satting 99% on 5L NC. Labs notable for leukocytosis to 18 neutrophil predominant, and an anemia to 7 w/ UA showing mod ketones and protein. CT A/P done showing possible RLL pna and ill defined gas containing presacral fluid collection which tracks along the L hip joint. CXR pulm congestion.    She recieved a dose of Vanc/Zosyn, 2L IVF. Decision was made to admit the patient for further workup and management.     During her hospitalization, ID was consulted, and given her history of VRE, she was switched to daptomycin and zosyn to cover both the fluid collection and aspiration pneumonia. IR was consulted, but stated that the fluid collection seen on imaging was too small to drain. Pt's mental status improved to AAOx4 from lawson initial AAOx0 while getting antibiotic treatment. Pt and family indicated that surgical intervention for the fluid drain was not within their goals of care. ENT was consulted, as the pt had vocal cord injections in the past. However, ENT did not deem the pt to be suitable for inpatient injection and recommended outpatient injection instead, if possible. Pt failed S&S and MBS evaluation, and was recommended to remain NPO with only tube feeds. However, pt and family were agreeable to pleasure feeds and endorsed understanding of risks of aspiration and death, so pt's diet was advanced. As pt continued to improve and CEA antigen and imaging did not show evidence of recurrence of cancer, goals of care changed. [facility] was involved in helping to clarify, and pt and family chose dispo to acute rehab/ long term living facility. Psychiatry was also consulted, as pt had history of anxiety and depression, and wanted to speak to a psychiatrist for better management given new hallucinations and more depressed mood, and she was started on seroquel 12.5 mg qhs. Pt was seen by PM&R who recommended ____. Pt had a PICC line placed for long term antibiotic treatment.    64yo F w/ PMH of emphysema on home 2L NC, colon CA s/p distal ileum resection in 2016, ESBL UTI, zenker's diverticulum s/p repair in 2019 c/b vocal cord paralysis req GJ tube for feeding, RA, gastroparesis, GERD, anemia presenting from home hospice for weakness.     Pt was previously admitted to [facility] from 11/27 to 12/3 for which she initially presented w/ leaking PEG tube and noted L hip ill-defined fluid collection w/ no surgical intervention at that time apart from I&D w/ minimal fluid output. During that hospitalization pt had had a CT A/P which showed a leak from the rectosigmoid anastomosis from her previous operation w/ enterococcus bacteremia requiring a MICU stay for vasopressor support in the setting of septic shock. While there pt continued to deteriorate and discussion was had w/ pt's daughter who is the healthcare surrogate and the decision was made to pursue comfort measures and home hospice.     At home hospice pt continued to improve despite prior discussion of rapid deterioration. She was then restarted on Zosyn for abx coverage and allowed PO intake. Per daughter and hospice physician they decided pt improving and to present to the hospital for continued management w/ IV abx and fluids if needed. Would like to re-pursue hospice care afterwards if warranted. In the ED Tmax 99.1, HR 92, BP 96/81, satting 99% on 5L NC. Labs notable for leukocytosis to 18 neutrophil predominant, and an anemia to 7 w/ UA showing mod ketones and protein. CT A/P done showing possible RLL pna and ill defined gas containing presacral fluid collection which tracks along the L hip joint. CXR pulm congestion.    She recieved a dose of Vanc/Zosyn, 2L IVF. Decision was made to admit the patient for further workup and management.     During her hospitalization, ID was consulted, and given her history of VRE, she was switched to daptomycin and zosyn to cover both the fluid collection and aspiration pneumonia. IR was consulted, but stated that the fluid collection seen on imaging was too small to drain. Pt's mental status improved to AAOx4 from lawson initial AAOx0 while getting antibiotic treatment. Pt and family indicated that surgical intervention for the fluid drain was not within their goals of care. ENT was consulted, as the pt had vocal cord injections in the past. However, ENT did not deem the pt to be suitable for inpatient injection and recommended outpatient injection instead, if possible. Pt failed S&S and MBS evaluation, and was recommended to remain NPO with only tube feeds. However, pt and family were agreeable to pleasure feeds and endorsed understanding of risks of aspiration and death, so pt's diet was advanced. As pt continued to improve and CEA antigen and imaging did not show evidence of recurrence of cancer, goals of care changed. [facility] was involved in helping to clarify, and pt and family chose dispo to acute rehab/ long term living facility. Psychiatry was also consulted, as pt had history of anxiety and depression, and wanted to speak to a psychiatrist for better management given new hallucinations and more depressed mood, and she was started on seroquel 12.5 mg qhs.     Pt clinically declined and was transferred to PCU for end of life care and symptom management. Pt with PEG however increased secretions from it, unable to tolerate PO medications. Pt was on Methadone 30mg and after a few days with withdrawal symptoms, pt converted to Dilaudid gtt given increased PRN usage. Pt completed Dapto for VRE bacteremia and Zosyn for asp PNA. Pt then transferred to inpatient hospice at [facility].

## 2020-12-20 NOTE — DISCHARGE NOTE PROVIDER - NSDCMRMEDTOKEN_GEN_ALL_CORE_FT
Ativan 0.5 mg oral tablet: 1 tab(s) orally every 3 hours, As Needed for agitation  Cymbalta 30 mg oral delayed release capsule: 1 cap(s) orally once a day  gabapentin 100 mg oral capsule: 2 cap(s) orally 3 times a day  Humira:   ipratropium-albuterol 20 mcg-100 mcg/inh inhalation aerosol: 1 puff(s) inhaled 4 times a day  methadone 10 mg oral tablet: 3 tab(s) orally every 12 hours  methadone 10 mg oral tablet: 3 tab(s) orally every 12 hours  morphine 2 mg/mL injectable solution: 2 milligram(s) injectable every 1 to 2 hours, As Needed for SOB   Pepcid 20 mg oral tablet: 1 tab(s) orally 2 times a day  PROzac 20 mg oral capsule: 1 cap(s) orally once a day  Spiriva 18 mcg inhalation capsule: 1 cap(s) inhaled once a day  Zosyn 3 g-0.375 g intravenous injection: 3.375 milligram(s) intravenous every 6 hours   acetaminophen 650 mg rectal suppository: 1 suppository(ies) rectal every 6 hours, As needed, Temp greater or equal to 38C (100.4F)  bisacodyl 10 mg rectal suppository: 1 suppository(ies) rectal once a day, As needed, Constipation  glycopyrrolate 0.2 mg/mL injectable solution: 0.4  injectable every 6 hours  glycopyrrolate 0.2 mg/mL injectable solution: 0.4 milligram(s) injectable every 6 hours, As Needed  HYDROmorphone: 2 milligram(s) by continuous intravenous infusion every hour  HYDROmorphone 100 mg/50 mL-D5% intravenous solution: 2 milliliter(s) intravenous every hour, As Needed SOB  HYDROmorphone 100 mg/50 mL-D5% intravenous solution: 2 milliliter(s) intravenous every hour, As Needed pain  LORazepam: 1 milligram(s) intravenous every 6 hours  LORazepam: 1.5 milligram(s) intravenous every hour, As Needed Anxiety

## 2020-12-20 NOTE — DISCHARGE NOTE PROVIDER - DETAILS OF MALNUTRITION DIAGNOSIS/DIAGNOSES
This patient has been assessed with a concern for Malnutrition and was treated during this hospitalization for the following Nutrition diagnosis/diagnoses:     -  12/08/2020: Severe protein-calorie malnutrition   -  12/08/2020: Underweight/BMI < 19   This patient has been assessed with a concern for Malnutrition and was treated during this hospitalization for the following Nutrition diagnosis/diagnoses:     -  12/08/2020: Severe protein-calorie malnutrition   -  12/08/2020: Underweight/BMI < 19    This patient has been assessed with a concern for Malnutrition and was treated during this hospitalization for the following Nutrition diagnosis/diagnoses:     -  12/08/2020: Severe protein-calorie malnutrition   -  12/08/2020: Underweight/BMI < 19   This patient has been assessed with a concern for Malnutrition and was treated during this hospitalization for the following Nutrition diagnosis/diagnoses:     -  12/08/2020: Severe protein-calorie malnutrition   -  12/08/2020: Underweight/BMI < 19    This patient has been assessed with a concern for Malnutrition and was treated during this hospitalization for the following Nutrition diagnosis/diagnoses:     -  12/08/2020: Severe protein-calorie malnutrition   -  12/08/2020: Underweight/BMI < 19    This patient has been assessed with a concern for Malnutrition and was treated during this hospitalization for the following Nutrition diagnosis/diagnoses:     -  12/08/2020: Severe protein-calorie malnutrition   -  12/08/2020: Underweight/BMI < 19   This patient has been assessed with a concern for Malnutrition and was treated during this hospitalization for the following Nutrition diagnosis/diagnoses:     -  12/08/2020: Severe protein-calorie malnutrition   -  12/08/2020: Underweight/BMI < 19    This patient has been assessed with a concern for Malnutrition and was treated during this hospitalization for the following Nutrition diagnosis/diagnoses:     -  12/08/2020: Severe protein-calorie malnutrition   -  12/08/2020: Underweight/BMI < 19    This patient has been assessed with a concern for Malnutrition and was treated during this hospitalization for the following Nutrition diagnosis/diagnoses:     -  12/08/2020: Severe protein-calorie malnutrition   -  12/08/2020: Underweight/BMI < 19    This patient has been assessed with a concern for Malnutrition and was treated during this hospitalization for the following Nutrition diagnosis/diagnoses:     -  12/08/2020: Severe protein-calorie malnutrition   -  12/08/2020: Underweight/BMI < 19   This patient has been assessed with a concern for Malnutrition and was treated during this hospitalization for the following Nutrition diagnosis/diagnoses:     -  12/08/2020: Severe protein-calorie malnutrition   -  12/08/2020: Underweight/BMI < 19    This patient has been assessed with a concern for Malnutrition and was treated during this hospitalization for the following Nutrition diagnosis/diagnoses:     -  12/08/2020: Severe protein-calorie malnutrition   -  12/08/2020: Underweight/BMI < 19    This patient has been assessed with a concern for Malnutrition and was treated during this hospitalization for the following Nutrition diagnosis/diagnoses:     -  12/08/2020: Severe protein-calorie malnutrition   -  12/08/2020: Underweight/BMI < 19    This patient has been assessed with a concern for Malnutrition and was treated during this hospitalization for the following Nutrition diagnosis/diagnoses:     -  12/08/2020: Severe protein-calorie malnutrition   -  12/08/2020: Underweight/BMI < 19    This patient has been assessed with a concern for Malnutrition and was treated during this hospitalization for the following Nutrition diagnosis/diagnoses:     -  12/08/2020: Severe protein-calorie malnutrition   -  12/08/2020: Underweight/BMI < 19   This patient has been assessed with a concern for Malnutrition and was treated during this hospitalization for the following Nutrition diagnosis/diagnoses:     -  12/08/2020: Severe protein-calorie malnutrition   -  12/08/2020: Underweight/BMI < 19    This patient has been assessed with a concern for Malnutrition and was treated during this hospitalization for the following Nutrition diagnosis/diagnoses:     -  12/08/2020: Severe protein-calorie malnutrition   -  12/08/2020: Underweight/BMI < 19    This patient has been assessed with a concern for Malnutrition and was treated during this hospitalization for the following Nutrition diagnosis/diagnoses:     -  12/08/2020: Severe protein-calorie malnutrition   -  12/08/2020: Underweight/BMI < 19    This patient has been assessed with a concern for Malnutrition and was treated during this hospitalization for the following Nutrition diagnosis/diagnoses:     -  12/08/2020: Severe protein-calorie malnutrition   -  12/08/2020: Underweight/BMI < 19    This patient has been assessed with a concern for Malnutrition and was treated during this hospitalization for the following Nutrition diagnosis/diagnoses:     -  12/08/2020: Severe protein-calorie malnutrition   -  12/08/2020: Underweight/BMI < 19    This patient has been assessed with a concern for Malnutrition and was treated during this hospitalization for the following Nutrition diagnosis/diagnoses:     -  12/08/2020: Severe protein-calorie malnutrition   -  12/08/2020: Underweight/BMI < 19

## 2020-12-20 NOTE — PROGRESS NOTE ADULT - ATTENDING COMMENTS
Patient seen and examined, agree with resident note.   This is a 65F with h/o emphysema on home 2L NC, colon CA s/p distal ileum resection in 2016, ESBL UTI, Zenker's diverticulum s/p repair in 2019 c/b vocal cord paralysis req G-J tube for feeding, RA, gastroparesis, GERD, anemia presenting from home hospice for weakness ,found to have RLL pneumonia (likely due to aspiration). Also found to have fluid collections in the hip.     1. Coag neg staph bacteremia: staph epi and staph pettenkoferi, resolved sepsis – last + 12/10  2 Suspected aspiration pneumonia  3. Presacral fluid collection, possible infectious  4. Dysphagia, vocal cord paralysis, s/p G-J tube  5. h/o Ca colon, s/p distal ileum resection in 2016    - Invasive procedures not in line with overall GOC  - ID recommendations appreciated, continued on Zosyn and Daptomycin   - Psych recommedations appreciated  PT - LTC, PM&R c/s placed    DNR/DNI

## 2020-12-20 NOTE — PROGRESS NOTE ADULT - SUBJECTIVE AND OBJECTIVE BOX
[facility]  Santosh Stapleton PA-C  [address] [place] [address]  [phone]      INTERVAL HPI/OVERNIGHT EVENTS:    sitting in chair  tolerating po diet and supplemental tube feeds  no leakage at gastrostomy     MEDICATIONS  (STANDING):  acetaminophen  IVPB .. 1000 milliGRAM(s) IV Intermittent once  DAPTOmycin IVPB 350 milliGRAM(s) IV Intermittent every 24 hours  enoxaparin Injectable 40 milliGRAM(s) SubCutaneous daily  famotidine    Tablet 20 milliGRAM(s) Oral two times a day  FLUoxetine Solution 20 milliGRAM(s) Oral daily  gabapentin   Solution 200 milliGRAM(s) Oral three times a day  methadone    Tablet 30 milliGRAM(s) Oral every 12 hours  multivitamin 1 Tablet(s) Oral daily  piperacillin/tazobactam IVPB.. 3.375 Gram(s) IV Intermittent every 8 hours  simethicone 80 milliGRAM(s) Chew once  thiamine 100 milliGRAM(s) Oral daily    MEDICATIONS  (PRN):  acetaminophen   Tablet .. 650 milliGRAM(s) Oral every 6 hours PRN Mild Pain (1 - 3), Moderate Pain (4 - 6)  ALBUTerol    90 MICROgram(s) HFA Inhaler 2 Puff(s) Inhalation every 6 hours PRN Shortness of Breath and/or Wheezing  HYDROmorphone   Tablet 2 milliGRAM(s) Oral every 4 hours PRN Severe Pain (7 - 10)  LORazepam   Injectable 0.5 milliGRAM(s) IV Push two times a day PRN Anxiety  polyethylene glycol 3350 17 Gram(s) Oral daily PRN Constipation  senna 2 Tablet(s) Oral at bedtime PRN Constipation  traMADol 25 milliGRAM(s) Oral every 6 hours PRN Moderate Pain (4 - 6)      Allergies    Levaquin (Unknown)  levofloxacin (Unknown)    Intolerances        ROS:   unable to obtain      PHYSICAL EXAM:   Vital Signs:  Vital Signs Last 24 Hrs  T(C): 36.9 (19 Dec 2020 14:25), Max: 37.1 (18 Dec 2020 21:24)  T(F): 98.4 (19 Dec 2020 14:25), Max: 98.8 (18 Dec 2020 21:24)  HR: 83 (19 Dec 2020 05:05) (83 - 85)  BP: 110/68 (19 Dec 2020 14:25) (102/66 - 110/68)  BP(mean): --  RR: 18 (19 Dec 2020 14:25) (18 - 20)  SpO2: 99% (19 Dec 2020 14:25) (97% - 99%)  Daily     Daily     nad  frail  non toxic  soft, nt +gastrostomy  no edema        LABS:                        7.2    10.81 )-----------( 694      ( 19 Dec 2020 07:08 )             24.5     12-19    133<L>  |  96  |  12  ----------------------------<  123<H>  4.1   |  27  |  <0.30<L>    Ca    8.3<L>      19 Dec 2020 07:08  Phos  3.1     12-19  Mg     1.7     12-19            RADIOLOGY & ADDITIONAL TESTS:

## 2020-12-20 NOTE — PROGRESS NOTE BEHAVIORAL HEALTH - NSBHFUPINTERVALHXFT_PSY_A_CORE
Pt is alert and eating her meal with much appetite.  She had complained of visual hallucinations yesterday but denies having any now.  She understands that she was prescribed a small dose of Seroquel 12.5mg at night.  She says she took Abilify in the past but is did not agree with her.  She does have tardive dyskinesia possibly secondary to past use of antipsychotic medications.  She is more hopeful today.

## 2020-12-20 NOTE — DISCHARGE NOTE PROVIDER - NSDCCPCAREPLAN_GEN_ALL_CORE_FT
PRINCIPAL DISCHARGE DIAGNOSIS  Diagnosis: Generalized weakness  Assessment and Plan of Treatment: W/ sacral fluid collection tracking to both hips.      SECONDARY DISCHARGE DIAGNOSES  Diagnosis: Dysphagia  Assessment and Plan of Treatment: Dysphagia     PRINCIPAL DISCHARGE DIAGNOSIS  Diagnosis: Malignant neoplasm of colon, unspecified part of colon  Assessment and Plan of Treatment: Please continue to take pain medications as needed for pain.      SECONDARY DISCHARGE DIAGNOSES  Diagnosis: Dysphagia  Assessment and Plan of Treatment: You had a PEG, which startd to malfunction, thus your oral medications were stopped.

## 2020-12-21 NOTE — PROGRESS NOTE ADULT - SUBJECTIVE AND OBJECTIVE BOX
[facility]  Santosh Stapleton PA-C  [address] [place] [address]  [phone]      INTERVAL HPI/OVERNIGHT EVENTS:    events noted  tachypneic with coarse breathsounds    MEDICATIONS  (STANDING):  acetaminophen  IVPB .. 1000 milliGRAM(s) IV Intermittent once  DAPTOmycin IVPB 350 milliGRAM(s) IV Intermittent every 24 hours  enoxaparin Injectable 40 milliGRAM(s) SubCutaneous daily  famotidine    Tablet 20 milliGRAM(s) Oral two times a day  FLUoxetine Solution 20 milliGRAM(s) Oral daily  gabapentin   Solution 200 milliGRAM(s) Oral three times a day  methadone    Tablet 30 milliGRAM(s) Oral every 12 hours  multivitamin 1 Tablet(s) Oral daily  piperacillin/tazobactam IVPB.. 3.375 Gram(s) IV Intermittent every 8 hours  simethicone 80 milliGRAM(s) Chew once  thiamine 100 milliGRAM(s) Oral daily    MEDICATIONS  (PRN):  acetaminophen   Tablet .. 650 milliGRAM(s) Oral every 6 hours PRN Mild Pain (1 - 3), Moderate Pain (4 - 6)  ALBUTerol    90 MICROgram(s) HFA Inhaler 2 Puff(s) Inhalation every 6 hours PRN Shortness of Breath and/or Wheezing  HYDROmorphone   Tablet 2 milliGRAM(s) Oral every 4 hours PRN Severe Pain (7 - 10)  LORazepam   Injectable 0.5 milliGRAM(s) IV Push two times a day PRN Anxiety  polyethylene glycol 3350 17 Gram(s) Oral daily PRN Constipation  senna 2 Tablet(s) Oral at bedtime PRN Constipation  traMADol 25 milliGRAM(s) Oral every 6 hours PRN Moderate Pain (4 - 6)      Allergies    Levaquin (Unknown)  levofloxacin (Unknown)    Intolerances        ROS:   unable to obtain      PHYSICAL EXAM:   Vital Signs:  Vital Signs Last 24 Hrs  T(C): 36.9 (19 Dec 2020 14:25), Max: 37.1 (18 Dec 2020 21:24)  T(F): 98.4 (19 Dec 2020 14:25), Max: 98.8 (18 Dec 2020 21:24)  HR: 83 (19 Dec 2020 05:05) (83 - 85)  BP: 110/68 (19 Dec 2020 14:25) (102/66 - 110/68)  BP(mean): --  RR: 18 (19 Dec 2020 14:25) (18 - 20)  SpO2: 99% (19 Dec 2020 14:25) (97% - 99%)  Daily     Daily     nad  frail  non toxic  soft, nt +gastrostomy  no edema        LABS:                        7.2    10.81 )-----------( 694      ( 19 Dec 2020 07:08 )             24.5     12-19    133<L>  |  96  |  12  ----------------------------<  123<H>  4.1   |  27  |  <0.30<L>    Ca    8.3<L>      19 Dec 2020 07:08  Phos  3.1     12-19  Mg     1.7     12-19            RADIOLOGY & ADDITIONAL TESTS:

## 2020-12-21 NOTE — PROGRESS NOTE ADULT - SUBJECTIVE AND OBJECTIVE BOX
Follow Up:  bacteremia, gluteal and sacral abscess with osteo    Interval History: pt had fever with elevated WBC and ?aspiration events, CXR also with new bibasilar consolidations    ROS:      All other systems negative    Constitutional: + fever  Cardiovascular:  no chest pain, no palpitation  Respiratory:  no SOB, no cough  GI:  no abd pain, no vomiting, had diarrhea last night now resolved  urinary: no dysuria, no hematuria, no flank pain  musculoskeletal: no joint swelling, slight L hip pain  skin:  no rash  neurology:  no headache, no seizure        Allergies  Levaquin (Unknown)  levofloxacin (Unknown)        ANTIMICROBIALS:  DAPTOmycin IVPB 350 every 24 hours  piperacillin/tazobactam IVPB.. 3.375 every 8 hours      OTHER MEDS:  acetaminophen   Tablet .. 650 milliGRAM(s) Oral every 6 hours PRN  acetaminophen  IVPB .. 1000 milliGRAM(s) IV Intermittent once  ALBUTerol    90 MICROgram(s) HFA Inhaler 2 Puff(s) Inhalation every 6 hours PRN  enoxaparin Injectable 40 milliGRAM(s) SubCutaneous daily  famotidine    Tablet 20 milliGRAM(s) Oral two times a day  FLUoxetine Solution 20 milliGRAM(s) Oral daily  gabapentin   Solution 200 milliGRAM(s) Oral three times a day  HYDROmorphone   Tablet 2 milliGRAM(s) Oral every 4 hours PRN  LORazepam   Injectable 0.5 milliGRAM(s) IV Push two times a day PRN  methadone    Tablet 30 milliGRAM(s) Oral every 12 hours  multivitamin 1 Tablet(s) Oral daily  polyethylene glycol 3350 17 Gram(s) Oral daily PRN  QUEtiapine 12.5 milliGRAM(s) Oral at bedtime  senna 2 Tablet(s) Oral at bedtime PRN  thiamine 100 milliGRAM(s) Oral daily  traMADol 25 milliGRAM(s) Oral every 6 hours PRN      Vital Signs Last 24 Hrs  T(C): 36.6 (21 Dec 2020 13:54), Max: 38.9 (21 Dec 2020 04:26)  T(F): 97.9 (21 Dec 2020 13:54), Max: 102.1 (21 Dec 2020 04:26)  HR: 112 (21 Dec 2020 13:54) (85 - 135)  BP: 127/78 (21 Dec 2020 13:54) (87/58 - 127/78)  BP(mean): --  RR: 18 (21 Dec 2020 13:54) (18 - 20)  SpO2: 92% (21 Dec 2020 13:54) (88% - 99%)    Physical Exam:  General: NAD  Respiratory:  comfortable on NC, no tachypnea  abd:   soft, BS +, not tender, Jtube with no surrounding tenderness  :     no CVAT, no suprapubic tenderness, + goldman  Musculoskeletal : no joint swelling, no edema  Skin:    no rash, no decubitus  vascular: no phlebitis                          7.3    24.77 )-----------( 672      ( 21 Dec 2020 08:52 )             24.9       12-21    133<L>  |  95<L>  |  18  ----------------------------<  202<H>  4.0   |  28  |  0.33<L>    Ca    8.3<L>      21 Dec 2020 08:52  Phos  3.2     12-21  Mg     1.6     12-21            MICROBIOLOGY:  v  .Blood Blood-Peripheral  12-13-20   No Growth Final  --  --      .Blood Blood-Peripheral  12-10-20   No Growth Final  --    Growth in anaerobic bottle: Gram Positive Cocci in Clusters      .Blood Blood-Venous  12-09-20   No Growth Final  --  --      .Blood Blood-Peripheral  12-09-20   No Growth Final  --  --      .Urine Clean Catch (Midstream)  12-08-20   <10,000 CFU/mL Normal Urogenital Ami  --  --      .Blood Blood-Peripheral  12-07-20   No Growth Final  --  --      .Blood Blood-Peripheral  12-07-20   Growth in aerobic bottle: Staphylococcus pettenkoferi Coag Negative  Staphylococcus  Single set isolate, possible contaminant. Contact  Microbiology if susceptibility testing clinically  indicated.  Growth in aerobic bottle: Gram Positive Cocci in Pairs and Chains  NONVIABLE UPON REPEATED SUBCULTURES  "Due to technical problems, Proteus sp. will Not be reported as part of  the BCID panel until further notice"  ***Blood Panel PCR results on this specimen are available  approximately 3 hours after the Gram stain result.***  Gram stain, PCR, and/or culture results may not always  correspond due to difference in methodologies.  ************************************************************  This PCR assay was performed using [employer].  The following targets are tested for: Enterococcus,  vancomycin resistant enterococci, Listeria monocytogenes,  coagulase negative staphylococci, S. aureus,  methicillin resistant S. aureus, Streptococcus agalactiae  (Group B), S. pneumoniae, S. pyogenes (Group A),  Acinetobacter baumannii, Enterobacter cloacae, E. coli,  Klebsiella oxytoca, K. pneumoniae, Proteus sp.,  Serratia marcescens, Haemophilus influenzae,  Neisseria meningitidis, Pseudomonas aeruginosa, Candida  albicans, C. glabrata, C krusei, C parapsilosis,  C. tropicalis and the KPC resistance gene.  --  Blood Culture PCR      .Abscess Hip - Left  11-30-20   Rare Enterococcus faecium  --  Enterococcus faecium      .Blood Blood-Peripheral  11-29-20   No Growth Final  --  --      .Urine Clean Catch (Midstream)  11-28-20   <10,000 CFU/mL Normal Urogenital Ami  --  --      .Blood Blood-Peripheral  11-27-20   No Growth Final  --  Blood Culture PCR  Enterococcus faecalis                RADIOLOGY:  Images independently visualized and reviewed personally, findings as below  < from: Xray Chest 1 View- PORTABLE-Urgent (Xray Chest 1 View- PORTABLE-Urgent .) (12.21.20 @ 10:17) >    Impression:    New right basilar consolidation and right pleural effusion

## 2020-12-21 NOTE — PROVIDER CONTACT NOTE (CHANGE IN STATUS NOTIFICATION) - ACTION/TREATMENT ORDERED:
MD acknowledged situation. Will review chart and place orders for fluid and cultures ( blood, urine and stool) to be drawn, EKG, bladder scan and straight cath and will come asses patient.

## 2020-12-21 NOTE — PROGRESS NOTE ADULT - SUBJECTIVE AND OBJECTIVE BOX
Dion Wilcox, PGY1  Internal Medicine  Pager #: [phone], NS [phone]    Patient is a 65y old  Female who presents with a chief complaint of Weakness (20 Dec 2020 20:57)      SUBJECTIVE / OVERNIGHT EVENTS:  ADDITIONAL REVIEW OF SYSTEMS:    MEDICATIONS  (STANDING):  acetaminophen  IVPB .. 1000 milliGRAM(s) IV Intermittent once  DAPTOmycin IVPB 350 milliGRAM(s) IV Intermittent every 24 hours  enoxaparin Injectable 40 milliGRAM(s) SubCutaneous daily  famotidine    Tablet 20 milliGRAM(s) Oral two times a day  FLUoxetine Solution 20 milliGRAM(s) Oral daily  gabapentin   Solution 200 milliGRAM(s) Oral three times a day  methadone    Tablet 30 milliGRAM(s) Oral every 12 hours  multivitamin 1 Tablet(s) Oral daily  piperacillin/tazobactam IVPB.. 3.375 Gram(s) IV Intermittent every 8 hours  QUEtiapine 12.5 milliGRAM(s) Oral at bedtime  thiamine 100 milliGRAM(s) Oral daily    MEDICATIONS  (PRN):  acetaminophen   Tablet .. 650 milliGRAM(s) Oral every 6 hours PRN Mild Pain (1 - 3), Moderate Pain (4 - 6)  ALBUTerol    90 MICROgram(s) HFA Inhaler 2 Puff(s) Inhalation every 6 hours PRN Shortness of Breath and/or Wheezing  albuterol/ipratropium for Nebulization. 3 milliLiter(s) Nebulizer once PRN Shortness of Breath and/or Wheezing  HYDROmorphone   Tablet 2 milliGRAM(s) Oral every 4 hours PRN Severe Pain (7 - 10)  LORazepam   Injectable 0.5 milliGRAM(s) IV Push two times a day PRN Anxiety  polyethylene glycol 3350 17 Gram(s) Oral daily PRN Constipation  senna 2 Tablet(s) Oral at bedtime PRN Constipation  traMADol 25 milliGRAM(s) Oral every 6 hours PRN Moderate Pain (4 - 6)      CAPILLARY BLOOD GLUCOSE      POCT Blood Glucose.: 197 mg/dL (21 Dec 2020 06:39)  POCT Blood Glucose.: 179 mg/dL (20 Dec 2020 23:56)  POCT Blood Glucose.: 156 mg/dL (20 Dec 2020 17:52)  POCT Blood Glucose.: 100 mg/dL (20 Dec 2020 12:15)    I&O's Summary    20 Dec 2020 07:01  -  21 Dec 2020 07:00  --------------------------------------------------------  IN: 820 mL / OUT: 0 mL / NET: 820 mL        PHYSICAL EXAM:  Vital Signs Last 24 Hrs  T(C): 38.9 (21 Dec 2020 04:26), Max: 38.9 (21 Dec 2020 04:26)  T(F): 102.1 (21 Dec 2020 04:26), Max: 102.1 (21 Dec 2020 04:26)  HR: 135 (21 Dec 2020 04:26) (84 - 135)  BP: 125/77 (21 Dec 2020 04:26) (108/65 - 125/77)  BP(mean): --  RR: 18 (21 Dec 2020 04:26) (18 - 20)  SpO2: 93% (21 Dec 2020 04:26) (93% - 99%)  CONSTITUTIONAL: NAD, lying in bed comfortably  EYES: EOMI, PERRLA; conjunctiva and sclera clear  ENMT: Moist oral mucosa; normal dentition  NECK: Supple, no palpable masses  RESPIRATORY: Lungs clear to ascultation b/l; No rales, ronchi, or wheezing; Unlabored respirations  CARDIOVASCULAR: Regular rate and rhythm, normal S1 and S2, no murmurs, rubs, or gallops  ABDOMEN: Soft, nontender, nondistended, normal bowel sounds  MUSCULOSKELETAL: No joint swelling or tenderness to palpation  PSYCH: Affect appropriate  NEUROLOGY: AAOx3, CNs grossly intact  SKIN: No rashes; no palpable lesions    LABS:                        8.1    16.19 )-----------( 756      ( 20 Dec 2020 06:39 )             26.8     12-20    131<L>  |  95<L>  |  16  ----------------------------<  108<H>  4.5   |  25  |  0.40<L>    Ca    8.4      20 Dec 2020 06:40  Phos  3.3     12-20  Mg     1.7     12-20                  RADIOLOGY & ADDITIONAL TESTS: Dion Wilcox, PGY1  Internal Medicine  Pager #: [phone], NS [phone]    Patient is a 65y old  Female who presents with a chief complaint of Weakness (20 Dec 2020 20:57)      SUBJECTIVE / OVERNIGHT EVENTS: Overnight, patient had an episode of tachycardia, hypotension, and fever. She was given 500 mL of LR as well as Tylenol. Seen this morning still with tachycardia (mildly improved from last night), and hypotension. Patient was lethargic and difficult to rouse but rousable. O2 saturation was around 89% on 4 L NC.     MEDICATIONS  (STANDING):  acetaminophen  IVPB .. 1000 milliGRAM(s) IV Intermittent once  DAPTOmycin IVPB 350 milliGRAM(s) IV Intermittent every 24 hours  enoxaparin Injectable 40 milliGRAM(s) SubCutaneous daily  famotidine    Tablet 20 milliGRAM(s) Oral two times a day  FLUoxetine Solution 20 milliGRAM(s) Oral daily  gabapentin   Solution 200 milliGRAM(s) Oral three times a day  methadone    Tablet 30 milliGRAM(s) Oral every 12 hours  multivitamin 1 Tablet(s) Oral daily  piperacillin/tazobactam IVPB.. 3.375 Gram(s) IV Intermittent every 8 hours  QUEtiapine 12.5 milliGRAM(s) Oral at bedtime  thiamine 100 milliGRAM(s) Oral daily    MEDICATIONS  (PRN):  acetaminophen   Tablet .. 650 milliGRAM(s) Oral every 6 hours PRN Mild Pain (1 - 3), Moderate Pain (4 - 6)  ALBUTerol    90 MICROgram(s) HFA Inhaler 2 Puff(s) Inhalation every 6 hours PRN Shortness of Breath and/or Wheezing  albuterol/ipratropium for Nebulization. 3 milliLiter(s) Nebulizer once PRN Shortness of Breath and/or Wheezing  HYDROmorphone   Tablet 2 milliGRAM(s) Oral every 4 hours PRN Severe Pain (7 - 10)  LORazepam   Injectable 0.5 milliGRAM(s) IV Push two times a day PRN Anxiety  polyethylene glycol 3350 17 Gram(s) Oral daily PRN Constipation  senna 2 Tablet(s) Oral at bedtime PRN Constipation  traMADol 25 milliGRAM(s) Oral every 6 hours PRN Moderate Pain (4 - 6)      CAPILLARY BLOOD GLUCOSE      POCT Blood Glucose.: 197 mg/dL (21 Dec 2020 06:39)  POCT Blood Glucose.: 179 mg/dL (20 Dec 2020 23:56)  POCT Blood Glucose.: 156 mg/dL (20 Dec 2020 17:52)  POCT Blood Glucose.: 100 mg/dL (20 Dec 2020 12:15)    I&O's Summary    20 Dec 2020 07:01  -  21 Dec 2020 07:00  --------------------------------------------------------  IN: 820 mL / OUT: 0 mL / NET: 820 mL        PHYSICAL EXAM:  Vital Signs Last 24 Hrs  T(C): 38.9 (21 Dec 2020 04:26), Max: 38.9 (21 Dec 2020 04:26)  T(F): 102.1 (21 Dec 2020 04:26), Max: 102.1 (21 Dec 2020 04:26)  HR: 135 (21 Dec 2020 04:26) (84 - 135)  BP: 125/77 (21 Dec 2020 04:26) (108/65 - 125/77)  BP(mean): --  RR: 18 (21 Dec 2020 04:26) (18 - 20)  SpO2: 93% (21 Dec 2020 04:26) (93% - 99%)  CONSTITUTIONAL: NAD, lying in bed comfortably  EYES: EOMI, PERRLA; conjunctiva and sclera clear  ENMT: Moist oral mucosa; normal dentition  NECK: Supple, no palpable masses  RESPIRATORY: Lungs clear to ascultation; decreased breath sounds in right lower lung  CARDIOVASCULAR: tachycardic but regular rhythm, normal S1 and S2, no murmurs, rubs, or gallops  ABDOMEN: Soft, nontender, nondistended, normal bowel sounds  MUSCULOSKELETAL: No joint swelling or tenderness to palpation  PSYCH: Affect appropriate  NEUROLOGY: AAOx3, CNs grossly intact  SKIN: No rashes; no palpable lesions    LABS:                        8.1    16.19 )-----------( 756      ( 20 Dec 2020 06:39 )             26.8     12-20    131<L>  |  95<L>  |  16  ----------------------------<  108<H>  4.5   |  25  |  0.40<L>    Ca    8.4      20 Dec 2020 06:40  Phos  3.3     12-20  Mg     1.7     12-20                  RADIOLOGY & ADDITIONAL TESTS:

## 2020-12-21 NOTE — CONSULT NOTE ADULT - CONSULT REQUESTED DATE/TIME
07-Dec-2020 21:23
08-Dec-2020
09-Dec-2020
10-Dec-2020 16:12
21-Dec-2020 13:37
17-Dec-2020 13:10
18-Dec-2020 12:17

## 2020-12-21 NOTE — PROGRESS NOTE ADULT - ASSESSMENT
65 f with HTN, COPD, stage I colon ca s/p resection and no chemo, RA on humira, zenker diverticulum s/p repair 2019 which resulted in vocal paralysis and multiple aspiration pneumonias, had a PEG before but s/p a J tube about 6 weeks ago, was hospitalized at [facility] from 11/27 to 12/3 initially for GJ tube leaking and ?cellulitis but CT showed ? rectosigmoid anastomosis leak, L hip abscess and myositis extending to trochanter bursa, but no hip effusion, also R hip myositis and ?forming abscess, sacral osteo with phlegmon involving the central canal  s/p IR drainage of L hip abscess which grew enterococcus  faecalis R to vanco and amp (I called the lab and it is sensitive to dapto and linezolid)  blood cx showed E. faecalis S to amp  pt was deteriorating so she was discharged to hospice with no antibiotics but there she improved clinically and was sent back to the hospital, she is confused but stable  blood cx 11/7 now with GPC in pairs and chains but not on PCR panel so not enterococcus  WBC 16-18  abd/pelvis CT: Right lower lobe consolidation and bilateral patchy lung opacities suspicious for multifocal pneumonia. Ill-defined gas-containing presacral fluid collection and/or phlegmon which tracks along to the left hip joint. Findings consistent with infection. MRI with contrast is recommended to further evaluate.    leukocytosis with L hip abscess, myositis s/p IR drain 11/30 which showed VRE (S to dapto and linezolid)  also sacral osteo and abscess with no decubitus so the source is likely in the abdomen, there was question of rectosigmoid anastomosis leak on initial CTs but that surgery was long time ago and pt had no complications after that, the J tube however was  placed 6 weeks ago  repeat CT 12/16 showed increase size of the L hip abscess to 8.8 cm tracking to presacral space and R hip, RLL patchy opacities s/o aspiration  E. faecalis bacteremia   2 different coag neg staph bacteremia: staph epi and staph pettenkoferi, ?contaminant , repeat cultures 12/13 negative   malnutrition, dysphagia and risk of aspiration  new fever and leukocytosis, with new consolidation, ?aspiration, pt on appropriate coverage   * f/u the blood cx  * IR did not believe there is a drainable collection but now repeat CT is showing increased size of the abscess, IR still believed it is unchanged, family not willing to proceed with ortho eval and any surgical drainage  * c/w dapto 8 mg/kg and monitor the CK, started 12/10  * c/w zosyn for now to cover for the ?anastomotic leak, abdominal source and also aspiration, antibiotics started 12/7, now day 15  * monitor the WBC  * will need a PICC line and 6 week course of IV antibiotics if within goals of care    The above assessment and plan was discussed with the medicine resident    Maria Antonia Hunter MD  Pager [phone]  After 5pm and on weekends call [phone]

## 2020-12-21 NOTE — CONSULT NOTE ADULT - SUBJECTIVE AND OBJECTIVE BOX
HPI:  64yo F w/ PMH of emphysema on home 2L NC, colon CA s/p distal ileum resection in 2016, ESBL UTI, zenker's diverticulum s/p repair in 2019 c/b vocal cord paralysis req GJ tube for feeding, RA, gastroparesis, GERD, anemia presenting from home hospice for weakness.     Pt was previously admitted to [facility] from 11/27 to 12/3 for which she initially presented w/ leaking PEG tube and noted L hip ill-defined fluid collection w/ no surgical intervention at that time apart from I&D w/ minimal fluid output. During that hospitalization pt had had a CT A/P which showed a leak from the rectosigmoid anastomosis from her previous operation w/ enterococcus bacteremia requiring a MICU stay for vasopressor support in the setting of septic shock. While there pt continued to deteriorate and discussion was had w/ pt's daughter who is the healthcare surrogate and the decision was made to pursue comfort measures and home hospice.     At home hospice pt continued to improve despite prior discussion of rapid deterioration. She was then restarted on Zosyn for abx coverage and allowed PO intake. Per daughter and hospice physician they decided pt improving and to present to the hospital for continued management w/ IV abx and fluids if needed. Would like to re-puruse hospice care afterwards if warranted/    In the ED Tmax 99.1, HR 92, BP 96/81, satting 99% on 5L NC. Labs notable for leukocytosis to 18 neutrophil predominant, and an anemia to 7 w/ UA showing mod ketones and protein. CT A/P done showing possible RLL pna and ill defined gas containing presacral fluid collection which tracks along the L hip joint. CXR pulm congestion.    She recieved a dose of Vanc/Zosyn, 2L IVF (08 Dec 2020 03:10)    Patient was admitted on 12/7, worked up for weakness: malnutrition vs infection due to presacral fluid collection or aspiration pneumonia, treated with broad spectrum abx.  Patient evaluation by SLP, recommended NPO for dysphagia, family decided on pleasure feeds over weekend. Patient seen this morning.     REVIEW OF SYSTEMS  Constitutional - No fever, + weight loss, + fatigue  HEENT - No eye pain, No visual disturbances, No difficulty hearing, No tinnitus, No vertigo, No neck pain  Respiratory - No cough, No wheezing, No shortness of breath  Cardiovascular - No chest pain, No palpitations  Gastrointestinal - No abdominal pain, No nausea, No vomiting, No diarrhea, No constipation  Genitourinary - No dysuria, No frequency, No hematuria, No incontinence  Neurological - No headaches, No memory loss,+ loss of strength, No numbness, No tremors  Skin - No itching, No rashes, No lesions   Endocrine - No temperature intolerance  Musculoskeletal - No joint pain, No joint swelling, No muscle pain  Psychiatric - No depression, No anxiety    VITALS  T(C): 36.5 (12-21-20 @ 10:39), Max: 38.9 (12-21-20 @ 04:26)  HR: 102 (12-21-20 @ 10:39) (84 - 135)  BP: 98/60 (12-21-20 @ 10:39) (87/58 - 125/77)  RR: 18 (12-21-20 @ 10:39) (18 - 20)  SpO2: 94% (12-21-20 @ 10:39) (88% - 99%)  Wt(kg): --    PAST MEDICAL & SURGICAL HISTORY  Colon cancer    Rheumatoid arthritis    Anemia    COPD (chronic obstructive pulmonary disease)    Vocal cord paralysis    Chronic GERD    Rheumatoid arthritis    Zenker diverticulum    UTI due to extended-spectrum beta lactamase (ESBL) producing Escherichia coli    Colon cancer    Emphysematous COPD    Candidal stomatitis    Malignant neoplasm of colon    Major depressive disorder    HTN (hypertension)    COPD (chronic obstructive pulmonary disease)    Encounter for gastrojejunal (GJ) tube placement    History of Nissen fundoplication    S/P removal of Zenker&#x27;s diverticulum    History of colon resection        SOCIAL HISTORY  Smoking - Denied  EtOH - Denied   Drugs - Denied    FUNCTIONAL HISTORY  required assist with amb/ADLs PTA    CURRENT FUNCTIONAL STATUS  12/17 SLP: signed off   oropharyngeal dysphagia  dysphagia diet/puree with honey thick liquids, pleasure feeds     12/18 PT  bed mobility min assist  transfer min assist  gait min assist, RW x 10 feet         FAMILY HISTORY   Family history of peripheral vascular disease    FH: heart failure    FH: rheumatoid arthritis        RECENT LABS/IMAGING  CBC Full  -  ( 21 Dec 2020 08:52 )  WBC Count : 24.77 K/uL  RBC Count : 2.85 M/uL  Hemoglobin : 7.3 g/dL  Hematocrit : 24.9 %  Platelet Count - Automated : 672 K/uL  Mean Cell Volume : 87.4 fl  Mean Cell Hemoglobin : 25.6 pg  Mean Cell Hemoglobin Concentration : 29.3 gm/dL  Auto Neutrophil # : x  Auto Lymphocyte # : x  Auto Monocyte # : x  Auto Eosinophil # : x  Auto Basophil # : x  Auto Neutrophil % : x  Auto Lymphocyte % : x  Auto Monocyte % : x  Auto Eosinophil % : x  Auto Basophil % : x    12-21    133<L>  |  95<L>  |  18  ----------------------------<  202<H>  4.0   |  28  |  0.33<L>    Ca    8.3<L>      21 Dec 2020 08:52  Phos  3.2     12-21  Mg     1.6     12-21    < from: CT Abdomen and Pelvis w/ IV Cont (12.16.20 @ 20:58) >    IMPRESSION:  Increased left hip fluid collection which extends along the presacral space and into the right hip.    Small amount of fluid in the distal esophagus with patchy opacities in the right lower lobe, suspicious for aspiration.      < end of copied text >        ALLERGIES  Levaquin (Unknown)  levofloxacin (Unknown)      MEDICATIONS   acetaminophen   Tablet .. 650 milliGRAM(s) Oral every 6 hours PRN  acetaminophen  IVPB .. 1000 milliGRAM(s) IV Intermittent once  ALBUTerol    90 MICROgram(s) HFA Inhaler 2 Puff(s) Inhalation every 6 hours PRN  DAPTOmycin IVPB 350 milliGRAM(s) IV Intermittent every 24 hours  enoxaparin Injectable 40 milliGRAM(s) SubCutaneous daily  famotidine    Tablet 20 milliGRAM(s) Oral two times a day  FLUoxetine Solution 20 milliGRAM(s) Oral daily  gabapentin   Solution 200 milliGRAM(s) Oral three times a day  HYDROmorphone   Tablet 2 milliGRAM(s) Oral every 4 hours PRN  LORazepam   Injectable 0.5 milliGRAM(s) IV Push two times a day PRN  methadone    Tablet 30 milliGRAM(s) Oral every 12 hours  multivitamin 1 Tablet(s) Oral daily  piperacillin/tazobactam IVPB.. 3.375 Gram(s) IV Intermittent every 8 hours  polyethylene glycol 3350 17 Gram(s) Oral daily PRN  QUEtiapine 12.5 milliGRAM(s) Oral at bedtime  senna 2 Tablet(s) Oral at bedtime PRN  thiamine 100 milliGRAM(s) Oral daily  traMADol 25 milliGRAM(s) Oral every 6 hours PRN      ----------------------------------------------------------------------------------------  PHYSICAL EXAM  Constitutional - in bed, cachectic, frail   HEENT - NCAT, EOMI  Cardiovascular - warm   Abdomen - Soft   Neurologic Exam - resisting movement/exam               Cognitive - Awake, Alert, AAO to self        Motor - unable to examine        Psychiatric - Mood stable, Affect WNL  ----------------------------------------------------------------------------------------  ASSESSMENT/PLAN  65yFemale h/o emphysema on home 2L NC, colon CA s/p distal ileum resection in 2016, ESBL UTI, zenker's diverticulum s/p repair in 2019 c/b vocal cord paralysis s/p GJ tube for feeding, RA, gastroparesis, GERD, anemia with weakness  admitted from home hospice, possible aspiration PNA  dysphagia, was on pleasure feeds, PEG/PO feeds on hold   Pain - Tylenol, tramadol, methadone  DVT PPX - SCDs, lovenox   Rehab -   patient was able to participate with PT on 12/18, transfers/gait at min assist  now with new aspiration/NPO, is not a rehab candidate  will sign off

## 2020-12-21 NOTE — PROGRESS NOTE ADULT - ASSESSMENT
leaking GJ tube  colonic leak  diarrhea  ? anastomotic leak    likely aspiration event  NPO  ok to use g/j tube  overall prognosis grim  DNR

## 2020-12-21 NOTE — PROGRESS NOTE ADULT - PROBLEM SELECTOR PLAN 3
- c/w tramadol for moderate pain, used 0 doses/24 hours  - c/w dilaudid 2mg PO via PEG PRN for severe pain, used 1 dose/24 hours   - continue home dose of methadone  - unable to drain fluid collection in hip via IR

## 2020-12-21 NOTE — PROVIDER CONTACT NOTE (CHANGE IN STATUS NOTIFICATION) - BACKGROUND
Pt with Colon Ca; currently covered with zosyn and dabromycin. Started pleasure feeds during the day and now has coarse sounding lungs with suspect vital signs.

## 2020-12-21 NOTE — PROGRESS NOTE ADULT - ATTENDING COMMENTS
Patient seen and examined, agree with resident note.   This is a 65F with h/o emphysema on home 2L NC, colon CA s/p distal ileum resection in 2016, ESBL UTI, Zenker's diverticulum s/p repair in 2019 c/b vocal cord paralysis req G-J tube for feeding, RA, gastroparesis, GERD, anemia presenting from home hospice for weakness ,found to have RLL pneumonia (likely due to aspiration). Also found to have fluid collections in the hip.     1. Coag neg staph bacteremia: staph epi and staph pettenkoferi, resolved sepsis – last + 12/10  2 Suspected aspiration pneumonia  3. Presacral fluid collection, possible infectious  4. Dysphagia, vocal cord paralysis, s/p G-J tube  5. h/o Ca colon, s/p distal ileum resection in 2016    - Likely with aspiration event overnight, patient more lethargic this AM. Daughter updated  - Invasive procedures not in line with overall GOC but IV abx can be continued   - ID recommendations appreciated, continued on Zosyn and Daptomycin     DNR/DNI.

## 2020-12-21 NOTE — PROGRESS NOTE ADULT - PROBLEM SELECTOR PLAN 1
- RLL consolidation w/ leukocytosis w/ hx of vocal cord paralysis and oral intake at home   - c/w Zosyn, now s/p vanc and switched to daptomycin per ID  - weekly CPK per ID while on daptomycin, last CPK 32 on 12/10   - NPO per S&S, but pt now allowed to have pleasure feeds - RLL consolidation w/ leukocytosis w/ hx of vocal cord paralysis and oral intake at home   - c/w Zosyn, now s/p vanc and switched to daptomycin per ID  - weekly CPK per ID while on daptomycin, last CPK 32 on 12/10   - NPO per S&S, but pt now allowed to have pleasure feeds  - Pt with another episode of aspiration pneumonia given CXR and clinical findings, will place on NPO for now, to discuss hospice care and pleasure feeds again with patient and family  - continue current antibiotics per ID; pt's family amenable to IV antibiotics - RLL consolidation w/ leukocytosis w/ hx of vocal cord paralysis and oral intake at home   - c/w Zosyn, now s/p vanc and switched to daptomycin per ID  - weekly CPK per ID while on daptomycin, last CPK 32 on 12/10   - NPO per S&S, but pt now allowed to have pleasure feeds  - Pt with another episode of aspiration pneumonia given CXR and clinical findings, will place on NPO for now, to discuss hospice care and pleasure feeds again with patient and family  - CXR this morning with new right basilar consolidation and right pleural effusion    - continue current antibiotics per ID; pt's family amenable to IV antibiotics

## 2020-12-21 NOTE — CONSULT NOTE ADULT - PROVIDER SPECIALTY LIST ADULT
Intervent Radiology
Intervent Radiology
Orthopedics
Rehab Medicine
Infectious Disease
ENT
Gastroenterology
Palliative Care

## 2020-12-21 NOTE — PROGRESS NOTE ADULT - PROBLEM SELECTOR PLAN 2
- Pt w/ minimal nutrition at home hospice and cachectic on exam w/ elevated ketones on UA likely cause of weakness   - NPO per S&S eval (failed MBS), but now allowed to have pleasure feeds - Pt w/ minimal nutrition at home hospice and cachectic on exam w/ elevated ketones on UA likely cause of weakness   - NPO per S&S eval (failed MBS) and currently for latest episode of aspiration pneumonia, will need to re-discuss hospice and pleasure feeds

## 2020-12-21 NOTE — PROGRESS NOTE ADULT - SUBJECTIVE AND OBJECTIVE BOX
HPI: 65F with PMH including emphysema on home 2L NC, colon CA s/p distal ileum resection in 2016, ESBL UTI, zenker's diverticulum s/p repair in 2019 c/b vocal cord paralysis s/p GJ tube for feeding, RA, gastroparesis, GERD, anemia presenting from hospice for weakness, likely from malnutrition vs infection (presacral fluid collection) vs RLL aspiration PNA. Patient is on broad-spectrum abx, and awaiting S&S evaluation. Has had enterococcal infection in pas, possible from leak at distal ileal anastomosis site. For pain, is on 30mg methadone BID at home. Daughter is HCP and is a pediatric RN in [place], DNR/DNI. Palliative called for GOC.    INTERVAL EVENTS:  12/10: patient denies any concerns  12/11: denies any concerns, appears comfortable  12/14: states having 8/10 pain in her hip, 3/10 is tolerable; much more alert today  12/15: continues to be alert, has pain in her hip but did not realize she had dilaudid ordered  12/16: continues to be alert, states pain is tolerable with dilaudid, and has a strong desire to eat; used dilaudid PO x 4, tramadol x 2/24 hours  12/17: alert, states being much happier after having PO   12/18: patient appears slightly more confused today, felt she was at her home yesterday; has intermittent but fleeting pain  12/21: patient more lethargic today, may have aspirated this morning    ADVANCE DIRECTIVES:    DNR  Yes    MOLST  [ ]  Living Will  [ ]   DECISION MAKER(s):  [X ] Health Care Proxy(s)  [ ] Surrogate(s)  [ ] Guardian           Name(s): Phone Number(s): chriss Alfaro    BASELINE (I)ADL(s) (prior to admission):  Rhea: [ ]Total  [ ] Moderate [ ]Dependent    Allergies    Levaquin (Unknown)  levofloxacin (Unknown)    Intolerances    MEDICATIONS  (STANDING):  acetaminophen  IVPB .. 1000 milliGRAM(s) IV Intermittent once  DAPTOmycin IVPB 350 milliGRAM(s) IV Intermittent every 24 hours  enoxaparin Injectable 40 milliGRAM(s) SubCutaneous daily  famotidine    Tablet 20 milliGRAM(s) Oral two times a day  FLUoxetine Solution 20 milliGRAM(s) Oral daily  gabapentin   Solution 200 milliGRAM(s) Oral three times a day  methadone    Tablet 30 milliGRAM(s) Oral every 12 hours  multivitamin 1 Tablet(s) Oral daily  piperacillin/tazobactam IVPB.. 3.375 Gram(s) IV Intermittent every 8 hours  QUEtiapine 12.5 milliGRAM(s) Oral at bedtime  thiamine 100 milliGRAM(s) Oral daily    MEDICATIONS  (PRN):  acetaminophen   Tablet .. 650 milliGRAM(s) Oral every 6 hours PRN Mild Pain (1 - 3), Moderate Pain (4 - 6)  ALBUTerol    90 MICROgram(s) HFA Inhaler 2 Puff(s) Inhalation every 6 hours PRN Shortness of Breath and/or Wheezing  HYDROmorphone   Tablet 2 milliGRAM(s) Oral every 4 hours PRN Severe Pain (7 - 10)  LORazepam   Injectable 0.5 milliGRAM(s) IV Push two times a day PRN Anxiety  polyethylene glycol 3350 17 Gram(s) Oral daily PRN Constipation  senna 2 Tablet(s) Oral at bedtime PRN Constipation  traMADol 25 milliGRAM(s) Oral every 6 hours PRN Moderate Pain (4 - 6)      PRESENT SYMPTOMS: [X ] Limited ability to obtain due to lethargy  Source if other than patient:  [ ]Family   [ ]Team     Pain: [ X]yes [ ]no  QOL impact -   Location - L hip             Aggravating factors - minimal relief with tramadol and methadone  Quality -  Radiation -  Timing-  Severity (0-10 scale):    Minimal acceptable level (0-10 scale):      CPOT:    https://www.[email or web address]/getattachment/[email or web address]-[email or web address]/Critical-Care-Pain-Observation-Tool-(CPOT)      PAIN AD Score:     http://geriatrictoolkit.[place].[email or web address]/cog/painad.pdf (press ctrl +  left click to view)    Dyspnea:                           [ ]Mild [ ]Moderate [ ]Severe  Anxiety:                             [ ]Mild [ ]Moderate [ ]Severe  Fatigue:                             [ ]Mild [ ]Moderate [ ]Severe  Nausea:                             [ ]Mild [ ]Moderate [ ]Severe  Loss of appetite:              [ ]Mild [ ]Moderate [ ]Severe  Constipation:                    [ ]Mild [ ]Moderate [ ]Severe    Other Symptoms:  [X ]All other review of systems negative     Palliative Performance Status Version 2:         %    http://[facility].org/files/news/palliative_performance_scale_ppsv2.pdf    PHYSICAL EXAM:  Vital Signs Last 24 Hrs  T(C): 36.5 (21 Dec 2020 10:39), Max: 38.9 (21 Dec 2020 04:26)  T(F): 97.7 (21 Dec 2020 10:39), Max: 102.1 (21 Dec 2020 04:26)  HR: 102 (21 Dec 2020 10:39) (84 - 135)  BP: 98/60 (21 Dec 2020 10:39) (87/58 - 125/77)  BP(mean): --  RR: 18 (21 Dec 2020 10:39) (18 - 20)  SpO2: 94% (21 Dec 2020 10:39) (88% - 99%)    Limited exam for patient comfort  GENERAL:  [ ]Alert  [ ]Oriented x   [ ]Lethargic  [ ]Cachexia  [ ]Unarousable  [x ]Verbal  [ ]Non-Verbal  Behavioral:   [ ] Anxiety  [ ] Delirium [ ] Agitation [ ] Other  HEENT:  [ ]Normal   [ ]Dry mouth   [ ]ET Tube/Trach  [ ]Oral lesions  PULMONARY:   [ ]Clear [ ]Tachypnea  [ ]Audible excessive secretions [X] No labored breathing  [ ]Rhonchi        [ ]Right [ ]Left [ ]Bilateral  [ ]Crackles        [ ]Right [ ]Left [ ]Bilateral  [ ]Wheezing     [ ]Right [ ]Left [ ]Bilateral  [ ]Diminished breath sounds [ ]right [ ]left [ ]bilateral  CARDIOVASCULAR:    [ ]Regular [ ]Irregular [ ]Tachy  [ ]Roge [ ]Murmur [ ]Other  GASTROINTESTINAL:  [ ]Soft  [ ]Distended   [ ]+BS  [ ]Non tender [ ]Tender  [ ]PEG [ ]OGT/ NGT  Last BM:   GENITOURINARY:  [ ]Normal [ ] Incontinent   [ ]Oliguria/Anuria   [ ]Dalal  MUSCULOSKELETAL:   [ ]Normal   [ ]Weakness  [ ]Bed/Wheelchair bound [ ]Edema  NEUROLOGIC:   [ ]No focal deficits  [X ]Cognitive impairment  [ ]Dysphagia [ ]Dysarthria [ ]Paresis [ ]Other   SKIN:   [ ]Normal    [ ]Rash  [ ]Pressure ulcer(s)       Present on admission [ ]y [ ]n    CRITICAL CARE:  [ ] Shock Present  [ ]Septic [ ]Cardiogenic [ ]Neurologic [ ]Hypovolemic  [ ]  Vasopressors [ ]  Inotropes   [ ]Respiratory failure present [ ]Mechanical ventilation [ ]Non-invasive ventilatory support [ ]High flow  [ ]Acute  [ ]Chronic [ ]Hypoxic  [ ]Hypercarbic [ ]Other  [ ]Other organ failure     LABS: reviewed                          7.3    24.77 )-----------( 672      ( 21 Dec 2020 08:52 )             24.9     12-21    133<L>  |  95<L>  |  18  ----------------------------<  202<H>  4.0   |  28  |  0.33<L>    Ca    8.3<L>      21 Dec 2020 08:52  Phos  3.2     12-21  Mg     1.6     12-21         RADIOLOGY & ADDITIONAL STUDIES:  CT A/P 12/7/2020    Right lower lobe consolidation and bilateral patchy lung opacities suspicious for multifocal pneumonia.    Ill-defined gas-containing presacral fluid collection and/or phlegmon which tracks along to the left hip joint. Findings consistent with infection. MRI with contrast is recommended to further evaluate.    PROTEIN CALORIE MALNUTRITION PRESENT: [ ]mild [ ]moderate [ ]severe [ ]underweight [ ]morbid obesity  https://www.andeal.org/vault/2440/web/files/ONC/Table_Clinical%20Characteristics%20to%20Document%20Malnutrition-White%20JV%20et%20al%077637.pdf    Height (cm): 152.4 (12-07-20 @ 23:10), 152.4 (11-27-20 @ 20:43)  Weight (kg): 42.3 (12-07-20 @ 23:10), 44.4 (11-27-20 @ 20:43)  BMI (kg/m2): 18.2 (12-07-20 @ 23:10), 19.1 (11-27-20 @ 20:43)    [ ]PPSV2 < or = to 30% [ ]significant weight loss  [ ]poor nutritional intake  [ ]anasarca     Albumin, Serum: 2.7 g/dL (12-08-20 @ 07:22)   [ ]Artificial Nutrition      REFERRALS:   [ ]Chaplaincy  [ ]Hospice  [ ]Child Life  [ ]Social Work  [ ]Case management [ ]Holistic Therapy     Goals of Care Document: CRISTIAN Monroe (12-02-20 @ 18:00)  Goals of Care Conversation:   Participants:  · Participants  Patient; Family  · Child(mana Thomas    Advance Directives:  · Does patient have Advance Directive  No  · Does Patient Have a Surrogate  Yes  · Surrogate's Name  Missy Thomas  · Surrogate's Phone Number  [phone]  · Caregiver:  yes  · Name  Sheeba Cardoso  · Phone Number  [place]    Conversation Discussion:  · Conversation  Diagnosis; Prognosis; Treatment Options; Palliative Care Referral  · Conversation Details  -diagnostic findings including anastomotic leak, thoracic discitis/osteo, sacral abscess  -treatment options including abx, surgery  -diagnostic options including CT scan, colonoscopy   -poor nutrition and overall health status   -comfort care, hospice    What Matters Most To Patient and Family:  · What matters most to patient and family  treatment, comfort    Personal Advance Directives Treatment Guidelines:   Treatment Guidelines:  · Decision Maker  Surrogate    Location of Discussion:   Duration of Advanced Care Planning Meeting:  · Time spent (in minutes)  25    Location of Discussion:  · Location of discussion  Telephone      Electronic Signatures:  Maximilian Monroe)  (Signed 02-Dec-2020 18:02)  	Authored: Goals of Care Conversation, Personal Advance Directives Treatment Guidelines, Location of Discussion      Last Updated: 02-Dec-2020 18:02 by Maximilian Monroe)        ______________  Henrique Gustafson MD   of Geriatric and Palliative Medicine  [facility]     Please page the following number for clinical matters between the hours of 9AM and 5PM   from Monday through Friday : [phone]    After 5PM and on weekends, please page: [phone]. The Geriatric and Palliative Medicine consult service has 24/7 coverage for medical recommendations, including for symptom management needs.

## 2020-12-21 NOTE — CONSULT NOTE ADULT - CONSULT REASON
Rehabilitation consult
GOC
L hip fluid collection
bacteremia
dysphagia, hoarseness
leaking PEG tube
fluid aspiration

## 2020-12-21 NOTE — PROGRESS NOTE ADULT - PROBLEM SELECTOR PLAN 1
- PEG and PO feeds on hold now, given potential aspiration  - CXR with R consolidation, consistent with aspiration

## 2020-12-21 NOTE — CHART NOTE - NSCHARTNOTEFT_GEN_A_CORE
Pt seen at bedside after 102F, 125/77, 130HR, 93%O2. Increased secretions now audible. Pt remains comfortable. Pan-cultured with EKG pending, IV acetaminophen. 500cc bolus over 45 minutes overed. Pt seen at bedside after 102F, 125/77, 130HR, 93%O2. Increased secretions now audible. Pt remains comfortable. Pan-cultured with EKG pending, IV acetaminophen.

## 2020-12-22 NOTE — PROGRESS NOTE ADULT - SUBJECTIVE AND OBJECTIVE BOX
Dion Wilcox, PGY1  Internal Medicine  Pager #: [phone], [place] [phone]    Patient is a 65y old  Female who presents with a chief complaint of Weakness (21 Dec 2020 15:37)      SUBJECTIVE / OVERNIGHT EVENTS:  ADDITIONAL REVIEW OF SYSTEMS:    MEDICATIONS  (STANDING):  acetaminophen  IVPB .. 1000 milliGRAM(s) IV Intermittent once  DAPTOmycin IVPB 350 milliGRAM(s) IV Intermittent every 24 hours  enoxaparin Injectable 40 milliGRAM(s) SubCutaneous daily  famotidine    Tablet 20 milliGRAM(s) Oral two times a day  FLUoxetine Solution 20 milliGRAM(s) Oral daily  gabapentin   Solution 200 milliGRAM(s) Oral three times a day  methadone    Tablet 30 milliGRAM(s) Oral every 12 hours  multivitamin 1 Tablet(s) Oral daily  piperacillin/tazobactam IVPB.. 3.375 Gram(s) IV Intermittent every 8 hours  QUEtiapine 12.5 milliGRAM(s) Oral at bedtime  thiamine 100 milliGRAM(s) Oral daily    MEDICATIONS  (PRN):  acetaminophen   Tablet .. 650 milliGRAM(s) Oral every 6 hours PRN Mild Pain (1 - 3), Moderate Pain (4 - 6)  ALBUTerol    90 MICROgram(s) HFA Inhaler 2 Puff(s) Inhalation every 6 hours PRN Shortness of Breath and/or Wheezing  HYDROmorphone   Tablet 2 milliGRAM(s) Oral every 4 hours PRN Severe Pain (7 - 10)  LORazepam   Injectable 0.5 milliGRAM(s) IV Push two times a day PRN Anxiety  polyethylene glycol 3350 17 Gram(s) Oral daily PRN Constipation  senna 2 Tablet(s) Oral at bedtime PRN Constipation  traMADol 25 milliGRAM(s) Oral every 6 hours PRN Moderate Pain (4 - 6)      CAPILLARY BLOOD GLUCOSE      POCT Blood Glucose.: 79 mg/dL (22 Dec 2020 06:21)  POCT Blood Glucose.: 90 mg/dL (22 Dec 2020 00:09)  POCT Blood Glucose.: 82 mg/dL (21 Dec 2020 22:26)  POCT Blood Glucose.: 85 mg/dL (21 Dec 2020 17:37)  POCT Blood Glucose.: 165 mg/dL (21 Dec 2020 12:12)    I&O's Summary    21 Dec 2020 07:01  -  22 Dec 2020 07:00  --------------------------------------------------------  IN: 1575 mL / OUT: 500 mL / NET: 1075 mL    22 Dec 2020 07:01  -  22 Dec 2020 07:20  --------------------------------------------------------  IN: 0 mL / OUT: 350 mL / NET: -350 mL        PHYSICAL EXAM:  Vital Signs Last 24 Hrs  T(C): 37.1 (22 Dec 2020 04:29), Max: 38.6 (21 Dec 2020 18:17)  T(F): 98.8 (22 Dec 2020 04:29), Max: 101.5 (21 Dec 2020 18:17)  HR: 105 (22 Dec 2020 06:28) (91 - 128)  BP: 109/64 (22 Dec 2020 06:28) (74/42 - 144/77)  BP(mean): --  RR: 18 (22 Dec 2020 06:28) (18 - 32)  SpO2: 93% (22 Dec 2020 06:28) (68% - 100%)  CONSTITUTIONAL: NAD, lying in bed comfortably  EYES: EOMI, PERRLA; conjunctiva and sclera clear  ENMT: Moist oral mucosa; normal dentition  NECK: Supple, no palpable masses  RESPIRATORY: Lungs clear to ascultation b/l; No rales, ronchi, or wheezing; Unlabored respirations  CARDIOVASCULAR: Regular rate and rhythm, normal S1 and S2, no murmurs, rubs, or gallops  ABDOMEN: Soft, nontender, nondistended, normal bowel sounds  MUSCULOSKELETAL: No joint swelling or tenderness to palpation  PSYCH: Affect appropriate  NEUROLOGY: AAOx3, CNs grossly intact  SKIN: No rashes; no palpable lesions    LABS:                        7.3    24.77 )-----------( 672      ( 21 Dec 2020 08:52 )             24.9     12-21    133<L>  |  95<L>  |  18  ----------------------------<  202<H>  4.0   |  28  |  0.33<L>    Ca    8.3<L>      21 Dec 2020 08:52  Phos  3.2     12-21  Mg     1.6     12-21                  RADIOLOGY & ADDITIONAL TESTS: Dion Wilcox, PGY1  Internal Medicine  Pager #: [facility] [phone], [place] [phone]    Patient is a 65y old  Female who presents with a chief complaint of Weakness (21 Dec 2020 15:37)      SUBJECTIVE / OVERNIGHT EVENTS: Overnight, patient had another episode of hypotension and tachycardia and was given 1 L of NS bolus. She then had an episode of desaturation down to 60 for which she was put on a nonrebreather with improvement. Nonrebreather weaned back to NC in the morning. Patient seen this morning. She is lethargic but arousable but drifts off to sleep before she can answer questions. ROS unable to be obtained 2/2 to patient condition.     MEDICATIONS  (STANDING):  acetaminophen  IVPB .. 1000 milliGRAM(s) IV Intermittent once  DAPTOmycin IVPB 350 milliGRAM(s) IV Intermittent every 24 hours  enoxaparin Injectable 40 milliGRAM(s) SubCutaneous daily  famotidine    Tablet 20 milliGRAM(s) Oral two times a day  FLUoxetine Solution 20 milliGRAM(s) Oral daily  gabapentin   Solution 200 milliGRAM(s) Oral three times a day  methadone    Tablet 30 milliGRAM(s) Oral every 12 hours  multivitamin 1 Tablet(s) Oral daily  piperacillin/tazobactam IVPB.. 3.375 Gram(s) IV Intermittent every 8 hours  QUEtiapine 12.5 milliGRAM(s) Oral at bedtime  thiamine 100 milliGRAM(s) Oral daily    MEDICATIONS  (PRN):  acetaminophen   Tablet .. 650 milliGRAM(s) Oral every 6 hours PRN Mild Pain (1 - 3), Moderate Pain (4 - 6)  ALBUTerol    90 MICROgram(s) HFA Inhaler 2 Puff(s) Inhalation every 6 hours PRN Shortness of Breath and/or Wheezing  HYDROmorphone   Tablet 2 milliGRAM(s) Oral every 4 hours PRN Severe Pain (7 - 10)  LORazepam   Injectable 0.5 milliGRAM(s) IV Push two times a day PRN Anxiety  polyethylene glycol 3350 17 Gram(s) Oral daily PRN Constipation  senna 2 Tablet(s) Oral at bedtime PRN Constipation  traMADol 25 milliGRAM(s) Oral every 6 hours PRN Moderate Pain (4 - 6)      CAPILLARY BLOOD GLUCOSE      POCT Blood Glucose.: 79 mg/dL (22 Dec 2020 06:21)  POCT Blood Glucose.: 90 mg/dL (22 Dec 2020 00:09)  POCT Blood Glucose.: 82 mg/dL (21 Dec 2020 22:26)  POCT Blood Glucose.: 85 mg/dL (21 Dec 2020 17:37)  POCT Blood Glucose.: 165 mg/dL (21 Dec 2020 12:12)    I&O's Summary    21 Dec 2020 07:01  -  22 Dec 2020 07:00  --------------------------------------------------------  IN: 1575 mL / OUT: 500 mL / NET: 1075 mL    22 Dec 2020 07:01  -  22 Dec 2020 07:20  --------------------------------------------------------  IN: 0 mL / OUT: 350 mL / NET: -350 mL        PHYSICAL EXAM:  Vital Signs Last 24 Hrs  T(C): 37.1 (22 Dec 2020 04:29), Max: 38.6 (21 Dec 2020 18:17)  T(F): 98.8 (22 Dec 2020 04:29), Max: 101.5 (21 Dec 2020 18:17)  HR: 105 (22 Dec 2020 06:28) (91 - 128)  BP: 109/64 (22 Dec 2020 06:28) (74/42 - 144/77)  BP(mean): --  RR: 18 (22 Dec 2020 06:28) (18 - 32)  SpO2: 93% (22 Dec 2020 06:28) (68% - 100%)  CONSTITUTIONAL: NAD, lying in bed comfortably  EYES: EOMI, PERRLA; conjunctiva and sclera clear  ENMT: Moist oral mucosa; normal dentition  NECK: Supple, no palpable masses  RESPIRATORY: Lungs clear to ascultation b/l; No rales, ronchi, or wheezing; decreased right-sided breath sounds  CARDIOVASCULAR: Tachycardic but regular rhythm, normal S1 and S2, no murmurs, rubs, or gallops  ABDOMEN: Soft, nontender, nondistended, normal bowel sounds  MUSCULOSKELETAL: No joint swelling or tenderness to palpation  PSYCH: Affect appropriate  NEUROLOGY: AAOx0, CNs grossly intact  SKIN: No rashes; no palpable lesions    LABS:                        7.3    24.77 )-----------( 672      ( 21 Dec 2020 08:52 )             24.9     12-21    133<L>  |  95<L>  |  18  ----------------------------<  202<H>  4.0   |  28  |  0.33<L>    Ca    8.3<L>      21 Dec 2020 08:52  Phos  3.2     12-21  Mg     1.6     12-21                  RADIOLOGY & ADDITIONAL TESTS:

## 2020-12-22 NOTE — PROVIDER CONTACT NOTE (OTHER) - BACKGROUND
Admitted with weakness
pt admitted with weakness,  on tube feeding, c/o diarrhea every hour. h/o colon CA
Pt also had a fever at 4am
Admitted with weakness
admitting dx: weakness

## 2020-12-22 NOTE — PROVIDER CONTACT NOTE (CRITICAL VALUE NOTIFICATION) - BACKGROUND
NPO
Admitted for shortness of breath. Diagnosed with aspiration PNA.
Selena. Weakness
Selena. Weakness

## 2020-12-22 NOTE — PROVIDER CONTACT NOTE (CRITICAL VALUE NOTIFICATION) - SITUATION
Na=93, K=3.1, Cl=<70, Ca=5.6
Glucose 38
Patient's blood culture in an aerobic bottle, positive with gram positive cocci in pairs and clusters.
Blood Culture 12/10: growth in anaerobiotic bottle, gram positive cocci in clusters.
H&H 5.4 & 17.7
H&H 6.7/23.8

## 2020-12-22 NOTE — PROGRESS NOTE ADULT - ATTENDING COMMENTS
Patient seen and examined, agree with resident note.   This is a 65F with h/o emphysema on home 2L NC, colon CA s/p distal ileum resection in 2016, ESBL UTI, Zenker's diverticulum s/p repair in 2019 c/b vocal cord paralysis req G-J tube for feeding, RA, gastroparesis, GERD, anemia presenting from home hospice for weakness ,found to have RLL pneumonia (likely due to aspiration). Also found to have fluid collections in the hip.     1. Suspected aspiration pneumonia, sepsis  2. Coag neg staph bacteremia: staph epi and staph pettenkoferi, resolved sepsis – last + 12/10  3. Presacral fluid collection, possible infectious  4. Dysphagia, vocal cord paralysis, s/p G-J tube  5. h/o Ca colon, s/p distal ileum resection in 2016    - Continues to decline after likely aspiration event – meets sepsis criteria for fever, tachycardia, hypotension, leukocytosis. Invasive procedures not in line with overall GOC, Palliative following, to be transferred to PCU  - ID recommendations appreciated, continued on Zosyn and Daptomycin   - Psych recommendations appreciated    DNR/DNI.

## 2020-12-22 NOTE — PROVIDER CONTACT NOTE (OTHER) - REASON
Concern that patient is not tolerating tube feed rate at 30cc
Pt Hypotensive
Pt hypoxic, increased work of breathing, increase secretions
pt with continuous diarrhea and anxiety

## 2020-12-22 NOTE — PROVIDER CONTACT NOTE (CRITICAL VALUE NOTIFICATION) - ASSESSMENT
Glucose 38
Patient alert but confuse. Afebrile.
A&OX2-3, no s/s of distress noted, will continue to monitor.
A&OX3, no s/s of distress noted or reported.

## 2020-12-22 NOTE — CHART NOTE - NSCHARTNOTEFT_GEN_A_CORE
Called to beside to evaluate patient for hypotension. Patient was lethargic but denied chest pain or SOB. BPs initially 74/42. Gave 1L bolus of NS. BP improved to 92/57. Called again about 1 hour later that patient was desaturating to 60s. Patient with significant crackles and rhonci on exam. Patient was placed on nonrebreather with improvement in O2 sat. Patient was reassessed 2 hours later with improvement in breathing. Interval decrease in rhonchi/crackles on exam. Called to beside to evaluate patient for hypotension. Patient was lethargic but denied chest pain or SOB. BPs initially 74/42. Gave 1L bolus of NS. BP improved to 92/57. Called again about 1 hour later that patient was desaturating to 60s. Patient with significant crackles and rhonchi on exam. Patient was placed on nonrebreather with improvement in O2 sat. Patient was reassessed 2 hours later with improvement in breathing. Interval decrease in rhonchi/crackles on exam. Patient placed back on nasal cannula at 6L.

## 2020-12-22 NOTE — PROGRESS NOTE ADULT - PROBLEM SELECTOR PLAN 1
- PEG and PO feeds on hold now, given potential aspiration  - CXR with R consolidation, consistent with aspiration  - c/w zosyn (also on dapto for E. faecalis bacteremia)

## 2020-12-22 NOTE — PROGRESS NOTE ADULT - SUBJECTIVE AND OBJECTIVE BOX
[facility]  Santosh Stapleton PA-C  [address] [place] [address]  [phone]      INTERVAL HPI/OVERNIGHT EVENTS:    patient seen and examined  overnight events noted      MEDICATIONS  (STANDING):  acetaminophen  IVPB .. 1000 milliGRAM(s) IV Intermittent once  DAPTOmycin IVPB 350 milliGRAM(s) IV Intermittent every 24 hours  enoxaparin Injectable 40 milliGRAM(s) SubCutaneous daily  famotidine    Tablet 20 milliGRAM(s) Oral two times a day  FLUoxetine Solution 20 milliGRAM(s) Oral daily  gabapentin   Solution 200 milliGRAM(s) Oral three times a day  methadone    Tablet 30 milliGRAM(s) Oral every 12 hours  multivitamin 1 Tablet(s) Oral daily  piperacillin/tazobactam IVPB.. 3.375 Gram(s) IV Intermittent every 8 hours  simethicone 80 milliGRAM(s) Chew once  thiamine 100 milliGRAM(s) Oral daily    MEDICATIONS  (PRN):  acetaminophen   Tablet .. 650 milliGRAM(s) Oral every 6 hours PRN Mild Pain (1 - 3), Moderate Pain (4 - 6)  ALBUTerol    90 MICROgram(s) HFA Inhaler 2 Puff(s) Inhalation every 6 hours PRN Shortness of Breath and/or Wheezing  HYDROmorphone   Tablet 2 milliGRAM(s) Oral every 4 hours PRN Severe Pain (7 - 10)  LORazepam   Injectable 0.5 milliGRAM(s) IV Push two times a day PRN Anxiety  polyethylene glycol 3350 17 Gram(s) Oral daily PRN Constipation  senna 2 Tablet(s) Oral at bedtime PRN Constipation  traMADol 25 milliGRAM(s) Oral every 6 hours PRN Moderate Pain (4 - 6)      Allergies    Levaquin (Unknown)  levofloxacin (Unknown)    Intolerances        ROS:   unable to obtain      PHYSICAL EXAM:   Vital Signs:  Vital Signs Last 24 Hrs  T(C): 36.9 (19 Dec 2020 14:25), Max: 37.1 (18 Dec 2020 21:24)  T(F): 98.4 (19 Dec 2020 14:25), Max: 98.8 (18 Dec 2020 21:24)  HR: 83 (19 Dec 2020 05:05) (83 - 85)  BP: 110/68 (19 Dec 2020 14:25) (102/66 - 110/68)  BP(mean): --  RR: 18 (19 Dec 2020 14:25) (18 - 20)  SpO2: 99% (19 Dec 2020 14:25) (97% - 99%)  Daily     Daily     lethargic  frail  non toxic  soft, nt +gastrostomy, no drainage or surrounding erythema   no edema        LABS:                        7.2    10.81 )-----------( 694      ( 19 Dec 2020 07:08 )             24.5     12-19    133<L>  |  96  |  12  ----------------------------<  123<H>  4.1   |  27  |  <0.30<L>    Ca    8.3<L>      19 Dec 2020 07:08  Phos  3.1     12-19  Mg     1.7     12-19            RADIOLOGY & ADDITIONAL TESTS:

## 2020-12-22 NOTE — PROGRESS NOTE ADULT - PROBLEM SELECTOR PLAN 3
- c/w tramadol for moderate pain, used 0 doses/24 hours  - c/w dilaudid 2mg PO via PEG PRN for severe pain, used 0 dose/24 hours   - continue home dose of methadone  - unable to drain fluid collection in hip via IR

## 2020-12-22 NOTE — PROGRESS NOTE ADULT - PROBLEM SELECTOR PLAN 6
- will continue to follow  - goals of care ongoing; family initially thought patient was actively dying at [facility], but she rebounded and came to [facility], and she was improving here as well until downturn 12/21; daughter is HCP and has a realistic perspective of her mother's situation (she also works as a pediatric RN)  - transfer to PCU today at [place]

## 2020-12-22 NOTE — PROVIDER CONTACT NOTE (OTHER) - ASSESSMENT
BP = 99/62, HR = 110, POX =92% on 3.5L NC
VSS, no s/s of distress noted
see flowsheets
Pt a+ox1 hoarse voice, lethargic but arousable. 4L nc lung sounds coarse. No prior hx of CHF
Pt a+ox1-2 pulse ox on initial event 4L nc 68% rr: 32. Pallor in color. Struggling to breathe, use of accessory muscles noted

## 2020-12-22 NOTE — PROVIDER CONTACT NOTE (OTHER) - RECOMMENDATIONS
Adhere to MD orders recommend an IV bolus
Adhere to MD orders, pt suctioned oropharyngeal route, secretions difficult to extract. Chest PT performed. Pt placed on nonrebreather 15L, RR: 24-26
decrease tube feeds to 20 cc

## 2020-12-22 NOTE — PROGRESS NOTE ADULT - ASSESSMENT
leaking GJ tube  colonic leak  diarrhea  ? anastomotic leak    CXR noted   likely aspiration event  NPO  ok to use g/j tube  overall prognosis grim  DNR  d/w GI attending Dr. Loaiza

## 2020-12-22 NOTE — PROVIDER CONTACT NOTE (OTHER) - DATE AND TIME:
21-Dec-2020 16:33
22-Dec-2020 01:59
13-Dec-2020 17:10
18-Dec-2020 11:15
21-Dec-2020 21:21
21-Dec-2020 18:20
18-Dec-2020 00:05

## 2020-12-22 NOTE — PROVIDER CONTACT NOTE (OTHER) - ACTION/TREATMENT ORDERED:
MD to order Tylenol.
Give Methadone now if tube is flushing well.     RN follow up: tube flushed without leaking and medication was given.
As per MD De Los Santos, orders pending, will update RN on plan of care, to consult MAR. Reassess VS.
Bilateral Cataracts  removed  H/O laminectomy  cervical  1998  lumbar 1986,1998,2000  H/O shoulder replacement  b/l 2015/2016  H/O umbilical hernia repair    History of tonsillectomy    Prolapse, Uterovaginal  s/p sling  S/P Appendectomy    S/P Cholecystectomy    S/P foot surgery    S/P Hysterectomy Partial  1974  S/P Laparoscopic Fundoplication
MD aware and coming to assess the patient
MD ren.
Tube feeds decreased to 20cc
As per MD De Los Santos to assess pt further orders pending

## 2020-12-22 NOTE — PROGRESS NOTE ADULT - SUBJECTIVE AND OBJECTIVE BOX
[facility] to be [facility], full note to follow    ______________  Henrique Gustafson MD   of Geriatric and Palliative Medicine  [facility]     Please page the following number for clinical matters between the hours of 9AM and 5PM   from Monday through Friday : [phone]    After 5PM and on weekends, please page: [phone]. The Geriatric and Palliative Medicine consult service has 24/7 coverage for medical recommendations, including for symptom management needs.         HPI: 65F with PMH including emphysema on home 2L NC, colon CA s/p distal ileum resection in 2016, ESBL UTI, zenker's diverticulum s/p repair in 2019 c/b vocal cord paralysis s/p GJ tube for feeding, RA, gastroparesis, GERD, anemia presenting from hospice for weakness, likely from malnutrition vs infection (presacral fluid collection) vs RLL aspiration PNA. Patient is on broad-spectrum abx, and awaiting S&S evaluation. Has had enterococcal infection in pas, possible from leak at distal ileal anastomosis site. For pain, is on 30mg methadone BID at home. Daughter is HCP and is a pediatric RN in [place], DNR/DNI. Palliative called for GOC.    INTERVAL EVENTS:  12/10: patient denies any concerns  12/11: denies any concerns, appears comfortable  12/14: states having 8/10 pain in her hip, 3/10 is tolerable; much more alert today  12/15: continues to be alert, has pain in her hip but did not realize she had dilaudid ordered  12/16: continues to be alert, states pain is tolerable with dilaudid, and has a strong desire to eat; used dilaudid PO x 4, tramadol x 2/24 hours  12/17: alert, states being much happier after having PO   12/18: patient appears slightly more confused today, felt she was at her home yesterday; has intermittent but fleeting pain  12/21: patient more lethargic today, may have aspirated this morning  12/22: remains lethargic, but appears comfortable overall    ADVANCE DIRECTIVES:    DNR  Yes    MOLST  [ ]  Living Will  [ ]   DECISION MAKER(s):  [X ] Health Care Proxy(s)  [ ] Surrogate(s)  [ ] Guardian           Name(s): Phone Number(s): chriss Alfaro    BASELINE (I)ADL(s) (prior to admission):  [place]: [ ]Total  [ ] Moderate [ ]Dependent    Allergies    Levaquin (Unknown)  levofloxacin (Unknown)    Intolerances    MEDICATIONS  (STANDING):  acetaminophen  IVPB .. 1000 milliGRAM(s) IV Intermittent once  DAPTOmycin IVPB 350 milliGRAM(s) IV Intermittent every 24 hours  dextrose 40% Gel 15 Gram(s) Oral once  dextrose 5% + sodium chloride 0.45%. 1000 milliLiter(s) (50 mL/Hr) IV Continuous <Continuous>  dextrose 50% Injectable 25 Gram(s) IV Push once  dextrose 50% Injectable 12.5 Gram(s) IV Push once  dextrose 50% Injectable 25 Gram(s) IV Push once  enoxaparin Injectable 40 milliGRAM(s) SubCutaneous daily  famotidine    Tablet 20 milliGRAM(s) Oral two times a day  FLUoxetine Solution 20 milliGRAM(s) Oral daily  gabapentin   Solution 200 milliGRAM(s) Oral three times a day  glucagon  Injectable 1 milliGRAM(s) IntraMuscular once  methadone    Tablet 30 milliGRAM(s) Oral every 12 hours  multivitamin 1 Tablet(s) Oral daily  piperacillin/tazobactam IVPB.. 3.375 Gram(s) IV Intermittent every 8 hours  QUEtiapine 12.5 milliGRAM(s) Oral at bedtime  thiamine 100 milliGRAM(s) Oral daily    MEDICATIONS  (PRN):  acetaminophen   Tablet .. 650 milliGRAM(s) Oral every 6 hours PRN Mild Pain (1 - 3), Moderate Pain (4 - 6)  ALBUTerol    90 MICROgram(s) HFA Inhaler 2 Puff(s) Inhalation every 6 hours PRN Shortness of Breath and/or Wheezing  HYDROmorphone   Tablet 2 milliGRAM(s) Oral every 4 hours PRN Severe Pain (7 - 10)  LORazepam   Injectable 0.5 milliGRAM(s) IV Push two times a day PRN Anxiety  polyethylene glycol 3350 17 Gram(s) Oral daily PRN Constipation  senna 2 Tablet(s) Oral at bedtime PRN Constipation  traMADol 25 milliGRAM(s) Oral every 6 hours PRN Moderate Pain (4 - 6)        PRESENT SYMPTOMS: [X ] Limited ability to obtain due to lethargy  Source if other than patient:  [ ]Family   [ X]Team     Pain: [ ]yes [X ]no  QOL impact -   Location - L hip             Aggravating factors -    Quality -  Radiation -  Timing-  Severity (0-10 scale):    Minimal acceptable level (0-10 scale):      CPOT:    https://www.sccm.org/getattachment/tik00c79-8v0z-9p6d-3h6q-4661e1914p7j/Critical-Care-Pain-Observation-Tool-(CPOT)      PAIN AD Score: 0    http://geriatrictoolkit.[place].[facility]/cog/painad.pdf (press ctrl +  left click to view)    Dyspnea:                           [ ]Mild [ ]Moderate [ ]Severe  Anxiety:                             [ ]Mild [ ]Moderate [ ]Severe  Fatigue:                             [ ]Mild [ ]Moderate [ ]Severe  Nausea:                             [ ]Mild [ ]Moderate [ ]Severe  Loss of appetite:              [ ]Mild [ ]Moderate [ ]Severe  Constipation:                    [ ]Mild [ ]Moderate [ ]Severe    Other Symptoms:  [X ]All other review of systems negative     Palliative Performance Status Ver30%    http://[facility].org/files/news/palliative_performance_scale_ppsv2.pdf    PHYSICAL EXAM:  Vital Signs Last 24 Hrs  T(C): 36.8 (22 Dec 2020 08:58), Max: 38.6 (21 Dec 2020 18:17)  T(F): 98.2 (22 Dec 2020 08:58), Max: 101.5 (21 Dec 2020 18:17)  HR: 108 (22 Dec 2020 08:58) (91 - 128)  BP: 95/69 (22 Dec 2020 08:58) (74/42 - 144/77)  BP(mean): --  RR: 18 (22 Dec 2020 08:58) (18 - 32)  SpO2: 92% (22 Dec 2020 08:58) (68% - 100%)    GENERAL:  [ ]Alert  [ ]Oriented x   [x ]Lethargic  [ ]Cachexia  [ ]Unarousable  [x ]Verbal  [ ]Non-Verbal  Behavioral:   [ ] Anxiety  [ ] Delirium [ ] Agitation [ ] Other  HEENT:  [ ]Normal   [ ]Dry mouth   [ ]ET Tube/Trach  [ ]Oral lesions  PULMONARY:   [x ]Clear [ ]Tachypnea  [ ]Audible excessive secretions [X] No labored breathing  [ ]Rhonchi        [ ]Right [ ]Left [ ]Bilateral  [ ]Crackles        [ ]Right [ ]Left [ ]Bilateral  [ ]Wheezing     [ ]Right [ ]Left [ ]Bilateral  [ ]Diminished breath sounds [ ]right [ ]left [ ]bilateral  CARDIOVASCULAR:    [ x]Regular [ ]Irregular [ ]Tachy  [ ]Roge [ ]Murmur [ ]Other  GASTROINTESTINAL:  [ x]Soft  [ ]Distended   [x ]+BS  [x ]Non tender [ ]Tender  [ ]PEG [ ]OGT/ NGT  Last BM:   GENITOURINARY:  [ ]Normal [ ] Incontinent   [ ]Oliguria/Anuria   [ ]Dalal  MUSCULOSKELETAL:   [ ]Normal   [ ]Weakness  [ ]Bed/Wheelchair bound [ ]Edema  NEUROLOGIC:   [ ]No focal deficits  [X ]Cognitive impairment  [ ]Dysphagia [ ]Dysarthria [ ]Paresis [ ]Other   SKIN:   [ ]Normal    [ ]Rash  [ ]Pressure ulcer(s)       Present on admission [ ]y [ ]n    CRITICAL CARE:  [ ] Shock Present  [ ]Septic [ ]Cardiogenic [ ]Neurologic [ ]Hypovolemic  [ ]  Vasopressors [ ]  Inotropes   [ ]Respiratory failure present [ ]Mechanical ventilation [ ]Non-invasive ventilatory support [ ]High flow  [ ]Acute  [ ]Chronic [ ]Hypoxic  [ ]Hypercarbic [ ]Other  [ ]Other organ failure     LABS: reviewed                          7.1    24.43 )-----------( 687      ( 22 Dec 2020 07:30 )             24.6     12-22    135  |  97  |  20  ----------------------------<  38<LL>  4.3   |  23  |  <0.30<L>    Ca    8.2<L>      22 Dec 2020 07:21  Phos  3.2     12-22  Mg     1.9     12-22      RADIOLOGY & ADDITIONAL STUDIES:  CT A/P 12/7/2020    Right lower lobe consolidation and bilateral patchy lung opacities suspicious for multifocal pneumonia.    Ill-defined gas-containing presacral fluid collection and/or phlegmon which tracks along to the left hip joint. Findings consistent with infection. MRI with contrast is recommended to further evaluate.    PROTEIN CALORIE MALNUTRITION PRESENT: [ ]mild [ ]moderate [ ]severe [ ]underweight [ ]morbid obesity  https://www.andeal.org/vault/2440/web/files/ONC/Table_Clinical%20Characteristics%20to%20Document%20Malnutrition-White%20JV%20et%20al%979942.pdf    Height (cm): 152.4 (12-07-20 @ 23:10), 152.4 (11-27-20 @ 20:43)  Weight (kg): 42.3 (12-07-20 @ 23:10), 44.4 (11-27-20 @ 20:43)  BMI (kg/m2): 18.2 (12-07-20 @ 23:10), 19.1 (11-27-20 @ 20:43)    [ ]PPSV2 < or = to 30% [ ]significant weight loss  [ ]poor nutritional intake  [ ]anasarca     Albumin, Serum: 2.7 g/dL (12-08-20 @ 07:22)   [ ]Artificial Nutrition      REFERRALS:   [ ]Chaplaincy  [ ]Hospice  [ ]Child Life  [ ]Social Work  [ ]Case management [ ]Holistic Therapy     Goals of Care Document: CRISTIAN Monroe (12-02-20 @ 18:00)  Goals of Care Conversation:   Participants:  · Participants  Patient; Family  · Child(chioma)  Missy Thomas    Advance Directives:  · Does patient have Advance Directive  No  · Does Patient Have a Surrogate  Yes  · Surrogate's Name  Missy Thomas  · Surrogate's Phone Number  [phone]  · Caregiver:  yes  · Name  Sheeba Cardoso  · Phone Number  [place]    Conversation Discussion:  · Conversation  Diagnosis; Prognosis; Treatment Options; Palliative Care Referral  · Conversation Details  -diagnostic findings including anastomotic leak, thoracic discitis/osteo, sacral abscess  -treatment options including abx, surgery  -diagnostic options including CT scan, colonoscopy   -poor nutrition and overall health status   -comfort care, hospice    What Matters Most To Patient and Family:  · What matters most to patient and family  treatment, comfort    Personal Advance Directives Treatment Guidelines:   Treatment Guidelines:  · Decision Maker  Surrogate    Location of Discussion:   Duration of Advanced Care Planning Meeting:  · Time spent (in minutes)  25    Location of Discussion:  · Location of discussion  Telephone      Electronic Signatures:  Maximilian Monroe)  (Signed 02-Dec-2020 18:02)  	Authored: Goals of Care Conversation, Personal Advance Directives Treatment Guidelines, Location of Discussion      Last Updated: 02-Dec-2020 18:02 by Maximilian Monroe)        ______________  Henrique Gustafson MD   of Geriatric and Palliative Medicine  [facility]     Please page the following number for clinical matters between the hours of 9AM and 5PM   from Monday through Friday : [phone]    After 5PM and on weekends, please page: [phone]. The Geriatric and Palliative Medicine consult service has 24/7 coverage for medical recommendations, including for symptom management needs.

## 2020-12-22 NOTE — PROGRESS NOTE ADULT - SUBJECTIVE AND OBJECTIVE BOX
Follow Up:  bacteremia, gluteal and sacral abscess with osteo    Interval History: pt again febrile last night with hypotension, worsening resp status and also hypoglycemia    ROS:      All other systems negative    Constitutional: + fever  Cardiovascular:  no chest pain, no palpitation  Respiratory:  no SOB  GI:  no abd pain, no vomiting  urinary: no dysuria, no hematuria, no flank pain  musculoskeletal: no joint swelling, slight L hip pain  skin:  no rash  neurology:  no headache, no seizure      Allergies  Levaquin (Unknown)  levofloxacin (Unknown)        ANTIMICROBIALS:  DAPTOmycin IVPB 350 every 24 hours  piperacillin/tazobactam IVPB.. 3.375 every 8 hours      OTHER MEDS:  acetaminophen   Tablet .. 650 milliGRAM(s) Oral every 6 hours PRN  acetaminophen  IVPB .. 1000 milliGRAM(s) IV Intermittent once  ALBUTerol    90 MICROgram(s) HFA Inhaler 2 Puff(s) Inhalation every 6 hours PRN  dextrose 40% Gel 15 Gram(s) Oral once  dextrose 5% + sodium chloride 0.45%. 1000 milliLiter(s) IV Continuous <Continuous>  dextrose 50% Injectable 25 Gram(s) IV Push once  dextrose 50% Injectable 12.5 Gram(s) IV Push once  dextrose 50% Injectable 25 Gram(s) IV Push once  enoxaparin Injectable 40 milliGRAM(s) SubCutaneous daily  famotidine    Tablet 20 milliGRAM(s) Oral two times a day  FLUoxetine Solution 20 milliGRAM(s) Oral daily  gabapentin   Solution 200 milliGRAM(s) Oral three times a day  glucagon  Injectable 1 milliGRAM(s) IntraMuscular once  HYDROmorphone   Tablet 2 milliGRAM(s) Oral every 4 hours PRN  LORazepam   Injectable 0.5 milliGRAM(s) IV Push two times a day PRN  methadone    Tablet 30 milliGRAM(s) Oral every 12 hours  multivitamin 1 Tablet(s) Oral daily  polyethylene glycol 3350 17 Gram(s) Oral daily PRN  QUEtiapine 12.5 milliGRAM(s) Oral at bedtime  senna 2 Tablet(s) Oral at bedtime PRN  thiamine 100 milliGRAM(s) Oral daily  traMADol 25 milliGRAM(s) Oral every 6 hours PRN      Vital Signs Last 24 Hrs  T(C): 36.8 (22 Dec 2020 08:58), Max: 38.6 (21 Dec 2020 18:17)  T(F): 98.2 (22 Dec 2020 08:58), Max: 101.5 (21 Dec 2020 18:17)  HR: 108 (22 Dec 2020 08:58) (91 - 128)  BP: 95/69 (22 Dec 2020 08:58) (74/42 - 144/77)  BP(mean): --  RR: 18 (22 Dec 2020 08:58) (18 - 32)  SpO2: 92% (22 Dec 2020 08:58) (68% - 100%)    Physical Exam:  General:    NAD,  non toxic  Head: atraumatic, normocephalic  Eye: normal sclera and conjunctiva  ENT:    no oropharyngeal lesions,   no LAD,   neck supple  Cardio:     regular S1, S2,  no murmur  Respiratory:    clear b/l,    no wheezing  abd:     soft,   BS +,   no tenderness  :   no CVAT,  no suprapubic tenderness,   no  goldman  Musculoskeletal:   no joint swelling  vascular: no phlebitis  Skin:    no rash  Neurologic:     no focal deficit  psych: normal affect                          7.1    24.43 )-----------( 687      ( 22 Dec 2020 07:30 )             24.6       12-22    135  |  97  |  20  ----------------------------<  38<LL>  4.3   |  23  |  <0.30<L>    Ca    8.2<L>      22 Dec 2020 07:21  Phos  3.2     12-22  Mg     1.9     12-22            MICROBIOLOGY:  v  .Urine Clean Catch (Midstream)  12-21-20   <10,000 CFU/mL Normal Urogenital Ami  --  --      .Blood Blood-Peripheral  12-21-20   No growth to date.  --  --      .Blood Blood-Peripheral  12-13-20   No Growth Final  --  --      .Blood Blood-Peripheral  12-10-20   No Growth Final  --    Growth in anaerobic bottle: Gram Positive Cocci in Clusters      .Blood Blood-Venous  12-09-20   No Growth Final  --  --      .Blood Blood-Peripheral  12-09-20   No Growth Final  --  --      .Urine Clean Catch (Midstream)  12-08-20   <10,000 CFU/mL Normal Urogenital Ami  --  --      .Blood Blood-Peripheral  12-07-20   No Growth Final  --  --      .Blood Blood-Peripheral  12-07-20   Growth in aerobic bottle: Staphylococcus pettenkoferi Coag Negative  Staphylococcus  Single set isolate, possible contaminant. Contact  Microbiology if susceptibility testing clinically  indicated.  Growth in aerobic bottle: Gram Positive Cocci in Pairs and Chains  NONVIABLE UPON REPEATED SUBCULTURES  "Due to technical problems, Proteus sp. will Not be reported as part of  the BCID panel until further notice"  ***Blood Panel PCR results on this specimen are available  approximately 3 hours after the Gram stain result.***  Gram stain, PCR, and/or culture results may not always  correspond due to difference in methodologies.  ************************************************************  This PCR assay was performed using [employer].  The following targets are tested for: Enterococcus,  vancomycin resistant enterococci, Listeria monocytogenes,  coagulase negative staphylococci, S. aureus,  methicillin resistant S. aureus, Streptococcus agalactiae  (Group B), S. pneumoniae, S. pyogenes (Group A),  Acinetobacter baumannii, Enterobacter cloacae, E. coli,  Klebsiella oxytoca, K. pneumoniae, Proteus sp.,  Serratia marcescens, Haemophilus influenzae,  Neisseria meningitidis, Pseudomonas aeruginosa, Candida  albicans, C. glabrata, C krusei, C parapsilosis,  C. tropicalis and the KPC resistance gene.  --  Blood Culture PCR      .Abscess Hip - Left  11-30-20   Rare Enterococcus faecium  --  Enterococcus faecium      .Blood Blood-Peripheral  11-29-20   No Growth Final  --  --      .Urine Clean Catch (Midstream)  11-28-20   <10,000 CFU/mL Normal Urogenital Ami  --  --      .Blood Blood-Peripheral  11-27-20   No Growth Final  --  Blood Culture PCR  Enterococcus faecalis                RADIOLOGY:  Images independently visualized and reviewed personally, findings as below  < from: Xray Chest 1 View- PORTABLE-Urgent (Xray Chest 1 View- PORTABLE-Urgent .) (12.22.20 @ 03:17) >  IMPRESSION:    Right perihilar consolidative opacity. Improved aeration of the right lung base. The left lung is clear..      < end of copied text >  < from: CT Abdomen and Pelvis w/ IV Cont (12.16.20 @ 20:58) >  Increased left hip fluid collection which extends along the presacral space and into the right hip.    Small amount of fluid in the distal esophagus with patchy opacities in the right lower lobe, suspicious for aspiration.    Approximately 40 mL of Omnipaque 350 extravasated into the patient's left forearm. Patient was seen by Dr. Doe.Neurosensory examination remained intact. Difficulty was applied and arm was elevated.. ANA Mesa was notified on December 15, 2020 at approximately 3:27 PM.

## 2020-12-22 NOTE — PROVIDER CONTACT NOTE (CRITICAL VALUE NOTIFICATION) - ACTION/TREATMENT ORDERED:
Repeat labs
MD already aware, repeat H&H and T&S sent.
79 on FS
Will obtain blood culture as ordered.
Continue with treatment as per order.
Pt will receive 1 unit PRBC as per order.

## 2020-12-22 NOTE — PROGRESS NOTE ADULT - PROBLEM SELECTOR PLAN 1
- RLL consolidation w/ leukocytosis w/ hx of vocal cord paralysis and oral intake at home   - c/w Zosyn, now s/p vanc and switched to daptomycin per ID  - weekly CPK per ID while on daptomycin, last CPK 32 on 12/10   - NPO per S&S, but pt now allowed to have pleasure feeds  - Pt with another episode of aspiration pneumonia given CXR and clinical findings, will place on NPO for now, to discuss hospice care and pleasure feeds again with patient and family  - CXR this morning with new right basilar consolidation and right pleural effusion    - continue current antibiotics per ID; pt's family amenable to IV antibiotics - RLL consolidation w/ leukocytosis w/ hx of vocal cord paralysis and oral intake at home   - c/w Zosyn, now s/p vanc and switched to daptomycin per ID  - weekly CPK per ID while on daptomycin, last CPK 32 on 12/10   - NPO per S&S, but pt now allowed to have pleasure feeds  - Pt with another episode of aspiration pneumonia given CXR and clinical findings, will place on NPO for now, to discuss hospice care and pleasure feeds again with patient and family  - CXR this morning with new right basilar consolidation and right pleural effusion    - continue current antibiotics per ID; pt's family amenable to IV antibiotics  - patient currently pending transfer to PCU at [facility]

## 2020-12-22 NOTE — PROVIDER CONTACT NOTE (OTHER) - SITUATION
Pt did not get Methadone this morning (usually scheduled for 6am) because she was NPO due to a leaking tube.
Updates:  Stool and sputum cultures have not been sent because pt has not had either.  Pt has not voided and straight cath was 500cc.  POX is stable at 94% on 3.5L
Concern that patient is not tolerating tube feed rate at 30cc. Patient was getting tube feeds at home at 20cc and per daughter she could not tolerate >20. Patient had 4 loose BMs on shift. No laxative
Pt's oral temp is 101.5. There is no Tylenol order for fever
Pt found increased respirations, rhonchi on auscultation, reports struggle to breath
Pt hypotensive upon arrive to unit, bp: 74/42 manual BP
pt with continuous diarrhea and anxiety

## 2020-12-22 NOTE — PROGRESS NOTE ADULT - ASSESSMENT
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 rectosigmoid anastomosis leak, L hip abscess and myositis extending to trochanter bursa, but no hip effusion, also R hip myositis and ?forming abscess, sacral osteo with phlegmon involving the central canal  s/p IR drainage of L hip abscess which grew enterococcus  faecalis R to vanco and amp (I called the lab and it is sensitive to dapto and linezolid)  blood cx showed E. faecalis S to amp  pt was deteriorating so she was discharged to hospice with no antibiotics but there she improved clinically and was sent back to the hospital, she is confused but stable  blood cx 11/7 now with GPC in pairs and chains but not on PCR panel so not enterococcus  WBC 16-18  abd/pelvis CT: Right lower lobe consolidation and bilateral patchy lung opacities suspicious for multifocal pneumonia. Ill-defined gas-containing presacral fluid collection and/or phlegmon which tracks along to the left hip joint.    leukocytosis with L hip abscess, myositis s/p IR drain 11/30 which showed VRE (S to dapto and linezolid)  also sacral osteo and abscess with no decubitus so the source is likely in the abdomen, there was question of rectosigmoid anastomosis leak on initial CTs but that surgery was long time ago and pt had no complications after that, the J tube however was  placed 6 weeks ago  repeat CT 12/16 showed increase size of the L hip abscess to 8.8 cm tracking to presacral space and R hip, RLL patchy opacities s/o aspiration  E. faecalis bacteremia   2 different coag neg staph bacteremia: staph epi and staph pettenkoferi, ?contaminant , repeat cultures 12/13 negative   malnutrition, dysphagia and risk of aspiration  new fever and leukocytosis, with new consolidation, ?aspiration, pt on appropriate coverage, but also there was concern about leaking the J tube     *  blood cx and urine cx negative but pt still febrile with hypotension, tachycardia and worsening resp status  * IR did not believe there is a drainable collection but now repeat CT is showing increased size of the abscess, IR still believed it is unchanged, family not willing to proceed with ortho eval and any surgical drainage  * c/w dapto 8 mg/kg and monitor the CK, started 12/10  * c/w zosyn for now to cover for the ?anastomotic leak, abdominal source and also aspiration, antibiotics started 12/7, now day 16  * now palliative is involved and plan for PCU  * if within goals of care, will need a PICC line and 6 week course of IV antibiotics     The above assessment and plan was discussed with the medicine resident    Maria Antonia Hunter MD  Pager [phone]  After 5pm and on weekends call [phone]

## 2020-12-22 NOTE — PROGRESS NOTE ADULT - PROBLEM SELECTOR PLAN 2
- Pt w/ minimal nutrition at home hospice and cachectic on exam w/ elevated ketones on UA likely cause of weakness   - NPO per S&S eval (failed MBS) and currently for latest episode of aspiration pneumonia, will need to re-discuss hospice and pleasure feeds

## 2020-12-23 NOTE — CHART NOTE - NSCHARTNOTESELECT_GEN_ALL_CORE
Event Note
Malnutrition Notification
Event Note
Nutrition Services
Speech and Swallow

## 2020-12-23 NOTE — PROGRESS NOTE ADULT - SUBJECTIVE AND OBJECTIVE BOX
Dion Wilcox, PGY1  Internal Medicine  Pager #: [phone], [place] [phone]    Patient is a 65y old  Female who presents with a chief complaint of Weakness (22 Dec 2020 15:28)      SUBJECTIVE / OVERNIGHT EVENTS:  ADDITIONAL REVIEW OF SYSTEMS:    MEDICATIONS  (STANDING):  acetaminophen  IVPB .. 1000 milliGRAM(s) IV Intermittent once  DAPTOmycin IVPB 350 milliGRAM(s) IV Intermittent every 24 hours  dextrose 40% Gel 15 Gram(s) Oral once  dextrose 50% Injectable 25 Gram(s) IV Push once  dextrose 50% Injectable 12.5 Gram(s) IV Push once  dextrose 50% Injectable 25 Gram(s) IV Push once  enoxaparin Injectable 40 milliGRAM(s) SubCutaneous daily  famotidine    Tablet 20 milliGRAM(s) Oral two times a day  FLUoxetine Solution 20 milliGRAM(s) Oral daily  gabapentin   Solution 200 milliGRAM(s) Oral three times a day  glucagon  Injectable 1 milliGRAM(s) IntraMuscular once  methadone    Tablet 30 milliGRAM(s) Oral every 12 hours  multivitamin 1 Tablet(s) Oral daily  piperacillin/tazobactam IVPB.. 3.375 Gram(s) IV Intermittent every 8 hours  QUEtiapine 12.5 milliGRAM(s) Oral at bedtime  thiamine 100 milliGRAM(s) Oral daily    MEDICATIONS  (PRN):  acetaminophen   Tablet .. 650 milliGRAM(s) Oral every 6 hours PRN Mild Pain (1 - 3), Moderate Pain (4 - 6)  ALBUTerol    90 MICROgram(s) HFA Inhaler 2 Puff(s) Inhalation every 6 hours PRN Shortness of Breath and/or Wheezing  glycopyrrolate Injectable 0.4 milliGRAM(s) IV Push every 6 hours PRN excessive secretions  HYDROmorphone   Tablet 2 milliGRAM(s) Oral every 4 hours PRN Severe Pain (7 - 10)  HYDROmorphone  Injectable 0.2 milliGRAM(s) IV Push every 1 hour PRN dyspnea or RR>28  LORazepam   Injectable 0.25 milliGRAM(s) IV Push every 1 hour PRN anxiety or rr>28  polyethylene glycol 3350 17 Gram(s) Oral daily PRN Constipation  senna 2 Tablet(s) Oral at bedtime PRN Constipation  traMADol 25 milliGRAM(s) Oral every 6 hours PRN Moderate Pain (4 - 6)      CAPILLARY BLOOD GLUCOSE      POCT Blood Glucose.: 107 mg/dL (23 Dec 2020 04:17)  POCT Blood Glucose.: 85 mg/dL (22 Dec 2020 22:25)  POCT Blood Glucose.: 181 mg/dL (22 Dec 2020 13:25)  POCT Blood Glucose.: 194 mg/dL (22 Dec 2020 13:11)  POCT Blood Glucose.: 64 mg/dL (22 Dec 2020 12:36)  POCT Blood Glucose.: 79 mg/dL (22 Dec 2020 08:45)    I&O's Summary    22 Dec 2020 07:01  -  23 Dec 2020 07:00  --------------------------------------------------------  IN: 300 mL / OUT: 350 mL / NET: -50 mL        PHYSICAL EXAM:  Vital Signs Last 24 Hrs  T(C): 36.7 (23 Dec 2020 00:52), Max: 37.1 (22 Dec 2020 17:55)  T(F): 98.1 (23 Dec 2020 00:52), Max: 98.8 (22 Dec 2020 17:55)  HR: 110 (23 Dec 2020 00:52) (107 - 110)  BP: 97/62 (23 Dec 2020 00:52) (95/69 - 97/62)  BP(mean): --  RR: 20 (23 Dec 2020 00:52) (18 - 22)  SpO2: 92% (23 Dec 2020 00:52) (92% - 96%)  CONSTITUTIONAL: NAD, lying in bed comfortably  EYES: EOMI, PERRLA; conjunctiva and sclera clear  ENMT: Moist oral mucosa; normal dentition  NECK: Supple, no palpable masses  RESPIRATORY: Lungs clear to ascultation b/l; No rales, ronchi, or wheezing; Unlabored respirations  CARDIOVASCULAR: Regular rate and rhythm, normal S1 and S2, no murmurs, rubs, or gallops  ABDOMEN: Soft, nontender, nondistended, normal bowel sounds  MUSCULOSKELETAL: No joint swelling or tenderness to palpation  PSYCH: Affect appropriate  NEUROLOGY: AAOx3, CNs grossly intact  SKIN: No rashes; no palpable lesions    LABS:                        7.1    24.43 )-----------( 687      ( 22 Dec 2020 07:30 )             24.6     12-22    135  |  97  |  20  ----------------------------<  38<LL>  4.3   |  23  |  <0.30<L>    Ca    8.2<L>      22 Dec 2020 07:21  Phos  3.2     12-22  Mg     1.9     12-22                Culture - Urine (collected 21 Dec 2020 11:12)  Source: .Urine Clean Catch (Midstream)  Final Report (22 Dec 2020 10:10):    <10,000 CFU/mL Normal Urogenital Ami    Culture - Blood (collected 21 Dec 2020 09:01)  Source: .Blood Blood-Peripheral  Preliminary Report (22 Dec 2020 10:01):    No growth to date.    Culture - Blood (collected 21 Dec 2020 09:01)  Source: .Blood Blood-Peripheral  Preliminary Report (22 Dec 2020 10:01):    No growth to date.        RADIOLOGY & ADDITIONAL TESTS:

## 2020-12-23 NOTE — PROGRESS NOTE ADULT - PROBLEM SELECTOR PLAN 6
- s/p distal ileal resection in 2016, not on active DMT DNR/DNI, MOLST in chart  -Pt in dying process, communicated with family. They are planning on visiting today from [place].

## 2020-12-23 NOTE — PROGRESS NOTE ADULT - SUBJECTIVE AND OBJECTIVE BOX
GAP TEAM PALLIATIVE CARE UNIT PROGRESS NOTE:      [  ] Patient on hospice program.    INDICATION FOR PALLIATIVE CARE UNIT SERVICES:    INTERVAL HPI/OVERNIGHT EVENTS: No major overnight events. Pt received  one PRN od Dilaudid 0.2mg IV for dyspnea    DNR on chart: Yes    Allergies:  Levaquin (Unknown)  levofloxacin (Unknown)      MEDICATIONS  (STANDING):  DAPTOmycin IVPB 350 milliGRAM(s) IV Intermittent every 24 hours  dextrose 40% Gel 15 Gram(s) Oral once  dextrose 50% Injectable 25 Gram(s) IV Push once  dextrose 50% Injectable 12.5 Gram(s) IV Push once  dextrose 50% Injectable 25 Gram(s) IV Push once  enoxaparin Injectable 40 milliGRAM(s) SubCutaneous daily  famotidine    Tablet 20 milliGRAM(s) Oral two times a day  FLUoxetine Solution 20 milliGRAM(s) Oral daily  gabapentin   Solution 200 milliGRAM(s) Oral three times a day  glucagon  Injectable 1 milliGRAM(s) IntraMuscular once  glycopyrrolate Injectable 0.4 milliGRAM(s) IV Push every 6 hours  methadone    Tablet 30 milliGRAM(s) Oral every 12 hours  piperacillin/tazobactam IVPB.. 3.375 Gram(s) IV Intermittent every 8 hours  QUEtiapine 12.5 milliGRAM(s) Oral at bedtime    MEDICATIONS  (PRN):  acetaminophen   Tablet .. 650 milliGRAM(s) Oral every 6 hours PRN Mild Pain (1 - 3), Moderate Pain (4 - 6)  ALBUTerol    90 MICROgram(s) HFA Inhaler 2 Puff(s) Inhalation every 6 hours PRN Shortness of Breath and/or Wheezing  bisacodyl Suppository 10 milliGRAM(s) Rectal daily PRN Constipation  glycopyrrolate Injectable 0.4 milliGRAM(s) IV Push every 6 hours PRN excessive secretions  HYDROmorphone  Injectable 0.2 milliGRAM(s) IV Push every 1 hour PRN Severe Pain (7 - 10)  HYDROmorphone  Injectable 0.2 milliGRAM(s) IV Push every 1 hour PRN dyspnea or RR>28  LORazepam   Injectable 0.5 milliGRAM(s) IV Push every 1 hour PRN Anxiety/Agitation    ITEMS UNCHECKED ARE NOT PRESENT    PRESENT SYMPTOMS: [ X]Unable to obtain due to poor mentation   Source if other than patient:  [ ]Family   [X ]Team     Pain: [ ] yes [X ] no  QOL impact -   Location -                    Aggravating factors -  Quality -  Radiation -  Timing-  Severity (0-10 scale):  Minimal acceptable level (0-10 scale):     Dyspnea:                           [X ]Mild [ ]Moderate [ ]Severe  Anxiety:                             [ ]Mild [ ]Moderate [ ]Severe  Fatigue:                             [ ]Mild [ ]Moderate [ ]Severe  Nausea:                             [ ]Mild [ ]Moderate [ ]Severe  Loss of appetite:              [ ]Mild [ ]Moderate [ ]Severe  Constipation:                    [ ]Mild [ ]Moderate [ ]Severe    PAINAD Score: 0    [email or web address] (Ctrl +  left click to view)  		  Other Symptoms:  [ ]All other review of systems negative     Palliative Performance Status Version 2:    10 %         http://npcrc.org/files/news/palliative_performance_scale_ppsv2.pdf  PHYSICAL EXAM:  Vital Signs Last 24 Hrs  T(C): 36.5 (23 Dec 2020 08:08), Max: 37.1 (22 Dec 2020 17:55)  T(F): 97.7 (23 Dec 2020 08:08), Max: 98.8 (22 Dec 2020 17:55)  HR: 104 (23 Dec 2020 08:08) (104 - 110)  BP: 91/53 (23 Dec 2020 08:08) (91/53 - 97/62)  BP(mean): --  RR: 22 (23 Dec 2020 08:08) (20 - 22)  SpO2: 92% (23 Dec 2020 08:08) (92% - 96%) I&O's Summary    22 Dec 2020 07:01  -  23 Dec 2020 07:00  --------------------------------------------------------  IN: 300 mL / OUT: 350 mL / NET: -50 mL    GENERAL:  [ ]Alert  [ ]Oriented x   [X ]Lethargic  [ ]Cachexia  [ ]Unarousable  [ ]Verbal  [ X]Non-Verbal  Behavioral:   [ ] Anxiety  [ ] Delirium [ ] Agitation [ ] Other  HEENT:  [ ]Normal   [X ]Dry mouth   [ ]ET Tube/Trach  [ ]Oral lesions  PULMONARY:   [X ]Clear [ ]Tachypnea  [ ]Audible excessive secretions   [ ]Rhonchi        [ ]Right [ ]Left [ ]Bilateral  [ ]Crackles        [ ]Right [ ]Left [ ]Bilateral  [ ]Wheezing     [ ]Right [ ]Left [ ]Bilateral  [ ]Diminshed BS [ ]Right [ ]Left [ ]Bilateral    CARDIOVASCULAR:    [X ]Regular [ ]Irregular [ ]Tachy  [ ]Roge [ ]Murmur [ ]Other  GASTROINTESTINAL:  [X ]Soft  [ ]Distended   [ ]+BS  [X ]Non tender [ ]Tender  [ ]PEG [ ]OGT/ NGT   Last BM:   12/22/2020    GENITOURINARY:  [ ]Normal [ X] Incontinent   [ ]Oliguria/Anuria   [ ]Dalal  MUSCULOSKELETAL:   [ ]Normal   [ X]Weakness  [ ]Bed/Wheelchair bound [ ]Edema  NEUROLOGIC:   [ ]No focal deficits  [ X] Cognitive impairment  [ ] Dysphagia [ ]Dysarthria [ ] Paresis [ ]Other   SKIN:   [X ]Normal  [ ]Rash     [ ]Pressure ulcer(s)  [ ]y [ ]n  Present on admission      CRITICAL CARE:  [ ] Shock Present  [ ]Septic [ ]Cardiogenic [ ]Neurologic [ ]Hypovolemic  [ ]  Vasopressors [ ]  Inotropes   [ ] Respiratory failure present [ ] Mechanical Ventilation [ ] Non-invasive ventilatory support [ ] High-Flow  [ ] Acute  [ ] Chronic [ ] Hypoxic  [ ] Hypercarbic [ ] Other  [ ] Other organ failure     LABS:                        7.1    24.43 )-----------( 687      ( 22 Dec 2020 07:30 )             24.6   12-22    135  |  97  |  20  ----------------------------<  38<LL>  4.3   |  23  |  <0.30<L>    Ca    8.2<L>      22 Dec 2020 07:21  Phos  3.2     12-22  Mg     1.9     12-22          RADIOLOGY & ADDITIONAL STUDIES: No new imaging    PROTEIN CALORIE MALNUTRITION: [ ] mild [ ] moderate [X ] severe  [ ] underweight [ ] morbid obesity    https://www.andeal.org/vault/6030/web/files/ONC/Table_Clinical%20Characteristics%20to%20Document%20Malnutrition-White%20JV%20et%20al%288546.pdf    Height (cm): 152.4 (12-07-20 @ 23:10), 152.4 (11-27-20 @ 20:43)  Weight (kg): 42.3 (12-07-20 @ 23:10), 44.4 (11-27-20 @ 20:43)  BMI (kg/m2): 18.2 (12-07-20 @ 23:10), 19.1 (11-27-20 @ 20:43)    [ X] PPSV2 < or = 30% [ ] significant weight loss [ X] poor nutritional intake [ ] anasarca Albumin, Serum: 3.0 g/dL (12-13-20 @ 06:48)    Artificial Nutrition [ ]     REFERRALS:   [ ]Chaplaincy  [ ] Hospice  [ ]Child Life  [ ]Social Work  [ ]Case management [ ]Holistic Therapy [ ] Physical Therapy [ ] Dietary   Goals of Care Document: CRISTIAN Monroe (12-02-20 @ 18:00)  Goals of Care Conversation:   Participants:  · Participants  Patient; Family  · Child(chioma)  Missy Thomas    Advance Directives:  · Does patient have Advance Directive  No  · Does Patient Have a Surrogate  Yes  · Surrogate's Name  Missy Thomas  · Surrogate's Phone Number  [phone]  · Caregiver:  yes  · Name  Sheeba Cardoso  · Phone Number  [facility]    Conversation Discussion:  · Conversation  Diagnosis; Prognosis; Treatment Options; Palliative Care Referral  · Conversation Details  -diagnostic findings including anastomotic leak, thoracic discitis/osteo, sacral abscess  -treatment options including abx, surgery  -diagnostic options including CT scan, colonoscopy   -poor nutrition and overall health status   -comfort care, hospice    What Matters Most To Patient and Family:  · What matters most to patient and family  treatment, comfort    Personal Advance Directives Treatment Guidelines:   Treatment Guidelines:  · Decision Maker  Surrogate    Location of Discussion:   Duration of Advanced Care Planning Meeting:  · Time spent (in minutes)  25    Location of Discussion:  · Location of discussion  Telephone      Electronic Signatures:  Maximilian Monroe)  (Signed 02-Dec-2020 18:02)  	Authored: Goals of Care Conversation, Personal Advance Directives Treatment Guidelines, Location of Discussion      Last Updated: 02-Dec-2020 18:02 by Maximilian Monroe)       GAP TEAM PALLIATIVE CARE UNIT PROGRESS NOTE:      [  ] Patient on hospice program.    INDICATION FOR PALLIATIVE CARE UNIT SERVICES: End of life care in the setting of malignancy    INTERVAL HPI/OVERNIGHT EVENTS: No major overnight events. Pt received one PRN od Dilaudid 0.2mg IV for dyspnea    Addendum: This afternoon, PEG with large amount of bilious secretions.     DNR on chart: Yes    Allergies:  Levaquin (Unknown)  levofloxacin (Unknown)      MEDICATIONS  (STANDING):  DAPTOmycin IVPB 350 milliGRAM(s) IV Intermittent every 24 hours  dextrose 40% Gel 15 Gram(s) Oral once  dextrose 50% Injectable 25 Gram(s) IV Push once  dextrose 50% Injectable 12.5 Gram(s) IV Push once  dextrose 50% Injectable 25 Gram(s) IV Push once  enoxaparin Injectable 40 milliGRAM(s) SubCutaneous daily  famotidine    Tablet 20 milliGRAM(s) Oral two times a day  FLUoxetine Solution 20 milliGRAM(s) Oral daily  gabapentin   Solution 200 milliGRAM(s) Oral three times a day  glucagon  Injectable 1 milliGRAM(s) IntraMuscular once  glycopyrrolate Injectable 0.4 milliGRAM(s) IV Push every 6 hours  methadone    Tablet 30 milliGRAM(s) Oral every 12 hours  piperacillin/tazobactam IVPB.. 3.375 Gram(s) IV Intermittent every 8 hours  QUEtiapine 12.5 milliGRAM(s) Oral at bedtime    MEDICATIONS  (PRN):  acetaminophen   Tablet .. 650 milliGRAM(s) Oral every 6 hours PRN Mild Pain (1 - 3), Moderate Pain (4 - 6)  ALBUTerol    90 MICROgram(s) HFA Inhaler 2 Puff(s) Inhalation every 6 hours PRN Shortness of Breath and/or Wheezing  bisacodyl Suppository 10 milliGRAM(s) Rectal daily PRN Constipation  glycopyrrolate Injectable 0.4 milliGRAM(s) IV Push every 6 hours PRN excessive secretions  HYDROmorphone  Injectable 0.2 milliGRAM(s) IV Push every 1 hour PRN Severe Pain (7 - 10)  HYDROmorphone  Injectable 0.2 milliGRAM(s) IV Push every 1 hour PRN dyspnea or RR>28  LORazepam   Injectable 0.5 milliGRAM(s) IV Push every 1 hour PRN Anxiety/Agitation    ITEMS UNCHECKED ARE NOT PRESENT    PRESENT SYMPTOMS: [ X]Unable to obtain due to poor mentation   Source if other than patient:  [ ]Family   [X ]Team     Pain: [ ] yes [X ] no  QOL impact -   Location -                    Aggravating factors -  Quality -  Radiation -  Timing-  Severity (0-10 scale):  Minimal acceptable level (0-10 scale):     Dyspnea:                           [X ]Mild [ ]Moderate [ ]Severe  Anxiety:                             [ ]Mild [ ]Moderate [ ]Severe  Fatigue:                             [ ]Mild [ ]Moderate [ ]Severe  Nausea:                             [ ]Mild [ ]Moderate [ ]Severe  Loss of appetite:              [ ]Mild [ ]Moderate [ ]Severe  Constipation:                    [ ]Mild [ ]Moderate [ ]Severe    PAINAD Score: 0    [email or web address] (Ctrl +  left click to view)  		  Other Symptoms:  [ ]All other review of systems negative     Palliative Performance Status Version 2:    10 %         http://[facility].org/files/news/palliative_performance_scale_ppsv2.pdf  PHYSICAL EXAM:  Vital Signs Last 24 Hrs  T(C): 36.5 (23 Dec 2020 08:08), Max: 37.1 (22 Dec 2020 17:55)  T(F): 97.7 (23 Dec 2020 08:08), Max: 98.8 (22 Dec 2020 17:55)  HR: 104 (23 Dec 2020 08:08) (104 - 110)  BP: 91/53 (23 Dec 2020 08:08) (91/53 - 97/62)  BP(mean): --  RR: 22 (23 Dec 2020 08:08) (20 - 22)  SpO2: 92% (23 Dec 2020 08:08) (92% - 96%) I&O's Summary    22 Dec 2020 07:01  -  23 Dec 2020 07:00  --------------------------------------------------------  IN: 300 mL / OUT: 350 mL / NET: -50 mL    GENERAL:  [ ]Alert  [ ]Oriented x   [X ]Lethargic  [ ]Cachexia  [ ]Unarousable  [ ]Verbal  [ X]Non-Verbal  Behavioral:   [ ] Anxiety  [ ] Delirium [ ] Agitation [ ] Other  HEENT:  [ ]Normal   [X ]Dry mouth   [ ]ET Tube/Trach  [ ]Oral lesions  PULMONARY:   [X ]Clear [ ]Tachypnea  [ ]Audible excessive secretions   [ ]Rhonchi        [ ]Right [ ]Left [ ]Bilateral  [ ]Crackles        [ ]Right [ ]Left [ ]Bilateral  [ ]Wheezing     [ ]Right [ ]Left [ ]Bilateral  [ ]Diminshed BS [ ]Right [ ]Left [ ]Bilateral    CARDIOVASCULAR:    [X ]Regular [ ]Irregular [ ]Tachy  [ ]Roge [ ]Murmur [ ]Other  GASTROINTESTINAL:  [X ]Soft  [ ]Distended   [ ]+BS  [X ]Non tender [ ]Tender  [ ]PEG [ ]OGT/ NGT   Last BM:   12/22/2020    GENITOURINARY:  [ ]Normal [ X] Incontinent   [ ]Oliguria/Anuria   [ ]Dalal  MUSCULOSKELETAL:   [ ]Normal   [ X]Weakness  [ ]Bed/Wheelchair bound [ ]Edema  NEUROLOGIC:   [ ]No focal deficits  [ X] Cognitive impairment  [ ] Dysphagia [ ]Dysarthria [ ] Paresis [ ]Other   SKIN:   [X ]Normal  [ ]Rash     [ ]Pressure ulcer(s)  [ ]y [ ]n  Present on admission      CRITICAL CARE:  [ ] Shock Present  [ ]Septic [ ]Cardiogenic [ ]Neurologic [ ]Hypovolemic  [ ]  Vasopressors [ ]  Inotropes   [ ] Respiratory failure present [ ] Mechanical Ventilation [ ] Non-invasive ventilatory support [ ] High-Flow  [ ] Acute  [ ] Chronic [ ] Hypoxic  [ ] Hypercarbic [ ] Other  [ ] Other organ failure     LABS:                        7.1    24.43 )-----------( 687      ( 22 Dec 2020 07:30 )             24.6   12-22    135  |  97  |  20  ----------------------------<  38<LL>  4.3   |  23  |  <0.30<L>    Ca    8.2<L>      22 Dec 2020 07:21  Phos  3.2     12-22  Mg     1.9     12-22          RADIOLOGY & ADDITIONAL STUDIES: No new imaging    PROTEIN CALORIE MALNUTRITION: [ ] mild [ ] moderate [X ] severe  [ ] underweight [ ] morbid obesity    https://www.andeal.org/vault/7016/web/files/ONC/Table_Clinical%20Characteristics%20to%20Document%20Malnutrition-White%20JV%20et%20al%362035.pdf    Height (cm): 152.4 (12-07-20 @ 23:10), 152.4 (11-27-20 @ 20:43)  Weight (kg): 42.3 (12-07-20 @ 23:10), 44.4 (11-27-20 @ 20:43)  BMI (kg/m2): 18.2 (12-07-20 @ 23:10), 19.1 (11-27-20 @ 20:43)    [ X] PPSV2 < or = 30% [ ] significant weight loss [ X] poor nutritional intake [ ] anasarca Albumin, Serum: 3.0 g/dL (12-13-20 @ 06:48)    Artificial Nutrition [ ]     REFERRALS:   [ ]Chaplaincy  [ ] Hospice  [ ]Child Life  [ ]Social Work  [ ]Case management [ ]Holistic Therapy [ ] Physical Therapy [ ] Dietary   Goals of Care Document: CRISTIAN Monroe (12-02-20 @ 18:00)  Goals of Care Conversation:   Participants:  · Participants  Patient; Family  · Child(chioma)  Missy Thomas    Advance Directives:  · Does patient have Advance Directive  No  · Does Patient Have a Surrogate  Yes  · Surrogate's Name  Missy Thomas  · Surrogate's Phone Number  [phone]  · Caregiver:  yes  · Name  Sheeba Cardoso  · Phone Number  [facility] [place]    Conversation Discussion:  · Conversation  Diagnosis; Prognosis; Treatment Options; Palliative Care Referral  · Conversation Details  -diagnostic findings including anastomotic leak, thoracic discitis/osteo, sacral abscess  -treatment options including abx, surgery  -diagnostic options including CT scan, colonoscopy   -poor nutrition and overall health status   -comfort care, hospice    What Matters Most To Patient and Family:  · What matters most to patient and family  treatment, comfort    Personal Advance Directives Treatment Guidelines:   Treatment Guidelines:  · Decision Maker  Surrogate    Location of Discussion:   Duration of Advanced Care Planning Meeting:  · Time spent (in minutes)  25    Location of Discussion:  · Location of discussion  Telephone      Electronic Signatures:  Maximilian Monroe)  (Signed 02-Dec-2020 18:02)  	Authored: Goals of Care Conversation, Personal Advance Directives Treatment Guidelines, Location of Discussion      Last Updated: 02-Dec-2020 18:02 by Maximilian Monroe)

## 2020-12-23 NOTE — PROGRESS NOTE ADULT - PROBLEM SELECTOR PLAN 1
- RLL consolidation w/ leukocytosis w/ hx of vocal cord paralysis and oral intake at home   - c/w Zosyn, now s/p vanc and switched to daptomycin per ID  - weekly CPK per ID while on daptomycin, last CPK 32 on 12/10   - NPO per S&S, but pt now allowed to have pleasure feeds  - Pt with another episode of aspiration pneumonia given CXR and clinical findings, will place on NPO for now, to discuss hospice care and pleasure feeds again with patient and family  - CXR this morning with new right basilar consolidation and right pleural effusion    - continue current antibiotics per ID; pt's family amenable to IV antibiotics  - patient currently pending transfer to PCU at [facility]

## 2020-12-23 NOTE — PROGRESS NOTE ADULT - PROBLEM SELECTOR PLAN 4
-Initiate Dilaudid 0.2mg IV PRN  - continue home dose of methadone -Initiate Dilaudid 0.2mg IV PRN  -Will have to transition off methadone given unable to use PEG

## 2020-12-23 NOTE — PROGRESS NOTE ADULT - PROBLEM SELECTOR PLAN 3
- PEG and PO feeds on hold now, given potential aspiration  - c/w zosyn (also on dapto for E. faecalis bacteremia) - PEG and PO feeds on hold now, given potential aspiration  - c/w zosyn (also on dapto for E. faecalis bacteremia)  -Will dc all oral meds given large secretions from PEG

## 2020-12-24 NOTE — PROGRESS NOTE ADULT - PROBLEM SELECTOR PLAN 1
-c/w Dilaudid 0.2mg IV PRN  -Initiate Dilaudid 0.5mg ATC q6hr given pt on Methadone 30mg for over 10 years as per daughter. Will monitor for withdrawal symptoms. RN aware.

## 2020-12-24 NOTE — PROGRESS NOTE ADULT - SUBJECTIVE AND OBJECTIVE BOX
GAP TEAM PALLIATIVE CARE UNIT PROGRESS NOTE:      [  ] Patient on hospice program.    INDICATION FOR PALLIATIVE CARE UNIT SERVICES: End of life care and symptom management     INTERVAL HPI/OVERNIGHT EVENTS:    DNR on chart: Yes      Allergies    Levaquin (Unknown)  levofloxacin (Unknown)    Intolerances    MEDICATIONS  (STANDING):  DAPTOmycin IVPB 350 milliGRAM(s) IV Intermittent every 24 hours  enoxaparin Injectable 40 milliGRAM(s) SubCutaneous daily  glycopyrrolate Injectable 0.4 milliGRAM(s) IV Push every 6 hours  HYDROmorphone  Injectable 0.5 milliGRAM(s) IV Push every 6 hours  piperacillin/tazobactam IVPB.. 3.375 Gram(s) IV Intermittent every 8 hours    MEDICATIONS  (PRN):  acetaminophen   Tablet .. 650 milliGRAM(s) Oral every 6 hours PRN Mild Pain (1 - 3), Moderate Pain (4 - 6)  ALBUTerol    90 MICROgram(s) HFA Inhaler 2 Puff(s) Inhalation every 6 hours PRN Shortness of Breath and/or Wheezing  bisacodyl Suppository 10 milliGRAM(s) Rectal daily PRN Constipation  glycopyrrolate Injectable 0.4 milliGRAM(s) IV Push every 6 hours PRN excessive secretions  HYDROmorphone  Injectable 0.2 milliGRAM(s) IV Push every 1 hour PRN Severe Pain (7 - 10)  HYDROmorphone  Injectable 0.2 milliGRAM(s) IV Push every 1 hour PRN dyspnea or RR>28  LORazepam   Injectable 0.5 milliGRAM(s) IV Push every 1 hour PRN Anxiety/Agitation    ITEMS UNCHECKED ARE NOT PRESENT    PRESENT SYMPTOMS: [X ]Unable to obtain due to poor mentation   Source if other than patient:  [ ]Family   [ ]Team     Pain: [X ] yes [ ] no  QOL impact -   Location -      Legs      Aggravating factors -  Quality -  Radiation -  Timing-  Severity (0-10 scale):  Minimal acceptable level (0-10 scale):     Dyspnea:                           [ ]Mild [ ]Moderate [ ]Severe  Anxiety:                             [ X]Mild [ ]Moderate [ ]Severe  Fatigue:                             [ ]Mild [ ]Moderate [ ]Severe  Nausea:                             [ ]Mild [ ]Moderate [ ]Severe  Loss of appetite:              [ ]Mild [ ]Moderate [ ]Severe  Constipation:                    [ ]Mild [ ]Moderate [ ]Severe    PAINAD Score: 0-1    [email or web address]/painad.pdf (Ctrl +  left click to view)  		  Other Symptoms:  [ ]All other review of systems negative     Palliative Performance Status Version 2:      10%         http://npcrc.org/files/news/palliative_performance_scale_ppsv2.pdf  PHYSICAL EXAM:  Vital Signs Last 24 Hrs  T(C): 37.1 (24 Dec 2020 05:00), Max: 37.1 (24 Dec 2020 05:00)  T(F): 98.7 (24 Dec 2020 05:00), Max: 98.7 (24 Dec 2020 05:00)  HR: 118 (24 Dec 2020 05:00) (115 - 118)  BP: 101/71 (24 Dec 2020 05:00) (101/71 - 110/67)  BP(mean): --  RR: 19 (24 Dec 2020 05:00) (19 - 20)  SpO2: 94% (24 Dec 2020 05:00) (94% - 94%) I&O's Summary  GENERAL:  [ X]Alert  [ ]Oriented x   [ X]Lethargic  [X ]Cachexia  [ ]Unarousable  [X ]Verbal  [ ]Non-Verbal  Behavioral:   [ X] Anxiety  [ ] Delirium [ ] Agitation [ ] Other  HEENT:  [ X]Normal   [ ]Dry mouth   [ ]ET Tube/Trach  [ ]Oral lesions  PULMONARY:   [ X]Clear [ ]Tachypnea  [ ]Audible excessive secretions   [ ]Rhonchi        [ ]Right [ ]Left [ ]Bilateral  [ ]Crackles        [ ]Right [ ]Left [ ]Bilateral  [ ]Wheezing     [ ]Right [ ]Left [ ]Bilateral  [ ]Diminshed BS [ ]Right [ ]Left [ ]Bilateral    CARDIOVASCULAR:    [X ]Regular [ ]Irregular [ ]Tachy  [ ]Roge [ ]Murmur [ ]Other  GASTROINTESTINAL:  [X ]Soft  [ ]Distended   [ ]+BS  [X ]Non tender [ ]Tender  [ ]PEG [ ]OGT/ NGT   Last BM:   12/22/2020    GENITOURINARY:  [ ]Normal [X ] Incontinent   [ ]Oliguria/Anuria   [X ]Dalal  MUSCULOSKELETAL:   [ ]Normal   [ ]Weakness  [ X]Bed/Wheelchair bound [ ]Edema  NEUROLOGIC:   [ ]No focal deficits  [ X] Cognitive impairment  [ ] Dysphagia [ ]Dysarthria [ ] Paresis [ ]Other   SKIN:   [ X]Normal  [ ]Rash     [ ]Pressure ulcer(s)  [ ]y [ ]n  Present on admission      CRITICAL CARE:  [ ] Shock Present  [ ]Septic [ ]Cardiogenic [ ]Neurologic [ ]Hypovolemic  [ ]  Vasopressors [ ]  Inotropes   [ ] Respiratory failure present [ ] Mechanical Ventilation [ ] Non-invasive ventilatory support [ ] High-Flow  [ ] Acute  [ ] Chronic [ ] Hypoxic  [ ] Hypercarbic [ ] Other  [ ] Other organ failure     LABS: No new labs    RADIOLOGY & ADDITIONAL STUDIES:NO new imaging    PROTEIN CALORIE MALNUTRITION: [ ] mild [ ] moderate [ X] severe  [ ] underweight [ ] morbid obesity    https://www.andeal.org/vault/2440/web/files/ONC/Table_Clinical%20Characteristics%20to%20Document%20Malnutrition-White%20JV%20et%20al%085747.pdf    Height (cm): 152.4 (12-07-20 @ 23:10), 152.4 (11-27-20 @ 20:43)  Weight (kg): 42.3 (12-07-20 @ 23:10), 44.4 (11-27-20 @ 20:43)  BMI (kg/m2): 18.2 (12-07-20 @ 23:10), 19.1 (11-27-20 @ 20:43)    [X ] PPSV2 < or = 30% [X ] significant weight loss [ X] poor nutritional intake [ ] anasarca Albumin, Serum: 3.0 g/dL (12-13-20 @ 06:48)    Artificial Nutrition [ ]     REFERRALS:   [ ]Chaplaincy  [ ] Hospice  [ ]Child Life  [ ]Social Work  [ ]Case management [ ]Holistic Therapy [ ] Physical Therapy [ ] Dietary   Goals of Care Document: CRISTIAN Monroe (12-02-20 @ 18:00)  Goals of Care Conversation:   Participants:  · Participants  Patient; Family  · Child(chioma)  Missy Thomas    Advance Directives:  · Does patient have Advance Directive  No  · Does Patient Have a Surrogate  Yes  · Surrogate's Name  Missy Thomas  · Surrogate's Phone Number  [phone]  · Caregiver:  yes  · Name  Sheeba Cardoso  · Phone Number  [facility]    Conversation Discussion:  · Conversation  Diagnosis; Prognosis; Treatment Options; Palliative Care Referral  · Conversation Details  -diagnostic findings including anastomotic leak, thoracic discitis/osteo, sacral abscess  -treatment options including abx, surgery  -diagnostic options including CT scan, colonoscopy   -poor nutrition and overall health status   -comfort care, hospice    What Matters Most To Patient and Family:  · What matters most to patient and family  treatment, comfort    Personal Advance Directives Treatment Guidelines:   Treatment Guidelines:  · Decision Maker  Surrogate    Location of Discussion:   Duration of Advanced Care Planning Meeting:  · Time spent (in minutes)  25    Location of Discussion:  · Location of discussion  Telephone      Electronic Signatures:  Maximilian Monroe)  (Signed 02-Dec-2020 18:02)  	Authored: Goals of Care Conversation, Personal Advance Directives Treatment Guidelines, Location of Discussion      Last Updated: 02-Dec-2020 18:02 by Maximilian Monroe)

## 2020-12-24 NOTE — PROGRESS NOTE ADULT - ATTENDING COMMENTS
Pt seen with fellow. Agree with above. met colon ca now with poor po intake- discontinue methadone and start dilaudid atc and prn . robinol atc as well for secretions.  family at bedside.  emotional support provided

## 2020-12-24 NOTE — PROGRESS NOTE ADULT - PROBLEM SELECTOR PLAN 3
-Better controlled  -c/w Robinol 0.4mg IV q6hr ATC and PRN Adequate: hears normal conversation without difficulty

## 2020-12-24 NOTE — PROGRESS NOTE ADULT - PROBLEM SELECTOR PLAN 7
DNR/DNI, LIZ in chart  -Daughter came today. Spoke with her at bedside. Pt more awake today. COmfort provided. All questions/ concerns address. Will continue to keep her updated. Goal is comfort.

## 2020-12-25 NOTE — PROGRESS NOTE ADULT - ATTENDING COMMENTS
Pt seen with fellow. AGree with above.  pt was taking methadon in past but unable to take po now.  increase dilaudid atc and prn,  ativan prn anxiety.  goals are for comfort at this time.

## 2020-12-25 NOTE — PROGRESS NOTE ADULT - PROBLEM SELECTOR PROBLEM 8
Prophylactic measure
Encounter for palliative care
Prophylactic measure

## 2020-12-25 NOTE — PROGRESS NOTE ADULT - PROBLEM SELECTOR PLAN 1
-Increase Dilaudid to 2 mg IV PRN  -Increase Dilaudid to 1mg ATC q6hr  -Monitoring withdrawal symptoms

## 2020-12-25 NOTE — PROGRESS NOTE ADULT - SUBJECTIVE AND OBJECTIVE BOX
GAP TEAM PALLIATIVE CARE UNIT PROGRESS NOTE:      [  ] Patient on hospice program.    INDICATION FOR PALLIATIVE CARE UNIT SERVICES: End of life care and symptom management     INTERVAL HPI/OVERNIGHT EVENTS: No major overnight events. Pt required four PRNs of Ativan 0.5mg IV and 2 doses of Dilaudid 0.2mg IV for pain in 24 hours.    DNR on chart: Yes      Allergies    Levaquin (Unknown)  levofloxacin (Unknown)    Intolerances    MEDICATIONS  (STANDING):  enoxaparin Injectable 40 milliGRAM(s) SubCutaneous daily  glycopyrrolate Injectable 0.4 milliGRAM(s) IV Push every 6 hours  HYDROmorphone  Injectable 1 milliGRAM(s) IV Push every 6 hours  piperacillin/tazobactam IVPB.. 3.375 Gram(s) IV Intermittent every 8 hours    MEDICATIONS  (PRN):  acetaminophen   Tablet .. 650 milliGRAM(s) Oral every 6 hours PRN Mild Pain (1 - 3), Moderate Pain (4 - 6)  ALBUTerol    90 MICROgram(s) HFA Inhaler 2 Puff(s) Inhalation every 6 hours PRN Shortness of Breath and/or Wheezing  bisacodyl Suppository 10 milliGRAM(s) Rectal daily PRN Constipation  glycopyrrolate Injectable 0.4 milliGRAM(s) IV Push every 6 hours PRN excessive secretions  HYDROmorphone  Injectable 2 milliGRAM(s) IV Push every 1 hour PRN dyspnea or RR>28  HYDROmorphone  Injectable 2 milliGRAM(s) IV Push every 1 hour PRN Severe Pain (7 - 10)  LORazepam   Injectable 1 milliGRAM(s) IV Push every 1 hour PRN Anxiety/Agitation    ITEMS UNCHECKED ARE NOT PRESENT    PRESENT SYMPTOMS: [X ]Unable to obtain due to poor mentation   Source if other than patient:  [ ]Family   [X ]Team     Pain: [X ] yes [ ] no  QOL impact -   Location -                    Aggravating factors -  Quality -  Radiation -  Timing-  Severity (0-10 scale):  Minimal acceptable level (0-10 scale):     Dyspnea:                           [ X]Mild [ ]Moderate [ ]Severe  Anxiety:                             [ ]Mild [X ]Moderate [ ]Severe  Fatigue:                             [ ]Mild [ ]Moderate [ ]Severe  Nausea:                             [ ]Mild [ ]Moderate [ ]Severe  Loss of appetite:              [ ]Mild [ ]Moderate [ ]Severe  Constipation:                    [ ]Mild [ ]Moderate [ ]Severe    PAINAD Score:5    [email or web address]/cog/painad.pdf (Ctrl +  left click to view)  		  Other Symptoms:  [ ]All other review of systems negative     Palliative Performance Status Version 2:   10      %         http://[facility].org/files/news/palliative_performance_scale_[email or web address].pdf  PHYSICAL EXAM:  Vital Signs Last 24 Hrs  T(C): 36.9 (25 Dec 2020 08:21), Max: 36.9 (25 Dec 2020 08:21)  T(F): 98.5 (25 Dec 2020 08:21), Max: 98.5 (25 Dec 2020 08:21)  HR: 109 (25 Dec 2020 08:21) (107 - 109)  BP: 126/76 (25 Dec 2020 08:21) (119/75 - 126/76)  BP(mean): --  RR: 20 (25 Dec 2020 08:21) (20 - 20)  SpO2: 97% (25 Dec 2020 08:21) (95% - 97%) I&O's Summary    24 Dec 2020 07:01  -  25 Dec 2020 07:00  --------------------------------------------------------  IN: 0 mL / OUT: 300 mL / NET: -300 mL    GENERAL:  [ X]Alert  [ ]Oriented x   [ X]Lethargic  [X ]Cachexia  [ ]Unarousable  [X ]Verbal  [ ]Non-Verbal  Behavioral:   [ X] Anxiety  [ ] Delirium [ ] Agitation [ ] Other  HEENT:  [ X]Normal   [ ]Dry mouth   [ ]ET Tube/Trach  [ ]Oral lesions  PULMONARY:   [ X]Clear [ ]Tachypnea  [ ]Audible excessive secretions   [ ]Rhonchi        [ ]Right [ ]Left [ ]Bilateral  [ ]Crackles        [ ]Right [ ]Left [ ]Bilateral  [ ]Wheezing     [ ]Right [ ]Left [ ]Bilateral  [ ]Diminshed BS [ ]Right [ ]Left [ ]Bilateral    CARDIOVASCULAR:    [X ]Regular [ ]Irregular [ ]Tachy  [ ]Roge [ ]Murmur [ ]Other  GASTROINTESTINAL:  [X ]Soft  [ ]Distended   [ ]+BS  [X ]Non tender [ ]Tender  [ ]PEG [ ]OGT/ NGT   Last BM:   12/22/2020    GENITOURINARY:  [ ]Normal [X ] Incontinent   [ ]Oliguria/Anuria   [X ]Dalal  MUSCULOSKELETAL:   [ ]Normal   [ ]Weakness  [ X]Bed/Wheelchair bound [ ]Edema  NEUROLOGIC:   [ ]No focal deficits  [ X] Cognitive impairment  [ ] Dysphagia [ ]Dysarthria [ ] Paresis [ ]Other   SKIN:   [ X]Normal  [ ]Rash     [ ]Pressure ulcer(s)  [ ]y [ ]n  Present on admission      CRITICAL CARE:  [ ] Shock Present  [ ]Septic [ ]Cardiogenic [ ]Neurologic [ ]Hypovolemic  [ ]  Vasopressors [ ]  Inotropes   [ ] Respiratory failure present [ ] Mechanical Ventilation [ ] Non-invasive ventilatory support [ ] High-Flow  [ ] Acute  [ ] Chronic [ ] Hypoxic  [ ] Hypercarbic [ ] Other  [ ] Other organ failure     LABS: No new labs    RADIOLOGY & ADDITIONAL STUDIES: No new imaging    PROTEIN CALORIE MALNUTRITION: [ ] mild [ ] moderate [X ] severe  [ ] underweight [ ] morbid obesity    https://www.andeal.org/vault/2440/web/files/ONC/Table_Clinical%20Characteristics%20to%20Document%20Malnutrition-White%20JV%20et%20al%588018.pdf    Height (cm): 152.4 (12-07-20 @ 23:10), 152.4 (11-27-20 @ 20:43)  Weight (kg): 42.3 (12-07-20 @ 23:10), 44.4 (11-27-20 @ 20:43)  BMI (kg/m2): 18.2 (12-07-20 @ 23:10), 19.1 (11-27-20 @ 20:43)    [X ] PPSV2 < or = 30% [ ] significant weight loss [ X] poor nutritional intake [ ] anasarca Albumin, Serum: 3.0 g/dL (12-13-20 @ 06:48)    Artificial Nutrition [ ]     REFERRALS:   [ ]Chaplaincy  [ ] Hospice  [ ]Child Life  [ ]Social Work  [ ]Case management [ ]Holistic Therapy [ ] Physical Therapy [ ] Dietary   Goals of Care Document: CRISTIAN Monroe (12-02-20 @ 18:00)  Goals of Care Conversation:   Participants:  · Participants  Patient; Family  · Child(mana Thomas    Advance Directives:  · Does patient have Advance Directive  No  · Does Patient Have a Surrogate  Yes  · Surrogate's Name  Missy Thomas  · Surrogate's Phone Number  [phone]  · Caregiver:  yes  · Name  Sheeba Cardoso  · Phone Number  [facility]    Conversation Discussion:  · Conversation  Diagnosis; Prognosis; Treatment Options; Palliative Care Referral  · Conversation Details  -diagnostic findings including anastomotic leak, thoracic discitis/osteo, sacral abscess  -treatment options including abx, surgery  -diagnostic options including CT scan, colonoscopy   -poor nutrition and overall health status   -comfort care, hospice    What Matters Most To Patient and Family:  · What matters most to patient and family  treatment, comfort    Personal Advance Directives Treatment Guidelines:   Treatment Guidelines:  · Decision Maker  Surrogate    Location of Discussion:   Duration of Advanced Care Planning Meeting:  · Time spent (in minutes)  25    Location of Discussion:  · Location of discussion  Telephone      Electronic Signatures:  Maximilian Monroe)  (Signed 02-Dec-2020 18:02)  	Authored: Goals of Care Conversation, Personal Advance Directives Treatment Guidelines, Location of Discussion      Last Updated: 02-Dec-2020 18:02 by Maximilian Monroe)

## 2020-12-26 NOTE — PROGRESS NOTE ADULT - ATTENDING COMMENTS
Pt seen with fellow.  Agree with above.  Increase dilaudid 2mg iv atc with 3mg iv prn. If needed, will change to gtt in am for better control of  symptoms.  Ativan prn anxiety.  Goal is for comfort.

## 2020-12-26 NOTE — PROGRESS NOTE ADULT - PROBLEM SELECTOR PLAN 1
-Increase Dilaudid to 3 mg IV PRN  -Increase Dilaudid to 2mg ATC q6hr  -Monitoring withdrawal symptoms, had some overnight, now better controlled

## 2020-12-26 NOTE — PROGRESS NOTE ADULT - SUBJECTIVE AND OBJECTIVE BOX
GAP TEAM PALLIATIVE CARE UNIT PROGRESS NOTE:      [  ] Patient on hospice program.    INDICATION FOR PALLIATIVE CARE UNIT SERVICES: End of life care and symptom management    INTERVAL HPI/OVERNIGHT EVENTS: Overnight with restlessness, thus increased ATC and PRN Dilaudid. Received 3 PRN doses of Ativan 1mg in 24 hours.    DNR on chart: Yes      Allergies    Levaquin (Unknown)  levofloxacin (Unknown)    Intolerances    MEDICATIONS  (STANDING):  enoxaparin Injectable 40 milliGRAM(s) SubCutaneous daily  glycopyrrolate Injectable 0.4 milliGRAM(s) IV Push every 6 hours  HYDROmorphone  Injectable 2 milliGRAM(s) IV Push every 6 hours  piperacillin/tazobactam IVPB.. 3.375 Gram(s) IV Intermittent every 8 hours    MEDICATIONS  (PRN):  acetaminophen   Tablet .. 650 milliGRAM(s) Oral every 6 hours PRN Mild Pain (1 - 3), Moderate Pain (4 - 6)  ALBUTerol    90 MICROgram(s) HFA Inhaler 2 Puff(s) Inhalation every 6 hours PRN Shortness of Breath and/or Wheezing  bisacodyl Suppository 10 milliGRAM(s) Rectal daily PRN Constipation  glycopyrrolate Injectable 0.4 milliGRAM(s) IV Push every 6 hours PRN excessive secretions  HYDROmorphone  Injectable 3 milliGRAM(s) IV Push every 1 hour PRN dyspnea or RR>28  HYDROmorphone  Injectable 3 milliGRAM(s) IV Push every 1 hour PRN Severe Pain (7 - 10)  LORazepam   Injectable 1 milliGRAM(s) IV Push every 1 hour PRN Anxiety/Agitation    ITEMS UNCHECKED ARE NOT PRESENT    PRESENT SYMPTOMS: [X ]Unable to obtain due to poor mentation   Source if other than patient:  [ ]Family   [ X]Team     Pain: [ X] yes [ ] no  QOL impact -   Location -                    Aggravating factors -  Quality -  Radiation -  Timing-  Severity (0-10 scale):  Minimal acceptable level (0-10 scale):     Dyspnea:                           [ ]Mild [ ]Moderate [ ]Severe  Anxiety:                             [ ]Mild [ ]Moderate [ ]Severe  Fatigue:                             [ ]Mild [ ]Moderate [ ]Severe  Nausea:                             [ ]Mild [ ]Moderate [ ]Severe  Loss of appetite:              [ ]Mild [ ]Moderate [ ]Severe  Constipation:                    [ ]Mild [ ]Moderate [ ]Severe    PAINAD Score:0-5    [email or web address]/cog/painad.pdf (Ctrl +  left click to view)  		  Other Symptoms:  [ ]All other review of systems negative     Palliative Performance Status Version 2:   10 %         http://[email or web address]/files/news/palliative_performance_scale_[email or web address].pdf  PHYSICAL EXAM:  Vital Signs Last 24 Hrs  T(C): 37.1 (26 Dec 2020 09:43), Max: 37.1 (26 Dec 2020 09:43)  T(F): 98.7 (26 Dec 2020 09:43), Max: 98.7 (26 Dec 2020 09:43)  HR: 95 (26 Dec 2020 09:43) (95 - 95)  BP: 109/70 (26 Dec 2020 09:43) (109/70 - 109/70)  BP(mean): --  RR: 20 (26 Dec 2020 09:43) (20 - 20)  SpO2: 100% (26 Dec 2020 09:43) (100% - 100%) I&O's Summary    GENERAL:  [ X]Alert  [ ]Oriented x   [ X]Lethargic  [X ]Cachexia  [ ]Unarousable  [X ]Verbal  [ ]Non-Verbal  Behavioral:   [ X] Anxiety  [ ] Delirium [ ] Agitation [ ] Other  HEENT:  [ X]Normal   [ ]Dry mouth   [ ]ET Tube/Trach  [ ]Oral lesions  PULMONARY:   [ X]Clear [ ]Tachypnea  [ ]Audible excessive secretions   [ ]Rhonchi        [ ]Right [ ]Left [ ]Bilateral  [ ]Crackles        [ ]Right [ ]Left [ ]Bilateral  [ ]Wheezing     [ ]Right [ ]Left [ ]Bilateral  [ ]Diminshed BS [ ]Right [ ]Left [ ]Bilateral    CARDIOVASCULAR:    [X ]Regular [ ]Irregular [ ]Tachy  [ ]Roge [ ]Murmur [ ]Other  GASTROINTESTINAL:  [X ]Soft  [ ]Distended   [ ]+BS  [X ]Non tender [ ]Tender  [ ]PEG [ ]OGT/ NGT   Last BM:   12/22/2020    GENITOURINARY:  [ ]Normal [X ] Incontinent   [ ]Oliguria/Anuria   [X ]Dalal  MUSCULOSKELETAL:   [ ]Normal   [ ]Weakness  [ X]Bed/Wheelchair bound [ ]Edema  NEUROLOGIC:   [ ]No focal deficits  [ X] Cognitive impairment  [ ] Dysphagia [ ]Dysarthria [ ] Paresis [ ]Other   SKIN:   [ X]Normal  [ ]Rash     [ ]Pressure ulcer(s)  [ ]y [ ]n  Present on admission         CRITICAL CARE:  [ ] Shock Present  [ ]Septic [ ]Cardiogenic [ ]Neurologic [ ]Hypovolemic  [ ]  Vasopressors [ ]  Inotropes   [ ] Respiratory failure present [ ] Mechanical Ventilation [ ] Non-invasive ventilatory support [ ] High-Flow  [ ] Acute  [ ] Chronic [ ] Hypoxic  [ ] Hypercarbic [ ] Other  [ ] Other organ failure     LABS: No new labs      RADIOLOGY & ADDITIONAL STUDIES: No new imaging    PROTEIN CALORIE MALNUTRITION: [ ] mild [ ] moderate [X ] severe  [ ] underweight [ ] morbid obesity    https://www.andeal.org/vault/2440/web/files/ONC/Table_Clinical%20Characteristics%20to%20Document%20Malnutrition-White%20JV%20et%20al%252743.pdf    Height (cm): 152.4 (12-07-20 @ 23:10), 152.4 (11-27-20 @ 20:43)  Weight (kg): 42.3 (12-07-20 @ 23:10), 44.4 (11-27-20 @ 20:43)  BMI (kg/m2): 18.2 (12-07-20 @ 23:10), 19.1 (11-27-20 @ 20:43)    [X ] PPSV2 < or = 30% [ ] significant weight loss [X ] poor nutritional intake [ ] anasarca Albumin, Serum: 3.0 g/dL (12-13-20 @ 06:48)    Artificial Nutrition [ ]     REFERRALS:   [ ]Chaplaincy  [ ] Hospice  [ ]Child Life  [ ]Social Work  [ ]Case management [ ]Holistic Therapy [ ] Physical Therapy [ ] Dietary   Goals of Care Document: CRISTIAN Monroe (12-02-20 @ 18:00)  Goals of Care Conversation:   Participants:  · Participants  Patient; Family  · Child(chioma)  Missy Thomas    Advance Directives:  · Does patient have Advance Directive  No  · Does Patient Have a Surrogate  Yes  · Surrogate's Name  Missy Thomas  · Surrogate's Phone Number  [phone]  · Caregiver:  yes  · Name  Sheeba Cardoso  · Phone Number  [facility]    Conversation Discussion:  · Conversation  Diagnosis; Prognosis; Treatment Options; Palliative Care Referral  · Conversation Details  -diagnostic findings including anastomotic leak, thoracic discitis/osteo, sacral abscess  -treatment options including abx, surgery  -diagnostic options including CT scan, colonoscopy   -poor nutrition and overall health status   -comfort care, hospice    What Matters Most To Patient and Family:  · What matters most to patient and family  treatment, comfort    Personal Advance Directives Treatment Guidelines:   Treatment Guidelines:  · Decision Maker  Surrogate    Location of Discussion:   Duration of Advanced Care Planning Meeting:  · Time spent (in minutes)  25    Location of Discussion:  · Location of discussion  Telephone      Electronic Signatures:  Maximilian Monroe)  (Signed 02-Dec-2020 18:02)  	Authored: Goals of Care Conversation, Personal Advance Directives Treatment Guidelines, Location of Discussion      Last Updated: 02-Dec-2020 18:02 by Maximilian Monroe)

## 2020-12-27 NOTE — PROGRESS NOTE ADULT - NSHPATTENDINGPLANDISCUSS_GEN_ALL_CORE
family, team
Team 4
family, team
house staff
Team 4
House staff
Team 4
house staff
Team 4

## 2020-12-27 NOTE — PROGRESS NOTE ADULT - SUBJECTIVE AND OBJECTIVE BOX
GAP TEAM PALLIATIVE CARE UNIT PROGRESS NOTE:      [  ] Patient on hospice program.    INDICATION FOR PALLIATIVE CARE UNIT SERVICES: End of life care and symptom management    INTERVAL HPI/OVERNIGHT EVENTS: No major events overnight. Pt required PRN dilaudid 3mg IV x3 in the past 24 hours.    DNR on chart: Yes      Allergies    Levaquin (Unknown)  levofloxacin (Unknown)    Intolerances    MEDICATIONS  (STANDING):  enoxaparin Injectable 40 milliGRAM(s) SubCutaneous daily  glycopyrrolate Injectable 0.4 milliGRAM(s) IV Push every 6 hours  HYDROmorphone Infusion 1 mG/Hr (1 mL/Hr) IV Continuous <Continuous>    MEDICATIONS  (PRN):  acetaminophen  Suppository .. 650 milliGRAM(s) Rectal every 6 hours PRN Temp greater or equal to 38C (100.4F)  ALBUTerol    90 MICROgram(s) HFA Inhaler 2 Puff(s) Inhalation every 6 hours PRN Shortness of Breath and/or Wheezing  bisacodyl Suppository 10 milliGRAM(s) Rectal daily PRN Constipation  glycopyrrolate Injectable 0.4 milliGRAM(s) IV Push every 6 hours PRN excessive secretions  HYDROmorphone  Injectable 3 milliGRAM(s) IV Push every 1 hour PRN dyspnea or RR>28  HYDROmorphone  Injectable 3 milliGRAM(s) IV Push every 1 hour PRN Severe Pain (7 - 10)  LORazepam   Injectable 1 milliGRAM(s) IV Push every 1 hour PRN Anxiety/Agitation    ITEMS UNCHECKED ARE NOT PRESENT    PRESENT SYMPTOMS: [X ]Unable to obtain due to poor mentation   Source if other than patient:  [ ]Family   [ X]Team     Pain: [X ] yes [ ] no  QOL impact -   Location -                    Aggravating factors -  Quality -  Radiation -  Timing-  Severity (0-10 scale):  Minimal acceptable level (0-10 scale):     Dyspnea:                           [ ]Mild [ ]Moderate [ ]Severe  Anxiety:                             [ ]Mild [ ]Moderate [ ]Severe  Fatigue:                             [ ]Mild [ ]Moderate [ ]Severe  Nausea:                             [ ]Mild [ ]Moderate [ ]Severe  Loss of appetite:              [ ]Mild [ ]Moderate [ ]Severe  Constipation:                    [ ]Mild [ ]Moderate [ ]Severe    PAINAD Score: 8    [email or web address].[email or web address]/painad.pdf (Ctrl +  left click to view)  		  Other Symptoms:  [ ]All other review of systems negative     Palliative Performance Status Version 2:  10       %         http://[facility].org/files/news/palliative_performance_scale_[email or web address].pdf  PHYSICAL EXAM:  Vital Signs Last 24 Hrs  T(C): 36.9 (27 Dec 2020 07:38), Max: 36.9 (27 Dec 2020 07:38)  T(F): 98.5 (27 Dec 2020 07:38), Max: 98.5 (27 Dec 2020 07:38)  HR: 105 (27 Dec 2020 07:38) (105 - 105)  BP: 126/79 (27 Dec 2020 07:38) (126/79 - 126/79)  BP(mean): --  RR: 18 (27 Dec 2020 07:38) (18 - 18)  SpO2: 94% (27 Dec 2020 07:38) (94% - 94%) I&O's Summary  GENERAL:  [ X]Alert  [ ]Oriented x   [ X]Lethargic  [X ]Cachexia  [ ]Unarousable  [X ]Verbal  [ ]Non-Verbal  Behavioral:   [ X] Anxiety  [ ] Delirium [ ] Agitation [ ] Other  HEENT:  [ X]Normal   [ ]Dry mouth   [ ]ET Tube/Trach  [ ]Oral lesions  PULMONARY:   [ X]Clear [ ]Tachypnea  [ ]Audible excessive secretions   [ ]Rhonchi        [ ]Right [ ]Left [ ]Bilateral  [ ]Crackles        [ ]Right [ ]Left [ ]Bilateral  [ ]Wheezing     [ ]Right [ ]Left [ ]Bilateral  [ ]Diminshed BS [ ]Right [ ]Left [ ]Bilateral    CARDIOVASCULAR:    [X ]Regular [ ]Irregular [ ]Tachy  [ ]Roge [ ]Murmur [ ]Other  GASTROINTESTINAL:  [X ]Soft  [ ]Distended   [ ]+BS  [X ]Non tender [ ]Tender  [ ]PEG [ ]OGT/ NGT   Last BM:   12/22/2020    GENITOURINARY:  [ ]Normal [X ] Incontinent   [ ]Oliguria/Anuria   [X ]Dalal  MUSCULOSKELETAL:   [ ]Normal   [ ]Weakness  [ X]Bed/Wheelchair bound [ ]Edema  NEUROLOGIC:   [ ]No focal deficits  [ X] Cognitive impairment  [ ] Dysphagia [ ]Dysarthria [ ] Paresis [ ]Other   SKIN:   [ X]Normal  [ ]Rash     [ ]Pressure ulcer(s)  [ ]y [ ]n  Present on admission         CRITICAL CARE:  [ ] Shock Present  [ ]Septic [ ]Cardiogenic [ ]Neurologic [ ]Hypovolemic  [ ]  Vasopressors [ ]  Inotropes   [ ] Respiratory failure present [ ] Mechanical Ventilation [ ] Non-invasive ventilatory support [ ] High-Flow  [ ] Acute  [ ] Chronic [ ] Hypoxic  [ ] Hypercarbic [ ] Other  [ ] Other organ failure     LABS: No new labs      RADIOLOGY & ADDITIONAL STUDIES: No new imaging    PROTEIN CALORIE MALNUTRITION: [ ] mild [ ] moderate [X ] severe  [ ] underweight [ ] morbid obesity    https://www.andeal.org/vault/2440/web/files/ONC/Table_Clinical%20Characteristics%20to%20Document%20Malnutrition-White%20JV%20et%20al%545438.pdf    Height (cm): 152.4 (12-07-20 @ 23:10), 152.4 (11-27-20 @ 20:43)  Weight (kg): 42.3 (12-07-20 @ 23:10), 44.4 (11-27-20 @ 20:43)  BMI (kg/m2): 18.2 (12-07-20 @ 23:10), 19.1 (11-27-20 @ 20:43)    [X ] PPSV2 < or = 30% [ ] significant weight loss [ X] poor nutritional intake [ ] anasarca Albumin, Serum: 3.0 g/dL (12-13-20 @ 06:48)    Artificial Nutrition [ ]     REFERRALS:   [ ]Chaplaincy  [ ] Hospice  [ ]Child Life  [ ]Social Work  [ ]Case management [ ]Holistic Therapy [ ] Physical Therapy [ ] Dietary   Goals of Care Document: CRISTIAN Monroe (12-02-20 @ 18:00)  Goals of Care Conversation:   Participants:  · Participants  Patient; Family  · Child(chioma)  Missy Thomas    Advance Directives:  · Does patient have Advance Directive  No  · Does Patient Have a Surrogate  Yes  · Surrogate's Name  Missyfartun Thomas  · Surrogate's Phone Number  [phone]  · Caregiver:  yes  · Name  Sheeba Cardoso  · Phone Number  [place]    Conversation Discussion:  · Conversation  Diagnosis; Prognosis; Treatment Options; Palliative Care Referral  · Conversation Details  -diagnostic findings including anastomotic leak, thoracic discitis/osteo, sacral abscess  -treatment options including abx, surgery  -diagnostic options including CT scan, colonoscopy   -poor nutrition and overall health status   -comfort care, hospice    What Matters Most To Patient and Family:  · What matters most to patient and family  treatment, comfort    Personal Advance Directives Treatment Guidelines:   Treatment Guidelines:  · Decision Maker  Surrogate    Location of Discussion:   Duration of Advanced Care Planning Meeting:  · Time spent (in minutes)  25    Location of Discussion:  · Location of discussion  Telephone      Electronic Signatures:  Maximilian Monroe)  (Signed 02-Dec-2020 18:02)  	Authored: Goals of Care Conversation, Personal Advance Directives Treatment Guidelines, Location of Discussion      Last Updated: 02-Dec-2020 18:02 by Maximilian Monroe)

## 2020-12-27 NOTE — PROGRESS NOTE ADULT - ATTENDING COMMENTS
Pt seen with fellow.  Agree with above.  Discontinue atc dilaudid and start dilaudid 1mg/hr with prn.  Adjust as needed.  Goal is for comfort.  Pt had been on methadone 30mg bid orally previously.

## 2020-12-28 NOTE — PROGRESS NOTE ADULT - PROBLEM SELECTOR PLAN 5
- PEG and PO feeds on hold given not within patient's GOC - PEG and PO feeds on hold given not within patient's GOC, and PEG possible not working

## 2020-12-28 NOTE — PROGRESS NOTE ADULT - ATTENDING COMMENTS
I was physically present for the key portions of the evaluation and management (E/M) service provided.  I agree with the above history, physical, and plan which I have reviewed and edited where appropriate. Plan discussed with team.    Patient continues to be lethargic, but overall appears comfortable; opioid adjustments as above.     ______________  Henrique Gustafson MD   of Geriatric and Palliative Medicine  [facility]     Please page the following number for clinical matters between the hours of 9AM and 5PM   from Monday through Friday : [phone]    After 5PM and on weekends, please page: [phone]. The Geriatric and Palliative Medicine consult service has 24/7 coverage for medical recommendations, including for symptom management needs.

## 2020-12-28 NOTE — PROGRESS NOTE ADULT - SUBJECTIVE AND OBJECTIVE BOX
GAP TEAM PALLIATIVE CARE UNIT PROGRESS NOTE:      [  ] Patient on hospice program.    INDICATION FOR PALLIATIVE CARE UNIT SERVICES: End of life care and symptom management    INTERVAL HPI/OVERNIGHT EVENTS: Overnight with restlessness and in pain. Pt required 4 PRNs of Dilaudid 3mg IV and 4 of Ativan 1mg IV.    DNR on chart: Yes    Allergies:  Levaquin (Unknown)  levofloxacin (Unknown)      MEDICATIONS  (STANDING):  enoxaparin Injectable 40 milliGRAM(s) SubCutaneous daily  glycopyrrolate Injectable 0.4 milliGRAM(s) IV Push every 6 hours  HYDROmorphone Infusion 2 mG/Hr (2 mL/Hr) IV Continuous <Continuous>  LORazepam   Injectable 1 milliGRAM(s) IV Push every 4 hours    MEDICATIONS  (PRN):  acetaminophen  Suppository .. 650 milliGRAM(s) Rectal every 6 hours PRN Temp greater or equal to 38C (100.4F)  ALBUTerol    90 MICROgram(s) HFA Inhaler 2 Puff(s) Inhalation every 6 hours PRN Shortness of Breath and/or Wheezing  bisacodyl Suppository 10 milliGRAM(s) Rectal daily PRN Constipation  glycopyrrolate Injectable 0.4 milliGRAM(s) IV Push every 6 hours PRN excessive secretions  HYDROmorphone  Injectable 4 milliGRAM(s) IV Push every 1 hour PRN dyspnea or RR>28  HYDROmorphone  Injectable 4 milliGRAM(s) IV Push every 1 hour PRN Severe Pain (7 - 10)  LORazepam   Injectable 1.5 milliGRAM(s) IV Push every 1 hour PRN Anxiety/Agitation    ITEMS UNCHECKED ARE NOT PRESENT    PRESENT SYMPTOMS: [X ]Unable to obtain due to poor mentation   Source if other than patient:  [ ]Family   [X ]Team     Pain: [X ] yes [ ] no  QOL impact -   Location -                    Aggravating factors -  Quality -  Radiation -  Timing-  Severity (0-10 scale):  Minimal acceptable level (0-10 scale):     Dyspnea:                           [ ]Mild [ ]Moderate [ ]Severe  Anxiety:                             [ X]Mild [ ]Moderate [ ]Severe  Fatigue:                             [ ]Mild [ ]Moderate [ ]Severe  Nausea:                             [ ]Mild [ ]Moderate [ ]Severe  Loss of appetite:              [ ]Mild [ ]Moderate [ ]Severe  Constipation:                    [ ]Mild [ ]Moderate [ ]Severe    PAINAD Score:0-6    [email or web address].[email or web address]/cog/painad.pdf (Ctrl +  left click to view)  		  Other Symptoms:  [ ]All other review of systems negative     Palliative Performance Status Version 2:  10%         http://npcrc.org/files/news/palliative_performance_scale_ppsv2.pdf    PHYSICAL EXAM:  Vital Signs Last 24 Hrs  T(C): 36.9 (28 Dec 2020 07:53), Max: 36.9 (28 Dec 2020 07:53)  T(F): 98.4 (28 Dec 2020 07:53), Max: 98.4 (28 Dec 2020 07:53)  HR: 97 (28 Dec 2020 07:53) (97 - 97)  BP: 114/72 (28 Dec 2020 07:53) (114/72 - 114/72)  RR: 16 (28 Dec 2020 07:53) (16 - 16)  SpO2: 97% (28 Dec 2020 07:53) (97% - 97%) I&O's Summary    27 Dec 2020 07:01  -  28 Dec 2020 07:00  --------------------------------------------------------  IN: 0 mL / OUT: 500 mL / NET: -500 mL    GENERAL:  [ ]Alert  [ ]Oriented x   [X ]Lethargic  [X ]Cachexia  [ ]Unarousable  [ ]Verbal  [X ]Non-Verbal  Behavioral:   [ ] Anxiety  [ ] Delirium [ ] Agitation [ ] Other  HEENT:  [X ]Normal   [ ]Dry mouth   [ ]ET Tube/Trach  [ ]Oral lesions  PULMONARY:   [X ]Clear [ ]Tachypnea  [ ]Audible excessive secretions   [ ]Rhonchi        [ ]Right [ ]Left [ ]Bilateral  [ ]Crackles        [ ]Right [ ]Left [ ]Bilateral  [ ]Wheezing     [ ]Right [ ]Left [ ]Bilateral  [ ]Diminshed BS [ ]Right [ ]Left [ ]Bilateral    CARDIOVASCULAR:    [ X]Regular [ ]Irregular [ ]Tachy  [ ]Roge [ ]Murmur [ ]Other  GASTROINTESTINAL:  [X ]Soft  [ ]Distended   [ ]+BS  [ X]Non tender [ ]Tender  [X ]PEG [ ]OGT/ NGT   Last BM:   12/22/2020    GENITOURINARY:  [ ]Normal [X] Incontinent   [ ]Oliguria/Anuria   [ ]Dalal  MUSCULOSKELETAL:   [ ]Normal   [ ]Weakness  [ X]Bed/Wheelchair bound [ ]Edema  NEUROLOGIC:   [ ]No focal deficits  [ X] Cognitive impairment  [ ] Dysphagia [ ]Dysarthria [ ] Paresis [ ]Other   SKIN:   [ X]Normal  [ ]Rash     [ ]Pressure ulcer(s)  [ ]y [ ]n  Present on admission      CRITICAL CARE:  [ ] Shock Present  [ ]Septic [ ]Cardiogenic [ ]Neurologic [ ]Hypovolemic  [ ]  Vasopressors [ ]  Inotropes   [ ] Respiratory failure present [ ] Mechanical Ventilation [ ] Non-invasive ventilatory support [ ] High-Flow  [ ] Acute  [ ] Chronic [ ] Hypoxic  [ ] Hypercarbic [ ] Other  [ ] Other organ failure     LABS: No new labs      RADIOLOGY & ADDITIONAL STUDIES: No new imaging    PROTEIN CALORIE MALNUTRITION: [ ] mild [ ] moderate [X ] severe  [ ] underweight [ ] morbid obesity    https://www.andeal.org/vault/2440/web/files/ONC/Table_Clinical%20Characteristics%20to%20Document%20Malnutrition-White%20JV%20et%20al%954048.pdf    Height (cm): 152.4 (12-07-20 @ 23:10), 152.4 (11-27-20 @ 20:43)  Weight (kg): 42.3 (12-07-20 @ 23:10), 44.4 (11-27-20 @ 20:43)  BMI (kg/m2): 18.2 (12-07-20 @ 23:10), 19.1 (11-27-20 @ 20:43)    [X ] PPSV2 < or = 30% [ ] significant weight loss [X ] poor nutritional intake [ ] anasarca Albumin, Serum: 3.0 g/dL (12-13-20 @ 06:48)    Artificial Nutrition [ ]     REFERRALS:   [ ]Chaplaincy  [X ] Hospice  [ ]Child Life  [X ]Social Work  [ ]Case management [ ]Holistic Therapy [ ] Physical Therapy [ ] Dietary   Goals of Care Document: CRISTIAN Monroe (12-02-20 @ 18:00)  Goals of Care Conversation:   Participants:  · Participants  Patient; Family  · Child(chiomaAkin Thomas    Advance Directives:  · Does patient have Advance Directive  No  · Does Patient Have a Surrogate  Yes  · Surrogate's Name  Missy Thomas  · Surrogate's Phone Number  [phone]  · Caregiver:  yes  · Name  Sheeba Cardoso  · Phone Number  [place]    Conversation Discussion:  · Conversation  Diagnosis; Prognosis; Treatment Options; Palliative Care Referral  · Conversation Details  -diagnostic findings including anastomotic leak, thoracic discitis/osteo, sacral abscess  -treatment options including abx, surgery  -diagnostic options including CT scan, colonoscopy   -poor nutrition and overall health status   -comfort care, hospice    What Matters Most To Patient and Family:  · What matters most to patient and family  treatment, comfort    Personal Advance Directives Treatment Guidelines:   Treatment Guidelines:  · Decision Maker  Surrogate    Location of Discussion:   Duration of Advanced Care Planning Meeting:  · Time spent (in minutes)  25    Location of Discussion:  · Location of discussion  Telephone      Electronic Signatures:  Maximilian Monroe)  (Signed 02-Dec-2020 18:02)  	Authored: Goals of Care Conversation, Personal Advance Directives Treatment Guidelines, Location of Discussion      Last Updated: 02-Dec-2020 18:02 by Maximilian Monroe)

## 2020-12-29 NOTE — PROGRESS NOTE ADULT - PROBLEM SELECTOR PLAN 7
DNR/DNI, MOLST in chart  -Comfort measures  -Symptom management  -Dispo planning today, SW referral and hospice referral DNR/DNI, MOLST in chart  -Comfort measures  -Symptom management  -Dispo planning today to hospice inn, SW referral and hospice referral

## 2020-12-29 NOTE — PROGRESS NOTE ADULT - REASON FOR ADMISSION
Weakness

## 2020-12-29 NOTE — PROGRESS NOTE ADULT - PROBLEM SELECTOR PROBLEM 7
Medication management
Medication management
Encounter for palliative care
Medication management
Malignant neoplasm of colon, unspecified part of colon
Medication management
Encounter for palliative care
Medication management
Encounter for palliative care
Medication management
Encounter for palliative care
Medication management

## 2020-12-29 NOTE — PROGRESS NOTE ADULT - PROBLEM SELECTOR PROBLEM 3
Hip pain
Vocal cord paralysis
Hip pain
Salivary secretion
Dysphagia, unspecified type
Dysphagia, unspecified type
Hip pain
Malignant neoplasm of colon, unspecified part of colon
Malignant neoplasm of colon, unspecified part of colon
Vocal cord paralysis
Anxiety
Hip pain
Vocal cord paralysis
Vocal cord paralysis
Hip pain
Dysphagia, unspecified type
Vocal cord paralysis
Anxiety

## 2020-12-29 NOTE — DISCHARGE NOTE NURSING/CASE MANAGEMENT/SOCIAL WORK - PATIENT PORTAL LINK FT
You can access the FollowMyHealth Patient Portal offered by [facility] by registering at the following website: [email or web address]. By joining [employer]’s FollowMyHealth portal, you will also be able to view your health information using other applications (apps) compatible with our system.

## 2020-12-29 NOTE — PROGRESS NOTE ADULT - PROBLEM SELECTOR PROBLEM 4
Salivary secretion
Salivary secretion
Altered mental status, unspecified altered mental status type
Hip joint finding
Altered mental status, unspecified altered mental status type
Encounter for palliative care
Encounter for palliative care
Hip joint finding
Hip pain
Malignant neoplasm of colon, unspecified part of colon
Malignant neoplasm of colon, unspecified part of colon
Salivary secretion
Salivary secretion
Altered mental status, unspecified altered mental status type
Altered mental status, unspecified altered mental status type
Dysphagia, unspecified type
Hip joint finding
Altered mental status, unspecified altered mental status type
Hip joint finding
Salivary secretion
Hip joint finding
Hip joint finding

## 2020-12-29 NOTE — PROGRESS NOTE ADULT - NUTRITIONAL ASSESSMENT
This patient has been assessed with a concern for Malnutrition and has been determined to have a diagnosis/diagnoses of Severe protein-calorie malnutrition and Underweight/BMI < 19.    This patient is being managed with:   Diet Dysphagia 1 Pureed-Honey Consistency Fluid-  Tube Feeding Modality: Gastro-Jejunostomy  Vital 1.5 Hernan (VITAL1.5RTH)  Total Volume for 24 Hours (mL): 960  Continuous  Starting Tube Feed Rate {mL per Hour}: 40  Until Goal Tube Feed Rate (mL per Hour): 40  Tube Feed Duration (in Hours): 24  Tube Feed Start Time: 10:00  Entered: Dec 18 2020  3:17AM    
This patient has been assessed with a concern for Malnutrition and has been determined to have a diagnosis/diagnoses of Severe protein-calorie malnutrition and Underweight/BMI < 19.    This patient is being managed with:   Diet Dysphagia 1 Pureed-Honey Consistency Fluid-  Tube Feeding Modality: Gastro-Jejunostomy  Vital 1.5 Hernan (VITAL1.5RTH)  Total Volume for 24 Hours (mL): 960  Continuous  Starting Tube Feed Rate {mL per Hour}: 40  Until Goal Tube Feed Rate (mL per Hour): 40  Tube Feed Duration (in Hours): 24  Tube Feed Start Time: 10:00  Entered: Dec 18 2020  3:17AM    
This patient has been assessed with a concern for Malnutrition and has been determined to have a diagnosis/diagnoses of Severe protein-calorie malnutrition and Underweight/BMI < 19.    This patient is being managed with:   Diet NPO with Tube Feed-  Tube Feeding Modality: Gastro-Jejunostomy  Vital 1.5 Hernan (VITAL1.5RTH)  Total Volume for 24 Hours (mL): 480  Continuous  Starting Tube Feed Rate {mL per Hour}: 20  Increase Tube Feed Rate by (mL): 0     Every 24 hours  Until Goal Tube Feed Rate (mL per Hour): 20  Tube Feed Duration (in Hours): 24  Tube Feed Start Time: 14:00  Entered: Dec 13 2020  5:16PM    
This patient has been assessed with a concern for Malnutrition and has been determined to have a diagnosis/diagnoses of Severe protein-calorie malnutrition and Underweight/BMI < 19.    This patient is being managed with:   Diet NPO with Tube Feed-  Tube Feeding Modality: Gastro-Jejunostomy  Vital 1.5 Hernan (VITAL1.5RTH)  Total Volume for 24 Hours (mL): 480  Continuous  Starting Tube Feed Rate {mL per Hour}: 20  Increase Tube Feed Rate by (mL): 0     Every 24 hours  Until Goal Tube Feed Rate (mL per Hour): 20  Tube Feed Duration (in Hours): 24  Tube Feed Start Time: 14:00  Entered: Dec  9 2020  4:59PM    
This patient has been assessed with a concern for Malnutrition and has been determined to have a diagnosis/diagnoses of Severe protein-calorie malnutrition and Underweight/BMI < 19.    This patient is being managed with:   Diet NPO with Tube Feed-  Tube Feeding Modality: Gastro-Jejunostomy  Vital 1.5 Hernan (VITAL1.5RTH)  Total Volume for 24 Hours (mL): 480  Continuous  Starting Tube Feed Rate {mL per Hour}: 20  Increase Tube Feed Rate by (mL): 0     Every 24 hours  Until Goal Tube Feed Rate (mL per Hour): 20  Tube Feed Duration (in Hours): 24  Tube Feed Start Time: 14:00  Entered: Dec  9 2020  4:59PM    
This patient has been assessed with a concern for Malnutrition and has been determined to have a diagnosis/diagnoses of Severe protein-calorie malnutrition and Underweight/BMI < 19.    This patient is being managed with:   Diet NPO with Tube Feed-  Tube Feeding Modality: Gastro-Jejunostomy  Vital 1.5 Hernan (VITAL1.5RTH)  Total Volume for 24 Hours (mL): 480  Continuous  Starting Tube Feed Rate {mL per Hour}: 20  Increase Tube Feed Rate by (mL): 0     Every 24 hours  Until Goal Tube Feed Rate (mL per Hour): 20  Tube Feed Duration (in Hours): 24  Tube Feed Start Time: 14:00  Entered: Dec 13 2020  5:16PM    
This patient has been assessed with a concern for Malnutrition and has been determined to have a diagnosis/diagnoses of Severe protein-calorie malnutrition and Underweight/BMI < 19.    This patient is being managed with:   Diet NPO with Tube Feed-  Tube Feeding Modality: Gastro-Jejunostomy  Vital 1.5 Hernan (VITAL1.5RTH)  Total Volume for 24 Hours (mL): 960  Continuous  Starting Tube Feed Rate {mL per Hour}: 20  Increase Tube Feed Rate by (mL): 10     Every 12 hours  Until Goal Tube Feed Rate (mL per Hour): 40  Tube Feed Duration (in Hours): 24  Tube Feed Start Time: 14:00  Entered: Dec  8 2020  1:51PM    
This patient has been assessed with a concern for Malnutrition and has been determined to have a diagnosis/diagnoses of Severe protein-calorie malnutrition and Underweight/BMI < 19.    This patient is being managed with:   Diet NPO with Tube Feed-  Tube Feeding Modality: Gastro-Jejunostomy  Vital 1.5 Hernan (VITAL1.5RTH)  Total Volume for 24 Hours (mL): 480  Continuous  Starting Tube Feed Rate {mL per Hour}: 20  Increase Tube Feed Rate by (mL): 0     Every 24 hours  Until Goal Tube Feed Rate (mL per Hour): 20  Tube Feed Duration (in Hours): 24  Tube Feed Start Time: 14:00  Entered: Dec 13 2020  5:16PM    
This patient has been assessed with a concern for Malnutrition and has been determined to have a diagnosis/diagnoses of Severe protein-calorie malnutrition and Underweight/BMI < 19.    This patient is being managed with:   Diet Dysphagia 1 Pureed-Honey Consistency Fluid-  Entered: Dec 16 2020  7:12PM    
This patient has been assessed with a concern for Malnutrition and has been determined to have a diagnosis/diagnoses of Severe protein-calorie malnutrition and Underweight/BMI < 19.    This patient is being managed with:   Diet NPO with Tube Feed-  Tube Feeding Modality: Gastro-Jejunostomy  Vital 1.5 Hernan (VITAL1.5RTH)  Total Volume for 24 Hours (mL): 480  Continuous  Starting Tube Feed Rate {mL per Hour}: 20  Increase Tube Feed Rate by (mL): 0     Every 24 hours  Until Goal Tube Feed Rate (mL per Hour): 20  Tube Feed Duration (in Hours): 24  Tube Feed Start Time: 14:00  Entered: Dec 13 2020  5:16PM    
This patient has been assessed with a concern for Malnutrition and has been determined to have a diagnosis/diagnoses of Severe protein-calorie malnutrition and Underweight/BMI < 19.    This patient is being managed with:   Diet NPO-  Except Medications  With Ice Chips/Sips of Water  Entered: Dec 18 2020  3:17AM    
This patient has been assessed with a concern for Malnutrition and has been determined to have a diagnosis/diagnoses of Severe protein-calorie malnutrition and Underweight/BMI < 19.    This patient is being managed with:   Diet Dysphagia 1 Pureed-Honey Consistency Fluid-  Tube Feeding Modality: Gastro-Jejunostomy  Vital 1.5 Hernan (VITAL1.5RTH)  Total Volume for 24 Hours (mL): 960  Continuous  Starting Tube Feed Rate {mL per Hour}: 40  Until Goal Tube Feed Rate (mL per Hour): 40  Tube Feed Duration (in Hours): 24  Tube Feed Start Time: 10:00  Entered: Dec 18 2020  3:17AM    
This patient has been assessed with a concern for Malnutrition and has been determined to have a diagnosis/diagnoses of Severe protein-calorie malnutrition and Underweight/BMI < 19.    This patient is being managed with:   Diet NPO with Tube Feed-  Tube Feeding Modality: Gastro-Jejunostomy  Vital 1.5 Hernan (VITAL1.5RTH)  Total Volume for 24 Hours (mL): 480  Continuous  Starting Tube Feed Rate {mL per Hour}: 20  Increase Tube Feed Rate by (mL): 0     Every 24 hours  Until Goal Tube Feed Rate (mL per Hour): 20  Tube Feed Duration (in Hours): 24  Tube Feed Start Time: 14:00  Entered: Dec  9 2020  4:59PM    
This patient has been assessed with a concern for Malnutrition and has been determined to have a diagnosis/diagnoses of Severe protein-calorie malnutrition and Underweight/BMI < 19.    This patient is being managed with:   Diet NPO with Tube Feed-  Tube Feeding Modality: Gastro-Jejunostomy  Vital 1.5 Hernan (VITAL1.5RTH)  Total Volume for 24 Hours (mL): 480  Continuous  Starting Tube Feed Rate {mL per Hour}: 20  Increase Tube Feed Rate by (mL): 0     Every 24 hours  Until Goal Tube Feed Rate (mL per Hour): 20  Tube Feed Duration (in Hours): 24  Tube Feed Start Time: 14:00  Entered: Dec  9 2020  4:59PM    
This patient has been assessed with a concern for Malnutrition and has been determined to have a diagnosis/diagnoses of Severe protein-calorie malnutrition and Underweight/BMI < 19.    This patient is being managed with:   Diet NPO with Tube Feed-  Tube Feeding Modality: Gastro-Jejunostomy  Vital 1.5 Hernan (VITAL1.5RTH)  Total Volume for 24 Hours (mL): 480  Continuous  Starting Tube Feed Rate {mL per Hour}: 20  Increase Tube Feed Rate by (mL): 0     Every 24 hours  Until Goal Tube Feed Rate (mL per Hour): 20  Tube Feed Duration (in Hours): 24  Tube Feed Start Time: 14:00  Entered: Dec 13 2020  5:16PM    
This patient has been assessed with a concern for Malnutrition and has been determined to have a diagnosis/diagnoses of Severe protein-calorie malnutrition and Underweight/BMI < 19.    This patient is being managed with:   Diet NPO-  Except Medications  Entered: Dec 21 2020 10:57AM    
This patient has been assessed with a concern for Malnutrition and has been determined to have a diagnosis/diagnoses of Severe protein-calorie malnutrition and Underweight/BMI < 19.    This patient is being managed with:   Diet NPO with Tube Feed-  Tube Feeding Modality: Gastro-Jejunostomy  Vital 1.5 Hernan (VITAL1.5RTH)  Total Volume for 24 Hours (mL): 480  Continuous  Starting Tube Feed Rate {mL per Hour}: 20  Increase Tube Feed Rate by (mL): 0     Every 24 hours  Until Goal Tube Feed Rate (mL per Hour): 20  Tube Feed Duration (in Hours): 24  Tube Feed Start Time: 14:00  Entered: Dec 13 2020  5:16PM    
This patient has been assessed with a concern for Malnutrition and has been determined to have a diagnosis/diagnoses of Severe protein-calorie malnutrition and Underweight/BMI < 19.    This patient is being managed with:   Diet NPO with Tube Feed-  Tube Feeding Modality: Gastro-Jejunostomy  Vital 1.5 Hernan (VITAL1.5RTH)  Total Volume for 24 Hours (mL): 480  Continuous  Starting Tube Feed Rate {mL per Hour}: 20  Increase Tube Feed Rate by (mL): 0     Every 24 hours  Until Goal Tube Feed Rate (mL per Hour): 20  Tube Feed Duration (in Hours): 24  Tube Feed Start Time: 14:00  Entered: Dec  9 2020  4:59PM    
This patient has been assessed with a concern for Malnutrition and has been determined to have a diagnosis/diagnoses of Severe protein-calorie malnutrition and Underweight/BMI < 19.    This patient is being managed with:   Diet NPO with Tube Feed-  Tube Feeding Modality: Gastro-Jejunostomy  Vital 1.5 Hernan (VITAL1.5RTH)  Total Volume for 24 Hours (mL): 480  Continuous  Starting Tube Feed Rate {mL per Hour}: 20  Increase Tube Feed Rate by (mL): 0     Every 24 hours  Until Goal Tube Feed Rate (mL per Hour): 20  Tube Feed Duration (in Hours): 24  Tube Feed Start Time: 14:00  Entered: Dec 13 2020  5:16PM    
This patient has been assessed with a concern for Malnutrition and has been determined to have a diagnosis/diagnoses of Severe protein-calorie malnutrition and Underweight/BMI < 19.    This patient is being managed with:   Diet NPO with Tube Feed-  Tube Feeding Modality: Gastro-Jejunostomy  Vital 1.5 Hernan (VITAL1.5RTH)  Total Volume for 24 Hours (mL): 480  Continuous  Starting Tube Feed Rate {mL per Hour}: 20  Increase Tube Feed Rate by (mL): 0     Every 24 hours  Until Goal Tube Feed Rate (mL per Hour): 20  Tube Feed Duration (in Hours): 24  Tube Feed Start Time: 14:00  Entered: Dec 13 2020  5:16PM    
This patient has been assessed with a concern for Malnutrition and has been determined to have a diagnosis/diagnoses of Severe protein-calorie malnutrition and Underweight/BMI < 19.    This patient is being managed with:   Diet NPO with Tube Feed-  Tube Feeding Modality: Gastro-Jejunostomy  Vital 1.5 Hernan (VITAL1.5RTH)  Total Volume for 24 Hours (mL): 720  Continuous  Starting Tube Feed Rate {mL per Hour}: 30  Increase Tube Feed Rate by (mL): 0     Every 24 hours  Until Goal Tube Feed Rate (mL per Hour): 30  Tube Feed Duration (in Hours): 24  Tube Feed Start Time: 14:00  Entered: Dec 12 2020 12:41PM    
This patient has been assessed with a concern for Malnutrition and has been determined to have a diagnosis/diagnoses of Severe protein-calorie malnutrition and Underweight/BMI < 19.    This patient is being managed with:   Diet NPO with Tube Feed-  Tube Feeding Modality: Gastro-Jejunostomy  Vital 1.5 Hernan (VITAL1.5RTH)  Total Volume for 24 Hours (mL): 720  Continuous  Starting Tube Feed Rate {mL per Hour}: 30  Increase Tube Feed Rate by (mL): 0     Every 24 hours  Until Goal Tube Feed Rate (mL per Hour): 30  Tube Feed Duration (in Hours): 24  Tube Feed Start Time: 14:00  Entered: Dec 15 2020  3:03PM    
This patient has been assessed with a concern for Malnutrition and has been determined to have a diagnosis/diagnoses of Severe protein-calorie malnutrition and Underweight/BMI < 19.    This patient is being managed with:   Diet NPO-  Except Medications  Entered: Dec 21 2020 10:57AM

## 2020-12-29 NOTE — PROGRESS NOTE ADULT - PROVIDER SPECIALTY LIST ADULT
Infectious Disease
Internal Medicine
Palliative Care
Infectious Disease
Infectious Disease
Palliative Care
Gastroenterology
Infectious Disease
Gastroenterology
Internal Medicine
Palliative Care
Internal Medicine

## 2020-12-29 NOTE — PROGRESS NOTE ADULT - ATTENDING COMMENTS
I was physically present for the key portions of the evaluation and management (E/M) service provided.  I agree with the above history, physical, and plan which I have reviewed and edited where appropriate. Plan discussed with team.    ______________  Henrique Gustafson MD   of Geriatric and Palliative Medicine  [facility]     Please page the following number for clinical matters between the hours of 9AM and 5PM   from Monday through Friday : [phone]    After 5PM and on weekends, please page: [phone]. The Geriatric and Palliative Medicine consult service has 24/7 coverage for medical recommendations, including for symptom management needs.

## 2020-12-29 NOTE — PROGRESS NOTE ADULT - SUBJECTIVE AND OBJECTIVE BOX
GAP TEAM PALLIATIVE CARE UNIT PROGRESS NOTE:      [  ] Patient on hospice program.    INDICATION FOR PALLIATIVE CARE UNIT SERVICES: End of life care and symptom management    INTERVAL HPI/OVERNIGHT EVENTS: No major events overnight. Pt required no PRNs    DNR on chart: Yes      Allergies    Levaquin (Unknown)  levofloxacin (Unknown)    Intolerances    MEDICATIONS  (STANDING):  enoxaparin Injectable 40 milliGRAM(s) SubCutaneous daily  glycopyrrolate Injectable 0.4 milliGRAM(s) IV Push every 6 hours  HYDROmorphone Infusion 2 mG/Hr (2 mL/Hr) IV Continuous <Continuous>  LORazepam   Injectable 1 milliGRAM(s) IV Push every 6 hours    MEDICATIONS  (PRN):  acetaminophen  Suppository .. 650 milliGRAM(s) Rectal every 6 hours PRN Temp greater or equal to 38C (100.4F)  ALBUTerol    90 MICROgram(s) HFA Inhaler 2 Puff(s) Inhalation every 6 hours PRN Shortness of Breath and/or Wheezing  bisacodyl Suppository 10 milliGRAM(s) Rectal daily PRN Constipation  glycopyrrolate Injectable 0.4 milliGRAM(s) IV Push every 6 hours PRN excessive secretions  HYDROmorphone  Injectable 4 milliGRAM(s) IV Push every 1 hour PRN dyspnea or RR>28  HYDROmorphone  Injectable 4 milliGRAM(s) IV Push every 1 hour PRN Severe Pain (7 - 10)  LORazepam   Injectable 1.5 milliGRAM(s) IV Push every 1 hour PRN Anxiety/Agitation    ITEMS UNCHECKED ARE NOT PRESENT    PRESENT SYMPTOMS: [ X]Unable to obtain due to poor mentation   Source if other than patient:  [ ]Family   [X]Team     Pain: [X ] yes [ ] no  QOL impact -   Location -                    Aggravating factors -  Quality -  Radiation -  Timing-  Severity (0-10 scale):  Minimal acceptable level (0-10 scale):     Dyspnea:                           [ ]Mild [ ]Moderate [ ]Severe  Anxiety:                             [ X]Mild [ ]Moderate [ ]Severe  Fatigue:                             [ ]Mild [ ]Moderate [ ]Severe  Nausea:                             [ ]Mild [ ]Moderate [ ]Severe  Loss of appetite:              [ ]Mild [ ]Moderate [ ]Severe  Constipation:                    [ ]Mild [ ]Moderate [ ]Severe    PAINAD Score:0-1    [email or web address] (Ctrl +  left click to view)  		  Other Symptoms:  [ ]All other review of systems negative     Palliative Performance Status Version 2:    10     %         [email or web address]  PHYSICAL EXAM:  Vital Signs Last 24 Hrs  T(C): 36.6 (29 Dec 2020 08:25), Max: 36.6 (29 Dec 2020 08:25)  T(F): 97.8 (29 Dec 2020 08:25), Max: 97.8 (29 Dec 2020 08:25)  HR: 108 (29 Dec 2020 08:25) (108 - 108)  BP: 114/69 (29 Dec 2020 08:25) (114/69 - 114/69)  RR: 20 (29 Dec 2020 08:25) (20 - 20)  SpO2: 90% (29 Dec 2020 08:25) (90% - 90%) I&O's Summary  GENERAL:  [ ]Alert  [ ]Oriented x   [X ]Lethargic  [X ]Cachexia  [ ]Unarousable  [ ]Verbal  [X ]Non-Verbal  Behavioral:   [ ] Anxiety  [ ] Delirium [ ] Agitation [ ] Other  HEENT:  [X ]Normal   [ ]Dry mouth   [ ]ET Tube/Trach  [ ]Oral lesions  PULMONARY:   [X ]Clear [ ]Tachypnea  [ ]Audible excessive secretions   [ ]Rhonchi        [ ]Right [ ]Left [ ]Bilateral  [ ]Crackles        [ ]Right [ ]Left [ ]Bilateral  [ ]Wheezing     [ ]Right [ ]Left [ ]Bilateral  [ ]Diminshed BS [ ]Right [ ]Left [ ]Bilateral    CARDIOVASCULAR:    [ X]Regular [ ]Irregular [ ]Tachy  [ ]Roge [ ]Murmur [ ]Other  GASTROINTESTINAL:  [X ]Soft  [ ]Distended   [ ]+BS  [ X]Non tender [ ]Tender  [X ]PEG [ ]OGT/ NGT   Last BM:   12/22/2020    GENITOURINARY:  [ ]Normal [X] Incontinent   [ ]Oliguria/Anuria   [ ]Dalal  MUSCULOSKELETAL:   [ ]Normal   [ ]Weakness  [ X]Bed/Wheelchair bound [ ]Edema  NEUROLOGIC:   [ ]No focal deficits  [ X] Cognitive impairment  [ ] Dysphagia [ ]Dysarthria [ ] Paresis [ ]Other   SKIN:   [ X]Normal  [ ]Rash     [ ]Pressure ulcer(s)  [ ]y [ ]n  Present on admission      CRITICAL CARE:  [ ] Shock Present  [ ]Septic [ ]Cardiogenic [ ]Neurologic [ ]Hypovolemic  [ ]  Vasopressors [ ]  Inotropes   [ ] Respiratory failure present [ ] Mechanical Ventilation [ ] Non-invasive ventilatory support [ ] High-Flow  [ ] Acute  [ ] Chronic [ ] Hypoxic  [ ] Hypercarbic [ ] Other  [ ] Other organ failure     LABS: No new labs      RADIOLOGY & ADDITIONAL STUDIES: No new imaging    PROTEIN CALORIE MALNUTRITION: [ ] mild [ ] moderate [X ] severe  [ ] underweight [ ] morbid obesity    https://www.andeal.org/vault/2440/web/files/ONC/Table_Clinical%20Characteristics%20to%20Document%20Malnutrition-White%20JV%20et%20al%409372.pdf    Height (cm): 152.4 (12-07-20 @ 23:10), 152.4 (11-27-20 @ 20:43)  Weight (kg): 42.3 (12-07-20 @ 23:10), 44.4 (11-27-20 @ 20:43)  BMI (kg/m2): 18.2 (12-07-20 @ 23:10), 19.1 (11-27-20 @ 20:43)    [X ] PPSV2 < or = 30% [ ] significant weight loss [ X] poor nutritional intake [ ] anasarca Albumin, Serum: 3.0 g/dL (12-13-20 @ 06:48)    Artificial Nutrition [ ]     REFERRALS:   [ ]Chaplaincy  [ ] Hospice  [ ]Child Life  [ ]Social Work  [ ]Case management [ ]Holistic Therapy [ ] Physical Therapy [ ] Dietary   Goals of Care Document: CRISTIAN Monroe (12-02-20 @ 18:00)  Goals of Care Conversation:   Participants:  · Participants  Patient; Family  · Child(chioma)  Missy Thomas    Advance Directives:  · Does patient have Advance Directive  No  · Does Patient Have a Surrogate  Yes  · Surrogate's Name  Missy Thomas  · Surrogate's Phone Number  [phone]  · Caregiver:  yes  · Name  Sheeba Cardoso  · Phone Number  Stuart Merida    Conversation Discussion:  · Conversation  Diagnosis; Prognosis; Treatment Options; Palliative Care Referral  · Conversation Details  -diagnostic findings including anastomotic leak, thoracic discitis/osteo, sacral abscess  -treatment options including abx, surgery  -diagnostic options including CT scan, colonoscopy   -poor nutrition and overall health status   -comfort care, hospice    What Matters Most To Patient and Family:  · What matters most to patient and family  treatment, comfort    Personal Advance Directives Treatment Guidelines:   Treatment Guidelines:  · Decision Maker  Surrogate    Location of Discussion:   Duration of Advanced Care Planning Meeting:  · Time spent (in minutes)  25    Location of Discussion:  · Location of discussion  Telephone      Electronic Signatures:  Maximilian Monroe)  (Signed 02-Dec-2020 18:02)  	Authored: Goals of Care Conversation, Personal Advance Directives Treatment Guidelines, Location of Discussion      Last Updated: 02-Dec-2020 18:02 by Maximilian Monroe)

## 2021-01-21 PROCEDURE — 82435 ASSAY OF BLOOD CHLORIDE: CPT

## 2021-01-21 PROCEDURE — 82947 ASSAY GLUCOSE BLOOD QUANT: CPT

## 2021-01-21 PROCEDURE — 85018 HEMOGLOBIN: CPT

## 2021-01-21 PROCEDURE — 86850 RBC ANTIBODY SCREEN: CPT

## 2021-01-21 PROCEDURE — 87150 DNA/RNA AMPLIFIED PROBE: CPT

## 2021-01-21 PROCEDURE — 92610 EVALUATE SWALLOWING FUNCTION: CPT

## 2021-01-21 PROCEDURE — 86901 BLOOD TYPING SEROLOGIC RH(D): CPT

## 2021-01-21 PROCEDURE — 85730 THROMBOPLASTIN TIME PARTIAL: CPT

## 2021-01-21 PROCEDURE — 84100 ASSAY OF PHOSPHORUS: CPT

## 2021-01-21 PROCEDURE — 87040 BLOOD CULTURE FOR BACTERIA: CPT

## 2021-01-21 PROCEDURE — 94640 AIRWAY INHALATION TREATMENT: CPT

## 2021-01-21 PROCEDURE — 86769 SARS-COV-2 COVID-19 ANTIBODY: CPT

## 2021-01-21 PROCEDURE — P9016: CPT

## 2021-01-21 PROCEDURE — 96374 THER/PROPH/DIAG INJ IV PUSH: CPT | Mod: XU

## 2021-01-21 PROCEDURE — 84132 ASSAY OF SERUM POTASSIUM: CPT

## 2021-01-21 PROCEDURE — 36430 TRANSFUSION BLD/BLD COMPNT: CPT

## 2021-01-21 PROCEDURE — 85027 COMPLETE CBC AUTOMATED: CPT

## 2021-01-21 PROCEDURE — 73502 X-RAY EXAM HIP UNI 2-3 VIEWS: CPT

## 2021-01-21 PROCEDURE — 85610 PROTHROMBIN TIME: CPT

## 2021-01-21 PROCEDURE — 83605 ASSAY OF LACTIC ACID: CPT

## 2021-01-21 PROCEDURE — 71045 X-RAY EXAM CHEST 1 VIEW: CPT

## 2021-01-21 PROCEDURE — 86923 COMPATIBILITY TEST ELECTRIC: CPT

## 2021-01-21 PROCEDURE — 74230 X-RAY XM SWLNG FUNCJ C+: CPT

## 2021-01-21 PROCEDURE — 80053 COMPREHEN METABOLIC PANEL: CPT

## 2021-01-21 PROCEDURE — 85025 COMPLETE CBC W/AUTO DIFF WBC: CPT

## 2021-01-21 PROCEDURE — U0003: CPT

## 2021-01-21 PROCEDURE — 86803 HEPATITIS C AB TEST: CPT

## 2021-01-21 PROCEDURE — 92611 MOTION FLUOROSCOPY/SWALLOW: CPT

## 2021-01-21 PROCEDURE — 82378 CARCINOEMBRYONIC ANTIGEN: CPT

## 2021-01-21 PROCEDURE — 82330 ASSAY OF CALCIUM: CPT

## 2021-01-21 PROCEDURE — 73521 X-RAY EXAM HIPS BI 2 VIEWS: CPT

## 2021-01-21 PROCEDURE — 87635 SARS-COV-2 COVID-19 AMP PRB: CPT

## 2021-01-21 PROCEDURE — 87086 URINE CULTURE/COLONY COUNT: CPT

## 2021-01-21 PROCEDURE — 81001 URINALYSIS AUTO W/SCOPE: CPT

## 2021-01-21 PROCEDURE — 82803 BLOOD GASES ANY COMBINATION: CPT

## 2021-01-21 PROCEDURE — 82550 ASSAY OF CK (CPK): CPT

## 2021-01-21 PROCEDURE — 86900 BLOOD TYPING SEROLOGIC ABO: CPT

## 2021-01-21 PROCEDURE — 99285 EMERGENCY DEPT VISIT HI MDM: CPT | Mod: 25

## 2021-01-21 PROCEDURE — 96375 TX/PRO/DX INJ NEW DRUG ADDON: CPT | Mod: XU

## 2021-01-21 PROCEDURE — 93005 ELECTROCARDIOGRAM TRACING: CPT

## 2021-01-21 PROCEDURE — 84295 ASSAY OF SERUM SODIUM: CPT

## 2021-01-21 PROCEDURE — 74177 CT ABD & PELVIS W/CONTRAST: CPT

## 2021-01-21 PROCEDURE — 80048 BASIC METABOLIC PNL TOTAL CA: CPT

## 2021-01-21 PROCEDURE — 97161 PT EVAL LOW COMPLEX 20 MIN: CPT

## 2021-01-21 PROCEDURE — 82962 GLUCOSE BLOOD TEST: CPT

## 2021-01-21 PROCEDURE — 85014 HEMATOCRIT: CPT

## 2021-01-21 PROCEDURE — 51702 INSERT TEMP BLADDER CATH: CPT

## 2021-01-21 PROCEDURE — 83735 ASSAY OF MAGNESIUM: CPT

## 2021-01-21 PROCEDURE — 73552 X-RAY EXAM OF FEMUR 2/>: CPT

## 2021-01-21 PROCEDURE — 93306 TTE W/DOPPLER COMPLETE: CPT

## 2021-01-21 PROCEDURE — 72170 X-RAY EXAM OF PELVIS: CPT

## 2021-06-09 NOTE — CHART NOTE - NSCHARTNOTEFT_GEN_A_CORE
Medication:   Requested Prescriptions     Pending Prescriptions Disp Refills    clonazePAM (KLONOPIN) 1 MG tablet 60 tablet 0     Sig: Take 1 tablet by mouth 2 times daily as needed for Anxiety for up to 30 days.  cloNIDine (CATAPRES) 0.1 MG tablet 30 tablet 0     Sig: Take 1 tablet by mouth nightly      Last Filled:      Patient Phone Number: [phone] (home) [phone] (work)    Last appt: 4/20/2021   Next appt: Visit date not found    Last OARRS:   RX Monitoring 5/11/2020   Attestation -   Periodic Controlled Substance Monitoring No signs of potential drug abuse or diversion identified.      PDMP Monitoring:    Last PDMP Jermain Can as Reviewed [facility]):  Review User Review Instant Review Result   Wendi Garner 4/20/2021 12:35 PM Reviewed PDMP [1]     Preferred Pharmacy:   [facility] [address], [address] [phone] [facility] Due [phone] [address] [facility] [place] [address]  Phone: [phone] Fax: [phone] Nutrition Follow Up Note  Patient seen for:    "65F with PMH including emphysema on home 2L NC, colon CA s/p distal ileum resection in 2016, ESBL UTI, zenker's diverticulum s/p repair in 2019 c/b vocal cord paralysis s/p GJ tube for feeding, RA, gastroparesis, GERD, anemia presenting from hospice for weakness, likely from malnutrition vs infection (presacral fluid collection) vs RLL aspiration PNA. Patient is on broad-spectrum abx, and awaiting S&S evaluation. Has had enterococcal infection in pas, possible from leak at distal ileal anastomosis site. For pain, is on 30mg methadone BID at home. Daughter is HCP and is a pediatric RN in [place], DNR/DNI. Palliative called for GOC".     Interim events: Per MD note 12/22, pt remains lethargic but appears comfortable overall. Feeds now on hold given potential aspiration. CXR with R consolidation, consistent with aspiration. Continues on antibiotics as ordered.     Source: RN, comprehensive chart review    Diet: NPO since 12/21. Was previously prescribed Vital 1.5 at 40ml/hr x 24 hrs.     EN provision (per nursing flow sheet):   (12/23): NONE  (12/22): NONE  (12/21): 320ml   (12/20): 320ml   (12/19): 400ml   (12/18): 350ml   *Pt has received </= 50% of est energy needs x >/= 5 days*    Stool count: (12/22): x 2; (12/21): x 1. On bowel regimen as ordered.     Daily   % Weight Change    Pertinent Medications: MEDICATIONS  (STANDING):  DAPTOmycin IVPB 350 milliGRAM(s) IV Intermittent every 24 hours  dextrose 40% Gel 15 Gram(s) Oral once  dextrose 50% Injectable 25 Gram(s) IV Push once  dextrose 50% Injectable 12.5 Gram(s) IV Push once  dextrose 50% Injectable 25 Gram(s) IV Push once  enoxaparin Injectable 40 milliGRAM(s) SubCutaneous daily  famotidine    Tablet 20 milliGRAM(s) Oral two times a day  FLUoxetine Solution 20 milliGRAM(s) Oral daily  gabapentin   Solution 200 milliGRAM(s) Oral three times a day  glucagon  Injectable 1 milliGRAM(s) IntraMuscular once  glycopyrrolate Injectable 0.4 milliGRAM(s) IV Push every 6 hours  methadone    Tablet 30 milliGRAM(s) Oral every 12 hours  piperacillin/tazobactam IVPB.. 3.375 Gram(s) IV Intermittent every 8 hours  QUEtiapine 12.5 milliGRAM(s) Oral at bedtime    MEDICATIONS  (PRN):  acetaminophen   Tablet .. 650 milliGRAM(s) Oral every 6 hours PRN Mild Pain (1 - 3), Moderate Pain (4 - 6)  ALBUTerol    90 MICROgram(s) HFA Inhaler 2 Puff(s) Inhalation every 6 hours PRN Shortness of Breath and/or Wheezing  bisacodyl Suppository 10 milliGRAM(s) Rectal daily PRN Constipation  glycopyrrolate Injectable 0.4 milliGRAM(s) IV Push every 6 hours PRN excessive secretions  HYDROmorphone  Injectable 0.2 milliGRAM(s) IV Push every 1 hour PRN Severe Pain (7 - 10)  HYDROmorphone  Injectable 0.2 milliGRAM(s) IV Push every 1 hour PRN dyspnea or RR>28  LORazepam   Injectable 0.5 milliGRAM(s) IV Push every 1 hour PRN Anxiety/Agitation    Pertinent Labs:   Finger Sticks:  POCT Blood Glucose.: 95 mg/dL (12-23 @ 09:17)  POCT Blood Glucose.: 107 mg/dL (12-23 @ 04:17)  POCT Blood Glucose.: 85 mg/dL (12-22 @ 22:25)  POCT Blood Glucose.: 181 mg/dL (12-22 @ 13:25)  POCT Blood Glucose.: 194 mg/dL (12-22 @ 13:11)  POCT Blood Glucose.: 64 mg/dL (12-22 @ 12:36)      Skin per nursing documentation: left buttock impaired skin integrity  Edema: 1+ right leg; left leg    Estimated Needs:   [x] no change since previous assessment  [ ] recalculated:     Previous Nutrition Diagnosis:  Severe malnutrition, Underweight, Swallowing difficulty  Nutrition Diagnosis is: ongoing, care plan in progress, being addressed with EN feedings as tolerated    Interventions:     Recommend  1) Determine nutritional goals of care. If EN resumed, consider resume Vital 1.5 at 40mL/hr x 24 hr, provides 960 ml total volume, 1440 kcal, 65 g protein, and 733 ml free water. Meets 34 kcal/kg, 1.5 g/kg protein based on dosing wt 42.3 kg. Defer additional free water to team.  2) Continue to monitor and replete electrolytes PRN. Consider multivitamin and thiamine.   3) If EN resumed, monitor GI tolerance. RD to remain available to adjust EN formulary, volume/rate PRN.   4) If PO diet initiated, consider no therapeutic restrictions and defer consistency to medical team, SLP.     Monitoring and Evaluation:     Continue to monitor Nutritional intake, Tolerance to diet prescription, weights, labs, skin integrity    RD remains available upon request and will follow up per protocol Nutrition Follow Up Note  Patient seen for:    "65F with PMH including emphysema on home 2L NC, colon CA s/p distal ileum resection in 2016, ESBL UTI, zenker's diverticulum s/p repair in 2019 c/b vocal cord paralysis s/p GJ tube for feeding, RA, gastroparesis, GERD, anemia presenting from hospice for weakness, likely from malnutrition vs infection (presacral fluid collection) vs RLL aspiration PNA. Patient is on broad-spectrum abx, and awaiting S&S evaluation. Has had enterococcal infection in pas, possible from leak at distal ileal anastomosis site. For pain, is on 30mg methadone BID at home. Daughter is HCP and is a pediatric RN in [place], DNR/DNI. Palliative called for GOC".     Interim events: Per MD note 12/22, pt remains lethargic but appears comfortable overall. Feeds now on hold given potential aspiration. CXR with R consolidation, consistent with aspiration. Continues on antibiotics as ordered.     Source: RN, comprehensive chart review    Diet: NPO since 12/21. Was previously prescribed Vital 1.5 at 40ml/hr x 24 hrs.     EN provision (per nursing flow sheet):   (12/23): NONE  (12/22): NONE  (12/21): 320ml   (12/20): 320ml   (12/19): 400ml   (12/18): 350ml   *Pt has received </= 50% of est energy needs x >/= 5 days*    Stool count: (12/22): x 2; (12/21): x 1. On bowel regimen as ordered.     Daily   % Weight Change    Pertinent Medications: MEDICATIONS  (STANDING):  DAPTOmycin IVPB 350 milliGRAM(s) IV Intermittent every 24 hours  dextrose 40% Gel 15 Gram(s) Oral once  dextrose 50% Injectable 25 Gram(s) IV Push once  dextrose 50% Injectable 12.5 Gram(s) IV Push once  dextrose 50% Injectable 25 Gram(s) IV Push once  enoxaparin Injectable 40 milliGRAM(s) SubCutaneous daily  famotidine    Tablet 20 milliGRAM(s) Oral two times a day  FLUoxetine Solution 20 milliGRAM(s) Oral daily  gabapentin   Solution 200 milliGRAM(s) Oral three times a day  glucagon  Injectable 1 milliGRAM(s) IntraMuscular once  glycopyrrolate Injectable 0.4 milliGRAM(s) IV Push every 6 hours  methadone    Tablet 30 milliGRAM(s) Oral every 12 hours  piperacillin/tazobactam IVPB.. 3.375 Gram(s) IV Intermittent every 8 hours  QUEtiapine 12.5 milliGRAM(s) Oral at bedtime    MEDICATIONS  (PRN):  acetaminophen   Tablet .. 650 milliGRAM(s) Oral every 6 hours PRN Mild Pain (1 - 3), Moderate Pain (4 - 6)  ALBUTerol    90 MICROgram(s) HFA Inhaler 2 Puff(s) Inhalation every 6 hours PRN Shortness of Breath and/or Wheezing  bisacodyl Suppository 10 milliGRAM(s) Rectal daily PRN Constipation  glycopyrrolate Injectable 0.4 milliGRAM(s) IV Push every 6 hours PRN excessive secretions  HYDROmorphone  Injectable 0.2 milliGRAM(s) IV Push every 1 hour PRN Severe Pain (7 - 10)  HYDROmorphone  Injectable 0.2 milliGRAM(s) IV Push every 1 hour PRN dyspnea or RR>28  LORazepam   Injectable 0.5 milliGRAM(s) IV Push every 1 hour PRN Anxiety/Agitation    Pertinent Labs:   Finger Sticks:  POCT Blood Glucose.: 95 mg/dL (12-23 @ 09:17)  POCT Blood Glucose.: 107 mg/dL (12-23 @ 04:17)  POCT Blood Glucose.: 85 mg/dL (12-22 @ 22:25)  POCT Blood Glucose.: 181 mg/dL (12-22 @ 13:25)  POCT Blood Glucose.: 194 mg/dL (12-22 @ 13:11)  POCT Blood Glucose.: 64 mg/dL (12-22 @ 12:36)      Skin per nursing documentation: left buttock impaired skin integrity  Edema: 1+ right leg; left leg    Estimated Needs:   [x] no change since previous assessment  [ ] recalculated:     Previous Nutrition Diagnosis:  Severe malnutrition, Underweight, Swallowing difficulty  Nutrition Diagnosis is: ongoing, care plan in progress. EN feeds currently on hold. Pending goals of care.     Interventions:     Recommend  1) Determine nutritional goals of care. If EN resumed, consider resume Vital 1.5 at 40mL/hr x 24 hr, provides 960 ml total volume, 1440 kcal, 65 g protein, and 733 ml free water. Meets 34 kcal/kg, 1.5 g/kg protein based on dosing wt 42.3 kg. Defer additional free water to team.  2) Continue to monitor and replete electrolytes PRN. Consider multivitamin and thiamine.   3) If EN resumed, monitor GI tolerance. RD to remain available to adjust EN formulary, volume/rate PRN.   4) If PO diet initiated, consider no therapeutic restrictions and defer consistency to medical team, SLP.     Monitoring and Evaluation:     Continue to monitor Nutritional intake, Tolerance to diet prescription, weights, labs, skin integrity    RD remains available upon request and will follow up per protocol

## 2022-05-04 NOTE — PROGRESS NOTE ADULT - PROVIDER SPECIALTY LIST ADULT
Critical Care You can access the FollowMyHealth Patient Portal offered by [facility] by registering at the following website: [email or web address]. By joining [employer]’s FollowMyHealth portal, you will also be able to view your health information using other applications (apps) compatible with our system.

## 2022-11-10 NOTE — ASSESSMENT
[FreeTextEntry1] : Pt to have her esophagram images sent to me for review.  She may require a revision Zenker's repair.  If the diverticulum is very small we may need to have the pt see Dr. Chowdary for a flexible endoscopic approach to the repair.  \par \par Pt to see Dr. Kim for possible VC injection to manage the VC paresis issue.   no

## 2023-01-01 NOTE — PATIENT PROFILE ADULT - HAS THE PATIENT RECEIVED THE INFLUENZA VACCINE THIS SEASON?
PT Name: Nilda Adams  MR #: [id number]    DOCUMENTATION CLARIFICATION      CDS: VIKA Martinez, RN           Contact information: [email or web address]  This form is a permanent document in the medical record.      Query Date: May 15, 2023    By submitting this query, we are merely seeking further clarification of documentation. Please utilize your independent clinical judgment when addressing the question(s) below.    The Medical Record contains the following:   Indicators  Supporting Clinical Findings Location in Medical Record   X Gestation age at birth born at 39w5d H&P     X Maternal Blood Type O POS H&P      Maternal Altagracia/ maternal antibodies detected              X Cord ABO/Rh/Altagracia Altagracia positive      23 22:48   Cord ABO B   Cord Rh POS   Cord Direct Altagracia POS    DC summary      Lab      Jaundice documented     X Bilirubin level  23 22:45 23 10:11   Bilirubin, Total -  8.2 (H) 9.8   Bilirubin, Direct -  0.4 0.4    Lab  -    X Treatment Therapeutic Interventions: none DC summary     Phototherapy      Other       Provider, please clarify the clinical significance of altagracia positive.   [  x ] ABO incompatibility   [   ] Altagracia positive, clinically not significant   [   ] Other (please specify): ______________                                                                                                       no...

## 2023-04-24 NOTE — PATIENT PROFILE ADULT - IS PATIENT POST-MENOPAUSAL?
yes Xenograft Text: The defect edges were debeveled with a #15c scalpel blade.  Given the location of the defect, shape of the defect and the proximity to free margins a xenograft was deemed most appropriate.  The graft was then trimmed to fit the size of the defect.  The graft was then placed in the primary defect and oriented appropriately.

## 2023-12-05 NOTE — PROGRESS NOTE ADULT - PROBLEM SELECTOR PLAN 7
Mom given discharge instructions and follow up information given. Prescription provided. Medication dosage sheet provided.  Patient resting comfortably at this time. Patient discharged home.    DNR/DNI, MOLST in chart  -Comfort measures  -Symptom management  -Dispo planning today, SW referral and hospice referral

## 2023-12-18 NOTE — ED PROVIDER NOTE - PRINCIPAL DIAGNOSIS
2023       No Recipients    Patient: Mariaelena Easley   YOB: 1946   Date of Visit: 2023     Dear ANGELA Andrade:       Thank you for referring Mariaelena Easley to me for evaluation. Below are the relevant portions of my assessment and plan of care.    If you have questions, please do not hesitate to call me. I look forward to following Mariaelena along with you.         Sincerely,        ANGELA Alarcon        CC:   No Recipients    Stella Jerez APRN  23 [id number]  Sign when Signing Visit  Subjective:     Encounter Date:2023    Primary Care Physician: Huber Ortiz APRN      Patient ID: Mariaelena Easley is a 77 y.o. female.    Chief Complaint:Chest pain    PROBLEM LIST:  Chest pain  MPS, 2018: EF of 66% very small sized, mildly severe area of ischemia in the lateral wall.  Low risk study.   Stress MPS, 2020: Myocardial perfusion imaging indicates a small sized infarct located in the lateral wall with no significant ischemia noted. EF 57%.   US nonvascular extremity, 10/08/2020: 2.5 cm lipoma in the area of the palpable abnormality.   2020 MPS no significant change from 2019.  Hypertension  Dyslipidemia  Borderline diabetes  Eczema  Surgeries:  Bilateral breast augmentation  Right breast biopsy   section  Trigger point injection  Wrist surgery      Allergies   Allergen Reactions   • Codeine Nausea Only and Dizziness   • Sulfa Antibiotics Rash         Current Outpatient Medications:   •  amLODIPine (NORVASC) 5 MG tablet, TAKE 1 TABLET DAILY, Disp: 90 tablet, Rfl: 1  •  atorvastatin (LIPITOR) 80 MG tablet, Take 1 tablet by mouth Daily., Disp: 90 tablet, Rfl: 1  •  Calcium 500-100 MG-UNIT chewable tablet, Chew 1 tablet Daily., Disp: , Rfl:   •  Coenzyme Q10 30 MG/5ML liquid, Take 5 mL by mouth Daily., Disp: , Rfl:   •  diazePAM (VALIUM) 5 MG tablet, Take 1 tablet by mouth Every 6 (Six) Hours As Needed., Disp: , Rfl:   •   hydroCHLOROthiazide (HYDRODIURIL) 25 MG tablet, TAKE 1 TABLET DAILY, Disp: 90 tablet, Rfl: 1  •  Lancets (OneTouch Delica Plus [id number]) misc, USE AND DISCARD LANCET TO  CHECK BLOOD SUGAR TWO TIMESA DAY, Disp: 200 each, Rfl: 1  •  metFORMIN ER (GLUCOPHAGE-XR) 500 MG 24 hr tablet, Take 2 po in the am, 2 po in the pm, Disp: 360 tablet, Rfl: 3  •  multivitamin with minerals tablet tablet, Take 1 tablet by mouth Daily., Disp: , Rfl:   •  naproxen sodium (ALEVE) 220 MG tablet, Take 1 tablet by mouth 2 (Two) Times a Day As Needed., Disp: , Rfl:   •  OneTouch Verio test strip, USE TO TEST 2-3 TIMES DAILY, Disp: 300 each, Rfl: 3  •  rizatriptan MLT (Maxalt-MLT) 10 MG disintegrating tablet, Place 1 tablet on the tongue 1 (One) Time As Needed for Migraine for up to 1 dose. May repeat in 2 hours if needed, Disp: 9 tablet, Rfl: 1  •  fluticasone (FLONASE) 50 MCG/ACT nasal spray, 2 sprays into the nostril(s) as directed by provider Daily. (Patient not taking: Reported on 12/18/2023), Disp: 15.8 mL, Rfl: 0        History of Present Illness    Patient is a 77-year-old  female who presents today for annual follow-up of history of chest pain and cardiac risk factors.  Since last being seen she feels overall at her baseline.  Still has some occasional brief sharp in nature chest pain in multiple areas.  Has chronic shortness of breath that she feels is overall unchanged from last year.  No reported syncope, presyncope, or worsened edema.  Notes she is compliant with her medications.    The following portions of the patient's history were reviewed and updated as appropriate: allergies, current medications, past family history, past medical history, past social history, past surgical history and problem list.      Social History     Tobacco Use   • Smoking status: Former     Packs/day: 0.50     Years: 3.00     Additional pack years: 0.00     Total pack years: 1.50     Types: Cigarettes     Start date: 6/30/1966     Quit date:  "9/15/1969     Years since quittin.2   • Smokeless tobacco: Never   Vaping Use   • Vaping Use: Never used   Substance Use Topics   • Alcohol use: Yes     Alcohol/week: 1.0 standard drink of alcohol     Types: 1 Glasses of wine per week     Comment: Very seldom.....at dinner socially...mostly not even a month   • Drug use: No         ROS       Objective:   /84 (BP Location: Left arm, Patient Position: Sitting, Cuff Size: Adult)   Pulse 84   Ht 160 cm (62.99\")   Wt 72.8 kg (160 lb 9.6 oz)   LMP  (LMP Unknown)   SpO2 96%   BMI 28.46 kg/m²         Vitals reviewed.   Constitutional:       Appearance: Well-developed and not in distress.   Neck:      Vascular: No JVD.      Trachea: No tracheal deviation.   Pulmonary:      Effort: Pulmonary effort is normal.      Breath sounds: Normal breath sounds.   Cardiovascular:      Normal rate. Regular rhythm.      Murmurs: There is no murmur.   Edema:     Peripheral edema absent.   Musculoskeletal:         General: No deformity. Skin:     General: Skin is warm and dry.   Neurological:      Mental Status: Alert and oriented to person, place, and time.         Procedures          Assessment:   Assessment & Plan     Diagnoses and all orders for this visit:    1. Chest pain, unspecified type  (Primary), overall atypical in nature.  History of low risk MPS studies.    2. Essential hypertension, mildly elevated today but has previously been controlled through the last few visits.  On amlodipine.    3. Hyperlipidemia, unspecified hyperlipidemia type, stable.  On statin.  Labs monitored by primary care.      Plan:  Patient is overall stable from cardiac standpoint.  Continue current cardiac medications.  Follow-up in 1 years time or sooner if needed.       Stella MILLER     Advance Care Planning  ACP discussion was held with the patient during this visit. Patient has an advance directive (not in EMR), copy requested.        Dictated utilizing Dragon dictation  " Generalized weakness

## 2024-06-10 NOTE — DIETITIAN INITIAL EVALUATION ADULT. - DOB: +DATEOFBIRTH
Office Consent to Operation/Invasive Procedure    Name: Anisa Carrillo   YOB: 1945   MRN: [id number]   AUTHORIZATION:  I authorize performance on the patient of the following surgical operation/invasive procedure under the direction, supervision and authority of Kenna Orta MD.  Description of operation(s): Right breast seroma drainage    NATURE OF TREATMENT:  The nature of my condition, the nature and purpose of the operation/procedure, possible alternative methods of treatment, risks involved, and the possibility of complications have been explained to me. I have had an opportunity to discuss this operation/procedure with the physician and other doctors concerned, and I have received answers to all questions I asked and do hereby assume all risks involved. No guarantee or assurance has been given to me by anyone as to the results that may be obtained.     ADDITIONAL PROCEDURES:  I consent to the performance of operations and procedures in addition to or different from those now contemplated, whether or not arising from presently unforeseen conditions, which the above-named doctor or his/her associate or assistants may consider necessary or advisable in the course of the operation.    OTHER QUALIFIED PRACTITIONERS:  I have been advised, and I agree, that the operation/procedure may be performed by a team of doctors including one or more attending, doctors, residents and medical students. I consent to these other qualified medical practitioners, who are not physicians, performing important parts of the operation/procedure or the administration of anesthetic agents.    ANESTHETIC AGENTS: I understand that anesthetic agents may have serious and even fatal complications. I consent to the administration of such anesthetics/sedatives/medications as may be considered necessary or advisable by the physician responsible for the service.      OBSERVATION:  For the purpose of advancing the educational programs  of the facility I consent to the admittance of qualified observers.    PHOTOGRAPHY: I consent to the taking and publication of photographs and other reproductions in the course of the operation or procedure for the purpose of advancing education provided that the identify of the patient is not revealed.    TISSUE USE:  I authorize the facility to preserve and use for scientific or teaching purposes or otherwise dispose of any dismembered tissue resulting from the procedure and treatment authorized above.    INDEPENDENT PHYSICIAN SERVICES: I have been informed and I acknowledge and fully understand that the Physician(s) providing services to me in this facility, such as my personal Physician(s),  Specialty Physician(s), Radiology, Pathology, consulting, and other allied health physicians, are independent contractors and are not employees or agents of this facility, unless otherwise specifically stated. My decision to seek medical care at the facility is not based upon any understanding, representation, advertisement, media campaign, inference, implication or reliance that the physicians who are or will be treating me are employees or agents of the facility.     DO NOT SIGN IF YOU HAVE ANY QUESTIONS  ________________________________________________________________________________________________________________  My signature below constitutes my acknowledgement and agreement that I have read and understand the above, was given an opportunity to discuss this form and ask questions, that all questions were answered to my satisfaction, and I am satisfied I understand the form's contents and significance.    Date: __________________ Time: _________________ Signed: ______________________________________________________        Patient/Legally Authorized Representative ([place] [employer] appropriate)     Date: __________________   Time: _________________   Signed: ______________________________________________________  Witness    Certificate of Interpretation:  I certify that I have read the foregoing to the signor hearof in the ____________________________________________________ language.     Date: ________________ Time: __________________ Signed: _________________________________________________________     Affirmation by Responsible Physician  I have informed the above named patient or Legally Authorized Representative of the medical condition requiring surgical treatment and/or the further diagnostic procedures referred to above. I have explained, consistent with accepted medical judgment, the nature and purposes of the treatment or procedures, the reasonable (1) possible alternatives (2) risks/complications and (3) consequences in the treatment or procedure consented to. I have also given the opportunity to ask questions and have answered any such questions.     Date:________________ Time: __________________ Signed: _________________________________________________________    Statement Selected

## 2024-09-13 NOTE — H&P ADULT - HISTORY OF PRESENT ILLNESS
Refill request received for oxy IR 10-15 mg q4 PRN   Last office visit: 9/3/2024  Next office visit: 9/24/2024  Last ordered: 9/6/2024    PDMP is down. Contacted [facility], states rx was received but not dispensed. Transferred to dispensing pharmacy. Per pharmacist insurance is declining rx even though it is due. Pharmacist tried to change the qty and was still declined.     3:56 PM  Contacted sister Kasie, will call back with how many pills patient has left.     4:52 PM  Per sister, patient has been out since last week. Contacted [employer] line, completed override.     Contacted [facility] to re-run. Elsy states it still says plan has been exceeded.     5:02 PM  Contacted [facility] again, spoke with Lorenza. States there was a missing piece but should be fixed. Approval # [id number].    5:12 PM  Spoke with pharmacist, confirmed approval.     Contacted sister and notified, verbalized understanding. Recommended calling our office back in 2.5 months to see if another DAPO needs to be completed.          64yo F w/ PMH of emphysema on home 2L NC, colon CA s/p distal ileum resection in 2016, ESBL UTI, zenker's diverticulum s/p repair in 2019 c/b vocal cord paralysis req GJ tube for feeding, RA, gastroparesis, GERD, anemia presenting from home hospice for weakness.     Pt was previously admitted to [facility] from 11/27 to 12/3 for which she initially presented w/ leaking PEG tube and noted L hip ill-defined fluid collection w/ no surgical intervention at that time apart from I&D w/ minimal fluid output. During that hospitalization pt had had a CT A/P which showed a leak from the rectosigmoid anastomosis from her previous operation w/ enterococcus bacteremia requiring a MICU stay for vasopressor support in the setting of septic shock. While there pt continued to deteriorate and discussion was had w/ pt's daughter who is the healthcare surrogate and the decision was made to pursue comfort measures and home hospice.     At home hospice pt continued to improve despite prior discussion of rapid deterioration. She was then restarted on Zosyn for abx coverage and allowed PO intake. Per daughter and hospice physician they decided pt improving and to present to the hospital for continued management w/ IV abx and fluids if needed. Would like to re-puruse hospice care afterwards if warranted/    In the ED Tmax 99.1, HR 92, BP 96/81, satting 99% on 5L NC. Labs notable for leukocytosis to 18 neutrophil predominant, and an anemia to 7 w/ UA showing mod ketones and protein. CT A/P done showing possible RLL pna and ill defined gas containing presacral fluid collection which tracks along the L hip joint. CXR pulm congestion.    She recieved a dose of Vanc/Zosyn, 2L IVF, Lasix 40mg IVP x1. 66yo F w/ PMH of emphysema on home 2L NC, colon CA s/p distal ileum resection in 2016, ESBL UTI, zenker's diverticulum s/p repair in 2019 c/b vocal cord paralysis req GJ tube for feeding, RA, gastroparesis, GERD, anemia presenting from home hospice for weakness.     Pt was previously admitted to [facility] from 11/27 to 12/3 for which she initially presented w/ leaking PEG tube and noted L hip ill-defined fluid collection w/ no surgical intervention at that time apart from I&D w/ minimal fluid output. During that hospitalization pt had had a CT A/P which showed a leak from the rectosigmoid anastomosis from her previous operation w/ enterococcus bacteremia requiring a MICU stay for vasopressor support in the setting of septic shock. While there pt continued to deteriorate and discussion was had w/ pt's daughter who is the healthcare surrogate and the decision was made to pursue comfort measures and home hospice.     At home hospice pt continued to improve despite prior discussion of rapid deterioration. She was then restarted on Zosyn for abx coverage and allowed PO intake. Per daughter and hospice physician they decided pt improving and to present to the hospital for continued management w/ IV abx and fluids if needed. Would like to re-puruse hospice care afterwards if warranted/    In the ED Tmax 99.1, HR 92, BP 96/81, satting 99% on 5L NC. Labs notable for leukocytosis to 18 neutrophil predominant, and an anemia to 7 w/ UA showing mod ketones and protein. CT A/P done showing possible RLL pna and ill defined gas containing presacral fluid collection which tracks along the L hip joint. CXR pulm congestion.    She recieved a dose of Vanc/Zosyn, 2L IVF

## 2025-01-17 NOTE — DISCHARGE NOTE PROVIDER - CARE PROVIDERS DIRECT ADDRESSES
Start Losartan 12.5 mg (1/2 tab) at bedtime   Continue monitoring blood pressure at home and notify the office if readings are consistently over 140 or below 100 or if you develop dizziness or lightheadedness.   Obtain new blood pressure monitor  Send blood pressure readings in about one month, around Valentines day.   Repeat labs before next appointment  Bring blood pressure machine to next appointment   Follow up in March or sooner if needed           - We always advise our patients to follow a well balanced diet, specifically a mediterranean style diet focusing on fruits, vegetables, extra virgin olive oil and fish. We suggest avoiding foods high in trans saturated fats, red meats, processed foods or those high in sugar content.  - We also recommend over 30 minutes of aerobic exercise 5 days a week (total of 150 minutes) if you can tolerate this.  - Healthy eating habits and exercise are critical to us as we work towards achieving your best health.  - Follow up as scheduled and please call with any questions or concerns that may arise.    HOME BLOOD PRESSURE LOG  Bring this log to your next appointment    NAME: _______________________________________________________________________________  DATE TIME AVG BP HEART RATE Weight Comments                                                                                                                                                         
,DirectAddress_Unknown

## 2025-07-24 NOTE — PHYSICAL THERAPY INITIAL EVALUATION ADULT - PRECAUTIONS/LIMITATIONS, REHAB EVAL
[Time Spent: ___ minutes] : I have spent [unfilled] minutes of time on the encounter which excludes teaching and separately reported services. Pt was previously admitted to [facility] from 11/27 to 12/3 for which she initially presented w/ leaking PEG tube and noted L hip ill-defined fluid collection w/ no surgical intervention at that time apart from I&D w/ minimal fluid output. During that hospitalization pt had had a CT A/P which showed a leak from the rectosigmoid anastomosis from her previous operation w/ enterococcus bacteremia requiring a MICU stay for vasopressor support in the setting of septic shock. While there pt continued to deteriorate and discussion was had w/ pt's daughter who is the healthcare surrogate and the decision was made to pursue comfort measures and home hospice. At home hospice pt continued to improve despite prior discussion of rapid deterioration. She was then restarted on Zosyn for abx coverage and allowed PO intake. Per daughter and hospice physician they decided pt improving and to present to the hospital for continued management w/ IV abx and fluids if needed.  CT A/P done showing possible RLL pna and ill defined gas containing presacral fluid collection which tracks along the L hip joint. CXR pulm congestion.  XR L hip: Soft tissue air is present in the gluteal soft tissues just superior to the left hip and overlying the left hip, consistent with history of wound/collection. Hip joint spaces are maintained. Sacroiliac joints are intact.